# Patient Record
Sex: MALE | Race: WHITE | NOT HISPANIC OR LATINO | Employment: UNEMPLOYED | URBAN - METROPOLITAN AREA
[De-identification: names, ages, dates, MRNs, and addresses within clinical notes are randomized per-mention and may not be internally consistent; named-entity substitution may affect disease eponyms.]

---

## 2019-03-08 ENCOUNTER — OFFICE VISIT (OUTPATIENT)
Dept: URGENT CARE | Facility: URGENT CARE | Age: 34
End: 2019-03-08
Payer: MEDICAID

## 2019-03-08 VITALS — TEMPERATURE: 98.6 F | HEART RATE: 105 BPM | SYSTOLIC BLOOD PRESSURE: 117 MMHG | DIASTOLIC BLOOD PRESSURE: 72 MMHG

## 2019-03-08 DIAGNOSIS — S61.259A DOG BITE OF FINGER, INITIAL ENCOUNTER: Primary | ICD-10-CM
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
## 2019-03-08 PROCEDURE — 99203 OFFICE O/P NEW LOW 30 MIN: CPT

## 2019-03-08 RX ORDER — DOXYCYCLINE 100 MG/1
100 TABLET ORAL 2 TIMES DAILY
Qty: 14 TABLET | Refills: 0 | Status: SHIPPED | OUTPATIENT
Start: 2019-03-08 | End: 2019-03-15

## 2019-03-08 RX ORDER — NAPROXEN 500 MG/1
500 TABLET ORAL 2 TIMES DAILY WITH MEALS
Qty: 30 TABLET | Refills: 1 | Status: SHIPPED | OUTPATIENT
Start: 2019-03-08 | End: 2019-11-04

## 2019-03-08 RX ORDER — CLINDAMYCIN HCL 300 MG
300 CAPSULE ORAL 3 TIMES DAILY
Qty: 21 CAPSULE | Refills: 0 | Status: SHIPPED | OUTPATIENT
Start: 2019-03-08 | End: 2019-03-15

## 2019-03-09 NOTE — PATIENT INSTRUCTIONS
STOP Cephalexin.    Take antibiotics (Doxycycline + Clindamycin) as directed.    Follow-up with hand specialist.    Seek immediate medical attention if you develop fever, increasing swelling/redness, wound discharge, nausea/vomiting, diarrhea, or any other unusual symptoms.      Patient Education     Dog Bite  A dog bite can cause a wound deep enough to break the skin. In such cases, the wound is cleaned and sometimes closed. If the wound is closed, it is usually not completely closed. This is so that fluid can drain if the wound becomes infected. Often, wounds will be left open to heal. In addition to wound care, a tetanus shot may be given, if needed.    Home care    Wash your hands well with soap and warm water before and after caring for the wound. This helps lower the risk of infection.    Care for the wound as directed. If a dressing was applied to the wound, be sure to change it as directed.    If the wound bleeds, place a clean, soft cloth on the wound. Then firmly apply pressure until the bleeding stops. This may take up to 5 minutes. Do not release the pressure and look at the wound during this time.    Most wounds heal within 10 days. But an infection can occur even with proper treatment. So be sure to check the wound daily for signs of infection (see below).    Antibiotics may be prescribed. These help prevent or treat infection. If you re given antibiotics, take them as directed. Also be sure to complete the medicines.  Rabies prevention  Rabies is a virus that can be carried in certain animals. These can include domestic animals such as dogs and cats. Pets fully vaccinated against rabies (2 shots) are at very low risk of infection. But because human rabies is almost always fatal, any biting pet should be confined for 10 days as an extra precaution. In general, if there is a risk for rabies, the following steps may need to be taken:    If someone s pet dog has bitten you, it should be kept in a secure  area for the next 10 days to watch for signs of illness. (If the pet owner won t allow this, contact your local animal control center.) If the dog becomes ill or dies during that time, contact your local animal control center at once so the animal may be tested for rabies. If the dog stays healthy for the next 10 days, there is no danger of rabies in the animal or you.  ? If a stray dog bit you, contact your local animal control center. They can give information on capture, quarantine, and animal rabies testing.  ? If you can t find the animal that bit you in the next 2 days, and if rabies exists in your area, you may need to receive the rabies vaccine series. Call your healthcare provider right away. Or, return to the emergency department promptly.  ? All animal bites should be reported to the local animal control center. If you were not given a form to fill out, you can report this yourself.  Follow-up care  Follow up with your healthcare provider, or as directed.  When to seek medical advice  Call your healthcare provider right away if any of these occur:    Signs of infection:  ? Spreading redness or warmth from the wound  ? Increased pain or swelling  ? Fever of 100.4 F (38 C) or higher, or as directed by your healthcare provider  ? Colored fluid or pus draining from the wound    Signs of rabies infection:  ? Headache  ? Confusion  ? Strange behavior  ? Increased salivating and drooling  ? Seizure    Decreased ability to move any body part near the wound    Bleeding that can't be stopped after 5 minutes of firm pressure  Date Last Reviewed: 3/1/2017    4902-8218 [employer]. [address] [place] [address], [place] [id number]. All rights reserved. This information is not intended as a substitute for professional medical care. Always follow your healthcare professional's instructions.

## 2019-03-12 ENCOUNTER — TELEPHONE (OUTPATIENT)
Dept: ORTHOPEDICS | Facility: CLINIC | Age: 34
End: 2019-03-12

## 2019-03-12 NOTE — TELEPHONE ENCOUNTER
3/12/19 Ortho Con referral from Jonathan Tom for dog bite of rt thumb  3/12 LVM for pt to call back -LMK

## 2019-03-13 ASSESSMENT — ENCOUNTER SYMPTOMS
DIARRHEA: 0
VOMITING: 0
NAUSEA: 0
CHILLS: 0
SENSORY CHANGE: 0
FEVER: 0

## 2019-03-13 NOTE — PROGRESS NOTES
HPI    Patient comes in due to increasing pain at the right thumb after a dog bite on 3/6/2019.  There is also increasing swelling.  No redness or wound discharge.  Denies numbness/tingling.  Was seen at the ER on the same day of the dog bite and was prescribed with cephalexin and tramadol.  Patient is still in the middle of acquiring vaccination records of the dog involved in the incident.      Past Medical History:   Diagnosis Date     Anxiety      Depressive disorder      PTSD (post-traumatic stress disorder)        Review of Systems   Constitutional: Negative for chills, fever and malaise/fatigue.   Gastrointestinal: Negative for diarrhea, nausea and vomiting.   Neurological: Negative for sensory change.       /72   Pulse 105   Temp 98.6  F (37  C) (Tympanic)       Physical Exam   Constitutional: He is oriented to person, place, and time. No distress.   Pulmonary/Chest: Effort normal. No respiratory distress.   Musculoskeletal: Normal range of motion. He exhibits edema and tenderness.   Neurological: He is alert and oriented to person, place, and time. No sensory deficit.   Skin:   Scabbed punctate bite wounds at right thumb; no wound discharge   Vitals reviewed.        ICD-10-CM    1. Dog bite of finger, initial encounter S61.259A doxycycline monohydrate (ADOXA) 100 MG tablet    W54.0XXA clindamycin (CLEOCIN) 300 MG capsule     naproxen (NAPROSYN) 500 MG tablet     ORTHOPEDICS ADULT REFERRAL   **Informed patient that cephalexin is not recommended for prophylactic antibiotic treatment of dog bites; patient is allergic to amoxicillin so we cannot use Augmentin; informed the patient about the recommendation of doxycycline plus clindamycin as alternative prophylactic antibiotic treatment of his dog bite; will also start NSAIDs for pain and swelling; advised patient that surgical referral is recommended with any dog bite involving the face or hands      Patient Instructions   STOP Cephalexin.    Take  antibiotics (Doxycycline + Clindamycin) as directed.    Follow-up with hand specialist.    Seek immediate medical attention if you develop fever, increasing swelling/redness, wound discharge, nausea/vomiting, diarrhea, or any other unusual symptoms.      Patient Education     Dog Bite  A dog bite can cause a wound deep enough to break the skin. In such cases, the wound is cleaned and sometimes closed. If the wound is closed, it is usually not completely closed. This is so that fluid can drain if the wound becomes infected. Often, wounds will be left open to heal. In addition to wound care, a tetanus shot may be given, if needed.    Home care    Wash your hands well with soap and warm water before and after caring for the wound. This helps lower the risk of infection.    Care for the wound as directed. If a dressing was applied to the wound, be sure to change it as directed.    If the wound bleeds, place a clean, soft cloth on the wound. Then firmly apply pressure until the bleeding stops. This may take up to 5 minutes. Do not release the pressure and look at the wound during this time.    Most wounds heal within 10 days. But an infection can occur even with proper treatment. So be sure to check the wound daily for signs of infection (see below).    Antibiotics may be prescribed. These help prevent or treat infection. If you re given antibiotics, take them as directed. Also be sure to complete the medicines.  Rabies prevention  Rabies is a virus that can be carried in certain animals. These can include domestic animals such as dogs and cats. Pets fully vaccinated against rabies (2 shots) are at very low risk of infection. But because human rabies is almost always fatal, any biting pet should be confined for 10 days as an extra precaution. In general, if there is a risk for rabies, the following steps may need to be taken:    If someone s pet dog has bitten you, it should be kept in a secure area for the next 10 days  to watch for signs of illness. (If the pet owner won t allow this, contact your local animal control center.) If the dog becomes ill or dies during that time, contact your local animal control center at once so the animal may be tested for rabies. If the dog stays healthy for the next 10 days, there is no danger of rabies in the animal or you.  ? If a stray dog bit you, contact your local animal control center. They can give information on capture, quarantine, and animal rabies testing.  ? If you can t find the animal that bit you in the next 2 days, and if rabies exists in your area, you may need to receive the rabies vaccine series. Call your healthcare provider right away. Or, return to the emergency department promptly.  ? All animal bites should be reported to the local animal control center. If you were not given a form to fill out, you can report this yourself.  Follow-up care  Follow up with your healthcare provider, or as directed.  When to seek medical advice  Call your healthcare provider right away if any of these occur:    Signs of infection:  ? Spreading redness or warmth from the wound  ? Increased pain or swelling  ? Fever of 100.4 F (38 C) or higher, or as directed by your healthcare provider  ? Colored fluid or pus draining from the wound    Signs of rabies infection:  ? Headache  ? Confusion  ? Strange behavior  ? Increased salivating and drooling  ? Seizure    Decreased ability to move any body part near the wound    Bleeding that can't be stopped after 5 minutes of firm pressure  Date Last Reviewed: 3/1/2017    1813-5747 [employer]. [address] [place] [address], [place] [address]. All rights reserved. This information is not intended as a substitute for professional medical care. Always follow your healthcare professional's instructions.

## 2019-09-18 ENCOUNTER — NURSE TRIAGE (OUTPATIENT)
Dept: NURSING | Facility: CLINIC | Age: 34
End: 2019-09-18

## 2019-09-19 NOTE — TELEPHONE ENCOUNTER
"Girlfriend calling, brandin gave ok to talk with her. At 5pm got home from errands, states Brandin \"kind of passed out\" in the car \"twice\". Easily roused, all day feeling dizzy, and \"scatter brained\" and light headed. Patient talking in background seems to have delayed speech. Does not want to go d/t insurance issues and only works part time, but GF got him to agree to go.     Jasmyn Herrera RN  [facility]      Reason for Disposition    Difficult to awaken or acting confused (e.g., disoriented, slurred speech)    Additional Information    Negative: Still unconscious    Protocols used: [id number]-G-CB      " Routine  care and anticipatory guidance

## 2019-09-21 ENCOUNTER — NURSE TRIAGE (OUTPATIENT)
Dept: NURSING | Facility: CLINIC | Age: 34
End: 2019-09-21

## 2019-09-21 NOTE — TELEPHONE ENCOUNTER
Brandin is having cognitive issues.  Girlfriend told him was apparently unconscious and took a while to awaken.  Brandin is having trouble communicating.  Wednesday September 18th of this week is when Brandin was found slumping down on couch.  Brandin is going to go to ED as he is having troubles communicating and is wanting ED to be aware of this as he is not able to sit long.  Brandin feels he may have had a stroke.    Reason for Disposition    Headache  (and neurologic deficit)    Additional Information    Negative: [1] SEVERE weakness (i.e., unable to walk or barely able to walk, requires support) AND [2] new onset or worsening    Negative: [1] Numbness (i.e., loss of sensation) of the face, arm / hand, or leg / foot on one side of the body AND [2] sudden onset AND [3] present now    Negative: [1] Loss of speech or garbled speech AND [2] sudden onset AND [3] present now    Negative: Difficult to awaken or acting confused (e.g., disoriented, slurred speech)    Negative: Sounds like a life-threatening emergency to the triager    Negative: [1] Weakness (i.e., paralysis, loss of muscle strength) of the face, arm / hand, or leg / foot on one side of the body AND [2] sudden onset AND [3] present now    Protocols used: NEUROLOGIC DEFICIT-A-AH

## 2019-11-04 ENCOUNTER — HOSPITAL ENCOUNTER (INPATIENT)
Facility: CLINIC | Age: 34
LOS: 2 days | Discharge: HOME | End: 2019-11-06
Attending: EMERGENCY MEDICINE | Admitting: INTERNAL MEDICINE
Payer: MEDICAID

## 2019-11-04 DIAGNOSIS — R11.2 INTRACTABLE VOMITING WITH NAUSEA, UNSPECIFIED VOMITING TYPE: ICD-10-CM

## 2019-11-04 DIAGNOSIS — E80.6 CONJUGATED HYPERBILIRUBINEMIA: ICD-10-CM

## 2019-11-04 DIAGNOSIS — K92.0 HEMATEMESIS WITH NAUSEA: ICD-10-CM

## 2019-11-04 DIAGNOSIS — K70.10 ALCOHOLIC HEPATITIS WITHOUT ASCITES (H): ICD-10-CM

## 2019-11-04 DIAGNOSIS — F32.A DEPRESSION, UNSPECIFIED DEPRESSION TYPE: ICD-10-CM

## 2019-11-04 DIAGNOSIS — F10.920 ALCOHOLIC INTOXICATION WITHOUT COMPLICATION (H): ICD-10-CM

## 2019-11-04 DIAGNOSIS — R45.851 SUICIDAL THOUGHTS: ICD-10-CM

## 2019-11-04 LAB
ALBUMIN SERPL-MCNC: 5 G/DL (ref 3.4–5)
ALBUMIN UR-MCNC: 30 MG/DL
ALP SERPL-CCNC: 126 U/L (ref 40–150)
ALT SERPL W P-5'-P-CCNC: 85 U/L (ref 0–70)
ANION GAP SERPL CALCULATED.3IONS-SCNC: 27 MMOL/L (ref 3–14)
APPEARANCE UR: CLEAR
AST SERPL W P-5'-P-CCNC: 124 U/L (ref 0–45)
BASOPHILS # BLD AUTO: 0.1 10E9/L (ref 0–0.2)
BASOPHILS NFR BLD AUTO: 1 %
BILIRUB DIRECT SERPL-MCNC: 1.1 MG/DL (ref 0–0.2)
BILIRUB SERPL-MCNC: 2.5 MG/DL (ref 0.2–1.3)
BILIRUB UR QL STRIP: NEGATIVE
BUN SERPL-MCNC: 12 MG/DL (ref 7–30)
CALCIUM SERPL-MCNC: 9.3 MG/DL (ref 8.5–10.1)
CHLORIDE SERPL-SCNC: 97 MMOL/L (ref 94–109)
CO2 SERPL-SCNC: 12 MMOL/L (ref 20–32)
COLOR UR AUTO: YELLOW
CREAT SERPL-MCNC: 0.86 MG/DL (ref 0.66–1.25)
DIFFERENTIAL METHOD BLD: ABNORMAL
EOSINOPHIL # BLD AUTO: 0 10E9/L (ref 0–0.7)
EOSINOPHIL NFR BLD AUTO: 0 %
ERYTHROCYTE [DISTWIDTH] IN BLOOD BY AUTOMATED COUNT: 14.4 % (ref 10–15)
ETHANOL SERPL-MCNC: 0.31 G/DL
GFR SERPL CREATININE-BSD FRML MDRD: >90 ML/MIN/{1.73_M2}
GLUCOSE SERPL-MCNC: 78 MG/DL (ref 70–99)
GLUCOSE UR STRIP-MCNC: NEGATIVE MG/DL
HCT VFR BLD AUTO: 52.5 % (ref 40–53)
HGB BLD-MCNC: 14.6 G/DL (ref 13.3–17.7)
HGB BLD-MCNC: 19 G/DL (ref 13.3–17.7)
HGB UR QL STRIP: NEGATIVE
HYALINE CASTS #/AREA URNS LPF: 7 /LPF (ref 0–2)
KETONES BLD-SCNC: 5 MMOL/L (ref 0–0.6)
KETONES UR STRIP-MCNC: >150 MG/DL
LACTATE BLD-SCNC: 10.3 MMOL/L (ref 0.7–2)
LEUKOCYTE ESTERASE UR QL STRIP: NEGATIVE
LIPASE SERPL-CCNC: 363 U/L (ref 73–393)
LYMPHOCYTES # BLD AUTO: 1.1 10E9/L (ref 0.8–5.3)
LYMPHOCYTES NFR BLD AUTO: 11 %
MCH RBC QN AUTO: 37.7 PG (ref 26.5–33)
MCHC RBC AUTO-ENTMCNC: 36.2 G/DL (ref 31.5–36.5)
MCV RBC AUTO: 104 FL (ref 78–100)
MONOCYTES # BLD AUTO: 0.3 10E9/L (ref 0–1.3)
MONOCYTES NFR BLD AUTO: 3 %
MUCOUS THREADS #/AREA URNS LPF: PRESENT /LPF
NEUTROPHILS # BLD AUTO: 8.8 10E9/L (ref 1.6–8.3)
NEUTROPHILS NFR BLD AUTO: 85 %
NITRATE UR QL: NEGATIVE
PH UR STRIP: 5 PH (ref 5–7)
PLATELET # BLD AUTO: 287 10E9/L (ref 150–450)
PLATELET # BLD EST: ABNORMAL 10*3/UL
POTASSIUM SERPL-SCNC: 3.9 MMOL/L (ref 3.4–5.3)
PROT SERPL-MCNC: 9.3 G/DL (ref 6.8–8.8)
RBC # BLD AUTO: 5.04 10E12/L (ref 4.4–5.9)
RBC #/AREA URNS AUTO: <1 /HPF (ref 0–2)
RBC MORPH BLD: ABNORMAL
SODIUM SERPL-SCNC: 136 MMOL/L (ref 133–144)
SOURCE: ABNORMAL
SP GR UR STRIP: 1.02 (ref 1–1.03)
SQUAMOUS #/AREA URNS AUTO: <1 /HPF (ref 0–1)
UROBILINOGEN UR STRIP-MCNC: NORMAL MG/DL (ref 0–2)
WBC # BLD AUTO: 10.4 10E9/L (ref 4–11)
WBC #/AREA URNS AUTO: <1 /HPF (ref 0–5)

## 2019-11-04 PROCEDURE — 81001 URINALYSIS AUTO W/SCOPE: CPT | Performed by: EMERGENCY MEDICINE

## 2019-11-04 PROCEDURE — 25000128 H RX IP 250 OP 636: Performed by: INTERNAL MEDICINE

## 2019-11-04 PROCEDURE — C9113 INJ PANTOPRAZOLE SODIUM, VIA: HCPCS | Performed by: INTERNAL MEDICINE

## 2019-11-04 PROCEDURE — 99285 EMERGENCY DEPT VISIT HI MDM: CPT | Mod: 25

## 2019-11-04 PROCEDURE — 83690 ASSAY OF LIPASE: CPT | Performed by: EMERGENCY MEDICINE

## 2019-11-04 PROCEDURE — 96361 HYDRATE IV INFUSION ADD-ON: CPT

## 2019-11-04 PROCEDURE — 25000132 ZZH RX MED GY IP 250 OP 250 PS 637: Performed by: NURSE PRACTITIONER

## 2019-11-04 PROCEDURE — 25800030 ZZH RX IP 258 OP 636: Performed by: INTERNAL MEDICINE

## 2019-11-04 PROCEDURE — 25000128 H RX IP 250 OP 636: Performed by: NURSE PRACTITIONER

## 2019-11-04 PROCEDURE — 83605 ASSAY OF LACTIC ACID: CPT | Performed by: INTERNAL MEDICINE

## 2019-11-04 PROCEDURE — HZ2ZZZZ DETOXIFICATION SERVICES FOR SUBSTANCE ABUSE TREATMENT: ICD-10-PCS | Performed by: EMERGENCY MEDICINE

## 2019-11-04 PROCEDURE — 99223 1ST HOSP IP/OBS HIGH 75: CPT | Mod: AI | Performed by: INTERNAL MEDICINE

## 2019-11-04 PROCEDURE — 25800030 ZZH RX IP 258 OP 636: Performed by: EMERGENCY MEDICINE

## 2019-11-04 PROCEDURE — 25000128 H RX IP 250 OP 636: Performed by: EMERGENCY MEDICINE

## 2019-11-04 PROCEDURE — 85018 HEMOGLOBIN: CPT | Performed by: INTERNAL MEDICINE

## 2019-11-04 PROCEDURE — 25000132 ZZH RX MED GY IP 250 OP 250 PS 637: Performed by: INTERNAL MEDICINE

## 2019-11-04 PROCEDURE — 96375 TX/PRO/DX INJ NEW DRUG ADDON: CPT

## 2019-11-04 PROCEDURE — 12000000 ZZH R&B MED SURG/OB

## 2019-11-04 PROCEDURE — 80076 HEPATIC FUNCTION PANEL: CPT | Performed by: EMERGENCY MEDICINE

## 2019-11-04 PROCEDURE — 25800030 ZZH RX IP 258 OP 636: Performed by: NURSE PRACTITIONER

## 2019-11-04 PROCEDURE — 36415 COLL VENOUS BLD VENIPUNCTURE: CPT | Performed by: INTERNAL MEDICINE

## 2019-11-04 PROCEDURE — 25000125 ZZHC RX 250: Performed by: EMERGENCY MEDICINE

## 2019-11-04 PROCEDURE — 82010 KETONE BODYS QUAN: CPT | Performed by: EMERGENCY MEDICINE

## 2019-11-04 PROCEDURE — 99291 CRITICAL CARE FIRST HOUR: CPT | Performed by: NURSE PRACTITIONER

## 2019-11-04 PROCEDURE — 96365 THER/PROPH/DIAG IV INF INIT: CPT

## 2019-11-04 PROCEDURE — 80320 DRUG SCREEN QUANTALCOHOLS: CPT | Performed by: EMERGENCY MEDICINE

## 2019-11-04 PROCEDURE — 25000132 ZZH RX MED GY IP 250 OP 250 PS 637: Performed by: EMERGENCY MEDICINE

## 2019-11-04 PROCEDURE — 85025 COMPLETE CBC W/AUTO DIFF WBC: CPT | Performed by: EMERGENCY MEDICINE

## 2019-11-04 PROCEDURE — 80048 BASIC METABOLIC PNL TOTAL CA: CPT | Performed by: EMERGENCY MEDICINE

## 2019-11-04 RX ORDER — LORAZEPAM 2 MG/ML
1-2 INJECTION INTRAMUSCULAR EVERY 30 MIN PRN
Status: DISCONTINUED | OUTPATIENT
Start: 2019-11-04 | End: 2019-11-06 | Stop reason: HOSPADM

## 2019-11-04 RX ORDER — PANTOPRAZOLE SODIUM 40 MG/1
40 TABLET, DELAYED RELEASE ORAL ONCE
Status: COMPLETED | OUTPATIENT
Start: 2019-11-04 | End: 2019-11-04

## 2019-11-04 RX ORDER — LIDOCAINE 40 MG/G
CREAM TOPICAL
Status: DISCONTINUED | OUTPATIENT
Start: 2019-11-04 | End: 2019-11-06 | Stop reason: HOSPADM

## 2019-11-04 RX ORDER — MULTIPLE VITAMINS W/ MINERALS TAB 9MG-400MCG
1 TAB ORAL DAILY
Status: DISCONTINUED | OUTPATIENT
Start: 2019-11-05 | End: 2019-11-06 | Stop reason: HOSPADM

## 2019-11-04 RX ORDER — PROCHLORPERAZINE MALEATE 5 MG
10 TABLET ORAL EVERY 6 HOURS PRN
Status: DISCONTINUED | OUTPATIENT
Start: 2019-11-04 | End: 2019-11-06 | Stop reason: HOSPADM

## 2019-11-04 RX ORDER — PROCHLORPERAZINE 25 MG
25 SUPPOSITORY, RECTAL RECTAL EVERY 12 HOURS PRN
Status: DISCONTINUED | OUTPATIENT
Start: 2019-11-04 | End: 2019-11-06 | Stop reason: HOSPADM

## 2019-11-04 RX ORDER — FOLIC ACID 1 MG/1
1 TABLET ORAL DAILY
Status: DISCONTINUED | OUTPATIENT
Start: 2019-11-05 | End: 2019-11-06 | Stop reason: HOSPADM

## 2019-11-04 RX ORDER — POTASSIUM CHLORIDE 29.8 MG/ML
20 INJECTION INTRAVENOUS
Status: DISCONTINUED | OUTPATIENT
Start: 2019-11-04 | End: 2019-11-06 | Stop reason: HOSPADM

## 2019-11-04 RX ORDER — POTASSIUM CHLORIDE 1.5 G/1.58G
20-40 POWDER, FOR SOLUTION ORAL
Status: DISCONTINUED | OUTPATIENT
Start: 2019-11-04 | End: 2019-11-06 | Stop reason: HOSPADM

## 2019-11-04 RX ORDER — ONDANSETRON 2 MG/ML
4 INJECTION INTRAMUSCULAR; INTRAVENOUS EVERY 6 HOURS PRN
Status: DISCONTINUED | OUTPATIENT
Start: 2019-11-04 | End: 2019-11-06 | Stop reason: HOSPADM

## 2019-11-04 RX ORDER — NICOTINE 21 MG/24HR
1 PATCH, TRANSDERMAL 24 HOURS TRANSDERMAL DAILY
Status: DISCONTINUED | OUTPATIENT
Start: 2019-11-04 | End: 2019-11-06 | Stop reason: HOSPADM

## 2019-11-04 RX ORDER — LORAZEPAM 2 MG/ML
1 INJECTION INTRAMUSCULAR ONCE
Status: COMPLETED | OUTPATIENT
Start: 2019-11-04 | End: 2019-11-04

## 2019-11-04 RX ORDER — GABAPENTIN 300 MG/1
300 CAPSULE ORAL 3 TIMES DAILY
Status: DISCONTINUED | OUTPATIENT
Start: 2019-11-04 | End: 2019-11-06 | Stop reason: HOSPADM

## 2019-11-04 RX ORDER — POTASSIUM CHLORIDE 7.45 MG/ML
10 INJECTION INTRAVENOUS
Status: DISCONTINUED | OUTPATIENT
Start: 2019-11-04 | End: 2019-11-06 | Stop reason: HOSPADM

## 2019-11-04 RX ORDER — SODIUM CHLORIDE, SODIUM LACTATE, POTASSIUM CHLORIDE, CALCIUM CHLORIDE 600; 310; 30; 20 MG/100ML; MG/100ML; MG/100ML; MG/100ML
INJECTION, SOLUTION INTRAVENOUS CONTINUOUS
Status: DISCONTINUED | OUTPATIENT
Start: 2019-11-04 | End: 2019-11-06 | Stop reason: HOSPADM

## 2019-11-04 RX ORDER — ONDANSETRON 4 MG/1
4 TABLET, ORALLY DISINTEGRATING ORAL EVERY 6 HOURS PRN
Status: DISCONTINUED | OUTPATIENT
Start: 2019-11-04 | End: 2019-11-06 | Stop reason: HOSPADM

## 2019-11-04 RX ORDER — LANOLIN ALCOHOL/MO/W.PET/CERES
100 CREAM (GRAM) TOPICAL DAILY
Status: DISCONTINUED | OUTPATIENT
Start: 2019-11-05 | End: 2019-11-06 | Stop reason: HOSPADM

## 2019-11-04 RX ORDER — NALOXONE HYDROCHLORIDE 0.4 MG/ML
.1-.4 INJECTION, SOLUTION INTRAMUSCULAR; INTRAVENOUS; SUBCUTANEOUS
Status: DISCONTINUED | OUTPATIENT
Start: 2019-11-04 | End: 2019-11-06 | Stop reason: HOSPADM

## 2019-11-04 RX ORDER — ONDANSETRON 2 MG/ML
4 INJECTION INTRAMUSCULAR; INTRAVENOUS ONCE
Status: COMPLETED | OUTPATIENT
Start: 2019-11-04 | End: 2019-11-04

## 2019-11-04 RX ORDER — POTASSIUM CL/LIDO/0.9 % NACL 10MEQ/0.1L
10 INTRAVENOUS SOLUTION, PIGGYBACK (ML) INTRAVENOUS
Status: DISCONTINUED | OUTPATIENT
Start: 2019-11-04 | End: 2019-11-06 | Stop reason: HOSPADM

## 2019-11-04 RX ORDER — LORAZEPAM 1 MG/1
1-2 TABLET ORAL EVERY 30 MIN PRN
Status: DISCONTINUED | OUTPATIENT
Start: 2019-11-04 | End: 2019-11-06 | Stop reason: HOSPADM

## 2019-11-04 RX ORDER — POTASSIUM CHLORIDE 1500 MG/1
20-40 TABLET, EXTENDED RELEASE ORAL
Status: DISCONTINUED | OUTPATIENT
Start: 2019-11-04 | End: 2019-11-06 | Stop reason: HOSPADM

## 2019-11-04 RX ORDER — CEFTRIAXONE 1 G/1
1 INJECTION, POWDER, FOR SOLUTION INTRAMUSCULAR; INTRAVENOUS EVERY 24 HOURS
Status: DISCONTINUED | OUTPATIENT
Start: 2019-11-04 | End: 2019-11-06

## 2019-11-04 RX ADMIN — ONDANSETRON 4 MG: 2 INJECTION INTRAMUSCULAR; INTRAVENOUS at 20:49

## 2019-11-04 RX ADMIN — PANTOPRAZOLE SODIUM 40 MG: 40 INJECTION, POWDER, FOR SOLUTION INTRAVENOUS at 21:25

## 2019-11-04 RX ADMIN — SODIUM CHLORIDE 1000 ML: 9 INJECTION, SOLUTION INTRAVENOUS at 16:15

## 2019-11-04 RX ADMIN — Medication 1 LOZENGE: at 21:24

## 2019-11-04 RX ADMIN — LORAZEPAM 1 MG: 1 TABLET ORAL at 20:30

## 2019-11-04 RX ADMIN — NICOTINE 1 PATCH: 21 PATCH, EXTENDED RELEASE TRANSDERMAL at 19:02

## 2019-11-04 RX ADMIN — CEFTRIAXONE SODIUM 1 G: 1 INJECTION, POWDER, FOR SOLUTION INTRAMUSCULAR; INTRAVENOUS at 20:26

## 2019-11-04 RX ADMIN — SODIUM CHLORIDE, POTASSIUM CHLORIDE, SODIUM LACTATE AND CALCIUM CHLORIDE: 600; 310; 30; 20 INJECTION, SOLUTION INTRAVENOUS at 23:27

## 2019-11-04 RX ADMIN — GABAPENTIN 300 MG: 300 CAPSULE ORAL at 21:25

## 2019-11-04 RX ADMIN — ONDANSETRON 4 MG: 2 INJECTION INTRAMUSCULAR; INTRAVENOUS at 14:53

## 2019-11-04 RX ADMIN — LORAZEPAM 2 MG: 2 INJECTION, SOLUTION INTRAMUSCULAR; INTRAVENOUS at 23:25

## 2019-11-04 RX ADMIN — LORAZEPAM 1 MG: 2 INJECTION, SOLUTION INTRAMUSCULAR; INTRAVENOUS at 16:09

## 2019-11-04 RX ADMIN — PANTOPRAZOLE SODIUM 40 MG: 40 TABLET, DELAYED RELEASE ORAL at 14:53

## 2019-11-04 RX ADMIN — OCTREOTIDE ACETATE 50 MCG/HR: 200 INJECTION, SOLUTION INTRAVENOUS; SUBCUTANEOUS at 21:14

## 2019-11-04 RX ADMIN — PROCHLORPERAZINE EDISYLATE 10 MG: 5 INJECTION, SOLUTION INTRAMUSCULAR; INTRAVENOUS at 18:38

## 2019-11-04 RX ADMIN — SODIUM CHLORIDE, POTASSIUM CHLORIDE, SODIUM LACTATE AND CALCIUM CHLORIDE 2970 ML: 600; 310; 30; 20 INJECTION, SOLUTION INTRAVENOUS at 20:05

## 2019-11-04 RX ADMIN — LORAZEPAM 2 MG: 2 INJECTION, SOLUTION INTRAMUSCULAR; INTRAVENOUS at 21:24

## 2019-11-04 RX ADMIN — FOLIC ACID: 5 INJECTION, SOLUTION INTRAMUSCULAR; INTRAVENOUS; SUBCUTANEOUS at 17:37

## 2019-11-04 RX ADMIN — SODIUM CHLORIDE 1000 ML: 9 INJECTION, SOLUTION INTRAVENOUS at 14:53

## 2019-11-04 ASSESSMENT — ACTIVITIES OF DAILY LIVING (ADL)
BATHING: 0-->INDEPENDENT
FALL_HISTORY_WITHIN_LAST_SIX_MONTHS: NO
TRANSFERRING: 0-->INDEPENDENT
TOILETING: 0-->INDEPENDENT
DRESS: 0-->INDEPENDENT
ADLS_ACUITY_SCORE: 15
RETIRED_COMMUNICATION: 0-->UNDERSTANDS/COMMUNICATES WITHOUT DIFFICULTY
RETIRED_EATING: 0-->INDEPENDENT
SWALLOWING: 0-->SWALLOWS FOODS/LIQUIDS WITHOUT DIFFICULTY
COGNITION: 0 - NO COGNITION ISSUES REPORTED
AMBULATION: 0-->INDEPENDENT

## 2019-11-04 ASSESSMENT — ENCOUNTER SYMPTOMS
NAUSEA: 1
VOMITING: 1
SHORTNESS OF BREATH: 0
DYSPHORIC MOOD: 1

## 2019-11-04 NOTE — PHARMACY-ADMISSION MEDICATION HISTORY
Admission medication history interview status for the 11/4/2019  admission is complete. See EPIC admission navigator for prior to admission medications     Medication history source reliability:Moderate    Actions taken by pharmacist (provider contacted, etc):None     Additional medication history information not noted on PTA med list :None    Medication reconciliation/reorder completed by provider prior to medication history?     Prior to Admission medications    None

## 2019-11-04 NOTE — ED TRIAGE NOTES
Pt originally brought to room 25 - while receiving report it was evident to me that the pt needs to be in the mental health area if possible- transferred to 18. Report given to MG

## 2019-11-04 NOTE — ED NOTES
"[facility]  ED Nurse Handoff Report    ED Chief complaint: Hematemesis      ED Diagnosis:   Final diagnoses:   Alcoholic intoxication without complication (H)   Alcoholic hepatitis without ascites   Conjugated hyperbilirubinemia   Intractable vomiting with nausea, unspecified vomiting type   Hematemesis with nausea   Depression, unspecified depression type   Suicidal thoughts       Code Status: Full Code    Allergies:   Allergies   Allergen Reactions     Amoxicillin      Bactrim [Sulfamethoxazole W-Trimethoprim]        Activity level - Baseline/Home:  Independent  Activity Level - Current:   Independent    Patient's Preferred language: english     Needed?: No    Isolation: No  Infection: Not Applicable  Bariatric?: No    Vital Signs:   Vitals:    11/04/19 1600 11/04/19 1620 11/04/19 1630 11/04/19 1700   BP: (!) 154/107 (!) 160/101 135/87 (!) 160/101   Pulse: 157  103 108   Resp:       Temp:       TempSrc:       SpO2: 96% 96% 97% 96%       Cardiac Rhythm: ,        Pain level: 0-10 Pain Scale: 5    Is this patient confused?: No   Does this patient have a guardian?  No         If yes, is there guardianship documents in the Epic \"Code/ACP\" activity?  N/A         Guardian Notified?  N/A  [place] - Suicide Severity Rating Scale Completed?  Yes  If yes, what color did the patient score?  Red    Patient Report: Initial Complaint: vomiting  Focused Assessment:  34 year old male, with history of alcohol abuse, alcohol withdrawal and anxiety amongst others, who presents alone for evaluation of noted hematemesis since this morning. Patient does admit that he is a chronic drinker, 1 pint of vodka daily and admits to some alcohol this morning, but endorses that it was less than he normally drinks  but noted that his emesis was dark in hue with some bright red blood, which prompted him to come to ED.Patient also admits to long, complicated history of mental health issues and domestic issues. He states " that he has had ongoing suicidal ideation since the death of his daughter 12 years ago.   I interviewed the patient 2 additional times after the initial intake, the patient denies current suicidal ideation.  He notes that even when he thinks transiently about suicide, he generally does not want to go down that pathway and does not spend any time planning. Pt does not require sitter. Pt will need psych eval after medically stable     Tests Performed: labs  Abnormal Results:   Results for orders placed or performed during the hospital encounter of 11/04/19   CBC with platelets differential     Status: Abnormal   Result Value Ref Range    WBC 10.4 4.0 - 11.0 10e9/L    RBC Count 5.04 4.4 - 5.9 10e12/L    Hemoglobin 19.0 (H) 13.3 - 17.7 g/dL    Hematocrit 52.5 40.0 - 53.0 %     (H) 78 - 100 fl    MCH 37.7 (H) 26.5 - 33.0 pg    MCHC 36.2 31.5 - 36.5 g/dL    RDW 14.4 10.0 - 15.0 %    Platelet Count 287 150 - 450 10e9/L    Diff Method Manual Differential     % Neutrophils 85.0 %    % Lymphocytes 11.0 %    % Monocytes 3.0 %    % Eosinophils 0.0 %    % Basophils 1.0 %    Absolute Neutrophil 8.8 (H) 1.6 - 8.3 10e9/L    Absolute Lymphocytes 1.1 0.8 - 5.3 10e9/L    Absolute Monocytes 0.3 0.0 - 1.3 10e9/L    Absolute Eosinophils 0.0 0.0 - 0.7 10e9/L    Absolute Basophils 0.1 0.0 - 0.2 10e9/L    RBC Morphology Consistent with reported results     Platelet Estimate       Automated count confirmed.  Platelet morphology is normal.   Basic metabolic panel     Status: Abnormal   Result Value Ref Range    Sodium 136 133 - 144 mmol/L    Potassium 3.9 3.4 - 5.3 mmol/L    Chloride 97 94 - 109 mmol/L    Carbon Dioxide 12 (L) 20 - 32 mmol/L    Anion Gap 27 (H) 3 - 14 mmol/L    Glucose 78 70 - 99 mg/dL    Urea Nitrogen 12 7 - 30 mg/dL    Creatinine 0.86 0.66 - 1.25 mg/dL    GFR Estimate >90 >60 mL/min/[1.73_m2]    GFR Estimate If Black >90 >60 mL/min/[1.73_m2]    Calcium 9.3 8.5 - 10.1 mg/dL   Hepatic panel     Status: Abnormal    Result Value Ref Range    Bilirubin Direct 1.1 (H) 0.0 - 0.2 mg/dL    Bilirubin Total 2.5 (H) 0.2 - 1.3 mg/dL    Albumin 5.0 3.4 - 5.0 g/dL    Protein Total 9.3 (H) 6.8 - 8.8 g/dL    Alkaline Phosphatase 126 40 - 150 U/L    ALT 85 (H) 0 - 70 U/L     (H) 0 - 45 U/L   Lipase     Status: None   Result Value Ref Range    Lipase 363 73 - 393 U/L   Alcohol level blood     Status: Abnormal   Result Value Ref Range    Ethanol g/dL 0.31 (HH) <0.01 g/dL   UA with Microscopic     Status: Abnormal   Result Value Ref Range    Color Urine Yellow     Appearance Urine Clear     Glucose Urine Negative NEG^Negative mg/dL    Bilirubin Urine Negative NEG^Negative    Ketones Urine >150 (A) NEG^Negative mg/dL    Specific Gravity Urine 1.021 1.003 - 1.035    Blood Urine Negative NEG^Negative    pH Urine 5.0 5.0 - 7.0 pH    Protein Albumin Urine 30 (A) NEG^Negative mg/dL    Urobilinogen mg/dL Normal 0.0 - 2.0 mg/dL    Nitrite Urine Negative NEG^Negative    Leukocyte Esterase Urine Negative NEG^Negative    Source Midstream Urine     WBC Urine <1 0 - 5 /HPF    RBC Urine <1 0 - 2 /HPF    Squamous Epithelial /HPF Urine <1 0 - 1 /HPF    Mucous Urine Present (A) NEG^Negative /LPF    Hyaline Casts 7 (H) 0 - 2 /LPF   Ketone Beta-Hydroxybutyrate Quantitative     Status: Abnormal   Result Value Ref Range    Ketone Quantitative 5.0 (HH) 0.0 - 0.6 mmol/L       Treatments provided: fluids    Family Comments: no family here    OBS brochure/video discussed/provided to patient/family: No              Name of person given brochure if not patient:               Relationship to patient:     ED Medications:   Medications   prochlorperazine (COMPAZINE) injection 10 mg (10 mg Intravenous Not Given 11/4/19 3568)   0.9% sodium chloride BOLUS (0 mLs Intravenous Stopped 11/4/19 1612)   pantoprazole (PROTONIX) EC tablet 40 mg (40 mg Oral Given 11/4/19 1453)   ondansetron (ZOFRAN) injection 4 mg (4 mg Intravenous Given 11/4/19 1453)   0.9% sodium chloride  BOLUS (1,000 mLs Intravenous New Bag 11/4/19 1615)   dextrose 5% and 0.9% NaCl 1,000 mL with INFUVITE ADULT 10 mL, thiamine 100 mg, folic acid 1 mg, magnesium sulfate 2 g infusion ( Intravenous New Bag 11/4/19 8616)   LORazepam (ATIVAN) injection 1 mg (1 mg Intravenous Given 11/4/19 1602)       Drips infusing?:  Yes    For the majority of the shift this patient was Green.   Interventions performed were .    Severe Sepsis OR Septic Shock Diagnosis Present: No    To be done/followed up on inpatient unit:      ED NURSE PHONE NUMBER: [phone]

## 2019-11-04 NOTE — ED NOTES
DATE:  11/4/2019   TIME OF RECEIPT FROM LAB:  5245  LAB TEST:  Serum Ketone  LAB VALUE:  5.0  RESULTS GIVEN WITH READ-BACK TO (PROVIDER): Tremayne Saul/ Dr. Anguiano  TIME LAB VALUE REPORTED TO PROVIDER:   0040

## 2019-11-04 NOTE — ED NOTES
Bed: ED18  Expected date:   Expected time:   Means of arrival:   Comments:  Room 25, bloody emesis also run risk

## 2019-11-04 NOTE — LETTER
[place] [address] [facility]  [address] [place] [address]  [phone]          November 6, 2019    RE:  Brandin Rodriguez                                                                                                                                                       [address]  [facility] [address]            To whom it may concern:    Brandin Rodriguez was under my professional care from 11/4/19-11/6/19.  He may return to work on 11/8/19 without any restrictions       Sincerely,        Jerod Arzate DO
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
## 2019-11-05 LAB
ALBUMIN SERPL-MCNC: 3.1 G/DL (ref 3.4–5)
ALBUMIN SERPL-MCNC: 3.4 G/DL (ref 3.4–5)
ALP SERPL-CCNC: 65 U/L (ref 40–150)
ALP SERPL-CCNC: 76 U/L (ref 40–150)
ALT SERPL W P-5'-P-CCNC: 43 U/L (ref 0–70)
ALT SERPL W P-5'-P-CCNC: 53 U/L (ref 0–70)
ANION GAP SERPL CALCULATED.3IONS-SCNC: 15 MMOL/L (ref 3–14)
ANION GAP SERPL CALCULATED.3IONS-SCNC: 7 MMOL/L (ref 3–14)
AST SERPL W P-5'-P-CCNC: 54 U/L (ref 0–45)
AST SERPL W P-5'-P-CCNC: 70 U/L (ref 0–45)
BASE DEFICIT BLDV-SCNC: 10.4 MMOL/L
BILIRUB SERPL-MCNC: 2.6 MG/DL (ref 0.2–1.3)
BILIRUB SERPL-MCNC: 3.3 MG/DL (ref 0.2–1.3)
BUN SERPL-MCNC: 6 MG/DL (ref 7–30)
BUN SERPL-MCNC: 7 MG/DL (ref 7–30)
CALCIUM SERPL-MCNC: 7.9 MG/DL (ref 8.5–10.1)
CALCIUM SERPL-MCNC: 8 MG/DL (ref 8.5–10.1)
CHLORIDE SERPL-SCNC: 101 MMOL/L (ref 94–109)
CHLORIDE SERPL-SCNC: 102 MMOL/L (ref 94–109)
CO2 SERPL-SCNC: 16 MMOL/L (ref 20–32)
CO2 SERPL-SCNC: 23 MMOL/L (ref 20–32)
CREAT SERPL-MCNC: 0.7 MG/DL (ref 0.66–1.25)
CREAT SERPL-MCNC: 0.73 MG/DL (ref 0.66–1.25)
ERYTHROCYTE [DISTWIDTH] IN BLOOD BY AUTOMATED COUNT: 14.7 % (ref 10–15)
GFR SERPL CREATININE-BSD FRML MDRD: >90 ML/MIN/{1.73_M2}
GFR SERPL CREATININE-BSD FRML MDRD: >90 ML/MIN/{1.73_M2}
GLUCOSE SERPL-MCNC: 137 MG/DL (ref 70–99)
GLUCOSE SERPL-MCNC: 157 MG/DL (ref 70–99)
HCO3 BLDV-SCNC: 14 MMOL/L (ref 21–28)
HCT VFR BLD AUTO: 38.3 % (ref 40–53)
HGB BLD-MCNC: 13.5 G/DL (ref 13.3–17.7)
LACTATE BLD-SCNC: 0.9 MMOL/L (ref 0.7–2)
LACTATE BLD-SCNC: 5.1 MMOL/L (ref 0.7–2)
MAGNESIUM SERPL-MCNC: 2.2 MG/DL (ref 1.6–2.3)
MCH RBC QN AUTO: 36.4 PG (ref 26.5–33)
MCHC RBC AUTO-ENTMCNC: 35.2 G/DL (ref 31.5–36.5)
MCV RBC AUTO: 103 FL (ref 78–100)
OXYHGB MFR BLDV: 87 %
PCO2 BLDV: 27 MM HG (ref 40–50)
PH BLDV: 7.33 PH (ref 7.32–7.43)
PLATELET # BLD AUTO: 142 10E9/L (ref 150–450)
PO2 BLDV: 57 MM HG (ref 25–47)
POTASSIUM SERPL-SCNC: 4.1 MMOL/L (ref 3.4–5.3)
POTASSIUM SERPL-SCNC: 4.5 MMOL/L (ref 3.4–5.3)
PROT SERPL-MCNC: 5.6 G/DL (ref 6.8–8.8)
PROT SERPL-MCNC: 6.1 G/DL (ref 6.8–8.8)
RBC # BLD AUTO: 3.71 10E12/L (ref 4.4–5.9)
SODIUM SERPL-SCNC: 132 MMOL/L (ref 133–144)
SODIUM SERPL-SCNC: 132 MMOL/L (ref 133–144)
UPPER GI ENDOSCOPY: NORMAL
WBC # BLD AUTO: 7.6 10E9/L (ref 4–11)

## 2019-11-05 PROCEDURE — 25000128 H RX IP 250 OP 636: Performed by: INTERNAL MEDICINE

## 2019-11-05 PROCEDURE — 82805 BLOOD GASES W/O2 SATURATION: CPT | Performed by: NURSE PRACTITIONER

## 2019-11-05 PROCEDURE — 36415 COLL VENOUS BLD VENIPUNCTURE: CPT | Performed by: NURSE PRACTITIONER

## 2019-11-05 PROCEDURE — 43235 EGD DIAGNOSTIC BRUSH WASH: CPT | Performed by: INTERNAL MEDICINE

## 2019-11-05 PROCEDURE — 80053 COMPREHEN METABOLIC PANEL: CPT | Performed by: INTERNAL MEDICINE

## 2019-11-05 PROCEDURE — 80053 COMPREHEN METABOLIC PANEL: CPT | Performed by: NURSE PRACTITIONER

## 2019-11-05 PROCEDURE — 25000132 ZZH RX MED GY IP 250 OP 250 PS 637: Performed by: NURSE PRACTITIONER

## 2019-11-05 PROCEDURE — 99232 SBSQ HOSP IP/OBS MODERATE 35: CPT | Performed by: INTERNAL MEDICINE

## 2019-11-05 PROCEDURE — 85027 COMPLETE CBC AUTOMATED: CPT | Performed by: INTERNAL MEDICINE

## 2019-11-05 PROCEDURE — 83605 ASSAY OF LACTIC ACID: CPT | Performed by: NURSE PRACTITIONER

## 2019-11-05 PROCEDURE — 25000128 H RX IP 250 OP 636: Performed by: NURSE PRACTITIONER

## 2019-11-05 PROCEDURE — 25800030 ZZH RX IP 258 OP 636: Performed by: NURSE PRACTITIONER

## 2019-11-05 PROCEDURE — 25000125 ZZHC RX 250: Performed by: INTERNAL MEDICINE

## 2019-11-05 PROCEDURE — 99207 ZZC NON-BILLABLE SERV PER CHARTING: CPT | Performed by: PSYCHIATRY & NEUROLOGY

## 2019-11-05 PROCEDURE — 25000132 ZZH RX MED GY IP 250 OP 250 PS 637: Performed by: INTERNAL MEDICINE

## 2019-11-05 PROCEDURE — 25000132 ZZH RX MED GY IP 250 OP 250 PS 637: Performed by: PSYCHIATRY & NEUROLOGY

## 2019-11-05 PROCEDURE — 0DJ08ZZ INSPECTION OF UPPER INTESTINAL TRACT, VIA NATURAL OR ARTIFICIAL OPENING ENDOSCOPIC: ICD-10-PCS | Performed by: INTERNAL MEDICINE

## 2019-11-05 PROCEDURE — C9113 INJ PANTOPRAZOLE SODIUM, VIA: HCPCS | Performed by: INTERNAL MEDICINE

## 2019-11-05 PROCEDURE — G0500 MOD SEDAT ENDO SERVICE >5YRS: HCPCS | Performed by: INTERNAL MEDICINE

## 2019-11-05 PROCEDURE — 83735 ASSAY OF MAGNESIUM: CPT | Performed by: NURSE PRACTITIONER

## 2019-11-05 PROCEDURE — 12000000 ZZH R&B MED SURG/OB

## 2019-11-05 PROCEDURE — 36415 COLL VENOUS BLD VENIPUNCTURE: CPT | Performed by: INTERNAL MEDICINE

## 2019-11-05 RX ORDER — NALOXONE HYDROCHLORIDE 0.4 MG/ML
.1-.4 INJECTION, SOLUTION INTRAMUSCULAR; INTRAVENOUS; SUBCUTANEOUS
Status: DISCONTINUED | OUTPATIENT
Start: 2019-11-05 | End: 2019-11-06 | Stop reason: HOSPADM

## 2019-11-05 RX ORDER — LIDOCAINE 40 MG/G
CREAM TOPICAL
Status: DISCONTINUED | OUTPATIENT
Start: 2019-11-05 | End: 2019-11-05 | Stop reason: HOSPADM

## 2019-11-05 RX ORDER — FLUMAZENIL 0.1 MG/ML
0.2 INJECTION, SOLUTION INTRAVENOUS
Status: ACTIVE | OUTPATIENT
Start: 2019-11-05 | End: 2019-11-06

## 2019-11-05 RX ORDER — FENTANYL CITRATE 50 UG/ML
INJECTION, SOLUTION INTRAMUSCULAR; INTRAVENOUS PRN
Status: DISCONTINUED | OUTPATIENT
Start: 2019-11-05 | End: 2019-11-05 | Stop reason: HOSPADM

## 2019-11-05 RX ORDER — MIRTAZAPINE 7.5 MG/1
7.5 TABLET, FILM COATED ORAL AT BEDTIME
Status: DISCONTINUED | OUTPATIENT
Start: 2019-11-05 | End: 2019-11-06

## 2019-11-05 RX ADMIN — SODIUM CHLORIDE, POTASSIUM CHLORIDE, SODIUM LACTATE AND CALCIUM CHLORIDE: 600; 310; 30; 20 INJECTION, SOLUTION INTRAVENOUS at 08:00

## 2019-11-05 RX ADMIN — GABAPENTIN 300 MG: 300 CAPSULE ORAL at 10:06

## 2019-11-05 RX ADMIN — GABAPENTIN 300 MG: 300 CAPSULE ORAL at 16:23

## 2019-11-05 RX ADMIN — PANTOPRAZOLE SODIUM 40 MG: 40 INJECTION, POWDER, FOR SOLUTION INTRAVENOUS at 21:39

## 2019-11-05 RX ADMIN — ONDANSETRON 4 MG: 2 INJECTION INTRAMUSCULAR; INTRAVENOUS at 03:15

## 2019-11-05 RX ADMIN — SODIUM CHLORIDE, POTASSIUM CHLORIDE, SODIUM LACTATE AND CALCIUM CHLORIDE: 600; 310; 30; 20 INJECTION, SOLUTION INTRAVENOUS at 15:42

## 2019-11-05 RX ADMIN — NICOTINE 1 PATCH: 21 PATCH, EXTENDED RELEASE TRANSDERMAL at 10:06

## 2019-11-05 RX ADMIN — LIDOCAINE HYDROCHLORIDE 30 ML: 20 SOLUTION ORAL; TOPICAL at 16:23

## 2019-11-05 RX ADMIN — CEFTRIAXONE SODIUM 1 G: 1 INJECTION, POWDER, FOR SOLUTION INTRAMUSCULAR; INTRAVENOUS at 21:40

## 2019-11-05 RX ADMIN — LIDOCAINE HYDROCHLORIDE 30 ML: 20 SOLUTION ORAL; TOPICAL at 21:39

## 2019-11-05 RX ADMIN — QUETIAPINE 12.5 MG: 25 TABLET, FILM COATED ORAL at 22:48

## 2019-11-05 RX ADMIN — SODIUM CHLORIDE, POTASSIUM CHLORIDE, SODIUM LACTATE AND CALCIUM CHLORIDE: 600; 310; 30; 20 INJECTION, SOLUTION INTRAVENOUS at 23:11

## 2019-11-05 RX ADMIN — GABAPENTIN 300 MG: 300 CAPSULE ORAL at 22:48

## 2019-11-05 RX ADMIN — PANTOPRAZOLE SODIUM 40 MG: 40 INJECTION, POWDER, FOR SOLUTION INTRAVENOUS at 10:06

## 2019-11-05 ASSESSMENT — ACTIVITIES OF DAILY LIVING (ADL)
ADLS_ACUITY_SCORE: 10

## 2019-11-05 NOTE — CONSULTS
[facility]  Gastroenterology Consultation         Brandin Rodriguez  [address]  [place] [id number]  34 year old male    Admission Date/Time: 11/4/2019  Primary Care Provider: Kee Horn  Referring / Attending Physician:  Dr. Jerod Arzate\    We were asked to see the patient in consultation by Dr. Jerod Arzate for evaluation of GI bleed, hematemesis and possible melena.      CC: hematemesis    HPI:  Brandin Rodriguez is a 34 year old male who has a 12 or more year history of alcohol abuse. States drinks 1 pint of alcohol daily, States yesterday vomited numerous time at least 5 and since being at [facility] has vomited another 4 times. States had pink colored vomit and then dark coffee ground emesis. States thinks he has dark colored stool as well. He denies fevers, chills, chest pain, SOB, lightheadedness, diarrhea, constipation, heartburn, or abdominal pain.      He has a significant medical history of anxiety, depression, and PTSD. States he believes is alcohol abuse stems from death of daughter 12 years ago. He also has a significant other that is an alcoholic and has bipolar disorder and he is concerned for his safety.    Labs reveal hyponatremia ( Na 132), Creat 0.73, bilirubin of 2.6, slightly elevated AST at 70, Lactic acid 5.1 and has improved to 0.9, normal lipase, ethanol level on presentation 0.31, WBC 7.6, hemoglobin 13.5, platelets still pending (287 yesterday). Patient is afebrile now, BP around 110-120 systolic.    ROS: A comprehensive ten point review of systems was negative aside from those in mentioned in the HPI.      PAST MED HX:  I have reviewed this patient's medical history and updated it with pertinent information if needed.   Past Medical History:   Diagnosis Date     Anxiety      Depressive disorder      PTSD (post-traumatic stress disorder)        MEDICATIONS:   None       ALLERGIES:   Allergies   Allergen Reactions     Amoxicillin       Bactrim [Sulfamethoxazole W-Trimethoprim]        SOCIAL HISTORY:  Social History     Tobacco Use     Smoking status: Current Every Day Smoker     Packs/day: 1.00   Substance Use Topics     Alcohol use: Yes     Comment: pint a day     Drug use: Yes     Types: Marijuana     Comment: occ       FAMILY HISTORY:  History reviewed. No pertinent family history.    PHYSICAL EXAM:   General  Alert, oriented and   Vital Signs with Ranges  Temp: 98  F (36.7  C) Temp src: Oral BP: 117/82 Pulse: 120 Heart Rate: 107 Resp: 18 SpO2: 95 % O2 Device: None (Room air)    I/O last 3 completed shifts:  In: 3742 [I.V.:3742]  Out: 850 [Emesis/NG output:850]    Constitutional: alert, moderate distress and cooperative   Cardiovascular: negative, PMI normal. No lifts, heaves, or thrills. RRR. No murmurs, clicks gallops or rub  Respiratory: negative, Percussion normal. Good diaphragmatic excursion. Lungs clear  Head: Normocephalic. No masses, lesions, tenderness or abnormalities  Neck: Neck supple. No adenopathy. Thyroid symmetric, normal size,, Carotids without bruits.  Abdomen: Abdomen soft, non-tender. BS normal. No masses, organomegaly          ADDITIONAL COMMENTS:   I reviewed the patient's new clinical lab test results.   Recent Labs   Lab Test 11/05/19 0819 11/04/19  2158 11/04/19  1454   WBC 7.6  --  10.4   HGB 13.5 14.6 19.0*   *  --  104*   *  --  287     Recent Labs   Lab Test 11/05/19 0819 11/05/19  0023 11/04/19  1454   POTASSIUM 4.1 4.5 3.9   CHLORIDE 102 101 97   CO2 23 16* 12*   BUN 7 6* 12   ANIONGAP 7 15* 27*     Recent Labs   Lab Test 11/05/19  0819 11/05/19  0023 11/04/19  1650 11/04/19  1454   ALBUMIN 3.1* 3.4  --  5.0   BILITOTAL 3.3* 2.6*  --  2.5*   ALT 43 53  --  85*   AST 54* 70*  --  124*   PROTEIN  --   --  30*  --    LIPASE  --   --   --  363       I reviewed the patient's new imaging results.        CONSULTATION ASSESSMENT AND PLAN:    Active Problems:  Hematemesis  Alcohol abuse  Brandin Rodriguez is  a pleasant 34 year old male with a history of hematemesis and alcohol abuse,. Hemoglobin dropped 19 to 13.5 likely related to dehydration and rehydration. There is a concern for elina alfonso tears vs PUD and unlikely esophageal varices.   - Recommend EGD today  - Keep NPO until post EGD  - Continue with IV pantoprazole  - Daily hemoglobin  - Alcohol cessation with further workup as an outpatient  - Appreciate chemical dependency and psychiatry consult           [facility].  Office: [phone]  Cell : [phone] (Dr. Elizabeth)  Cell: [phone] (Irene Cox PA-C)

## 2019-11-05 NOTE — PROGRESS NOTES
Admission    Patient arrives to room 612 via cart from ED.  Care plan note: Pt nauseous, vomited 250mL upon arrival. A&Ox4. SBA, slight tremor.    Inpatient nursing criteria listed below were met:    PCD's Documented: Yes  Skin issues/needs documented :Yes  Isolation education started/completed NA  Patient allergies verified with patient: Yes  Verified completion of [place] Risk Assessment Tool:  Yes  Verified completion of Guardianship screening tool: Yes  Fall Prevention: Care plan updated, Education given and documented Yes  Care Plan initiated: Yes  Home medications documented in belongings flowsheet: NA  Patient belongings documented in belongings flowsheet: Yes  Reminder note (belongings/ medications) placed in discharge instructions:Yes  Admission profile/ required documentation complete: No  Bedside Report Letter given and explained to patient Yes

## 2019-11-05 NOTE — PROGRESS NOTES
[facility]    Medicine Progress Note - Hospitalist Service       Date of Admission:  11/4/2019  Assessment & Plan   Brandin Rodriguez is a 34 year old male with a history of alcohol abuse who presented to the ED on 11/4/2019 with hematemesis and the desire to quit drinking    Alcohol abuse  Hepatitis, likely due to alcohol use   Anion gap metabolic acidosis 2/2 starvation ketosis   Patient admits to drinking at least 1 pint of hard alcohol a day.  In the ED his ETOH level is 0.31.  He has no prior history of hallucinations or seizures from withdrawing from alcohol but does admit to a history of seizures as a kid supposedly.  Given his alcohol use and tolerance I suspect this patient is at a high risk for alcohol withdrawal     ALT 85 and  in the ED.  Patient does note he had an ultrasound approximately 1 year ago that showed a fatty liver.  Suspect this is due to alcohol use. Improved on recheck   Patient also has a bicarb of 12 with an anion gap of 27.  His ketones were elevated at 5.0 and suspect this is due to starvation ketosis.   CIWAs elevated over night and required Ativan PRN   - CIWA with Ativan PRN   - IVF with LR   - IV multivitamins followed by PO   - Psychiatry consulted as below     Hematemesis  Possible recent melena  Presented with multiple episodes of hematemesis today at home.  States it started with his initial episode.  Also notes some melena off and on recently.  Hemoglobin is actually elevated at 19.0 and suspect this is hemo-concentrated  - Recheck hemoglobin this evening  - Continue to monitor  - IV Protonix BID   - GI consulted and appreciate their recommendations.  Plan for EGD today     Lactic acidosis  After admission met SIRS criteria.  Lactate was significantly elevated at 10.  Given IVF and resolved.  Was started on Zosyn   - Will continue Zosyn one more day and then stop and monitor     Suicidal ideation   Patient also admits to multiple stressors and states he  has been having issues with suicidal ideation since his daughter  12 years ago.  Has had thought of suicide by  and has had other ideas but denies any specific plan currently though he does have thoughts of harming himself  - Psychiatry consulted and appreciate their recommendations  - Suicide precautions with sitter         Diet: NPO per Anesthesia Guidelines for Procedure/Surgery Except for: Meds    DVT Prophylaxis: Pneumatic Compression Devices  Sosa Catheter: not present  Code Status: Full Code      Disposition Plan   Expected discharge: 3-4 days, once through withdrawal.  Disposition placement is dependent upon psychiatry evaluation   Entered: Jerod Arzate DO 2019, 12:06 PM       The patient's care was discussed with the Bedside Nurse and Patient.    Jerod Arzate DO  Hospitalist Service  [facility]    ______________________________________________________________________    Interval History   Patient seen and examined.  No acute events over night.  No fevers or chills.  No pain at this time.  Nausea improved.      Data reviewed today: I reviewed all medications, new labs and imaging results over the last 24 hours. I personally reviewed no images or EKG's today.    Physical Exam   Vital Signs: Temp: 98  F (36.7  C) Temp src: Oral BP: 117/82 Pulse: 120 Heart Rate: 107 Resp: 18 SpO2: 95 % O2 Device: None (Room air)    Weight: 218 lbs 4.09 oz  General Appearance: Sleeping but arousable.  NAD   Respiratory: Clear to auscultation.  No respiratory distress  Cardiovascular: Tachycardiac.  No obvious murmurs  GI: Bowel sounds present.  Non-tender  Skin: No obvious rashes.  No cyanosis  Other: No edema.  No calf tenderness     Data   Recent Labs   Lab 19  0819 19  0023 19  2158 19  1454   WBC 7.6  --   --  10.4   HGB 13.5  --  14.6 19.0*   *  --   --  104*   *  --   --  287   * 132*  --  136   POTASSIUM 4.1 4.5  --  3.9   CHLORIDE 102  101  --  97   CO2 23 16*  --  12*   BUN 7 6*  --  12   CR 0.70 0.73  --  0.86   ANIONGAP 7 15*  --  27*   KATHY 8.0* 7.9*  --  9.3   * 137*  --  78   ALBUMIN 3.1* 3.4  --  5.0   PROTTOTAL 5.6* 6.1*  --  9.3*   BILITOTAL 3.3* 2.6*  --  2.5*   ALKPHOS 65 76  --  126   ALT 43 53  --  85*   AST 54* 70*  --  124*   LIPASE  --   --   --  363     No results found for this or any previous visit (from the past 24 hour(s)).

## 2019-11-05 NOTE — PROGRESS NOTES
MD Notification    Notified Person: MD    Notified Person Name: Mitch Hall AIMEE    Notification Date/Time: 11/5/19 0035    Notification Interaction: Phone     Purpose of Notification: Pt lactic 5.1    Orders Received: Continue to monitor

## 2019-11-05 NOTE — PLAN OF CARE
DATE & TIME: 11/4/2019 evening shift                    Cognitive Concerns/ Orientation : A&Ox4   BEHAVIOR & AGGRESSION TOOL COLOR: Green  CIWA SCORE: 7, 11, 14        ABNL VS/O2: HTN, other VSS on RA  MOBILITY: SBA to BR  PAIN MANAGMENT: minimizing PO liquids, repositioning, ativan for anxiety  DIET: NPO  BOWEL/BLADDER: continent of B&B  ABNL LAB/BG: Lactic acid 10.3- RRT called. Recheck at 0000.ALT- 85, AST- 124 Ketone 5.0, HgB 19.0, recheck 14.9  DRAIN/DEVICES: 2 PIV Infusing in R arm  TELEMETRY RHYTHM: n/a  SKIN: WDL  TESTS/PROCEDURES: EGD tomorrow  D/C DAY/GOALS/PLACE: pending  OTHER IMPORTANT INFO: Zofran given x1, Compazine given x1. Ativan given for CIWA and nausea x2 (oral pill vomited up). Pt has had hematemesis x2 since arrival to the unit. 250mL and 600mL. Dark red/brown in color. Hx of SI and current SI, no current plan. Sitter at bedside, psych to see tomorrow.

## 2019-11-05 NOTE — PROGRESS NOTES
Current condition (current mood & behavior): calm  Sitter present: yes  Every 15 minute documentation by NA/RN completed for Shift: yes  Room safety Suicide Checklist completed in Epic: yes  Patient's color of severity (suicide scale): YELLOW  Order for psych consult placed (if appropriate): yes  Suicide care plan added: yes      Silvia Urena RN
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
## 2019-11-05 NOTE — CODE/RAPID RESPONSE
[facility]  House [facility] RRT Note  11/4/2019   Time Called: 1925  RRT called for: elevated lactic acid  Code Status: Full Code    Assessment & Plan   I was paged to the bedside to evaluate Mr. Brandin Rodriguez for an acute episode of elevated lactic acid. Patient was admitted this afternoon for alcohol withdrawal and hematemesis. Abdominal pain present, but appears to be chronic in nature. Given elevated LA and abdominal pain, we can prophylactically treat with rocephin and fluids. 3 L LR for lactic acidosis, patient does not have history of heart failure and is young, he should be able to tolerate the volume. Gabapentin to mitigate the withdrawal symptoms, continue with CIWA. Labs at midnight after boluses.     Diagnosis:  -- lactic acidosis  -- acute alcohol withdrawal    Interventions ordered/provided:  -- 3L LR bolus (30 mL/kg)  -- labs at midnight (CMP, mag, lactic acid, VBG)  -- 1 g IV rocephin  -- 300 mg PO gabapentin TID for withdrawal  -- octreotide infusion    At the conclusion of this RRT patient was hemodynamically stable and will remain on station 66. I have a low threshold to transfer the patient to a higher level of care. Please page with additional concerns.     Interval History     Mr. Brandin Rodriguez is a 34 year old male who was admitted on 11/4/2019 for alcohol withdrawal and hematemesis.    His history is significant for:  Past Medical History:   Diagnosis Date     Anxiety      Depressive disorder      PTSD (post-traumatic stress disorder)      History reviewed. No pertinent surgical history.    Allergies   Allergies   Allergen Reactions     Amoxicillin      Bactrim [Sulfamethoxazole W-Trimethoprim]        Physical Exam   Physical Exam  HENT:      Head: Normocephalic and atraumatic.      Nose: Nose normal.      Mouth/Throat:      Mouth: Mucous membranes are dry.   Eyes:      Extraocular Movements: Extraocular movements intact.      Pupils: Pupils are equal, round, and reactive to  light.   Neck:      Musculoskeletal: Normal range of motion.   Cardiovascular:      Rate and Rhythm: Regular rhythm. Tachycardia present.   Pulmonary:      Effort: Pulmonary effort is normal. No respiratory distress.      Breath sounds: Normal breath sounds.   Abdominal:      General: Abdomen is flat. There is no distension.      Tenderness: There is tenderness.   Musculoskeletal: Normal range of motion.      Right lower leg: No edema.      Left lower leg: No edema.   Skin:     General: Skin is warm and dry.      Capillary Refill: Capillary refill takes 2 to 3 seconds.      Coloration: Skin is pale.   Neurological:      General: No focal deficit present.      Mental Status: He is alert and oriented to person, place, and time. Mental status is at baseline.   Psychiatric:         Attention and Perception: Attention normal.         Mood and Affect: Mood is anxious.         Speech: Speech normal.         Behavior: Behavior normal. Behavior is cooperative.         Thought Content: Thought content normal.         Vital Signs with Ranges:  Temp:  [97.9  F (36.6  C)-98.2  F (36.8  C)] 97.9  F (36.6  C)  Pulse:  [103-157] 123  Heart Rate:  [108] 108  Resp:  [22-30] 22  BP: (135-177)/() 157/97  SpO2:  [94 %-98 %] 96 %  No intake/output data recorded.    Data     EKG:  - none    ABG:  -No lab results found in last 7 days.    Troponin:    No lab results found.    IMAGING: (X-ray/CT/MRI)   No results found for this or any previous visit (from the past 24 hour(s)).    CBC with Diff:  Recent Labs   Lab Test 11/04/19  1454   WBC 10.4   HGB 19.0*   *         No results found for: RETICABSCT  No results found for: RETP    Lactic Acid:    No results found for: LACT  Lactate for Sepsis Protocol   Date Value Ref Range Status   11/04/2019 10.3 () 0.7 - 2.0 mmol/L Final     Comment:     Critical Value called to and read back by  Renetta Castillo on [address] at 1924.SL          Comprehensive Metabolic Panel:  Recent  Labs   Lab 11/04/19  1454      POTASSIUM 3.9   CHLORIDE 97   CO2 12*   ANIONGAP 27*   GLC 78   BUN 12   CR 0.86   GFRESTIMATED >90   GFRESTBLACK >90   KATHY 9.3   PROTTOTAL 9.3*   ALBUMIN 5.0   BILITOTAL 2.5*   ALKPHOS 126   *   ALT 85*       INR:    No lab results found.    D-DIMER:  No components found for: DDIMER    BNP:  No results found for: BNP    UA:  Recent Labs   Lab 11/04/19  1650   COLOR Yellow   APPEARANCE Clear   URINEGLC Negative   URINEBILI Negative   URINEKETONE >150*   SG 1.021   UBLD Negative   URINEPH 5.0   PROTEIN 30*   NITRITE Negative   LEUKEST Negative   RBCU <1   WBCU <1       Time Spent on this Encounter   I spent 30 minutes of critical care time on the unit/floor managing the care of Brandin Rodriguez. Upon evaluation, this patient had a high probability of imminent or life-threatening deterioration due to lactic acidosis and malperfusion, which required my direct attention, intervention, and personal management. 100% of my time was spent at the bedside counseling the patient and/or coordinating care regarding services listed in this note.    OBDULIA Jimenez, CNP  Hospitalist - House [facility]  Text Page  [phone])

## 2019-11-05 NOTE — PLAN OF CARE
DATE & TIME: 11/5/2019 3644-4464    Cognitive Concerns/ Orientation : A&O4   BEHAVIOR & AGGRESSION TOOL COLOR: green  CIWA SCORE: 7,6; scoring for N/V, sweats, and anxiety   ABNL VS/O2: VSS on RA  MOBILITY: SBA in room  PAIN MANAGMENT: denies on shift   DIET: NPO   BOWEL/BLADDER: Continent of bowel and bladder- ambulates to bathroom. No BM on shift   ABNL LAB/BG: Lactic 10.3 on evening shift - recheck at midnight was 5.1. Continue to monitor per NP. Ketones 5.0   DRAIN/DEVICES: 2 PIV infusing   TELEMETRY RHYTHM: na  SKIN: CDI  TESTS/PROCEDURES: EGD 11/5  D/C DAY/GOALS/PLACE: pending clinical progress   OTHER IMPORTANT INFO: suicide precautions - sitter in room. Psych to see on 11/5. Pt denies feelings of self harm on shift. Zofran x1 given. Cell phone and  in room currently while sitter present. No emesis on shift.

## 2019-11-05 NOTE — CONSULTS
GI bleed, Hematemesis and melena.  Dehydration, ETOH use. Elevated LFT, Hb 19  Plan for EGD in AM    Jake ARECHIGA

## 2019-11-05 NOTE — PROGRESS NOTES
RECEIVING UNIT ED HANDOFF REVIEW    ED Nurse Handoff Report was reviewed by: Renetta Castillo RN on November 4, 2019 at 6:11 PM

## 2019-11-06 VITALS
BODY MASS INDEX: 27.28 KG/M2 | SYSTOLIC BLOOD PRESSURE: 135 MMHG | TEMPERATURE: 98.5 F | RESPIRATION RATE: 16 BRPM | HEART RATE: 81 BPM | WEIGHT: 218.26 LBS | DIASTOLIC BLOOD PRESSURE: 92 MMHG | OXYGEN SATURATION: 95 %

## 2019-11-06 PROCEDURE — 25000132 ZZH RX MED GY IP 250 OP 250 PS 637: Performed by: INTERNAL MEDICINE

## 2019-11-06 PROCEDURE — C9113 INJ PANTOPRAZOLE SODIUM, VIA: HCPCS | Performed by: INTERNAL MEDICINE

## 2019-11-06 PROCEDURE — 25000128 H RX IP 250 OP 636: Performed by: INTERNAL MEDICINE

## 2019-11-06 PROCEDURE — 25000125 ZZHC RX 250: Performed by: INTERNAL MEDICINE

## 2019-11-06 PROCEDURE — 25800030 ZZH RX IP 258 OP 636: Performed by: NURSE PRACTITIONER

## 2019-11-06 PROCEDURE — 25000132 ZZH RX MED GY IP 250 OP 250 PS 637: Performed by: NURSE PRACTITIONER

## 2019-11-06 PROCEDURE — 99238 HOSP IP/OBS DSCHRG MGMT 30/<: CPT | Performed by: INTERNAL MEDICINE

## 2019-11-06 PROCEDURE — 25000132 ZZH RX MED GY IP 250 OP 250 PS 637: Performed by: PSYCHIATRY & NEUROLOGY

## 2019-11-06 RX ORDER — ONDANSETRON 4 MG/1
4 TABLET, FILM COATED ORAL EVERY 8 HOURS PRN
Qty: 30 TABLET | Refills: 0 | Status: ON HOLD | OUTPATIENT
Start: 2019-11-06 | End: 2020-03-11

## 2019-11-06 RX ORDER — PANTOPRAZOLE SODIUM 40 MG/1
40 TABLET, DELAYED RELEASE ORAL 2 TIMES DAILY
Qty: 60 TABLET | Refills: 0 | Status: ON HOLD | OUTPATIENT
Start: 2019-11-06 | End: 2021-11-18

## 2019-11-06 RX ORDER — FOLIC ACID 1 MG/1
1 TABLET ORAL DAILY
Qty: 30 TABLET | Refills: 0 | Status: SHIPPED | OUTPATIENT
Start: 2019-11-07 | End: 2019-12-03

## 2019-11-06 RX ORDER — DISULFIRAM 250 MG/1
250 TABLET ORAL DAILY
Qty: 30 TABLET | Refills: 0 | Status: ON HOLD | OUTPATIENT
Start: 2019-11-06 | End: 2020-03-11

## 2019-11-06 RX ORDER — QUETIAPINE FUMARATE 25 MG/1
12.5 TABLET, FILM COATED ORAL EVERY 6 HOURS PRN
Qty: 30 TABLET | Refills: 0 | Status: ON HOLD | OUTPATIENT
Start: 2019-11-06 | End: 2020-03-11

## 2019-11-06 RX ORDER — LANOLIN ALCOHOL/MO/W.PET/CERES
100 CREAM (GRAM) TOPICAL DAILY
Qty: 30 TABLET | Refills: 0 | Status: ON HOLD | OUTPATIENT
Start: 2019-11-07 | End: 2020-12-03

## 2019-11-06 RX ORDER — MULTIPLE VITAMINS W/ MINERALS TAB 9MG-400MCG
1 TAB ORAL DAILY
Qty: 30 EACH | Refills: 0 | Status: ON HOLD | OUTPATIENT
Start: 2019-11-07 | End: 2020-12-03

## 2019-11-06 RX ORDER — MIRTAZAPINE 15 MG/1
15 TABLET, FILM COATED ORAL AT BEDTIME
Qty: 30 TABLET | Refills: 0 | Status: ON HOLD | OUTPATIENT
Start: 2019-11-06 | End: 2020-03-11

## 2019-11-06 RX ORDER — MIRTAZAPINE 15 MG/1
15 TABLET, FILM COATED ORAL AT BEDTIME
Status: DISCONTINUED | OUTPATIENT
Start: 2019-11-06 | End: 2019-11-06 | Stop reason: HOSPADM

## 2019-11-06 RX ORDER — PANTOPRAZOLE SODIUM 40 MG/1
40 TABLET, DELAYED RELEASE ORAL
Status: DISCONTINUED | OUTPATIENT
Start: 2019-11-07 | End: 2019-11-06 | Stop reason: HOSPADM

## 2019-11-06 RX ADMIN — NICOTINE 1 PATCH: 21 PATCH, EXTENDED RELEASE TRANSDERMAL at 09:45

## 2019-11-06 RX ADMIN — Medication 100 MG: at 09:39

## 2019-11-06 RX ADMIN — LIDOCAINE HYDROCHLORIDE 30 ML: 20 SOLUTION ORAL; TOPICAL at 08:02

## 2019-11-06 RX ADMIN — MIRTAZAPINE 7.5 MG: 7.5 TABLET, FILM COATED ORAL at 00:24

## 2019-11-06 RX ADMIN — PANTOPRAZOLE SODIUM 40 MG: 40 INJECTION, POWDER, FOR SOLUTION INTRAVENOUS at 09:39

## 2019-11-06 RX ADMIN — FOLIC ACID 1 MG: 1 TABLET ORAL at 09:39

## 2019-11-06 RX ADMIN — SODIUM CHLORIDE, POTASSIUM CHLORIDE, SODIUM LACTATE AND CALCIUM CHLORIDE: 600; 310; 30; 20 INJECTION, SOLUTION INTRAVENOUS at 05:42

## 2019-11-06 RX ADMIN — GABAPENTIN 300 MG: 300 CAPSULE ORAL at 09:39

## 2019-11-06 RX ADMIN — MULTIPLE VITAMINS W/ MINERALS TAB 1 TABLET: TAB at 09:39

## 2019-11-06 ASSESSMENT — ACTIVITIES OF DAILY LIVING (ADL)
ADLS_ACUITY_SCORE: 10

## 2019-11-06 NOTE — DISCHARGE INSTRUCTIONS
Pt belongings (clothes, shoes, wallet) in Locker #2 in blue pod     Please Call DR Zuleta to set up an appointment  [address], [place] [address]  Phone: [phone]    DR Barrow's office will call you for an appointment  [facility]  Office : [phone]

## 2019-11-06 NOTE — CONSULTS
[facility] Psychiatric Consult Follow-up Note      TIME SPENT IN PSYCHIATRY CONSULT: 15 MINUTES.     Interim History     The patient's care was discussed, patient seen and chart notes were reviewed. Pt examined on 11/06/19 by Dr. Yancey for a follow-up psychiatric consultation.  On interview with Dr Yancey, Patient expressed and appeared improved mood in comparison to yesterday. Patient denies any side effects from the new medications, Remeron and Seroquel, which were added yesterday. Dr Yancey will increase Remeron dose to 15 mg to continue to address patient's symptoms of depression including sleep disturbance. Patient continues to wait for CD . He is still in agreement with [facility] Intensive outpatient  Recommendation. In addition to this the medication Antabuse was discussed as a bridging measure from discharge to the start of intensive outpatient treatment to support sobriety. If Patient is unable to see inpatient CD  before discharge, recommend scheduling an outpatient CD assessment immediately after discharging.       Medications     Current Facility-Administered Medications Ordered in Epic   Medication Dose Route Frequency Last Rate Last Dose     benzocaine-menthol (CHLORASEPTIC) 6-10 MG lozenge 1 lozenge  1 lozenge Buccal Q1H PRN   1 lozenge at 11/04/19 2124     cefTRIAXone (ROCEPHIN) 1 g vial to attach to  mL bag for ADULTS or NS 50 mL bag for PEDS  1 g Intravenous Q24H 400 mL/hr at 11/04/19 2026 1 g at 11/05/19 2140     folic acid (FOLVITE) tablet 1 mg  1 mg Oral Daily         gabapentin (NEURONTIN) capsule 300 mg  300 mg Oral TID   300 mg at 11/05/19 2248     lactated ringers infusion   Intravenous Continuous 150 mL/hr at 11/06/19 0542       lidocaine (LMX4) cream   Topical Q1H PRN         lidocaine (XYLOCAINE) 2 % 15 mL, alum & mag hydroxide-simethicone (MYLANTA ES/MAALOX  ES) 15 mL GI Cocktail  30 mL Oral 4x Daily   30 mL at 11/05/19 2139      lidocaine 1 % 0.1-1 mL  0.1-1 mL Other Q1H PRN         LORazepam (ATIVAN) tablet 1-2 mg  1-2 mg Oral Q30 Min PRN   1 mg at 11/04/19 2030    Or     LORazepam (ATIVAN) injection 1-2 mg  1-2 mg Intravenous Q30 Min PRN   2 mg at 11/04/19 2325     May continue current IV fluids if patient has IV fluids infusing.   Does not apply Continuous PRN         melatonin tablet 1 mg  1 mg Oral At Bedtime PRN         mirtazapine (REMERON) tablet TABS 7.5 mg  7.5 mg Oral At Bedtime   7.5 mg at 11/06/19 0024     multivitamin w/minerals (THERA-VIT-M) tablet 1 tablet  1 tablet Oral Daily         naloxone (NARCAN) injection 0.1-0.4 mg  0.1-0.4 mg Intravenous Q2 Min PRN         naloxone (NARCAN) injection 0.1-0.4 mg  0.1-0.4 mg Intravenous Q2 Min PRN         nicotine (NICODERM CQ) 21 MG/24HR 24 hr patch 1 patch  1 patch Transdermal Daily   1 patch at 11/05/19 1006     nicotine Patch in Place   Transdermal Q8H         nicotine patch REMOVAL   Transdermal Daily         ondansetron (ZOFRAN-ODT) ODT tab 4 mg  4 mg Oral Q6H PRN        Or     ondansetron (ZOFRAN) injection 4 mg  4 mg Intravenous Q6H PRN   4 mg at 11/05/19 0315     pantoprazole (PROTONIX) 40 mg IV push injection  40 mg Intravenous BID   40 mg at 11/05/19 2139     potassium chloride (KLOR-CON) Packet 20-40 mEq  20-40 mEq Oral or Feeding Tube Q2H PRN         potassium chloride 10 mEq in 100 mL intermittent infusion with 10 mg lidocaine  10 mEq Intravenous Q1H PRN         potassium chloride 10 mEq in 100 mL sterile water intermittent infusion (premix)  10 mEq Intravenous Q1H PRN         potassium chloride 20 mEq in 50 mL intermittent infusion  20 mEq Intravenous Q1H PRN         potassium chloride ER (K-DUR/KLOR-CON M) CR tablet 20-40 mEq  20-40 mEq Oral Q2H PRN         prochlorperazine (COMPAZINE) injection 10 mg  10 mg Intravenous Q6H PRN   10 mg at 11/04/19 1838    Or     prochlorperazine (COMPAZINE) tablet 10 mg  10 mg Oral Q6H PRN        Or     prochlorperazine (COMPAZINE)  Suppository 25 mg  25 mg Rectal Q12H PRN         QUEtiapine (SEROquel) half-tab 12.5-25 mg  12.5-25 mg Oral Q2H PRN   12.5 mg at 11/05/19 2248     sodium chloride (PF) 0.9% PF flush 3 mL  3 mL Intravenous q1 min prn         sodium chloride (PF) 0.9% PF flush 3 mL  3 mL Intracatheter q1 min prn         sodium chloride (PF) 0.9% PF flush 3 mL  3 mL Intracatheter Q8H   3 mL at 11/05/19 1919     vitamin B1 (THIAMINE) tablet 100 mg  100 mg Oral Daily         No current Epic-ordered outpatient medications on file.         Allergies      Allergies   Allergen Reactions     Amoxicillin      Bactrim [Sulfamethoxazole W-Trimethoprim]         Medical Review of Systems     /73 (BP Location: Left arm)   Pulse 94   Temp 98.6  F (37  C) (Oral)   Resp 16   Wt 99 kg (218 lb 4.1 oz)   SpO2 94%   BMI 27.28 kg/m    Body mass index is 27.28 kg/m .  A 10-point review of systems was performed by Diego Zuleta MD and is negative, no new findings.      Psychiatric Examination     Appearance Lying in bed, dressed in gown. Appears stated age.   Attitude Cooperative   Orientation Oriented to person, place, time   Eye Contact Good   Speech Regular rate, rhythm, volume and tone   Language Normal   Psychomotor Behavior Normal   Mood Relieved   Affect Congruent with mood   Thought Process Goal Oriented, Intact   Associations Intact   Thought Content Negative for current suicidal ideations   Fund of Knowledge normal   Insight intact   Judgement intact   Attention Span & Concentration normal   Recent & Remote Memory normal   Gait Not assessed   Muscle Tone Intact on visual inspection       Labs     Labs reviewed.  Recent Results (from the past 24 hour(s))   Comprehensive metabolic panel    Collection Time: 11/05/19  8:19 AM   Result Value Ref Range    Sodium 132 (L) 133 - 144 mmol/L    Potassium 4.1 3.4 - 5.3 mmol/L    Chloride 102 94 - 109 mmol/L    Carbon Dioxide 23 20 - 32 mmol/L    Anion Gap 7 3 - 14 mmol/L    Glucose 157 (H) 70  - 99 mg/dL    Urea Nitrogen 7 7 - 30 mg/dL    Creatinine 0.70 0.66 - 1.25 mg/dL    GFR Estimate >90 >60 mL/min/[1.73_m2]    GFR Estimate If Black >90 >60 mL/min/[1.73_m2]    Calcium 8.0 (L) 8.5 - 10.1 mg/dL    Bilirubin Total 3.3 (H) 0.2 - 1.3 mg/dL    Albumin 3.1 (L) 3.4 - 5.0 g/dL    Protein Total 5.6 (L) 6.8 - 8.8 g/dL    Alkaline Phosphatase 65 40 - 150 U/L    ALT 43 0 - 70 U/L    AST 54 (H) 0 - 45 U/L   CBC with platelets    Collection Time: 11/05/19  8:19 AM   Result Value Ref Range    WBC 7.6 4.0 - 11.0 10e9/L    RBC Count 3.71 (L) 4.4 - 5.9 10e12/L    Hemoglobin 13.5 13.3 - 17.7 g/dL    Hematocrit 38.3 (L) 40.0 - 53.0 %     (H) 78 - 100 fl    MCH 36.4 (H) 26.5 - 33.0 pg    MCHC 35.2 31.5 - 36.5 g/dL    RDW 14.7 10.0 - 15.0 %    Platelet Count 142 (L) 150 - 450 10e9/L   Lactic acid whole blood    Collection Time: 11/05/19  8:19 AM   Result Value Ref Range    Lactic Acid 0.9 0.7 - 2.0 mmol/L   UPPER GI ENDOSCOPY    Collection Time: 11/05/19  1:57 PM   Result Value Ref Range    Upper GI Endoscopy       [facility] Endoscopy Department  _______________________________________________________________________________  Patient Name: Brandin Rodriguez          Procedure Date: 11/5/2019 1:57 PM  MRN: [id number]                       Account Number: [id number]  YOB: 1985              Admit Type: Inpatient  Age: 34                               Room: 3  Note Status: Finalized                Attending MD: Jake Elizabeth MD  Total Sedation Time:                  Instrument Name: 401 GIF- Gastroscope  _______________________________________________________________________________     Procedure:                Upper GI endoscopy  Indications:              Functional Dyspepsia, Dysphagia, Odynophagia,                             Heartburn  Providers:                Jake Elizabeth MD, Anila García, RN  Referring MD:               Medicines:                Midazolam 4 mg IV,  Fentanyl 100 micrograms IV,                             Cetacaine spray  Complicat ions:            No immediate complications.  _______________________________________________________________________________  Procedure:                Pre-Anesthesia Assessment:                            - Prior to the procedure, a History and Physical                             was performed, and patient medications and                             allergies were reviewed. The patient is competent.                             The risks and benefits of the procedure and the                             sedation options and risks were discussed with the                             patient. All questions were answered and informed                             consent was obtained. Patient identification and                             proposed procedure were verified by the physician                             in the pre-procedure area. Mental Status                             Examination: alert and oriented. Airway                             Examination: normal oropharyngeal airway  and neck                             mobility. Respiratory Examination: clear to                             auscultation. CV Examination: normal. Prophylactic                             Antibiotics: The patient does not require                             prophylactic antibiotics. Prior Anticoagulants: The                             patient has taken no previous anticoagulant or                             antiplatelet agents. ASA Grade Assessment: II - A                             patient with mild systemic disease. After reviewing                             the risks and benefits, the patient was deemed in                             satisfactory condition to undergo the procedure.                             The anesthesia plan was to use moderate sedation /                             analgesia (conscious sedation). Immediately prior                              to administration of medications, the patient was                             re-assessed for adequacy to receive sed atives. The                             heart rate, respiratory rate, oxygen saturations,                             blood pressure, adequacy of pulmonary ventilation,                             and response to care were monitored throughout the                             procedure. The physical status of the patient was                             re-assessed after the procedure.                            After obtaining informed consent, the endoscope was                             passed under direct vision. Throughout the                             procedure, the patient's blood pressure, pulse, and                             oxygen saturations were monitored continuously. The                             Colonoscope was introduced through the mouth, and                             advanced to the third part of duodenum. The upper                             GI endoscopy was accomplished without difficulty.                             The patient tolerated the procedure well.                                                                                    Findings:       LA Grade D (one or more mucosal breaks involving at least 75% of        esophageal circumference) esophagitis with no bleeding was found in the        entire esophagus.       Diffuse moderately congested mucosa was found in the entire examined        stomach.       The cardia and gastric fundus were normal on retroflexion.       The duodenal bulb, first portion of the duodenum and second portion of        the duodenum were normal.                                                                                   Impression:               - LA Grade D reflux esophagitis.                            - Congestive gastropathy.                            - Normal duodenal bulb, first portion of the                              duodenum and second portion of the duodenum.                            - No specimens collected.  Recommendation:           - Anti Relux Precautions                             - Continue present medications.                            - [facility]                                                                                   Procedure Code(s):       --- Professional ---       23991, Esophagogastroduodenoscopy, flexible, transoral; diagnostic,        including collection of specimen(s) by brushing or washing, when        performed (separate procedure)  Diagnosis Code(s):       --- Professional ---       K21.0, Gastro-esophageal reflux disease with esophagitis       K31.89, Other diseases of stomach and duodenum       K30, Functional dyspepsia       R13.10, Dysphagia, unspecified       R12, Heartburn    CPT copyright 2018 [employer] Medical [employer]. All rights reserved.    The codes documented in this report are preliminary and upon  review may   be revised to meet current compliance requirements.    Electronically Signed by Jake Huertas  __________________  Jake Elizabeth MD  11/5/2019 2:35:18 PM  I was physically presen t for the entire viewing portion of the exam.  Jake Elizabeth MD  Number of Addenda: 0    Note Initiated On: 11/5/2019 1:57 PM  MRN:                      [id number]  Procedure Date:           11/5/2019 1:57:42 PM  Total Procedure Duration: 0 hours 3 minutes 57 seconds   Estimated Blood Loss:       Scope In: 2:05:30 PM  Scope Out: 2:09:27 PM          Impression   This is a 34 year old male with a history of major depression and PTSD. Patient originally presented to [facility] ED for hematemesis. Patient seen this morning by Dr. Zuleta for a follow-up psychiatric consultation. Patient feels and appears much improved from yesterday. Patient describes better sleep last night and decreased anxiety. Dr Zuleta will increase the Remeron to 15 mg, encourage  Patient to take the Seroquel 25 mg instead of the 12.5 mg PRN. Chemical Dependency assessment was order, goal is to have it completed before discharge. If not completed inpatient before discharge Dr Yancey recommends scheduling an outpatient CD assessment  Upon discharge. Patient is in agreement with attending [facility] Outpatient treatment in [place]. Patient will be followed by Dr Yancey at [facility] and will have an appointment set up for 1-2 weeks post discharge. Upon discharge patient should be prescribed Antabuse 250 mg daily to reinforce sobriety and bridge from inpatientl to outpatient treatment.     Diagnoses     1. Major depression, recurrent, severe, without psychotic features.  2. Alcohol Use Disorder      Plan     1. Explained Side effects, benefits and complications of medications to the patient.   2. Medication changes:Increase Remeron to 15 mg  3. Encourage Patient to utilize the Seroquel 25 mg instead of the 12.5 mg PRN  4. Prescribe Antabuse 250 mg daily upon discharge  5. Discussed treatment plan with patient and team.  6. Re-consult Psychiatry as needed.  7. Schedule appointment with Dr Yancey at [facility] 1-2 weeks after discharge.  8. Anticipate Patient attending [facility] Treatment in [facility]      TIME SPENT IN PSYCHIATRY CONSULT: 15 MINUTES.    Attestation:   I, Brandin Elyssacelestino, am serving as a PA Student on 11/6/2019 to document services personally performed by Diego Yancey MD based on my observations and the provider's statements to me.      Patient ID:  Name: Brandin Rodriguez    MRN: [id number]  Admission: 11/4/2019   YOB: 1985

## 2019-11-06 NOTE — PLAN OF CARE
DATE & TIME: 11/06/19 8793-8220   Cognitive Concerns/ Orientation : A&O x4   BEHAVIOR & AGGRESSION TOOL COLOR: Green  CIWA SCORE: 2, 3   ABNL VS/O2: VSS on room air   MOBILITY: Independent  PAIN MANAGMENT: Back pain, heat packs given  DIET: Mechanical/dental soft  BOWEL/BLADDER: Continent  ABNL LAB/BG: None  DRAIN/DEVICES:  PIV x2, IVF infusing  TELEMETRY RHYTHM: NA  SKIN: Intact  TESTS/PROCEDURES: None  D/C DAY/GOALS/PLACE: Discharge pending  OTHER IMPORTANT INFO: CD/ Psych following; pt started on scheduled Remeron last night and PRN Seroquel

## 2019-11-06 NOTE — PROGRESS NOTES
[facility]  Gastroenterology Progress Note     Brandin Rodriguez MRN# [id number]   YOB: 1985 Age: 34 year old          Assessment and Plan:   Brandin Rodriguez is a pleasant 34 year old male with a history of hematemesis and alcohol abuse,. Hemoglobin dropped 19 to 13.5. Gi was consulted on 11/5/2019.    Hematemesis  Alcohol abuse  Patient has had stable hemoglobin. NO recurrent episodes of hematemesis. Underwent EGD that revealed esophagitis.   - Tolerating diet can advance to regular diet  -Switch to oral pantoprazole 40 mg daily and to continue as an outpatient  - Alcohol cessation with further workup as an outpatient  - Appreciate chemical dependency and psychiatry consult  - Ok with [facility] ARE[facility]IG[facility] to discharge patient with plans to f/u as an outpatient in 1-2 weeks          Alcoholic intoxication without complication (H)  Alcoholic hepatitis without ascites  Conjugated hyperbilirubinemia  Intractable vomiting with nausea, unspecified vomiting type  Hematemesis with nausea  Depression, unspecified depression type  Suicidal thoughts      Interval History:   no new complaints, doing well, denies chest pain, denies shortness of breath, denies abdominal pain, alert, oriented to person, place and time and doing well; no cp, sob, n/v/d, or abd pain.              Review of Systems:   C: NEGATIVE for fever, chills, change in weight  E/M: NEGATIVE for ear, mouth and throat problems  R: NEGATIVE for significant cough or SOB  CV: NEGATIVE for chest pain, palpitations or peripheral edema             Medications:   I have reviewed this patient's current medications    folic acid  1 mg Oral Daily     gabapentin  300 mg Oral TID     lidocaine viscous 2% 15 mL and maalox/mylanta w/ simethicone 15 mL (GI COCKTAIL)  30 mL Oral 4x Daily     mirtazapine  15 mg Oral At Bedtime     multivitamin w/minerals  1 tablet Oral Daily     nicotine  1 patch Transdermal Daily     nicotine   Transdermal Q8H     nicotine    Transdermal Daily     pantoprazole (PROTONIX) IV  40 mg Intravenous BID     [START ON 11/7/2019] pantoprazole  40 mg Oral QAM AC     sodium chloride (PF)  3 mL Intracatheter Q8H     vitamin B1  100 mg Oral Daily                  Physical Exam:   Vitals were reviewed  Vital Signs with Ranges  Temp:  [98.1  F (36.7  C)-98.8  F (37.1  C)] 98.5  F (36.9  C)  Pulse:  [] 81  Heart Rate:  [] 86  Resp:  [10-35] 16  BP: (128-150)/(73-96) 135/92  SpO2:  [93 %-96 %] 95 %  I/O last 3 completed shifts:  In: 2419 [P.O.:360; I.V.:2059]  Out: -   Constitutional: healthy, alert and no distress   Cardiovascular: negative, PMI normal. No lifts, heaves, or thrills. RRR. No murmurs, clicks gallops or rub  Respiratory: negative, Percussion normal. Good diaphragmatic excursion. Lungs clear  Head: Normocephalic. No masses, lesions, tenderness or abnormalities  Neck: Neck supple. No adenopathy. Thyroid symmetric, normal size,, Carotids without bruits.  Abdomen: Abdomen soft, non-tender. BS normal. No masses, organomegaly           Data:   I reviewed the patient's new clinical lab test results.   Recent Labs   Lab Test 11/05/19 0819 11/04/19  2158 11/04/19  1454   WBC 7.6  --  10.4   HGB 13.5 14.6 19.0*   *  --  104*   *  --  287     Recent Labs   Lab Test 11/05/19 0819 11/05/19  0023 11/04/19  1454   POTASSIUM 4.1 4.5 3.9   CHLORIDE 102 101 97   CO2 23 16* 12*   BUN 7 6* 12   ANIONGAP 7 15* 27*     Recent Labs   Lab Test 11/05/19 0819 11/05/19  0023 11/04/19  1650 11/04/19  1454   ALBUMIN 3.1* 3.4  --  5.0   BILITOTAL 3.3* 2.6*  --  2.5*   ALT 43 53  --  85*   AST 54* 70*  --  124*   PROTEIN  --   --  30*  --    LIPASE  --   --   --  363       I reviewed the patient's new imaging results.    All laboratory data reviewed  All imaging studies reviewed by me.    Irene Cox PA-C,  11/6/2019  [facility]  Office : [phone]  Cell: [phone] (Dr. Elizabeth)  Cell: [phone] (Irene  Kenny MIRELES)
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
## 2019-11-06 NOTE — PLAN OF CARE
DATE & TIME: 11/5/19 5447-1764  Cognitive Concerns/ Orientation : A&Ox4, calm/cooperative. Verbalized sadness of daughters passing.  BEHAVIOR & AGGRESSION TOOL COLOR: Green  CIWA SCORE: 4,   ABNL VS/O2: VSS on RA  MOBILITY: SBA  PAIN MANAGMENT: C/O abd pain. Scheduled GI cocktail given x 2.   DIET: Mechanical Soft Diet  BOWEL/BLADDER: Continent  ABNL LAB/BG: lactic now 0.9  DRAIN/DEVICES: N/A  TELEMETRY RHYTHM: N/A  SKIN: Intact, tattoos  TESTS/PROCEDURES: EGD done today  D/C DAY/GOALS/PLACE: Pending plan  OTHER IMPORTANT INFO Dr. Zuleta saw patient this evening, stated the patient no longer needed a sitter or suicide precautions. Started pt on PRN Seroquel and scheduled Remeron.

## 2019-11-06 NOTE — DISCHARGE SUMMARY
[facility]  Hospitalist Discharge Summary       Date of Admission:  11/4/2019  Date of Discharge:  11/6/2019  2:20 PM  Discharging Provider: Jerod Arzate DO      Discharge Diagnoses   1. Alcohol abuse  2. 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 Anion gap metabolic acidosis 2/2 starvation ketosis  4. Hematemesis  5. Esophagitis w/congestive gastropathy   6. Lactic acidosis due to dehydration   7. Major depressive disorder with suicidal ideation     Follow-ups Needed After Discharge   Follow-up Appointments     Follow-up and recommended labs and tests       Follow up with primary care provider, [facility],   within 2-4 weeks, for hospital follow- up. No follow up labs or test are   needed.  Follow up with Dr. Elizabeth (GI) in 1-2 weeks  Follow up with outpatient chemical dependency   Follow up with psychiatry as needed           Unresulted Labs Ordered in the Past 30 Days of this Admission     No orders found from 10/5/2019 to 11/5/2019.        Discharge Disposition   Discharged to home  Condition at discharge: Stable    Hospital Course   Alcohol abuse  Hepatitis, likely due to alcohol use   Anion gap metabolic acidosis 2/2 starvation ketosis   Patient admits to drinking at least 1 pint of hard alcohol a day.  In the ED his ETOH level is 0.31.  He has no prior history of hallucinations or seizures from withdrawing from alcohol but does admit to a history of seizures as a kid supposedly.  Given his alcohol use and tolerance I suspect this patient is at a high risk for alcohol withdrawal     ALT 85 and  in the ED.  Patient does note he had an ultrasound approximately 1 year ago that showed a fatty liver.  Suspect this is due to alcohol use. Improved on recheck   Patient also has a bicarb of 12 with an anion gap of 27.  His ketones were elevated at 5.0 and suspect this is due to starvation ketosis.   CIWAs elevated over night and required Ativan PRN   - CIWA with Ativan  PRN   - IVF with LR   - IV multivitamins followed by PO      Presented with multiple episodes of hematemesis today at home.  States it started with his initial episode.  Also notes some melena off and on recently.  Hemoglobin is actually elevated at 19.0 and suspect this is hemo-concentrated  - IV Protonix BID started and switched to PO at discharge   - GI consulted and appreciate their recommendations.  EGD done which shoed reflux esophagitis and congestive gastropathy.     Lactic acidosis  After admission met SIRS criteria.  Lactate was significantly elevated at 10.  Given IVF and resolved.  Was started on Zosyn   - Given Zosyn briefly but this was stopped without issue      Suicidal ideation   Patient also admits to multiple stressors and states he has been having issues with suicidal ideation since his daughter  12 years ago.  Has had thought of suicide by  and has had other ideas but denies any specific plan currently though he does have thoughts of harming himself  - Suicide precautions stopped by psychiatry   - Psychiatry consulted and started Remeron and PRN Seroquel.  Okay with outpatient follow up        Consultations This Hospital Stay   GASTROENTEROLOGY IP CONSULT  PSYCHIATRY IP CONSULT  CHEMICAL DEPENDENCY IP CONSULT    Code Status   Full Code    Time Spent on this Encounter   I, Jerod Arzate DO, personally saw the patient today and spent less than or equal to 30 minutes discharging this patient.       Jerod Arzate DO  [facility]  ______________________________________________________________________    Physical Exam   Vital Signs: Temp: 98.5  F (36.9  C) Temp src: Oral BP: (!) 135/92 Pulse: 81 Heart Rate: 86 Resp: 16 SpO2: 95 % O2 Device: None (Room air) Oxygen Delivery: 2 LPM  Weight: 218 lbs 4.09 oz  General Appearance: Resting comfortably.  NAD   Respiratory: Clear to auscultation.  No respiratory distress  Cardiovascular: RRR.  No murmurs  GI: Bowel sounds  present.  Non-tender  Skin: No rashes.  No cyanosis  Other: No edema.  No calf tenderness        Primary Care Physician   Kee Aponte [facility]    Discharge Orders      Reason for your hospital stay    Bloody vomit.  Alcohol use     Follow-up and recommended labs and tests     Follow up with primary care provider, Kee Horn, within 2-4 weeks, for hospital follow- up. No follow up labs or test are needed.  Follow up with Dr. Elizabeth (GI) in 1-2 weeks  Follow up with outpatient chemical dependency   Follow up with psychiatry as needed     Activity    Your activity upon discharge: activity as tolerated     Diet    Follow this diet upon discharge: Orders Placed This Encounter      Mechanical/Dental Soft Diet       Significant Results and Procedures   Most Recent 3 CBC's:  Recent Labs   Lab Test 11/05/19  0819 11/04/19  2158 11/04/19  1454   WBC 7.6  --  10.4   HGB 13.5 14.6 19.0*   *  --  104*   *  --  287     Most Recent 3 BMP's:  Recent Labs   Lab Test 11/05/19 0819 11/05/19  0023 11/04/19  1454   * 132* 136   POTASSIUM 4.1 4.5 3.9   CHLORIDE 102 101 97   CO2 23 16* 12*   BUN 7 6* 12   CR 0.70 0.73 0.86   ANIONGAP 7 15* 27*   KATHY 8.0* 7.9* 9.3   * 137* 78     Most Recent 2 LFT's:  Recent Labs   Lab Test 11/05/19 0819 11/05/19  0023   AST 54* 70*   ALT 43 53   ALKPHOS 65 76   BILITOTAL 3.3* 2.6*   , No results found for this or any previous visit.    Discharge Medications   Current Discharge Medication List      START taking these medications    Details   folic acid (FOLVITE) 1 MG tablet Take 1 tablet (1 mg) by mouth daily  Qty: 30 tablet, Refills: 0    Comments: Future refills by PCP Dr. Kee Horn with phone number [phone].  Associated Diagnoses: Alcoholic intoxication without complication (H)      mirtazapine (REMERON) 15 MG tablet Take 1 tablet (15 mg) by mouth At Bedtime  Qty: 30 tablet, Refills: 0    Comments: Future refills by  PCP Dr. Kee Diamond [facility] with phone number [phone].  Associated Diagnoses: Depression, unspecified depression type      multivitamin w/minerals (THERA-VIT-M) tablet Take 1 tablet by mouth daily  Qty: 30 each, Refills: 0    Comments: Future refills by PCP Dr. Kee Diamond [facility] with phone number [phone].  Associated Diagnoses: Alcoholic intoxication without complication (H)      ondansetron (ZOFRAN) 4 MG tablet Take 1 tablet (4 mg) by mouth every 8 hours as needed for nausea or vomiting  Qty: 30 tablet, Refills: 0    Comments: Future refills by PCP Dr. Kee Diamond [facility] with phone number [phone].  Associated Diagnoses: Alcoholic intoxication without complication (H)      pantoprazole (PROTONIX) 40 MG EC tablet Take 1 tablet (40 mg) by mouth 2 times daily  Qty: 60 tablet, Refills: 0    Comments: Future refills by PCP Dr. Kee Diamond [facility] with phone number [phone].  Associated Diagnoses: Alcoholic intoxication without complication (H)      QUEtiapine (SEROQUEL) 25 MG tablet Take 0.5 tablets (12.5 mg) by mouth every 6 hours as needed (anxienty)  Qty: 30 tablet, Refills: 0    Comments: Future refills by PCP Dr. Kee Diamond [facility] with phone number [phone].  Associated Diagnoses: Depression, unspecified depression type      vitamin B1 (THIAMINE) 100 MG tablet Take 1 tablet (100 mg) by mouth daily  Qty: 30 tablet, Refills: 0    Comments: Future refills by PCP Dr. Kee Diamond [facility] with phone number [phone].  Associated Diagnoses: Alcoholic intoxication without complication (H)           Allergies   Allergies   Allergen Reactions     Amoxicillin      Bactrim [Sulfamethoxazole W-Trimethoprim]

## 2019-11-11 ENCOUNTER — OFFICE VISIT (OUTPATIENT)
Dept: FAMILY MEDICINE | Facility: CLINIC | Age: 34
End: 2019-11-11
Payer: MEDICAID

## 2019-11-11 VITALS
HEART RATE: 101 BPM | HEIGHT: 75 IN | DIASTOLIC BLOOD PRESSURE: 80 MMHG | OXYGEN SATURATION: 94 % | SYSTOLIC BLOOD PRESSURE: 121 MMHG | TEMPERATURE: 97.3 F | WEIGHT: 223 LBS | BODY MASS INDEX: 27.73 KG/M2

## 2019-11-11 DIAGNOSIS — K70.9 LIVER DISEASE, CHRONIC, DUE TO ALCOHOL (H): ICD-10-CM

## 2019-11-11 DIAGNOSIS — F33.2 SEVERE EPISODE OF RECURRENT MAJOR DEPRESSIVE DISORDER, WITHOUT PSYCHOTIC FEATURES (H): ICD-10-CM

## 2019-11-11 DIAGNOSIS — F10.10 ALCOHOL ABUSE, EPISODIC DRINKING BEHAVIOR: Primary | ICD-10-CM

## 2019-11-11 DIAGNOSIS — K75.9 HEPATITIS: ICD-10-CM

## 2019-11-11 DIAGNOSIS — R45.851 SUICIDAL IDEATION: ICD-10-CM

## 2019-11-11 DIAGNOSIS — F41.1 GAD (GENERALIZED ANXIETY DISORDER): ICD-10-CM

## 2019-11-11 LAB
ALBUMIN UR-MCNC: 30 MG/DL
AMORPH CRY #/AREA URNS HPF: ABNORMAL /HPF
APPEARANCE UR: CLEAR
BACTERIA #/AREA URNS HPF: ABNORMAL /HPF
BASOPHILS # BLD AUTO: 0 10E9/L (ref 0–0.2)
BASOPHILS NFR BLD AUTO: 0.6 %
BILIRUB UR QL STRIP: ABNORMAL
CAOX CRY #/AREA URNS HPF: ABNORMAL /HPF
COLOR UR AUTO: ABNORMAL
DIFFERENTIAL METHOD BLD: ABNORMAL
EOSINOPHIL # BLD AUTO: 0.2 10E9/L (ref 0–0.7)
EOSINOPHIL NFR BLD AUTO: 2.8 %
ERYTHROCYTE [DISTWIDTH] IN BLOOD BY AUTOMATED COUNT: 15.7 % (ref 10–15)
GLUCOSE UR STRIP-MCNC: NEGATIVE MG/DL
HCT VFR BLD AUTO: 43.7 % (ref 40–53)
HGB BLD-MCNC: 15.3 G/DL (ref 13.3–17.7)
HGB UR QL STRIP: NEGATIVE
KETONES UR STRIP-MCNC: ABNORMAL MG/DL
LEUKOCYTE ESTERASE UR QL STRIP: NEGATIVE
LYMPHOCYTES # BLD AUTO: 1.7 10E9/L (ref 0.8–5.3)
LYMPHOCYTES NFR BLD AUTO: 24.9 %
MCH RBC QN AUTO: 36.9 PG (ref 26.5–33)
MCHC RBC AUTO-ENTMCNC: 35 G/DL (ref 31.5–36.5)
MCV RBC AUTO: 105 FL (ref 78–100)
MONOCYTES # BLD AUTO: 1.1 10E9/L (ref 0–1.3)
MONOCYTES NFR BLD AUTO: 16.2 %
MUCOUS THREADS #/AREA URNS LPF: PRESENT /LPF
NEUTROPHILS # BLD AUTO: 3.7 10E9/L (ref 1.6–8.3)
NEUTROPHILS NFR BLD AUTO: 55.5 %
NITRATE UR QL: NEGATIVE
PH UR STRIP: 5.5 PH (ref 5–7)
PLATELET # BLD AUTO: 302 10E9/L (ref 150–450)
RBC # BLD AUTO: 4.15 10E12/L (ref 4.4–5.9)
RBC #/AREA URNS AUTO: ABNORMAL /HPF
SOURCE: ABNORMAL
SP GR UR STRIP: 1.02 (ref 1–1.03)
UROBILINOGEN UR STRIP-ACNC: 2 EU/DL (ref 0.2–1)
WBC # BLD AUTO: 6.7 10E9/L (ref 4–11)
WBC #/AREA URNS AUTO: ABNORMAL /HPF

## 2019-11-11 PROCEDURE — 85025 COMPLETE CBC W/AUTO DIFF WBC: CPT | Performed by: INTERNAL MEDICINE

## 2019-11-11 PROCEDURE — 81001 URINALYSIS AUTO W/SCOPE: CPT | Performed by: INTERNAL MEDICINE

## 2019-11-11 PROCEDURE — 80053 COMPREHEN METABOLIC PANEL: CPT | Performed by: INTERNAL MEDICINE

## 2019-11-11 PROCEDURE — 99204 OFFICE O/P NEW MOD 45 MIN: CPT | Performed by: INTERNAL MEDICINE

## 2019-11-11 PROCEDURE — 36415 COLL VENOUS BLD VENIPUNCTURE: CPT | Performed by: INTERNAL MEDICINE

## 2019-11-11 ASSESSMENT — ANXIETY QUESTIONNAIRES
GAD7 TOTAL SCORE: 21
6. BECOMING EASILY ANNOYED OR IRRITABLE: NEARLY EVERY DAY
5. BEING SO RESTLESS THAT IT IS HARD TO SIT STILL: NEARLY EVERY DAY
2. NOT BEING ABLE TO STOP OR CONTROL WORRYING: NEARLY EVERY DAY
1. FEELING NERVOUS, ANXIOUS, OR ON EDGE: NEARLY EVERY DAY
IF YOU CHECKED OFF ANY PROBLEMS ON THIS QUESTIONNAIRE, HOW DIFFICULT HAVE THESE PROBLEMS MADE IT FOR YOU TO DO YOUR WORK, TAKE CARE OF THINGS AT HOME, OR GET ALONG WITH OTHER PEOPLE: VERY DIFFICULT
3. WORRYING TOO MUCH ABOUT DIFFERENT THINGS: NEARLY EVERY DAY
7. FEELING AFRAID AS IF SOMETHING AWFUL MIGHT HAPPEN: NEARLY EVERY DAY

## 2019-11-11 ASSESSMENT — MIFFLIN-ST. JEOR: SCORE: 2024.21

## 2019-11-11 ASSESSMENT — PATIENT HEALTH QUESTIONNAIRE - PHQ9
SUM OF ALL RESPONSES TO PHQ QUESTIONS 1-9: 26
5. POOR APPETITE OR OVEREATING: NEARLY EVERY DAY
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
## 2019-11-11 NOTE — NURSING NOTE
Care Coordination referral done.  Avelina said  Briseyda is off site today at another clinic but will call pt later today or tomorrow.  Patient will call us if does not hear from her by tomorrow afternoon.    Licha CASTANEDA MA

## 2019-11-12 ENCOUNTER — PATIENT OUTREACH (OUTPATIENT)
Dept: CARE COORDINATION | Facility: CLINIC | Age: 34
End: 2019-11-12

## 2019-11-12 ENCOUNTER — TELEPHONE (OUTPATIENT)
Dept: ADDICTION MEDICINE | Facility: CLINIC | Age: 34
End: 2019-11-12

## 2019-11-12 ENCOUNTER — TELEPHONE (OUTPATIENT)
Dept: PSYCHIATRY | Facility: CLINIC | Age: 34
End: 2019-11-12

## 2019-11-12 ENCOUNTER — TELEPHONE (OUTPATIENT)
Dept: FAMILY MEDICINE | Facility: CLINIC | Age: 34
End: 2019-11-12

## 2019-11-12 LAB
ALBUMIN SERPL-MCNC: 4 G/DL (ref 3.4–5)
ALP SERPL-CCNC: 106 U/L (ref 40–150)
ALT SERPL W P-5'-P-CCNC: 676 U/L (ref 0–70)
ANION GAP SERPL CALCULATED.3IONS-SCNC: 9 MMOL/L (ref 3–14)
AST SERPL W P-5'-P-CCNC: 888 U/L (ref 0–45)
BILIRUB SERPL-MCNC: 0.9 MG/DL (ref 0.2–1.3)
BUN SERPL-MCNC: 8 MG/DL (ref 7–30)
CALCIUM SERPL-MCNC: 9.2 MG/DL (ref 8.5–10.1)
CHLORIDE SERPL-SCNC: 105 MMOL/L (ref 94–109)
CO2 SERPL-SCNC: 26 MMOL/L (ref 20–32)
CREAT SERPL-MCNC: 0.73 MG/DL (ref 0.66–1.25)
GFR SERPL CREATININE-BSD FRML MDRD: >90 ML/MIN/{1.73_M2}
GLUCOSE SERPL-MCNC: 100 MG/DL (ref 70–99)
POTASSIUM SERPL-SCNC: 3.4 MMOL/L (ref 3.4–5.3)
PROT SERPL-MCNC: 7.2 G/DL (ref 6.8–8.8)
SODIUM SERPL-SCNC: 140 MMOL/L (ref 133–144)

## 2019-11-12 ASSESSMENT — ANXIETY QUESTIONNAIRES: GAD7 TOTAL SCORE: 21

## 2019-11-12 ASSESSMENT — ACTIVITIES OF DAILY LIVING (ADL): DEPENDENT_IADLS:: INDEPENDENT

## 2019-11-12 NOTE — TELEPHONE ENCOUNTER
Pt is scheduled with Dr. Fisher on 11/21/19 @ 2:45 @ [facility] . Closing encounter as no further follow up is needed.     Dennise Kamara  [facility]

## 2019-11-12 NOTE — TELEPHONE ENCOUNTER
"Spoke to pt in depth.  Pt is struggling with ETOH.   Pt states he can't go to hospital cause of bills he has to figure out how to pay his rent tonight.  Is asking for help with managing life things/help with figuiring out bills/etc.  Advised SW called this am, and will route back for them to recall - pt will have phone on him.    Advised ED/UC per Dr. Ortiz's note, discussed elevated liver levels and importance of being seen.    Pt mentions he was going to go to [place] to see about borrowing money from parents. Parents do not know about recent hospitalization, but would be supportive, but pt states \"don't want to put that on them.\"  Encouraged pt to share with his parents, and that if he's planning to go to [place], to obstain from ETOH, and that they have hospital too.     Pt states he knows, will think about it and will plan to be seen by tomorrow AM.  Pt does DENY any SI    Shima Girard RN     "

## 2019-11-12 NOTE — TELEPHONE ENCOUNTER
Please schedule appointment for patient with   Addiction Medicine Provider   For alcohol use.

## 2019-11-12 NOTE — PROGRESS NOTES
Clinic Care Coordination Contact    Clinic Care Coordination Contact  OUTREACH    Referral Information:  Referral Source: PCP    Primary Diagnosis: Dual -  MH/CD    Chief Complaint   Patient presents with     Clinic Care Coordination - Initial      Universal Utilization: 2 hospital visits in the past 90 days.  Utilization    Last refreshed: 11/12/2019  3:40 PM:  Hospital Admissions 1           Last refreshed: 11/12/2019  3:40 PM:  ED Visits 0           Last refreshed: 11/12/2019  3:40 PM:  No Show Count (past year) 0              Current as of: 11/12/2019  3:40 PM            Clinical Concerns:  CC LUIS spoke with pt regarding his overall wellbeing. PT shared that she is depressed and has financial worries.    He spoke with a triage nurse that advised him to go to the ED due to elevated liver levels. He stated understanding that the numbers don't sound good. He understands his alcohol use and lack of eating is likely making this worse but he thinks he will probably continue drinking at least minimally tonight. LUZ MARINA SMITH spoke with him about the concerns of this decision but he explained that he worries about the danger of withdrawal symptoms. He understands that the ED could assist with withdrawal symptoms as well. LUZ MARINA SMITH inquired about what would need to happen for him to make to the choice to go into the hospital and he said if he were actually feeling sick he would go in.    Psychosocial:  Pt shared that the main factor that is contributing to his depression is the death of his daughter 12 years ago. Her birthday in 11/16 and this is historically a very hard day for him. He explained that he typically goes to [place] to visit her grave and spend time with family on her birthday and on the date of her accident. He missed her accident last winter because he fell on the ice and he will be missing her birthday because he has to work. He plans to visit her grave on 11/18. Pt shared that he has participating in therapy in  the past which was helpful for a while but when he would start to feel better he would stop going and then his mood would slide again.    Pt has set up several appts today to address his mental health and chemical health needs. 11/21 he has an appointment with Psychology and Addiction Medicine. He has an appointment for a Rule 25 in January, he hopes to get into outpatient treatment.    Financial/Insurance:   Pt is now enrolled in MA. He is currently working at [employer] at a . He only gets hours on the weekends and is unable to pay his bills with this. He plans to speak with his parents tonight about money from rent. He feels despair about his job because he doesn't think he can emotionally handle more hours, especially if he gets into a treatment program.    CC SW spoke with pt about potential for [facility] support, cash and SNAP. Pt explained that he struggles with follow through on completing applications. CC SW shared that the [facility] can assist with this. Pt was unable to continue conversation at this time due to needing to start requesting financial support from family. CC SW will outreach to pt again to further discuss this.    Living Situation:  Current living arrangement:: I live in a private home  with girlfriend  Type of residence:: Apartment    Diet/Exercise/Sleep:  Pt stated that he is not eating much due to depression. He shared that he does have food at home but he is unable to get the motivation to eat. He related this to a memory of his daughter being taken off her feeding tube prior to life support ending.    Transportation:  Transportation concerns (GOAL):: No  Transportation means:: Regular car     Resources and Interventions:  Community Resources: OP Mental Health, Drug Abuse  Referrals Placed: Mental Health     Goals:      Goals        Patient Stated      1. Mental Health Management (pt-stated)      Goal Statement: Within the next 2-3 months, I would like to work with Psychiatry, Addiction  Medicine, and CD treatment to support my mental health symptoms and overall wellbeing.     Measure of Success: Brandin will verbally inform CC SW of success    Supportive Steps to Achieve: Care Coordination  will assist provide resources, treatment options and programs, and assisting to find appropriate community providers.    Barriers: Difficulty coping with financial stressors and employment concerns    Strengths: Motivated to address mental health concerns    Date to Achieve By: 2/12/2020    Patient expressed understanding of goal: Brandin verbally stated understanding of goal            Patient/Caregiver understanding: Pt verbally stated understanding     Future Appointments              In 1 week Tricia Cummings LGSW [facility], [facility]    In 1 week Arturo Fisher MD [place] [facility], [facility]    In 3 weeks Mireya Navarrete MD [facility], [facility]      Plan: CC SW will follow up in 2 weeks to further discuss pt's goals. Pt was reminded of CC SW contact information and encouraged to call with questions or concerns that arise before next outreach.    DENNY Gtz, [facility]  Clinic Care Coordinator  [facility]  [phone]  [email or web address]

## 2019-11-12 NOTE — TELEPHONE ENCOUNTER
Please review referral. Please route back to reception pool #[id number].    Thank you,    Dennise Kamara  [facility]

## 2019-11-12 NOTE — LETTER
[facility]  Complex Care Plan  About Me:    Patient Name:  Brandin Rodriguez    YOB: 1985  Age:         34 year old   [place] MRN:    [id number] Telephone Information:  Home Phone [phone]   Mobile [phone]       Address:  [address]  [place] [address] Email address:  No e-mail address on record      Emergency Contact(s)    Name Relationship Lgl Lamonte Work Phone Home Phone Mobile Phone   HARDEEP PEREZ Mother   [phone]            Primary language:  English     needed? No   [employer]:  [phone] op. 1  Other communication barriers: None  Preferred Method of Communication:     Current living arrangement: I live in a private home with spouse(lives with girlfriend)  Mobility Status/ Medical Equipment: Independent    Health Maintenance  Health Maintenance Reviewed: Up to date    My Access Plan  Medical Emergency 911   Primary Clinic Line [facility] - [phone]   24 Hour Appointment Line [phone] or  [phone] ([phone]) (toll-free)   24 Hour Nurse Line [phone] (toll-free)   Preferred Urgent Care [facility], [phone]   Preferred Hospital [facility]  [phone]   Preferred Pharmacy [id number] [place], [place] - [address] AT [address] [place] [address]     Behavioral Health Crisis Line The National Suicide Prevention Lifeline at 1-517.177.2930 or 911             My Care Team Members  Patient Care Team       Relationship Specialty Notifications Start End    Earlene Whelan MD PCP - General Internal Medicine  11/11/19     Phone: [phone] [address] [place] [address]    Briseyda Cross LGSW Lead Care Coordinator Primary Care - CC  11/12/19             My Care Plans  Self Management and Treatment Plan  Goals and (Comments)  Goals        General    1. Mental Health Management (pt-stated)     Notes - Note  created  11/12/2019  4:12 PM by Briseyda Cross LUIS    Goal Statement: Within the next 2-3 months, I would like to work with Psychiatry, Addiction Medicine, and CD treatment to support my mental health symptoms and overall wellbeing.     Measure of Success: Brandin will verbally inform CC SW of success    Supportive Steps to Achieve: Care Coordination  will assist provide resources, treatment options and programs, and assisting to find appropriate community providers.    Barriers: Difficulty coping with financial stressors and employment concerns    Strengths: Motivated to address mental health concerns    Date to Achieve By: 2/12/2020    Patient expressed understanding of goal: Brandin verbally stated understanding of goal                      My Medical and Care Information  Problem List   Patient Active Problem List   Diagnosis     Hematemesis     NEREYDA (generalized anxiety disorder)     Severe episode of recurrent major depressive disorder, without psychotic features (H)     Alcohol abuse, episodic drinking behavior     Hepatitis     Liver disease, chronic, due to alcohol (H)     Suicidal ideation      Current Medications and Allergies:  See printed Medication Report.    Care Coordination Start Date: 11/12/2019   Frequency of Care Coordination: 2 weeks   Form Last Updated: 11/12/2019

## 2019-11-12 NOTE — PROGRESS NOTES
Clinic Care Coordination Contact  [facility]/Voicemail    Referral Source: PCP  Clinical Data: Care Coordinator Outreach    Outreach attempted x 1.  Left message on patient's voicemail with call back information and requested return call.    Plan: Care Coordinator will try to reach patient again in 1-3 business days.    DENNY Gtz, [facility]  Clinic Care Coordinator  [facility] Children[facility]  [phone]  [email or web address]

## 2019-11-12 NOTE — TELEPHONE ENCOUNTER
.  PSYCHIATRY CLINIC PHONE INTAKE     SERVICES REQUESTED / INTERESTED IN          Other:  CDE    Presenting Problem and Brief History                              What would you like to be seen for? (brief description):  Pt is looking to get help with alcohol. Pt has struggled with alcohol abuse for over 10 years. Pt drinks over a pint a day. Pt stated he has PTSD from the death of his daughter when she was 2 years old. The drinking wasn't as bad then as it is now. Sleep isn't good. It's hard to fall asleep unless he drinks. He gets nightmares. He stated his esophagus is really messed up, he's been throwing up blood and has concerns about his liver. Pt gets panic attacks that seem to be increasing in frequency.     Have you received a mental health diagnosis? No   Which one (s): No formal diagnosis (Had ADD when he was growing up)  Is there any history of developmental delay?  No   Are you currently seeing a mental health provider?  Yes            Who / month last seen:  Pt scheduled at [facility] for therapy and med management.   Do you have mental health records elsewhere?  Yes  Will you sign a release so we can obtain them?  Yes    Have you ever been hospitalized for psychiatric reasons? Yes  Describe:  He was taking a medication that was giving him SI. He was held over night. Last week he was in the hospital [facility] and stayed for 3 days due to depression.     Do you have current thoughts of self-harm?  No    Do you currently have thoughts of harming others?  No       Substance Use History     Do you have any history of alcohol / illicit drug use?  Yes  Describe:  See above  Have you ever received treatment for this?  No    Describe:  NA     Social History     Does the patient have a guardian?  No    Name / number: NA  Have you had an ACT team in last 12 months?  No  Describe: NA   Do you have any current or past legal issues?  No  Describe: NA   OK to leave a detailed voicemail?  Yes    Medical/ Surgical  History                                   Patient Active Problem List   Diagnosis     Hematemesis     NEREYDA (generalized anxiety disorder)     Severe episode of recurrent major depressive disorder, without psychotic features (H)     Alcohol abuse, episodic drinking behavior     Hepatitis     Liver disease, chronic, due to alcohol (H)     Suicidal ideation          Medications             Current Outpatient Medications   Medication Sig Dispense Refill     disulfiram (ANTABUSE) 250 MG tablet Take 1 tablet (250 mg) by mouth daily (Patient not taking: Reported on 11/11/2019) 30 tablet 0     folic acid (FOLVITE) 1 MG tablet Take 1 tablet (1 mg) by mouth daily 30 tablet 0     mirtazapine (REMERON) 15 MG tablet Take 1 tablet (15 mg) by mouth At Bedtime 30 tablet 0     multivitamin w/minerals (THERA-VIT-M) tablet Take 1 tablet by mouth daily 30 each 0     ondansetron (ZOFRAN) 4 MG tablet Take 1 tablet (4 mg) by mouth every 8 hours as needed for nausea or vomiting 30 tablet 0     pantoprazole (PROTONIX) 40 MG EC tablet Take 1 tablet (40 mg) by mouth 2 times daily 60 tablet 0     QUEtiapine (SEROQUEL) 25 MG tablet Take 0.5 tablets (12.5 mg) by mouth every 6 hours as needed (anxienty) 30 tablet 0     vitamin B1 (THIAMINE) 100 MG tablet Take 1 tablet (100 mg) by mouth daily 30 tablet 0         DISPOSITION      11/12/19 Intake completed. Pt is scheduled for med management and therapy through [facility]. Adding pt to CDE [facility].  Madeline Herrera,

## 2019-11-12 NOTE — LETTER
[place] CARE COORDINATION    November 12, 2019    Brandin Rodriguez  [address]  [place] [address]      Dear Brandin,    I am a clinic care coordinator who works with Earlene Whelan MD at [facility]. Thank you for speaking with me today. I wanted to provide you with my contact information so that you can call me with questions or concerns about your health care. Below is a description of clinic care coordination and how I can further assist you.     The clinic care coordinator is a registered nurse and/or  who understand the health care system. The goal of clinic care coordination is to help you manage your health and improve access to the [facility] system in the most efficient manner. The registered nurse can assist you in meeting your health care goals by providing education, coordinating services, and strengthening the communication among your providers. The  can assist you with financial, behavioral, psychosocial, chemical dependency, counseling, and/or psychiatric resources.    Please feel free to contact me at [phone], with any questions or concerns. We at [facility] are focused on providing you with the highest-quality healthcare experience possible and that all starts with you.     Sincerely,     CIARAN Gtz

## 2019-11-12 NOTE — TELEPHONE ENCOUNTER
The patient's labs returned showing an AST of 888 and an ALT of 676.  I will have the nurse directed him to either an urgent care or ER if this is significantly higher than the last one.    Shine Ortiz M.D.

## 2019-11-13 ENCOUNTER — HOSPITAL ENCOUNTER (EMERGENCY)
Facility: CLINIC | Age: 34
Discharge: HOME | End: 2019-11-13
Attending: EMERGENCY MEDICINE | Admitting: EMERGENCY MEDICINE
Payer: MEDICAID

## 2019-11-13 VITALS
DIASTOLIC BLOOD PRESSURE: 95 MMHG | HEIGHT: 75 IN | SYSTOLIC BLOOD PRESSURE: 143 MMHG | WEIGHT: 220 LBS | HEART RATE: 96 BPM | BODY MASS INDEX: 27.35 KG/M2 | OXYGEN SATURATION: 98 % | TEMPERATURE: 98 F | RESPIRATION RATE: 16 BRPM

## 2019-11-13 DIAGNOSIS — F10.10 ALCOHOL ABUSE: ICD-10-CM

## 2019-11-13 DIAGNOSIS — K70.10 ALCOHOLIC HEPATITIS, UNSPECIFIED WHETHER ASCITES PRESENT (H): ICD-10-CM

## 2019-11-13 LAB
ALBUMIN SERPL-MCNC: 3.7 G/DL (ref 3.4–5)
ALP SERPL-CCNC: 107 U/L (ref 40–150)
ALT SERPL W P-5'-P-CCNC: 425 U/L (ref 0–70)
ANION GAP SERPL CALCULATED.3IONS-SCNC: 8 MMOL/L (ref 3–14)
AST SERPL W P-5'-P-CCNC: 325 U/L (ref 0–45)
BASOPHILS # BLD AUTO: 0 10E9/L (ref 0–0.2)
BASOPHILS NFR BLD AUTO: 0.6 %
BILIRUB SERPL-MCNC: 0.7 MG/DL (ref 0.2–1.3)
BUN SERPL-MCNC: 12 MG/DL (ref 7–30)
CALCIUM SERPL-MCNC: 8.9 MG/DL (ref 8.5–10.1)
CHLORIDE SERPL-SCNC: 108 MMOL/L (ref 94–109)
CO2 SERPL-SCNC: 24 MMOL/L (ref 20–32)
CREAT SERPL-MCNC: 0.73 MG/DL (ref 0.66–1.25)
DIFFERENTIAL METHOD BLD: ABNORMAL
EOSINOPHIL # BLD AUTO: 0.2 10E9/L (ref 0–0.7)
EOSINOPHIL NFR BLD AUTO: 3 %
ERYTHROCYTE [DISTWIDTH] IN BLOOD BY AUTOMATED COUNT: 15.5 % (ref 10–15)
ETHANOL SERPL-MCNC: 0.07 G/DL
GFR SERPL CREATININE-BSD FRML MDRD: >90 ML/MIN/{1.73_M2}
GLUCOSE SERPL-MCNC: 104 MG/DL (ref 70–99)
HCT VFR BLD AUTO: 42.9 % (ref 40–53)
HGB BLD-MCNC: 15.2 G/DL (ref 13.3–17.7)
IMM GRANULOCYTES # BLD: 0 10E9/L (ref 0–0.4)
IMM GRANULOCYTES NFR BLD: 0.1 %
LIPASE SERPL-CCNC: 332 U/L (ref 73–393)
LYMPHOCYTES # BLD AUTO: 2.6 10E9/L (ref 0.8–5.3)
LYMPHOCYTES NFR BLD AUTO: 37 %
MCH RBC QN AUTO: 37.1 PG (ref 26.5–33)
MCHC RBC AUTO-ENTMCNC: 35.4 G/DL (ref 31.5–36.5)
MCV RBC AUTO: 105 FL (ref 78–100)
MONOCYTES # BLD AUTO: 0.9 10E9/L (ref 0–1.3)
MONOCYTES NFR BLD AUTO: 12.5 %
NEUTROPHILS # BLD AUTO: 3.3 10E9/L (ref 1.6–8.3)
NEUTROPHILS NFR BLD AUTO: 46.8 %
NRBC # BLD AUTO: 0 10*3/UL
NRBC BLD AUTO-RTO: 0 /100
PLATELET # BLD AUTO: 390 10E9/L (ref 150–450)
POTASSIUM SERPL-SCNC: 3.2 MMOL/L (ref 3.4–5.3)
PROT SERPL-MCNC: 7.1 G/DL (ref 6.8–8.8)
RBC # BLD AUTO: 4.1 10E12/L (ref 4.4–5.9)
SODIUM SERPL-SCNC: 140 MMOL/L (ref 133–144)
WBC # BLD AUTO: 7 10E9/L (ref 4–11)

## 2019-11-13 PROCEDURE — 85025 COMPLETE CBC W/AUTO DIFF WBC: CPT | Performed by: EMERGENCY MEDICINE

## 2019-11-13 PROCEDURE — 80320 DRUG SCREEN QUANTALCOHOLS: CPT | Performed by: EMERGENCY MEDICINE

## 2019-11-13 PROCEDURE — 99284 EMERGENCY DEPT VISIT MOD MDM: CPT | Mod: 25

## 2019-11-13 PROCEDURE — 83690 ASSAY OF LIPASE: CPT | Performed by: EMERGENCY MEDICINE

## 2019-11-13 PROCEDURE — 80053 COMPREHEN METABOLIC PANEL: CPT | Performed by: EMERGENCY MEDICINE

## 2019-11-13 PROCEDURE — 96360 HYDRATION IV INFUSION INIT: CPT

## 2019-11-13 PROCEDURE — 25000132 ZZH RX MED GY IP 250 OP 250 PS 637: Performed by: EMERGENCY MEDICINE

## 2019-11-13 PROCEDURE — 25800030 ZZH RX IP 258 OP 636: Performed by: EMERGENCY MEDICINE

## 2019-11-13 RX ORDER — FOLIC ACID 1 MG/1
1 TABLET ORAL ONCE
Status: COMPLETED | OUTPATIENT
Start: 2019-11-13 | End: 2019-11-13

## 2019-11-13 RX ORDER — LANOLIN ALCOHOL/MO/W.PET/CERES
100 CREAM (GRAM) TOPICAL ONCE
Status: COMPLETED | OUTPATIENT
Start: 2019-11-13 | End: 2019-11-13

## 2019-11-13 RX ORDER — MULTIVITAMIN,THERAPEUTIC
1 TABLET ORAL ONCE
Status: COMPLETED | OUTPATIENT
Start: 2019-11-13 | End: 2019-11-13

## 2019-11-13 RX ORDER — POTASSIUM CHLORIDE 1500 MG/1
40 TABLET, EXTENDED RELEASE ORAL ONCE
Status: COMPLETED | OUTPATIENT
Start: 2019-11-13 | End: 2019-11-13

## 2019-11-13 RX ADMIN — FOLIC ACID 1 MG: 1 TABLET ORAL at 20:08

## 2019-11-13 RX ADMIN — SODIUM CHLORIDE 1000 ML: 9 INJECTION, SOLUTION INTRAVENOUS at 20:07

## 2019-11-13 RX ADMIN — POTASSIUM CHLORIDE 40 MEQ: 1500 TABLET, EXTENDED RELEASE ORAL at 20:30

## 2019-11-13 RX ADMIN — Medication 100 MG: at 20:07

## 2019-11-13 RX ADMIN — THERA TABS 1 TABLET: TAB at 20:07

## 2019-11-13 ASSESSMENT — ENCOUNTER SYMPTOMS
FEVER: 0
ABDOMINAL PAIN: 0
VOMITING: 0

## 2019-11-13 ASSESSMENT — MIFFLIN-ST. JEOR: SCORE: 2010.6

## 2019-11-13 NOTE — TELEPHONE ENCOUNTER
Dr. Whelan,    Just wanted to make sure you saw this and his labs from yesterday.  I will let you take it from here.    Thanks    Shine

## 2019-11-13 NOTE — TELEPHONE ENCOUNTER
Thank you Shine for looking at these labs, I had also contacted patient yesterday and he decided go to the ER today; follow-up call today with the patient, he is now at [facility], he will be going to the ER as per recommendations.  He denies any symptoms at all.

## 2019-11-13 NOTE — RESULT ENCOUNTER NOTE
Patient was notified of all his lab results, electrolytes stable and cbc shows macrocytosis, platelet normal as well hb/hematocrit and wbc. He was notified of marked elevation of LFT and recommendation given [see prior result note]

## 2019-11-13 NOTE — RESULT ENCOUNTER NOTE
This provider talked to patient on phone , and advised him again of his marked elevated liver enzymes [compared to last hospitalization], advised patient again be seen in ED for possible admission,/obseravtion, monitoring of his LFT, IVF and also see GI and have RUQ US of liver and coagulopathy mead ,   patient had continued to drink alcohol since discharge from hospital but not to same extent he says, he was advised to completely stop and abstain from alcohol drinking, avoid any hepatotoxic medicine, antiinflammatories, or tylenol and  push fluids,   He denies any abdominal pain, lethargy, vomiting, hematemesis or any GI bleed, or change of mental status, he declined to go to ED tonight but will go tomorrow morning to ED, he states still has financial burdens, he still has to pay his bills from last hospital,  involved with the care,and has contacted patient,   patient appreciative of phone call, and repeated he will go tomorrow to ED.

## 2019-11-14 ENCOUNTER — PATIENT OUTREACH (OUTPATIENT)
Dept: CARE COORDINATION | Facility: CLINIC | Age: 34
End: 2019-11-14

## 2019-11-14 ASSESSMENT — ENCOUNTER SYMPTOMS: SHORTNESS OF BREATH: 0

## 2019-11-14 NOTE — ED PROVIDER NOTES
History   Chief Complaint:  Abnormal Labs    HPI   Brandin Rodriguez is a 34 year old male (current every day smoker) with a history of alcohol abuse and chronic liver disease who presents with abnormal labs. The patient reports that he was hospitalized for three days on 11/04 due to hematemesis and depression. He saw his primary care physician two days ago for follow up after his admission and labs were obtained. Today, the patient was contacted by his primary's office and was recommended to come to the ED due to elevated liver enzymes (see below). The patient does have a history of alcohol abuse and typically drinks 1-2 pints vodka per day. He endorses having three drinks today 3-4 hours ago. The patient is not having any pain and denies fever, vomiting, and recent illness.    Allergies:  Amoxicillin  Bactrim    Medications:    disulfiram (ANTABUSE) 250 MG tablet  mirtazapine (REMERON) 15 MG tablet  ondansetron (ZOFRAN) 4 MG tablet  pantoprazole (PROTONIX) 40 MG EC tablet  QUEtiapine (SEROQUEL) 25 MG tablet  vitamin B1 (THIAMINE) 100 MG tablet    Past Medical History:    Alcohol abuse, episodic drinking behavior  Alcoholic hepatitis  Anxiety  Depression  Depressive disorder  PTSD  Suicidal ideation  Hematemesis  Liver disease, chronic, due to alcohol  Hepatitis  Severe episode of recurrent major depressive disorder, without psychotic features    Past Surgical History:    Mole biopsy  Soft tissue surgery    Family History:    History reviewed. No pertinent family history.     Social History:  Smoking status: Current every day smoker  Alcohol use: Yes, 1-2 pints per day vodka  Drug use: Yes, marijuana   Marital Status:  Single [1]     Review of Systems   Constitutional: Negative for fever.   Respiratory: Negative for shortness of breath.    Cardiovascular: Negative for chest pain.   Gastrointestinal: Negative for abdominal pain and vomiting.   All other systems reviewed and are negative.      Physical Exam   Patient  "Vitals for the past 24 hrs:   BP Temp Temp src Pulse Heart Rate Resp SpO2 Height Weight   11/13/19 2140 (!) 143/95 -- -- 96 -- 16 98 % -- --   11/13/19 2130 (!) 143/95 -- -- 89 -- -- 97 % -- --   11/13/19 2115 (!) 140/94 -- -- 97 -- -- 96 % -- --   11/13/19 2100 (!) 130/91 -- -- 100 -- -- 97 % -- --   11/13/19 2045 (!) 129/92 -- -- 98 -- -- 99 % -- --   11/13/19 2030 (!) 133/90 -- -- 97 -- -- 99 % -- --   11/13/19 2015 (!) 129/92 -- -- 98 -- -- 98 % -- --   11/13/19 2000 136/86 -- -- 99 -- -- 96 % -- --   11/13/19 1950 (!) 132/91 -- -- -- -- -- -- -- --   11/13/19 1913 (!) 141/79 98  F (36.7  C) Oral -- 125 16 95 % 1.892 m (6' 2.5\") 99.8 kg (220 lb)     Physical Exam  General: Alert and cooperative with exam. Patient in mild distress.  Mildly intoxicated.  Head:  Scalp is NC/AT  Eyes:  No scleral icterus, PERRL  ENT:  The external nose and ears are normal. The oropharynx is normal and without erythema; mucus membranes are moist. Uvula midline.  Neck:  Normal range of motion without rigidity.  CV:  Mildly tachycardic, regular rhythm    No pathologic murmur   Resp:  Breath sounds are clear bilaterally    Non-labored, no retractions or accessory muscle use  GI:  Abdomen is soft, no distension, no tenderness. No peritoneal signs.  Overweight  MS:  No lower extremity edema   Skin:  Warm and dry, No rash or lesions noted.  Neuro: Oriented x 3. No gross motor deficits.    Emergency Department Course   Laboratory:  CBC: WNL (WBC 7.0, HGB 15.2, )  CMP: Potassium 3.2 (L), Glucose 104 (H),  (H),  (H) o/w WNL (Creatinine 0.73)  Lipase: 332  Alcohol Ethyl: 0.07 (H)    Interventions:  2007: NS 1L IV Bolus            thiamine 100 mg PO            multivitamin 1 tablet PO  2008: folic acid 1 mg PO  2030: potassium chloride 40 mEq PO    Emergency Department Course:  Past medical records, nursing notes, and vitals reviewed.   1955: I performed an exam of the patient and obtained history, as documented " above.    2045: I rechecked the patient. Explained findings to the patient.    Findings and plan explained to the patient. Patient discharged home with instructions regarding supportive care, medications, and reasons to return. The importance of close follow-up was reviewed.     Impression & Plan    Medical Decision Making:  Patient is a 34-year-old male who presents for abnormal LFTs obtained 2 days ago (significantly elevated AST/ALT).  Patient with history of alcohol abuse and notes that he continues to drink daily.  Patient's medical history and records were reviewed.  On evaluation, patient denied abdominal pain and overall feels well; abdominal exam benign.  Labs were obtained and demonstrate improving LFTs as noted above.  LFT elevation likely secondary to alcohol abuse/alcoholic hepatitis.  Patient was offered but deferred resources for alcohol abuse.  No reported history of complex withdrawal symptoms and patient is clinically sober at time of discharge.  He was provided 1 L IV fluids as well as multivitamin/folate/thiamine.  Additionally, mild hypokalemia was noted on labs; provided oral replacement.  Recommended close follow-up with his PCP.  No indication for emergent abdominal imaging at this time.  Return precautions were discussed.  Patient discharged home.  At the time discharge patient was hemodynamically stable, neurologically intact, afebrile, and well-appearing.    Diagnosis:    ICD-10-CM    1. Alcoholic hepatitis, unspecified whether ascites present K70.10    2. Alcohol abuse F10.10        Disposition:  Discharged to home        Tremayne Krueger  11/13/2019    EMERGENCY DEPARTMENT  I Tremayne Krueger, am serving as a scribe at 7:55 PM on 11/13/2019 to document services personally performed by Darron Wayne DO based on my observations and the provider's statements to me.        Darron Wayne DO  11/14/19 0841

## 2019-11-14 NOTE — PROGRESS NOTES
Clinic Care Coordination Contact  [facility]/Voicemail       Clinical Data: Care Coordinator Outreach    Outreach attempted x 1.  Left message on patient's voicemail with call back information and requested return call.    Plan: Care Coordinator will try to reach patient again in 1-2 business days.    DENNY Gtz, [facility]  Clinic Care Coordinator  [facility] Children[facility]  [phone]  [email or web address]

## 2019-11-21 ENCOUNTER — OFFICE VISIT (OUTPATIENT)
Dept: ADDICTION MEDICINE | Facility: CLINIC | Age: 34
End: 2019-11-21
Payer: MEDICAID

## 2019-11-21 VITALS
OXYGEN SATURATION: 99 % | WEIGHT: 224.5 LBS | HEART RATE: 125 BPM | TEMPERATURE: 98.3 F | BODY MASS INDEX: 27.91 KG/M2 | HEIGHT: 75 IN | RESPIRATION RATE: 18 BRPM

## 2019-11-21 DIAGNOSIS — F10.930 ALCOHOL WITHDRAWAL SYNDROME WITHOUT COMPLICATION (H): Primary | ICD-10-CM

## 2019-11-21 DIAGNOSIS — F10.20 UNCOMPLICATED ALCOHOL DEPENDENCE (H): ICD-10-CM

## 2019-11-21 PROCEDURE — 99203 OFFICE O/P NEW LOW 30 MIN: CPT | Performed by: FAMILY MEDICINE

## 2019-11-21 RX ORDER — PROPRANOLOL HYDROCHLORIDE 20 MG/1
20 TABLET ORAL 2 TIMES DAILY PRN
Qty: 30 TABLET | Refills: 1 | Status: SHIPPED | OUTPATIENT
Start: 2019-11-21 | End: 2021-05-26

## 2019-11-21 RX ORDER — GABAPENTIN 300 MG/1
300 CAPSULE ORAL 3 TIMES DAILY
Qty: 15 CAPSULE | Refills: 0 | Status: SHIPPED | OUTPATIENT
Start: 2019-11-21 | End: 2019-11-25

## 2019-11-21 ASSESSMENT — MIFFLIN-ST. JEOR: SCORE: 2031.02

## 2019-11-21 NOTE — PROGRESS NOTES
Subjective     Brandin Rodriguez is a 34 year old male who presents to clinic today for the following health issues:    HPI   New Patient/Transfer of Care    ADDICTION MEDICINE NOTE:    Initial visit    Epic chart reviewed     Here asking for help with alcohol dependence    Knows he needs to stop drinking and wants to    Has been drinking heavily for 13 years  since 2 yr.old daughter was killed in a MVA 13 yrs. 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  No children    Drinks vodka daily - 1-2 pints  Adverse effects:   - liver disease   - depression, anxiety   - finances   - job loss    Was hospitalized at [facility] earlier this month after hematemesis  Started on Seroquel and Remeron but not interested in taking either    Discussed medications used for alcohol dependence  - Antabuse, naltrexone, Gabapentin    No history of severe withdrawal     Will begin Gabapentin for alcohol withdrawal and post acute withdrawal     Advised make appointment at [facility] for evaluation for out-patient treatment ; he's willing    Discussed AA; told him [facility] [facility]    Advised follow up here 11/25/19    Questions answered    Has used propranolol for anxiety with some success in past  - ordered        Patient Active Problem List   Diagnosis     Hematemesis     NEREYDA (generalized anxiety disorder)     Severe episode of recurrent major depressive disorder, without psychotic features (H)     Alcohol abuse, episodic drinking behavior     Hepatitis     Liver disease, chronic, due to alcohol (H)     Suicidal ideation     Past Surgical History:   Procedure Laterality Date     BIOPSY      mole bxs     ESOPHAGOSCOPY, GASTROSCOPY, DUODENOSCOPY (EGD), COMBINED N/A 11/5/2019    Procedure: ESOPHAGOGASTRODUODENOSCOPY (EGD);  Surgeon: Jake Elizabeth MD;  Location:  GI     SOFT TISSUE SURGERY        "  Social History     Tobacco Use     Smoking status: Current Every Day Smoker     Packs/day: 1.00     Smokeless tobacco: Never Used   Substance Use Topics     Alcohol use: Yes     Comment: pint a day, 11/ 2019 Trying to quit      No family history on file.      Current Outpatient Medications   Medication Sig Dispense Refill     gabapentin (NEURONTIN) 300 MG capsule Take 1 capsule (300 mg) by mouth 3 times daily 15 capsule 0     propranolol (INDERAL) 20 MG tablet Take 1 tablet (20 mg) by mouth 2 times daily as needed (Anxiety) 30 tablet 1     disulfiram (ANTABUSE) 250 MG tablet Take 1 tablet (250 mg) by mouth daily (Patient not taking: Reported on 11/11/2019) 30 tablet 0     folic acid (FOLVITE) 1 MG tablet Take 1 tablet (1 mg) by mouth daily 30 tablet 0     mirtazapine (REMERON) 15 MG tablet Take 1 tablet (15 mg) by mouth At Bedtime 30 tablet 0     multivitamin w/minerals (THERA-VIT-M) tablet Take 1 tablet by mouth daily 30 each 0     ondansetron (ZOFRAN) 4 MG tablet Take 1 tablet (4 mg) by mouth every 8 hours as needed for nausea or vomiting 30 tablet 0     pantoprazole (PROTONIX) 40 MG EC tablet Take 1 tablet (40 mg) by mouth 2 times daily 60 tablet 0     QUEtiapine (SEROQUEL) 25 MG tablet Take 0.5 tablets (12.5 mg) by mouth every 6 hours as needed (anxienty) 30 tablet 0     vitamin B1 (THIAMINE) 100 MG tablet Take 1 tablet (100 mg) by mouth daily 30 tablet 0     Allergies   Allergen Reactions     Amoxicillin      Bactrim [Sulfamethoxazole W-Trimethoprim]          Reviewed and updated as needed this visit by Provider  Tobacco         Review of Systems   ROS COMP: Constitutional, HEENT, cardiovascular, pulmonary, GI, , musculoskeletal, neuro, skin, endocrine and psych systems are negative, except as otherwise noted.      Objective    Pulse 125   Temp 98.3  F (36.8  C) (Temporal)   Resp 18   Ht 1.892 m (6' 2.5\")   Wt 101.8 kg (224 lb 8 oz)   SpO2 99%   BMI 28.44 kg/m    Body mass index is 28.44 " kg/m .  Physical Exam   GENERAL: healthy, alert and no distress  EYES: Eyes grossly normal to inspection, PERRL and conjunctivae and sclerae normal  MS: no gross musculoskeletal defects noted, no edema  SKIN: no suspicious lesions or rashes  NEURO: Normal strength and tone, mentation intact and speech normal  PSYCH: mentation appears normal, affect normal/bright  Mental Status:   General appearance:  Appropriate, well kept   Mood: anxious   Cognition: Appropriate for age   Orientation: Times three   Insight: fair   Memory: Appropriate long term / Short term   Psychosis: Denies   Suicidal / Homicidal Thoughts: Denies       Diagnostic Test Results:  Labs reviewed in Epic        Assessment & Plan     1. Alcohol withdrawal syndrome without complication (H)    - gabapentin (NEURONTIN) 300 MG capsule; Take 1 capsule (300 mg) by mouth 3 times daily  Dispense: 15 capsule; Refill: 0  - propranolol (INDERAL) 20 MG tablet; Take 1 tablet (20 mg) by mouth 2 times daily as needed (Anxiety)  Dispense: 30 tablet; Refill: 1     2. Alcohol dependence     3. Alcoholic liver disease    MEDICATIONS:   Orders Placed This Encounter   Medications     gabapentin (NEURONTIN) 300 MG capsule     Sig: Take 1 capsule (300 mg) by mouth 3 times daily     Dispense:  15 capsule     Refill:  0     propranolol (INDERAL) 20 MG tablet     Sig: Take 1 tablet (20 mg) by mouth 2 times daily as needed (Anxiety)     Dispense:  30 tablet     Refill:  1          - Continue other medications without change  FUTURE APPOINTMENTS:       - Follow-up visit in 4 days      Arturo Fisher MD  [facility]

## 2019-11-25 ENCOUNTER — OFFICE VISIT (OUTPATIENT)
Dept: ADDICTION MEDICINE | Facility: CLINIC | Age: 34
End: 2019-11-25
Payer: MEDICAID

## 2019-11-25 ENCOUNTER — OFFICE VISIT (OUTPATIENT)
Dept: PSYCHOLOGY | Facility: CLINIC | Age: 34
End: 2019-11-25
Attending: INTERNAL MEDICINE
Payer: MEDICAID

## 2019-11-25 ENCOUNTER — TELEPHONE (OUTPATIENT)
Dept: ADDICTION MEDICINE | Facility: CLINIC | Age: 34
End: 2019-11-25

## 2019-11-25 VITALS
WEIGHT: 231 LBS | OXYGEN SATURATION: 95 % | HEART RATE: 99 BPM | DIASTOLIC BLOOD PRESSURE: 68 MMHG | TEMPERATURE: 98.4 F | BODY MASS INDEX: 29.26 KG/M2 | SYSTOLIC BLOOD PRESSURE: 124 MMHG | RESPIRATION RATE: 15 BRPM

## 2019-11-25 DIAGNOSIS — F10.10 ALCOHOL ABUSE, EPISODIC DRINKING BEHAVIOR: ICD-10-CM

## 2019-11-25 DIAGNOSIS — F41.1 GAD (GENERALIZED ANXIETY DISORDER): ICD-10-CM

## 2019-11-25 DIAGNOSIS — F33.2 SEVERE EPISODE OF RECURRENT MAJOR DEPRESSIVE DISORDER, WITHOUT PSYCHOTIC FEATURES (H): Primary | ICD-10-CM

## 2019-11-25 DIAGNOSIS — F10.930 ALCOHOL WITHDRAWAL SYNDROME WITHOUT COMPLICATION (H): ICD-10-CM

## 2019-11-25 PROCEDURE — 99214 OFFICE O/P EST MOD 30 MIN: CPT | Performed by: FAMILY MEDICINE

## 2019-11-25 PROCEDURE — 90834 PSYTX W PT 45 MINUTES: CPT | Performed by: SOCIAL WORKER

## 2019-11-25 RX ORDER — GABAPENTIN 300 MG/1
300 CAPSULE ORAL 3 TIMES DAILY
Qty: 90 CAPSULE | Refills: 0 | Status: ON HOLD | OUTPATIENT
Start: 2019-11-25 | End: 2020-12-03

## 2019-11-25 RX ORDER — HYDROXYZINE PAMOATE 50 MG/1
50 CAPSULE ORAL 3 TIMES DAILY PRN
Qty: 90 CAPSULE | Refills: 0 | Status: SHIPPED | OUTPATIENT
Start: 2019-11-25 | End: 2020-01-27

## 2019-11-25 ASSESSMENT — PATIENT HEALTH QUESTIONNAIRE - PHQ9
5. POOR APPETITE OR OVEREATING: NEARLY EVERY DAY
SUM OF ALL RESPONSES TO PHQ QUESTIONS 1-9: 22

## 2019-11-25 ASSESSMENT — ANXIETY QUESTIONNAIRES
5. BEING SO RESTLESS THAT IT IS HARD TO SIT STILL: MORE THAN HALF THE DAYS
2. NOT BEING ABLE TO STOP OR CONTROL WORRYING: NEARLY EVERY DAY
3. WORRYING TOO MUCH ABOUT DIFFERENT THINGS: NEARLY EVERY DAY
GAD7 TOTAL SCORE: 20
7. FEELING AFRAID AS IF SOMETHING AWFUL MIGHT HAPPEN: NEARLY EVERY DAY
1. FEELING NERVOUS, ANXIOUS, OR ON EDGE: NEARLY EVERY DAY
6. BECOMING EASILY ANNOYED OR IRRITABLE: NEARLY EVERY DAY

## 2019-11-25 NOTE — PROGRESS NOTES
Progress Note - Initial Session    Client Name:  Brandin Rodriguez Date: 11/25/2019         Service Type: Individual  Video Visit: No     Session Start Time: 2:05 pm  Session End Time: 2:55 pm     Session Length: 50 mins    Session #: 1    Attendees: Client attended alone     DATA:  Diagnostic Assessment in progress.  Unable to complete documentation at the conclusion of the first session due to reviewing rights and responsibilities, expectation for therapy and current symptoms. PHQ 9 and NEREYDA 7 completed in session. Intake packet and WHODAS incomplete, advised to return next session. Client endorses symptoms of depression and anxiety along with dependency on alcohol. Client has 3 ED visits in late 2019 relating to alcohol intoxication. He reports concerns about his physical health, finances and depression. He is working with Dr. Fisher for addiction and has an appointment with psychiatry in 12/3/2019.     Interactive Complexity: No  Crisis: No    Intervention:  Motivational Interviewing: Building rapport with client, and reflective listening. Discussed changes of quitting alcohol and treating depression with support from medical team.  Motivational Interviewing    MI Intervention: Expressed Empathy/Understanding, Supported Autonomy, Collaboration, Evocation, Permission to raise concern or advise, Open-ended questions and Reflections: simple and complex     Change Talk Expressed by the Patient: Desire to change Ability to change Reasons to change Need to change Committment to change    Provider Response to Change Talk: E - Evoked more info from patient about behavior change, A - Affirmed patient's thoughts, decisions, or attempts at behavior change, R - Reflected patient's change talk and S - Summarized patient's change talk statements      ASSESSMENT:  Mental Status Assessment:  Appearance:   Appropriate   Eye Contact:   Good   Psychomotor Behavior: Normal   Attitude:   Cooperative  "  Orientation:   All  Speech   Rate / Production: Normal    Volume:  Normal   Mood:    Depressed  Sad   Affect:    Appropriate   Thought Content:  Clear   Thought Form:  Coherent  Logical   Insight:    Good       Safety Issues and Plan for Safety and Risk Management:  Client denies current fears or concerns for personal safety.  Client reports the following current or recent suicidal ideation or behaviors: having fleeting thoughts of not wanting to live. States he is afraid to cut himself and is afraid of heights. He express, \"not wanting to do that to my parents..  Client denies current or recent homicidal ideation or behaviors.  Client denies current or recent self injurious behavior or ideation.  Client denies other safety concerns.  Recommended that patient call 911 or go to the local ED should there be a change in any of these risk factors.  Client reports there are no firearms in the house.      Diagnostic Criteria:  A. Excessive anxiety and worry about a number of events or activities (such as work or school performance).   B. The person finds it difficult to control the worry.   - Restlessness or feeling keyed up or on edge.    - Difficulty concentrating or mind going blank.    - Irritability.    - Muscle tension.    - Sleep disturbance (difficulty falling or staying asleep, or restless unsatisfying sleep).   D. The focus of the anxiety and worry is not confined to features of an Axis I disorder.  E. The anxiety, worry, or physical symptoms cause clinically significant distress or impairment in social, occupational, or other important areas of functioning.   F. The disturbance is not due to the direct physiological effects of a substance (e.g., a drug of abuse, a medication) or a general medical condition (e.g., hyperthyroidism) and does not occur exclusively during a Mood Disorder, a Psychotic Disorder, or a Pervasive Developmental Disorder.  A) Recurrent episode(s) - symptoms have been present during the " same 2-week period and represent a change from previous functioning 5 or more symptoms (required for diagnosis)   - Depressed mood. Note: In children and adolescents, can be irritable mood.     - Diminished interest or pleasure in all, or almost all, activities.    - Decreased sleep.    - Fatigue or loss of energy.    - Feelings of worthlessness or excessive guilt.    - Diminished ability to think or concentrate, or indecisiveness.    - Recurrent thoughts of death (not just fear of dying), recurrent suicidal ideation without a specific plan, or a suicide attempt or a specific plan for committing suicide.   B) The symptoms cause clinically significant distress or impairment in social, occupational, or other important areas of functioning  C) The episode is not attributable to the physiological effects of a substance or to another medical condition  D) The occurence of major depressive episode is not better explained by other thought / psychotic disorders  E) There has never been a manic episode or hypomanic episode    A problematic pattern of alcohol use leading to clinically significant impairment or distress, as manifested by at least two of the following, occurring within a 12-month period:  1. Alcohol is often taken in larger amounts or over a longer period than was intended.  2. There is a persistent desire or unsuccessful efforts to cut down or control alcohol use.  3. A great deal of time is spent in activities necessary to obtain alcohol, use alcohol, or recover from its effects.  4. Craving, or a strong desire or urge to use alcohol.  5. Recurrent alcohol use resulting in a failure to fulfill major role obligations at work, school, or home.  6. Continued alcohol use despite having persistent or recurrent social or interpersonal problems caused or exacerbated by the effects of alcohol.  7. Important social, occupational, or recreational activities are given up or reduced because of alcohol use.  8. Recurrent  alcohol use in situations in which it is physically hazardous.  9. Alcohol use is continued despite knowledge of having a persistent or recurrent physical or psychological problem that is likely to have been caused or exacerbated by alcohol.  10. Tolerance, as defined by either of the followin. A need for markedly increased amounts of alcohol to achieve intoxication or desired effect.  2. A markedly diminished effect with continued use of the same amount of alcohol.      DSM5 Diagnoses: (Sustained by DSM5 Criteria Listed Above)  Diagnoses: 296.33 (F33.2) Major Depressive Disorder, Recurrent Episode, Severe _  300.02 (F41.1) Generalized Anxiety Disorder  Substance-Related & Addictive Disorders Alcohol Use Disorder   303.90 (F10.20) Severe .  Psychosocial & Contextual Factors: Loss of child, history of SI, alcohol dependency, financial stressor, relationship conflict  WHODAS 2.0 (12 item)-not completed prior to start of session.   Collateral Reports Completed:  Not Applicable      PLAN: (Homework, other):  Client stated that he may follow up for ongoing services with [facility].  Second session scheduled for 2019.  Writer provided number to complete CD assessment. Encouraged client to attend psychiatry appointment and look into AA/NA support groups before next session. Client will continue to abstain from alcohol and use Gabapentin as prescribed by doctor to treat withdrawal symptoms.       DENNY Reis LGSW   2019  Note reviewed and clinical supervision by DENNY Hull [facility] 2019

## 2019-11-25 NOTE — TELEPHONE ENCOUNTER
Reason for Call:  Other appointment    Detailed comments: Patient arrived at 3:30 for his 3:15 appt, while the  was busy, and was unable to be checked in until 3:38.    Phone Number Patient can be reached at: Home number on file [phone] (home)    Best Time: Any    Can we leave a detailed message on this number? YES    Call taken on 11/25/2019 at 3:37 PM by Kerry Hanks

## 2019-11-26 ASSESSMENT — ANXIETY QUESTIONNAIRES: GAD7 TOTAL SCORE: 20

## 2019-11-26 NOTE — TELEPHONE ENCOUNTER
Patient came to appointment and patient was seen by provider. Closing encounter.    Day Higgins RN  11/26/19  8:27 AM

## 2019-11-27 NOTE — PROGRESS NOTES
Clinic Care Coordination Contact  RUST/Voicemail       Clinical Data: Care Coordinator Outreach    Outreach attempted x 2.  Left message on patient's voicemail with call back information and requested return call.    Plan: Care Coordinator sent care coordination introduction letter on 11/12/2019 via mail. Care Coordinator will try to reach patient again in 10 business days.    DENNY Gtz, [facility]  Clinic Care Coordinator  [facility]  [phone]  [email or web address]

## 2019-11-28 NOTE — PROGRESS NOTES
Subjective     Brandin Rodriguez is a 34 year old male who presents to clinic today for the following health issues:    HPI   New Patient/Transfer of Care    ADDICTION MEDICINE NOTE:    Previous visit noted below    Sober 2-3 days    Gabapentin helps immensely    Had therapist appointment today; referred for CD evaluation as I did    Wonders about medication for anxiety; advised Xanax is a no    Hydroxyzine OK    Encouraged AA    Re-check 10 days        PREVIOUS VISIT:    Initial visit    Epic chart reviewed     Here asking for help with alcohol dependence    Knows he needs to stop drinking and wants to    Has been drinking heavily for 13 years  since 2 yr.old daughter was killed in a MVA 13 yrs. 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  No children    Drinks vodka daily - 1-2 pints  Adverse effects:   - liver disease   - depression, anxiety   - finances   - job loss    Was hospitalized at [facility] earlier this month after hematemesis  Started on Seroquel and Remeron but not interested in taking either    Discussed medications used for alcohol dependence  - Antabuse, naltrexone, Gabapentin    No history of severe withdrawal     Will begin Gabapentin for alcohol withdrawal and post acute withdrawal     Advised make appointment at [facility] for evaluation for out-patient treatment ; he's willing    Discussed AA; told him about [email or web address] group which meets tonight    Advised follow up here 11/25/19    Questions answered    Has used propranolol for anxiety with some success in past  - ordered        Patient Active Problem List   Diagnosis     Hematemesis     NEREYDA (generalized anxiety disorder)     Severe episode of recurrent major depressive disorder, without psychotic features (H)     Alcohol abuse, episodic drinking behavior     Hepatitis     Liver disease, chronic, due to alcohol (H)     Suicidal ideation      Past Surgical History:   Procedure Laterality Date     BIOPSY      mole bxs     ESOPHAGOSCOPY, GASTROSCOPY, DUODENOSCOPY (EGD), COMBINED N/A 11/5/2019    Procedure: ESOPHAGOGASTRODUODENOSCOPY (EGD);  Surgeon: Jake Elizabeth MD;  Location:  GI     SOFT TISSUE SURGERY         Social History     Tobacco Use     Smoking status: Current Every Day Smoker     Packs/day: 1.00     Smokeless tobacco: Never Used   Substance Use Topics     Alcohol use: Yes     Comment: pint a day, 11/ 2019 Trying to quit      No family history on file.      Current Outpatient Medications   Medication Sig Dispense Refill     gabapentin (NEURONTIN) 300 MG capsule Take 1 capsule (300 mg) by mouth 3 times daily 90 capsule 0     hydrOXYzine (VISTARIL) 50 MG capsule Take 1 capsule (50 mg) by mouth 3 times daily as needed for anxiety 90 capsule 0     disulfiram (ANTABUSE) 250 MG tablet Take 1 tablet (250 mg) by mouth daily (Patient not taking: Reported on 11/11/2019) 30 tablet 0     folic acid (FOLVITE) 1 MG tablet Take 1 tablet (1 mg) by mouth daily 30 tablet 0     mirtazapine (REMERON) 15 MG tablet Take 1 tablet (15 mg) by mouth At Bedtime 30 tablet 0     multivitamin w/minerals (THERA-VIT-M) tablet Take 1 tablet by mouth daily 30 each 0     ondansetron (ZOFRAN) 4 MG tablet Take 1 tablet (4 mg) by mouth every 8 hours as needed for nausea or vomiting 30 tablet 0     pantoprazole (PROTONIX) 40 MG EC tablet Take 1 tablet (40 mg) by mouth 2 times daily 60 tablet 0     propranolol (INDERAL) 20 MG tablet Take 1 tablet (20 mg) by mouth 2 times daily as needed (Anxiety) 30 tablet 1     QUEtiapine (SEROQUEL) 25 MG tablet Take 0.5 tablets (12.5 mg) by mouth every 6 hours as needed (anxienty) 30 tablet 0     vitamin B1 (THIAMINE) 100 MG tablet Take 1 tablet (100 mg) by mouth daily 30 tablet 0     Allergies   Allergen Reactions     Amoxicillin      Bactrim [Sulfamethoxazole W-Trimethoprim]          Reviewed and updated as needed this visit by  Provider         Review of Systems   ROS COMP: Constitutional, HEENT, cardiovascular, pulmonary, GI, , musculoskeletal, neuro, skin, endocrine and psych systems are negative, except as otherwise noted.      Objective    /68   Pulse 99   Temp 98.4  F (36.9  C) (Oral)   Resp 15   Wt 104.8 kg (231 lb)   SpO2 95%   BMI 29.26 kg/m    Body mass index is 29.26 kg/m .  Physical Exam   GENERAL: healthy, alert and no distress  EYES: Eyes grossly normal to inspection, PERRL and conjunctivae and sclerae normal  MS: no gross musculoskeletal defects noted, no edema  SKIN: no suspicious lesions or rashes  NEURO: Normal strength and tone, mentation intact and speech normal  PSYCH: mentation appears normal, affect normal/bright  Mental Status:   General appearance:  Appropriate, well kept   Mood: anxious   Cognition: Appropriate for age   Orientation: Times three   Insight: fair   Memory: Appropriate long term / Short term   Psychosis: Denies   Suicidal / Homicidal Thoughts: Denies       Diagnostic Test Results:  Labs reviewed in Epic        Assessment & Plan     1. Alcohol withdrawal syndrome without complication (H)    - gabapentin (NEURONTIN) 300 MG capsule; Take 1 capsule (300 mg) by mouth 3 times daily  Dispense: 15 capsule; Refill: 0  - propranolol (INDERAL) 20 MG tablet; Take 1 tablet (20 mg) by mouth 2 times daily as needed (Anxiety)  Dispense: 30 tablet; Refill: 1     2. Alcohol dependence     3. Alcoholic liver disease    MEDICATIONS:   Orders Placed This Encounter   Medications     gabapentin (NEURONTIN) 300 MG capsule     Sig: Take 1 capsule (300 mg) by mouth 3 times daily     Dispense:  90 capsule     Refill:  0     hydrOXYzine (VISTARIL) 50 MG capsule     Sig: Take 1 capsule (50 mg) by mouth 3 times daily as needed for anxiety     Dispense:  90 capsule     Refill:  0          - Continue other medications without change  FUTURE APPOINTMENTS:       - Follow-up visit in 4 days      Arturo Fisher,  MD [facility]

## 2019-12-02 ENCOUNTER — PATIENT OUTREACH (OUTPATIENT)
Dept: CARE COORDINATION | Facility: CLINIC | Age: 34
End: 2019-12-02

## 2019-12-02 ASSESSMENT — ACTIVITIES OF DAILY LIVING (ADL): DEPENDENT_IADLS:: INDEPENDENT

## 2019-12-02 NOTE — PROGRESS NOTES
Clinic Care Coordination Contact    Follow Up Progress Note      Assessment: LUZ MARINA SMITH spoke with pt regarding his overall wellbeing. Pt shared that he is doing well and has been sober for 1 week. He explained that the medication is helping with this. He spoke of his Thanksgiving without alcohol and reported it went well.     Pt requested information on dental care with MA and locations that take his insurance. LUZ MARINA SMITH recommended he contact [facility] to discuss locations near him that take [facility].     Pt shared concerns regarding his Psychiatry appointment tomorrow and it's location. LUZ MARINA SMITH explained how Psychiatry works within [facility] and he will likely not have many appointments at this location. LUZ MARINA SMITH recommended he keep his appointment as it could take a while to get a new one. Pt was agreeable to this and may discuss with scheduling other locations after his appointment.    Goals addressed this encounter:   Goals Addressed                 This Visit's Progress       Patient Stated      1. Mental Health Management (pt-stated)   On track     Goal Statement: Within the next 2-3 months, I would like to work with Psychiatry, Addiction Medicine, and CD treatment to support my mental health symptoms and overall wellbeing.     Measure of Success: Brandin will verbally inform LUZ MARINA SMITH of success    Supportive Steps to Achieve: Care Coordination  will assist provide resources, treatment options and programs, and assisting to find appropriate community providers.    Barriers: Difficulty coping with financial stressors and employment concerns    Strengths: Motivated to address mental health concerns    Date to Achieve By: 2/12/2020    Patient expressed understanding of goal: Brandin verbally stated understanding of goal            Outreach Frequency: 2 weeks    Plan: CC [facility] will continue to follow up with pt regarding goal progression. Pt was reminded of [facility] contact information and encouraged to call with  questions or concerns that arise before next outreach.    Care Coordinator will follow up in 1 month    DENNY Gtz, CIARAN  Clinic Care Coordinator  [facility]  [phone]  [email or web address] [email or web address]

## 2019-12-03 ENCOUNTER — OFFICE VISIT (OUTPATIENT)
Dept: BEHAVIORAL HEALTH | Facility: CLINIC | Age: 34
End: 2019-12-03
Attending: INTERNAL MEDICINE
Payer: COMMERCIAL

## 2019-12-03 VITALS
DIASTOLIC BLOOD PRESSURE: 65 MMHG | HEART RATE: 84 BPM | OXYGEN SATURATION: 94 % | TEMPERATURE: 98.5 F | WEIGHT: 230 LBS | BODY MASS INDEX: 29.14 KG/M2 | SYSTOLIC BLOOD PRESSURE: 102 MMHG

## 2019-12-03 DIAGNOSIS — F32.2 CURRENT SEVERE EPISODE OF MAJOR DEPRESSIVE DISORDER WITHOUT PSYCHOTIC FEATURES, UNSPECIFIED WHETHER RECURRENT (H): Primary | ICD-10-CM

## 2019-12-03 DIAGNOSIS — F41.1 GAD (GENERALIZED ANXIETY DISORDER): ICD-10-CM

## 2019-12-03 DIAGNOSIS — F10.20 UNCOMPLICATED ALCOHOL DEPENDENCE (H): ICD-10-CM

## 2019-12-03 DIAGNOSIS — F43.10 PTSD (POST-TRAUMATIC STRESS DISORDER): ICD-10-CM

## 2019-12-03 DIAGNOSIS — F17.200 TOBACCO USE DISORDER: ICD-10-CM

## 2019-12-03 PROBLEM — F43.21 COMPLICATED GRIEF: Status: ACTIVE | Noted: 2017-11-02

## 2019-12-03 PROBLEM — F10.920 ALCOHOLIC INTOXICATION WITHOUT COMPLICATION (H): Status: ACTIVE | Noted: 2019-12-03

## 2019-12-03 PROBLEM — N50.819 TESTICULAR PAIN: Status: ACTIVE | Noted: 2018-02-02

## 2019-12-03 PROBLEM — R79.89 ELEVATED LFTS: Status: ACTIVE | Noted: 2017-09-12

## 2019-12-03 PROCEDURE — 90792 PSYCH DIAG EVAL W/MED SRVCS: CPT | Performed by: PSYCHIATRY & NEUROLOGY

## 2019-12-03 RX ORDER — FLUOXETINE 10 MG/1
TABLET, FILM COATED ORAL
Qty: 60 TABLET | Refills: 0 | Status: ON HOLD | OUTPATIENT
Start: 2019-12-03 | End: 2020-03-11

## 2019-12-03 RX ORDER — FOLIC ACID 1 MG/1
1 TABLET ORAL DAILY
Qty: 30 TABLET | Refills: 2 | Status: ON HOLD | OUTPATIENT
Start: 2019-12-03 | End: 2020-12-03

## 2019-12-03 SDOH — HEALTH STABILITY: MENTAL HEALTH: HOW MANY STANDARD DRINKS CONTAINING ALCOHOL DO YOU HAVE ON A TYPICAL DAY?: 7 TO 9

## 2019-12-03 SDOH — HEALTH STABILITY: MENTAL HEALTH: HOW OFTEN DO YOU HAVE A DRINK CONTAINING ALCOHOL?: 4 OR MORE TIMES A WEEK

## 2019-12-03 SDOH — HEALTH STABILITY: MENTAL HEALTH: HOW OFTEN DO YOU HAVE 6 OR MORE DRINKS ON ONE OCCASION?: DAILY OR ALMOST DAILY

## 2019-12-03 ASSESSMENT — ANXIETY QUESTIONNAIRES
GAD7 TOTAL SCORE: 19
2. NOT BEING ABLE TO STOP OR CONTROL WORRYING: NEARLY EVERY DAY
6. BECOMING EASILY ANNOYED OR IRRITABLE: NEARLY EVERY DAY
IF YOU CHECKED OFF ANY PROBLEMS ON THIS QUESTIONNAIRE, HOW DIFFICULT HAVE THESE PROBLEMS MADE IT FOR YOU TO DO YOUR WORK, TAKE CARE OF THINGS AT HOME, OR GET ALONG WITH OTHER PEOPLE: EXTREMELY DIFFICULT
5. BEING SO RESTLESS THAT IT IS HARD TO SIT STILL: MORE THAN HALF THE DAYS
7. FEELING AFRAID AS IF SOMETHING AWFUL MIGHT HAPPEN: NEARLY EVERY DAY
1. FEELING NERVOUS, ANXIOUS, OR ON EDGE: NEARLY EVERY DAY
3. WORRYING TOO MUCH ABOUT DIFFERENT THINGS: NEARLY EVERY DAY

## 2019-12-03 ASSESSMENT — PAIN SCALES - GENERAL: PAINLEVEL: NO PAIN (0)

## 2019-12-03 ASSESSMENT — PATIENT HEALTH QUESTIONNAIRE - PHQ9
5. POOR APPETITE OR OVEREATING: MORE THAN HALF THE DAYS
SUM OF ALL RESPONSES TO PHQ QUESTIONS 1-9: 22
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
## 2019-12-03 NOTE — PATIENT INSTRUCTIONS
Start fluoxetine 10 mg daily for one week, then 20 mg daily    Continue all other medications per your primary care provider.    Schedule an appointment with me in 4 weeks or sooner as needed.  Call [facility] at [phone] to schedule or schedule at the .     [facility] Resources:      Go to the Emergency Department as needed or call after hours crisis line at [phone].      To schedule individual or family therapy, call [facility] at [phone].     Follow up with primary care provider as planned or sooner for acute medical concerns.    Call the psychiatric nurse line with medication questions or concerns at [phone].    [employer] may be used to communicate with your provider, but this is not intended to be used for emergencies.    Community Resources:      National Suicide Prevention Lifeline: 861.587.7421 (TTY: 432.317.6330). Call anytime for help.  (www.suicidepreventionlifeline.org)    National [place] on Mental Illness (www.nini.org): 102.252.1233 or 867-719-9477.     Mental Health Association (www.mentalhealth.org): 459.625.1136 or 767-775-8845.    [place] Crisis Text Line: Text [place] to 972367    Suicide LifeLine Chat: suicideCapture Educational Consulting Servicesline.org/chat

## 2019-12-04 ENCOUNTER — TELEPHONE (OUTPATIENT)
Dept: BEHAVIORAL HEALTH | Facility: CLINIC | Age: 34
End: 2019-12-04

## 2019-12-04 ASSESSMENT — ANXIETY QUESTIONNAIRES: GAD7 TOTAL SCORE: 19

## 2019-12-04 NOTE — TELEPHONE ENCOUNTER
Prior Authorization Retail Medication Request    Medication/Dose: Fluoxetine 10MG tablets  ICD code (if different than what is on RX):    Previously Tried and Failed:    Rationale:      Insurance Name:  [facility]  Insurance ID:  [id number]       Pharmacy Information (if different than what is on RX)  Name:  [facility]  Phone:  [phone]    Giulia Baker MA

## 2019-12-04 NOTE — TELEPHONE ENCOUNTER
Central Prior Authorization Team   Phone: [phone]    PA Initiation    Medication: Fluoxetine 10MG tablets  Insurance Company: [employer] - Phone [phone] Fax [phone]  Pharmacy Filling the Rx: [id number] [address] AT [address] [place] [address]  Filling Pharmacy Phone: [phone]  Filling Pharmacy Fax: [phone]  Start Date: 12/4/2019

## 2019-12-04 NOTE — TELEPHONE ENCOUNTER
Received call from Gerber at [facility]. He stated that the capsules are on patient's formulary but the tablets are not. Patient has to take 10mg capsules for 1 week and then has to take 20mg capsules thereafter. Please send new Rxs to the pharmacy for 10mg and 20mg capsules.

## 2019-12-12 ENCOUNTER — TELEPHONE (OUTPATIENT)
Dept: PSYCHOLOGY | Facility: CLINIC | Age: 34
End: 2019-12-12
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
## 2019-12-17 ENCOUNTER — OFFICE VISIT (OUTPATIENT)
Dept: PSYCHOLOGY | Facility: CLINIC | Age: 34
End: 2019-12-17
Payer: COMMERCIAL

## 2019-12-17 DIAGNOSIS — F43.10 POSTTRAUMATIC STRESS DISORDER: ICD-10-CM

## 2019-12-17 DIAGNOSIS — F33.2 SEVERE EPISODE OF RECURRENT MAJOR DEPRESSIVE DISORDER, WITHOUT PSYCHOTIC FEATURES (H): Primary | ICD-10-CM

## 2019-12-17 DIAGNOSIS — F41.1 GAD (GENERALIZED ANXIETY DISORDER): ICD-10-CM

## 2019-12-17 DIAGNOSIS — F10.10 ALCOHOL ABUSE, EPISODIC DRINKING BEHAVIOR: ICD-10-CM

## 2019-12-17 PROCEDURE — 90791 PSYCH DIAGNOSTIC EVALUATION: CPT | Performed by: SOCIAL WORKER
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
## 2019-12-17 NOTE — PROGRESS NOTES
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 influence will be addressed by client and therapist    Initial Treatment will focus on: Depressed Mood - low mood, negative self-talk, SI  Anxiety - excessive worry, irritability  Alcohol / Substance Use - Urges/cravings, alcohol replacement  Trauma-understanding impact of trauma, grounding skills.     Resources/Service Plan:       services are not indicated.     Modifications to assist communication are not indicated.     Additional disability accomodations are not indicated     Discussed the general effects of drugs and alcohol on health and well-being. Provider gave patient printed information about the effects of chemical use on his health and well being.    Records were reviewed at time of assessment.    Report to child / adult protection services was NA.    Information in this assessment was obtained from the medical record and provided by patient who is a fair historian.     Patient will have open access to their mental health medical record.    DENNY Reis [facility]     December 17, 2019  Note reviewed and clinical supervision by DENNY Doty [facility] 12/19/2019

## 2019-12-18 ENCOUNTER — TELEPHONE (OUTPATIENT)
Dept: FAMILY MEDICINE | Facility: CLINIC | Age: 34
End: 2019-12-18

## 2019-12-18 DIAGNOSIS — K70.9 LIVER DISEASE, CHRONIC, DUE TO ALCOHOL (H): Primary | ICD-10-CM

## 2019-12-18 NOTE — TELEPHONE ENCOUNTER
Notes recorded by Earlene Whelan MD on 12/18/2019 at 5:09 AM CST  Please notify patient he will need repeat LFT and Also see GI for alcohol liver disease.  Dr Whelan    Called patient:     No answer, left VM to return call to clinic.     Verena TAVERA RN

## 2019-12-18 NOTE — LETTER
December 26, 2019      Brandin Rodriguez  [address]  [place] [address]        Dear Brandin,     Attached are your lab results from 11/11/19.     Dr. Whelan wanted to follow up to ensure you follow up with gastroenterology: Preferred Location: [place] [facility]: [phone]    Thank you,   [facility]

## 2019-12-23 NOTE — TELEPHONE ENCOUNTER
Patient does have lab appt on 12/30/19. ?if he got referral  Left message to call triage back  Nicolette Pennington RN- Triage [employer]

## 2019-12-26 NOTE — TELEPHONE ENCOUNTER
FYI PCP:     Attempt #3: no answer, LVM asking pt to call back     Unable to reach pt - Mailed letter with lab results/recommendation to schedule with GI     Not active on MyChart     Hoa NUNEZ RN

## 2019-12-30 ENCOUNTER — OFFICE VISIT (OUTPATIENT)
Dept: PSYCHOLOGY | Facility: CLINIC | Age: 34
End: 2019-12-30
Payer: COMMERCIAL

## 2019-12-30 DIAGNOSIS — F10.10 ALCOHOL ABUSE, EPISODIC DRINKING BEHAVIOR: ICD-10-CM

## 2019-12-30 DIAGNOSIS — F33.2 SEVERE EPISODE OF RECURRENT MAJOR DEPRESSIVE DISORDER, WITHOUT PSYCHOTIC FEATURES (H): Primary | ICD-10-CM

## 2019-12-30 PROCEDURE — 90834 PSYTX W PT 45 MINUTES: CPT | Performed by: SOCIAL WORKER

## 2019-12-30 ASSESSMENT — COLUMBIA-SUICIDE SEVERITY RATING SCALE - C-SSRS
ATTEMPT PAST THREE MONTHS: NO
TOTAL  NUMBER OF ABORTED OR SELF INTERRUPTED ATTEMPTS PAST LIFETIME: YES
2. HAVE YOU ACTUALLY HAD ANY THOUGHTS OF KILLING YOURSELF?: NO
1. IN THE PAST MONTH, HAVE YOU WISHED YOU WERE DEAD OR WISHED YOU COULD GO TO SLEEP AND NOT WAKE UP?: YES
ATTEMPT LIFETIME: YES
LETHALITY/MEDICAL DAMAGE CODE FIRST POTENTIAL ATTEMPT: BEHAVIOR NOT LIKELY TO RESULT IN INJURY
5. HAVE YOU STARTED TO WORK OUT OR WORKED OUT THE DETAILS OF HOW TO KILL YOURSELF? DO YOU INTEND TO CARRY OUT THIS PLAN?: YES
LETHALITY/MEDICAL DAMAGE CODE FIRST ACTUAL ATTEMPT: NO PHYSICAL DAMAGE OR VERY MINOR PHYSICAL DAMAGE
TOTAL  NUMBER OF INTERRUPTED ATTEMPTS LIFETIME: NO
REASONS FOR IDEATION LIFETIME: COMPLETELY TO END OR STOP THE PAIN (YOU COULDN'T GO ON LIVING WITH THE PAIN OR HOW YOU WERE FEELING)
3. HAVE YOU BEEN THINKING ABOUT HOW YOU MIGHT KILL YOURSELF?: YES
REASONS FOR IDEATION PAST MONTH: COMPLETELY TO END OR STOP THE PAIN (YOU COULDN'T GO ON LIVING WITH THE PAIN OR HOW YOU WERE FEELING)
6. HAVE YOU EVER DONE ANYTHING, STARTED TO DO ANYTHING, OR PREPARED TO DO ANYTHING TO END YOUR LIFE?: NO
4. HAVE YOU HAD THESE THOUGHTS AND HAD SOME INTENTION OF ACTING ON THEM?: YES
6. HAVE YOU EVER DONE ANYTHING, STARTED TO DO ANYTHING, OR PREPARED TO DO ANYTHING TO END YOUR LIFE?: NO
TOTAL  NUMBER OF ABORTED OR SELF INTERRUPTED ATTEMPTS LIFETIME: 1
2. HAVE YOU ACTUALLY HAD ANY THOUGHTS OF KILLING YOURSELF LIFETIME?: YES
4. HAVE YOU HAD THESE THOUGHTS AND HAD SOME INTENTION OF ACTING ON THEM?: NO
5. HAVE YOU STARTED TO WORK OUT OR WORKED OUT THE DETAILS OF HOW TO KILL YOURSELF? DO YOU INTEND TO CARRY OUT THIS PLAN?: NO
TOTAL  NUMBER OF ABORTED OR SELF INTERRUPTED ATTEMPTS PAST 3 MONTHS: NO
TOTAL  NUMBER OF ACTUAL ATTEMPTS LIFETIME: 1
TOTAL  NUMBER OF INTERRUPTED ATTEMPTS PAST 3 MONTHS: NO
1. IN THE PAST MONTH, HAVE YOU WISHED YOU WERE DEAD OR WISHED YOU COULD GO TO SLEEP AND NOT WAKE UP?: YES

## 2019-12-30 ASSESSMENT — ANXIETY QUESTIONNAIRES
GAD7 TOTAL SCORE: 11
2. NOT BEING ABLE TO STOP OR CONTROL WORRYING: MORE THAN HALF THE DAYS
1. FEELING NERVOUS, ANXIOUS, OR ON EDGE: MORE THAN HALF THE DAYS
3. WORRYING TOO MUCH ABOUT DIFFERENT THINGS: MORE THAN HALF THE DAYS
5. BEING SO RESTLESS THAT IT IS HARD TO SIT STILL: SEVERAL DAYS
6. BECOMING EASILY ANNOYED OR IRRITABLE: SEVERAL DAYS
7. FEELING AFRAID AS IF SOMETHING AWFUL MIGHT HAPPEN: SEVERAL DAYS

## 2019-12-30 ASSESSMENT — PATIENT HEALTH QUESTIONNAIRE - PHQ9
5. POOR APPETITE OR OVEREATING: MORE THAN HALF THE DAYS
SUM OF ALL RESPONSES TO PHQ QUESTIONS 1-9: 12

## 2019-12-30 NOTE — PROGRESS NOTES
Progress Note    Patient Name: Brandin Rodriguez  Date: 12/30/2019         Service Type: Individual  Video Visit: No     Session Start Time: 3:15 pm  Session End Time: 4 pm     Session Length: 45 mins    Session #: 3    Attendees: Client attended alone     Treatment Plan Last Reviewed: n/a  PHQ-9 / NEREYDA-7 : 12/11    DATA  Interactive Complexity: No  Crisis: No       Progress Since Last Session (Related to Symptoms / Goals / Homework):   Symptoms: Improving -- decrease in PHQ 9 and NEREYDA 7, still some challenges around alcohol consumption. Reports 0 SI in the last two weeks    Homework: Partially completed      Episode of Care Goals: Minimal progress - PREPARATION (Decided to change - considering how); Intervened by negotiating a change plan and determining options / strategies for behavior change, identifying triggers, exploring social supports, and working towards setting a date to begin behavior change     Current / Ongoing Stressors and Concerns:   Ongoing: Managing MDD, NEREYDA, RENATA and PTSD symptoms, financial stressor and relationship stressor     Current: Discussed past SI and attempts, completed [place] Suicide Severity Scale, discussed discomfort about talking about SI in details due to past hospitalization because SI was disclosed.      Treatment Objective(s) Addressed in This Session:   Decrease urges/cravings to consume alcohol  Decrease frequency and intensity of feeling down, depressed, hopeless  Identify negative self-talk and behaviors: challenge core beliefs, myths, and actions  Decrease thoughts that you'd be better off dead or of suicide / self-harm     Intervention:   CBT: Reviewed SI thoughts in the past versus now, client identified triggers to drinking as memories of losing his daughter, financial and relationship stressor with roommate/girlfriend, discussed client's anxiety around talking about SI in details due to past experience of being escorted by  "security to psychiatric franco after experience SI as a side effect of Trazodone. Client also identified feeling hopeful lately about being able to \"do something about my drinking.   Motivational Interviewing: Emphasizing personal choice and control, validating his discomfort and talking about his SI and past attempt, reviewing risk and protective factors in the past and currently.  Motivational Interviewing    MI Intervention: Expressed Empathy/Understanding, Supported Autonomy, Collaboration, Evocation, Permission to raise concern or advise, Open-ended questions, Reflections: simple and complex and Reframe     Change Talk Expressed by the Patient: Desire to change Ability to change Reasons to change Need to change Committment to change    Provider Response to Change Talk: E - Evoked more info from patient about behavior change, A - Affirmed patient's thoughts, decisions, or attempts at behavior change, R - Reflected patient's change talk and S - Summarized patient's change talk statements          ASSESSMENT: Current Emotional / Mental Status (status of significant symptoms):   Risk status (Self / Other harm or suicidal ideation)   Patient denies current fears or concerns for personal safety.   Patient denies current or recent suicidal ideation or behaviors.   Patientdenies current or recent homicidal ideation or behaviors.   Patient denies current or recent self injurious behavior or ideation.   Patient denies other safety concerns.   Patient Patient reports there has been no change in risk factors since their last session.     PatientPatient reports there has been no change in protective factors since their last session.     Recommended that patient call 911 or go to the local ED should there be a change in any of these risk factors.   [place] Suicide Severity Rating Scale (Lifetime/Recent)  [place] Suicide Severity Rating (Lifetime/Recent) 12/30/2019   1. Wish to be Dead (Lifetime) Yes   Wish to be Dead " "Description (Lifetime) After the death of 1yo daughter, \"hoping to be done,\" \"It would just be easier.\" Stated that \"everything seemed dark in the world, so maybe creating my own end would be better.\"   1. Wish to be Dead (Recent) Yes   Wish to be Dead Description (Recent) \"Wishing that it would just be done,\" Sometimes placing self in \"risky\" situation.    2. Non-Specific Active Suicidal Thoughts (Lifetime) Yes   Non-Specific Active Suicidal Thought Description (Lifetime) Cynaide pills, hanging self--aborted attempt, use of alcohol-\"drinking myself to death.\"   2. Non-Specific Active Suicidal Thoughts (Recent) No   3. Active Suicidal Ideation with any Methods (Not Plan) Without Intent to Act (Lifetime) Yes   Active Suicidal Ideation with any Methods (Not Plan) Description (Lifetime) pills, hanging, alcohol   3. Active Sucidal Ideation with any Methods (Not Plan) Without Intent to Act (Recent) No   4. Active Suicidal Ideation with Some Intent to Act, Without Specific Plan (Lifetime) Yes   Active Suicidal Ideation with Some Intent to Act, Without Specific Plan Description (Lifetime) Memories of the loss of daughter, financial stressor and accumulation of stress in living situation.    4. Active Suicidal Ideation with Some Intent to Act, Without Specific Plan (Recent) No   5. Active Suicidal Ideation with Specific Plan and Intent (Lifetime) Yes   Active Suicidal Ideation with Specific Plan and Intent Description (Lifetime) Yes, using extension cord to hang self, had images when 24 you of seeing self hanging outside of parent's shower.    5. Active Suicidal Ideation with Specific Plan and Intent (Recent) No   Most Severe Ideation Rating (Lifetime) 5   Most Severe Ideation Description (Lifetime) 2 years ago with attempt--accumulation of stressor over time   Frequency (Lifetime) 5   Duration (Lifetime) 4   Controllability (Lifetime) 5   Protective Factors  (Lifetime) 1   Reasons for Ideation (Lifetime) 5   Most Severe " Ideation Rating (Past Month) 2   Most Severe Ideation Description (Past Month) able to see ability to get help   Frequency (Past Month) 5   Duration (Past Month) 4   Controllability (Past Month) 2   Protective Factors (Past Month) 1   Reasons for Ideation (Past Month) 5   Actual Attempt (Lifetime) Yes   Actual Attempt Description (Lifetime) 2 yrs ago, attempted to hang self   Total Number of Actual Attempts (Lifetime) 1   Actual Attempt (Past 3 Months) No   Has subject engaged in non-suicidal self-injurious behavior? (Lifetime) Yes   Has subject engaged in non-suicidal self-injurious behavior? (Past 3 Months) Yes   Interrupted Attempts (Lifetime) No   Interrupted Attempts (Past 3 Months) No   Aborted or Self-Interrupted Attempt (Lifetime) Yes   Aborted or Self Interrupted Attempt Description (Lifetime) Client attempted to hang however thought about parents and decided not to go through with it.   Total Number Aborted or Self Interrupted Attempts (Lifetime) 1   Aborted or Self-Interrupted Attempt (Past 3 Months) No   Preparatory Acts or Behavior (Lifetime) No   Preparatory Acts or Behavior (Past 3 Months) No   Most Recent Attempt Actual Lethality Code NA   Most Lethal Attempt Actual Lethality Code NA   Initial/First Attempt Actual Lethality Code 0   Initial/First Attempt Potential Lethality Code 0          Appearance:   Appropriate    Eye Contact:   Good    Psychomotor Behavior: Agitated  Restless    Attitude:   Cooperative    Orientation:   All   Speech    Rate / Production: Hyperverbal     Volume:  Normal    Mood:    Anxious  Irritable    Affect:    Appropriate    Thought Content:  Perservative    Thought Form:  Coherent  Tangential    Insight:    Fair      Medication Review:   No changes to current psychiatric medication(s)     Medication Compliance:   Yes     Changes in Health Issues:   None reported     Chemical Use Review:   Substance Use: Problem use continues with no change since last session, Stage of  Change: Preparatory  Reviewed information and resources for treatment and ongoing sobriety  Provided encouragement towards sobriety        Tobacco Use: No current tobacco use.      Diagnosis:  1. Severe episode of recurrent major depressive disorder, without psychotic features (H)    2. Alcohol abuse, episodic drinking behavior        Collateral Reports Completed:   Not Applicable    PLAN: (Patient Tasks / Therapist Tasks / Other)  Client: Connect with PCP for referral to neurology to address concerns of cognitive impairment due to alcohol use.   Next Session: Treatment Planning, safety plan, Dx discussion        DENNY Reis [facility]   December 30, 2019  Note reviewed and clinical supervision by DENNY Doty [facility] 1/6/2020

## 2019-12-31 ASSESSMENT — ANXIETY QUESTIONNAIRES: GAD7 TOTAL SCORE: 11

## 2020-01-09 ENCOUNTER — PATIENT OUTREACH (OUTPATIENT)
Dept: CARE COORDINATION | Facility: CLINIC | Age: 35
End: 2020-01-09

## 2020-01-09 ASSESSMENT — ACTIVITIES OF DAILY LIVING (ADL): DEPENDENT_IADLS:: INDEPENDENT

## 2020-01-09 NOTE — PROGRESS NOTES
Clinic Care Coordination Contact  [facility]/Voicemail    Referral Source: PCP  Clinical Data: Care Coordinator Outreach    Outreach attempted x 1.  Left message on patient's voicemail with call back information and requested return call.    Plan: Care Coordinator will try to reach patient again in 3-5 business days.    DENNY Gtz, [facility]  Clinic Care Coordinator  [facility]  [phone]  [email or web address]

## 2020-01-12 ENCOUNTER — HOSPITAL ENCOUNTER (EMERGENCY)
Facility: CLINIC | Age: 35
Discharge: HOME | End: 2020-01-12
Attending: EMERGENCY MEDICINE | Admitting: EMERGENCY MEDICINE
Payer: COMMERCIAL

## 2020-01-12 ENCOUNTER — NURSE TRIAGE (OUTPATIENT)
Dept: NURSING | Facility: CLINIC | Age: 35
End: 2020-01-12

## 2020-01-12 VITALS
TEMPERATURE: 98 F | RESPIRATION RATE: 18 BRPM | HEART RATE: 100 BPM | OXYGEN SATURATION: 97 % | SYSTOLIC BLOOD PRESSURE: 132 MMHG | DIASTOLIC BLOOD PRESSURE: 93 MMHG

## 2020-01-12 DIAGNOSIS — F10.129 ALCOHOL ABUSE WITH INTOXICATION (H): ICD-10-CM

## 2020-01-12 DIAGNOSIS — F10.920 ALCOHOLIC INTOXICATION WITHOUT COMPLICATION (H): ICD-10-CM

## 2020-01-12 DIAGNOSIS — F32.A DEPRESSION, UNSPECIFIED DEPRESSION TYPE: ICD-10-CM

## 2020-01-12 LAB — ALCOHOL BREATH TEST: 0.26 (ref 0–0.01)

## 2020-01-12 PROCEDURE — 25000128 H RX IP 250 OP 636: Performed by: EMERGENCY MEDICINE

## 2020-01-12 PROCEDURE — 99285 EMERGENCY DEPT VISIT HI MDM: CPT | Performed by: EMERGENCY MEDICINE

## 2020-01-12 PROCEDURE — 99284 EMERGENCY DEPT VISIT MOD MDM: CPT | Mod: Z6 | Performed by: EMERGENCY MEDICINE

## 2020-01-12 PROCEDURE — 82075 ASSAY OF BREATH ETHANOL: CPT | Performed by: EMERGENCY MEDICINE

## 2020-01-12 RX ORDER — ONDANSETRON 4 MG/1
4 TABLET, ORALLY DISINTEGRATING ORAL ONCE
Status: COMPLETED | OUTPATIENT
Start: 2020-01-12 | End: 2020-01-12

## 2020-01-12 RX ADMIN — ONDANSETRON 4 MG: 4 TABLET, ORALLY DISINTEGRATING ORAL at 03:51

## 2020-01-12 ASSESSMENT — ENCOUNTER SYMPTOMS
HALLUCINATIONS: 0
NAUSEA: 0
HEADACHES: 0
VOMITING: 0
NECK PAIN: 0
DYSURIA: 0
ABDOMINAL PAIN: 0
FEVER: 0
DIARRHEA: 0
NECK STIFFNESS: 0
WOUND: 1
CHILLS: 0
SORE THROAT: 0

## 2020-01-12 NOTE — ED TRIAGE NOTES
Drinking, wanted someone to talk to, called suicide hotline and mentioned about creating a noose, stated wanted to hang himself in living room, hot called local PD, cuffed, cooperative, on meds, see therapist here, missed recent appointment,

## 2020-01-12 NOTE — ED PROVIDER NOTES
History     Chief Complaint   Patient presents with     Psychiatric Evaluation     drinking tonight, thought about  2 year old daughter and wanted someone to talk to, called suicide hotline who then called 911, denies si     HPI  Brandin Rodriguez is a 34 year old male who has past medical history of alcohol dependence, depression presenting with report of suicidal ideation.  The patient says that he called the suicide hotline stating that he wanted to hang himself.  He is depressed about 2-year-old daughter who has passed away.  Currently he is denying suicidal ideation and says that he would never do it.  He says he called the hotline because he was more depressed than usual.  He has been drinking and occasionally uses marijuana.  Denies hallucinations or voices.  He has no intent at this point to hurt himself and he says he would never do it.  He is been working very hard to his depression, he has been taking his medications and seeing his therapist weekly.    I have reviewed the Medications, Allergies, Past Medical and Surgical History, and Social History in the Epic system.    Review of Systems   Constitutional: Negative for chills and fever.   HENT: Negative for sore throat.    Eyes: Negative for visual disturbance.   Gastrointestinal: Negative for abdominal pain, diarrhea, nausea and vomiting.   Genitourinary: Negative for dysuria.   Musculoskeletal: Negative for neck pain and neck stiffness.   Skin: Positive for wound. Negative for rash.   Neurological: Negative for headaches.   Psychiatric/Behavioral: Positive for suicidal ideas. Negative for hallucinations.   All other systems reviewed and are negative.      Physical Exam   BP: 124/88  Pulse: 95  Temp: 98  F (36.7  C)  Resp: 16  SpO2: 98 %      Physical Exam  Physical Exam   Constitutional: oriented to person, place, and time. appears well-developed and well-nourished.   HENT:   Head: Normocephalic and atraumatic.   Neck: Normal range of motion.    Pulmonary/Chest: Effort normal. No respiratory distress.   Cardiac: No murmurs, rubs, gallops. RRR.  Abdominal: Abdomen soft, nontender, nondistended. No rebound tenderness.  MSK: Bilateral forearms there is area of bruising that appears to be several days old with no lacerations or excoriations.  No tenderness palpation over this area, no swelling, no erythema.  Range of motion at elbows and wrist bilaterally are normal.  No tenderness palpation over these areas.  Neurological: alert and oriented to person, place, and time.   Skin: Skin is warm and dry.   Psychiatric:  normal mood and affect.  behavior is normal. Thought content normal.     ED Course        Procedures      Labs Ordered and Resulted from Time of ED Arrival Up to the Time of Departure from the ED   ALCOHOL BREATH TEST POCT - Abnormal; Notable for the following components:       Result Value    Alcohol Breath Test 0.260 (*)     All other components within normal limits            Assessments & Plan (with Medical Decision Making)   MDM  Patient is presenting with alcohol intoxication.  He is not suicidal, he has no plan is that he is no plans on hurting himself.  Patient does have resources set in place and his medications patient will need to sober, likely disposition home when sober.    Re eval: Patient is clinically sober and well appearing. He is denying any SI or thoughts of self harm. Patient will return if he has any further concerns or worsening depression.    I have reviewed the nursing notes.    I have reviewed the findings, diagnosis, plan and need for follow up with the patient.    New Prescriptions    No medications on file       Final diagnoses:   Alcoholic intoxication without complication (H)   Depression, unspecified depression type       1/12/2020   [facility], EMERGENCY DEPARTMENT     Toni Ray MD  01/12/20 9401

## 2020-01-12 NOTE — DISCHARGE INSTRUCTIONS
Please follow-up with your therapist as planned.    Continue to take your medications as you are prescribed.    If you would like to try to get a better detox, you may call our detox center at [phone].  If there is a bed availability they can reserve this for you    Return to the emergency department if you have thoughts about harming yourself or if you do not feel safe

## 2020-01-12 NOTE — ED NOTES
Bed: ED16A  Expected date:   Expected time:   Means of arrival:   Comments:  Ollie Jones, 33 yo M, Depressed, on hold

## 2020-01-12 NOTE — ED AVS SNAPSHOT
[facility], [place], Emergency Department  [address]  [place] [address]  Phone:  [phone]  Fax:  [phone]                                    Brandin Rodriguez   MRN: [id number]    Department:  [facility], Emergency Department   Date of Visit:  1/12/2020           After Visit Summary Signature Page    I have received my discharge instructions, and my questions have been answered. I have discussed any challenges I see with this plan with the nurse or doctor.    ..........................................................................................................................................  Patient/Patient Representative Signature      ..........................................................................................................................................  Patient Representative Print Name and Relationship to Patient    ..................................................               ................................................  Date                                   Time    ..........................................................................................................................................  Reviewed by Signature/Title    ...................................................              ..............................................  Date                                               Time          22EPIC Rev 08/18

## 2020-01-12 NOTE — ED NOTES
Patient told writer that he wanted to be discharged. Writer explained that he needs to be sober to be discharged. Alcohol breath test done blew 0.140. patient still adamant on leaving, and requested to be discharged with a sober friend. MD informed.
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
## 2020-01-12 NOTE — TELEPHONE ENCOUNTER
Caller states he is suffering from depression and anxiety. Caller states he can't sleep and is effecting his activities of daily living. Patient is requesting to know if any beds are available.  Caller denies any suicidal ideations or homicidal thoughts. Triage guidelines recommend to go to ED. Caller verbalized and understands directives.    Reason for Disposition    [1] Depression AND [2] unable to do any of normal activities (e.g., self care, school, work; in comparison to baseline).    Additional Information    Negative: Patient attempted suicide    Negative: Patient is threatening suicide now    Negative: Violent behavior, or threatening to physically hurt or kill someone    Negative: [1] Patient is very confused (disoriented, slurred speech) AND [2] no other adult (e.g., friend or family member) available    Negative: [1] Difficult to awaken or acting very confused (disoriented, slurred speech) AND [2] new onset    Negative: Sounds like a life-threatening emergency to the triager    Negative: Alcohol use, abuse or dependence, question or problem related to    Negative: Drug abuse or dependence, question or problem related to    Negative: Bipolar disorder (manic depression)    Negative: Depression during the postpartum period (< 1 year since delivery)    Protocols used: DEPRESSION-A-

## 2020-01-14 ENCOUNTER — PATIENT OUTREACH (OUTPATIENT)
Dept: CARE COORDINATION | Facility: CLINIC | Age: 35
End: 2020-01-14

## 2020-01-14 ASSESSMENT — ACTIVITIES OF DAILY LIVING (ADL): DEPENDENT_IADLS:: INDEPENDENT
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
## 2020-01-16 ENCOUNTER — OFFICE VISIT (OUTPATIENT)
Dept: PSYCHOLOGY | Facility: CLINIC | Age: 35
End: 2020-01-16
Payer: COMMERCIAL

## 2020-01-16 DIAGNOSIS — F10.10 ALCOHOL ABUSE, EPISODIC DRINKING BEHAVIOR: ICD-10-CM

## 2020-01-16 DIAGNOSIS — F33.2 SEVERE EPISODE OF RECURRENT MAJOR DEPRESSIVE DISORDER, WITHOUT PSYCHOTIC FEATURES (H): Primary | ICD-10-CM

## 2020-01-16 DIAGNOSIS — F41.1 GAD (GENERALIZED ANXIETY DISORDER): ICD-10-CM

## 2020-01-16 PROCEDURE — 90834 PSYTX W PT 45 MINUTES: CPT | Performed by: SOCIAL WORKER

## 2020-01-16 ASSESSMENT — ANXIETY QUESTIONNAIRES
6. BECOMING EASILY ANNOYED OR IRRITABLE: MORE THAN HALF THE DAYS
5. BEING SO RESTLESS THAT IT IS HARD TO SIT STILL: SEVERAL DAYS
GAD7 TOTAL SCORE: 11
2. NOT BEING ABLE TO STOP OR CONTROL WORRYING: SEVERAL DAYS
7. FEELING AFRAID AS IF SOMETHING AWFUL MIGHT HAPPEN: MORE THAN HALF THE DAYS
3. WORRYING TOO MUCH ABOUT DIFFERENT THINGS: MORE THAN HALF THE DAYS
1. FEELING NERVOUS, ANXIOUS, OR ON EDGE: MORE THAN HALF THE DAYS

## 2020-01-16 ASSESSMENT — PATIENT HEALTH QUESTIONNAIRE - PHQ9
5. POOR APPETITE OR OVEREATING: SEVERAL DAYS
SUM OF ALL RESPONSES TO PHQ QUESTIONS 1-9: 15

## 2020-01-17 ASSESSMENT — ANXIETY QUESTIONNAIRES: GAD7 TOTAL SCORE: 11

## 2020-01-23 ENCOUNTER — OFFICE VISIT (OUTPATIENT)
Dept: PSYCHOLOGY | Facility: CLINIC | Age: 35
End: 2020-01-23
Payer: COMMERCIAL

## 2020-01-23 ENCOUNTER — TELEPHONE (OUTPATIENT)
Dept: FAMILY MEDICINE | Facility: CLINIC | Age: 35
End: 2020-01-23

## 2020-01-23 DIAGNOSIS — F10.10 ALCOHOL ABUSE, EPISODIC DRINKING BEHAVIOR: Primary | ICD-10-CM

## 2020-01-23 DIAGNOSIS — F10.930 ALCOHOL WITHDRAWAL SYNDROME WITHOUT COMPLICATION (H): ICD-10-CM

## 2020-01-23 DIAGNOSIS — F33.2 SEVERE EPISODE OF RECURRENT MAJOR DEPRESSIVE DISORDER, WITHOUT PSYCHOTIC FEATURES (H): ICD-10-CM

## 2020-01-23 DIAGNOSIS — F41.1 GAD (GENERALIZED ANXIETY DISORDER): ICD-10-CM

## 2020-01-23 PROCEDURE — 90834 PSYTX W PT 45 MINUTES: CPT | Performed by: SOCIAL WORKER

## 2020-01-23 RX ORDER — HYDROXYZINE PAMOATE 50 MG/1
50 CAPSULE ORAL 3 TIMES DAILY PRN
Qty: 90 CAPSULE | Refills: 0 | Status: CANCELLED | OUTPATIENT
Start: 2020-01-23

## 2020-01-23 NOTE — TELEPHONE ENCOUNTER
Reason for Call:  Medication or medication refill:    Do you use a [facility]?  Name of the pharmacy and phone number for the current request:       [id number] - [address] AT [address] [place] [address]      Name of the medication requested: hydrOXYzine   (VISTARIL) 50 MG    Other request:  Albuterol inhailer    Can we leave a detailed message on this number? YES    Phone number patient can be reached at: Home number on file [phone] (home)    Best Time: Any    Call taken on 1/23/2020 at 3:44 PM by Zoila Fine

## 2020-01-23 NOTE — TELEPHONE ENCOUNTER
Routing to Dr Whelan.   Please read all the messaged below.     Then----Patient requesting Rx Hydroxyzine---on current med list.   Pended.   Previously Rx'd by a different provider.     Last Written Prescription Date:  11-25-19  Last Fill Quantity: 90,  # refills: 0   Last office visit: 11/11/2019 with  Dr Whelan   Future Office Visit:   Next 5 appointments (look out 90 days)    Jan 30, 2020  3:30 PM CST  Return Visit with Tricia Cummings [id number] [address]  [phone]   Feb 05, 2020  9:30 AM CST  Return Visit with Tricia Cummings [place] [address] [place] [address]  [phone]   Feb 12, 2020  9:30 AM CST  Return Visit with Tricia Cummings [place] [address] [address]  [phone]   Feb 20, 2020  4:00 PM CST  Return Visit with Tricia Cummings [facility] ([place] [id number] [address]  [phone]   Feb 27, 2020  2:30 PM CST  Return Visit with Tricia Cummings [place] [address] [address]  [phone]         Requested Prescriptions   Pending Prescriptions Disp Refills     hydrOXYzine (VISTARIL) 50 MG capsule 90 capsule 0     Sig: Take 1 capsule (50 mg) by mouth 3 times daily as needed for anxiety       There is no refill protocol information for this order

## 2020-01-23 NOTE — PATIENT INSTRUCTIONS
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,\"    Thinking Processes: ruminations (can't stop thinking about my problems): thinking back around current stressor with roommate or finances, thoughts about loss of daughter, thoughts about how life used to be and racing thoughts    Mood: worsening depression, hopelessness, helplessness, intense anger and intense worry    Behaviors: isolating/withdrawing , using alcohol, impulsive, reckless behaviors (acting without thinking): driving under the influence, getting into fights with strangers, unsafe sex practices, not taking care of myself, not taking care of my responsibilities and not sleeping enough, not eating/sleeping    Situations: relationship problems, trauma  and financial stress   Step 2: Coping strategies - Things I can do to take my mind off of my problems without contacting another person (relaxation technique, physical activity):    Distress Tolerance Strategies:  change body temperature (ice pack/cold water) , paced breathing/progressive muscle relaxation, intense exercise: running, jumping jacks for 2-3 minutes  and listening to podcast, playing video games    Physical Activities: go for a walk, gardening, meditation, deep breathing, stretching  and weight lifting    Focus on helpful thoughts:  \"This is temporary\", \"I will get through this\", \"It always passes\", \"Ride the wave\" and remind myself of what is important to me: \"I can't do this to my parents,\" \"Fuck them, I'm going to better than that shit.\"  Step 3: People and " "social settings that provide distraction:   Name: Briana Phone: [phone]   Name: Dick Phone: [phone]    gym  and [place]   Step 4: Remind myself of people and things that are important to me and worth living for:                              My family, my garden    Step 5: When I am in crisis, I can ask these people to help me use my safety plan:   Name: Briana Phone: [phone]   Name: Dick Phone: [phone]  Step 6: Making the environment safe:     remove alcohol, remove drugs, dispose of old medications  and be around others, get rid of gun lighter  Step 7: Professionals or agencies I can contact during a crisis:    Suicide Prevention Lifeline: 9-250-155-EIKC (0049)    Crisis Text Line Service (available 24 hours a day, 7 days a week): Text [place] to 139616  [facility] Crisis Services: [facility] [place]E: [phone]    Call 911 or go to my nearest emergency department.   I helped develop this safety plan and agree to use it when needed.  I have been given a copy of this plan.      Client signature _________________________________________________________________  Today s date:  1/23/2020  Adapted from Safety Plan Template 2008 Janina Pierson and Arturo Roman is reprinted with the express permission of the authors.  No portion of the Safety Plan Template may be reproduced without the express, written permission.  You can contact the authors at [email or web address] or [email or web address].                                             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,\"    Thinking Processes: " "ruminations (can't stop thinking about my problems): thinking back around current stressor with roommate or finances, thoughts about loss of daughter, thoughts about how life used to be and racing thoughts    Mood: worsening depression, hopelessness, helplessness, intense anger and intense worry    Behaviors: isolating/withdrawing , using alcohol, impulsive, reckless behaviors (acting without thinking): driving under the influence, getting into fights with strangers, unsafe sex practices, not taking care of myself, not taking care of my responsibilities and not sleeping enough, not eating/sleeping    Situations: relationship problems, trauma  and financial stress   Step 2: Coping strategies - Things I can do to take my mind off of my problems without contacting another person (relaxation technique, physical activity):    Distress Tolerance Strategies:  change body temperature (ice pack/cold water) , paced breathing/progressive muscle relaxation, intense exercise: running, jumping jacks for 2-3 minutes  and listening to podcast, playing video games    Physical Activities: go for a walk, gardening, meditation, deep breathing, stretching  and weight lifting    Focus on helpful thoughts:  \"This is temporary\", \"I will get through this\", \"It always passes\", \"Ride the wave\" and remind myself of what is important to me: \"I can't do this to my parents,\" \"Fuck them, I'm going to better than that shit.\"  Step 3: People and social settings that provide distraction:   Name: Briana Phone: [phone]   Name: Dick Phone: [phone]    gym  and [place]   Step 4: Remind myself of people and things that are important to me and worth living for:                              My family, my garden    Step 5: When I am in crisis, I can ask these people to help me use my safety plan:   Name: Briana Phone: [phone]   Name: Dick Phone: [phone]  Step 6: Making the environment safe:     remove alcohol, remove drugs, dispose of old " medications  and be around others, get rid of gun lighter  Step 7: Professionals or agencies I can contact during a crisis:    Suicide Prevention Lifeline: 0-535-790-TALK (3989)    Crisis Text Line Service (available 24 hours a day, 7 days a week): Text [place] to 122367  Local Crisis Services: [facility] COPE: [phone]    Call 911 or go to my nearest emergency department.   I helped develop this safety plan and agree to use it when needed.  I have been given a copy of this plan.      Client signature _________________________________________________________________  Today s date:  1/23/2020  Adapted from Safety Plan Template 2008 Janina Pierson and Arturo Roman is reprinted with the express permission of the authors.  No portion of the Safety Plan Template may be reproduced without the express, written permission.  You can contact the authors at [email or web address] or [email or web address].

## 2020-01-23 NOTE — PROGRESS NOTES
Progress Note    Patient Name: Brandin Rodriguez  Date: 1/23/2020         Service Type: Individual  Video Visit: No     Session Start Time: 2:07  PM   Session End Time: 2:55 PM     Session Length: 48 mins    Session #: 5    Attendees: Client attended alone     Treatment Plan Last Reviewed: n/a  PHQ-9 / NEREYDA-7 : n/a    DATA  Interactive Complexity: No  Crisis: No       Progress Since Last Session (Related to Symptoms / Goals / Homework):   Symptoms: No change --client continues to use alcohol, currently experiencing withdrawal symptoms of nausea, loss in appetite, insomnia    Homework: Did not complete-Connect with PCP for referral to neurology to address concerns of cognitive impairment due to alcohol use.      Episode of Care Goals: No improvement - CONTEMPLATION (Considering change and yet undecided); Intervened by assessing the negative and positive thinking (ambivalence) about behavior change     Current / Ongoing Stressors and Concerns:   Ongoing: Managing MDD, NEREYDA, RENATA and PTSD symptoms, financial stressor and relationship stressor     Current: Client recently visited parents in [facility], working with maintaining his sobriety, looking at work opportunities and cleaning his apartment.      Treatment Objective(s) Addressed in This Session:   Decrease urges/cravings to consume alcohol  Decrease frequency and intensity of feeling down, depressed, hopeless  Identify negative self-talk and behaviors: challenge core beliefs, myths, and actions  Decrease thoughts that you'd be better off dead or of suicide / self-harm     Intervention:   CBT: Discussed thoughts, emotions and behaviors associated with SI  DBT: Distress Tolerance skills, self-soothing and relaxation; client thinking about joining a gym such as the [employer] and participating in swimming, he would like to get back into gardening  Motivational Interviewing: Emphasizing personal choice and control, validating his  discomfort and talking about his SI and past attempt and reaching out for support, reviewing risk and protective factors in the past and currently  Motivational Interviewing    MI Intervention: Expressed Empathy/Understanding, Supported Autonomy, Collaboration, Evocation, Permission to raise concern or advise, Open-ended questions, Reflections: simple and complex, Change talk (evoked) and Reframe     Change Talk Expressed by the Patient: Desire to change Ability to change Reasons to change Need to change Committment to change Activation    Provider Response to Change Talk: E - Evoked more info from patient about behavior change, A - Affirmed patient's thoughts, decisions, or attempts at behavior change, R - Reflected patient's change talk and S - Summarized patient's change talk statements          ASSESSMENT: Current Emotional / Mental Status (status of significant symptoms):   Risk status (Self / Other harm or suicidal ideation)   Patient denies current fears or concerns for personal safety.   Patient denies current or recent suicidal ideation or behaviors.   Patientdenies current or recent homicidal ideation or behaviors.   Patient denies current or recent self injurious behavior or ideation.   Patient denies other safety concerns.   Patient reports there has been no change in risk factors since their last session.     Patientreports there has been a chance in protective factors since their last session.  Client has been sober from alcohol for one week   A safety and risk management plan has been developed including: Patient consented to co-developed safety plan.  Safety and risk management plan was completed.  Patient agreed to use safety plan should any safety concerns arise.  A copy was given to the patient.   [place] Suicide Severity Rating Scale (Lifetime/Recent)  [place] Suicide Severity Rating (Lifetime/Recent) 12/30/2019   1. Wish to be Dead (Lifetime) Yes   Wish to be Dead Description (Lifetime) After  "the death of 3yo daughter, \"hoping to be done,\" \"It would just be easier.\" Stated that \"everything seemed dark in the world, so maybe creating my own end would be better.\"   1. Wish to be Dead (Recent) Yes   Wish to be Dead Description (Recent) \"Wishing that it would just be done,\" Sometimes placing self in \"risky\" situation.    2. Non-Specific Active Suicidal Thoughts (Lifetime) Yes   Non-Specific Active Suicidal Thought Description (Lifetime) Cynaide pills, hanging self--aborted attempt, use of alcohol-\"drinking myself to death.\"   2. Non-Specific Active Suicidal Thoughts (Recent) No   3. Active Suicidal Ideation with any Methods (Not Plan) Without Intent to Act (Lifetime) Yes   Active Suicidal Ideation with any Methods (Not Plan) Description (Lifetime) pills, hanging, alcohol   3. Active Sucidal Ideation with any Methods (Not Plan) Without Intent to Act (Recent) No   4. Active Suicidal Ideation with Some Intent to Act, Without Specific Plan (Lifetime) Yes   Active Suicidal Ideation with Some Intent to Act, Without Specific Plan Description (Lifetime) Memories of the loss of daughter, financial stressor and accumulation of stress in living situation.    4. Active Suicidal Ideation with Some Intent to Act, Without Specific Plan (Recent) No   5. Active Suicidal Ideation with Specific Plan and Intent (Lifetime) Yes   Active Suicidal Ideation with Specific Plan and Intent Description (Lifetime) Yes, using extension cord to hang self, had images when 24 you of seeing self hanging outside of parent's shower.    5. Active Suicidal Ideation with Specific Plan and Intent (Recent) No   Most Severe Ideation Rating (Lifetime) 5   Most Severe Ideation Description (Lifetime) 2 years ago with attempt--accumulation of stressor over time   Frequency (Lifetime) 5   Duration (Lifetime) 4   Controllability (Lifetime) 5   Protective Factors  (Lifetime) 1   Reasons for Ideation (Lifetime) 5   Most Severe Ideation Rating (Past Month) 2 "   Most Severe Ideation Description (Past Month) able to see ability to get help   Frequency (Past Month) 5   Duration (Past Month) 4   Controllability (Past Month) 2   Protective Factors (Past Month) 1   Reasons for Ideation (Past Month) 5   Actual Attempt (Lifetime) Yes   Actual Attempt Description (Lifetime) 2 yrs ago, attempted to hang self   Total Number of Actual Attempts (Lifetime) 1   Actual Attempt (Past 3 Months) No   Has subject engaged in non-suicidal self-injurious behavior? (Lifetime) Yes   Has subject engaged in non-suicidal self-injurious behavior? (Past 3 Months) Yes   Interrupted Attempts (Lifetime) No   Interrupted Attempts (Past 3 Months) No   Aborted or Self-Interrupted Attempt (Lifetime) Yes   Aborted or Self Interrupted Attempt Description (Lifetime) Client attempted to hang however thought about parents and decided not to go through with it.   Total Number Aborted or Self Interrupted Attempts (Lifetime) 1   Aborted or Self-Interrupted Attempt (Past 3 Months) No   Preparatory Acts or Behavior (Lifetime) No   Preparatory Acts or Behavior (Past 3 Months) No   Most Recent Attempt Actual Lethality Code NA   Most Lethal Attempt Actual Lethality Code NA   Initial/First Attempt Actual Lethality Code 0   Initial/First Attempt Potential Lethality Code 0          Appearance:   Appropriate    Eye Contact:   Fair    Psychomotor Behavior: Normal    Attitude:   Cooperative    Orientation:   All   Speech    Rate / Production: Normal     Volume:  Normal    Mood:    Anxious    Affect:    Appropriate    Thought Content:  Clear    Thought Form:  Coherent  Goal Directed  Logical    Insight:    Fair      Medication Review:   No changes to current psychiatric medication(s)     Medication Compliance:   Yes     Changes in Health Issues:   None reported     Chemical Use Review:   Substance Use: decrease in alcohol .  Patient reports frequency of use none this week.  Stage of Change: Preparatory        Tobacco Use: No  "current tobacco use.      Diagnosis:  1. Alcohol abuse, episodic drinking behavior    2. Severe episode of recurrent major depressive disorder, without psychotic features (H)    3. NEREYDA (generalized anxiety disorder)        Collateral Reports Completed:   Not Applicable    PLAN: (Patient Tasks / Therapist Tasks / Other)  Client: Think about specific treatment goals focused around Alcohol Use, MDD, NEREYDA and PTSD.         DENNY Reis [facility]   January 23, 2020  Note reviewed and clinical supervision by DENNY Doty [facility] 1/25/2020                                                 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,\"    Thinking Processes: ruminations (can't stop thinking about my problems): thinking back around current stressor with roommate or finances, thoughts about loss of daughter, thoughts about how life used to be and racing thoughts    Mood: worsening depression, hopelessness, helplessness, intense anger and intense worry    Behaviors: isolating/withdrawing , using alcohol, impulsive, reckless behaviors (acting without thinking): driving under the influence, getting into fights with strangers, unsafe sex practices, not taking care of myself, not taking care of my responsibilities and not sleeping enough, not eating/sleeping    Situations: relationship problems, trauma  and financial stress   Step 2: Coping strategies - Things I can do to take my mind off of my problems without contacting another person (relaxation technique, physical activity):    Distress Tolerance Strategies:  change body temperature " "(ice pack/cold water) , paced breathing/progressive muscle relaxation, intense exercise: running, jumping jacks for 2-3 minutes  and listening to podcast, playing video games    Physical Activities: go for a walk, gardening, meditation, deep breathing, stretching  and weight lifting    Focus on helpful thoughts:  \"This is temporary\", \"I will get through this\", \"It always passes\", \"Ride the wave\" and remind myself of what is important to me: \"I can't do this to my parents,\" \"Fuck them, I'm going to better than that shit.\"  Step 3: People and social settings that provide distraction:   Name: Briana Phone: [phone]   Name: Dick Phone: [phone]    gym  and [place]   Step 4: Remind myself of people and things that are important to me and worth living for:                              My family, my garden    Step 5: When I am in crisis, I can ask these people to help me use my safety plan:   Name: Briana Phone: [phone]   Name: Dick Phone: [phone]  Step 6: Making the environment safe:     remove alcohol, remove drugs, dispose of old medications  and be around others, get rid of gun lighter  Step 7: Professionals or agencies I can contact during a crisis:    Suicide Prevention Lifeline: 2-545-904-OIKU (5146)    Crisis Text Line Service (available 24 hours a day, 7 days a week): Text [place] to 976254  Local Crisis Services: [facility] COPE: [phone]    Call 911 or go to my nearest emergency department.   I helped develop this safety plan and agree to use it when needed.  I have been given a copy of this plan.      Client signature _________________________________________________________________  Today s date:  1/23/2020  Adapted from Safety Plan Template 2008 Janina Pierson and Arturo Roman is reprinted with the express permission of the authors.  No portion of the Safety Plan Template may be reproduced without the express, written permission.  You can contact the authors at [email or web address] or " [email or web address].

## 2020-01-23 NOTE — TELEPHONE ENCOUNTER
"Called patient.     Patient states he doesn't need Rx Hydroxyzine refilled.    States it's a \"med that another doctor prescribed to take at bedtime to help with sleep\"  Reviewed patient's current med list---and patient says the med requested is not Rx Mirtazapine.  Patient doesn't know the name of the med.      Advised patient to call pharmacy and have pharmacy send Rx request either to Dr Whelan ---or to the provider who prescribed the med, if patient is still seeing that provider.     Regarding Rx Albuterol---not on patient's current or historical med list.    States he used to use Albuterol \"years ago\".    Patient states he wants the inhaler \"because I'm trying to stop smoking and I think the inhaler will help my breathing.\"    \"I thought that inhaler was innocuous enough that Dr Whelan could just Rx it for me\".    Patient denies Hx of asthma.      Advised patient to schedule appointment with PCP to review meds and also discuss Rx Albuterol for smoking cessation.     Patient stated understanding and agreement with advise/plan.    Will call back to schedule .     Will watch for Rx requests sent directly from patient's pharmacy.    Tania ZAMUDIO RN,BSN      "

## 2020-01-24 NOTE — TELEPHONE ENCOUNTER
Routing to AM intake to contact patient.       Refill for: hydrOXYzine (VISTARIL) 50 MG capsule    Last Appointment: 11/25/19    Next Appointment: none    No Shows/Cancellations since last appointment: no show: 12/9, 12/12    Last Refill in Epic (date and amount/how many days):    Disp Refills Start End ELIE   hydrOXYzine (VISTARIL) 50 MG capsule 90 capsule 0 11/25/2019  --   Sig - Route: Take 1 capsule (50 mg) by mouth 3 times daily as needed for anxiety - Oral     Most Recent UDS results: none on record      Day Higgins RN  01/24/20  9:54 AM

## 2020-01-24 NOTE — TELEPHONE ENCOUNTER
Medication was never ordered or filled by this provider.    Medication was prescribed by Addiction specialist below   Arturo Fisher MD     please forward refills to addiction specialists Arturo Yang MD

## 2020-01-24 NOTE — TELEPHONE ENCOUNTER
Detailed message left for patient advising request for Hydroxyzine needs to go to Dr. Fisher  Noted there is an open refill request that was routed to addiction medicine pool for this med     Hoa NUNEZ RN

## 2020-01-27 RX ORDER — HYDROXYZINE PAMOATE 50 MG/1
50 CAPSULE ORAL 3 TIMES DAILY PRN
Qty: 90 CAPSULE | Refills: 0 | Status: ON HOLD | OUTPATIENT
Start: 2020-01-27 | End: 2020-12-03

## 2020-02-05 ENCOUNTER — OFFICE VISIT (OUTPATIENT)
Dept: PSYCHOLOGY | Facility: CLINIC | Age: 35
End: 2020-02-05
Payer: COMMERCIAL

## 2020-02-05 DIAGNOSIS — F41.1 GAD (GENERALIZED ANXIETY DISORDER): ICD-10-CM

## 2020-02-05 DIAGNOSIS — F43.10 POSTTRAUMATIC STRESS DISORDER: ICD-10-CM

## 2020-02-05 DIAGNOSIS — F33.2 SEVERE EPISODE OF RECURRENT MAJOR DEPRESSIVE DISORDER, WITHOUT PSYCHOTIC FEATURES (H): Primary | ICD-10-CM

## 2020-02-05 DIAGNOSIS — F10.10 ALCOHOL ABUSE, EPISODIC DRINKING BEHAVIOR: ICD-10-CM

## 2020-02-05 PROCEDURE — 90834 PSYTX W PT 45 MINUTES: CPT | Performed by: SOCIAL WORKER

## 2020-02-05 NOTE — PROGRESS NOTES
Progress Note    Patient Name: Brandin Rodriguez  Date: 2/5/2020         Service Type: Individual  Video Visit: No     Session Start Time: 9:35 AM   Session End Time: 10:20 AM     Session Length: 45 mins    Session #: 6    Attendees: Client attended alone     Treatment Plan Last Reviewed: 2/5/2020  PHQ-9 / NEREYDA-7 : n/a    DATA  Interactive Complexity: No  Crisis: No       Progress Since Last Session (Related to Symptoms / Goals / Homework):   Symptoms: No change --client continues to use alcohol, currently experiencing withdrawal symptoms of nausea, loss in appetite, insomnia     Homework: Completed in session-Think about specific treatment goals focused around Alcohol Use, MDD, NEREYDA and PTSD.       Episode of Care Goals: No improvement - CONTEMPLATION (Considering change and yet undecided); Intervened by assessing the negative and positive thinking (ambivalence) about behavior change     Current / Ongoing Stressors and Concerns:   Ongoing: Managing MDD, NEREYDA, RENATA and PTSD symptoms, financial stressor and relationship stressor     Current: Managing cravings/urges for alcohol, discussion around attendance and no show last session.      Treatment Objective(s) Addressed in This Session:   Decrease urges/cravings to consume alcohol  Decrease frequency and intensity of feeling down, depressed, hopeless  Identify negative self-talk and behaviors: challenge core beliefs, myths, and actions  Decrease thoughts that you'd be better off dead or of suicide / self-harm     Intervention:  Motivational Interviewing: Emphasizing personal choice and control, discussion around barriers that get in the way of making it to therapy, affirming client's efforts in making it to today's session and encouraging client to challenge self to be accountable in making it to sessions or communicating with write when he is unable to make it. Discussion around treatment goals and changes he wants to see and  "level of change he is at.   Motivational Interviewing    MI Intervention: Expressed Empathy/Understanding, Supported Autonomy, Collaboration, Evocation, Permission to raise concern or advise, Open-ended questions, Reflections: simple and complex, Change talk (evoked) and Reframe     Change Talk Expressed by the Patient: Desire to change Ability to change Reasons to change Need to change Committment to change Activation    Provider Response to Change Talk: E - Evoked more info from patient about behavior change, A - Affirmed patient's thoughts, decisions, or attempts at behavior change, R - Reflected patient's change talk and S - Summarized patient's change talk statements          ASSESSMENT: Current Emotional / Mental Status (status of significant symptoms):   Risk status (Self / Other harm or suicidal ideation)   Patient denies current fears or concerns for personal safety.   Patient denies current or recent suicidal ideation or behaviors.   Patientdenies current or recent homicidal ideation or behaviors.   Patient denies current or recent self injurious behavior or ideation.   Patient denies other safety concerns.   Patient reports there has been no change in risk factors since their last session.     Patientreports there has been a chance in protective factors since their last session.  Client has been sober from alcohol for one week   A safety and risk management plan has been developed including: Patient consented to co-developed safety plan.  Safety and risk management plan was completed.  Patient agreed to use safety plan should any safety concerns arise.  A copy was given to the patient.   [place] Suicide Severity Rating Scale (Lifetime/Recent)  [place] Suicide Severity Rating (Lifetime/Recent) 12/30/2019   1. Wish to be Dead (Lifetime) Yes   Wish to be Dead Description (Lifetime) After the death of 1yo daughter, \"hoping to be done,\" \"It would just be easier.\" Stated that \"everything seemed dark in the " "world, so maybe creating my own end would be better.\"   1. Wish to be Dead (Recent) Yes   Wish to be Dead Description (Recent) \"Wishing that it would just be done,\" Sometimes placing self in \"risky\" situation.    2. Non-Specific Active Suicidal Thoughts (Lifetime) Yes   Non-Specific Active Suicidal Thought Description (Lifetime) Cynaide pills, hanging self--aborted attempt, use of alcohol-\"drinking myself to death.\"   2. Non-Specific Active Suicidal Thoughts (Recent) No   3. Active Suicidal Ideation with any Methods (Not Plan) Without Intent to Act (Lifetime) Yes   Active Suicidal Ideation with any Methods (Not Plan) Description (Lifetime) pills, hanging, alcohol   3. Active Sucidal Ideation with any Methods (Not Plan) Without Intent to Act (Recent) No   4. Active Suicidal Ideation with Some Intent to Act, Without Specific Plan (Lifetime) Yes   Active Suicidal Ideation with Some Intent to Act, Without Specific Plan Description (Lifetime) Memories of the loss of daughter, financial stressor and accumulation of stress in living situation.    4. Active Suicidal Ideation with Some Intent to Act, Without Specific Plan (Recent) No   5. Active Suicidal Ideation with Specific Plan and Intent (Lifetime) Yes   Active Suicidal Ideation with Specific Plan and Intent Description (Lifetime) Yes, using extension cord to hang self, had images when 24 you of seeing self hanging outside of parent's shower.    5. Active Suicidal Ideation with Specific Plan and Intent (Recent) No   Most Severe Ideation Rating (Lifetime) 5   Most Severe Ideation Description (Lifetime) 2 years ago with attempt--accumulation of stressor over time   Frequency (Lifetime) 5   Duration (Lifetime) 4   Controllability (Lifetime) 5   Protective Factors  (Lifetime) 1   Reasons for Ideation (Lifetime) 5   Most Severe Ideation Rating (Past Month) 2   Most Severe Ideation Description (Past Month) able to see ability to get help   Frequency (Past Month) 5   Duration " (Past Month) 4   Controllability (Past Month) 2   Protective Factors (Past Month) 1   Reasons for Ideation (Past Month) 5   Actual Attempt (Lifetime) Yes   Actual Attempt Description (Lifetime) 2 yrs ago, attempted to hang self   Total Number of Actual Attempts (Lifetime) 1   Actual Attempt (Past 3 Months) No   Has subject engaged in non-suicidal self-injurious behavior? (Lifetime) Yes   Has subject engaged in non-suicidal self-injurious behavior? (Past 3 Months) Yes   Interrupted Attempts (Lifetime) No   Interrupted Attempts (Past 3 Months) No   Aborted or Self-Interrupted Attempt (Lifetime) Yes   Aborted or Self Interrupted Attempt Description (Lifetime) Client attempted to hang however thought about parents and decided not to go through with it.   Total Number Aborted or Self Interrupted Attempts (Lifetime) 1   Aborted or Self-Interrupted Attempt (Past 3 Months) No   Preparatory Acts or Behavior (Lifetime) No   Preparatory Acts or Behavior (Past 3 Months) No   Most Recent Attempt Actual Lethality Code NA   Most Lethal Attempt Actual Lethality Code NA   Initial/First Attempt Actual Lethality Code 0   Initial/First Attempt Potential Lethality Code 0          Appearance:   Appropriate    Eye Contact:   Fair    Psychomotor Behavior: Normal    Attitude:   Cooperative    Orientation:   All   Speech    Rate / Production: Normal     Volume:  Normal    Mood:    Anxious    Affect:    Appropriate    Thought Content:  Clear    Thought Form:  Coherent  Goal Directed  Logical    Insight:    Fair      Medication Review:   No changes to current psychiatric medication(s)     Medication Compliance:   Yes     Changes in Health Issues:   None reported     Chemical Use Review:   Substance Use: decrease in alcohol .  Patient reports frequency of use none this week.  Stage of Change: Preparatory        Tobacco Use: No current tobacco use.      Diagnosis:  1. 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 Alcohol abuse, episodic drinking behavior    3. NEREYDA (generalized anxiety disorder)    4. Posttraumatic stress disorder        Collateral Reports Completed:   Not Applicable    PLAN: (Patient Tasks / Therapist Tasks / Other)  Client: Re-schedule CD assessment.         DENNY Reis [facility]   February 5, 2020  Note reviewed and clinical supervision by DENNY Doty [facility] 2/16/2020                                                 Brandin   Jennifer     SAFETY PLAN:  Step 1: Warning signs / cues (Thoughts, images, mood, situation, behavior) that a crisis may be developing:    Thoughts: \"I can't do this anymore\", \"I just want this to end\", \"Nothing makes it better\" and \"feeling helpless,\" \"I can't fix anything,\" \"I've fucked up my life so much that I can't come back from it.\"    Images: flashbacks and \"how 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,\" managing restaurants, 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,\"    Thinking Processes: ruminations (can't stop thinking about my problems): thinking back around current stressor with roommate or finances, thoughts about loss of daughter, thoughts about how life used to be and racing thoughts    Mood: worsening depression, hopelessness, helplessness, intense anger and intense worry    Behaviors: isolating/withdrawing , using alcohol, impulsive, reckless behaviors (acting without thinking): driving under the influence, getting into fights with strangers, unsafe sex practices, not taking care of myself, not taking care of my responsibilities and not sleeping enough, not eating/sleeping    Situations: relationship problems, trauma  and financial stress   Step 2: Coping strategies - Things I can do to take my mind off of my problems without contacting another person (relaxation technique, physical activity):    Distress Tolerance Strategies:  change body temperature (ice pack/cold water) , paced breathing/progressive muscle relaxation, intense exercise: running, jumping jacks for 2-3 minutes  and " "listening to podcast, playing video games    Physical Activities: go for a walk, gardening, meditation, deep breathing, stretching  and weight lifting    Focus on helpful thoughts:  \"This is temporary\", \"I will get through this\", \"It always passes\", \"Ride the wave\" and remind myself of what is important to me: \"I can't do this to my parents,\" \"Fuck them, I'm going to better than that shit.\"  Step 3: People and social settings that provide distraction:   Name: Briana Phone: [phone]   Name: Dick Phone: [phone]    gym  and [place]   Step 4: Remind myself of people and things that are important to me and worth living for:                              My family, my garden    Step 5: When I am in crisis, I can ask these people to help me use my safety plan:   Name: Briana Phone: [phone]   Name: Dick Phone: [phone]  Step 6: Making the environment safe:     remove alcohol, remove drugs, dispose of old medications  and be around others, get rid of gun lighter  Step 7: Professionals or agencies I can contact during a crisis:    Suicide Prevention Lifeline: 2-705-100-TALK (5257)    Crisis Text Line Service (available 24 hours a day, 7 days a week): Text [place] to 676141  [facility] Crisis Services: [facility]: [phone]    Call 911 or go to my nearest emergency department.   I helped develop this safety plan and agree to use it when needed.  I have been given a copy of this plan.      Client signature _________________________________________________________________  Today s date:  1/23/2020  Adapted from Safety Plan Template 2008 Janina Pierson and Arturo Roman is reprinted with the express permission of the authors.  No portion of the Safety Plan Template may be reproduced without the express, written permission.  You can contact the authors at [email or web address] or [email or web address].                                                   Treatment Plan    Client's Name: Brandin Rodriguez  Date Of " Birth: 1985    Date: 2/5/2020    DSM5 Diagnoses: (Sustained by DSM5 Criteria Listed Above)  Diagnoses:      296.33 (F33.2) Major Depressive Disorder, Recurrent Episode, Severe _  300.02 (F41.1) Generalized Anxiety Disorder  309.81 (F43.10) Posttraumatic Stress Disorder (includes Posttraumatic Stress Disorder for Children 6 Years and Younger)  Without dissociative symptoms  Substance-Related & Addictive Disorders Alcohol Use Disorder   303.90 (F10.20) Severe .  Psychosocial & Contextual Factors: Loss of child, history of SI, alcohol dependency, financial stressor, relationship conflict  WHODAS:   WHODAS 2.0 Total Score 12/3/2019   Total Score 41         Referral / Collaboration:  Was/were discussed and client will pursue.  Referral to MI/CD day treatment made.   Client is to follow thru with CD Assessment.    Anticipated number of session or this episode of care: 12      MeasurableTreatment Goal(s) related to diagnosis / functional impairment(s)  Goal 1: Client will report a decrease in depressive symptoms and reduce PHQ 9 score from 26 below 10.    Objective #A (Client Action)    Client will Decrease frequency and intensity of feeling down, depressed, hopeless  Identify negative self-talk and behaviors: challenge core beliefs, myths, and actions.  Status: New - Date: 2/5/2020     Intervention(s)  Therapist will use components of CBT and DBT to identify unhealthy patterns of thoughts, emotions and behaviors, breaking negative core beliefs, and cognitive restructuring .    Objective #B  Client will identify two areas of life that you would like to have improved functioning.  Status: New - Date: 2/5/2020     Intervention(s)  Therapist will assign homework : Client will write up a plan on finding another job that is more fulfilling to his skills.  Client will also identify 1-2 barriers that get in the way of completing household tasks. Client will learn 1-2 strategies to help in tidying place.     Goal 2: Client  will report a decrease in anxiety symptoms and reduce NEREYDA 7 score from 21 below 10.    I will know I've met my goal when I know I'm not afraid of everything.      Objective #A (Client Action)    Status: New - Date: 2/5/2020     Client will identify 2-3 fears / thoughts that contribute to feeling anxious.    Intervention(s)  Therapist will teach mindfulness and relaxation skills to help in identifying and noticing fear/thoughts  Therapist will use components of DBT and CBT to aid in cognitive restructuring.    Goal 3: Client will be completely sober from alcohol in 6 months    Objective #A (Client Action)    Status: New - Date: 2/5/2020     Client will identify at least 3 example(s) of how drinking has resulted in an experience that interferes with person values or goals.   Client will write a detailed alcohol use history describing treatment attempts and the specific situations surrounding relapse.    Client will identify 2-3 triggers that may lead to relapse   Client will work to Develop a written relapse prevention plan  Intervention(s)  Therapist will discuss alcohol and high-risk behaviors, use CBT and DBT to identify triggers to usage and relapse prevention plan. .    Goal 4: Client will increase understanding of trauma and its impact on client's functioning.    Objective #A (Client Action)    Client will experience a decrease in distress in previously-avoided situation.   Status: New - Date: 2/5/2020     Intervention(s)  Therapist will teach mindfulness, grounding and relaxation skills. Therapist will use CBT and DBT to help client identify and challenge negative beliefs and distortions.    Patient has reviewed and agreed to the above plan.      DENNY Reis [facility]     February 5, 2020  Treatment plan reviewed and clinical supervision by DENNY Doty [facility] 2/16/2020

## 2020-02-07 NOTE — PATIENT INSTRUCTIONS
Treatment Plan    Client's Name: Brandin Rodriguez  YOB: 1985    Date: 2/5/2020    DSM5 Diagnoses: (Sustained by DSM5 Criteria Listed Above)  Diagnoses:      296.33 (F33.2) Major Depressive Disorder, Recurrent Episode, Severe _  300.02 (F41.1) Generalized Anxiety Disorder  309.81 (F43.10) Posttraumatic Stress Disorder (includes Posttraumatic Stress Disorder for Children 6 Years and Younger)  Without dissociative symptoms  Substance-Related & Addictive Disorders Alcohol Use Disorder   303.90 (F10.20) Severe .  Psychosocial & Contextual Factors: Loss of child, history of SI, alcohol dependency, financial stressor, relationship conflict  WHODAS:   WHODAS 2.0 Total Score 12/3/2019   Total Score 41         Referral / Collaboration:  Was/were discussed and client will pursue.  Referral to MI/CD day treatment made.   Client is to follow thru with CD Assessment.    Anticipated number of session or this episode of care: 12      MeasurableTreatment Goal(s) related to diagnosis / functional impairment(s)  Goal 1: Client will report a decrease in depressive symptoms and reduce PHQ 9 score from 26 below 10.    Objective #A (Client Action)    Client will Decrease frequency and intensity of feeling down, depressed, hopeless  Identify negative self-talk and behaviors: challenge core beliefs, myths, and actions.  Status: New - Date: 2/5/2020     Intervention(s)  Therapist will use components of CBT and DBT to identify unhealthy patterns of thoughts, emotions and behaviors, breaking negative core beliefs, and cognitive restructuring .    Objective #B  Client will identify two areas of life that you would like to have improved functioning.  Status: New - Date: 2/5/2020     Intervention(s)  Therapist will assign homework : Client will write up a plan on finding another job that is more fulfilling to his skills.  Client will also identify 1-2 barriers that get in the way of  completing household tasks. Client will learn 1-2 strategies to help in tidying place.     Goal 2: Client will report a decrease in anxiety symptoms and reduce NEREYDA 7 score from 21 below 10.    I will know I've met my goal when I know I'm not afraid of everything.      Objective #A (Client Action)    Status: New - Date: 2/5/2020     Client will identify 2-3 fears / thoughts that contribute to feeling anxious.    Intervention(s)  Therapist will teach mindfulness and relaxation skills to help in identifying and noticing fear/thoughts  Therapist will use components of DBT and CBT to aid in cognitive restructuring.    Goal 3: Client will be completely sober from alcohol in 6 months    Objective #A (Client Action)    Status: New - Date: 2/5/2020     Client will identify at least 3 example(s) of how drinking has resulted in an experience that interferes with person values or goals.   Client will write a detailed alcohol use history describing treatment attempts and the specific situations surrounding relapse.    Client will identify 2-3 triggers that may lead to relapse   Client will work to Develop a written relapse prevention plan  Intervention(s)  Therapist will discuss alcohol and high-risk behaviors, use CBT and DBT to identify triggers to usage and relapse prevention plan. .    Goal 4: Client will increase understanding of trauma and its impact on client's functioning.    Objective #A (Client Action)    Client will experience a decrease in distress in previously-avoided situation.   Status: New - Date: 2/5/2020     Intervention(s)  Therapist will teach mindfulness, grounding and relaxation skills. Therapist will use CBT and DBT to help client identify and challenge negative beliefs and distortions.    Patient has reviewed and agreed to the above plan.                DENNY Reis [facility]     February 5, 2020

## 2020-02-12 ENCOUNTER — OFFICE VISIT (OUTPATIENT)
Dept: PSYCHOLOGY | Facility: CLINIC | Age: 35
End: 2020-02-12
Payer: COMMERCIAL

## 2020-02-12 DIAGNOSIS — F33.2 SEVERE EPISODE OF RECURRENT MAJOR DEPRESSIVE DISORDER, WITHOUT PSYCHOTIC FEATURES (H): ICD-10-CM

## 2020-02-12 DIAGNOSIS — F10.10 ALCOHOL ABUSE, EPISODIC DRINKING BEHAVIOR: Primary | ICD-10-CM

## 2020-02-12 PROCEDURE — 90834 PSYTX W PT 45 MINUTES: CPT | Performed by: SOCIAL WORKER

## 2020-02-12 ASSESSMENT — ANXIETY QUESTIONNAIRES
6. BECOMING EASILY ANNOYED OR IRRITABLE: MORE THAN HALF THE DAYS
1. FEELING NERVOUS, ANXIOUS, OR ON EDGE: SEVERAL DAYS
5. BEING SO RESTLESS THAT IT IS HARD TO SIT STILL: MORE THAN HALF THE DAYS
2. NOT BEING ABLE TO STOP OR CONTROL WORRYING: SEVERAL DAYS
7. FEELING AFRAID AS IF SOMETHING AWFUL MIGHT HAPPEN: MORE THAN HALF THE DAYS
3. WORRYING TOO MUCH ABOUT DIFFERENT THINGS: SEVERAL DAYS
GAD7 TOTAL SCORE: 10

## 2020-02-12 ASSESSMENT — PATIENT HEALTH QUESTIONNAIRE - PHQ9
SUM OF ALL RESPONSES TO PHQ QUESTIONS 1-9: 12
5. POOR APPETITE OR OVEREATING: SEVERAL DAYS

## 2020-02-12 NOTE — PROGRESS NOTES
"                                           Progress Note    Patient Name: Brandin Rodriguez  Date: 2/12/2020         Service Type: Individual  Video Visit: No     Session Start Time: 9:31 AM   Session End Time: 10:20 AM     Session Length: 49 mins    Session #: 7    Attendees: Client attended alone     Treatment Plan Last Reviewed: 2/5/2020  PHQ-9 / NEREYDA-7 : 12/10    DATA  Interactive Complexity: No  Crisis: No       Progress Since Last Session (Related to Symptoms / Goals / Homework):   Symptoms: No change --client continues to use alcohol, currently experiencing withdrawal symptoms      Homework: Did not complete-Re-schedule CD assessment, client says he wonders about what he would get out of it, concern they would tell him something he already knows.        Episode of Care Goals: No improvement - CONTEMPLATION (Considering change and yet undecided); Intervened by assessing the negative and positive thinking (ambivalence) about behavior change     Current / Ongoing Stressors and Concerns:   Ongoing: Managing MDD, NEREYDA, RENATA and PTSD symptoms, financial stressor and relationship stressor     Current: Continual alcohol usage, took 2-3 days to recover from withdrawal symptoms after consuming a pint on Sunday. Client did go to first AA meeting yesterday, was surprised from his experience.       Treatment Objective(s) Addressed in This Session:   Decrease urges/cravings to consume alcohol  Decrease frequency and intensity of feeling down, depressed, hopeless  Identify negative self-talk and behaviors: challenge core beliefs, myths, and actions     Intervention:  CBT: We discussed client's experience with alcohol on Sunday and going to first Alcohol support group. He says while cleaning his apartment, found an unopened bottle of liquor and \"couldn't help myself.\" He connected with a Facebook support group and reconnected with an old friend who told him about a \"Meetings Leana,\" where he can find local support groups. He " "explained feeling nervous about going, and friend offered to go with him. After cancelling with friend Tuesday morning, he noted it felt unfair to pull a friend into his recovery and he needed to do it himself. He went to his first meeting and says, \"It went better than I expected.\" He reflected on \"excuses,\" that he made in the past, and notes these excuses have kept him from working on his usage. He plans to connect with a member tomorrow and possibly explore sponsorship.   Motivational Interviewing: Emphasizing personal choice and control, affirming efforts client made to make it to his first alcohol support groups, identifying pros and cons about attending groups, reflecting on the challenges of addressing his usage and connecting with people he is unfamiliar with.   Motivational Interviewing    MI Intervention: Expressed Empathy/Understanding, Supported Autonomy, Collaboration, Evocation, Permission to raise concern or advise, Open-ended questions, Reflections: simple and complex, Change talk (evoked) and Reframe     Change Talk Expressed by the Patient: Desire to change Ability to change Reasons to change Need to change Committment to change Activation Taking steps    Provider Response to Change Talk: E - Evoked more info from patient about behavior change, A - Affirmed patient's thoughts, decisions, or attempts at behavior change, R - Reflected patient's change talk and S - Summarized patient's change talk statements          ASSESSMENT: Current Emotional / Mental Status (status of significant symptoms):   Risk status (Self / Other harm or suicidal ideation)   Patient denies current fears or concerns for personal safety.   Patient denies current or recent suicidal ideation or behaviors.   Patient denies current or recent homicidal ideation or behaviors.   Patient denies current or recent self injurious behavior or ideation.   Patient denies other safety concerns.   Patient reports there has been a change in " "risk factors since their last session.  Client continues to use alcohol   Patient reports there has been a chance in protective factors since their last session.  Parents are offering for client to move back home   A safety and risk management plan has been developed including: Patient consented to co-developed safety plan.  Safety and risk management plan was completed.  Patient agreed to use safety plan should any safety concerns arise.  A copy was given to the patient.     [place] Suicide Severity Rating Scale (Lifetime/Recent)  [place] Suicide Severity Rating (Lifetime/Recent) 12/30/2019   1. Wish to be Dead (Lifetime) Yes   Wish to be Dead Description (Lifetime) After the death of 3yo daughter, \"hoping to be done,\" \"It would just be easier.\" Stated that \"everything seemed dark in the world, so maybe creating my own end would be better.\"   1. Wish to be Dead (Recent) Yes   Wish to be Dead Description (Recent) \"Wishing that it would just be done,\" Sometimes placing self in \"risky\" situation.    2. Non-Specific Active Suicidal Thoughts (Lifetime) Yes   Non-Specific Active Suicidal Thought Description (Lifetime) Cynaide pills, hanging self--aborted attempt, use of alcohol-\"drinking myself to death.\"   2. Non-Specific Active Suicidal Thoughts (Recent) No   3. Active Suicidal Ideation with any Methods (Not Plan) Without Intent to Act (Lifetime) Yes   Active Suicidal Ideation with any Methods (Not Plan) Description (Lifetime) pills, hanging, alcohol   3. Active Sucidal Ideation with any Methods (Not Plan) Without Intent to Act (Recent) No   4. Active Suicidal Ideation with Some Intent to Act, Without Specific Plan (Lifetime) Yes   Active Suicidal Ideation with Some Intent to Act, Without Specific Plan Description (Lifetime) Memories of the loss of daughter, financial stressor and accumulation of stress in living situation.    4. Active Suicidal Ideation with Some Intent to Act, Without Specific Plan (Recent) No   5. " Active Suicidal Ideation with Specific Plan and Intent (Lifetime) Yes   Active Suicidal Ideation with Specific Plan and Intent Description (Lifetime) Yes, using extension cord to hang self, had images when 24 you of seeing self hanging outside of parent's shower.    5. Active Suicidal Ideation with Specific Plan and Intent (Recent) No   Most Severe Ideation Rating (Lifetime) 5   Most Severe Ideation Description (Lifetime) 2 years ago with attempt--accumulation of stressor over time   Frequency (Lifetime) 5   Duration (Lifetime) 4   Controllability (Lifetime) 5   Protective Factors  (Lifetime) 1   Reasons for Ideation (Lifetime) 5   Most Severe Ideation Rating (Past Month) 2   Most Severe Ideation Description (Past Month) able to see ability to get help   Frequency (Past Month) 5   Duration (Past Month) 4   Controllability (Past Month) 2   Protective Factors (Past Month) 1   Reasons for Ideation (Past Month) 5   Actual Attempt (Lifetime) Yes   Actual Attempt Description (Lifetime) 2 yrs ago, attempted to hang self   Total Number of Actual Attempts (Lifetime) 1   Actual Attempt (Past 3 Months) No   Has subject engaged in non-suicidal self-injurious behavior? (Lifetime) Yes   Has subject engaged in non-suicidal self-injurious behavior? (Past 3 Months) Yes   Interrupted Attempts (Lifetime) No   Interrupted Attempts (Past 3 Months) No   Aborted or Self-Interrupted Attempt (Lifetime) Yes   Aborted or Self Interrupted Attempt Description (Lifetime) Client attempted to hang however thought about parents and decided not to go through with it.   Total Number Aborted or Self Interrupted Attempts (Lifetime) 1   Aborted or Self-Interrupted Attempt (Past 3 Months) No   Preparatory Acts or Behavior (Lifetime) No   Preparatory Acts or Behavior (Past 3 Months) No   Most Recent Attempt Actual Lethality Code NA   Most Lethal Attempt Actual Lethality Code NA   Initial/First Attempt Actual Lethality Code 0   Initial/First Attempt  "Potential Lethality Code 0          Appearance:   Appropriate    Eye Contact:   Good    Psychomotor Behavior: Normal    Attitude:   Cooperative    Orientation:   All   Speech    Rate / Production: Normal     Volume:  Normal    Mood:    Anxious    Affect:    Appropriate    Thought Content:  Clear    Thought Form:  Coherent  Goal Directed  Logical    Insight:    Fair      Medication Review:   No changes to current psychiatric medication(s)     Medication Compliance:   Yes     Changes in Health Issues:   None reported     Chemical Use Review:   Substance Use: Problem use continues with no change since last session, Stage of Change: Preparatory  Provided encouragement towards sobriety  Provided support and affirmation for steps taken towards sobriety         Tobacco Use: No current tobacco use.      Diagnosis:  1. Alcohol abuse, episodic drinking behavior    2. Severe episode of recurrent major depressive disorder, without psychotic features (H)        Collateral Reports Completed:   Not Applicable    PLAN: (Patient Tasks / Therapist Tasks / Other)  Client: Client will reschedule CD assessment by next session.    Client will also attend 2-3 more support groups by next session.     DENNY Reis [facility]   February 5, 2020  Note reviewed and clinical supervision by DENNY Doty [facility] 2/19/2020                                                 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,\"    Thinking Processes: ruminations (can't stop thinking about my problems): thinking back around current stressor with roommate or " "finances, thoughts about loss of daughter, thoughts about how life used to be and racing thoughts    Mood: worsening depression, hopelessness, helplessness, intense anger and intense worry    Behaviors: isolating/withdrawing , using alcohol, impulsive, reckless behaviors (acting without thinking): driving under the influence, getting into fights with strangers, unsafe sex practices, not taking care of myself, not taking care of my responsibilities and not sleeping enough, not eating/sleeping    Situations: relationship problems, trauma  and financial stress   Step 2: Coping strategies - Things I can do to take my mind off of my problems without contacting another person (relaxation technique, physical activity):    Distress Tolerance Strategies:  change body temperature (ice pack/cold water) , paced breathing/progressive muscle relaxation, intense exercise: running, jumping jacks for 2-3 minutes  and listening to podcast, playing video games    Physical Activities: go for a walk, gardening, meditation, deep breathing, stretching  and weight lifting    Focus on helpful thoughts:  \"This is temporary\", \"I will get through this\", \"It always passes\", \"Ride the wave\" and remind myself of what is important to me: \"I can't do this to my parents,\" \"Fuck them, I'm going to better than that shit.\"  Step 3: People and social settings that provide distraction:   Name: Briana Phone: [phone]   Name: Dick Phone: [phone]    gym  and [place]   Step 4: Remind myself of people and things that are important to me and worth living for:                              My family, my garden    Step 5: When I am in crisis, I can ask these people to help me use my safety plan:   Name: Briana Phone: [phone]   Name: Dick Phone: [phone]  Step 6: Making the environment safe:     remove alcohol, remove drugs, dispose of old medications  and be around others, get rid of gun lighter  Step 7: Professionals or agencies I can contact " during a crisis:    Suicide Prevention Lifeline: 0-407-437-TALK (5302)    Crisis Text Line Service (available 24 hours a day, 7 days a week): Text [place] to 098714  [facility] Crisis Services: [facility] [place]: [phone]    Call 911 or go to my nearest emergency department.   I helped develop this safety plan and agree to use it when needed.  I have been given a copy of this plan.      Client signature _________________________________________________________________  Today s date:  1/23/2020  Adapted from Safety Plan Template 2008 Janina Pierson and Arturo Roman is reprinted with the express permission of the authors.  No portion of the Safety Plan Template may be reproduced without the express, written permission.  You can contact the authors at bhs@[facility] or [email or web address].                                                   Treatment Plan    Client's Name: Brandin Rodriguez  YOB: 1985    Date: 2/5/2020    DSM5 Diagnoses: (Sustained by DSM5 Criteria Listed Above)  Diagnoses:      296.33 (F33.2) Major Depressive Disorder, Recurrent Episode, Severe _  300.02 (F41.1) Generalized Anxiety Disorder  309.81 (F43.10) Posttraumatic Stress Disorder (includes Posttraumatic Stress Disorder for Children 6 Years and Younger)  Without dissociative symptoms  Substance-Related & Addictive Disorders Alcohol Use Disorder   303.90 (F10.20) Severe .  Psychosocial & Contextual Factors: Loss of child, history of SI, alcohol dependency, financial stressor, relationship conflict  WHODAS:   WHODAS 2.0 Total Score 12/3/2019   Total Score 41         Referral / Collaboration:  Was/were discussed and client will pursue.  Referral to MI/CD day treatment made.   Client is to follow thru with CD Assessment.    Anticipated number of session or this episode of care: 12      MeasurableTreatment Goal(s) related to diagnosis / functional impairment(s)  Goal 1: Client will report a decrease in depressive symptoms and  reduce PHQ 9 score from 26 below 10.    Objective #A (Client Action)    Client will Decrease frequency and intensity of feeling down, depressed, hopeless  Identify negative self-talk and behaviors: challenge core beliefs, myths, and actions.  Status: New - Date: 2/5/2020     Intervention(s)  Therapist will use components of CBT and DBT to identify unhealthy patterns of thoughts, emotions and behaviors, breaking negative core beliefs, and cognitive restructuring .    Objective #B  Client will identify two areas of life that you would like to have improved functioning.  Status: New - Date: 2/5/2020     Intervention(s)  Therapist will assign homework : Client will write up a plan on finding another job that is more fulfilling to his skills.  Client will also identify 1-2 barriers that get in the way of completing household tasks. Client will learn 1-2 strategies to help in tidying place.     Goal 2: Client will report a decrease in anxiety symptoms and reduce NEREYDA 7 score from 21 below 10.    I will know I've met my goal when I know I'm not afraid of everything.      Objective #A (Client Action)    Status: New - Date: 2/5/2020     Client will identify 2-3 fears / thoughts that contribute to feeling anxious.    Intervention(s)  Therapist will teach mindfulness and relaxation skills to help in identifying and noticing fear/thoughts  Therapist will use components of DBT and CBT to aid in cognitive restructuring.    Goal 3: Client will be completely sober from alcohol in 6 months    Objective #A (Client Action)    Status: New - Date: 2/5/2020     Client will identify at least 3 example(s) of how drinking has resulted in an experience that interferes with person values or goals.   Client will write a detailed alcohol use history describing treatment attempts and the specific situations surrounding relapse.    Client will identify 2-3 triggers that may lead to relapse   Client will work to Develop a written relapse prevention  plan  Intervention(s)  Therapist will discuss alcohol and high-risk behaviors, use CBT and DBT to identify triggers to usage and relapse prevention plan. .    Goal 4: Client will increase understanding of trauma and its impact on client's functioning.    Objective #A (Client Action)    Client will experience a decrease in distress in previously-avoided situation.   Status: New - Date: 2/5/2020     Intervention(s)  Therapist will teach mindfulness, grounding and relaxation skills. Therapist will use CBT and DBT to help client identify and challenge negative beliefs and distortions.    Patient has reviewed and agreed to the above plan.      DENNY Reis [facility]     February 5, 2020  Treatment plan reviewed and clinical supervision by EDNNY Doty [facility] 2/19/2020

## 2020-02-13 ASSESSMENT — ANXIETY QUESTIONNAIRES: GAD7 TOTAL SCORE: 10

## 2020-02-18 ENCOUNTER — PATIENT OUTREACH (OUTPATIENT)
Dept: CARE COORDINATION | Facility: CLINIC | Age: 35
End: 2020-02-18

## 2020-02-18 NOTE — PROGRESS NOTES
Clinic Care Coordination Contact    Follow Up Progress Note      Assessment: CC SW spoke with pt regarding his overall wellbeing. Pt shared that he is doing pretty well and has been feeling like life is in process right now. He shared that his roommate continues to be out of the apartment. This was been very good for him as he is feeling less stress and sleeping better. This has caused some financial issues due to rent but past roommate is paying him back some money owed and his parents have been assisting. Pt plans to pursue getting another job.     Pt plans on making an appointment with PCP for a check up and labs. He is thinking about making this appointment for next week once he gets his work schedule.    Goals addressed this encounter:   Goals Addressed                 This Visit's Progress       Patient Stated      1. Mental Health Management (pt-stated)   50%     Goal Statement: Within the next 2-3 months, I would like to work with Psychiatry, Addiction Medicine, and CD treatment to support my mental health symptoms and overall wellbeing.     Measure of Success: Brandin will verbally inform CC LUIS of success    Supportive Steps to Achieve: Care Coordination  will assist provide resources, treatment options and programs, and assisting to find appropriate community providers.    Barriers: Difficulty coping with financial stressors and employment concerns    Strengths: Motivated to address mental health concerns    Date to Achieve By: 5/12/2020    Patient expressed understanding of goal: Brandin verbally stated understanding of goal            Outreach Frequency: monthly    Plan: CC SW will continue to follow up with pt regarding goal progression. Pt was reminded of CC SW contact information and encouraged to call with questions or concerns that arise before next outreach.    Care Coordinator will follow up in 1 month    DENNY Gtz, LGLUIS  Clinic Care Coordinator  [facility]  [facility]  [phone]  [email or web address]

## 2020-02-18 NOTE — LETTER
[place] CARE COORDINATION    February 18, 2020        Brandin Rodriguez  [address]  [place] [place] [address]      [facility]  Complex Care Plan  About Me:    Patient Name:  Brandin Rodriguez    YOB: 1985  Age:         34 year old   Kee MRN:    [id number] Telephone Information:  Home Phone [phone]   Mobile [phone]       Address:  [address]  [facility] [address] Email address:  No e-mail address on record      Emergency Contact(s)    Name Relationship Luis Aburto Work Phone Home Phone Mobile Phone   HARDEEP PEREZ Mother   [phone]    2. DECLINE PER PT Declined            My Access Plan  Medical Emergency 911   Primary Clinic Line [facility] [phone]   24 Hour Appointment Line [phone] or  [phone] ([phone]) (toll-free)   24 Hour Nurse Line [phone] (toll-free)   Preferred Urgent Care     Preferred Hospital     [id number] [place], [place] - [address] AT [address] [place] [address]     Behavioral Health Crisis Line The National Suicide Prevention Lifeline at 1-332.388.4535 or 911         My Care Team Members  Patient Care Team       Relationship Specialty Notifications Start End    Earlene Whelan MD PCP - General Internal Medicine  11/11/19     Phone: [phone] Fax: [phone]         [address] [place] [address]    Earlene Whelan MD Assigned PCP   10/17/19     Phone: [phone] Fax: [phone] [address] [place] [address]    Briseyda Cross LGSW Lead Care Coordinator Primary Care - CC  11/12/19             My Care Plans  Self Management and Treatment Plan  Goals and (Comments)  Goals        General    1. Mental Health Management (pt-stated)     Notes - Note edited  2/18/2020  1:08 PM by Briseyda Cross LGSW    Goal Statement: Within the next 2-3 months, I would like to work with Psychiatry, Addiction Medicine,  and CD treatment to support my mental health symptoms and overall wellbeing.     Measure of Success: Brandin will verbally inform CC SW of success    Supportive Steps to Achieve: Care Coordination  will assist provide resources, treatment options and programs, and assisting to find appropriate community providers.    Barriers: Difficulty coping with financial stressors and employment concerns    Strengths: Motivated to address mental health concerns    Date to Achieve By: 5/12/2020    Patient expressed understanding of goal: Brandin verbally stated understanding of goal                      My Medical and Care Information  Problem List   Patient Active Problem List   Diagnosis     Hematemesis     NEREYDA (generalized anxiety disorder)     Severe episode of recurrent major depressive disorder, without psychotic features (H)     Hepatitis     Liver disease, chronic, due to alcohol (H)     Suicidal ideation     Alcoholic intoxication without complication (H)     Uncomplicated alcohol dependence (H)     Complicated grief     Dysphagia     Elevated LFTs     Testicular pain     Tobacco use disorder     PTSD (post-traumatic stress disorder)     Current severe episode of major depressive disorder without psychotic features, unspecified whether recurrent (H)          Care Coordination Start Date: 11/12/2019   Frequency of Care Coordination: monthly   Form Last Updated: 02/18/2020

## 2020-02-20 ENCOUNTER — OFFICE VISIT (OUTPATIENT)
Dept: PSYCHOLOGY | Facility: CLINIC | Age: 35
End: 2020-02-20
Payer: COMMERCIAL

## 2020-02-20 DIAGNOSIS — F10.10 ALCOHOL ABUSE, EPISODIC DRINKING BEHAVIOR: Primary | ICD-10-CM

## 2020-02-20 DIAGNOSIS — F41.1 GAD (GENERALIZED ANXIETY DISORDER): ICD-10-CM

## 2020-02-20 DIAGNOSIS — F33.2 SEVERE EPISODE OF RECURRENT MAJOR DEPRESSIVE DISORDER, WITHOUT PSYCHOTIC FEATURES (H): ICD-10-CM

## 2020-02-20 PROCEDURE — 90834 PSYTX W PT 45 MINUTES: CPT | Performed by: SOCIAL WORKER

## 2020-02-20 NOTE — PROGRESS NOTES
Progress Note    Patient Name: Brandin Rodriguez  Date: 2/20/2020         Service Type: Individual  Video Visit: No     Session Start Time: 4:13 PM   Session End Time: 4:55 PM     Session Length: 42 mins    Session #: 8    Attendees: Client attended alone     Treatment Plan Last Reviewed: 2/5/2020  PHQ-9 / NEREYDA-7 : n/a    DATA  Interactive Complexity: No  Crisis: No       Progress Since Last Session (Related to Symptoms / Goals / Homework):   Symptoms: Improving -- client continues to feel motivated to work on his apartment and stay sober from alcohol      Homework: Did not complete-Client will reschedule CD assessment by next session.    Client will also attend 2-3 more support groups by next session. .        Episode of Care Goals: Minimal progress - PREPARATION (Decided to change - considering how); Intervened by negotiating a change plan and determining options / strategies for behavior change, identifying triggers, exploring social supports, and working towards setting a date to begin behavior change     Current / Ongoing Stressors and Concerns:   Ongoing: Managing MDD, NEREYDA, RENATA and PTSD symptoms, financial stressor and relationship stressor     Current: Continue managing alcohol use and thoughts around using, contemplating moving back to [place].     Treatment Objective(s) Addressed in This Session:   Decrease urges/cravings to consume alcohol  Decrease frequency and intensity of feeling down, depressed, hopeless  Identify negative self-talk and behaviors: challenge core beliefs, myths, and actions     Intervention:  CBT: Client noted that after last session, he had thoughts about going to buy some alcohol, however decided to buy food instead. Client says this felt good to invest money into food that he enjoys and would benefit him. He has started thinking about how he would like to move forward with his life, thinking about taking some trade classes at a [email or web address]  college, working on his garden at his parent's house and find enjoyment in daily activities.   Motivational Interviewing: Emphasizing personal choice and control, identifying barriers to continuing goals when if moving back to [place], assessing pros and cons of move and identifying resources to help make goals successful.   Motivational Interviewing    MI Intervention: Expressed Empathy/Understanding, Supported Autonomy, Collaboration, Evocation, Permission to raise concern or advise, Open-ended questions, Reflections: simple and complex, Change talk (evoked) and Reframe     Change Talk Expressed by the Patient: Desire to change Ability to change Reasons to change Need to change Committment to change Activation Taking steps    Provider Response to Change Talk: E - Evoked more info from patient about behavior change, A - Affirmed patient's thoughts, decisions, or attempts at behavior change, R - Reflected patient's change talk and S - Summarized patient's change talk statements          ASSESSMENT: Current Emotional / Mental Status (status of significant symptoms):   Risk status (Self / Other harm or suicidal ideation)   Patient denies current fears or concerns for personal safety.   Patient denies current or recent suicidal ideation or behaviors.   Patient denies current or recent homicidal ideation or behaviors.   Patient denies current or recent self injurious behavior or ideation.   Patient denies other safety concerns.   Patient reports there has been no change in risk factors since their last session.     Patient reports there has been a chance in protective factors since their last session.  Parents are offering for client to move back home   A safety and risk management plan has been developed including: Patient consented to co-developed safety plan.  Safety and risk management plan was completed.  Patient agreed to use safety plan should any safety concerns arise.  A copy was given to the patient.  "    [place] Suicide Severity Rating Scale (Lifetime/Recent)  [place] Suicide Severity Rating (Lifetime/Recent) 12/30/2019   1. Wish to be Dead (Lifetime) Yes   Wish to be Dead Description (Lifetime) After the death of 3yo daughter, \"hoping to be done,\" \"It would just be easier.\" Stated that \"everything seemed dark in the world, so maybe creating my own end would be better.\"   1. Wish to be Dead (Recent) Yes   Wish to be Dead Description (Recent) \"Wishing that it would just be done,\" Sometimes placing self in \"risky\" situation.    2. Non-Specific Active Suicidal Thoughts (Lifetime) Yes   Non-Specific Active Suicidal Thought Description (Lifetime) Cynaide pills, hanging self--aborted attempt, use of alcohol-\"drinking myself to death.\"   2. Non-Specific Active Suicidal Thoughts (Recent) No   3. Active Suicidal Ideation with any Methods (Not Plan) Without Intent to Act (Lifetime) Yes   Active Suicidal Ideation with any Methods (Not Plan) Description (Lifetime) pills, hanging, alcohol   3. Active Sucidal Ideation with any Methods (Not Plan) Without Intent to Act (Recent) No   4. Active Suicidal Ideation with Some Intent to Act, Without Specific Plan (Lifetime) Yes   Active Suicidal Ideation with Some Intent to Act, Without Specific Plan Description (Lifetime) Memories of the loss of daughter, financial stressor and accumulation of stress in living situation.    4. Active Suicidal Ideation with Some Intent to Act, Without Specific Plan (Recent) No   5. Active Suicidal Ideation with Specific Plan and Intent (Lifetime) Yes   Active Suicidal Ideation with Specific Plan and Intent Description (Lifetime) Yes, using extension cord to hang self, had images when 24 you of seeing self hanging outside of parent's shower.    5. Active Suicidal Ideation with Specific Plan and Intent (Recent) No   Most Severe Ideation Rating (Lifetime) 5   Most Severe Ideation Description (Lifetime) 2 years ago with attempt--accumulation of stressor " over time   Frequency (Lifetime) 5   Duration (Lifetime) 4   Controllability (Lifetime) 5   Protective Factors  (Lifetime) 1   Reasons for Ideation (Lifetime) 5   Most Severe Ideation Rating (Past Month) 2   Most Severe Ideation Description (Past Month) able to see ability to get help   Frequency (Past Month) 5   Duration (Past Month) 4   Controllability (Past Month) 2   Protective Factors (Past Month) 1   Reasons for Ideation (Past Month) 5   Actual Attempt (Lifetime) Yes   Actual Attempt Description (Lifetime) 2 yrs ago, attempted to hang self   Total Number of Actual Attempts (Lifetime) 1   Actual Attempt (Past 3 Months) No   Has subject engaged in non-suicidal self-injurious behavior? (Lifetime) Yes   Has subject engaged in non-suicidal self-injurious behavior? (Past 3 Months) Yes   Interrupted Attempts (Lifetime) No   Interrupted Attempts (Past 3 Months) No   Aborted or Self-Interrupted Attempt (Lifetime) Yes   Aborted or Self Interrupted Attempt Description (Lifetime) Client attempted to hang however thought about parents and decided not to go through with it.   Total Number Aborted or Self Interrupted Attempts (Lifetime) 1   Aborted or Self-Interrupted Attempt (Past 3 Months) No   Preparatory Acts or Behavior (Lifetime) No   Preparatory Acts or Behavior (Past 3 Months) No   Most Recent Attempt Actual Lethality Code NA   Most Lethal Attempt Actual Lethality Code NA   Initial/First Attempt Actual Lethality Code 0   Initial/First Attempt Potential Lethality Code 0          Appearance:   Appropriate    Eye Contact:   Good    Psychomotor Behavior: Normal    Attitude:   Cooperative    Orientation:   All   Speech    Rate / Production: Normal     Volume:  Normal    Mood:    Stable    Affect:    Appropriate    Thought Content:  Clear    Thought Form:  Coherent  Logical    Insight:    Fair      Medication Review:   No changes to current psychiatric medication(s)     Medication Compliance:   Yes     Changes in Health  "Issues:   None reported     Chemical Use Review:   Substance Use: decrease in alcohol .  Patient reports frequency of use to 0 since last week.  Stage of Change: Preparatory  Provided encouragement towards sobriety  Provided support and affirmation for steps taken towards sobriety         Tobacco Use: No current tobacco use.      Diagnosis:  1. Alcohol abuse, episodic drinking behavior    2. Severe episode of recurrent major depressive disorder, without psychotic features (H)    3. NEREYDA (generalized anxiety disorder)        Collateral Reports Completed:   Not Applicable    PLAN: (Patient Tasks / Therapist Tasks / Other)  Client: Start looking into [email or web address] in the [facility] and [place].    small project around his apartment.     DENNY Reis [facility]   February 20, 2020  Note reviewed and clinical supervision by DENNY Doty [facility] 2/24/2020                                                       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,\"    Thinking Processes: ruminations (can't stop thinking about my problems): thinking back around current stressor with roommate or finances, thoughts about loss of daughter, thoughts about how life used to be and racing thoughts    Mood: worsening depression, hopelessness, helplessness, intense anger and intense worry    Behaviors: isolating/withdrawing , using alcohol, impulsive, reckless behaviors (acting without thinking): driving under the influence, getting into fights with strangers, unsafe sex practices, not taking care of myself, not taking care of my " "responsibilities and not sleeping enough, not eating/sleeping    Situations: relationship problems, trauma  and financial stress   Step 2: Coping strategies - Things I can do to take my mind off of my problems without contacting another person (relaxation technique, physical activity):    Distress Tolerance Strategies:  change body temperature (ice pack/cold water) , paced breathing/progressive muscle relaxation, intense exercise: running, jumping jacks for 2-3 minutes  and listening to podcast, playing video games    Physical Activities: go for a walk, gardening, meditation, deep breathing, stretching  and weight lifting    Focus on helpful thoughts:  \"This is temporary\", \"I will get through this\", \"It always passes\", \"Ride the wave\" and remind myself of what is important to me: \"I can't do this to my parents,\" \"Fuck them, I'm going to better than that shit.\"  Step 3: People and social settings that provide distraction:   Name: Briana Phone: [phone]   Name: Dick Phone: [phone]    gym  and [place]   Step 4: Remind myself of people and things that are important to me and worth living for:                              My family, my garden    Step 5: When I am in crisis, I can ask these people to help me use my safety plan:   Name: Briana Phone: [phone]   Name: Dick Phone: [phone]  Step 6: Making the environment safe:     remove alcohol, remove drugs, dispose of old medications  and be around others, get rid of gun lighter  Step 7: Professionals or agencies I can contact during a crisis:    Suicide Prevention Lifeline: 8-060-065-LMLT (8142)    Crisis Text Line Service (available 24 hours a day, 7 days a week): Text [place] to 878720  Local Crisis Services: [facility]: [phone]    Call 911 or go to my nearest emergency department.   I helped develop this safety plan and agree to use it when needed.  I have been given a copy of this plan.      Client signature " _________________________________________________________________  Today s date:  1/23/2020  Adapted from Safety Plan Template 2008 Janina Pierson and Arturo Roman is reprinted with the express permission of the authors.  No portion of the Safety Plan Template may be reproduced without the express, written permission.  You can contact the authors at bhs@[email or web address] or ro@mail.[facility].                                                   Treatment Plan    Client's Name: Brandin Rodriguez  YOB: 1985    Date: 2/5/2020    DSM5 Diagnoses: (Sustained by DSM5 Criteria Listed Above)  Diagnoses:      296.33 (F33.2) Major Depressive Disorder, Recurrent Episode, Severe _  300.02 (F41.1) Generalized Anxiety Disorder  309.81 (F43.10) Posttraumatic Stress Disorder (includes Posttraumatic Stress Disorder for Children 6 Years and Younger)  Without dissociative symptoms  Substance-Related & Addictive Disorders Alcohol Use Disorder   303.90 (F10.20) Severe .  Psychosocial & Contextual Factors: Loss of child, history of SI, alcohol dependency, financial stressor, relationship conflict  WHODAS:   WHODAS 2.0 Total Score 12/3/2019   Total Score 41         Referral / Collaboration:  Was/were discussed and client will pursue.  Referral to MI/CD day treatment made.   Client is to follow thru with CD Assessment.    Anticipated number of session or this episode of care: 12      MeasurableTreatment Goal(s) related to diagnosis / functional impairment(s)  Goal 1: Client will report a decrease in depressive symptoms and reduce PHQ 9 score from 26 below 10.    Objective #A (Client Action)    Client will Decrease frequency and intensity of feeling down, depressed, hopeless  Identify negative self-talk and behaviors: challenge core beliefs, myths, and actions.  Status: New - Date: 2/5/2020     Intervention(s)  Therapist will use components of CBT and DBT to identify unhealthy patterns of thoughts, emotions and behaviors,  breaking negative core beliefs, and cognitive restructuring .    Objective #B  Client will identify two areas of life that you would like to have improved functioning.  Status: New - Date: 2/5/2020     Intervention(s)  Therapist will assign homework : Client will write up a plan on finding another job that is more fulfilling to his skills.  Client will also identify 1-2 barriers that get in the way of completing household tasks. Client will learn 1-2 strategies to help in tidying place.     Goal 2: Client will report a decrease in anxiety symptoms and reduce NEREYDA 7 score from 21 below 10.    I will know I've met my goal when I know I'm not afraid of everything.      Objective #A (Client Action)    Status: New - Date: 2/5/2020     Client will identify 2-3 fears / thoughts that contribute to feeling anxious.    Intervention(s)  Therapist will teach mindfulness and relaxation skills to help in identifying and noticing fear/thoughts  Therapist will use components of DBT and CBT to aid in cognitive restructuring.    Goal 3: Client will be completely sober from alcohol in 6 months    Objective #A (Client Action)    Status: New - Date: 2/5/2020     Client will identify at least 3 example(s) of how drinking has resulted in an experience that interferes with person values or goals.   Client will write a detailed alcohol use history describing treatment attempts and the specific situations surrounding relapse.    Client will identify 2-3 triggers that may lead to relapse   Client will work to Develop a written relapse prevention plan  Intervention(s)  Therapist will discuss alcohol and high-risk behaviors, use CBT and DBT to identify triggers to usage and relapse prevention plan. .    Goal 4: Client will increase understanding of trauma and its impact on client's functioning.    Objective #A (Client Action)    Client will experience a decrease in distress in previously-avoided situation.   Status: New - Date: 2/5/2020      Intervention(s)  Therapist will teach mindfulness, grounding and relaxation skills. Therapist will use CBT and DBT to help client identify and challenge negative beliefs and distortions.    Patient has reviewed and agreed to the above plan.      DENNY Reis [facility]     February 5, 2020  Treatment plan reviewed and clinical supervision by DENNY Doty [facility] 2/19/2020

## 2020-03-04 ENCOUNTER — OFFICE VISIT (OUTPATIENT)
Dept: PSYCHOLOGY | Facility: CLINIC | Age: 35
End: 2020-03-04
Payer: COMMERCIAL

## 2020-03-04 DIAGNOSIS — F41.1 GAD (GENERALIZED ANXIETY DISORDER): ICD-10-CM

## 2020-03-04 DIAGNOSIS — F33.2 SEVERE EPISODE OF RECURRENT MAJOR DEPRESSIVE DISORDER, WITHOUT PSYCHOTIC FEATURES (H): ICD-10-CM

## 2020-03-04 DIAGNOSIS — F43.10 POSTTRAUMATIC STRESS DISORDER: Primary | ICD-10-CM

## 2020-03-04 PROCEDURE — 90834 PSYTX W PT 45 MINUTES: CPT | Performed by: SOCIAL WORKER

## 2020-03-04 ASSESSMENT — PATIENT HEALTH QUESTIONNAIRE - PHQ9
5. POOR APPETITE OR OVEREATING: SEVERAL DAYS
SUM OF ALL RESPONSES TO PHQ QUESTIONS 1-9: 11

## 2020-03-04 ASSESSMENT — ANXIETY QUESTIONNAIRES
GAD7 TOTAL SCORE: 8
3. WORRYING TOO MUCH ABOUT DIFFERENT THINGS: SEVERAL DAYS
6. BECOMING EASILY ANNOYED OR IRRITABLE: SEVERAL DAYS
5. BEING SO RESTLESS THAT IT IS HARD TO SIT STILL: SEVERAL DAYS
2. NOT BEING ABLE TO STOP OR CONTROL WORRYING: SEVERAL DAYS
7. FEELING AFRAID AS IF SOMETHING AWFUL MIGHT HAPPEN: SEVERAL DAYS
1. FEELING NERVOUS, ANXIOUS, OR ON EDGE: MORE THAN HALF THE DAYS

## 2020-03-04 NOTE — PROGRESS NOTES
"                                           Progress Note    Patient Name: Brandin Rodriguez  Date: 3/4/2020         Service Type: Individual  Video Visit: No     Session Start Time: 10:31 AM   Session End Time: 11:22 AM     Session Length: 51 mins    Session #: 9    Attendees: Client attended alone     Treatment Plan Last Reviewed: 2/5/2020  PHQ-9 / NEREYDA-7 : 11/8    DATA  Interactive Complexity: No  Crisis: No       Progress Since Last Session (Related to Symptoms / Goals / Homework):   Symptoms: Improving -- some improvement on mood and managing his alcohol use, some increase in anxiety      Homework: Did not complete-Start looking into [employer] in the [facility] and [place].    small project around his apartment. .        Episode of Care Goals: Minimal progress - PREPARATION (Decided to change - considering how); Intervened by negotiating a change plan and determining options / strategies for behavior change, identifying triggers, exploring social supports, and working towards setting a date to begin behavior change     Current / Ongoing Stressors and Concerns:   Ongoing: Managing MDD, NEREYDA, RENATA and PTSD symptoms, financial stressor and relationship stressor     Current: concerns of being a \"sociopath,\" after reading and listening on a podcast on it.      Treatment Objective(s) Addressed in This Session:   Decrease frequency and intensity of feeling down, depressed, hopeless  Identify negative self-talk and behaviors: challenge core beliefs, myths, and actions   Decrease excessive worrying and ruminations  Psychoeducation around PTSD, trauma symptoms vs Antisocial Personality      Intervention:  CBT: Client presented concerns of being a \"sociopath,\" due to behaviors of manipulation, disruptive relationships, aggressive behavior and persistent lying. We talked about scenarios where these symptoms were present. Writer encouraged client to think about reasons for engaging in those behaviors at the " "time and if these behaviors are present most of the time. Client clarified that he is able to experiences and be empathetic yet also knows how to \"fake it.\" Writer provided explanation of scenarios being a response we can see in PTSD as far of distorted perception of situations, dissociations, negative belief about self and others which can contribute to risky behaviors, hypervigilance which may contribute to aggressive behavior in order to protect ourselves.   Motivational Interviewing: Emphasizing personal choice and control, affirming client's efforts in self-reflecting on past behaviors and wanting to make changes as he can see it can be self-destructive and harmful in his relationship.   Motivational Interviewing    MI Intervention: Expressed Empathy/Understanding, Supported Autonomy, Collaboration, Evocation, Permission to raise concern or advise, Open-ended questions, Reflections: simple and complex, Change talk (evoked) and Reframe     Change Talk Expressed by the Patient: Desire to change Ability to change Reasons to change Need to change Committment to change Activation Taking steps    Provider Response to Change Talk: E - Evoked more info from patient about behavior change, A - Affirmed patient's thoughts, decisions, or attempts at behavior change, R - Reflected patient's change talk and S - Summarized patient's change talk statements          ASSESSMENT: Current Emotional / Mental Status (status of significant symptoms):   Risk status (Self / Other harm or suicidal ideation)   Patient denies current fears or concerns for personal safety.   Patient denies current or recent suicidal ideation or behaviors.   Patient denies current or recent homicidal ideation or behaviors.   Patient denies current or recent self injurious behavior or ideation.   Patient denies other safety concerns.   Patient reports there has been no change in risk factors since their last session.     Patient reports there has been no " "change in protective factors since their last session.     A safety and risk management plan has been developed including: Patient consented to co-developed safety plan.  Safety and risk management plan was completed.  Patient agreed to use safety plan should any safety concerns arise.  A copy was given to the patient.     Dodge Suicide Severity Rating Scale (Lifetime/Recent)  Dodge Suicide Severity Rating (Lifetime/Recent) 12/30/2019   1. Wish to be Dead (Lifetime) Yes   Wish to be Dead Description (Lifetime) After the death of 3yo daughter, \"hoping to be done,\" \"It would just be easier.\" Stated that \"everything seemed dark in the world, so maybe creating my own end would be better.\"   1. Wish to be Dead (Recent) Yes   Wish to be Dead Description (Recent) \"Wishing that it would just be done,\" Sometimes placing self in \"risky\" situation.    2. Non-Specific Active Suicidal Thoughts (Lifetime) Yes   Non-Specific Active Suicidal Thought Description (Lifetime) Cynaide pills, hanging self--aborted attempt, use of alcohol-\"drinking myself to death.\"   2. Non-Specific Active Suicidal Thoughts (Recent) No   3. Active Suicidal Ideation with any Methods (Not Plan) Without Intent to Act (Lifetime) Yes   Active Suicidal Ideation with any Methods (Not Plan) Description (Lifetime) pills, hanging, alcohol   3. Active Sucidal Ideation with any Methods (Not Plan) Without Intent to Act (Recent) No   4. Active Suicidal Ideation with Some Intent to Act, Without Specific Plan (Lifetime) Yes   Active Suicidal Ideation with Some Intent to Act, Without Specific Plan Description (Lifetime) Memories of the loss of daughter, financial stressor and accumulation of stress in living situation.    4. Active Suicidal Ideation with Some Intent to Act, Without Specific Plan (Recent) No   5. Active Suicidal Ideation with Specific Plan and Intent (Lifetime) Yes   Active Suicidal Ideation with Specific Plan and Intent Description (Lifetime) Yes, " using extension cord to hang self, had images when 24 you of seeing self hanging outside of parent's shower.    5. Active Suicidal Ideation with Specific Plan and Intent (Recent) No   Most Severe Ideation Rating (Lifetime) 5   Most Severe Ideation Description (Lifetime) 2 years ago with attempt--accumulation of stressor over time   Frequency (Lifetime) 5   Duration (Lifetime) 4   Controllability (Lifetime) 5   Protective Factors  (Lifetime) 1   Reasons for Ideation (Lifetime) 5   Most Severe Ideation Rating (Past Month) 2   Most Severe Ideation Description (Past Month) able to see ability to get help   Frequency (Past Month) 5   Duration (Past Month) 4   Controllability (Past Month) 2   Protective Factors (Past Month) 1   Reasons for Ideation (Past Month) 5   Actual Attempt (Lifetime) Yes   Actual Attempt Description (Lifetime) 2 yrs ago, attempted to hang self   Total Number of Actual Attempts (Lifetime) 1   Actual Attempt (Past 3 Months) No   Has subject engaged in non-suicidal self-injurious behavior? (Lifetime) Yes   Has subject engaged in non-suicidal self-injurious behavior? (Past 3 Months) Yes   Interrupted Attempts (Lifetime) No   Interrupted Attempts (Past 3 Months) No   Aborted or Self-Interrupted Attempt (Lifetime) Yes   Aborted or Self Interrupted Attempt Description (Lifetime) Client attempted to hang however thought about parents and decided not to go through with it.   Total Number Aborted or Self Interrupted Attempts (Lifetime) 1   Aborted or Self-Interrupted Attempt (Past 3 Months) No   Preparatory Acts or Behavior (Lifetime) No   Preparatory Acts or Behavior (Past 3 Months) No   Most Recent Attempt Actual Lethality Code NA   Most Lethal Attempt Actual Lethality Code NA   Initial/First Attempt Actual Lethality Code 0   Initial/First Attempt Potential Lethality Code 0          Appearance:   Appropriate    Eye Contact:   Good    Psychomotor Behavior: Agitated    Attitude:   Cooperative  "   Orientation:   All   Speech    Rate / Production: Hyperverbal     Volume:  Normal    Mood:    Anxious    Affect:    Worrisome    Thought Content:  Perservative    Thought Form:  Coherent  Circumstantial   Insight:    Fair      Medication Review:   No changes to current psychiatric medication(s)     Medication Compliance:   Yes     Changes in Health Issues:   None reported     Chemical Use Review:   Substance Use: decrease in alcohol .  Patient reports frequency of use to 0 since last week.  Stage of Change: Preparatory  Provided encouragement towards sobriety  Provided support and affirmation for steps taken towards sobriety         Tobacco Use: No current tobacco use.      Diagnosis:  1. Posttraumatic stress disorder    2. Severe episode of recurrent major depressive disorder, without psychotic features (H)    3. NEREYDA (generalized anxiety disorder)        Collateral Reports Completed:   Not Applicable    PLAN: (Patient Tasks / Therapist Tasks / Other)  Client: Identify 2-3 behaviors that stand out from \"sociopath,\" that client would like to change.     DENNY Reis [facility]   March 4, 2020  Note reviewed and clinical supervision by DENNY Doty [facility] 3/9/2020                                                   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,\"    Thinking Processes: ruminations (can't stop thinking about my problems): thinking back around current stressor with roommate or finances, thoughts about loss of daughter, thoughts about how life used to be and racing thoughts    Mood: worsening depression, " "hopelessness, helplessness, intense anger and intense worry    Behaviors: isolating/withdrawing , using alcohol, impulsive, reckless behaviors (acting without thinking): driving under the influence, getting into fights with strangers, unsafe sex practices, not taking care of myself, not taking care of my responsibilities and not sleeping enough, not eating/sleeping    Situations: relationship problems, trauma  and financial stress   Step 2: Coping strategies - Things I can do to take my mind off of my problems without contacting another person (relaxation technique, physical activity):    Distress Tolerance Strategies:  change body temperature (ice pack/cold water) , paced breathing/progressive muscle relaxation, intense exercise: running, jumping jacks for 2-3 minutes  and listening to podcast, playing video games    Physical Activities: go for a walk, gardening, meditation, deep breathing, stretching  and weight lifting    Focus on helpful thoughts:  \"This is temporary\", \"I will get through this\", \"It always passes\", \"Ride the wave\" and remind myself of what is important to me: \"I can't do this to my parents,\" \"Fuck them, I'm going to better than that shit.\"  Step 3: People and social settings that provide distraction:   Name: Briana Phone: [phone]   Name: Dick Phone: [phone]    gym  and [place]   Step 4: Remind myself of people and things that are important to me and worth living for:                              My family, my garden    Step 5: When I am in crisis, I can ask these people to help me use my safety plan:   Name: Briana Phone: [phone]   Name: Dick Phone: [phone]  Step 6: Making the environment safe:     remove alcohol, remove drugs, dispose of old medications  and be around others, get rid of gun lighter  Step 7: Professionals or agencies I can contact during a crisis:    Suicide Prevention Lifeline: 5-715-259-FHMD (6885)    Crisis Text Line Service (available 24 hours a day, 7 days a " week): Text [place] to 941634  Local Crisis Services: [facility]: [phone]    Call 911 or go to my nearest emergency department.   I helped develop this safety plan and agree to use it when needed.  I have been given a copy of this plan.      Client signature _________________________________________________________________  Today s date:  1/23/2020  Adapted from Safety Plan Template 2008 Janina Pierson and Arturo Roman is reprinted with the express permission of the authors.  No portion of the Safety Plan Template may be reproduced without the express, written permission.  You can contact the authors at bhs@[facility] or ro@[email or web address].                                                   Treatment Plan    Client's Name: Brandin Rodriguez  YOB: 1985    Date: 2/5/2020    DSM5 Diagnoses: (Sustained by DSM5 Criteria Listed Above)  Diagnoses:      296.33 (F33.2) Major Depressive Disorder, Recurrent Episode, Severe _  300.02 (F41.1) Generalized Anxiety Disorder  309.81 (F43.10) Posttraumatic Stress Disorder (includes Posttraumatic Stress Disorder for Children 6 Years and Younger)  Without dissociative symptoms  Substance-Related & Addictive Disorders Alcohol Use Disorder   303.90 (F10.20) Severe .  Psychosocial & Contextual Factors: Loss of child, history of SI, alcohol dependency, financial stressor, relationship conflict  WHODAS:   WHODAS 2.0 Total Score 12/3/2019   Total Score 41         Referral / Collaboration:  Was/were discussed and client will pursue.  Referral to MI/CD day treatment made.   Client is to follow thru with CD Assessment.    Anticipated number of session or this episode of care: 12      MeasurableTreatment Goal(s) related to diagnosis / functional impairment(s)  Goal 1: Client will report a decrease in depressive symptoms and reduce PHQ 9 score from 26 below 10.    Objective #A (Client Action)    Client will Decrease frequency and intensity of feeling down,  depressed, hopeless  Identify negative self-talk and behaviors: challenge core beliefs, myths, and actions.  Status: New - Date: 2/5/2020     Intervention(s)  Therapist will use components of CBT and DBT to identify unhealthy patterns of thoughts, emotions and behaviors, breaking negative core beliefs, and cognitive restructuring .    Objective #B  Client will identify two areas of life that you would like to have improved functioning.  Status: New - Date: 2/5/2020     Intervention(s)  Therapist will assign homework : Client will write up a plan on finding another job that is more fulfilling to his skills.  Client will also identify 1-2 barriers that get in the way of completing household tasks. Client will learn 1-2 strategies to help in tidying place.     Goal 2: Client will report a decrease in anxiety symptoms and reduce NEREYDA 7 score from 21 below 10.    I will know I've met my goal when I know I'm not afraid of everything.      Objective #A (Client Action)    Status: New - Date: 2/5/2020     Client will identify 2-3 fears / thoughts that contribute to feeling anxious.    Intervention(s)  Therapist will teach mindfulness and relaxation skills to help in identifying and noticing fear/thoughts  Therapist will use components of DBT and CBT to aid in cognitive restructuring.    Goal 3: Client will be completely sober from alcohol in 6 months    Objective #A (Client Action)    Status: New - Date: 2/5/2020     Client will identify at least 3 example(s) of how drinking has resulted in an experience that interferes with person values or goals.   Client will write a detailed alcohol use history describing treatment attempts and the specific situations surrounding relapse.    Client will identify 2-3 triggers that may lead to relapse   Client will work to Develop a written relapse prevention plan  Intervention(s)  Therapist will discuss alcohol and high-risk behaviors, use CBT and DBT to identify triggers to usage and  relapse prevention plan. .    Goal 4: Client will increase understanding of trauma and its impact on client's functioning.    Objective #A (Client Action)    Client will experience a decrease in distress in previously-avoided situation.   Status: New - Date: 2/5/2020     Intervention(s)  Therapist will teach mindfulness, grounding and relaxation skills. Therapist will use CBT and DBT to help client identify and challenge negative beliefs and distortions.    Patient has reviewed and agreed to the above plan.      DENNY Reis [facility]     February 5, 2020  Treatment plan reviewed and clinical supervision by DENNY Doty [facility] 2/19/2020

## 2020-03-05 ASSESSMENT — ANXIETY QUESTIONNAIRES: GAD7 TOTAL SCORE: 8

## 2020-03-11 ENCOUNTER — APPOINTMENT (OUTPATIENT)
Dept: ULTRASOUND IMAGING | Facility: CLINIC | Age: 35
End: 2020-03-11
Attending: EMERGENCY MEDICINE
Payer: COMMERCIAL

## 2020-03-11 ENCOUNTER — HOSPITAL ENCOUNTER (INPATIENT)
Facility: CLINIC | Age: 35
LOS: 3 days | Discharge: HOME | End: 2020-03-14
Attending: EMERGENCY MEDICINE | Admitting: INTERNAL MEDICINE
Payer: COMMERCIAL

## 2020-03-11 DIAGNOSIS — F17.200 TOBACCO USE DISORDER: ICD-10-CM

## 2020-03-11 DIAGNOSIS — R79.89 ABNORMAL LFTS: ICD-10-CM

## 2020-03-11 DIAGNOSIS — F10.10 ALCOHOL ABUSE: Primary | ICD-10-CM

## 2020-03-11 DIAGNOSIS — K21.00 GASTROESOPHAGEAL REFLUX DISEASE WITH ESOPHAGITIS: ICD-10-CM

## 2020-03-11 DIAGNOSIS — E86.0 DEHYDRATION: ICD-10-CM

## 2020-03-11 DIAGNOSIS — F10.930 ALCOHOL WITHDRAWAL SYNDROME WITHOUT COMPLICATION (H): ICD-10-CM

## 2020-03-11 DIAGNOSIS — F41.1 GAD (GENERALIZED ANXIETY DISORDER): ICD-10-CM

## 2020-03-11 DIAGNOSIS — N17.9 AKI (ACUTE KIDNEY INJURY) (H): ICD-10-CM

## 2020-03-11 DIAGNOSIS — E87.6 HYPOKALEMIA: ICD-10-CM

## 2020-03-11 PROBLEM — F32.2 CURRENT SEVERE EPISODE OF MAJOR DEPRESSIVE DISORDER WITHOUT PSYCHOTIC FEATURES, UNSPECIFIED WHETHER RECURRENT (H): Status: ACTIVE | Noted: 2019-12-03

## 2020-03-11 PROBLEM — F10.20 UNCOMPLICATED ALCOHOL DEPENDENCE (H): Status: ACTIVE | Noted: 2019-12-03

## 2020-03-11 PROBLEM — K75.9 HEPATITIS: Status: ACTIVE | Noted: 2019-11-11

## 2020-03-11 PROBLEM — R13.10 DYSPHAGIA: Status: ACTIVE | Noted: 2017-09-19

## 2020-03-11 PROBLEM — K70.9 LIVER DISEASE, CHRONIC, DUE TO ALCOHOL (H): Status: ACTIVE | Noted: 2019-11-11

## 2020-03-11 PROBLEM — F33.2 SEVERE EPISODE OF RECURRENT MAJOR DEPRESSIVE DISORDER, WITHOUT PSYCHOTIC FEATURES (H): Status: ACTIVE | Noted: 2019-11-11

## 2020-03-11 PROBLEM — K92.0 HEMATEMESIS: Status: ACTIVE | Noted: 2019-11-04

## 2020-03-11 PROBLEM — R00.0 SINUS TACHYCARDIA: Status: ACTIVE | Noted: 2020-03-11

## 2020-03-11 PROBLEM — F10.939 ALCOHOL WITHDRAWAL (H): Status: ACTIVE | Noted: 2020-03-11

## 2020-03-11 LAB
ABO + RH BLD: NORMAL
ABO + RH BLD: NORMAL
ALBUMIN SERPL-MCNC: 3.9 G/DL (ref 3.4–5)
ALBUMIN SERPL-MCNC: 4.8 G/DL (ref 3.4–5)
ALP SERPL-CCNC: 108 U/L (ref 40–150)
ALT SERPL W P-5'-P-CCNC: 88 U/L (ref 0–70)
ANION GAP SERPL CALCULATED.3IONS-SCNC: 16 MMOL/L (ref 3–14)
ANION GAP SERPL CALCULATED.3IONS-SCNC: 27 MMOL/L (ref 3–14)
AST SERPL W P-5'-P-CCNC: 132 U/L (ref 0–45)
BASOPHILS # BLD AUTO: 0 10E9/L (ref 0–0.2)
BASOPHILS NFR BLD AUTO: 0.1 %
BILIRUB SERPL-MCNC: 6.9 MG/DL (ref 0.2–1.3)
BLD GP AB SCN SERPL QL: NORMAL
BLOOD BANK CMNT PATIENT-IMP: NORMAL
BUN SERPL-MCNC: 20 MG/DL (ref 7–30)
BUN SERPL-MCNC: 24 MG/DL (ref 7–30)
CALCIUM SERPL-MCNC: 8.4 MG/DL (ref 8.5–10.1)
CALCIUM SERPL-MCNC: 9.8 MG/DL (ref 8.5–10.1)
CHLORIDE SERPL-SCNC: 87 MMOL/L (ref 94–109)
CHLORIDE SERPL-SCNC: 94 MMOL/L (ref 94–109)
CO2 SERPL-SCNC: 15 MMOL/L (ref 20–32)
CO2 SERPL-SCNC: 21 MMOL/L (ref 20–32)
CREAT SERPL-MCNC: 1.59 MG/DL (ref 0.66–1.25)
CREAT SERPL-MCNC: 2.05 MG/DL (ref 0.66–1.25)
DIFFERENTIAL METHOD BLD: ABNORMAL
EOSINOPHIL # BLD AUTO: 0 10E9/L (ref 0–0.7)
EOSINOPHIL NFR BLD AUTO: 0 %
ERYTHROCYTE [DISTWIDTH] IN BLOOD BY AUTOMATED COUNT: 13 % (ref 10–15)
ERYTHROCYTE [DISTWIDTH] IN BLOOD BY AUTOMATED COUNT: 13.5 % (ref 10–15)
ETHANOL SERPL-MCNC: <0.01 G/DL
FOLATE SERPL-MCNC: 14.2 NG/ML
GFR SERPL CREATININE-BSD FRML MDRD: 41 ML/MIN/{1.73_M2}
GFR SERPL CREATININE-BSD FRML MDRD: 55 ML/MIN/{1.73_M2}
GLUCOSE BLDC GLUCOMTR-MCNC: 147 MG/DL (ref 70–99)
GLUCOSE SERPL-MCNC: 143 MG/DL (ref 70–99)
GLUCOSE SERPL-MCNC: 230 MG/DL (ref 70–99)
HBA1C MFR BLD: 4.6 % (ref 0–5.6)
HCT VFR BLD AUTO: 42.1 % (ref 40–53)
HCT VFR BLD AUTO: 48.9 % (ref 40–53)
HGB BLD-MCNC: 15 G/DL (ref 13.3–17.7)
HGB BLD-MCNC: 17.6 G/DL (ref 13.3–17.7)
IMM GRANULOCYTES # BLD: 0.1 10E9/L (ref 0–0.4)
IMM GRANULOCYTES NFR BLD: 0.3 %
INR PPP: 1.19 (ref 0.86–1.14)
LACTATE BLD-SCNC: 1.4 MMOL/L (ref 0.7–2)
LIPASE SERPL-CCNC: 366 U/L (ref 73–393)
LYMPHOCYTES # BLD AUTO: 1 10E9/L (ref 0.8–5.3)
LYMPHOCYTES NFR BLD AUTO: 5.8 %
MAGNESIUM SERPL-MCNC: 1.8 MG/DL (ref 1.6–2.3)
MCH RBC QN AUTO: 37.6 PG (ref 26.5–33)
MCH RBC QN AUTO: 37.9 PG (ref 26.5–33)
MCHC RBC AUTO-ENTMCNC: 35.6 G/DL (ref 31.5–36.5)
MCHC RBC AUTO-ENTMCNC: 36 G/DL (ref 31.5–36.5)
MCV RBC AUTO: 105 FL (ref 78–100)
MCV RBC AUTO: 106 FL (ref 78–100)
MONOCYTES # BLD AUTO: 1.4 10E9/L (ref 0–1.3)
MONOCYTES NFR BLD AUTO: 8 %
NEUTROPHILS # BLD AUTO: 15.5 10E9/L (ref 1.6–8.3)
NEUTROPHILS NFR BLD AUTO: 85.8 %
PHOSPHATE SERPL-MCNC: 1 MG/DL (ref 2.5–4.5)
PLATELET # BLD AUTO: 165 10E9/L (ref 150–450)
PLATELET # BLD AUTO: 221 10E9/L (ref 150–450)
POTASSIUM SERPL-SCNC: 3.8 MMOL/L (ref 3.4–5.3)
POTASSIUM SERPL-SCNC: 3.9 MMOL/L (ref 3.4–5.3)
PROT SERPL-MCNC: 8.6 G/DL (ref 6.8–8.8)
RBC # BLD AUTO: 3.96 10E12/L (ref 4.4–5.9)
RBC # BLD AUTO: 4.68 10E12/L (ref 4.4–5.9)
SODIUM SERPL-SCNC: 129 MMOL/L (ref 133–144)
SODIUM SERPL-SCNC: 131 MMOL/L (ref 133–144)
SPECIMEN EXP DATE BLD: NORMAL
TSH SERPL DL<=0.005 MIU/L-ACNC: 0.86 MU/L (ref 0.4–4)
VIT B12 SERPL-MCNC: 345 PG/ML (ref 193–986)
WBC # BLD AUTO: 12.3 10E9/L (ref 4–11)
WBC # BLD AUTO: 18.1 10E9/L (ref 4–11)

## 2020-03-11 PROCEDURE — 86850 RBC ANTIBODY SCREEN: CPT | Performed by: INTERNAL MEDICINE

## 2020-03-11 PROCEDURE — 96361 HYDRATE IV INFUSION ADD-ON: CPT

## 2020-03-11 PROCEDURE — 83735 ASSAY OF MAGNESIUM: CPT | Performed by: INTERNAL MEDICINE

## 2020-03-11 PROCEDURE — 99285 EMERGENCY DEPT VISIT HI MDM: CPT | Mod: 25

## 2020-03-11 PROCEDURE — 83036 HEMOGLOBIN GLYCOSYLATED A1C: CPT | Performed by: INTERNAL MEDICINE

## 2020-03-11 PROCEDURE — 85027 COMPLETE CBC AUTOMATED: CPT | Performed by: INTERNAL MEDICINE

## 2020-03-11 PROCEDURE — C9113 INJ PANTOPRAZOLE SODIUM, VIA: HCPCS | Performed by: EMERGENCY MEDICINE

## 2020-03-11 PROCEDURE — 25000125 ZZHC RX 250: Performed by: INTERNAL MEDICINE

## 2020-03-11 PROCEDURE — 25800030 ZZH RX IP 258 OP 636: Performed by: EMERGENCY MEDICINE

## 2020-03-11 PROCEDURE — 25000128 H RX IP 250 OP 636: Performed by: EMERGENCY MEDICINE

## 2020-03-11 PROCEDURE — C9113 INJ PANTOPRAZOLE SODIUM, VIA: HCPCS | Performed by: INTERNAL MEDICINE

## 2020-03-11 PROCEDURE — 25000132 ZZH RX MED GY IP 250 OP 250 PS 637: Performed by: INTERNAL MEDICINE

## 2020-03-11 PROCEDURE — 85025 COMPLETE CBC W/AUTO DIFF WBC: CPT | Performed by: EMERGENCY MEDICINE

## 2020-03-11 PROCEDURE — 83690 ASSAY OF LIPASE: CPT | Performed by: EMERGENCY MEDICINE

## 2020-03-11 PROCEDURE — 80320 DRUG SCREEN QUANTALCOHOLS: CPT | Performed by: EMERGENCY MEDICINE

## 2020-03-11 PROCEDURE — 86900 BLOOD TYPING SEROLOGIC ABO: CPT | Performed by: INTERNAL MEDICINE

## 2020-03-11 PROCEDURE — 93005 ELECTROCARDIOGRAM TRACING: CPT

## 2020-03-11 PROCEDURE — 83605 ASSAY OF LACTIC ACID: CPT | Performed by: INTERNAL MEDICINE

## 2020-03-11 PROCEDURE — 86901 BLOOD TYPING SEROLOGIC RH(D): CPT | Performed by: INTERNAL MEDICINE

## 2020-03-11 PROCEDURE — 25800030 ZZH RX IP 258 OP 636: Performed by: INTERNAL MEDICINE

## 2020-03-11 PROCEDURE — 93010 ELECTROCARDIOGRAM REPORT: CPT | Performed by: INTERNAL MEDICINE

## 2020-03-11 PROCEDURE — 80069 RENAL FUNCTION PANEL: CPT | Performed by: INTERNAL MEDICINE

## 2020-03-11 PROCEDURE — HZ2ZZZZ DETOXIFICATION SERVICES FOR SUBSTANCE ABUSE TREATMENT: ICD-10-PCS | Performed by: INTERNAL MEDICINE

## 2020-03-11 PROCEDURE — 99223 1ST HOSP IP/OBS HIGH 75: CPT | Mod: AI | Performed by: INTERNAL MEDICINE

## 2020-03-11 PROCEDURE — 82746 ASSAY OF FOLIC ACID SERUM: CPT | Performed by: INTERNAL MEDICINE

## 2020-03-11 PROCEDURE — 25000128 H RX IP 250 OP 636: Performed by: INTERNAL MEDICINE

## 2020-03-11 PROCEDURE — 36415 COLL VENOUS BLD VENIPUNCTURE: CPT | Performed by: INTERNAL MEDICINE

## 2020-03-11 PROCEDURE — 85610 PROTHROMBIN TIME: CPT | Performed by: INTERNAL MEDICINE

## 2020-03-11 PROCEDURE — 25000132 ZZH RX MED GY IP 250 OP 250 PS 637: Performed by: EMERGENCY MEDICINE

## 2020-03-11 PROCEDURE — 84443 ASSAY THYROID STIM HORMONE: CPT | Performed by: INTERNAL MEDICINE

## 2020-03-11 PROCEDURE — 00000146 ZZHCL STATISTIC GLUCOSE BY METER IP

## 2020-03-11 PROCEDURE — 96374 THER/PROPH/DIAG INJ IV PUSH: CPT

## 2020-03-11 PROCEDURE — 80053 COMPREHEN METABOLIC PANEL: CPT | Performed by: EMERGENCY MEDICINE

## 2020-03-11 PROCEDURE — 76705 ECHO EXAM OF ABDOMEN: CPT

## 2020-03-11 PROCEDURE — 96375 TX/PRO/DX INJ NEW DRUG ADDON: CPT

## 2020-03-11 PROCEDURE — 12000000 ZZH R&B MED SURG/OB

## 2020-03-11 PROCEDURE — 25000125 ZZHC RX 250: Performed by: EMERGENCY MEDICINE

## 2020-03-11 PROCEDURE — 82607 VITAMIN B-12: CPT | Performed by: INTERNAL MEDICINE

## 2020-03-11 RX ORDER — MULTIVITAMIN,THERAPEUTIC
1 TABLET ORAL ONCE
Status: COMPLETED | OUTPATIENT
Start: 2020-03-11 | End: 2020-03-11

## 2020-03-11 RX ORDER — LORAZEPAM 1 MG/1
1-2 TABLET ORAL EVERY 30 MIN PRN
Status: DISCONTINUED | OUTPATIENT
Start: 2020-03-11 | End: 2020-03-14 | Stop reason: HOSPADM

## 2020-03-11 RX ORDER — QUETIAPINE FUMARATE 25 MG/1
25-50 TABLET, FILM COATED ORAL EVERY 6 HOURS PRN
Status: DISCONTINUED | OUTPATIENT
Start: 2020-03-11 | End: 2020-03-14 | Stop reason: HOSPADM

## 2020-03-11 RX ORDER — AMOXICILLIN 250 MG
2 CAPSULE ORAL 2 TIMES DAILY PRN
Status: DISCONTINUED | OUTPATIENT
Start: 2020-03-11 | End: 2020-03-14 | Stop reason: HOSPADM

## 2020-03-11 RX ORDER — POTASSIUM CL/LIDO/0.9 % NACL 10MEQ/0.1L
10 INTRAVENOUS SOLUTION, PIGGYBACK (ML) INTRAVENOUS
Status: DISCONTINUED | OUTPATIENT
Start: 2020-03-11 | End: 2020-03-14 | Stop reason: HOSPADM

## 2020-03-11 RX ORDER — AMOXICILLIN 250 MG
1 CAPSULE ORAL 2 TIMES DAILY PRN
Status: DISCONTINUED | OUTPATIENT
Start: 2020-03-11 | End: 2020-03-14 | Stop reason: HOSPADM

## 2020-03-11 RX ORDER — LANOLIN ALCOHOL/MO/W.PET/CERES
100 CREAM (GRAM) TOPICAL DAILY
Status: DISCONTINUED | OUTPATIENT
Start: 2020-03-12 | End: 2020-03-14 | Stop reason: HOSPADM

## 2020-03-11 RX ORDER — LORAZEPAM 2 MG/ML
1-2 INJECTION INTRAMUSCULAR EVERY 30 MIN PRN
Status: DISCONTINUED | OUTPATIENT
Start: 2020-03-11 | End: 2020-03-14 | Stop reason: HOSPADM

## 2020-03-11 RX ORDER — ONDANSETRON 2 MG/ML
4 INJECTION INTRAMUSCULAR; INTRAVENOUS EVERY 6 HOURS PRN
Status: DISCONTINUED | OUTPATIENT
Start: 2020-03-11 | End: 2020-03-14 | Stop reason: HOSPADM

## 2020-03-11 RX ORDER — POTASSIUM CHLORIDE 1.5 G/1.58G
20-40 POWDER, FOR SOLUTION ORAL
Status: DISCONTINUED | OUTPATIENT
Start: 2020-03-11 | End: 2020-03-14 | Stop reason: HOSPADM

## 2020-03-11 RX ORDER — POTASSIUM CHLORIDE 7.45 MG/ML
10 INJECTION INTRAVENOUS
Status: DISCONTINUED | OUTPATIENT
Start: 2020-03-11 | End: 2020-03-11

## 2020-03-11 RX ORDER — POTASSIUM CHLORIDE 1500 MG/1
20-40 TABLET, EXTENDED RELEASE ORAL
Status: DISCONTINUED | OUTPATIENT
Start: 2020-03-11 | End: 2020-03-11

## 2020-03-11 RX ORDER — DEXTROSE MONOHYDRATE, SODIUM CHLORIDE, AND POTASSIUM CHLORIDE 50; 1.49; 9 G/1000ML; G/1000ML; G/1000ML
INJECTION, SOLUTION INTRAVENOUS CONTINUOUS
Status: DISCONTINUED | OUTPATIENT
Start: 2020-03-11 | End: 2020-03-14 | Stop reason: HOSPADM

## 2020-03-11 RX ORDER — MAGNESIUM SULFATE HEPTAHYDRATE 40 MG/ML
4 INJECTION, SOLUTION INTRAVENOUS EVERY 4 HOURS PRN
Status: DISCONTINUED | OUTPATIENT
Start: 2020-03-11 | End: 2020-03-14 | Stop reason: HOSPADM

## 2020-03-11 RX ORDER — POTASSIUM CHLORIDE 1500 MG/1
20-40 TABLET, EXTENDED RELEASE ORAL
Status: DISCONTINUED | OUTPATIENT
Start: 2020-03-11 | End: 2020-03-14 | Stop reason: HOSPADM

## 2020-03-11 RX ORDER — POTASSIUM CHLORIDE 29.8 MG/ML
20 INJECTION INTRAVENOUS
Status: DISCONTINUED | OUTPATIENT
Start: 2020-03-11 | End: 2020-03-14 | Stop reason: HOSPADM

## 2020-03-11 RX ORDER — ONDANSETRON 4 MG/1
4 TABLET, ORALLY DISINTEGRATING ORAL EVERY 6 HOURS PRN
Status: DISCONTINUED | OUTPATIENT
Start: 2020-03-11 | End: 2020-03-14 | Stop reason: HOSPADM

## 2020-03-11 RX ORDER — POLYETHYLENE GLYCOL 3350 17 G/17G
17 POWDER, FOR SOLUTION ORAL DAILY PRN
Status: DISCONTINUED | OUTPATIENT
Start: 2020-03-11 | End: 2020-03-14 | Stop reason: HOSPADM

## 2020-03-11 RX ORDER — PROCHLORPERAZINE 25 MG
25 SUPPOSITORY, RECTAL RECTAL EVERY 12 HOURS PRN
Status: DISCONTINUED | OUTPATIENT
Start: 2020-03-11 | End: 2020-03-14 | Stop reason: HOSPADM

## 2020-03-11 RX ORDER — LIDOCAINE 40 MG/G
CREAM TOPICAL
Status: DISCONTINUED | OUTPATIENT
Start: 2020-03-11 | End: 2020-03-14 | Stop reason: HOSPADM

## 2020-03-11 RX ORDER — POTASSIUM CHLORIDE 7.45 MG/ML
10 INJECTION INTRAVENOUS
Status: DISCONTINUED | OUTPATIENT
Start: 2020-03-11 | End: 2020-03-14 | Stop reason: HOSPADM

## 2020-03-11 RX ORDER — ONDANSETRON 2 MG/ML
4 INJECTION INTRAMUSCULAR; INTRAVENOUS ONCE
Status: COMPLETED | OUTPATIENT
Start: 2020-03-11 | End: 2020-03-11

## 2020-03-11 RX ORDER — ONDANSETRON 2 MG/ML
4 INJECTION INTRAMUSCULAR; INTRAVENOUS ONCE
Status: DISCONTINUED | OUTPATIENT
Start: 2020-03-11 | End: 2020-03-11

## 2020-03-11 RX ORDER — DISULFIRAM 250 MG/1
250 TABLET ORAL DAILY
Status: ON HOLD | COMMUNITY
End: 2020-03-14

## 2020-03-11 RX ORDER — MULTIPLE VITAMINS W/ MINERALS TAB 9MG-400MCG
1 TAB ORAL DAILY
Status: DISCONTINUED | OUTPATIENT
Start: 2020-03-12 | End: 2020-03-14 | Stop reason: HOSPADM

## 2020-03-11 RX ORDER — NALOXONE HYDROCHLORIDE 0.4 MG/ML
.1-.4 INJECTION, SOLUTION INTRAMUSCULAR; INTRAVENOUS; SUBCUTANEOUS
Status: DISCONTINUED | OUTPATIENT
Start: 2020-03-11 | End: 2020-03-14 | Stop reason: HOSPADM

## 2020-03-11 RX ORDER — POTASSIUM CHLORIDE 1.5 G/1.58G
20-40 POWDER, FOR SOLUTION ORAL
Status: DISCONTINUED | OUTPATIENT
Start: 2020-03-11 | End: 2020-03-11

## 2020-03-11 RX ORDER — LANOLIN ALCOHOL/MO/W.PET/CERES
100 CREAM (GRAM) TOPICAL ONCE
Status: COMPLETED | OUTPATIENT
Start: 2020-03-11 | End: 2020-03-11

## 2020-03-11 RX ORDER — POTASSIUM CHLORIDE 29.8 MG/ML
20 INJECTION INTRAVENOUS
Status: DISCONTINUED | OUTPATIENT
Start: 2020-03-11 | End: 2020-03-11

## 2020-03-11 RX ORDER — LORAZEPAM 2 MG/ML
2 INJECTION INTRAMUSCULAR ONCE
Status: COMPLETED | OUTPATIENT
Start: 2020-03-11 | End: 2020-03-11

## 2020-03-11 RX ORDER — FOLIC ACID 1 MG/1
1 TABLET ORAL ONCE
Status: COMPLETED | OUTPATIENT
Start: 2020-03-11 | End: 2020-03-11

## 2020-03-11 RX ORDER — FOLIC ACID 1 MG/1
1 TABLET ORAL DAILY
Status: DISCONTINUED | OUTPATIENT
Start: 2020-03-12 | End: 2020-03-11

## 2020-03-11 RX ORDER — POTASSIUM CL/LIDO/0.9 % NACL 10MEQ/0.1L
10 INTRAVENOUS SOLUTION, PIGGYBACK (ML) INTRAVENOUS
Status: DISCONTINUED | OUTPATIENT
Start: 2020-03-11 | End: 2020-03-11

## 2020-03-11 RX ORDER — PROCHLORPERAZINE MALEATE 5 MG
10 TABLET ORAL EVERY 6 HOURS PRN
Status: DISCONTINUED | OUTPATIENT
Start: 2020-03-11 | End: 2020-03-14 | Stop reason: HOSPADM

## 2020-03-11 RX ORDER — MULTIPLE VITAMINS W/ MINERALS TAB 9MG-400MCG
1 TAB ORAL DAILY
Status: DISCONTINUED | OUTPATIENT
Start: 2020-03-12 | End: 2020-03-11

## 2020-03-11 RX ORDER — BISACODYL 10 MG
10 SUPPOSITORY, RECTAL RECTAL DAILY PRN
Status: DISCONTINUED | OUTPATIENT
Start: 2020-03-11 | End: 2020-03-14 | Stop reason: HOSPADM

## 2020-03-11 RX ORDER — FOLIC ACID 1 MG/1
1 TABLET ORAL DAILY
Status: DISCONTINUED | OUTPATIENT
Start: 2020-03-12 | End: 2020-03-14 | Stop reason: HOSPADM

## 2020-03-11 RX ADMIN — ONDANSETRON 4 MG: 2 INJECTION INTRAMUSCULAR; INTRAVENOUS at 14:53

## 2020-03-11 RX ADMIN — Medication 1 LOZENGE: at 20:47

## 2020-03-11 RX ADMIN — Medication 1 LOZENGE: at 16:04

## 2020-03-11 RX ADMIN — SODIUM CHLORIDE 1000 ML: 9 INJECTION, SOLUTION INTRAVENOUS at 14:47

## 2020-03-11 RX ADMIN — SODIUM CHLORIDE 1000 ML: 9 INJECTION, SOLUTION INTRAVENOUS at 15:57

## 2020-03-11 RX ADMIN — PANTOPRAZOLE SODIUM 40 MG: 40 INJECTION, POWDER, FOR SOLUTION INTRAVENOUS at 21:46

## 2020-03-11 RX ADMIN — THERA TABS 1 TABLET: TAB at 18:19

## 2020-03-11 RX ADMIN — FOLIC ACID: 5 INJECTION, SOLUTION INTRAMUSCULAR; INTRAVENOUS; SUBCUTANEOUS at 20:39

## 2020-03-11 RX ADMIN — ONDANSETRON 4 MG: 4 TABLET, ORALLY DISINTEGRATING ORAL at 20:46

## 2020-03-11 RX ADMIN — FOLIC ACID 1 MG: 1 TABLET ORAL at 18:19

## 2020-03-11 RX ADMIN — Medication 1 MG: at 20:47

## 2020-03-11 RX ADMIN — FAMOTIDINE 20 MG: 10 INJECTION, SOLUTION INTRAVENOUS at 15:57

## 2020-03-11 RX ADMIN — THIAMINE HCL TAB 100 MG 100 MG: 100 TAB at 18:19

## 2020-03-11 RX ADMIN — LORAZEPAM 2 MG: 2 INJECTION INTRAMUSCULAR; INTRAVENOUS at 15:58

## 2020-03-11 RX ADMIN — PANTOPRAZOLE SODIUM 40 MG: 40 INJECTION, POWDER, FOR SOLUTION INTRAVENOUS at 18:19

## 2020-03-11 ASSESSMENT — ACTIVITIES OF DAILY LIVING (ADL)
COGNITION: 0 - NO COGNITION ISSUES REPORTED
AMBULATION: 0-->INDEPENDENT
NUMBER_OF_TIMES_PATIENT_HAS_FALLEN_WITHIN_LAST_SIX_MONTHS: 1
TOILETING: 0-->INDEPENDENT
RETIRED_COMMUNICATION: 0-->UNDERSTANDS/COMMUNICATES WITHOUT DIFFICULTY
FALL_HISTORY_WITHIN_LAST_SIX_MONTHS: YES
TRANSFERRING: 0-->INDEPENDENT
BATHING: 0-->INDEPENDENT
ADLS_ACUITY_SCORE: 10
RETIRED_EATING: 0-->INDEPENDENT
DRESS: 0-->INDEPENDENT
SWALLOWING: 0-->SWALLOWS FOODS/LIQUIDS WITHOUT DIFFICULTY

## 2020-03-11 ASSESSMENT — ENCOUNTER SYMPTOMS
ABDOMINAL PAIN: 1
WEAKNESS: 1
DYSPHORIC MOOD: 1
BACK PAIN: 1
APPETITE CHANGE: 1
VOMITING: 1
DIARRHEA: 0

## 2020-03-11 ASSESSMENT — MIFFLIN-ST. JEOR: SCORE: 2002.66

## 2020-03-11 NOTE — H&P
[facility]    History and Physical  Hospitalist       Date of Admission:  3/11/2020    Assessment & Plan   Brandin Rodriguez is a 34 year old male history of generalized anxiety disorder, depression, alcohol hepatitis, alcohol dependence, history of dysphasia, history of grade D esophagitis, history of tobacco use, history of PTSD, history of alcohol withdrawal who presents with alcohol withdrawal, vomiting and dehydration.  He is found to be in alcohol withdrawal and acute renal failure secondary to dehydration.  Anion gap metabolic acidosis thought to be starvation ketosis.    Principal Problem:    Alcohol withdrawal (H)    Assessment: No history of seizures.  Admit for alcohol withdrawal last fall [place] self to hospital.  Intermittent periods of sobriety.  Binge drinks heavily.  Alcohol withdrawal symptoms began yesterday.  Transient hallucinations.  Sinus tachycardia.  Nonfocal neuro exam.  Afebrile.  Leukocytosis secondary to dehydration.  Currently feeling better with IV Ativan.  Last drink was 2 days ago.  Drinking in part to treat his depression.  Interested in alcohol treatment.  Followed by addiction counselor at [facility] regularly for the last several months.  No signs of infection.  Calm and cooperative.    Plan: I mean, folic acid, multivitamin, psychiatry consult, social work consult, follow for need of physical therapy and Occupational Therapy, follow electrolytes, routine EKG to check QTc interval, telemetry, alcohol withdrawal protocol, IV fluids, daily electrolytes.  Clear liquids, n.p.o. at midnight for possible upper endoscopy with gastroenterology     possible Hematemesis   Etoh  Hepatitis   Liver disease, chronic, due to alcohol (H)  alcohol dependence (H)    Assessment: No history of varices or cirrhosis bilirubin currently 6.9.  Had been as high as 3.3 last November.  Mild transaminase elevations.  Normal alk phos.  Alcohol-related hepatitis.  No clear  encephalopathy.  No history of varices.  Grade D esophagitis by EGD last fall.  Hemoglobin normal likely concentrated.  History of melena in the past but denies currently.  Normal rectal exam today.  Hemodynamically stable.  Received 2 L normal saline in the emergency room.  Unclear if he is vomiting blood or not.  His history is vague.  Small amount of bright red blood earlier today.  Unclear if he was having coffee-ground emesis at home.  Normal rectal exam with no blood.  Doubt active bleeding.  He likely has gastritis and portal gastropathy and likely has ongoing esophagitis.    Plan: IV fluids, Protonix 40 mg twice daily, n.p.o. at midnight, clear liquids tonight, treat alcohol withdrawal, serial hemoglobins, type and screen    Addendum; follow labs show improvement in AGMA, Na, Cr.  Lactate nl. Low phos. Hb down but hemoconcentrated.  Follow Hb.       Dysphagia    Assessment: History esophagitis.  Grade D esophagitis by last EGD by Dr. Elizabeth December 2019.  No complaints of dysphasia currently.    Plan: Follow, PPI, follow hemoglobin, gastroenterology consult    Tobacco use disorder    Assessment: Currently smoking.  Follow    Elevated blood sugar-may be related to stress.  Follow blood sugars and check hemoglobin A1c.      NEREYDA (generalized anxiety disorder)    PTSD (post-traumatic stress disorder)     Hx severe episode of major depressive disorder without psychotic features, unspecified whether recurrent (H)    Assessment: She has multiple anxiolytics and antidepressants listed on medication list he is not been taking these regularly and is unclear which 1 he was taking regular at one point.  Taking propranolol intermittently.  Took a dose last week.    Plan: Psychiatry consultation, no suicidal ideation at this time.  Reevaluate his anxiolytics and antidepressants.      Sinus tachycardia    Assessment: Secondary to dehydration and alcohol withdrawal.  I doubt that he is actively bleeding at this time.     Plan: Telemetry, follow electrolytes, treat alcohol withdrawal, follow hemoglobin, IV fluids      Dehydration  AGMA  Hyponatremia    Assessment: Creatinine of 2..  Baseline creatinine is 0.7.  Poor p.o. intake for the last 1 to 2 days.  Multiple episodes of vomiting.  They can Erasmo to dehydration.  Anion gap metabolic acidosis likely starvation ketosis.  No ingestions.  No hypotension.  Hyponatremia secondary to nausea and vomiting     Plan: Check postvoid residual, continue IV fluids, follow BMP tonight and daily in the morning.  Follow hemoglobin.  DVT Prophylaxis: Pneumoboots  Code Status: Full Code    Disposition: Expected discharge in greater than 2 days once his alcohol withdrawal is resolved and he has safe disposition    Mick Ashton MD    Primary Care Physician   Earlene Whelna    Chief Complaint   Vomiting, alcohol withdrawal    History is obtained from the patient by EDMD    History of Present Illness   Brandin Rodriguez is a 34 year old male history of generalized anxiety disorder, depression, alcohol hepatitis, alcohol dependence, history of dysphasia, history of grade D esophagitis, history of tobacco use, history of PTSD, history of alcohol withdrawal who presents with alcohol withdrawal, vomiting and dehydration.    [facility] hepatitis secondary to alcohol use anion gap metabolic acidosis from starvation ketosis hematemesis and diagnosis of esophagitis with congestive gastropathy on EGD.  Lactic acidosis secondary to dehydration.  He had major depressive disorder with suicidal ideation.  Seen by psychiatry at that time.  He was initiated on Remeron and PRN Seroquel.    Patient states since his last discharge he is been having intermittent bouts of sobriety lasting about a week.  His last period of sobriety was last week.  He has typically been drinking about a pint of hard liquor daily when he is not sober.  He had binge drinking  over the weekend 1 to 2 L of vodka per day.  Last drink was Monday.  He began having nausea and vomiting yesterday.  He had a few specks of bright red blood at times but then other times may have had some dark coffee-ground-like emesis but details are unclear.  No bloody stools.  He has more remote history of black tarry stools but nothing in the last couple weeks.  He has had some mild right upper quadrant tenderness.  His mood has been worse he is been significantly depressed but not taking any of his anxiolytics or antidepressants on a regular basis.  He is followed by chemical dependency/addiction counselor at [facility] for several months which he finds helpful.  He has apparently an appointment later this week.  He has not been in a chemical dependency treatment program.  He denies suicidal ideation.  He denies suicidal ideation.  He began having tremors and sweats yesterday.  He had some visual hallucinations today seeing smoke.  He denies any hallucinations currently.  He feels as tremors are better with the Ativan given in the emergency room.  He has had poor p.o. intake for the last 2 days.  He has not been taking any of his medications regularly for the last several weeks.  He is not taking the Antabuse.    In the emergency room patient is afebrile.  Initial heart rates in the 150 range.  This is down to 130 with 2 L IV fluids and Ativan.  He is normotensive.  Normal oxygen saturations.  He was awake but having signs of alcohol withdrawal with tremors.  Nonfocal neuro exam.  Laboratory studies notable for normal lipase.  ALT 88, , normal alk phos.  Bilirubin total 6.9.  Anion gap 27 BUN 20 creatinine 2.  Sodium 129 chloride 87 bicarb 15.  White blood cell count 18.  Platelet count 221  hemoglobin 17.6.  Alcohol level negative.  Ultrasound shows fatty liver and normal gallbladder.  No acute findings.  Emergency room he was given 2 L normal saline, IV Pepcid, IV Ativan 2 mg and IV  Zofran.      Past Medical History    I have reviewed this patient's medical history and updated it with pertinent information if needed.   Past Medical History:   Diagnosis Date     Alcoholic hepatitis      Anxiety      Depression      Depressive disorder      PTSD (post-traumatic stress disorder)      Suicidal ideation        Past Surgical History   I have reviewed this patient's surgical history and updated it with pertinent information if needed.  Past Surgical History:   Procedure Laterality Date     BIOPSY      mole bxs     ESOPHAGOSCOPY, GASTROSCOPY, DUODENOSCOPY (EGD), COMBINED N/A 11/5/2019    Procedure: ESOPHAGOGASTRODUODENOSCOPY (EGD);  Surgeon: Jake Elizabeth MD;  Location:  GI     SOFT TISSUE SURGERY         Prior to Admission Medications   Prior to Admission Medications   Prescriptions Last Dose Informant Patient Reported? Taking?   FLUoxetine (PROZAC) 10 MG tablet   No No   Sig: Take 10 mg PO daily for one week, then increase to 20 mg daily   QUEtiapine (SEROQUEL) 25 MG tablet   No No   Sig: Take 0.5 tablets (12.5 mg) by mouth every 6 hours as needed (anxienty)   Patient not taking: Reported on 12/3/2019   disulfiram (ANTABUSE) 250 MG tablet   No No   Sig: Take 1 tablet (250 mg) by mouth daily   Patient not taking: Reported on 11/11/2019   folic acid (FOLVITE) 1 MG tablet   No No   Sig: Take 1 tablet (1 mg) by mouth daily   gabapentin (NEURONTIN) 300 MG capsule   No No   Sig: Take 1 capsule (300 mg) by mouth 3 times daily   hydrOXYzine (VISTARIL) 50 MG capsule   No No   Sig: Take 1 capsule (50 mg) by mouth 3 times daily as needed for anxiety   mirtazapine (REMERON) 15 MG tablet   No No   Sig: Take 1 tablet (15 mg) by mouth At Bedtime   Patient not taking: Reported on 12/3/2019   multivitamin w/minerals (THERA-VIT-M) tablet   No No   Sig: Take 1 tablet by mouth daily   ondansetron (ZOFRAN) 4 MG tablet   No No   Sig: Take 1 tablet (4 mg) by mouth every 8 hours as needed for nausea or vomiting    pantoprazole (PROTONIX) 40 MG EC tablet   No No   Sig: Take 1 tablet (40 mg) by mouth 2 times daily   propranolol (INDERAL) 20 MG tablet   No No   Sig: Take 1 tablet (20 mg) by mouth 2 times daily as needed (Anxiety)   vitamin B1 (THIAMINE) 100 MG tablet   No No   Sig: Take 1 tablet (100 mg) by mouth daily      Facility-Administered Medications: None     Allergies   Allergies   Allergen Reactions     Amoxicillin      Bactrim [Sulfamethoxazole W-Trimethoprim]        Social History   I have reviewed this patient's social history and updated it with pertinent information if needed. Brandin Rodriguez  reports that he has been smoking. He has been smoking about 1.00 pack per day. He has never used smokeless tobacco. He reports current alcohol use. He reports current drug use. Drug: Marijuana.    Family History   I have reviewed this patient's family history and updated it with pertinent information if needed.   Family History   Problem Relation Age of Onset     Diabetes Mother      Hypertension Mother      Heart Disease Maternal Grandmother      Cancer Paternal Grandmother      Anxiety Disorder Brother      Arrhythmia Maternal Half-Sister        Review of Systems   The 10 point Review of Systems is negative other than noted in the HPI or here.     Physical Exam   Temp: 98.5  F (36.9  C) Temp src: Oral BP: 126/83 Pulse: 128 Heart Rate: 124 Resp: 15 SpO2: 96 % O2 Device: None (Room air)    Vital Signs with Ranges  Temp:  [98.5  F (36.9  C)] 98.5  F (36.9  C)  Pulse:  [125-152] 128  Heart Rate:  [124-138] 124  Resp:  [11-28] 15  BP: (109-132)/(79-99) 126/83  SpO2:  [95 %-98 %] 96 %  220 lbs 0 oz    Constitutional: No acute distress, nontoxic-appearing  Eyes: Slightly icteric sclera, pupils equal round react light and accommodating no nystagmus, extraocular eye motions intact  HEENT: Normal oropharynx  Neck-supple, nontender  Respiratory: Clear to auscultation bilaterally, breathing easily  Cardiovascular: Regular rate and  rhythm, no rubs gallops or murmurs.  He is tachycardic  GI: Soft, nondistended, mild tenderness epigastrium.  No right upper quadrant tenderness.  Rectal exam with no blood.  No melena  Lymph/Hematologic: No axillary, cervical, submandibular or supraclavicular lymphadenopathy  Skin: No rash or edema  Musculoskeletal: No focal joint swelling or erythema.  No pain with passive range of motion of major joints upper and lower extremities.  No spinal tenderness  Neurologic: Mildly tremulous, fully oriented, normal speech and mentation.  No diaphoresis currently.  Normal finger-nose-finger, strength 5 out of 5 in extremities x4, cranial nerves II through XII grossly intact, toes downgoing bilaterally, no clonus in upper and lower extremities  Psychiatric: He is depressed but denies suicidal ideation.  Cooperative and calm, denies hallucinations currently    Data   Data reviewed today:  I personally reviewed laboratory studies and imaging performed in the emergency room  Recent Labs   Lab 03/11/20  1449   WBC 18.1*   HGB 17.6   *      *   POTASSIUM 3.9   CHLORIDE 87*   CO2 15*   BUN 20   CR 2.05*   ANIONGAP 27*   KATHY 9.8   *   ALBUMIN 4.8   PROTTOTAL 8.6   BILITOTAL 6.9*   ALKPHOS 108   ALT 88*   *   LIPASE 366       Imaging:  Recent Results (from the past 24 hour(s))   Abdomen US, limited (RUQ only)    Narrative    US ABDOMEN LIMITED 3/11/2020 4:54 PM    CLINICAL HISTORY: Right upper quadrant pain and epigastric pain,  elevated LFTs/bilirubin, rule out evidence of biliary  pathology/obstruction. Right upper quadrant pain and epigastric pain,  elevated LFTs/bilirubin. Rule out evidence of biliary  pathology/obstruction.    TECHNIQUE: Limited abdominal ultrasound.    COMPARISON: None.    FINDINGS:    GALLBLADDER: The gallbladder is normal. No gallstones, wall  thickening, or pericholecystic fluid. Negative sonographic Vega's  sign.    BILE DUCTS: There is no biliary dilatation. Common  duct is not  visualized due to overlying bowel gas.    LIVER: Liver is enlarged, measuring 19.6 cm in length. There is  diffuse increased hepatic echogenicity. No focal lesions.    RIGHT KIDNEY: No hydronephrosis.    PANCREAS: The pancreas is largely obscured by overlying gas.    No ascites.      Impression    IMPRESSION:  1.  Enlarged, fatty infiltrated liver.  2.  Normal-appearing gallbladder.    RIO TRAYLOR MD

## 2020-03-11 NOTE — PROGRESS NOTES
RECEIVING UNIT ED HANDOFF REVIEW    ED Nurse Handoff Report was reviewed by: SIDDHARTH NOBLE RN on March 11, 2020 at 6:24 PM

## 2020-03-11 NOTE — ED NOTES
"[facility]  ED Nurse Handoff Report    ED Chief complaint: Hematemesis (vomit dark purple started vomit monday) and Delirium Tremens (DTS) (drinks 2 pints per day)      ED Diagnosis:   Final diagnoses:   None       Code Status: Full Code    Allergies:   Allergies   Allergen Reactions     Amoxicillin      Bactrim [Sulfamethoxazole W-Trimethoprim]        Patient Story: Pt presents from home after binge drinking. Pt has hx of etoh abuse, was wober and began drinking again last weekend. Pt states last drink was on Monday. Pt presents today with \"purple\" vomit, shaking, feeling nauseous and having some epigastric pain. Pt's labs abnormal showing low NA, elevated LFTs and JINNY with a creat of 2.1. Pt given 2 liters NS, zofran, 2 mg ativan and pepcid. Pt initial HR was 140 sinus tach, ranging now from 120-130 bpm and BP stable. Parents are at bedside.   Focused Assessment:  A.o x4, slightly tremulous but improved after ativan, normally up independent     Treatments and/or interventions provided: Medications, RUQ US   Patient's response to treatments and/or interventions: Tolerating okay     To be done/followed up on inpatient unit:  inpt orders, ciwa     Does this patient have any cognitive concerns?: none    Activity level - Baseline/Home:  Independent  Activity Level - Current:   Stand with Assist    Patient's Preferred language: English   Needed?: No    Isolation: None  Infection: Not Applicable  Bariatric?: No    Vital Signs:   Vitals:    03/11/20 1600 03/11/20 1615 03/11/20 1700 03/11/20 1730   BP: (!) 128/90 127/79 126/83 (!) 126/91   Pulse: 125 131 128 130   Resp: 11 19 15    Temp:       TempSrc:       SpO2: 96% 96% 96%    Weight:       Height:           Cardiac Rhythm:     Was the PSS-3 completed:   Yes  What interventions are required if any?               Family Comments: Parents here now, will likely be leaving soon   OBS brochure/video discussed/provided to patient/family: N/A         "      Name of person given brochure if not patient: n/a              Relationship to patient: n/a    For the majority of the shift this patient's behavior was Green.   Behavioral interventions performed were none  .    ED NURSE PHONE NUMBER: *[id number]

## 2020-03-11 NOTE — ED NOTES
Pt reports shakes and nausea improved after IV ativan but pt still c/o throat discomfort and epigastric pain. Pt states overall feeling slightly better though.

## 2020-03-11 NOTE — ED PROVIDER NOTES
History     Chief Complaint:  Vomiting, Alcohol withdrawal    HPI   Brandin Rodriguez is a 34 year old male who presents with vomiting and alcohol withdrawal. The patient reports having a small relapse this weekend after a couple weeks of sobriety, drinking 2 to 3 pints of vodka daily. He attributes this to worsening depression and asking his ex-girlfriend to move out a few months ago, denying thoughts of self harm or homicidal ideation. He notes work has been difficult as well, as he has been feeling weak and it's simply hard to get there and be on his feet all day. The patient's last drink was 2 days ago, but has not been able to eat for the past 3 to 4 days. The patient reports of vomiting persistently since yesterday, also complaining of back pain, acid reflux, and right upper abdominal pain. He noticed a small blood clot in one of his emesis episodes. The patient has not been taking his medications consistently and denies diarrhea. Of note, the patient has an addiction therapist and his next appointment is tomorrow.     Upper GI endoscopy 11/4/19:  LA Grade D reflux esophagitis. Congestive gastropathy. Normal duodenal bulb, first portion of the duodenum and second portion of the duodenum. No specimens collected.     Allergies:  Amoxicillin  Bactrim [Sulfamethoxazole W-Trimethoprim]   Trazodone     Medications:    disulfiram  fluoxetine   folic acid   gabapentin  hydroxyzine   mirtazapine  Zofran  pantoprazole  propranolol   seroquel     Past Medical History:    Alcoholic hepatitis   Anxiety   Depression   Depressive disorder   PTSD (post-traumatic stress disorder)   Suicidal ideation   Alcohol dependence    Seizure disorder   TBI    Past Surgical History:    Mole biopsy   Soft tissue surgery     Family History:    Diabetes   Hypertension   Anxiety disorder     Social History:  Tobacco use: yes  Alcohol use: yes  PCP: Earlene Whelan     Review of Systems   Constitutional: Positive for appetite change.  "  Gastrointestinal: Positive for abdominal pain and vomiting. Negative for diarrhea.   Musculoskeletal: Positive for back pain.   Neurological: Positive for weakness.   Psychiatric/Behavioral: Positive for dysphoric mood. Negative for self-injury and suicidal ideas.   All other systems reviewed and are negative.      Physical Exam     Patient Vitals for the past 24 hrs:   BP Temp Temp src Pulse Heart Rate Resp SpO2 Height Weight   03/11/20 1815 114/80 -- -- 131 126 -- -- -- --   03/11/20 1800 117/66 -- -- 142 131 -- -- -- --   03/11/20 1730 (!) 126/91 -- -- 130 130 -- -- -- --   03/11/20 1700 126/83 -- -- 128 124 15 96 % -- --   03/11/20 1615 127/79 -- -- 131 130 19 96 % -- --   03/11/20 1600 (!) 128/90 -- -- 125 124 11 96 % -- --   03/11/20 1530 (!) 132/90 -- -- 135 138 28 95 % -- --   03/11/20 1500 (!) 132/99 -- -- 126 124 23 98 % -- --   03/11/20 1303 109/79 98.5  F (36.9  C) Oral 152 -- 18 96 % 1.88 m (6' 2\") 99.8 kg (220 lb)        Physical Exam  General: Alert and cooperative with exam. Patient in moderate distress. Normal mentation.  Head:  Scalp is NC/AT  Eyes:  No scleral icterus, PERRL  ENT:  The external nose and ears are normal. The oropharynx is normal and without erythema; mucus membranes are somewhat dry. Uvula midline, no evidence of deep space infection.  Neck:  Normal range of motion without rigidity.  CV:  Tachycardic, regular rhythm  Resp:  Breath sounds are clear bilaterally    Non-labored, no retractions or accessory muscle use  GI:  Abdomen is soft, no distension, mild epigastric tenderness/right upper quadrant tenderness. No peritoneal signs.   MS:  No lower extremity edema   Skin:  Warm and dry, No rash or lesions noted.  Neuro: Oriented x 3. No gross motor deficits. No tongue fasciculations.  Tremulous       Emergency Department Course     Imaging:  Radiology findings were communicated with the patient who voiced understanding of the findings.    US abdomen, limited RUQ:  1.  Enlarged, " fatty infiltrated liver.   2.  Normal-appearing gallbladder.   RIO TRAYLOR MD  as per radiology.      Laboratory:  Laboratory findings were communicated with the patient who voiced understanding of the findings.    CBC: WBC 18.1 (H), HGB 17.6,    CMP:  (L), Chloride 87 (L), Co2 15 (L), Anion Gap 27 (H), Glucose 230 (H), GFR estimate 41 (L), Bilirubin total 6.9 (H), ALT 88 (H),  (H), Creatinine 2.05 (H), o/w wnl   Lipase: 366  Alcohol ethyl: <0.01      Interventions:  1447 NS 1 L IV  1453 Zofran 4 mg IV  1557 NS 1 L IV   Pepcid 20 mg IV  1558 Ativan 2 mg IV  1604 Benzocaine-menthol 1 lozenge Buccal  1819 Protonix 40 mg Po   Thera-vit 1 tablet PO   Folic acid 1 mg PO   Thiamin 100 mg tablet PO     Emergency Department Course:  Past medical records, nursing notes, and vitals reviewed.    1448 I performed an exam of the patient as documented above.     IV was inserted and blood was drawn for laboratory testing, results above.  The patient was sent for a abdominal ultrasound while in the emergency department, results above.     1736 Patient rechecked and updated.      1726 I consulted with Dr. Ashton of the hospitalist services. He was in agreement to accept the patient for admission.    Findings and plan explained to the Patient who consents to admission. Discussed the patient with Dr. Ashton, who will admit the patient to a SUDS bed for further monitoring, evaluation, and treatment.    Impression & Plan     Medical Decision Making:  Brandin Rodriguez is a 34-year-old male who presents with nausea, vomiting, and upper abdominal pain; recent heavy alcohol use.  Patient's medical history and records were reviewed.  Initial consideration for, but not limited to, alcohol intoxication/withdrawal, dehydration, infectious process, electrolyte abnormality, metabolic disturbance, PUD, gastritis, pancreatitis, biliary pathology, among others.  Labs and imaging were obtained.  On initial evaluation  patient noted to be tachycardic and tremulous.  He did receive IV fluids, Zofran, and Ativan with noted improvement in heart rate and clinical appearance.  Patient demonstrated only mild epigastric and right upper quadrant tenderness.  Labs are notable for elevated white count (18.1; likely secondary to demargination reaction from repeated episodes of vomiting), mild hyponatremia (sodium 129), significantly elevated anion gap (27), mild elevations of LFTs with significant elevated bilirubin (bilirubin 6.9), and JINNY (creatinine 2.05).  Patient's ethanol level was undetectable.  He is showing evidence of significant alcohol withdrawal which is also likely contributing to patient's presentation.  He did note a small blood clot in episode of emesis.  He was provided Pepcid and Protonix.  Right upper quadrant ultrasound shows fatty liver without other significant pathology.  Patient does have desire to quit drinking and given significant lab abnormalities he will be admitted to the hospitalist service for further evaluation and care.  Patient's presentation is consistent with alcohol withdrawal and dehydration with associated acute kidney injury and abnormal LFTs.    Discharge Diagnosis:    ICD-10-CM    1. JINNY (acute kidney injury) (H)  N17.9 CANCELED: Hematocrit   2. Alcohol withdrawal syndrome without complication (H)  F10.230    3. Abnormal LFTs  R94.5    4. Dehydration  E86.0      Disposition:  Admitted to [facility] bed     Scribe Disclosure:  Shannon HICKS, am serving as a scribe at 2:48 PM on 3/11/2020 to document services personally performed by Darron Wayne DO based on my observations and the provider's statements to me.       Darron Wayne DO  03/12/20 0006

## 2020-03-12 ENCOUNTER — APPOINTMENT (OUTPATIENT)
Dept: OCCUPATIONAL THERAPY | Facility: CLINIC | Age: 35
End: 2020-03-12
Attending: INTERNAL MEDICINE
Payer: COMMERCIAL

## 2020-03-12 LAB
ALBUMIN SERPL-MCNC: 3.4 G/DL (ref 3.4–5)
ALP SERPL-CCNC: 68 U/L (ref 40–150)
ALT SERPL W P-5'-P-CCNC: 59 U/L (ref 0–70)
ANION GAP SERPL CALCULATED.3IONS-SCNC: 11 MMOL/L (ref 3–14)
AST SERPL W P-5'-P-CCNC: 84 U/L (ref 0–45)
BILIRUB DIRECT SERPL-MCNC: 1.4 MG/DL (ref 0–0.2)
BILIRUB SERPL-MCNC: 2.2 MG/DL (ref 0.2–1.3)
BUN SERPL-MCNC: 26 MG/DL (ref 7–30)
CALCIUM SERPL-MCNC: 8.3 MG/DL (ref 8.5–10.1)
CHLORIDE SERPL-SCNC: 99 MMOL/L (ref 94–109)
CO2 SERPL-SCNC: 22 MMOL/L (ref 20–32)
CREAT SERPL-MCNC: 1.19 MG/DL (ref 0.66–1.25)
ERYTHROCYTE [DISTWIDTH] IN BLOOD BY AUTOMATED COUNT: 12.8 % (ref 10–15)
GFR SERPL CREATININE-BSD FRML MDRD: 78 ML/MIN/{1.73_M2}
GLUCOSE SERPL-MCNC: 118 MG/DL (ref 70–99)
HCT VFR BLD AUTO: 38.8 % (ref 40–53)
HGB BLD-MCNC: 13.9 G/DL (ref 13.3–17.7)
HGB BLD-MCNC: 14.7 G/DL (ref 13.3–17.7)
MAGNESIUM SERPL-MCNC: 1.8 MG/DL (ref 1.6–2.3)
MCH RBC QN AUTO: 38 PG (ref 26.5–33)
MCHC RBC AUTO-ENTMCNC: 35.8 G/DL (ref 31.5–36.5)
MCV RBC AUTO: 106 FL (ref 78–100)
PHOSPHATE SERPL-MCNC: 1.3 MG/DL (ref 2.5–4.5)
PLATELET # BLD AUTO: 128 10E9/L (ref 150–450)
POTASSIUM SERPL-SCNC: 3.5 MMOL/L (ref 3.4–5.3)
PROT SERPL-MCNC: 6.2 G/DL (ref 6.8–8.8)
RBC # BLD AUTO: 3.66 10E12/L (ref 4.4–5.9)
SODIUM SERPL-SCNC: 132 MMOL/L (ref 133–144)
UPPER GI ENDOSCOPY: NORMAL
WBC # BLD AUTO: 10 10E9/L (ref 4–11)

## 2020-03-12 PROCEDURE — 99253 IP/OBS CNSLTJ NEW/EST LOW 45: CPT | Performed by: PSYCHIATRY & NEUROLOGY

## 2020-03-12 PROCEDURE — 83735 ASSAY OF MAGNESIUM: CPT | Performed by: HOSPITALIST

## 2020-03-12 PROCEDURE — 36415 COLL VENOUS BLD VENIPUNCTURE: CPT | Performed by: INTERNAL MEDICINE

## 2020-03-12 PROCEDURE — 80076 HEPATIC FUNCTION PANEL: CPT | Performed by: INTERNAL MEDICINE

## 2020-03-12 PROCEDURE — 0DJ08ZZ INSPECTION OF UPPER INTESTINAL TRACT, VIA NATURAL OR ARTIFICIAL OPENING ENDOSCOPIC: ICD-10-PCS | Performed by: INTERNAL MEDICINE

## 2020-03-12 PROCEDURE — 25800030 ZZH RX IP 258 OP 636: Performed by: INTERNAL MEDICINE

## 2020-03-12 PROCEDURE — 25000132 ZZH RX MED GY IP 250 OP 250 PS 637: Performed by: INTERNAL MEDICINE

## 2020-03-12 PROCEDURE — 25000132 ZZH RX MED GY IP 250 OP 250 PS 637: Performed by: PSYCHIATRY & NEUROLOGY

## 2020-03-12 PROCEDURE — 40000104 ZZH STATISTIC MODERATE SEDATION < 10 MIN: Performed by: INTERNAL MEDICINE

## 2020-03-12 PROCEDURE — 25000132 ZZH RX MED GY IP 250 OP 250 PS 637: Performed by: HOSPITALIST

## 2020-03-12 PROCEDURE — C9113 INJ PANTOPRAZOLE SODIUM, VIA: HCPCS | Performed by: INTERNAL MEDICINE

## 2020-03-12 PROCEDURE — 25800025 ZZH RX 258: Performed by: INTERNAL MEDICINE

## 2020-03-12 PROCEDURE — 85018 HEMOGLOBIN: CPT | Performed by: INTERNAL MEDICINE

## 2020-03-12 PROCEDURE — 80048 BASIC METABOLIC PNL TOTAL CA: CPT | Performed by: INTERNAL MEDICINE

## 2020-03-12 PROCEDURE — 43235 EGD DIAGNOSTIC BRUSH WASH: CPT | Performed by: INTERNAL MEDICINE

## 2020-03-12 PROCEDURE — 25000128 H RX IP 250 OP 636: Performed by: INTERNAL MEDICINE

## 2020-03-12 PROCEDURE — 25000125 ZZHC RX 250: Performed by: INTERNAL MEDICINE

## 2020-03-12 PROCEDURE — 99232 SBSQ HOSP IP/OBS MODERATE 35: CPT | Performed by: HOSPITALIST

## 2020-03-12 PROCEDURE — 97165 OT EVAL LOW COMPLEX 30 MIN: CPT | Mod: GO

## 2020-03-12 PROCEDURE — 12000000 ZZH R&B MED SURG/OB

## 2020-03-12 PROCEDURE — 84100 ASSAY OF PHOSPHORUS: CPT | Performed by: HOSPITALIST

## 2020-03-12 PROCEDURE — 25000132 ZZH RX MED GY IP 250 OP 250 PS 637: Performed by: EMERGENCY MEDICINE

## 2020-03-12 PROCEDURE — 85027 COMPLETE CBC AUTOMATED: CPT | Performed by: INTERNAL MEDICINE

## 2020-03-12 PROCEDURE — 36415 COLL VENOUS BLD VENIPUNCTURE: CPT | Performed by: HOSPITALIST

## 2020-03-12 RX ORDER — MIRTAZAPINE 7.5 MG/1
7.5 TABLET, FILM COATED ORAL AT BEDTIME
Status: COMPLETED | OUTPATIENT
Start: 2020-03-12 | End: 2020-03-12

## 2020-03-12 RX ORDER — SUCRALFATE ORAL 1 G/10ML
1 SUSPENSION ORAL
Status: DISCONTINUED | OUTPATIENT
Start: 2020-03-12 | End: 2020-03-14 | Stop reason: HOSPADM

## 2020-03-12 RX ORDER — MIRTAZAPINE 15 MG/1
15 TABLET, FILM COATED ORAL AT BEDTIME
Status: DISCONTINUED | OUTPATIENT
Start: 2020-03-13 | End: 2020-03-14 | Stop reason: HOSPADM

## 2020-03-12 RX ORDER — LIDOCAINE 40 MG/G
CREAM TOPICAL
Status: DISCONTINUED | OUTPATIENT
Start: 2020-03-12 | End: 2020-03-12 | Stop reason: HOSPADM

## 2020-03-12 RX ORDER — NICOTINE 21 MG/24HR
1 PATCH, TRANSDERMAL 24 HOURS TRANSDERMAL DAILY
Status: DISCONTINUED | OUTPATIENT
Start: 2020-03-12 | End: 2020-03-14 | Stop reason: HOSPADM

## 2020-03-12 RX ORDER — NALTREXONE HYDROCHLORIDE 50 MG/1
50 TABLET, FILM COATED ORAL DAILY
Status: DISCONTINUED | OUTPATIENT
Start: 2020-03-13 | End: 2020-03-14 | Stop reason: HOSPADM

## 2020-03-12 RX ORDER — NALOXONE HYDROCHLORIDE 0.4 MG/ML
.1-.4 INJECTION, SOLUTION INTRAMUSCULAR; INTRAVENOUS; SUBCUTANEOUS
Status: DISCONTINUED | OUTPATIENT
Start: 2020-03-12 | End: 2020-03-12

## 2020-03-12 RX ORDER — FENTANYL CITRATE 50 UG/ML
INJECTION, SOLUTION INTRAMUSCULAR; INTRAVENOUS PRN
Status: DISCONTINUED | OUTPATIENT
Start: 2020-03-12 | End: 2020-03-12 | Stop reason: HOSPADM

## 2020-03-12 RX ORDER — FLUMAZENIL 0.1 MG/ML
0.2 INJECTION, SOLUTION INTRAVENOUS
Status: ACTIVE | OUTPATIENT
Start: 2020-03-12 | End: 2020-03-12

## 2020-03-12 RX ADMIN — POTASSIUM CHLORIDE, DEXTROSE MONOHYDRATE AND SODIUM CHLORIDE: 150; 5; 900 INJECTION, SOLUTION INTRAVENOUS at 06:08

## 2020-03-12 RX ADMIN — Medication 1 LOZENGE: at 16:04

## 2020-03-12 RX ADMIN — Medication 1 ML: at 09:18

## 2020-03-12 RX ADMIN — ONDANSETRON 4 MG: 2 INJECTION INTRAMUSCULAR; INTRAVENOUS at 09:47

## 2020-03-12 RX ADMIN — SUCRALFATE 1 G: 1 SUSPENSION ORAL at 22:40

## 2020-03-12 RX ADMIN — PANTOPRAZOLE SODIUM 40 MG: 40 INJECTION, POWDER, FOR SOLUTION INTRAVENOUS at 09:18

## 2020-03-12 RX ADMIN — POTASSIUM CHLORIDE, DEXTROSE MONOHYDRATE AND SODIUM CHLORIDE: 150; 5; 900 INJECTION, SOLUTION INTRAVENOUS at 16:03

## 2020-03-12 RX ADMIN — PROCHLORPERAZINE EDISYLATE 10 MG: 5 INJECTION INTRAMUSCULAR; INTRAVENOUS at 13:40

## 2020-03-12 RX ADMIN — THIAMINE HCL TAB 100 MG 100 MG: 100 TAB at 09:18

## 2020-03-12 RX ADMIN — PANTOPRAZOLE SODIUM 40 MG: 40 INJECTION, POWDER, FOR SOLUTION INTRAVENOUS at 22:36

## 2020-03-12 RX ADMIN — MULTIPLE VITAMINS W/ MINERALS TAB 1 TABLET: TAB at 09:18

## 2020-03-12 RX ADMIN — NICOTINE 1 PATCH: 14 PATCH, EXTENDED RELEASE TRANSDERMAL at 22:59

## 2020-03-12 RX ADMIN — POTASSIUM PHOSPHATE, MONOBASIC AND POTASSIUM PHOSPHATE, DIBASIC 20 MMOL: 224; 236 INJECTION, SOLUTION INTRAVENOUS at 18:11

## 2020-03-12 RX ADMIN — FOLIC ACID 1 MG: 1 TABLET ORAL at 09:18

## 2020-03-12 RX ADMIN — POTASSIUM PHOSPHATE, MONOBASIC AND POTASSIUM PHOSPHATE, DIBASIC 25 MMOL: 224; 236 INJECTION, SOLUTION INTRAVENOUS at 04:40

## 2020-03-12 RX ADMIN — MIRTAZAPINE 7.5 MG: 7.5 TABLET, FILM COATED ORAL at 22:40

## 2020-03-12 RX ADMIN — SUCRALFATE 1 G: 1 SUSPENSION ORAL at 16:31

## 2020-03-12 ASSESSMENT — ACTIVITIES OF DAILY LIVING (ADL)
ADLS_ACUITY_SCORE: 12
PREVIOUS_RESPONSIBILITIES: MEAL PREP;HOUSEKEEPING;LAUNDRY;SHOPPING;YARDWORK;MEDICATION MANAGEMENT;FINANCES;DRIVING;WORK
ADLS_ACUITY_SCORE: 12
ADLS_ACUITY_SCORE: 12

## 2020-03-12 ASSESSMENT — MIFFLIN-ST. JEOR: SCORE: 2006.75

## 2020-03-12 NOTE — CONSULTS
Pt seen for initial psychiatric evaluation, please see my dictation for details and recommendations. Patient says he is interested in residential treatment but he has some urgent financial issues he has to take care off. States he is not medication compliant and so he does not feel antidepressants will work for him. Accepts trial of naltrexone.    Meri Ariza MD

## 2020-03-12 NOTE — CONSULTS
"Consult Date:  03/12/2020      PSYCHIATRY CONSULTATION        PRIMARY CARE PHYSICIAN:  Earlene Whelan MD      REASON FOR REFERRAL:  Decompensated depression, alcohol withdrawal, alcohol dependence.      REQUESTING PROVIDER:  Mick Ashton MD      IDENTIFYING DATA:  Иван Rodriguez is a 34-year-old man who reports he is single with no children.  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  He presented to [facility] ED on 03/11/2020 on account of vomiting and alcohol withdrawal.  Psychiatry was consulted to render an opinion on his depression, anxiety and alcohol use.  Information was gathered through direct patient contact in addition to chart review.      CHIEF COMPLAINT:  \"I'm just overwhelmed with life.\"      HISTORY OF PRESENT ILLNESS:  Иван Rodriguez reports a longstanding history of depression, which reportedly began about 14 years ago after he lost his 2-year-old daughter in a horrific car accident.  He has since been disillusioned about life and had become alcohol dependent.  He reports that he has never had formal CD treatment, but was consuming large amounts of alcohol, particularly when he and his ex-girlfriend were still a pair.  He reports that he drinks about 2-3 pints of vodka daily and reports that this has been going on account of asking his girlfriend to move out a few months ago.  He reports that he was consuming up to 3 pints when they were together, as she is also a drinker.  He reports that he has also had significant financial stress and struggles with keeping his head above water.  Per his report, he experiences florid symptoms of depression and finds it hard to deal with day-to-day even.  He reports that his last drink was a couple days prior to his presentation, but he had not been able to eat for about 3 or 4 days.  He was vomiting prior to admission and complained of back pain, acid reflux and right upper abdominal pain.  He " observed a small amount of blood in his emesis.  He denies other illicit drug use.  He tells me that he is currently seeing a therapist, CIARAN Reis at [facility] in [place].  He states he had been talking to her about the possibility of transitioning to a residential treatment facility on account of his worsening alcohol use.  He last saw his therapist on the 4th of this month.  The patient reports that he feels hopeless, but denies any active self-harm thoughts.  He endorsed anhedonia, depressed mood, excessive sleep, not wanting to move or get out of bed.  He denies any specific self-harm thoughts or plans.  He denies history consistent with jere or psychosis.  He does endorse history of posttraumatic stress disorder, but did not elaborate on this.  He denies history consistent with jere or psychosis.  He endorses ruminative worry as well as panic attacks.  With respect to medication compliance, he was advised that when he was seen by Dr. Zuleta about a year ago, he was prescribed mirtazapine and quetiapine.  The patient reports that he has not been able to take medications on account of his fear that the medications will do something negative to him.  He had been previously diagnosed with major depressive disorder, generalized anxiety disorder, substance use disorder and posttraumatic stress disorder.      PAST PSYCHIATRIC HISTORY:  The patient denies previous psychiatric hospitalization, but as indicated above, has had some trials of antidepressant medication.      CHEMICAL USE HISTORY:  The patient started drinking alcohol about the age of 16.  He started drinking heavily after his daughter's death.  He consumes on average a pint of vodka per day, but this escalated to 2-3 pints a day in the past year.  He also smokes marijuana on a daily basis.      PAST MEDICAL HISTORY:  None reported.      PAST SURGICAL HISTORY:  None reported.      MEDICATIONS PRIOR TO  ADMISSION:   1.  Folic acid 1 mg p.o. daily.   2.  Gabapentin 300 mg p.o. t.i.d.   3.  Vistaril 50 mg p.o. t.i.d.   4.  Multivitamin 1 p.o. daily.   5.  Protonix 40 mg p.o. b.i.d.   6.  Inderal 20 mg p.o. b.i.d.   7.  Thiamine 100 mg p.o. daily.   8.  Antabuse 250 mg p.o. daily.      ALLERGIES:  AMOXICILLIN AND BACTRIM.      FAMILY PSYCHIATRIC HISTORY:  The patient reports his brother suffers from a mental health diagnosis.      SOCIAL HISTORY:  The patient was born and raised in [place], [place].  He has an older brother and an older stepsister.  He reportedly has a good relationship with his parents.  He reports graduating high school and completed 2 years of college at [employer].  He works part-time as a  and claims he transiently worked at a [employer].  He is single and reports that he lost his daughter to a car accident 14 years ago.  He currently resides in an apartment in [place] by himself.  He claims he had significant financial stress.  He denies  or criminal history.  He states he has never obtained a DWI or DUI.      REVIEW OF SYSTEMS:  I refer the reader to the 10-point review of systems documented by Mick Ashton MD on 03/11/2020 at 5:32 p.m.      VITAL SIGNS:  Blood pressure 113/72, pulse 97, respirations 20, temperature 99.2.      MENTAL STATUS EXAMINATION:  Иван Rodriguez is a tall, middle-aged man who appears his stated age of 34.  He makes fair eye contact and speaks softly, but clearly and coherently.  His mood is described as depressed and his affect is flat and restricted in range.  His thought process is logical, relevant and goal directed.  He denies active self-harm thoughts, plan, or intent.  He does not endorse any homicidal thoughts, plans, or intent.  His gait and station are not assessed as he is currently bed bound.  His muscle strength is adequate.  His associations are tight and his language is appropriate.  His recall of recent  and remote events is intact.  His impulse control is marginal.  Risk assessment at this time is considered moderate to high.      DIAGNOSTIC IMPRESSION:  Иван Weaver is a 34-year-old man with significant alcohol use history who presents to the hospital on account of alcohol withdrawal and worsening depression.  He admits to increased use of alcohol and a desire to pursue residential Chemical Dependency treatment, but he claims he has significant financial issues that he is trying to resolve before going to treatment.  He states the undersigned should call his therapist for some background information.      DIAGNOSES:   1.  Alcohol use disorder, severe with alcohol withdrawal.   2.  Major depressive disorder, recurrent, severe without psychotic features.   3.  Generalized anxiety disorder.   4.  Posttraumatic stress disorder.   5.  Cannabis use disorder.   6.  Possible hematemesis.   7.  Alcoholic hepatitis.   8.  Alcoholic liver disease.   9.  Dysphagia.      PLAN:   1.  Medical management as you are.   2.  The patient will benefit from chemical use assessment and referral for appropriate level of care.     3.  The patient will also benefit from medication assisted treatment and naltrexone was discussed with the patient and he gave consent for a trial.  Although he will benefit from antidepressant medication, the patient is currently not keen on starting this reportedly because of medication compliance; however, he is also on other medications and so this will be instituted with mirtazapine at 15 mg daily at bedtime.      Thanks for the consult.         HILLARY SUE MD             D: 2020   T: 2020   MT: ANDREW      Name:     NIELS WEAVER   MRN:      [id number]        Account:       [id number]   :      1985           Consult Date:  2020      Document: [id number]       cc: Earlene Whelan MD

## 2020-03-12 NOTE — PROGRESS NOTES
03/12/20 0834   Quick Adds   Type of Visit Initial Occupational Therapy Evaluation   Living Environment   Lives With alone   Living Arrangements apartment   Home Accessibility stairs to enter home   Self-Care   Usual Activity Tolerance excellent   Current Activity Tolerance good   Equipment Currently Used at Home none   Functional Level   Ambulation 0-->independent   Transferring 0-->independent   Toileting 0-->independent   Bathing 0-->independent   Dressing 0-->independent   Eating 0-->independent   Fall history within last six months yes   Number of times patient has fallen within last six months 1   General Information   Onset of Illness/Injury or Date of Surgery - Date 03/11/20   Referring Physician Mick Ashton MD    Patient/Family Goals Statement I want to eat soon, my throat hurts   Additional Occupational Profile Info/Pertinent History of Current Problem Brandin Rodriguez is a 34 year old male history of generalized anxiety disorder, depression, alcohol hepatitis, alcohol dependence, history of dysphasia, history of grade D esophagitis, history of tobacco use, history of PTSD, history of alcohol withdrawal who presents with alcohol withdrawal, vomiting and dehydration.  He is found to be in alcohol withdrawal and acute renal failure secondary to dehydration   Precautions/Limitations fall precautions   Cognitive Status Examination   Orientation orientation to person, place and time   Level of Consciousness alert   Follows Commands (Cognition) WFL   Memory intact   Attention No deficits were identified   Organization/Problem Solving No deficits were identified   Executive Function No deficits were identified   Pain Assessment   Patient Currently in Pain No   Strength   Strength Comments 5/5 BUE strength   Coordination   Upper Extremity Coordination No deficits were identified   Coordination Comments no tremors, intact finger to nose with eyes closed   Transfer Skills   Transfer Comments Pt ambulated  "approximately 200 ft in [place] independently with no gait aid. Stairs not attempted as pt has IV.   Transfer Skill: Bed to Chair/Chair to Bed   Level of [place]: Bed to Chair independent   Transfer Skill: Sit to Stand   Level of [place]: Sit/Stand independent   Transfer Skill: Toilet Transfer   Level of [place]: Toilet independent   Upper Body Dressing   Level of [place]: Dress Upper Body independent   Lower Body Dressing   Level of [place]: Dress Lower Body independent   Grooming   Level of [place]: Grooming independent   Instrumental Activities of Daily Living (IADL)   Previous Responsibilities meal prep;housekeeping;laundry;shopping;yardwork;medication management;finances;driving;work   IADL Comments pt reports he has not been completing cooking and cleaning due to depressive symptoms   Clinical Impression   Criteria for Skilled Therapeutic Interventions Met evaluation only;no problems identified which require skilled intervention   Clinical Decision Making (Complexity) Low complexity   Anticipated Equipment Needs at Discharge   (none)   Anticipated Discharge Disposition Home   Risks and Benefits of Treatment have been explained. Yes   Patient, Family & other staff in agreement with plan of care Yes   [facility] \"6 Clicks\"   2016, Trustees of [facility], under license to [employer].  All rights reserved.   6 Clicks Short Forms Daily Activity Inpatient Short Form   [facility]  \"6 Clicks\" Daily Activity Inpatient Short Form   1. Putting on and taking off regular lower body clothing? 4 - None   2. Bathing (including washing, rinsing, drying)? 4 - None   3. Toileting, which includes using toilet, bedpan or urinal? 4 - None   4. Putting on and taking off regular upper body clothing? 4 - None   5. Taking care of personal grooming such as brushing teeth? 4 - None   6. Eating meals? 4 - None   Daily Activity Raw Score (Score out of 24.Lower scores equate " to lower levels of function) 24   Total Evaluation Time   Total Evaluation Time (Minutes) 25

## 2020-03-12 NOTE — CONSULTS
ETOH withdrawal.  Likely hematemesis.  H/O esophagitis.  NPO  I/V Protonix. Serial Hemoglobin.  Plan for full consult and EGD in AM.    Jake [facility] GI

## 2020-03-12 NOTE — PLAN OF CARE
DATE & TIME: 3/12/20 7070-2096  Cognitive Concerns/ Orientation : A&Ox4  BEHAVIOR & AGGRESSION TOOL COLOR: Green  CIWA SCORE: 4, 2  ABNL VS/O2: VSS, ex tachy   MOBILITY: SBA  PAIN MANAGMENT: Heat packs applied for neck/back pain.   DIET: Mechanical soft  BOWEL/BLADDER: Cont B/B. Voiding adequately.  ABNL LAB/BG: Na 132, AST 84  DRAIN/DEVICES: PIV x2; one infusing D5 NS + 20 mEq K+ @ 100mL/hr  TELEMETRY RHYTHM: ST  SKIN: Intact  TESTS/PROCEDURES: EGD; see results.   D/C DAY/GOALS/PLACE: Pending  OTHER IMPORTANT INFO: Emesis x1 this AM. Zofran and compazine given x1; effective. Psych, CD, GI, PT, OT, and SW consult.

## 2020-03-12 NOTE — PLAN OF CARE
Summary:     DATE & TIME: 3/11/20 NOC  Cognitive Concerns/ Orientation : A&Ox4  BEHAVIOR & AGGRESSION TOOL COLOR: Green  CIWA SCORE: 4  ABNL VS/O2: VSS on RA with LS clear  MOBILITY: SBA  with gb to bathroom  PAIN MANAGMENT: Denies  DIET: NPO at midnight  BOWEL/BLADDER: Cont B/B. Voiding adequately  ABNL LAB/BG: Hgb 14.7, Na 131, Cr 1.59, , ALT 88, , WBC 12.3  DRAIN/DEVICES: PIV SL  TELEMETRY RHYTHM: ST  SKIN: Intact  TESTS/PROCEDURES: US and EKG  D/C DAY/GOALS/PLACE: Pending  OTHER IMPORTANT INFO: Completed Banana bag. Currently replacing Phosphorus. Infusing D5 NS with 20 KCl at125 ml/hr. EGD in AM. Psych, CD, GI, PT, OT, and SW consult

## 2020-03-12 NOTE — PLAN OF CARE
DATE & TIME: 3/12/20 2799-5375  Cognitive Concerns/ Orientation : A&Ox4  BEHAVIOR & AGGRESSION TOOL COLOR: Green  CIWA SCORE: 1  ABNL VS/O2: VSS, ex tachycardic   MOBILITY: SBA, ambulation encouraged  PAIN MANAGMENT: denies pain  DIET: Mechanical soft diet, c/o some discomfort with swallowing, but tolerable  BOWEL/BLADDER: Cont B/B. Voiding adequately.  ABNL LAB/BG: Na 132, AST 84  DRAIN/DEVICES: PIV x2; both infusing  TELEMETRY RHYTHM: ST  SKIN: Intact  TESTS/PROCEDURES: n/a  D/C DAY/GOALS/PLACE: Possible discharge 1-2 days pending clinical improvement  OTHER IMPORTANT INFO: Psych, CD, GI, PT, OT, and SW following. Q4 neuro checks discontinued.

## 2020-03-12 NOTE — PLAN OF CARE
Discharge Planner PT   Patient plan for discharge: did not discuss with pt  Current status: PT orders received, chart reviewed, discussed with OT. Per communication with OT, pt demos ind mobility with excellent balance and coordination, able to perform SLS. No IP PT needs identified, will sign off.  Barriers to return to prior living situation: no mobility concerns  Recommendations for discharge: Home  Rationale for recommendations: Per communication with OT, pt demos ind mobility  & is at or near baseline.       Entered by: Danielle Guerin 03/12/2020 12:46 PM

## 2020-03-12 NOTE — PROGRESS NOTES
[facility]  Hospitalist Progress Note        Pilo Sandhu MD   03/12/2020        Interval History:      -continues to have vomiting and retching, not seem to be in active withdrawal, G.I. evaluation pending         Assessment and Plan:        Brandin Rodriguez is a 34 year old male history of generalized anxiety disorder, depression, alcohol hepatitis, alcohol dependence/withdrawal, h/o grade D esophagitis, tobacco use, PTSD who presented with concerns for alcohol withdrawal, vomiting and dehydration.       # Alcohol abuse with dependents and likely alcohol withdrawal  - clinically improving, leukocytosis, tachycardia better; wbc 18---12---10 ; does not currently seem to be in active withdrawal; continue Ativan PRN per [facility] protocol  - psych/Chem dep eval pending  -was prescribed Antabuse as outpatient but has not started yet  -continue thiamine, folic acid, multivitamin    # Nausea, vomiting, possible Hematemesis, likely sec to Esophagitis and esophageal ulcers (EGD 3/12)  # Hyponatremia  # Acute renal failure  -no further episodes of hematemesis, but continues to have some nausea and vomiting; hemodynamically stable with improving tachycardia from 150s to low 100s; continue IV protonix, serial Hb, transfuse prn for Hb <7; Hb 17---15---14.7  - US abd UR except for fatty liver  - Dr Elizabeth to eval ; EGD 3/12 noted with LA Grade D reflux esophagitis, Esophageal ulcers and Congestive gastropathy with Congested duodenal mucosa  - sucralfate started  - renal function improving; Cr 2.05----1.59---1.19  - hyponatremia improving; Na 129---132   - continue IV hydration until taking adequate PO    #Thrombocytopenia  - likely related to alcohol abuse; platelet 221---165---128  -will monitor counts    DVT Prophylaxis: PCD boots  Code Status: Full Code     Disposition: likely in 1-2 days pending clinical improvement                     Physical Exam:      Blood pressure 125/78, pulse 108, temperature 99.2  " F (37.3  C), temperature source Oral, resp. rate 18, height 1.88 m (6' 2\"), weight 100.2 kg (220 lb 14.4 oz), SpO2 96 %.  Vitals:    03/11/20 1303 03/12/20 0547   Weight: 99.8 kg (220 lb) 100.2 kg (220 lb 14.4 oz)     Vital Signs with Ranges  Temp:  [98.2  F (36.8  C)-99.2  F (37.3  C)] 99.2  F (37.3  C)  Pulse:  [100-152] 108  Heart Rate:  [106-138] 106  Resp:  [11-28] 18  BP: (109-132)/(66-99) 125/78  SpO2:  [94 %-98 %] 96 %  I/O's Last 24 hours  I/O last 3 completed shifts:  In: 2000 [IV Piggyback:2000]  Out: -     Constitutional: Alert, awake and oriented X 3; lying comfortably in bed in no apparent distress   HEENT: Pupils equal and reactive to light and accomodation, EOMI intact; neck supple no raised JVD or rigidity    Oral cavity: Moist mucosa   Cardiovascular: Normal s1 s2, slightly tachycardic, no murmur   Lungs: B/l clear to auscultation, no wheezes or crepitations   Abdomen: Soft, nt, nd, no guarding, rigidity or rebound; BS +   LE : No edema   Musculoskeletal: Power 5/5 in all extremities   Neuro: No focal neurological deficits noted, CN II to XII grossly intact   Psychiatry: normal mood and affect                Medications:          folic acid  1 mg Oral Daily     multivitamin w/minerals  1 tablet Oral Daily     pantoprazole (PROTONIX) IV  40 mg Intravenous Q12H     sodium chloride (PF)  3 mL Intracatheter Q8H     sodium chloride (PF)  3 mL Intracatheter Q8H     vitamin B1  100 mg Oral Daily     PRN Meds: sore throat lozenge, bisacodyl, lidocaine 4%, lidocaine 4%, lidocaine (buffered or not buffered), lidocaine (buffered or not buffered), LORazepam **OR** LORazepam, magnesium sulfate, - MEDICATION INSTRUCTIONS -, - MEDICATION INSTRUCTIONS -, melatonin, naloxone, ondansetron **OR** ondansetron, phenol, polyethylene glycol, potassium chloride, potassium chloride with lidocaine, potassium chloride, potassium chloride, potassium chloride, potassium phosphate (KPHOS) in D5W IV, potassium phosphate (KPHOS) " in D5W IV, potassium phosphate (KPHOS) in D5W IV, prochlorperazine **OR** prochlorperazine **OR** prochlorperazine, QUEtiapine, senna-docusate **OR** senna-docusate, sodium chloride (PF), sodium chloride (PF)         Data:      All new lab and imaging data was reviewed.   Recent Labs   Lab Test 03/12/20 0059 03/11/20 1941 03/11/20 1449 11/13/19 1956   WBC  --  12.3* 18.1* 7.0   HGB 14.7 15.0 17.6 15.2   MCV  --  106* 105* 105*   PLT  --  165 221 390   INR  --  1.19*  --   --       Recent Labs   Lab Test 03/11/20 1941 03/11/20 1449 11/13/19 1956   * 129* 140   POTASSIUM 3.8 3.9 3.2*   CHLORIDE 94 87* 108   CO2 21 15* 24   BUN 24 20 12   CR 1.59* 2.05* 0.73   ANIONGAP 16* 27* 8   KATHY 8.4* 9.8 8.9   * 230* 104*     No lab results found.    Invalid input(s): TROP, TROPONINIES

## 2020-03-12 NOTE — PLAN OF CARE
Pt admitted with alcohol withdrawal, vomiting and dehydration. At baseline pt lives alone and is independent in self-cares and IADLs.    Discharge Planner OT   Patient plan for discharge: Home  Current status: Pt is completing self-cares and functional mobility independently. He demonstrates excellent balance and coordination. No cognitive concerns noted. Pt does verbalize depressive thoughts and become intermittently tearful. Pt has no physical therapy needs.  Barriers to return to prior living situation: none  Recommendations for discharge: Home  Rationale for recommendations: Pt is functioning at or near baseline.       Entered by: Marleni Ray 03/12/2020 9:09 AM

## 2020-03-12 NOTE — PLAN OF CARE
Summary:     DATE & TIME: 3/11/20 PM  Cognitive Concerns/ Orientation : A&Ox4  BEHAVIOR & AGGRESSION TOOL COLOR: Green  CIWA SCORE: 6  ABNL VS/O2: VSS on RA with LS clear  MOBILITY: SBA  with gb  PAIN MANAGMENT: Denies  DIET: Clear liquid diet. NPO at midnight  BOWEL/BLADDER: Cont B/B. Voiding adequately  ABNL LAB/BG: Na 131, Cr 1.59, , ALT 88, , WBC 12.3  DRAIN/DEVICES: PIV SL  TELEMETRY RHYTHM: SR  SKIN: Intact  TESTS/PROCEDURES: US and EKG  D/C DAY/GOALS/PLACE: Pending  OTHER IMPORTANT INFO: Psych, CD, GI, PT, OT, and SW consult

## 2020-03-12 NOTE — PHARMACY-ADMISSION MEDICATION HISTORY
Pharmacy Medication History  Admission medication history interview status for the 3/11/2020  admission is complete. See EPIC admission navigator for prior to admission medications     Medication history sources: Patient  Medication history source reliability: Good  Adherence assessment: Poor    Significant changes made to the medication list:  1) Medications removed:    Fluoxetine (20 mg daily) - stopped taking > 1 month ago    Mirtazapine (15 mg) - stopped taking > 1 month ago    Additional medication history information:   1) Patient has not started disulfiram yet due to concern of adverse effects.   2) Patient uses gabapentin (PRN withdrawal symptoms), hydroxyzine (PRN anxiety), and propranolol (PRN anxiety) occasionally.   3) Patient reported taking folic acid, multivitamin, pantoprazole, and thiamine occasionally during the week when he remembers.    Medication reconciliation completed by provider prior to medication history? No    Time spent in this activity: 20 minutes      Prior to Admission medications    Medication Sig Last Dose Taking? Auth Provider   folic acid (FOLVITE) 1 MG tablet Take 1 tablet (1 mg) by mouth daily Past Week at AM Yes Mireya Navarrete MD   gabapentin (NEURONTIN) 300 MG capsule Take 1 capsule (300 mg) by mouth 3 times daily Past Week at PRN Yes Arturo Fisher MD   hydrOXYzine (VISTARIL) 50 MG capsule Take 1 capsule (50 mg) by mouth 3 times daily as needed for anxiety PRN at PRN Yes Arturo Fisher MD   multivitamin w/minerals (THERA-VIT-M) tablet Take 1 tablet by mouth daily Past Week at AM Yes Jerod Arzate,    pantoprazole (PROTONIX) 40 MG EC tablet Take 1 tablet (40 mg) by mouth 2 times daily Past Week at AM Yes Jerod Arzate DO   propranolol (INDERAL) 20 MG tablet Take 1 tablet (20 mg) by mouth 2 times daily as needed (Anxiety) PRN at PRN Yes Arturo Fisher MD   vitamin B1 (THIAMINE) 100 MG tablet Take 1 tablet (100 mg) by mouth daily  Past Week at AM Yes Jerod Arzate,    disulfiram (ANTABUSE) 250 MG tablet Take 250 mg by mouth daily   Unknown, Entered By History       Ro Suarez, PharmBetsyD.

## 2020-03-12 NOTE — CONSULTS
3/12/20 chem dep consult acknowledged.  Per EHR, patient has [facility] insurance so would need Rule 25 funding for any substance use services.  I emailed the unit  the [facility] application and requested she provide to patient prior to him being seen.

## 2020-03-13 LAB
ANION GAP SERPL CALCULATED.3IONS-SCNC: 8 MMOL/L (ref 3–14)
BUN SERPL-MCNC: 10 MG/DL (ref 7–30)
CALCIUM SERPL-MCNC: 8.7 MG/DL (ref 8.5–10.1)
CHLORIDE SERPL-SCNC: 106 MMOL/L (ref 94–109)
CO2 SERPL-SCNC: 24 MMOL/L (ref 20–32)
CREAT SERPL-MCNC: 0.78 MG/DL (ref 0.66–1.25)
GFR SERPL CREATININE-BSD FRML MDRD: >90 ML/MIN/{1.73_M2}
GLUCOSE BLDC GLUCOMTR-MCNC: 165 MG/DL (ref 70–99)
GLUCOSE SERPL-MCNC: 129 MG/DL (ref 70–99)
HGB BLD-MCNC: 13.2 G/DL (ref 13.3–17.7)
PHOSPHATE SERPL-MCNC: 1.3 MG/DL (ref 2.5–4.5)
PHOSPHATE SERPL-MCNC: 1.8 MG/DL (ref 2.5–4.5)
PHOSPHATE SERPL-MCNC: 1.9 MG/DL (ref 2.5–4.5)
PLATELET # BLD AUTO: 104 10E9/L (ref 150–450)
POTASSIUM SERPL-SCNC: 3 MMOL/L (ref 3.4–5.3)
POTASSIUM SERPL-SCNC: 3.5 MMOL/L (ref 3.4–5.3)
SODIUM SERPL-SCNC: 138 MMOL/L (ref 133–144)

## 2020-03-13 PROCEDURE — 25000132 ZZH RX MED GY IP 250 OP 250 PS 637: Performed by: INTERNAL MEDICINE

## 2020-03-13 PROCEDURE — 85018 HEMOGLOBIN: CPT | Performed by: HOSPITALIST

## 2020-03-13 PROCEDURE — 99232 SBSQ HOSP IP/OBS MODERATE 35: CPT | Performed by: HOSPITALIST

## 2020-03-13 PROCEDURE — 85049 AUTOMATED PLATELET COUNT: CPT | Performed by: HOSPITALIST

## 2020-03-13 PROCEDURE — 25000132 ZZH RX MED GY IP 250 OP 250 PS 637: Performed by: PSYCHIATRY & NEUROLOGY

## 2020-03-13 PROCEDURE — 80048 BASIC METABOLIC PNL TOTAL CA: CPT | Performed by: HOSPITALIST

## 2020-03-13 PROCEDURE — 25000125 ZZHC RX 250: Performed by: INTERNAL MEDICINE

## 2020-03-13 PROCEDURE — 25800025 ZZH RX 258: Performed by: INTERNAL MEDICINE

## 2020-03-13 PROCEDURE — 84132 ASSAY OF SERUM POTASSIUM: CPT | Performed by: INTERNAL MEDICINE

## 2020-03-13 PROCEDURE — 25800030 ZZH RX IP 258 OP 636: Performed by: INTERNAL MEDICINE

## 2020-03-13 PROCEDURE — 00000146 ZZHCL STATISTIC GLUCOSE BY METER IP

## 2020-03-13 PROCEDURE — 12000000 ZZH R&B MED SURG/OB

## 2020-03-13 PROCEDURE — 25000132 ZZH RX MED GY IP 250 OP 250 PS 637: Performed by: PHYSICIAN ASSISTANT

## 2020-03-13 PROCEDURE — 25000132 ZZH RX MED GY IP 250 OP 250 PS 637: Performed by: HOSPITALIST

## 2020-03-13 PROCEDURE — 36415 COLL VENOUS BLD VENIPUNCTURE: CPT | Performed by: HOSPITALIST

## 2020-03-13 PROCEDURE — 84100 ASSAY OF PHOSPHORUS: CPT | Performed by: HOSPITALIST

## 2020-03-13 PROCEDURE — C9113 INJ PANTOPRAZOLE SODIUM, VIA: HCPCS | Performed by: INTERNAL MEDICINE

## 2020-03-13 PROCEDURE — 36415 COLL VENOUS BLD VENIPUNCTURE: CPT | Performed by: INTERNAL MEDICINE

## 2020-03-13 PROCEDURE — 25000128 H RX IP 250 OP 636: Performed by: INTERNAL MEDICINE

## 2020-03-13 RX ORDER — PANTOPRAZOLE SODIUM 40 MG/1
40 TABLET, DELAYED RELEASE ORAL
Status: DISCONTINUED | OUTPATIENT
Start: 2020-03-13 | End: 2020-03-14 | Stop reason: HOSPADM

## 2020-03-13 RX ORDER — CALCIUM CARBONATE 500 MG/1
1000 TABLET, CHEWABLE ORAL EVERY 4 HOURS PRN
Status: DISCONTINUED | OUTPATIENT
Start: 2020-03-13 | End: 2020-03-14 | Stop reason: HOSPADM

## 2020-03-13 RX ADMIN — POTASSIUM PHOSPHATE, MONOBASIC AND POTASSIUM PHOSPHATE, DIBASIC 20 MMOL: 224; 236 INJECTION, SOLUTION INTRAVENOUS at 02:01

## 2020-03-13 RX ADMIN — MIRTAZAPINE 15 MG: 15 TABLET, FILM COATED ORAL at 21:44

## 2020-03-13 RX ADMIN — NICOTINE 1 PATCH: 14 PATCH, EXTENDED RELEASE TRANSDERMAL at 08:25

## 2020-03-13 RX ADMIN — SUCRALFATE 1 G: 1 SUSPENSION ORAL at 16:44

## 2020-03-13 RX ADMIN — Medication 10 MEQ: at 20:17

## 2020-03-13 RX ADMIN — PROCHLORPERAZINE EDISYLATE 10 MG: 5 INJECTION INTRAMUSCULAR; INTRAVENOUS at 12:32

## 2020-03-13 RX ADMIN — Medication 10 MEQ: at 19:05

## 2020-03-13 RX ADMIN — ONDANSETRON 4 MG: 2 INJECTION INTRAMUSCULAR; INTRAVENOUS at 08:23

## 2020-03-13 RX ADMIN — CALCIUM CARBONATE (ANTACID) CHEW TAB 500 MG 1000 MG: 500 CHEW TAB at 05:19

## 2020-03-13 RX ADMIN — Medication 10 MEQ: at 16:45

## 2020-03-13 RX ADMIN — POTASSIUM PHOSPHATE, MONOBASIC AND POTASSIUM PHOSPHATE, DIBASIC 20 MMOL: 224; 236 INJECTION, SOLUTION INTRAVENOUS at 23:07

## 2020-03-13 RX ADMIN — Medication 10 MEQ: at 18:01

## 2020-03-13 RX ADMIN — SUCRALFATE 1 G: 1 SUSPENSION ORAL at 21:44

## 2020-03-13 RX ADMIN — POTASSIUM CHLORIDE, DEXTROSE MONOHYDRATE AND SODIUM CHLORIDE: 150; 5; 900 INJECTION, SOLUTION INTRAVENOUS at 08:29

## 2020-03-13 RX ADMIN — POTASSIUM PHOSPHATE, MONOBASIC AND POTASSIUM PHOSPHATE, DIBASIC 20 MMOL: 224; 236 INJECTION, SOLUTION INTRAVENOUS at 12:23

## 2020-03-13 RX ADMIN — PANTOPRAZOLE SODIUM 40 MG: 40 TABLET, DELAYED RELEASE ORAL at 16:44

## 2020-03-13 RX ADMIN — PANTOPRAZOLE SODIUM 40 MG: 40 INJECTION, POWDER, FOR SOLUTION INTRAVENOUS at 09:26

## 2020-03-13 RX ADMIN — SUCRALFATE 1 G: 1 SUSPENSION ORAL at 12:22

## 2020-03-13 RX ADMIN — ONDANSETRON 4 MG: 4 TABLET, ORALLY DISINTEGRATING ORAL at 21:48

## 2020-03-13 RX ADMIN — SUCRALFATE 1 G: 1 SUSPENSION ORAL at 07:00

## 2020-03-13 RX ADMIN — NALTREXONE HYDROCHLORIDE 50 MG: 50 TABLET, FILM COATED ORAL at 08:24

## 2020-03-13 ASSESSMENT — ACTIVITIES OF DAILY LIVING (ADL)
ADLS_ACUITY_SCORE: 12

## 2020-03-13 ASSESSMENT — MIFFLIN-ST. JEOR: SCORE: 2000.85

## 2020-03-13 NOTE — CONSULTS
[facility]  Gastroenterology Consultation         Brandin Rodriguez  [address]  [place] [address]  34 year old male    Admission Date/Time: 3/11/2020  Primary Care Provider: Earlene Whelan  Referring / Attending Physician: Dr. Ashton    We were asked to see the patient in consultation by Dr. Ashton for evaluation of hematemesis nausea vomiting.      CC: Hematemesis    HPI:  Brandin Rodriguez is a 34 year old male who was admitted with history of binging drinking patient has longstanding history of heavy alcohol consumption patient was evaluated last November patient was found to have significant esophagitis and ulcers.  Patient was admitted this time with multiple episodes of nausea vomiting episode of hematemesis.  Patient is going through alcohol withdrawal patient has a known history of alcoholic hepatitis and elevated liver function tests alcohol dependence on anxiety post traumatic stress disorder.  Patient was actively drinking.  Patient is denying any history of seizures or any other neurological signs or symptoms.  Patient had episodes of hallucinations yesterday.  Currently patient is on alcohol withdrawal treatment protocol.  Patient is denying any other significant systemic complaint rest of review of system is negative.    ROS: A comprehensive ten point review of systems was negative aside from those in mentioned in the HPI.      PAST MED HX:  I have reviewed this patient's medical history and updated it with pertinent information if needed.   Past Medical History:   Diagnosis Date     Alcoholic hepatitis      Anxiety      Depression      Depressive disorder      PTSD (post-traumatic stress disorder)      Suicidal ideation        MEDICATIONS:   Prior to Admission Medications   Prescriptions Last Dose Informant Patient Reported? Taking?   disulfiram (ANTABUSE) 250 MG tablet  Self Yes No   Sig: Take 250 mg by mouth daily   folic acid (FOLVITE) 1 MG tablet Past Week  at AM Self No Yes   Sig: Take 1 tablet (1 mg) by mouth daily   gabapentin (NEURONTIN) 300 MG capsule Past Week at PRN Self No Yes   Sig: Take 1 capsule (300 mg) by mouth 3 times daily   hydrOXYzine (VISTARIL) 50 MG capsule PRN at PRN Self No Yes   Sig: Take 1 capsule (50 mg) by mouth 3 times daily as needed for anxiety   multivitamin w/minerals (THERA-VIT-M) tablet Past Week at AM Self No Yes   Sig: Take 1 tablet by mouth daily   pantoprazole (PROTONIX) 40 MG EC tablet Past Week at AM Self No Yes   Sig: Take 1 tablet (40 mg) by mouth 2 times daily   propranolol (INDERAL) 20 MG tablet PRN at PRN Self No Yes   Sig: Take 1 tablet (20 mg) by mouth 2 times daily as needed (Anxiety)   vitamin B1 (THIAMINE) 100 MG tablet Past Week at AM Self No Yes   Sig: Take 1 tablet (100 mg) by mouth daily      Facility-Administered Medications: None       ALLERGIES:   Allergies   Allergen Reactions     Amoxicillin      Bactrim [Sulfamethoxazole W-Trimethoprim]        SOCIAL HISTORY:  Social History     Tobacco Use     Smoking status: Current Every Day Smoker     Packs/day: 1.00     Smokeless tobacco: Never Used   Substance Use Topics     Alcohol use: Yes     Frequency: 4 or more times a week     Drinks per session: 7 to 9     Binge frequency: Daily or almost daily     Comment: daily 2 pints per day     Drug use: Yes     Types: Marijuana     Comment: Uses marijuana before eating       FAMILY HISTORY:  Family History   Problem Relation Age of Onset     Diabetes Mother      Hypertension Mother      Heart Disease Maternal Grandmother      Cancer Paternal Grandmother      Anxiety Disorder Brother      Arrhythmia Maternal Half-Sister        PHYSICAL EXAM:   General awake alert oriented shaky tachycardic  Vital Signs with Ranges  Temp: 99.1  F (37.3  C) Temp src: Oral BP: 109/68 Pulse: 97 Heart Rate: 109 Resp: 18 SpO2: 94 % O2 Device: None (Room air) Oxygen Delivery: 2 LPM  I/O last 3 completed shifts:  In: 2000 [IV Piggyback:2000]  Out: -      Constitutional: healthy, alert and no distress   Cardiovascular: negative, PMI normal. No lifts, heaves, or thrills. RRR. No murmurs, clicks gallops or rub  Respiratory: negative, Percussion normal. Good diaphragmatic excursion. Lungs clear  Head: Normocephalic. No masses, lesions, tenderness or abnormalities  Neck: Neck supple. No adenopathy. Thyroid symmetric, normal size,, Carotids without bruits.  Abdomen: Abdomen soft, non-tender. BS normal. No masses, organomegaly  NEURO: Gait normal. Reflexes normal and symmetric. Sensation grossly WNL.          ADDITIONAL COMMENTS:   I reviewed the patient's new clinical lab test results.   Recent Labs   Lab Test 03/12/20 0936 03/12/20  0059 03/11/20 1941 03/11/20  1449   WBC 10.0  --  12.3* 18.1*   HGB 13.9 14.7 15.0 17.6   *  --  106* 105*   *  --  165 221   INR  --   --  1.19*  --      Recent Labs   Lab Test 03/12/20 0936 03/11/20 1941 03/11/20  1449   POTASSIUM 3.5 3.8 3.9   CHLORIDE 99 94 87*   CO2 22 21 15*   BUN 26 24 20   ANIONGAP 11 16* 27*     Recent Labs   Lab Test 03/12/20 0936 03/11/20 1941 03/11/20  1449 11/13/19  1956 11/11/19  1124  11/04/19  1650 11/04/19  1454   ALBUMIN 3.4 3.9 4.8 3.7 4.0   < >  --  5.0   BILITOTAL 2.2*  --  6.9* 0.7 0.9   < >  --  2.5*   ALT 59  --  88* 425* 676*   < >  --  85*   AST 84*  --  132* 325* 888*   < >  --  124*   PROTEIN  --   --   --   --  30*  --  30*  --    LIPASE  --   --  366 332  --   --   --  363    < > = values in this interval not displayed.       I reviewed the patient's new imaging results.        CONSULTATION ASSESSMENT AND PLAN:    Principal Problem:    Alcohol withdrawal (H)    Assessment:   Very pleasant 34-year-old gentleman with history of recurrent bouts of nausea vomiting hematemesis history of esophagitis gastroesophageal reflux disease chronic alcohol use with recent admission again with significant alcohol use and now alcohol withdrawal.  Patient findings are quite suggestive of  possible recurrent esophagitis versus peptic ulcer disease other differential would include Ly-Higginbotham tear.  Patient is currently going through alcohol withdrawal treatment patient is being evaluated in psychiatry I do believe patient need close monitoring and treatment for his alcohol use.  Plan was discussed in detail with the patient continue on IV Protonix I will proceed with upper GI endoscopy this morning.  Differential diagnosis plan risk-benefit were discussed in detail with patient.  Thank you very much for letting me participate in his care.                Jake Elizabeth MD, FACP  [facility].  Office: [phone]  Cell : [phone]

## 2020-03-13 NOTE — PROGRESS NOTES
[facility]  Gastroenterology Progress Note     Brandin Rodriguez MRN# [id number]   YOB: 1985 Age: 34 year old          Assessment and Plan:    Update: Patient feeling improved. Has mild nausea with vomiting, reports heartburn. No labs available this a.m. EGD yesterday noted 2 sophgael ulcers with no stigmata of recent bleeding and congested mucosa in entire stomach.    Alcohol withdrawal (H)   Hematemesis  Patient doing well. Has no tremors. Denies new symptoms. NO labs available. Nausea and heartburn present    - try low fiber diet  - switch to oral pantoprazole 40 mg BID  - Daily labs  - Continue with antiemetics  - Keep head of bed elevated to 60 or higher with eating and 2-3 hours after      NEREYDA (generalized anxiety disorder)    Severe episode of recurrent major depressive disorder, without psychotic features (H)    Hepatitis    Liver disease, chronic, due to alcohol (H)    Uncomplicated alcohol dependence (H)    Dysphagia    Tobacco use disorder    PTSD (post-traumatic stress disorder)    Current severe episode of major depressive disorder without psychotic features, unspecified whether recurrent (H)    Sinus tachycardia    Dehydration            JINNY (acute kidney injury) (H)  Alcohol withdrawal syndrome without complication (H)  Abnormal LFTs  Dehydration      Interval History:   no new complaints, denies chest pain, denies shortness of breath, denies abdominal pain, alert, oriented to person, place and time, has had a bowel movement in the last 24 hours and doing well; no cp, sob, n/v/d, or abd pain.              Review of Systems:   C: NEGATIVE for fever, chills, change in weight  E/M: NEGATIVE for ear, mouth and throat problems  R: NEGATIVE for significant cough or SOB  CV: NEGATIVE for chest pain, palpitations or peripheral edema             Medications:   I have reviewed this patient's current medications    folic acid  1 mg Oral Daily     mirtazapine  15 mg Oral At Bedtime      multivitamin w/minerals  1 tablet Oral Daily     naltrexone  50 mg Oral Daily     nicotine  1 patch Transdermal Daily     nicotine   Transdermal Q8H     pantoprazole (PROTONIX) IV  40 mg Intravenous Q12H     sodium chloride (PF)  3 mL Intracatheter Q8H     sucralfate  1 g Oral 4x Daily AC & HS     vitamin B1  100 mg Oral Daily                  Physical Exam:   Vitals were reviewed  Vital Signs with Ranges  Temp:  [98.1  F (36.7  C)-99.1  F (37.3  C)] 98.7  F (37.1  C)  Pulse:  [] 92  Heart Rate:  [] 116  Resp:  [14-26] 20  BP: (109-138)/(68-89) 129/83  SpO2:  [92 %-99 %] 94 %  I/O last 3 completed shifts:  In: 2858 [I.V.:2858]  Out: -   Constitutional: healthy, alert and no distress   Cardiovascular: negative, PMI normal. No lifts, heaves, or thrills. RRR. No murmurs, clicks gallops or rub  Respiratory: negative, Percussion normal. Good diaphragmatic excursion. Lungs clear  Head: Normocephalic. No masses, lesions, tenderness or abnormalities  Neck: Neck supple. No adenopathy. Thyroid symmetric, normal size,, Carotids without bruits.  Abdomen: Abdomen soft, non-tender. BS normal. No masses, organomegaly           Data:   I reviewed the patient's new clinical lab test results.   Recent Labs   Lab Test 03/12/20 0936 03/12/20 0059 03/11/20 1941 03/11/20  1449   WBC 10.0  --  12.3* 18.1*   HGB 13.9 14.7 15.0 17.6   *  --  106* 105*   *  --  165 221   INR  --   --  1.19*  --      Recent Labs   Lab Test 03/12/20 0936 03/11/20 1941 03/11/20  1449   POTASSIUM 3.5 3.8 3.9   CHLORIDE 99 94 87*   CO2 22 21 15*   BUN 26 24 20   ANIONGAP 11 16* 27*     Recent Labs   Lab Test 03/12/20 0936 03/11/20 1941 03/11/20  1449 11/13/19 1956 11/11/19  1124  11/04/19  1650 11/04/19  1454   ALBUMIN 3.4 3.9 4.8 3.7 4.0   < >  --  5.0   BILITOTAL 2.2*  --  6.9* 0.7 0.9   < >  --  2.5*   ALT 59  --  88* 425* 676*   < >  --  85*   AST 84*  --  132* 325* 888*   < >  --  124*   PROTEIN  --   --   --   --  30*   --  30*  --    LIPASE  --   --  366 332  --   --   --  363    < > = values in this interval not displayed.       I reviewed the patient's new imaging results.    All laboratory data reviewed  All imaging studies reviewed by me.    Irene Cox PA-C,  3/13/2020  [facility]  Office : [phone]  Cell: [phone] (Dr. Elizabeth)  Cell: [phone] (Irene Cox PA-C)

## 2020-03-13 NOTE — PLAN OF CARE
DATE & TIME: 3/12/20 9106-8342  Cognitive Concerns/ Orientation : A&Ox4  BEHAVIOR & AGGRESSION TOOL COLOR: Green  CIWA SCORE: 1, 2, 2  ABNL VS/O2: VSS, ex tachycardic at times  MOBILITY: SBA, ambulation encouraged  PAIN MANAGMENT: denies pain  DIET: Mechanical soft diet, c/o some discomfort with swallowing, but tolerable  BOWEL/BLADDER: Cont B/B. Voiding adequately.  ABNL LAB/BG: Na 132, AST 84, phos 1.3, replaced, recheck 1.3, replacing again  DRAIN/DEVICES: IVF infusing  TELEMETRY RHYTHM: SR  SKIN: Intact  TESTS/PROCEDURES: n/a, EGD completed 3/12  D/C DAY/GOALS/PLACE: Possible discharge 1-2 days pending clinical improvement  OTHER IMPORTANT INFO: mild abdominal discomfort and heartburn, given tums and schedule carafate with relief. Psych, CD, GI, PT, OT, and SW following.

## 2020-03-13 NOTE — PLAN OF CARE
DATE & TIME: 3/13/2020 1368-5402  Cognitive Concerns/ Orientation : A&Ox4  BEHAVIOR & AGGRESSION TOOL COLOR: Green  CIWA SCORE: 2/4/1 for nausea mostly, given zofran/compazine; emesis x1 after trying to take pills  ABNL VS/O2: VSS on RA, ex tachycardic at times 110's  MOBILITY: independent, ambulation encouraged  PAIN MANAGMENT: c/o abdominal discomfort, describes it as burning/indigestion feeling; on carafate and protonix PO  DIET: Advanced to low fiber, poor appetite- still with discomfort and occasional nausea  BOWEL/BLADDER: Cont B/B. Voiding adequately.  ABNL LAB/BG: Na 132, phos 1.8- replaced and recheck at 1830. K 3.0, being replaced IV currently. , checks now discontinued.   DRAIN/DEVICES: PIV decreased to 75 ml/hr  TELEMETRY RHYTHM: ST/SR  SKIN: Intact  TESTS/PROCEDURES:  none  D/C DAY/GOALS/PLACE: Possible discharge 1-2 days pending ability to tolerate diet and decreased nausea/pain.  OTHER IMPORTANT INFO: CD and GI following. Nicotine patch R. Deltoid.

## 2020-03-13 NOTE — PROGRESS NOTES
MD Notification    Notified Person: MD    Notified Person Name: Micah    Notification Date/Time: 3/12/20, 2250    Notification Interaction:    Purpose of Notification: Pt requesting order for nicotine patch. Pt reports smoking 1 pack per day    Orders Received:    Comments:

## 2020-03-13 NOTE — PROGRESS NOTES
[facility]  Hospitalist Progress Note        Pilo Sandhu MD   03/13/2020        Interval History:      - completed EGD yesterday; feels better, still with poor PO intake; evaluated by psych         Assessment and Plan:        Brandin Rodriguez is a 34 year old male history of generalized anxiety disorder, depression, alcohol hepatitis, alcohol dependence/withdrawal, h/o grade D esophagitis, tobacco use, PTSD who presented with concerns for alcohol withdrawal, vomiting and dehydration.       # Alcohol abuse with dependents and likely alcohol withdrawal  - clinically improving, leukocytosis, tachycardia improving; wbc 18---12---10 ; does not currently seem to be in active withdrawal; continue Ativan PRN per [facility] protocol  - psych/Chem dep eval---- needs Rule 25 funding, psych started Remeron 15 mg daily at bedtime and Naltrexone 50 mg po daily  - was prescribed Antabuse as outpatient but had not started yet  - continue thiamine, folic acid, multivitamin    # Nausea, vomiting, possible Hematemesis, likely sec to Esophagitis and esophageal ulcers (EGD 3/12)  # Hyponatremia/ Hypokalemia  # Acute renal failure  # Hypophosphatemia  - no further episodes of hematemesis; hemodynamically stable with improving tachycardia ; serial Hb stabilizing, transfuse prn for Hb <7; Hb 17---15---14.7---13.9---13.2  - US abd UR except for fatty liver  - GI following ; EGD 3/12 noted with LA Grade D reflux esophagitis, Esophageal ulcers and Congestive gastropathy with Congested duodenal mucosa  - renal function improved; Cr 2.05----1.59---1.19---0.78  - hyponatremia improved; Na 129---132 ---138  - continue IV hydration until taking adequate PO; will decrease to 75 ml/hour  - Phosphorus low 1.0---1.3--1.8; K 3.0; continue potassium phosphate supple and monitor BMP  - IV Protonix switched to PO BID , continue carafate    #Thrombocytopenia  - likely related to alcohol abuse; platelet 221---165---128---104  - will  "monitor counts    DVT Prophylaxis: PCD boots  Code Status: Full Code     Disposition: likely in am if clinically stable and tolerating adequate PO                     Physical Exam:      Blood pressure 129/83, pulse 92, temperature 98.7  F (37.1  C), temperature source Oral, resp. rate 20, height 1.88 m (6' 2\"), weight 99.6 kg (219 lb 9.6 oz), SpO2 94 %.  Vitals:    03/11/20 1303 03/12/20 0547 03/13/20 0606   Weight: 99.8 kg (220 lb) 100.2 kg (220 lb 14.4 oz) 99.6 kg (219 lb 9.6 oz)     Vital Signs with Ranges  Temp:  [98.1  F (36.7  C)-99.1  F (37.3  C)] 98.7  F (37.1  C)  Pulse:  [] 92  Heart Rate:  [] 116  Resp:  [14-26] 20  BP: (109-138)/(68-89) 129/83  SpO2:  [92 %-99 %] 94 %  I/O's Last 24 hours  I/O last 3 completed shifts:  In: 2858 [I.V.:2858]  Out: -     Constitutional: Alert, awake and oriented X 3; lying comfortably in bed in no apparent distress   HEENT: Pupils equal and reactive to light and accomodation, EOMI intact; neck supple no raised JVD or rigidity    Oral cavity: Moist mucosa   Cardiovascular: Normal s1 s2, slightly tachycardic, no murmur   Lungs: B/l clear to auscultation, no wheezes or crepitations   Abdomen: Soft, nt, nd, no guarding, rigidity or rebound; BS +   LE : No edema   Musculoskeletal: Power 5/5 in all extremities   Neuro: No focal neurological deficits noted, CN II to XII grossly intact   Psychiatry: normal mood and affect                Medications:          folic acid  1 mg Oral Daily     mirtazapine  15 mg Oral At Bedtime     multivitamin w/minerals  1 tablet Oral Daily     naltrexone  50 mg Oral Daily     nicotine  1 patch Transdermal Daily     nicotine   Transdermal Q8H     pantoprazole  40 mg Oral BID AC     sodium chloride (PF)  3 mL Intracatheter Q8H     sucralfate  1 g Oral 4x Daily AC & HS     vitamin B1  100 mg Oral Daily     PRN Meds: sore throat lozenge, bisacodyl, calcium carbonate, lidocaine 4%, lidocaine (buffered or not buffered), LORazepam **OR** " LORazepam, magnesium sulfate, - MEDICATION INSTRUCTIONS -, melatonin, naloxone, ondansetron **OR** ondansetron, phenol, polyethylene glycol, potassium chloride, potassium chloride with lidocaine, potassium chloride, potassium chloride, potassium chloride, potassium phosphate (KPHOS) in D5W IV, potassium phosphate (KPHOS) in D5W IV, potassium phosphate (KPHOS) in D5W IV, prochlorperazine **OR** prochlorperazine **OR** prochlorperazine, QUEtiapine, senna-docusate **OR** senna-docusate, sodium chloride (PF), sodium chloride (PF)         Data:      All new lab and imaging data was reviewed.   Recent Labs   Lab Test 03/13/20  1014 03/12/20  0936 03/12/20  0059 03/11/20 1941 03/11/20  1449   WBC  --  10.0  --  12.3* 18.1*   HGB 13.2* 13.9 14.7 15.0 17.6   MCV  --  106*  --  106* 105*   PLT  --  128*  --  165 221   INR  --   --   --  1.19*  --       Recent Labs   Lab Test 03/12/20  0936 03/11/20 1941 03/11/20  1449   * 131* 129*   POTASSIUM 3.5 3.8 3.9   CHLORIDE 99 94 87*   CO2 22 21 15*   BUN 26 24 20   CR 1.19 1.59* 2.05*   ANIONGAP 11 16* 27*   KATHY 8.3* 8.4* 9.8   * 143* 230*     No lab results found.    Invalid input(s): TROP, TROPONINIES

## 2020-03-14 VITALS
RESPIRATION RATE: 20 BRPM | BODY MASS INDEX: 28.18 KG/M2 | WEIGHT: 219.6 LBS | HEIGHT: 74 IN | TEMPERATURE: 98.6 F | HEART RATE: 92 BPM | DIASTOLIC BLOOD PRESSURE: 76 MMHG | OXYGEN SATURATION: 95 % | SYSTOLIC BLOOD PRESSURE: 116 MMHG

## 2020-03-14 LAB
ANION GAP SERPL CALCULATED.3IONS-SCNC: 8 MMOL/L (ref 3–14)
BUN SERPL-MCNC: 6 MG/DL (ref 7–30)
CALCIUM SERPL-MCNC: 8.8 MG/DL (ref 8.5–10.1)
CHLORIDE SERPL-SCNC: 110 MMOL/L (ref 94–109)
CO2 SERPL-SCNC: 23 MMOL/L (ref 20–32)
CREAT SERPL-MCNC: 0.76 MG/DL (ref 0.66–1.25)
ERYTHROCYTE [DISTWIDTH] IN BLOOD BY AUTOMATED COUNT: 12.8 % (ref 10–15)
GFR SERPL CREATININE-BSD FRML MDRD: >90 ML/MIN/{1.73_M2}
GLUCOSE SERPL-MCNC: 133 MG/DL (ref 70–99)
HCT VFR BLD AUTO: 39.8 % (ref 40–53)
HGB BLD-MCNC: 14 G/DL (ref 13.3–17.7)
MAGNESIUM SERPL-MCNC: 1.5 MG/DL (ref 1.6–2.3)
MCH RBC QN AUTO: 37.7 PG (ref 26.5–33)
MCHC RBC AUTO-ENTMCNC: 35.2 G/DL (ref 31.5–36.5)
MCV RBC AUTO: 107 FL (ref 78–100)
PHOSPHATE SERPL-MCNC: 2.5 MG/DL (ref 2.5–4.5)
PLATELET # BLD AUTO: 129 10E9/L (ref 150–450)
POTASSIUM SERPL-SCNC: 3.3 MMOL/L (ref 3.4–5.3)
RBC # BLD AUTO: 3.71 10E12/L (ref 4.4–5.9)
SODIUM SERPL-SCNC: 141 MMOL/L (ref 133–144)
WBC # BLD AUTO: 5.9 10E9/L (ref 4–11)

## 2020-03-14 PROCEDURE — 25000128 H RX IP 250 OP 636: Performed by: INTERNAL MEDICINE

## 2020-03-14 PROCEDURE — 40000007 ZZH STATISTIC ADULT CD FACE TO FACE-NO CHRG

## 2020-03-14 PROCEDURE — 25000132 ZZH RX MED GY IP 250 OP 250 PS 637: Performed by: INTERNAL MEDICINE

## 2020-03-14 PROCEDURE — 83735 ASSAY OF MAGNESIUM: CPT | Performed by: HOSPITALIST

## 2020-03-14 PROCEDURE — 80048 BASIC METABOLIC PNL TOTAL CA: CPT | Performed by: HOSPITALIST

## 2020-03-14 PROCEDURE — 25000132 ZZH RX MED GY IP 250 OP 250 PS 637: Performed by: PHYSICIAN ASSISTANT

## 2020-03-14 PROCEDURE — 25000132 ZZH RX MED GY IP 250 OP 250 PS 637: Performed by: HOSPITALIST

## 2020-03-14 PROCEDURE — 36415 COLL VENOUS BLD VENIPUNCTURE: CPT | Performed by: HOSPITALIST

## 2020-03-14 PROCEDURE — 99239 HOSP IP/OBS DSCHRG MGMT >30: CPT | Performed by: INTERNAL MEDICINE

## 2020-03-14 PROCEDURE — 84100 ASSAY OF PHOSPHORUS: CPT | Performed by: HOSPITALIST

## 2020-03-14 PROCEDURE — 25000132 ZZH RX MED GY IP 250 OP 250 PS 637: Performed by: PSYCHIATRY & NEUROLOGY

## 2020-03-14 PROCEDURE — 85027 COMPLETE CBC AUTOMATED: CPT | Performed by: HOSPITALIST

## 2020-03-14 PROCEDURE — 25800030 ZZH RX IP 258 OP 636: Performed by: INTERNAL MEDICINE

## 2020-03-14 RX ORDER — POTASSIUM CHLORIDE 1500 MG/1
20 TABLET, EXTENDED RELEASE ORAL DAILY
Qty: 3 TABLET | Refills: 0 | Status: SHIPPED | OUTPATIENT
Start: 2020-03-14 | End: 2020-03-17

## 2020-03-14 RX ORDER — SUCRALFATE ORAL 1 G/10ML
1 SUSPENSION ORAL
Qty: 1200 ML | Refills: 0 | Status: SHIPPED | OUTPATIENT
Start: 2020-03-14 | End: 2020-04-13

## 2020-03-14 RX ORDER — NICOTINE 21 MG/24HR
1 PATCH, TRANSDERMAL 24 HOURS TRANSDERMAL DAILY
Qty: 30 PATCH | Refills: 0 | Status: ON HOLD | OUTPATIENT
Start: 2020-03-15 | End: 2020-12-03

## 2020-03-14 RX ORDER — NALTREXONE HYDROCHLORIDE 50 MG/1
50 TABLET, FILM COATED ORAL DAILY
Qty: 30 TABLET | Refills: 0 | Status: ON HOLD | OUTPATIENT
Start: 2020-03-15 | End: 2020-12-03

## 2020-03-14 RX ORDER — MIRTAZAPINE 15 MG/1
15 TABLET, FILM COATED ORAL AT BEDTIME
Qty: 20 TABLET | Refills: 0 | Status: ON HOLD | OUTPATIENT
Start: 2020-03-14 | End: 2020-12-03

## 2020-03-14 RX ADMIN — NICOTINE 1 PATCH: 14 PATCH, EXTENDED RELEASE TRANSDERMAL at 08:49

## 2020-03-14 RX ADMIN — SUCRALFATE 1 G: 1 SUSPENSION ORAL at 11:06

## 2020-03-14 RX ADMIN — FOLIC ACID 1 MG: 1 TABLET ORAL at 08:50

## 2020-03-14 RX ADMIN — SUCRALFATE 1 G: 1 SUSPENSION ORAL at 06:58

## 2020-03-14 RX ADMIN — PANTOPRAZOLE SODIUM 40 MG: 40 TABLET, DELAYED RELEASE ORAL at 06:58

## 2020-03-14 RX ADMIN — POTASSIUM CHLORIDE 40 MEQ: 1500 TABLET, EXTENDED RELEASE ORAL at 10:08

## 2020-03-14 RX ADMIN — MAGNESIUM SULFATE HEPTAHYDRATE 1 G: 500 INJECTION, SOLUTION INTRAMUSCULAR; INTRAVENOUS at 11:05

## 2020-03-14 RX ADMIN — THIAMINE HCL TAB 100 MG 100 MG: 100 TAB at 08:50

## 2020-03-14 RX ADMIN — NALTREXONE HYDROCHLORIDE 50 MG: 50 TABLET, FILM COATED ORAL at 08:49

## 2020-03-14 RX ADMIN — MULTIPLE VITAMINS W/ MINERALS TAB 1 TABLET: TAB at 08:50

## 2020-03-14 ASSESSMENT — ACTIVITIES OF DAILY LIVING (ADL)
ADLS_ACUITY_SCORE: 12

## 2020-03-14 NOTE — PLAN OF CARE
DATE & TIME: 3/14/20 NOC     Cognitive Concerns/ Orientation : A&Ox4    BEHAVIOR & AGGRESSION TOOL COLOR: Green   CIWA SCORE: 1,2,0    ABNL VS/O2: VSS on RA   MOBILITY: Up independent   PAIN MANAGMENT: denies   DIET: Low fiber   BOWEL/BLADDER: Continent   ABNL LAB/BG:   DRAIN/DEVICES: PIV infusing   TELEMETRY RHYTHM: SR   SKIN: intact   TESTS/PROCEDURES: none   D/C DAY/GOALS/PLACE: possible discharge today   OTHER IMPORTANT INFO: Phos replaced overnight, recheck in am. K+ recheck in am. Social work, Chemical dependency following

## 2020-03-14 NOTE — PROGRESS NOTES
[facility]  Gastroenterology Progress Note     Brandin Rodriguez MRN# [id number]   YOB: 1985 Age: 34 year old          Assessment and Plan:    Update: Patient feeling improved. Has mild nausea- improved, reports heartburn. EGD yesterday noted 2 esophgael ulcers with no stigmata of recent bleeding and congested mucosa in entire stomach.     Alcohol withdrawal (H)   Hematemesis  Patient doing well. Has no tremors. Denies new symptoms.  Nausea and heartburn present- but improved. EGD yesterday noted 2 sophgael ulcers with no stigmata of recent bleeding and congested mucosa in entire stomach. Magnesium 1.5, potassium 3.3.    - Regular diet  - continue with oral pantoprazole 40 mg BID also sucralfate 1g QID  - Ok with GI to discharge patient with plans to f/u as an outpatient in 1-2 weeks  - Keep head of bed elevated to 60 or higher with eating and 2-3 hours after     Alcohol withdrawal (H)    Hematemesis    NEREYDA (generalized anxiety disorder)    Severe episode of recurrent major depressive disorder, without psychotic features (H)    Tobacco use disorder    PTSD (post-traumatic stress disorder)    Current severe episode of major depressive disorder without psychotic features, unspecified whether recurrent (H)    Sinus tachycardia              JINNY (acute kidney injury) (H)  Alcohol withdrawal syndrome without complication (H)  Abnormal LFTs  Dehydration      Interval History:   doing well; no cp, sob, n/v/d, or abd pain.              Review of Systems:   C: NEGATIVE for fever, chills, change in weight  E/M: NEGATIVE for ear, mouth and throat problems  R: NEGATIVE for significant cough or SOB  CV: NEGATIVE for chest pain, palpitations or peripheral edema             Medications:   I have reviewed this patient's current medications    folic acid  1 mg Oral Daily     magnesium sulfate  1 g Intravenous Once     mirtazapine  15 mg Oral At Bedtime     multivitamin w/minerals  1 tablet Oral Daily      naltrexone  50 mg Oral Daily     nicotine  1 patch Transdermal Daily     nicotine   Transdermal Q8H     pantoprazole  40 mg Oral BID AC     sodium chloride (PF)  3 mL Intracatheter Q8H     sucralfate  1 g Oral 4x Daily AC & HS     vitamin B1  100 mg Oral Daily                  Physical Exam:   Vitals were reviewed  Vital Signs with Ranges  Temp:  [98.2  F (36.8  C)-99  F (37.2  C)] 98.6  F (37  C)  Heart Rate:  [] 98  Resp:  [18-20] 20  BP: (111-138)/(76-95) 116/76  SpO2:  [92 %-96 %] 95 %  I/O last 3 completed shifts:  In: 1463 [P.O.:240; I.V.:1223]  Out: -   Constitutional: healthy, alert and no distress   Cardiovascular: negative, PMI normal. No lifts, heaves, or thrills. RRR. No murmurs, clicks gallops or rub  Head: Normocephalic. No masses, lesions, tenderness or abnormalities  Neck: Neck supple. No adenopathy. Thyroid symmetric, normal size,, Carotids without bruits.  Abdomen: Abdomen soft, non-tender. BS normal. No masses, organomegaly           Data:   I reviewed the patient's new clinical lab test results.   Recent Labs   Lab Test 03/14/20 0941 03/13/20  1014 03/12/20 0936 03/11/20 1941   WBC 5.9  --  10.0  --  12.3*   HGB 14.0 13.2* 13.9   < > 15.0   *  --  106*  --  106*   * 104* 128*  --  165   INR  --   --   --   --  1.19*    < > = values in this interval not displayed.     Recent Labs   Lab Test 03/14/20  0941 03/13/20  2313 03/13/20 1014 03/12/20 0936   POTASSIUM 3.3* 3.5 3.0* 3.5   CHLORIDE 110*  --  106 99   CO2 23  --  24 22   BUN 6*  --  10 26   ANIONGAP 8  --  8 11     Recent Labs   Lab Test 03/12/20  0936 03/11/20  1941 03/11/20  1449 11/13/19 1956 11/11/19  1124  11/04/19  1650 11/04/19  1454   ALBUMIN 3.4 3.9 4.8 3.7 4.0   < >  --  5.0   BILITOTAL 2.2*  --  6.9* 0.7 0.9   < >  --  2.5*   ALT 59  --  88* 425* 676*   < >  --  85*   AST 84*  --  132* 325* 888*   < >  --  124*   PROTEIN  --   --   --   --  30*  --  30*  --    LIPASE  --   --  366 332  --   --   --  363     < > = values in this interval not displayed.       I reviewed the patient's new imaging results.    All laboratory data reviewed  All imaging studies reviewed by me.    Irene Cox PA-C,  3/14/2020  [facility]  Office : [phone]  Cell: [phone] (Dr. Elizabeth)  Cell: [phone] (Irene Cox PA-C)

## 2020-03-14 NOTE — DISCHARGE SUMMARY
[facility]    Discharge Summary  Hospitalist    Date of Admission:  3/11/2020  Date of Discharge:  3/14/2020  2:00 PM  Discharging Provider: Ganesh Falcon MD  Date of Service (when I saw the patient): 03/14/20    Discharge Diagnoses   History of alcohol abuse with acute alcohol withdrawal.  Upper GI bleeding secondary esophagitis and esophageal ulcer resolved  Nausea vomiting secondary to alcohol withdrawal esophagitis resolved  Acute renal failure resolved  Hypokalemia/hyponatremia/hypophosphatemia/hypomagnesium replaced  Thrombocytopenia secondary to alcohol abuse    History of Present Illness   Brandin Rodriguez is an 34 year old male who presented with alcohol withdrawal    Hospital Course      This is a 34-year-old male with history of general anxiety disorder, depression, alcohol dependence, tobacco abuse, posttraumatic stress disorder, presented with nausea vomiting and alcohol withdrawal symptoms and subsequently get admitted.    The patient was started on IV Protonix, IV fluids and put on alcohol withdrawal protocol.  We did give him him benzodiazepine according to CIWA.  GI was consulted because of his hematemesis and concern for upper GI bleed.  He had a EGD done which showed esophagitis and to esophageal ulcer.  The patient will continue IV Protonix 40 twice daily and sucralfate was added.    While in the hospital here multiple electrolyte abnormalities including hypokalemia, phosphatemia hypomagnesemia and hyponatremia.  Which were aggressively replaced while in the hospital.  And the dose of potassium and IV magnesium on the day of discharge.    At this time the patient is doing really well.  He has no alcohol withdrawal symptoms at this time.  Tolerating oral diet well.  Without any nausea and vomiting.  At the time of discharge is denies any chest pain shortness of breath headache dizziness lightheadedness.  Psychiatry started him on naltrexone which he is tolerating well.  He is  discharged on naltrexone at this time.    He will be discharged home in stable condition.  He needs to continue taking Protonix 40 mg twice daily, sucralfate as prescribed.  He is advised to abstain from alcohol.  And follow the recommendation of CD and psych.  He is discharged home in stable condition at this time.    Final discharge diagnosis and hospital course    Acute alcohol withdrawal in the patient with alcohol dependence and alcohol abuse.  - clinically improving, leukocytosis, tachycardia improving; wbc 18---12---10 ; does not currently seem to be in active withdrawal; continue Ativan PRN per [facility] protocol  - psych/Chem dep eval--- psych started Remeron 15 mg daily at bedtime and Naltrexone 50 mg po daily  - continue thiamine, folic acid, multivitamin     Nausea, vomiting, possible Hematemesis, likely sec to Esophagitis and esophageal ulcers (EGD 3/12)  Hyponatremia/ Hypokalemia  Acute renal failure   Hypophosphatemia  - no further episodes of hematemesis; hemodynamically stable with improving tachycardia ; hemoglobin is stable at this time.  - US abd unremarkable except for fatty liver.    - GI was consulted while he was in the hospital.; EGD 3/12 noted with LA Grade D reflux esophagitis, Esophageal ulcers and Congestive gastropathy with Congested duodenal mucosa  - renal function improved; Cr 2.05----1.59---1.19---0.78  - hyponatremia improved; Na 129---132 ---138  -Electrolyte replaced.  He is discharged on oral potassium for next few days.    Thrombocytopenia  -This is likely second to alcohol abuse has been stable at this time.       Ganesh Falcon MD, MD    Significant Results and Procedures   EGD  Impression:               - LA Grade D reflux esophagitis.                             - Esophageal ulcers.                             - Congestive gastropathy.                             - Congested duodenal mucosa.                             - No specimens collected    Pending Results   These results  will be followed up by PCP  Unresulted Labs Ordered in the Past 30 Days of this Admission     No orders found from 2/10/2020 to 3/12/2020.          Code Status   Full Code       Primary Care Physician   Earlene Whelan    Physical Exam   Temp: 98.6  F (37  C) Temp src: Oral BP: 116/76   Heart Rate: 98 Resp: 20 SpO2: 95 % O2 Device: None (Room air)    Vitals:    03/11/20 1303 03/12/20 0547 03/13/20 0606   Weight: 99.8 kg (220 lb) 100.2 kg (220 lb 14.4 oz) 99.6 kg (219 lb 9.6 oz)     Vital Signs with Ranges  Temp:  [98.6  F (37  C)-99  F (37.2  C)] 98.6  F (37  C)  Heart Rate:  [86-98] 98  Resp:  [18-20] 20  BP: (116-138)/(76-95) 116/76  SpO2:  [93 %-95 %] 95 %  I/O last 3 completed shifts:  In: 913 [P.O.:240; I.V.:673]  Out: -     Constitutional: awake, alert, cooperative, no apparent distress, and appears stated age  Eyes: Lids and lashes normal, pupils equal, round and reactive to light, extra ocular muscles intact, sclera clear, conjunctiva normal  Respiratory: No increased work of breathing, good air exchange, clear to auscultation bilaterally, no crackles or wheezing  Cardiovascular: Normal apical impulse, regular rate and rhythm, normal S1 and S2, no S3 or S4, and no murmur noted  GI: No scars, normal bowel sounds, soft, non-distended, non-tender, no masses palpated, no hepatosplenomegally  Musculoskeletal: no lower extremity pitting edema present  Neurologic: Awake, alert, oriented to name, place and time.  Cranial nerves II-XII are grossly intact.  Motor is 5 out of 5 bilaterally.      Discharge Disposition   Discharged to home  Condition at discharge: Stable    Consultations This Hospital Stay   PSYCHIATRY IP CONSULT  SOCIAL WORK IP CONSULT  PHYSICAL THERAPY ADULT IP CONSULT  OCCUPATIONAL THERAPY ADULT IP CONSULT  GASTROENTEROLOGY IP CONSULT  CHEMICAL DEPENDENCY IP CONSULT    Time Spent on this Encounter   IGanesh MD, personally saw the patient today and spent greater than 30 minutes discharging  this patient.    Discharge Orders      Reason for your hospital stay    Alcohol withdrawal, nausea, vomiting.     Follow-up and recommended labs and tests     Follow up with primary care provider, Earlene Whelan, within 7 days for hospital follow- up.  The following labs/tests are recommended: Renal panel .     Activity    Your activity upon discharge: activity as tolerated     Full Code     Diet    Follow this diet upon discharge: Orders Placed This Encounter      Low Fiber Diet     Discharge Medications   Discharge Medication List as of 3/14/2020 12:41 PM      START taking these medications    Details   mirtazapine (REMERON) 15 MG tablet Take 1 tablet (15 mg) by mouth At Bedtime, Disp-20 tablet,R-0, E-PrescribeFurther refill from PCP or Psychiatry      naltrexone (DEPADE/REVIA) 50 MG tablet Take 1 tablet (50 mg) by mouth daily, Disp-30 tablet,R-0, E-PrescribeFurther refill from PCP or Psychiatry      nicotine (NICODERM CQ) 14 MG/24HR 24 hr patch Place 1 patch onto the skin daily, Disp-30 patch,R-0, E-PrescribeFurther refill from PCP      potassium chloride ER (KLOR-CON M) 20 MEQ CR tablet Take 1 tablet (20 mEq) by mouth daily for 3 days, Disp-3 tablet,R-0, E-Prescribe      sucralfate (CARAFATE) 1 GM/10ML suspension Take 10 mLs (1 g) by mouth 4 times daily (before meals and nightly), Disp-1200 mL,R-0, E-PrescribeFurther refill from PCP or GI         CONTINUE these medications which have NOT CHANGED    Details   folic acid (FOLVITE) 1 MG tablet Take 1 tablet (1 mg) by mouth daily, Disp-30 tablet,R-2, E-Prescribe      gabapentin (NEURONTIN) 300 MG capsule Take 1 capsule (300 mg) by mouth 3 times daily, Disp-90 capsule,R-0, E-Prescribe      hydrOXYzine (VISTARIL) 50 MG capsule Take 1 capsule (50 mg) by mouth 3 times daily as needed for anxiety, Disp-90 capsule,R-0, E-PrescribePatient needs appointment before any further refills      multivitamin w/minerals (THERA-VIT-M) tablet Take 1 tablet by mouth daily, Disp-30  each,R-0, E-PrescribeFuture refills by PCP Dr. Sweet Dara [facility] with phone number [phone].      pantoprazole (PROTONIX) 40 MG EC tablet Take 1 tablet (40 mg) by mouth 2 times daily, Disp-60 tablet,R-0, E-PrescribeFuture refills by PCP Dr. Sweet Dara [facility] with phone number [phone].      propranolol (INDERAL) 20 MG tablet Take 1 tablet (20 mg) by mouth 2 times daily as needed (Anxiety), Disp-30 tablet,R-1, E-Prescribe      vitamin B1 (THIAMINE) 100 MG tablet Take 1 tablet (100 mg) by mouth daily, Disp-30 tablet,R-0, E-PrescribeFuture refills by PCP Dr. Kee Diamond [facility] with phone number [phone].         STOP taking these medications       disulfiram (ANTABUSE) 250 MG tablet Comments:   Reason for Stopping:             Allergies   Allergies   Allergen Reactions     Amoxicillin      Bactrim [Sulfamethoxazole W-Trimethoprim]      Data   Most Recent 3 CBC's:  Recent Labs   Lab Test 03/14/20  0941 03/13/20  1014 03/12/20  0936  03/11/20  1941   WBC 5.9  --  10.0  --  12.3*   HGB 14.0 13.2* 13.9   < > 15.0   *  --  106*  --  106*   * 104* 128*  --  165    < > = values in this interval not displayed.      Most Recent 3 BMP's:  Recent Labs   Lab Test 03/14/20  0941 03/13/20  2313 03/13/20  1014 03/12/20  0936     --  138 132*   POTASSIUM 3.3* 3.5 3.0* 3.5   CHLORIDE 110*  --  106 99   CO2 23  --  24 22   BUN 6*  --  10 26   CR 0.76  --  0.78 1.19   ANIONGAP 8  --  8 11   KATHY 8.8  --  8.7 8.3*   *  --  129* 118*     Most Recent 2 LFT's:  Recent Labs   Lab Test 03/12/20  0936 03/11/20  1449   AST 84* 132*   ALT 59 88*   ALKPHOS 68 108   BILITOTAL 2.2* 6.9*     Most Recent INR's and Anticoagulation Dosing History:  Anticoagulation Dose History     Recent Dosing and Labs Latest Ref Rng & Units 3/11/2020    INR 0.86 - 1.14 1.19(H)        Most Recent 3 Troponin's:No lab results found.  Most Recent Cholesterol Panel:No lab results found.  Most  Recent 6 Bacteria Isolates From Any Culture (See EPIC Reports for Culture Details):No lab results found.  Most Recent TSH, T4 and A1c Labs:  Recent Labs   Lab Test 03/11/20 1941   TSH 0.86   A1C 4.6     Results for orders placed or performed during the hospital encounter of 03/11/20   Abdomen US, limited (RUQ only)    Narrative    US ABDOMEN LIMITED 3/11/2020 4:54 PM    CLINICAL HISTORY: Right upper quadrant pain and epigastric pain,  elevated LFTs/bilirubin, rule out evidence of biliary  pathology/obstruction. Right upper quadrant pain and epigastric pain,  elevated LFTs/bilirubin. Rule out evidence of biliary  pathology/obstruction.    TECHNIQUE: Limited abdominal ultrasound.    COMPARISON: None.    FINDINGS:    GALLBLADDER: The gallbladder is normal. No gallstones, wall  thickening, or pericholecystic fluid. Negative sonographic Vgea's  sign.    BILE DUCTS: There is no biliary dilatation. Common duct is not  visualized due to overlying bowel gas.    LIVER: Liver is enlarged, measuring 19.6 cm in length. There is  diffuse increased hepatic echogenicity. No focal lesions.    RIGHT KIDNEY: No hydronephrosis.    PANCREAS: The pancreas is largely obscured by overlying gas.    No ascites.      Impression    IMPRESSION:  1.  Enlarged, fatty infiltrated liver.  2.  Normal-appearing gallbladder.    RIO TRAYLOR MD     Most Recent 3 CBC's:  Recent Labs   Lab Test 03/14/20  0941 03/13/20  1014 03/12/20  0936  03/11/20 1941   WBC 5.9  --  10.0  --  12.3*   HGB 14.0 13.2* 13.9   < > 15.0   *  --  106*  --  106*   * 104* 128*  --  165    < > = values in this interval not displayed.     Most Recent 3 BMP's:  Recent Labs   Lab Test 03/14/20  0941 03/13/20  2313 03/13/20  1014 03/12/20  0936     --  138 132*   POTASSIUM 3.3* 3.5 3.0* 3.5   CHLORIDE 110*  --  106 99   CO2 23  --  24 22   BUN 6*  --  10 26   CR 0.76  --  0.78 1.19   ANIONGAP 8  --  8 11   KAHTY 8.8  --  8.7 8.3*   *  --  129* 118*

## 2020-03-14 NOTE — CONSULTS
3/14/2020    CD consult completed.   Writer met with pt, pt putting his shoes on as he was getting ready to discharge. Pt reports he has done evaluations, declined one currently. Pt reports he has an addiction counselor he meets with, who has talked to him about LP, and he is thinking about it. [place]. Pt reports he has resources and understands what he needs to do if he wants to pursue treatment. Writer encouraged pt to call or reach out to writer if he has further questions in the future. Pt verbalized understanding.    LATANYA Reyes

## 2020-03-14 NOTE — DISCHARGE INSTRUCTIONS
Call to make appointment for follow up in 1-2 weeks   [facility]  Office : [phone]    If you are interested in meeting with a substance use counselor you can arrange this as an out patient by calling [phone].    Keep head of bed elevated to 60 or higher with eating and 2-3 hours after

## 2020-03-14 NOTE — PLAN OF CARE
Discharge    Patient discharged to home via cab  Care plan note: Patient was A&O x4. VSS on RA. Up independently. Tolerated regular diet with no nausea or pain. K 3.3, replaced with 40 mEq per MD. Mg 1.5, replaced and MD said okay not to recheck electrolytes. Discharge AVS reviewed. Patient educated on new medications and follow up he will need to make with PCP and [facility] in [facility]-2 weeks, phone number provided to pt. Patient verbalized understanding and had no further questions. All new medications sent with patient, remeron to soon to fill per pharmacy, carafate switched to pills per insurance coverage. IV removed. All patient belongings accounted for and sent home with patient, including things from security.     Listed belongings gathered and returned to patient. Yes  Care Plan and Patient education resolved: Yes  Prescriptions if needed, hard copies sent with patient  Yes  Home and hospital acquired medications returned to patient: Yes  Medication Bin checked and emptied on discharge Yes  Follow up appointment made for patient: No

## 2020-03-16 ENCOUNTER — PATIENT OUTREACH (OUTPATIENT)
Dept: CARE COORDINATION | Facility: CLINIC | Age: 35
End: 2020-03-16

## 2020-03-16 ENCOUNTER — TELEPHONE (OUTPATIENT)
Dept: FAMILY MEDICINE | Facility: CLINIC | Age: 35
End: 2020-03-16

## 2020-03-16 NOTE — PROGRESS NOTES
Clinic Care Coordination Contact  [facility]/Voicemail       Clinical Data: Care Coordinator Outreach    Pt recently admitted to [facility] due to alcohol withdrawal symptoms and Upper GI bleeding secondary esophagitis and esophageal ulcer, which has now resolved.    Outreach attempted x 1.  Left message on patient's voicemail with call back information and requested return call.  Plan: Care Coordinator will try to reach patient again in 1-2 business days.    DENNY Gtz, [facility]  Clinic Care Coordinator  [facility]  [phone]  [email or web address]

## 2020-03-16 NOTE — TELEPHONE ENCOUNTER
Chief Complaint: [place] (Acute Kidney Injury) (H), Alcohol Abuse,  SAT 14-MAR-2020  3 / 2    [phone] (home)

## 2020-03-17 NOTE — PROGRESS NOTES
Clinic Care Coordination Contact  [facility]/Voicemail     Clinical Data: Care Coordinator Outreach    Outreach attempted x 2.  Left message on patient's voicemail with call back information and requested return call.    Plan: Care Coordinator will try to reach patient again in 1 month.    DENNY Gtz, [facility]  Clinic Care Coordinator  [facility]  [phone]  [email or web address]

## 2020-03-17 NOTE — TELEPHONE ENCOUNTER
Hospital/TCU/ED for chronic condition Discharge Protocol     After reviewing patient's chart, CC has reached out to patient.    QUINCY SolerN, RN  [employer]

## 2020-03-18 LAB — INTERPRETATION ECG - MUSE: NORMAL

## 2020-03-19 ENCOUNTER — VIRTUAL VISIT (OUTPATIENT)
Dept: PSYCHOLOGY | Facility: CLINIC | Age: 35
End: 2020-03-19
Payer: COMMERCIAL

## 2020-03-19 DIAGNOSIS — F33.2 SEVERE EPISODE OF RECURRENT MAJOR DEPRESSIVE DISORDER, WITHOUT PSYCHOTIC FEATURES (H): Primary | ICD-10-CM

## 2020-03-19 DIAGNOSIS — F41.1 GAD (GENERALIZED ANXIETY DISORDER): ICD-10-CM

## 2020-03-19 DIAGNOSIS — F10.10 ALCOHOL ABUSE, EPISODIC DRINKING BEHAVIOR: ICD-10-CM

## 2020-03-19 PROCEDURE — 98968 PH1 ASSMT&MGMT NQHP 21-30: CPT | Performed by: SOCIAL WORKER

## 2020-03-19 NOTE — PROGRESS NOTES
"  Telephone Visit Note    Patient Name: Brandin Rodriguez  Date: 3/19/2020         Service Type: Telephone Visit     The patient has been notified of following:     \"This telephone visit will be conducted via a call between you and your provider. We have found that certain health care needs can be provided without the need for an office visit.  This service lets us provide the care you need with a short phone conversation.    If during the course of the call the provider feels a telephone visit is not appropriate, you will not be charged for this service.\"      Session Start Time: 1:03 PM  Session End Time: 1:33 PM     Session Length: 30 mins    Session #: 10    Attendees: Client attended alone     DATA      Progress Since Last Session (Related to Symptoms / Goals / Homework):   Symptoms: Worsening -- increase in depressed mood, excessive worry      Episode of Care Goals: No improvement - CONTEMPLATION (Considering change and yet undecided); Intervened by assessing the negative and positive thinking (ambivalence) about behavior change     Current / Ongoing Stressors and Concerns:   Ongoing: Managing MDD, NEREYDA, RENATA and PTSD symptoms, financial stressor and relationship stressor   Current: Recently was laid off at job and 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      Intervention:   Motivational Interviewing: Reflecting on challenges of losing financial support from father, discussed barriers to changes client would like to see for himself, identifying areas where he has control to make changes and areas he would like to prioritize.   Motivational Interviewing    MI Intervention: Expressed Empathy/Understanding, Supported Autonomy, Collaboration, Evocation, Permission to raise concern or advise, Open-ended questions, Reflections: simple and complex and Rolled with resistance: Emphasized patient autonomy, Simple reflection " and Evoked patient agenda     Change Talk Expressed by the Patient: Desire to change Ability to change Reasons to change    Provider Response to Change Talk: E - Evoked more info from patient about behavior change, A - Affirmed patient's thoughts, decisions, or attempts at behavior change, R - Reflected patient's change talk and S - Summarized patient's change talk statements          ASSESSMENT: Current Emotional / Mental Status (status of significant symptoms):   Risk status (Self / Other harm or suicidal ideation)   Patient denies current fears or concerns for personal safety.   Patient denies current or recent suicidal ideation or behaviors.   Patient denies current or recent homicidal ideation or behaviors.   Patient denies current or recent self injurious behavior or ideation.   Patient denies other safety concerns.   Patient reports there has been no change in risk factors since their last session.     Patient reports there has been no change in protective factors since their last session.     A safety and risk management plan has been developed including: Patient consented to co-developed safety plan.  Safety and risk management plan was completed.  Patient agreed to use safety plan should any safety concerns arise.  A copy was given to the patient.     Attitude:   Cooperative  Guarded    Orientation:   All   Speech    Rate / Production: Normal     Volume:  Normal    Mood:    Anxious  Irritable    Thought Content:  Perservative    Thought Form:  Coherent    Insight:    Fair      Medication Compliance:   Yes     Changes in Health Issues:   Yes: Pain, Associated Psychological Distress     Chemical Use Review:   Substance Use: decrease in alcohol .  Patient reports frequency of use 0 for one week.  Stage of Change: Contemplation        Tobacco Use: No change in amount of tobacco use since last session.  Pre-contemplation    Diagnosis:  1. 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 NEREYDA (generalized anxiety disorder)    3. Alcohol abuse, episodic drinking behavior          PLAN: (Patient Tasks / Therapist Tasks / Other)  Client:  Client will prioritize applying for unemployment income and continue to be compliant with taking his medication as prescribed.       I have reviewed the note as documented above.  This accurately captures the substance of my conversation with the patient.    DENNY Reis [facility]    March 19, 2020  Note reviewed and clinical supervision by DENNY Doty [facility] 4/1/2020                                                  Brandin Rodriguez              SAFETY PLAN:  Step 1: Warning signs / cues (Thoughts, images, mood, situation, behavior) that a crisis may be developing:  ? Thoughts: \"I can't do this anymore\", \"I just want this to end\", \"Nothing makes it better\" and \"feeling helpless,\" \"I can't fix anything,\" \"I've fucked up my life so much that I can't come back from it.\"  ? Images: flashbacks and \"how 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,\" managing restaurants, 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,\"  ? Thinking Processes: ruminations (can't stop thinking about my problems): thinking back around current stressor with roommate or finances, thoughts about loss of daughter, thoughts about how life used to be and racing thoughts  ? Mood: worsening depression, hopelessness, helplessness, intense anger and intense worry  ? Behaviors: isolating/withdrawing , using alcohol, impulsive, reckless behaviors (acting without thinking): driving under the influence, getting into fights with strangers, unsafe sex practices, not taking care of myself, not taking care of my responsibilities and not sleeping enough, not eating/sleeping  ? Situations: relationship problems, trauma  and financial stress   Step 2: Coping strategies - Things I can do to take my mind off of my problems without contacting another person (relaxation technique, physical activity):  ? Distress Tolerance Strategies:  change " "body temperature (ice pack/cold water) , paced breathing/progressive muscle relaxation, intense exercise: running, jumping jacks for 2-3 minutes  and listening to podcast, playing video games  ? Physical Activities: go for a walk, gardening, meditation, deep breathing, stretching  and weight lifting  ? Focus on helpful thoughts:  \"This is temporary\", \"I will get through this\", \"It always passes\", \"Ride the wave\" and remind myself of what is important to me: \"I can't do this to my parents,\" \"Fuck them, I'm going to better than that shit.\"  Step 3: People and social settings that provide distraction:              Name: Briana      Phone: [phone]              Name: Dick     Phone: [phone]  ? gym  and [place]             Step 4: Remind myself of people and things that are important to me and worth living for:                              My family, my garden     Step 5: When I am in crisis, I can ask these people to help me use my safety plan:              Name: Briana      Phone: [phone]              Name: Dick     Phone: [phone]  Step 6: Making the environment safe:   ? remove alcohol, remove drugs, dispose of old medications  and be around others, get rid of gun lighter  Step 7: Professionals or agencies I can contact during a crisis:  ? Suicide Prevention Lifeline: 4-530-870-BVMB (6792)  ? Crisis Text Line Service (available 24 hours a day, 7 days a week): Text [place] to 766936    Local Crisis Services: [facility] [place]: [phone]     Call 911 or go to my nearest emergency department.       I helped develop this safety plan and agree to use it when needed.  I have been given a copy of this plan.       Client signature _________________________________________________________________  Today s date:  1/23/2020  Adapted from Safety Plan Template 2008 Janina Pierson and Arturo Roman is reprinted with the express permission of the authors.  No portion of the Safety Plan Template may be reproduced " without the express, written permission.  You can contact the authors at [email or web address] or [email or web address].                                                     Treatment Plan     Client's Name: Brandin Rodriguez                    YOB: 1985     Date: 2/5/2020     DSM5 Diagnoses: (Sustained by DSM5 Criteria Listed Above)  Diagnoses:      296.33 (F33.2) Major Depressive Disorder, Recurrent Episode, Severe _  300.02 (F41.1) Generalized Anxiety Disorder  309.81 (F43.10) Posttraumatic Stress Disorder (includes Posttraumatic Stress Disorder for Children 6 Years and Younger)  Without dissociative symptoms  Substance-Related & Addictive Disorders Alcohol Use Disorder   303.90 (F10.20) Severe .  Psychosocial & Contextual Factors: Loss of child, history of SI, alcohol dependency, financial stressor, relationship conflict  WHODAS:   WHODAS 2.0 Total Score 12/3/2019   Total Score 41            Referral / Collaboration:  Was/were discussed and client will pursue.  Referral to MI/CD day treatment made.   Client is to follow thru with CD Assessment.     Anticipated number of session or this episode of care: 12        MeasurableTreatment Goal(s) related to diagnosis / functional impairment(s)  Goal 1: Client will report a decrease in depressive symptoms and reduce PHQ 9 score from 26 below 10.     Objective #A (Client Action)                Client will Decrease frequency and intensity of feeling down, depressed, hopeless  Identify negative self-talk and behaviors: challenge core beliefs, myths, and actions.  Status: New - Date: 2/5/2020      Intervention(s)  Therapist will use components of CBT and DBT to identify unhealthy patterns of thoughts, emotions and behaviors, breaking negative core beliefs, and cognitive restructuring .     Objective #B  Client will identify two areas of life that you would like to have improved functioning.  Status: New - Date: 2/5/2020      Intervention(s)  Therapist will  assign homework : Client will write up a plan on finding another job that is more fulfilling to his skills.  Client will also identify 1-2 barriers that get in the way of completing household tasks. Client will learn 1-2 strategies to help in tidying place.      Goal 2: Client will report a decrease in anxiety symptoms and reduce NEREYDA 7 score from 21 below 10.    I will know I've met my goal when I know I'm not afraid of everything.       Objective #A (Client Action)                Status: New - Date: 2/5/2020      Client will identify 2-3 fears / thoughts that contribute to feeling anxious.     Intervention(s)  Therapist will teach mindfulness and relaxation skills to help in identifying and noticing fear/thoughts  Therapist will use components of DBT and CBT to aid in cognitive restructuring.     Goal 3: Client will be completely sober from alcohol in 6 months     Objective #A (Client Action)                Status: New - Date: 2/5/2020      Client will identify at least 3 example(s) of how drinking has resulted in an experience that interferes with person values or goals.              Client will write a detailed alcohol use history describing treatment attempts and the specific situations surrounding relapse.               Client will identify 2-3 triggers that may lead to relapse              Client will work to Develop a written relapse prevention plan  Intervention(s)  Therapist will discuss alcohol and high-risk behaviors, use CBT and DBT to identify triggers to usage and relapse prevention plan. .     Goal 4: Client will increase understanding of trauma and its impact on client's functioning.     Objective #A (Client Action)                Client will experience a decrease in distress in previously-avoided situation.   Status: New - Date: 2/5/2020      Intervention(s)  Therapist will teach mindfulness, grounding and relaxation skills. Therapist will use CBT and DBT to help client identify and challenge  negative beliefs and distortions.     Patient has reviewed and agreed to the above plan.        DENNY Reis [facility]                                                        February 5, 2020  Treatment plan reviewed and clinical supervision by DENNY Doty [facility] 2/19/2020

## 2020-04-01 ENCOUNTER — VIRTUAL VISIT (OUTPATIENT)
Dept: PSYCHOLOGY | Facility: CLINIC | Age: 35
End: 2020-04-01
Payer: COMMERCIAL

## 2020-04-01 DIAGNOSIS — F10.10 ALCOHOL ABUSE, EPISODIC DRINKING BEHAVIOR: ICD-10-CM

## 2020-04-01 DIAGNOSIS — F33.2 SEVERE EPISODE OF RECURRENT MAJOR DEPRESSIVE DISORDER, WITHOUT PSYCHOTIC FEATURES (H): Primary | ICD-10-CM

## 2020-04-01 PROCEDURE — 90832 PSYTX W PT 30 MINUTES: CPT | Mod: 95 | Performed by: SOCIAL WORKER

## 2020-04-01 NOTE — PROGRESS NOTES
"                                           Progress Note    Patient Name: Brandin Rodriguez  Date: 4/1/2020         Service Type: Individual      Session Start Time: 12 PM  Session End Time: 12:30 PM     Session Length: 30 mins    Session #: 11    Attendees: Client attended alone    \"We have found that certain health care needs can be provided without the need for a face to face visit.  This service lets us provide the care you need with a phone conversation.       I will have full access to your [facility] medical record during this entire phone call.   I will be taking notes for your medical record.      Since this is like an office visit, we will bill your insurance company for this service.       There are potential benefits and risks of telephone visits (e.g. limits to patient confidentiality) that differ from in-person visits.?  Confidentiality still applies for telephone services, and nobody will record the visit.  It is important to be in a quiet, private space that is free of distractions (including cell phone or other devices) during the visit.??      If during the course of the call I believe a telephone visit is not appropriate, you will not be charged for this service\"     Treatment Plan Last Reviewed: 2/5/2020  PHQ-9 / NEREYDA-7 : n/a    DATA  Interactive Complexity: No  Crisis: No       Progress Since Last Session (Related to Symptoms / Goals / Homework):   Symptoms: Improving -- continues to stay sober from alcohol, mood continues to be low however improved since last session    Homework: Partially completed-Client has been compliant with medication       Episode of Care Goals: Minimal progress - ACTION (Actively working towards change); Intervened by reinforcing change plan / affirming steps taken     Current / Ongoing Stressors and Concerns:   Ongoing: Managing MDD, NEREYDA, RENATA and PTSD symptoms, financial stressor and relationship stressor     Current: Managing alcohol use/urges, living arrangement " stressors.      Treatment Objective(s) Addressed in This Session:    Client will experience a decrease in distress in previously-avoided situation.      Intervention:   Motivational Interviewing: Affirming client efforts to manage his alcohol use, reflecting on the challenges of maintaining mental and physical health, emphasizing personal choice and control.   Motivational Interviewing    MI Intervention: Expressed Empathy/Understanding, Supported Autonomy, Collaboration, Evocation, Permission to raise concern or advise, Open-ended questions and Reflections: simple and complex     Change Talk Expressed by the Patient: Desire to change Ability to change Reasons to change Need to change Committment to change    Provider Response to Change Talk: E - Evoked more info from patient about behavior change, A - Affirmed patient's thoughts, decisions, or attempts at behavior change, R - Reflected patient's change talk and S - Summarized patient's change talk statements          ASSESSMENT: Current Emotional / Mental Status (status of significant symptoms):   Risk status (Self / Other harm or suicidal ideation)   Patient denies current fears or concerns for personal safety.   Patient denies current or recent suicidal ideation or behaviors.   Patient denies current or recent homicidal ideation or behaviors.   Patient denies current or recent self injurious behavior or ideation.   Patient denies other safety concerns.   Patient reports there has been no change in risk factors since their last session.     Patient reports there has been no change in protective factors since their last session.     Recommended that patient call 911 or go to the local ED should there be a change in any of these risk factors.     Appearance:   n/a, Phone Session    Eye Contact:   n/a, Phone Session    Psychomotor Behavior: n/a, Phone Session    Attitude:   Cooperative    Orientation:   All   Speech    Rate / Production: Normal     Volume:  Normal  "   Mood:    Stable   Affect:    n/a, phone session    Thought Content:  Clear    Thought Form:  Coherent  Logical    Insight:    Fair      Medication Review:   No changes to current psychiatric medication(s)     Medication Compliance:   Yes     Changes in Health Issues:   None reported     Chemical Use Review:   Substance Use: Chemical use reviewed, no active concerns identified      Tobacco Use: No current tobacco use.      Diagnosis:  1. Severe episode of recurrent major depressive disorder, without psychotic features (H)    2. Alcohol abuse, episodic drinking behavior        Collateral Reports Completed:   Not Applicable    PLAN: (Patient Tasks / Therapist Tasks / Other)  Client:  Client will look into completing CD assessment.         Tricia Cummings [facility]     April 1, 2020  Note reviewed and clinical supervision by Kamini Crawford [facility] 4/7/2020                                                                                                       Brandin Rodriguez              SAFETY PLAN:  Step 1: Warning signs / cues (Thoughts, images, mood, situation, behavior) that a crisis may be developing:  ? Thoughts: \"I can't do this anymore\", \"I just want this to end\", \"Nothing makes it better\" and \"feeling helpless,\" \"I can't fix anything,\" \"I've fucked up my life so much that I can't come back from it.\"  ? Images: flashbacks and \"how 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,\" managing restaurants, 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,\"  ? Thinking Processes: ruminations (can't stop thinking about my problems): thinking back around current stressor with roommate or finances, thoughts about loss of daughter, thoughts about how life used to be and racing thoughts  ? Mood: worsening depression, hopelessness, helplessness, intense anger and intense worry  ? Behaviors: isolating/withdrawing , using alcohol, impulsive, reckless behaviors (acting without thinking): driving under the influence, getting into fights with strangers, unsafe sex practices, not " "taking care of myself, not taking care of my responsibilities and not sleeping enough, not eating/sleeping  ? Situations: relationship problems, trauma  and financial stress   Step 2: Coping strategies - Things I can do to take my mind off of my problems without contacting another person (relaxation technique, physical activity):  ? Distress Tolerance Strategies:  change body temperature (ice pack/cold water) , paced breathing/progressive muscle relaxation, intense exercise: running, jumping jacks for 2-3 minutes  and listening to podcast, playing video games  ? Physical Activities: go for a walk, gardening, meditation, deep breathing, stretching  and weight lifting  ? Focus on helpful thoughts:  \"This is temporary\", \"I will get through this\", \"It always passes\", \"Ride the wave\" and remind myself of what is important to me: \"I can't do this to my parents,\" \"Fuck them, I'm going to better than that shit.\"  Step 3: People and social settings that provide distraction:              Name: Briana      Phone: [phone]              Name: Dick     Phone: [phone]  ? gym  and [place]             Step 4: Remind myself of people and things that are important to me and worth living for:                              My family, my garden     Step 5: When I am in crisis, I can ask these people to help me use my safety plan:              Name: Briana      Phone: [phone]              Name: Dick     Phone: [phone]  Step 6: Making the environment safe:   ? remove alcohol, remove drugs, dispose of old medications  and be around others, get rid of gun lighter  Step 7: Professionals or agencies I can contact during a crisis:  ? Suicide Prevention Lifeline: 2-888-815-TALK (9243)  ? Crisis Text Line Service (available 24 hours a day, 7 days a week): Text [place] to 669451    Local Crisis Services: [facility]: [phone]     Call 911 or go to my nearest emergency department.       I helped develop this safety plan and " agree to use it when needed.  I have been given a copy of this plan.       Client signature _________________________________________________________________  Today s date:  1/23/2020  Adapted from Safety Plan Template 2008 Janina Pierson and Arturo Roman is reprinted with the express permission of the authors.  No portion of the Safety Plan Template may be reproduced without the express, written permission.  You can contact the authors at bhs@[email or web address] or [email or web address].                                                     Treatment Plan     Client's Name: Brandin Rodriguez                    YOB: 1985     Date: 2/5/2020     DSM5 Diagnoses: (Sustained by DSM5 Criteria Listed Above)  Diagnoses:      296.33 (F33.2) Major Depressive Disorder, Recurrent Episode, Severe _  300.02 (F41.1) Generalized Anxiety Disorder  309.81 (F43.10) Posttraumatic Stress Disorder (includes Posttraumatic Stress Disorder for Children 6 Years and Younger)  Without dissociative symptoms  Substance-Related & Addictive Disorders Alcohol Use Disorder   303.90 (F10.20) Severe .  Psychosocial & Contextual Factors: Loss of child, history of SI, alcohol dependency, financial stressor, relationship conflict  WHODAS:   WHODAS 2.0 Total Score 12/3/2019   Total Score 41            Referral / Collaboration:  Was/were discussed and client will pursue.  Referral to MI/CD day treatment made.   Client is to follow thru with CD Assessment.     Anticipated number of session or this episode of care: 12        MeasurableTreatment Goal(s) related to diagnosis / functional impairment(s)  Goal 1: Client will report a decrease in depressive symptoms and reduce PHQ 9 score from 26 below 10.     Objective #A (Client Action)                Client will Decrease frequency and intensity of feeling down, depressed, hopeless  Identify negative self-talk and behaviors: challenge core beliefs, myths, and actions.  Status: New - Date: 2/5/2020       Intervention(s)  Therapist will use components of CBT and DBT to identify unhealthy patterns of thoughts, emotions and behaviors, breaking negative core beliefs, and cognitive restructuring .     Objective #B  Client will identify two areas of life that you would like to have improved functioning.  Status: New - Date: 2/5/2020      Intervention(s)  Therapist will assign homework : Client will write up a plan on finding another job that is more fulfilling to his skills.  Client will also identify 1-2 barriers that get in the way of completing household tasks. Client will learn 1-2 strategies to help in tidying place.      Goal 2: Client will report a decrease in anxiety symptoms and reduce NEREYDA 7 score from 21 below 10.    I will know I've met my goal when I know I'm not afraid of everything.       Objective #A (Client Action)                Status: New - Date: 2/5/2020      Client will identify 2-3 fears / thoughts that contribute to feeling anxious.     Intervention(s)  Therapist will teach mindfulness and relaxation skills to help in identifying and noticing fear/thoughts  Therapist will use components of DBT and CBT to aid in cognitive restructuring.     Goal 3: Client will be completely sober from alcohol in 6 months     Objective #A (Client Action)                Status: New - Date: 2/5/2020      Client will identify at least 3 example(s) of how drinking has resulted in an experience that interferes with person values or goals.              Client will write a detailed alcohol use history describing treatment attempts and the specific situations surrounding relapse.               Client will identify 2-3 triggers that may lead to relapse              Client will work to Develop a written relapse prevention plan  Intervention(s)  Therapist will discuss alcohol and high-risk behaviors, use CBT and DBT to identify triggers to usage and relapse prevention plan. .     Goal 4: Client will increase understanding  of trauma and its impact on client's functioning.     Objective #A (Client Action)                Client will experience a decrease in distress in previously-avoided situation.   Status: New - Date: 2/5/2020      Intervention(s)  Therapist will teach mindfulness, grounding and relaxation skills. Therapist will use CBT and DBT to help client identify and challenge negative beliefs and distortions.     Patient has reviewed and agreed to the above plan.        DENNY Reis [facility]                                                        February 5, 2020  Treatment plan reviewed and clinical supervision by DENNY Doty [facility] 2/19/2020

## 2020-04-13 ENCOUNTER — VIRTUAL VISIT (OUTPATIENT)
Dept: PSYCHOLOGY | Facility: CLINIC | Age: 35
End: 2020-04-13
Payer: COMMERCIAL

## 2020-04-13 DIAGNOSIS — F10.10 ALCOHOL ABUSE, EPISODIC DRINKING BEHAVIOR: ICD-10-CM

## 2020-04-13 DIAGNOSIS — F41.1 GAD (GENERALIZED ANXIETY DISORDER): ICD-10-CM

## 2020-04-13 DIAGNOSIS — F33.2 SEVERE EPISODE OF RECURRENT MAJOR DEPRESSIVE DISORDER, WITHOUT PSYCHOTIC FEATURES (H): Primary | ICD-10-CM

## 2020-04-13 PROCEDURE — 90834 PSYTX W PT 45 MINUTES: CPT | Performed by: SOCIAL WORKER

## 2020-04-13 NOTE — PROGRESS NOTES
"                                           Progress Note    Patient Name: Brandin Rodriguez  Date: 4/13/2020         Service Type: Individual      Session Start Time: 10:02 AM  Session End Time: 10:45 AM     Session Length: 43 mins    Session #: 12    Attendees: Client attended alone    \"We have found that certain health care needs can be provided without the need for a face to face visit.  This service lets us provide the care you need with a phone conversation.       I will have full access to your [facility] medical record during this entire phone call.   I will be taking notes for your medical record.      Since this is like an office visit, we will bill your insurance company for this service.       There are potential benefits and risks of telephone visits (e.g. limits to patient confidentiality) that differ from in-person visits.?  Confidentiality still applies for telephone services, and nobody will record the visit.  It is important to be in a quiet, private space that is free of distractions (including cell phone or other devices) during the visit.??      If during the course of the call I believe a telephone visit is not appropriate, you will not be charged for this service\"     Treatment Plan Last Reviewed: 2/5/2020  PHQ-9 / NEREYDA-7 : n/a    DATA  Interactive Complexity: No  Crisis: No       Progress Since Last Session (Related to Symptoms / Goals / Homework):   Symptoms: Improving -- continues to stay sober from alcohol, mood continues to be low however improved since last session    Homework: Partially completed-Client has been compliant with medication       Episode of Care Goals: Minimal progress - ACTION (Actively working towards change); Intervened by reinforcing change plan / affirming steps taken     Current / Ongoing Stressors and Concerns:   Ongoing: Managing MDD, NEREYDA, RENATA and PTSD symptoms, financial stressor and relationship stressor     Current: Feeling more depressed, staying in bed, feeling " "\"isolated,\" discussion around day treatment for chemical and mental health.      Treatment Objective(s) Addressed in This Session:    Client will experience a decrease in distress in previously-avoided situation   Client will Decrease frequency and intensity of feeling down, depressed, hopeless    Client will identify 2-3 fears / thoughts that contribute to feeling anxious     Intervention:  CBT: We talked about symptoms of depression patient is experiencing now. Patient stated that he feels, \"isolated,\" not being able to work on his garden and see his parents. His motivation and energy is low, and has not been able to really sleep due to stress. We discussed eliminating, \"and but,\" from his thoughts and replacing it with just \"and\" to decrease mental barriers to action.  DBT: Engaging in opposite action and being mindful of emotions that get in the way of the task needing to be done.    Motivational Interviewing: Affirming client efforts to manage his alcohol use, reflecting on the challenges of maintaining mental and physical health, emphasizing personal choice and control.   Motivational Interviewing    MI Intervention: Expressed Empathy/Understanding, Supported Autonomy, Collaboration, Evocation, Permission to raise concern or advise, Open-ended questions and Reflections: simple and complex     Change Talk Expressed by the Patient: Desire to change Ability to change Reasons to change Need to change Committment to change    Provider Response to Change Talk: E - Evoked more info from patient about behavior change, A - Affirmed patient's thoughts, decisions, or attempts at behavior change, R - Reflected patient's change talk and S - Summarized patient's change talk statements          ASSESSMENT: Current Emotional / Mental Status (status of significant symptoms):   Risk status (Self / Other harm or suicidal ideation)   Patient denies current fears or concerns for personal safety.   Patient denies current or recent " "suicidal ideation or behaviors.   Patient denies current or recent homicidal ideation or behaviors.   Patient denies current or recent self injurious behavior or ideation.   Patient denies other safety concerns.   Patient reports there has been no change in risk factors since their last session.     Patient reports there has been no change in protective factors since their last session.     Recommended that patient call 911 or go to the local ED should there be a change in any of these risk factors.     Appearance:   n/a, Phone Session    Eye Contact:   n/a, Phone Session    Psychomotor Behavior: n/a, Phone Session    Attitude:   Cooperative    Orientation:   All   Speech    Rate / Production: Normal     Volume:  Normal    Mood:    Stable   Affect:    n/a, phone session    Thought Content:  Clear    Thought Form:  Coherent  Logical    Insight:    Fair      Medication Review:   No changes to current psychiatric medication(s)     Medication Compliance:   Yes     Changes in Health Issues:   None reported     Chemical Use Review:   Substance Use: Chemical use reviewed, no active concerns identified      Tobacco Use: No current tobacco use.      Diagnosis:  1. Severe episode of recurrent major depressive disorder, without psychotic features (H)    2. Alcohol abuse, episodic drinking behavior    3. NEREYDA (generalized anxiety disorder)        Collateral Reports Completed:   Not Applicable    PLAN: (Patient Tasks / Therapist Tasks / Other)  Patient: Patient will look into completing CD assessment. He will practice engaging in opposite action and removing \"and but\" from thoughts as much as possible.     Provider: Referral being made to MI/CD day treatment due to severe depression and anxiety and alcohol dependency.   Patient is isolated and could benefit for higher level of care at this time.         DENNY Reis [facility]     April 13, 2020  Note reviewed and clinical supervision by DENNY Doty [facility] 4/18/2020         " "Brandin Rodriguez              SAFETY PLAN:  Step 1: Warning signs / cues (Thoughts, images, mood, situation, behavior) that a crisis may be developing:  ? Thoughts: \"I can't do this anymore\", \"I just want this to end\", \"Nothing makes it better\" and \"feeling helpless,\" \"I can't fix anything,\" \"I've fucked up my life so much that I can't come back from it.\"  ? Images: flashbacks and \"how 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,\" managing restaurants, 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,\"  ? Thinking Processes: ruminations (can't stop thinking about my problems): thinking back around current stressor with roommate or finances, thoughts about loss of daughter, thoughts about how life used to be and racing thoughts  ? Mood: worsening depression, hopelessness, helplessness, intense anger and intense worry  ? Behaviors: isolating/withdrawing , using alcohol, impulsive, reckless behaviors (acting without thinking): driving under the influence, getting into fights with strangers, unsafe sex practices, not taking care of myself, not taking care of my responsibilities and not sleeping enough, not eating/sleeping  ? Situations: relationship problems, trauma  and financial stress   Step 2: Coping strategies - Things I can do to take my mind off of my problems without contacting another person (relaxation technique, physical activity):  ? Distress Tolerance Strategies:  change body temperature (ice pack/cold water) , paced breathing/progressive muscle relaxation, intense exercise: running, jumping jacks for 2-3 minutes  and listening to podcast, playing video games  ? Physical Activities: go for a walk, gardening, meditation, deep breathing, stretching  and weight lifting  ? Focus on helpful thoughts:  \"This is temporary\", \"I will get through this\", \"It always passes\", \"Ride the wave\" and remind myself of what is important to me: \"I can't do this to my parents,\" \"Fuck " "them, I'm going to better than that shit.\"  Step 3: People and social settings that provide distraction:              Name: Briana      Phone: [phone]              Name: Dick     Phone: [phone]  ? gym  and [place]             Step 4: Remind myself of people and things that are important to me and worth living for:                              My family, my garden     Step 5: When I am in crisis, I can ask these people to help me use my safety plan:              Name: Briana      Phone: [phone]              Name: Dick     Phone: [phone]  Step 6: Making the environment safe:   ? remove alcohol, remove drugs, dispose of old medications  and be around others, get rid of gun lighter  Step 7: Professionals or agencies I can contact during a crisis:  ? Suicide Prevention Lifeline: 6-794-893-HBCR (8638)  ? Crisis Text Line Service (available 24 hours a day, 7 days a week): Text [place] to 751091    Local Crisis Services: [facility] [place]: [phone]     Call 911 or go to my nearest emergency department.       I helped develop this safety plan and agree to use it when needed.  I have been given a copy of this plan.       Client signature _________________________________________________________________  Today s date:  1/23/2020  Adapted from Safety Plan Template 2008 Janina Pierson and Arturo Roman is reprinted with the express permission of the authors.  No portion of the Safety Plan Template may be reproduced without the express, written permission.  You can contact the authors at bhs@[place].[email or web address] or [email or web address].                                                     Treatment Plan     Client's Name: Brandin Rodriguez                    YOB: 1985     Date: 2/5/2020     DSM5 Diagnoses: (Sustained by DSM5 Criteria Listed Above)  Diagnoses:      296.33 (F33.2) Major Depressive Disorder, Recurrent Episode, Severe _  300.02 (F41.1) Generalized Anxiety Disorder  309.81 (F43.10) " Posttraumatic Stress Disorder (includes Posttraumatic Stress Disorder for Children 6 Years and Younger)  Without dissociative symptoms  Substance-Related & Addictive Disorders Alcohol Use Disorder   303.90 (F10.20) Severe .  Psychosocial & Contextual Factors: Loss of child, history of SI, alcohol dependency, financial stressor, relationship conflict  WHODAS:   WHODAS 2.0 Total Score 12/3/2019   Total Score 41            Referral / Collaboration:  Was/were discussed and client will pursue.  Referral to MI/CD day treatment made.   Client is to follow thru with CD Assessment.     Anticipated number of session or this episode of care: 12        MeasurableTreatment Goal(s) related to diagnosis / functional impairment(s)  Goal 1: Client will report a decrease in depressive symptoms and reduce PHQ 9 score from 26 below 10.     Objective #A (Client Action)                Client will Decrease frequency and intensity of feeling down, depressed, hopeless  Identify negative self-talk and behaviors: challenge core beliefs, myths, and actions.  Status: New - Date: 2/5/2020      Intervention(s)  Therapist will use components of CBT and DBT to identify unhealthy patterns of thoughts, emotions and behaviors, breaking negative core beliefs, and cognitive restructuring .     Objective #B  Client will identify two areas of life that you would like to have improved functioning.  Status: New - Date: 2/5/2020      Intervention(s)  Therapist will assign homework : Client will write up a plan on finding another job that is more fulfilling to his skills.  Client will also identify 1-2 barriers that get in the way of completing household tasks. Client will learn 1-2 strategies to help in tidying place.      Goal 2: Client will report a decrease in anxiety symptoms and reduce NEREYDA 7 score from 21 below 10.    I will know I've met my goal when I know I'm not afraid of everything.       Objective #A (Client Action)                Status: New -  Date: 2/5/2020      Client will identify 2-3 fears / thoughts that contribute to feeling anxious.     Intervention(s)  Therapist will teach mindfulness and relaxation skills to help in identifying and noticing fear/thoughts  Therapist will use components of DBT and CBT to aid in cognitive restructuring.     Goal 3: Client will be completely sober from alcohol in 6 months     Objective #A (Client Action)                Status: New - Date: 2/5/2020      Client will identify at least 3 example(s) of how drinking has resulted in an experience that interferes with person values or goals.              Client will write a detailed alcohol use history describing treatment attempts and the specific situations surrounding relapse.               Client will identify 2-3 triggers that may lead to relapse              Client will work to Develop a written relapse prevention plan  Intervention(s)  Therapist will discuss alcohol and high-risk behaviors, use CBT and DBT to identify triggers to usage and relapse prevention plan. .     Goal 4: Client will increase understanding of trauma and its impact on client's functioning.     Objective #A (Client Action)                Client will experience a decrease in distress in previously-avoided situation.   Status: New - Date: 2/5/2020      Intervention(s)  Therapist will teach mindfulness, grounding and relaxation skills. Therapist will use CBT and DBT to help client identify and challenge negative beliefs and distortions.     Patient has reviewed and agreed to the above plan.        DENNY Reis [facility]                                                        February 5, 2020  Treatment plan reviewed and clinical supervision by DENNY Doty [facility] 2/19/2020

## 2020-04-14 ENCOUNTER — PATIENT OUTREACH (OUTPATIENT)
Dept: CARE COORDINATION | Facility: CLINIC | Age: 35
End: 2020-04-14

## 2020-04-14 NOTE — LETTER
[place] CARE COORDINATION    April 14, 2020    Brandin Rodriguez  [address]  [place] [address]      Dear Brandin,    I have been unsuccessful in reaching you since our last contact. At this time the Care Coordination team will make no further attempts to reach you, however this does not change your ability to continue receiving care from your providers at your primary care clinic. If you need additional support from a care coordinator in the future please contact me at [phone].    All of us at [facility] are invested in your health and are here to assist you in meeting your goals.     Sincerely,    DENNY Gtz, [facility]  Clinic Care Coordinator  [facility]  [phone]  [email or web address]

## 2020-04-14 NOTE — PROGRESS NOTES
Clinic Care Coordination Contact  [facility]       Clinical Data: Care Coordinator Outreach    Outreach attempted x 3.  Left message on patient's voicemail with call back information and requested return call.    Plan: Care Coordinator will send disenrollment letter with care coordinator contact information via mail. Care Coordinator will do no further outreaches at this time.    DENNY Gtz, [facility]  Clinic Care Coordinator  [facility]  [phone]  [email or web address]

## 2020-07-17 ENCOUNTER — TELEPHONE (OUTPATIENT)
Dept: FAMILY MEDICINE | Facility: CLINIC | Age: 35
End: 2020-07-17

## 2020-07-17 NOTE — TELEPHONE ENCOUNTER
"Spoke with patient.     Symptoms:  Mild respiratory difficulty, mild intermittent cough, chills.    Unknown if fever  Patient is a smoker.     Onset:  \"a few days\".      Patient reports his roommate is currently being tested for COVID-19.  Patient has been exposed.     Patient requesting RX Albuterol inhaler.     Not on patient's med list.     Advised patient sign in to [email or web address] and be evaluated for symptoms.     Patient stated understanding and agreement with advise/plan.      Tania ZAMUDIO RN,BSN      "

## 2020-07-17 NOTE — TELEPHONE ENCOUNTER
Reason for call:  Patient reporting a symptom    Symptom or request: Pt called and is stating that he is having difficulties breathing. He is also a smoker.     Duration (how long have symptoms been present):   unkown    Have you been treated for this before? No    Additional comments: Pt's roommate is currently being tested for covid-19.     Phone Number patient can be reached at:  Cell number on file:    Telephone Information:   Mobile [phone]       Best Time:  Any    Can we leave a detailed message on this number:  Not Applicable    Call taken on 7/17/2020 at 4:30 PM by Maria Allen

## 2020-09-03 ENCOUNTER — HOSPITAL ENCOUNTER (EMERGENCY)
Facility: CLINIC | Age: 35
Discharge: HOME | End: 2020-09-04
Attending: EMERGENCY MEDICINE | Admitting: EMERGENCY MEDICINE
Payer: COMMERCIAL

## 2020-09-03 DIAGNOSIS — F10.20 UNCOMPLICATED ALCOHOL DEPENDENCE (H): ICD-10-CM

## 2020-09-03 DIAGNOSIS — F10.220 ALCOHOL DEPENDENCE WITH UNCOMPLICATED INTOXICATION (H): ICD-10-CM

## 2020-09-03 LAB
ALBUMIN SERPL-MCNC: 3.1 G/DL (ref 3.4–5)
ALCOHOL BREATH TEST: 0.1 (ref 0–0.01)
ALP SERPL-CCNC: 89 U/L (ref 40–150)
ALT SERPL W P-5'-P-CCNC: 80 U/L (ref 0–70)
AMPHETAMINES UR QL SCN: NEGATIVE
ANION GAP SERPL CALCULATED.3IONS-SCNC: 11 MMOL/L (ref 3–14)
AST SERPL W P-5'-P-CCNC: 67 U/L (ref 0–45)
BARBITURATES UR QL: NEGATIVE
BASOPHILS # BLD AUTO: 0 10E9/L (ref 0–0.2)
BASOPHILS NFR BLD AUTO: 0.4 %
BENZODIAZ UR QL: NEGATIVE
BILIRUB SERPL-MCNC: 0.9 MG/DL (ref 0.2–1.3)
BUN SERPL-MCNC: 7 MG/DL (ref 7–30)
CALCIUM SERPL-MCNC: 8.6 MG/DL (ref 8.5–10.1)
CANNABINOIDS UR QL SCN: POSITIVE
CHLORIDE SERPL-SCNC: 107 MMOL/L (ref 94–109)
CO2 SERPL-SCNC: 23 MMOL/L (ref 20–32)
COCAINE UR QL: NEGATIVE
CREAT SERPL-MCNC: 0.67 MG/DL (ref 0.66–1.25)
DIFFERENTIAL METHOD BLD: ABNORMAL
EOSINOPHIL # BLD AUTO: 0.2 10E9/L (ref 0–0.7)
EOSINOPHIL NFR BLD AUTO: 1.9 %
ERYTHROCYTE [DISTWIDTH] IN BLOOD BY AUTOMATED COUNT: 13.8 % (ref 10–15)
ETHANOL UR QL SCN: POSITIVE
GFR SERPL CREATININE-BSD FRML MDRD: >90 ML/MIN/{1.73_M2}
GLUCOSE SERPL-MCNC: 104 MG/DL (ref 70–99)
HCT VFR BLD AUTO: 41.4 % (ref 40–53)
HGB BLD-MCNC: 15 G/DL (ref 13.3–17.7)
IMM GRANULOCYTES # BLD: 0 10E9/L (ref 0–0.4)
IMM GRANULOCYTES NFR BLD: 0.1 %
LYMPHOCYTES # BLD AUTO: 2.3 10E9/L (ref 0.8–5.3)
LYMPHOCYTES NFR BLD AUTO: 29 %
MCH RBC QN AUTO: 41.2 PG (ref 26.5–33)
MCHC RBC AUTO-ENTMCNC: 36.2 G/DL (ref 31.5–36.5)
MCV RBC AUTO: 114 FL (ref 78–100)
MONOCYTES # BLD AUTO: 0.8 10E9/L (ref 0–1.3)
MONOCYTES NFR BLD AUTO: 9.9 %
NEUTROPHILS # BLD AUTO: 4.6 10E9/L (ref 1.6–8.3)
NEUTROPHILS NFR BLD AUTO: 58.7 %
NRBC # BLD AUTO: 0 10*3/UL
NRBC BLD AUTO-RTO: 0 /100
OPIATES UR QL SCN: NEGATIVE
PLATELET # BLD AUTO: 322 10E9/L (ref 150–450)
POTASSIUM SERPL-SCNC: 3.2 MMOL/L (ref 3.4–5.3)
PROT SERPL-MCNC: 6.3 G/DL (ref 6.8–8.8)
RBC # BLD AUTO: 3.64 10E12/L (ref 4.4–5.9)
SARS-COV-2 RNA SPEC QL NAA+PROBE: NORMAL
SODIUM SERPL-SCNC: 141 MMOL/L (ref 133–144)
SPECIMEN SOURCE: NORMAL
WBC # BLD AUTO: 7.8 10E9/L (ref 4–11)

## 2020-09-03 PROCEDURE — 99284 EMERGENCY DEPT VISIT MOD MDM: CPT | Mod: Z6 | Performed by: EMERGENCY MEDICINE

## 2020-09-03 PROCEDURE — 82075 ASSAY OF BREATH ETHANOL: CPT

## 2020-09-03 PROCEDURE — 25800030 ZZH RX IP 258 OP 636: Performed by: EMERGENCY MEDICINE

## 2020-09-03 PROCEDURE — 80320 DRUG SCREEN QUANTALCOHOLS: CPT | Performed by: FAMILY MEDICINE

## 2020-09-03 PROCEDURE — 96360 HYDRATION IV INFUSION INIT: CPT

## 2020-09-03 PROCEDURE — 85025 COMPLETE CBC W/AUTO DIFF WBC: CPT | Performed by: EMERGENCY MEDICINE

## 2020-09-03 PROCEDURE — 80053 COMPREHEN METABOLIC PANEL: CPT | Performed by: EMERGENCY MEDICINE

## 2020-09-03 PROCEDURE — C9803 HOPD COVID-19 SPEC COLLECT: HCPCS

## 2020-09-03 PROCEDURE — 80307 DRUG TEST PRSMV CHEM ANLYZR: CPT | Performed by: FAMILY MEDICINE

## 2020-09-03 PROCEDURE — U0003 INFECTIOUS AGENT DETECTION BY NUCLEIC ACID (DNA OR RNA); SEVERE ACUTE RESPIRATORY SYNDROME CORONAVIRUS 2 (SARS-COV-2) (CORONAVIRUS DISEASE [COVID-19]), AMPLIFIED PROBE TECHNIQUE, MAKING USE OF HIGH THROUGHPUT TECHNOLOGIES AS DESCRIBED BY CMS-2020-01-R: HCPCS | Performed by: EMERGENCY MEDICINE

## 2020-09-03 PROCEDURE — 96361 HYDRATE IV INFUSION ADD-ON: CPT

## 2020-09-03 PROCEDURE — 99284 EMERGENCY DEPT VISIT MOD MDM: CPT | Mod: 25

## 2020-09-03 RX ORDER — MULTIPLE VITAMINS W/ MINERALS TAB 9MG-400MCG
1 TAB ORAL DAILY
Status: DISCONTINUED | OUTPATIENT
Start: 2020-09-04 | End: 2020-09-04 | Stop reason: HOSPADM

## 2020-09-03 RX ORDER — FOLIC ACID 1 MG/1
1 TABLET ORAL DAILY
Status: DISCONTINUED | OUTPATIENT
Start: 2020-09-04 | End: 2020-09-04 | Stop reason: HOSPADM

## 2020-09-03 RX ORDER — DIAZEPAM 5 MG
5-20 TABLET ORAL EVERY 30 MIN PRN
Status: DISCONTINUED | OUTPATIENT
Start: 2020-09-03 | End: 2020-09-04 | Stop reason: HOSPADM

## 2020-09-03 RX ORDER — LANOLIN ALCOHOL/MO/W.PET/CERES
100 CREAM (GRAM) TOPICAL DAILY
Status: DISCONTINUED | OUTPATIENT
Start: 2020-09-04 | End: 2020-09-04 | Stop reason: HOSPADM

## 2020-09-03 RX ADMIN — SODIUM CHLORIDE 1000 ML: 9 INJECTION, SOLUTION INTRAVENOUS at 21:48

## 2020-09-03 ASSESSMENT — ENCOUNTER SYMPTOMS
NAUSEA: 0
ABDOMINAL PAIN: 0
NECK STIFFNESS: 0
CONFUSION: 0
DIFFICULTY URINATING: 0
SHORTNESS OF BREATH: 0
VOMITING: 0
APPETITE CHANGE: 1
ADENOPATHY: 0
HEADACHES: 0
BRUISES/BLEEDS EASILY: 0
COLOR CHANGE: 0
CHILLS: 0
EYE REDNESS: 0
FEVER: 0
ARTHRALGIAS: 0
POLYDIPSIA: 0

## 2020-09-03 NOTE — ED AVS SNAPSHOT
[facility], [place], Emergency Department  [address]  [place] [address]  Phone:  [phone]  Fax:  [phone]                                    Brandin Rodriguez   MRN: [id number]    Department:  [facility], Emergency Department   Date of Visit:  9/3/2020           After Visit Summary Signature Page    I have received my discharge instructions, and my questions have been answered. I have discussed any challenges I see with this plan with the nurse or doctor.    ..........................................................................................................................................  Patient/Patient Representative Signature      ..........................................................................................................................................  Patient Representative Print Name and Relationship to Patient    ..................................................               ................................................  Date                                   Time    ..........................................................................................................................................  Reviewed by Signature/Title    ...................................................              ..............................................  Date                                               Time          22EPIC Rev 08/18

## 2020-09-04 VITALS
OXYGEN SATURATION: 97 % | WEIGHT: 216 LBS | BODY MASS INDEX: 27.73 KG/M2 | DIASTOLIC BLOOD PRESSURE: 72 MMHG | HEART RATE: 89 BPM | SYSTOLIC BLOOD PRESSURE: 114 MMHG | RESPIRATION RATE: 16 BRPM | TEMPERATURE: 48.7 F

## 2020-09-04 LAB
LABORATORY COMMENT REPORT: NORMAL
SARS-COV-2 RNA SPEC QL NAA+PROBE: NEGATIVE
SPECIMEN SOURCE: NORMAL

## 2020-09-04 NOTE — ED PROVIDER NOTES
"ED Provider Note  [facility]      History     Chief Complaint   Patient presents with     Alcohol Intoxication     blew .095, seeking detox but does not have a bed reserved, last drink was 5 PM today, had 4oz Vodka today, has been tapering on his drink x 3-4 days, has been vomiting, feeling dehydrated,. Has been drinking 2-3 yrs drinks daily up to 2 pints. Pt unsure if he's had seizures in the past, \" but I was told I have lost consciousness\" denies any SI,HI. Uses THC to help with nausea/eating     HPI  Brandin Rodriguez is a 34 year old male who presents with willingness to check into detox for management of alcohol dependence.  Patient drinks approximately 2 pints of vodka on a daily basis.  No history of seizures or delirium tremens.  He occasionally uses marijuana to enhance his appetite.  He denies any other street drug use or prescription medication abuse.  Last drink was prior to arriving to the emergency department.  No fevers.  No other concerns or complaints.    Past Medical History  Past Medical History:   Diagnosis Date     Alcoholic hepatitis      Anxiety      Depression      Depressive disorder      PTSD (post-traumatic stress disorder)      Suicidal ideation      Past Surgical History:   Procedure Laterality Date     BIOPSY      mole bxs     ESOPHAGOSCOPY, GASTROSCOPY, DUODENOSCOPY (EGD), COMBINED N/A 11/5/2019    Procedure: ESOPHAGOGASTRODUODENOSCOPY (EGD);  Surgeon: Jake Elizabeth MD;  Location:  GI     ESOPHAGOSCOPY, GASTROSCOPY, DUODENOSCOPY (EGD), COMBINED N/A 3/12/2020    Procedure: ESOPHAGOGASTRODUODENOSCOPY (EGD);  Surgeon: Jake Elizabeth MD;  Location:  GI     SOFT TISSUE SURGERY       propranolol (INDERAL) 20 MG tablet  folic acid (FOLVITE) 1 MG tablet  gabapentin (NEURONTIN) 300 MG capsule  hydrOXYzine (VISTARIL) 50 MG capsule  mirtazapine (REMERON) 15 MG tablet  multivitamin w/minerals (THERA-VIT-M) tablet  naltrexone (DEPADE/REVIA) 50 MG " tablet  nicotine (NICODERM CQ) 14 MG/24HR 24 hr patch  pantoprazole (PROTONIX) 40 MG EC tablet  vitamin B1 (THIAMINE) 100 MG tablet      Allergies   Allergen Reactions     Amoxicillin      Bactrim [Sulfamethoxazole W-Trimethoprim]      Family History  Family History   Problem Relation Age of Onset     Diabetes Mother      Hypertension Mother      Heart Disease Maternal Grandmother      Cancer Paternal Grandmother      Anxiety Disorder Brother      Arrhythmia Maternal Half-Sister      Social History   Social History     Tobacco Use     Smoking status: Current Every Day Smoker     Packs/day: 1.00     Smokeless tobacco: Never Used   Substance Use Topics     Alcohol use: Yes     Frequency: 4 or more times a week     Drinks per session: 7 to 9     Binge frequency: Daily or almost daily     Comment: daily 2 pints per day     Drug use: Yes     Types: Marijuana     Comment: Uses marijuana before eating      Past medical history, past surgical history, medications, allergies, family history, and social history were reviewed with the patient. No additional pertinent items.       Review of Systems   Constitutional: Positive for appetite change. Negative for chills and fever.   HENT: Negative for congestion.    Eyes: Negative for redness.   Respiratory: Negative for shortness of breath.    Cardiovascular: Negative for chest pain.   Gastrointestinal: Negative for abdominal pain, nausea and vomiting.   Endocrine: Negative for polydipsia and polyuria.   Genitourinary: Negative for difficulty urinating.   Musculoskeletal: Negative for arthralgias and neck stiffness.   Skin: Negative for color change.   Neurological: Negative for headaches.   Hematological: Negative for adenopathy. Does not bruise/bleed easily.   Psychiatric/Behavioral: Negative for confusion and suicidal ideas.   All other systems reviewed and are negative.    A complete review of systems was performed with pertinent positives and negatives noted in the HPI, and  all other systems negative.    Physical Exam   BP: 100/70  Pulse: 145  Temp: 98.1  F (36.7  C)  Resp: 16  Weight: 98 kg (216 lb)  SpO2: 97 %  Physical Exam  Vitals signs and nursing note reviewed.   Constitutional:       General: He is not in acute distress.     Appearance: Normal appearance. He is not diaphoretic.   HENT:      Head: Normocephalic and atraumatic.   Eyes:      General: No scleral icterus.     Extraocular Movements: Extraocular movements intact.      Conjunctiva/sclera: Conjunctivae normal.      Pupils: Pupils are equal, round, and reactive to light.   Neck:      Musculoskeletal: Normal range of motion and neck supple.   Cardiovascular:      Rate and Rhythm: Tachycardia present.      Heart sounds: Normal heart sounds.   Pulmonary:      Effort: Pulmonary effort is normal. No respiratory distress.      Breath sounds: Normal breath sounds.   Abdominal:      Palpations: Abdomen is soft.      Tenderness: There is no abdominal tenderness.   Musculoskeletal: Normal range of motion.         General: No tenderness.   Skin:     General: Skin is warm.      Findings: No rash.   Neurological:      General: No focal deficit present.      Mental Status: He is alert and oriented to person, place, and time.      Coordination: Coordination normal.   Psychiatric:         Mood and Affect: Mood normal.         Behavior: Behavior normal.      Comments: No suicidal or homicidal ideations.         ED Course      Procedures                         Results for orders placed or performed during the hospital encounter of 09/03/20   Drug abuse screen 6 urine (tox)     Status: Abnormal   Result Value Ref Range    Amphetamine Qual Urine Negative NEG^Negative    Barbiturates Qual Urine Negative NEG^Negative    Benzodiazepine Qual Urine Negative NEG^Negative    Cannabinoids Qual Urine Positive (A) NEG^Negative    Cocaine Qual Urine Negative NEG^Negative    Ethanol Qual Urine Positive (A) NEG^Negative    Opiates Qualitative Urine  Negative NEG^Negative   Comprehensive metabolic panel     Status: Abnormal   Result Value Ref Range    Sodium 141 133 - 144 mmol/L    Potassium 3.2 (L) 3.4 - 5.3 mmol/L    Chloride 107 94 - 109 mmol/L    Carbon Dioxide 23 20 - 32 mmol/L    Anion Gap 11 3 - 14 mmol/L    Glucose 104 (H) 70 - 99 mg/dL    Urea Nitrogen 7 7 - 30 mg/dL    Creatinine 0.67 0.66 - 1.25 mg/dL    GFR Estimate >90 >60 mL/min/[1.73_m2]    GFR Estimate If Black >90 >60 mL/min/[1.73_m2]    Calcium 8.6 8.5 - 10.1 mg/dL    Bilirubin Total 0.9 0.2 - 1.3 mg/dL    Albumin 3.1 (L) 3.4 - 5.0 g/dL    Protein Total 6.3 (L) 6.8 - 8.8 g/dL    Alkaline Phosphatase 89 40 - 150 U/L    ALT 80 (H) 0 - 70 U/L    AST 67 (H) 0 - 45 U/L   CBC with platelets differential     Status: Abnormal   Result Value Ref Range    WBC 7.8 4.0 - 11.0 10e9/L    RBC Count 3.64 (L) 4.4 - 5.9 10e12/L    Hemoglobin 15.0 13.3 - 17.7 g/dL    Hematocrit 41.4 40.0 - 53.0 %     (H) 78 - 100 fl    MCH 41.2 (H) 26.5 - 33.0 pg    MCHC 36.2 31.5 - 36.5 g/dL    RDW 13.8 10.0 - 15.0 %    Platelet Count 322 150 - 450 10e9/L    Diff Method Automated Method     % Neutrophils 58.7 %    % Lymphocytes 29.0 %    % Monocytes 9.9 %    % Eosinophils 1.9 %    % Basophils 0.4 %    % Immature Granulocytes 0.1 %    Nucleated RBCs 0 0 /100    Absolute Neutrophil 4.6 1.6 - 8.3 10e9/L    Absolute Lymphocytes 2.3 0.8 - 5.3 10e9/L    Absolute Monocytes 0.8 0.0 - 1.3 10e9/L    Absolute Eosinophils 0.2 0.0 - 0.7 10e9/L    Absolute Basophils 0.0 0.0 - 0.2 10e9/L    Abs Immature Granulocytes 0.0 0 - 0.4 10e9/L    Absolute Nucleated RBC 0.0    Asymptomatic COVID-19 Virus (Coronavirus) by PCR     Status: None    Specimen: Nasopharyngeal   Result Value Ref Range    COVID-19 Virus PCR to [facility] - Source Nasopharyngeal     COVID-19 Virus PCR to [facility] - Result       Test received-See reflex to IDDL test SARS CoV2 (COVID-19) Virus RT-PCR   Alcohol breath test POCT     Status: Abnormal   Result Value Ref Range     Alcohol Breath Test 0.095 (A) 0.00 - 0.01     Medications   diazepam (VALIUM) tablet 5-20 mg (has no administration in time range)   thiamine (B-1) tablet 100 mg (has no administration in time range)   folic acid (FOLVITE) tablet 1 mg (has no administration in time range)   multivitamin w/minerals (THERA-VIT-M) tablet 1 tablet (has no administration in time range)   0.9% sodium chloride BOLUS (1,000 mLs Intravenous New Bag 9/3/20 9632)        Assessments & Plan (with Medical Decision Making)   34-year-old man presenting to the emergency department with willingness to check into detox for management of alcohol dependence.  Patient will be hydrated with a bolus of normal saline, intravenously.  Laboratory studies were obtained.  He will be admitted to detox.    Patient's laboratory studies returned without any evidence of leukocytosis, WBC is normal at 7800. There is no evidence of anemia, hemoglobin is normal at 15.0.  Electrolytes show no evidence of dehydration, creatinine is normal at 0.67.  LFTs are slightly elevated.      I have reviewed the nursing notes. I have reviewed the findings, diagnosis, plan and need for follow up with the patient.    New Prescriptions    No medications on file       Final diagnoses:   Uncomplicated alcohol dependence (H)       --  Jae Nielsen MD  [facility], [place], EMERGENCY DEPARTMENT  9/3/2020     Jae Nielsen MD  09/03/20 3232

## 2020-09-04 NOTE — ED NOTES
12:03 AM patient was signed out to me with the patient voluntarily going to alcohol detox.  I am informed now that he is choosing to be discharged.  He is sober.  He will be advised that if he changes his mind he should call mental health intake to check on bed availability.     Tigre Rogers MD  09/04/20 0003

## 2020-09-04 NOTE — DISCHARGE INSTRUCTIONS
We recommend that you get into detox and then into a chemical dependency treatment program.  If you change your mind call mental health intake to check on bed availability.

## 2020-09-04 NOTE — ED NOTES
"ED to Behavioral Floor Handoff    SITUATION  Brandin Rodriguez is a 34 year old male who speaks English and lives in a home alone The patient arrived in the ED by public transportation from home with a complaint of Alcohol Intoxication (blew .095, seeking detox but does not have a bed reserved, last drink was 5 PM today, had 4oz Vodka today, has been tapering on his drink x 3-4 days, has been vomiting, feeling dehydrated,. Has been drinking 2-3 yrs drinks daily up to 2 pints. Pt unsure if he's had seizures in the past, \" but I was told I have lost consciousness\" denies any SI,HI. Uses THC to help with nausea/eating)  .The patient's current symptoms started/worsened 2 week(s) ago and during this time the symptoms have increased.   In the ED, pt was diagnosed with   Final diagnoses:   Uncomplicated alcohol dependence (H)        Initial vitals were: BP: 100/70  Pulse: 145  Temp: 98.1  F (36.7  C)  Resp: 16  Weight: 98 kg (216 lb)  SpO2: 97 %   --------  Is the patient diabetic? No   If yes, last blood glucose? --     If yes, was this treated in the ED? --  --------  Is the patient inebriated (ETOH) Yes or Impaired on other substances? No  MSSA done? Yes  Last MSSA score: 6    Were withdrawal symptoms treated? N/A  Does the patient have a seizure history? No. If yes, date of most recent seizure--  --------  Is the patient patient experiencing suicidal ideation? denies current or recent suicidal ideation     Homicidal ideation? denies current or recent homicidal ideation or behaviors.    Self-injurious behavior/urges? denies current or recent self injurious behavior or ideation.  ------  Was pt aggressive in the ED No  Was a code called No  Is the pt now cooperative? Yes  -------  Meds given in ED:   Medications   0.9% sodium chloride BOLUS (1,000 mLs Intravenous New Bag 9/3/20 5700)   diazepam (VALIUM) tablet 5-20 mg (has no administration in time range)   thiamine (B-1) tablet 100 mg (has no administration in time range) "   folic acid (FOLVITE) tablet 1 mg (has no administration in time range)   multivitamin w/minerals (THERA-VIT-M) tablet 1 tablet (has no administration in time range)      Family present during ED course? No  Family currently present? No    BACKGROUND  Does the patient have a cognitive impairment or developmental disability? No  Allergies:   Allergies   Allergen Reactions     Amoxicillin      Bactrim [Sulfamethoxazole W-Trimethoprim]    .   Social demographics are   Social History     Socioeconomic History     Marital status: Single     Spouse name: Not on file     Number of children: Not on file     Years of education: Not on file     Highest education level: Not on file   Occupational History     Not on file   Social Needs     Financial resource strain: Not on file     Food insecurity     Worry: Not on file     Inability: Not on file     Transportation needs     Medical: Not on file     Non-medical: Not on file   Tobacco Use     Smoking status: Current Every Day Smoker     Packs/day: 1.00     Smokeless tobacco: Never Used   Substance and Sexual Activity     Alcohol use: Yes     Frequency: 4 or more times a week     Drinks per session: 7 to 9     Binge frequency: Daily or almost daily     Comment: daily 2 pints per day     Drug use: Yes     Types: Marijuana     Comment: Uses marijuana before eating     Sexual activity: Yes     Partners: Female   Lifestyle     Physical activity     Days per week: Not on file     Minutes per session: Not on file     Stress: Not on file   Relationships     Social connections     Talks on phone: Not on file     Gets together: Not on file     Attends Mosque service: Not on file     Active member of club or organization: Not on file     Attends meetings of clubs or organizations: Not on file     Relationship status: Not on file     Intimate partner violence     Fear of current or ex partner: Not on file     Emotionally abused: Not on file     Physically abused: Not on file     Forced  sexual activity: Not on file   Other Topics Concern     Parent/sibling w/ CABG, MI or angioplasty before 65F 55M? Not Asked   Social History Narrative     Not on file        ASSESSMENT  Labs results   Labs Ordered and Resulted from Time of ED Arrival Up to the Time of Departure from the ED   ALCOHOL BREATH TEST POCT - Abnormal; Notable for the following components:       Result Value    Alcohol Breath Test 0.095 (*)     All other components within normal limits   DRUG ABUSE SCREEN 6 CHEM DEP URINE ([employer])   COMPREHENSIVE METABOLIC PANEL   CBC WITH PLATELETS DIFFERENTIAL   COVID-19 VIRUS (CORONAVIRUS) BY PCR   PERIPHERAL IV CATHETER   MSSA SCORE AND VS   NOTIFY      Imaging Studies: No results found for this or any previous visit (from the past 24 hour(s)).   Most recent vital signs /70   Pulse 105   Temp 98.1  F (36.7  C) (Oral)   Resp 16   Wt 98 kg (216 lb)   SpO2 97%   BMI 27.73 kg/m     Abnormal labs/tests/findings requiring intervention:---   Pain control: pt had none  Nausea control: pt had none    RECOMMENDATION  Are any infection precautions needed (MRSA, VRE, etc.)? No If yes, what infection? --  ---  Does the patient have mobility issues? independently. If yes, what device does the pt use? ---  ---  Is patient on 72 hour hold or commitment? No If on 72 hour hold, have hold and rights been given to patient? N/A  Are admitting orders written if after 10 p.m. ?N/A  Tasks needing to be completed:---     Cyndy Payton RN   [phone] [facility]

## 2020-09-09 ENCOUNTER — FCC EXTENDED DOCUMENTATION (OUTPATIENT)
Dept: PSYCHOLOGY | Facility: CLINIC | Age: 35
End: 2020-09-09

## 2020-09-09 DIAGNOSIS — F43.10 POSTTRAUMATIC STRESS DISORDER: ICD-10-CM

## 2020-09-09 DIAGNOSIS — F33.2 SEVERE EPISODE OF RECURRENT MAJOR DEPRESSIVE DISORDER, WITHOUT PSYCHOTIC FEATURES (H): ICD-10-CM

## 2020-09-09 DIAGNOSIS — F10.10 ALCOHOL ABUSE, EPISODIC DRINKING BEHAVIOR: Primary | ICD-10-CM

## 2020-09-09 DIAGNOSIS — F41.1 GAD (GENERALIZED ANXIETY DISORDER): ICD-10-CM

## 2020-10-12 RX ORDER — HYDROXYZINE PAMOATE 50 MG/1
CAPSULE ORAL
Qty: 90 CAPSULE | Refills: 0 | OUTPATIENT
Start: 2020-10-12

## 2020-10-12 NOTE — TELEPHONE ENCOUNTER
Medication was refilled on 1/27/20.  Closing encounter.    Day Higgins RN    Nurse Liaison  [facility]    Addiction Medicine Services

## 2020-11-06 ENCOUNTER — TRANSFERRED RECORDS (OUTPATIENT)
Dept: HEALTH INFORMATION MANAGEMENT | Facility: CLINIC | Age: 35
End: 2020-11-06

## 2020-11-10 ENCOUNTER — MEDICAL CORRESPONDENCE (OUTPATIENT)
Dept: HEALTH INFORMATION MANAGEMENT | Facility: CLINIC | Age: 35
End: 2020-11-10

## 2020-11-11 ENCOUNTER — TRANSFERRED RECORDS (OUTPATIENT)
Dept: HEALTH INFORMATION MANAGEMENT | Facility: CLINIC | Age: 35
End: 2020-11-11

## 2020-11-13 ENCOUNTER — PATIENT OUTREACH (OUTPATIENT)
Dept: NURSING | Facility: CLINIC | Age: 35
End: 2020-11-13
Payer: COMMERCIAL

## 2020-11-13 DIAGNOSIS — F10.20 UNCOMPLICATED ALCOHOL DEPENDENCE (H): Primary | ICD-10-CM

## 2020-11-13 SDOH — ECONOMIC STABILITY: TRANSPORTATION INSECURITY
IN THE PAST 12 MONTHS, HAS LACK OF TRANSPORTATION KEPT YOU FROM MEETINGS, WORK, OR FROM GETTING THINGS NEEDED FOR DAILY LIVING?: NO

## 2020-11-13 SDOH — ECONOMIC STABILITY: FOOD INSECURITY: WITHIN THE PAST 12 MONTHS, YOU WORRIED THAT YOUR FOOD WOULD RUN OUT BEFORE YOU GOT MONEY TO BUY MORE.: NEVER TRUE

## 2020-11-13 SDOH — ECONOMIC STABILITY: FOOD INSECURITY: WITHIN THE PAST 12 MONTHS, THE FOOD YOU BOUGHT JUST DIDN'T LAST AND YOU DIDN'T HAVE MONEY TO GET MORE.: NEVER TRUE

## 2020-11-13 SDOH — ECONOMIC STABILITY: TRANSPORTATION INSECURITY
IN THE PAST 12 MONTHS, HAS THE LACK OF TRANSPORTATION KEPT YOU FROM MEDICAL APPOINTMENTS OR FROM GETTING MEDICATIONS?: NO

## 2020-11-13 SDOH — SOCIAL STABILITY: SOCIAL NETWORK: HOW OFTEN DO YOU GET TOGETHER WITH FRIENDS OR RELATIVES?: NEVER

## 2020-11-13 SDOH — ECONOMIC STABILITY: INCOME INSECURITY: HOW HARD IS IT FOR YOU TO PAY FOR THE VERY BASICS LIKE FOOD, HOUSING, MEDICAL CARE, AND HEATING?: NOT HARD AT ALL

## 2020-11-13 SDOH — HEALTH STABILITY: MENTAL HEALTH
STRESS IS WHEN SOMEONE FEELS TENSE, NERVOUS, ANXIOUS, OR CAN'T SLEEP AT NIGHT BECAUSE THEIR MIND IS TROUBLED. HOW STRESSED ARE YOU?: TO SOME EXTENT

## 2020-11-13 ASSESSMENT — ACTIVITIES OF DAILY LIVING (ADL): DEPENDENT_IADLS:: INDEPENDENT

## 2020-11-13 NOTE — PROGRESS NOTES
"Clinic Care Coordination Contact    Clinic Care Coordination Contact  OUTREACH    Referral Information:  Referral Source: Self-patient/Caregiver    Primary Diagnosis: Dual -  MH/CD    Chief Complaint   Patient presents with     Clinic Care Coordination - Initial        Universal Utilization: Completed CD treatment today  Clinic Utilization  Difficulty keeping appointments:: No  Compliance Concerns: No  No-Show Concerns: No  No PCP office visit in Past Year: No  Utilization    Last refreshed: 11/12/2020 11:07 AM: Hospital Admissions 1           Last refreshed: 11/12/2020 11:07 AM: ED Visits 4           Last refreshed: 11/12/2020 11:07 AM: No Show Count (past year) 10              Current as of: 11/12/2020 11:07 AM            Clinical Concerns:  Pt contact [facility] because he completed treatment today and believes he contracted COVID while there. He is very frustrated with the facility because they were hiding COVID cases and refusing tests for pts when they experienced symtpoms. He believes to be able to report no COVID cases. [facility] provided Ombudsman number to make a report.    Pt requested information on COVID testing, [facility] SMITH directed pt to [email or web address] and explained process.    Discussed COVID precautions. PT reported runny nose and sore throat. His voice was horse throughout call. 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 He struggled with linear conversation and repeated himself multiple times. Ruminating on his frustration with treatment facility.    Discussed coping skills for alcohol use. Pt stated he doesn't want to drink but he knows isolation is not good for him. He had planned to go to his parents but can no longer do that. [facility] SM[facility] proved AA website for programs to call throughout the day.     Current Medical Concerns:  COVID    Current Behavioral Concerns: alcohol use    Education Provided to patient: [facility] sarah   Pain  Pain (GOAL):: No  Health Maintenance Reviewed: Up to date  Clinical Pathway: " None    Medication Management:  Not discussed at this time     Functional Status:  Dependent ADLs:: Independent  Dependent IADLs:: Independent  Bed or wheelchair confined:: No  Mobility Status: Independent  Fallen 2 or more times in the past year?: No  Any fall with injury in the past year?: No    Living Situation:  Current living arrangement:: I live alone  Type of residence:: Apartment    Lifestyle & Psychosocial Needs:  Lifestyle     Physical activity     Days per week: Not on file     Minutes per session: Not on file     Stress: To some extent     Social Needs     Financial resource strain: Not hard at all     Food insecurity     Worry: Never true     Inability: Never true     Transportation needs     Medical: No     Non-medical: No     Diet:: Regular  Inadequate nutrition (GOAL):: No  Tube Feeding: No  Inadequate activity/exercise (GOAL):: No  Significant changes in sleep pattern (GOAL): No  Transportation means:: Regular car     Buddhism or spiritual beliefs that impact treatment:: No  Mental health DX:: Yes  Mental health DX how managed:: None  Mental health management concern (GOAL):: No  Chemical Dependency Status: Current Concern  Chemical Dependency Management: Previous treatment, AA  Informal Support system:: Family, Parent   Socioeconomic History     Marital status: Single     Spouse name: Not on file     Number of children: Not on file     Years of education: Not on file     Highest education level: Not on file   Relationships     Social connections     Talks on phone: Not on file     Gets together: Never     Attends Yarsanism service: Not on file     Active member of club or organization: Not on file     Attends meetings of clubs or organizations: Not on file     Relationship status: Not on file     Intimate partner violence     Fear of current or ex partner: Not on file     Emotionally abused: Not on file     Physically abused: Not on file     Forced sexual activity: Not on file     Tobacco Use      Smoking status: Current Every Day Smoker     Packs/day: 1.00     Smokeless tobacco: Never Used   Substance and Sexual Activity     Alcohol use: Yes     Frequency: 4 or more times a week     Drinks per session: 7 to 9     Binge frequency: Daily or almost daily     Comment: daily 2 pints per day     Drug use: Yes     Types: Marijuana     Comment: Uses marijuana before eating     Sexual activity: Yes     Partners: Female        Resources and Interventions:  Current Resources:    Community Resources: Chemical Dependency Services  Supplies used at home:: None  Equipment Currently Used at Home: none  Employment Status: unemployed)   )    Advance Care Plan/Directive  Advanced Care Plans/Directives on file:: No    Referrals Placed: None     Patient/Caregiver understanding: Pt reports understanding and denies any additional questions or concerns at this times. SW CC engaged in AIDET communication during encounter.    Plan: CC SW will outreach to pt next week to discuss overall wellbeing and determine next steps for ongoing sobriety and wellbeing.    DENNY Gtz, [facility]  Clinic Care Coordinator  [facility]  [phone]  [email or web address]

## 2020-11-21 ENCOUNTER — NURSE TRIAGE (OUTPATIENT)
Dept: NURSING | Facility: CLINIC | Age: 35
End: 2020-11-21

## 2020-11-21 NOTE — TELEPHONE ENCOUNTER
Has been dealing with a COVID19 infection for a while he believes.     Trying to get his prior PCP to get him scheduled for a COVID test, but they have not done it yet.     Patient is scheduled for a COVID Saliva test tomorrow    Exposure at a treatment facility  10/12/2020-11/12/2020  -when he got out he was too sick to even complete sentences   -has been incapacitated with his ability to communicate since leaving the facility    Sob   Headache  Dizziness  Fatigue  No taste   No smell    Triaged to a disposition of Go to ED. Patient is reluctant to get off of phone, but eventually decides that he will go to ED.     COVID 19 Nurse Triage Plan/Patient Instructions    Please be aware that novel coronavirus (COVID-19) may be circulating in the community. If you develop symptoms such as fever, cough, or SOB or if you have concerns about the presence of another infection including coronavirus (COVID-19), please contact your health care provider or visit [email or web address].     Disposition/Instructions    ED Visit recommended. Follow protocol based instructions.     Bring Your Own Device:  Please also bring your smart device(s) (smart phones, tablets, laptops) and their charging cables for your personal use and to communicate with your care team during your visit.    Thank you for taking steps to prevent the spread of this virus.  o Limit your contact with others.  o Wear a simple mask to cover your cough.  o Wash your hands well and often.    Resources    [facility]: About COVID-19: [email or web address]    CDC: What to Do If You're Sick: www.cdc.gov/coronavirus/2019-ncov/about/steps-when-sick.html    CDC: Ending Home Isolation: www.cdc.gov/coronavirus/2019-ncov/hcp/disposition-in-home-patients.html     CDC: Caring for Someone: www.cdc.gov/coronavirus/2019-ncov/if-you-are-sick/care-for-someone.html     [facility]: Interim Guidance for Hospital Discharge to Home:  www.health.[facility].[place]/diseases/coronavirus/hcp/hospdischarge.pdf    [facility] clinical trials (COVID-19 research studies): [email or web address]     Below are the COVID-19 hotlines at the [facility] ([facility]). Interpreters are available.   o For health questions: Call 502-353-9720 or 1-345.189.9170 (7 a.m. to 7 p.m.)  o For questions about schools and childcare: Call 381-832-7268 or 1-594.289.2361 (7 a.m. to 7 p.m.)

## 2020-11-21 NOTE — TELEPHONE ENCOUNTER
Has been dealing with a COVID19 infection for a while he believes.     Trying to get his prior PCP to get him scheduled for a COVID test, but they have not done it yet.     Patient is scheduled for a COVID Saliva test tomorrow    Exposure at a treatment facility  10/12/2020-11/12/2020  -when he got out he was too sick to even complete sentences   -has been incapacitated with his ability to communicate since leaving the facility    Sob   Headache  Dizziness  Fatigue  No taste   No smell    Triaged to a disposition of Go to ED. Patient is reluctant to get off of phone, but eventually decides that he will go to ED.     COVID 19 Nurse Triage Plan/Patient Instructions    Please be aware that novel coronavirus (COVID-19) may be circulating in the community. If you develop symptoms such as fever, cough, or SOB or if you have concerns about the presence of another infection including coronavirus (COVID-19), please contact your health care provider or visit [email or web address].     Disposition/Instructions    ED Visit recommended. Follow protocol based instructions.     Bring Your Own Device:  Please also bring your smart device(s) (smart phones, tablets, laptops) and their charging cables for your personal use and to communicate with your care team during your visit.    Thank you for taking steps to prevent the spread of this virus.  o Limit your contact with others.  o Wear a simple mask to cover your cough.  o Wash your hands well and often.    Resources    [facility]: About COVID-19: [email or web address]    CDC: What to Do If You're Sick: www.cdc.gov/coronavirus/2019-ncov/about/steps-when-sick.html    CDC: Ending Home Isolation: www.cdc.gov/coronavirus/2019-ncov/hcp/disposition-in-home-patients.html     CDC: Caring for Someone: www.cdc.gov/coronavirus/2019-ncov/if-you-are-sick/care-for-someone.html     [facility]: Interim Guidance for Hospital Discharge to Home:  [email or web address]/[email or web address]/hcp/hospdischarge.pdf    [facility] clinical trials (COVID-19 research studies): [email or web address]     Below are the COVID-19 hotlines at the [facility] ([facility]). Interpreters are available.   o For health questions: Call 911-797-9770 or 1-950.405.3876 (7 a.m. to 7 p.m.)  o For questions about schools and childcare: Call 899-625-1884 or 1-836.179.6917 (7 a.m. to 7 p.m.)     Reason for Disposition    [1] COVID-19 infection suspected by caller or triager AND [2] mild symptoms (cough, fever, or others) AND [3] no complications or SOB    Headache  (and neurologic deficit)    Additional Information    Negative: SEVERE difficulty breathing (e.g., struggling for each breath, speaks in single words)    Negative: Difficult to awaken or acting confused (e.g., disoriented, slurred speech)    Negative: Bluish (or gray) lips or face now    Negative: Shock suspected (e.g., cold/pale/clammy skin, too weak to stand, low BP, rapid pulse)    Negative: Sounds like a life-threatening emergency to the triager    Negative: [1] COVID-19 exposure AND [2] no symptoms    Negative: COVID-19 and Breastfeeding, questions about    Negative: [1] Adult with possible COVID-19 symptoms AND [2] triager concerned about severity of symptoms or other causes    Negative: SEVERE or constant chest pain or pressure (Exception: mild central chest pain, present only when coughing)    Negative: MODERATE difficulty breathing (e.g., speaks in phrases, SOB even at rest, pulse 100-120)    Negative: Patient sounds very sick or weak to the triager    Negative: MILD difficulty breathing (e.g., minimal/no SOB at rest, SOB with walking, pulse <100)    Negative: Chest pain or pressure    Negative: Fever > 103 F (39.4 C)    Negative: [1] Fever > 101 F (38.3 C) AND [2] age > 60    Negative: [1] Fever > 100.0 F (37.8 C) AND [2] bedridden (e.g., nursing home patient, CVA,  chronic illness, recovering from surgery)    Negative: HIGH RISK patient (e.g., age > 64 years, diabetes, heart or lung disease, weak immune system) (Exception: Has already been evaluated by healthcare provider and has no new or worsening symptoms)    Negative: Fever present > 3 days (72 hours)    Negative: [1] Fever returns after gone for over 24 hours AND [2] symptoms worse or not improved    Negative: [1] Continuous (nonstop) coughing interferes with work or school AND [2] no improvement using cough treatment per protocol    Negative: [1] SEVERE weakness (i.e., unable to walk or barely able to walk, requires support) AND [2] new onset or worsening    Negative: [1] Weakness (i.e., paralysis, loss of muscle strength) of the face, arm / hand, or leg / foot on one side of the body AND [2] sudden onset AND [3] present now    Negative: [1] Numbness (i.e., loss of sensation) of the face, arm / hand, or leg / foot on one side of the body AND [2] sudden onset AND [3] present now    Negative: [1] Loss of speech or garbled speech AND [2] sudden onset AND [3] present now    Negative: Difficult to awaken or acting confused (e.g., disoriented, slurred speech)    Negative: Sounds like a life-threatening emergency to the triager    Negative: Confusion, disorientation, or hallucinations is main symptom    Negative: Neck pain is main symptom (and having weakness, numbness, or tingling in arm / hand because of neck pain)    Negative: Back pain is main symptom (and having weakness, numbness, or tingling in leg because of back pain)    Negative: Hand pain is main symptom (and having mild weakness, numbness, or tingling in hand related to hand pain)    Negative: Dizziness is main symptom    Negative: Vision loss or change is main symptom    Negative: Followed a head injury within last 3 days    Negative: Followed a neck injury within last 3 days    Negative: [1] Tingling in both hands and/or feet AND [2] breathing faster than normal  AND [3] feels similar to prior panic attack or hyperventilation episode    Negative: Weakness in both sides of the body or weakness all over    Protocols used: CORONAVIRUS (COVID-19) DIAGNOSED OR [id number] 8.4.20, NEUROLOGIC DEFICIT-A-AH

## 2020-12-02 ENCOUNTER — APPOINTMENT (OUTPATIENT)
Dept: GENERAL RADIOLOGY | Facility: CLINIC | Age: 35
End: 2020-12-02
Attending: EMERGENCY MEDICINE
Payer: COMMERCIAL

## 2020-12-02 ENCOUNTER — HOSPITAL ENCOUNTER (INPATIENT)
Facility: CLINIC | Age: 35
LOS: 1 days | Discharge: HOME | End: 2020-12-03
Attending: EMERGENCY MEDICINE | Admitting: PSYCHIATRY & NEUROLOGY
Payer: COMMERCIAL

## 2020-12-02 ENCOUNTER — TELEPHONE (OUTPATIENT)
Dept: BEHAVIORAL HEALTH | Facility: CLINIC | Age: 35
End: 2020-12-02

## 2020-12-02 DIAGNOSIS — F33.9 RECURRENT MAJOR DEPRESSIVE DISORDER, REMISSION STATUS UNSPECIFIED (H): ICD-10-CM

## 2020-12-02 DIAGNOSIS — G62.9 PERIPHERAL NERVE DISORDER: ICD-10-CM

## 2020-12-02 DIAGNOSIS — F10.939 ALCOHOL WITHDRAWAL SYNDROME WITH COMPLICATION (H): ICD-10-CM

## 2020-12-02 DIAGNOSIS — F41.1 GAD (GENERALIZED ANXIETY DISORDER): Primary | ICD-10-CM

## 2020-12-02 DIAGNOSIS — G62.9 NEUROPATHY: ICD-10-CM

## 2020-12-02 DIAGNOSIS — R11.2 NAUSEA AND VOMITING, INTRACTABILITY OF VOMITING NOT SPECIFIED, UNSPECIFIED VOMITING TYPE: ICD-10-CM

## 2020-12-02 DIAGNOSIS — F43.10 POSTTRAUMATIC STRESS DISORDER: ICD-10-CM

## 2020-12-02 DIAGNOSIS — Z20.828 EXPOSURE TO SARS-ASSOCIATED CORONAVIRUS: ICD-10-CM

## 2020-12-02 LAB
ALBUMIN SERPL-MCNC: 4.3 G/DL (ref 3.4–5)
ALCOHOL BREATH TEST: 0.06 (ref 0–0.01)
ALP SERPL-CCNC: 126 U/L (ref 40–150)
ALT SERPL W P-5'-P-CCNC: 175 U/L (ref 0–70)
AMPHETAMINES UR QL SCN: NEGATIVE
ANION GAP SERPL CALCULATED.3IONS-SCNC: 16 MMOL/L (ref 3–14)
AST SERPL W P-5'-P-CCNC: 171 U/L (ref 0–45)
BARBITURATES UR QL: NEGATIVE
BASOPHILS # BLD AUTO: 0 10E9/L (ref 0–0.2)
BASOPHILS NFR BLD AUTO: 0.6 %
BENZODIAZ UR QL: NEGATIVE
BILIRUB SERPL-MCNC: 2.2 MG/DL (ref 0.2–1.3)
BUN SERPL-MCNC: 6 MG/DL (ref 7–30)
CALCIUM SERPL-MCNC: 9.7 MG/DL (ref 8.5–10.1)
CANNABINOIDS UR QL SCN: POSITIVE
CHLORIDE SERPL-SCNC: 98 MMOL/L (ref 94–109)
CO2 SERPL-SCNC: 23 MMOL/L (ref 20–32)
COCAINE UR QL: NEGATIVE
CREAT SERPL-MCNC: 0.73 MG/DL (ref 0.66–1.25)
DIFFERENTIAL METHOD BLD: ABNORMAL
EOSINOPHIL # BLD AUTO: 0 10E9/L (ref 0–0.7)
EOSINOPHIL NFR BLD AUTO: 0.3 %
ERYTHROCYTE [DISTWIDTH] IN BLOOD BY AUTOMATED COUNT: 13.4 % (ref 10–15)
ETHANOL UR QL SCN: POSITIVE
GFR SERPL CREATININE-BSD FRML MDRD: >90 ML/MIN/{1.73_M2}
GLUCOSE SERPL-MCNC: 106 MG/DL (ref 70–99)
HCT VFR BLD AUTO: 49.9 % (ref 40–53)
HGB BLD-MCNC: 17.7 G/DL (ref 13.3–17.7)
IMM GRANULOCYTES # BLD: 0 10E9/L (ref 0–0.4)
IMM GRANULOCYTES NFR BLD: 0.5 %
LIPASE SERPL-CCNC: 144 U/L (ref 73–393)
LYMPHOCYTES # BLD AUTO: 1.2 10E9/L (ref 0.8–5.3)
LYMPHOCYTES NFR BLD AUTO: 18.7 %
MCH RBC QN AUTO: 35.8 PG (ref 26.5–33)
MCHC RBC AUTO-ENTMCNC: 35.5 G/DL (ref 31.5–36.5)
MCV RBC AUTO: 101 FL (ref 78–100)
MONOCYTES # BLD AUTO: 0.5 10E9/L (ref 0–1.3)
MONOCYTES NFR BLD AUTO: 8.6 %
NEUTROPHILS # BLD AUTO: 4.4 10E9/L (ref 1.6–8.3)
NEUTROPHILS NFR BLD AUTO: 71.3 %
NRBC # BLD AUTO: 0 10*3/UL
NRBC BLD AUTO-RTO: 0 /100
NT-PROBNP SERPL-MCNC: 38 PG/ML (ref 0–450)
OPIATES UR QL SCN: NEGATIVE
PLATELET # BLD AUTO: 290 10E9/L (ref 150–450)
POTASSIUM SERPL-SCNC: 3.5 MMOL/L (ref 3.4–5.3)
PROT SERPL-MCNC: 7.9 G/DL (ref 6.8–8.8)
RBC # BLD AUTO: 4.94 10E12/L (ref 4.4–5.9)
SARS-COV-2 RNA SPEC QL NAA+PROBE: NORMAL
SODIUM SERPL-SCNC: 137 MMOL/L (ref 133–144)
SPECIMEN SOURCE: NORMAL
TROPONIN I SERPL-MCNC: <0.015 UG/L (ref 0–0.04)
WBC # BLD AUTO: 6.2 10E9/L (ref 4–11)

## 2020-12-02 PROCEDURE — 71045 X-RAY EXAM CHEST 1 VIEW: CPT

## 2020-12-02 PROCEDURE — 250N000013 HC RX MED GY IP 250 OP 250 PS 637: Performed by: EMERGENCY MEDICINE

## 2020-12-02 PROCEDURE — 96374 THER/PROPH/DIAG INJ IV PUSH: CPT | Performed by: EMERGENCY MEDICINE

## 2020-12-02 PROCEDURE — U0003 INFECTIOUS AGENT DETECTION BY NUCLEIC ACID (DNA OR RNA); SEVERE ACUTE RESPIRATORY SYNDROME CORONAVIRUS 2 (SARS-COV-2) (CORONAVIRUS DISEASE [COVID-19]), AMPLIFIED PROBE TECHNIQUE, MAKING USE OF HIGH THROUGHPUT TECHNOLOGIES AS DESCRIBED BY CMS-2020-01-R: HCPCS | Performed by: EMERGENCY MEDICINE

## 2020-12-02 PROCEDURE — 99285 EMERGENCY DEPT VISIT HI MDM: CPT | Mod: 25 | Performed by: EMERGENCY MEDICINE

## 2020-12-02 PROCEDURE — 80320 DRUG SCREEN QUANTALCOHOLS: CPT | Performed by: EMERGENCY MEDICINE

## 2020-12-02 PROCEDURE — 83880 ASSAY OF NATRIURETIC PEPTIDE: CPT | Performed by: EMERGENCY MEDICINE

## 2020-12-02 PROCEDURE — 80053 COMPREHEN METABOLIC PANEL: CPT | Performed by: EMERGENCY MEDICINE

## 2020-12-02 PROCEDURE — C9803 HOPD COVID-19 SPEC COLLECT: HCPCS | Performed by: EMERGENCY MEDICINE

## 2020-12-02 PROCEDURE — 93010 ELECTROCARDIOGRAM REPORT: CPT | Performed by: EMERGENCY MEDICINE

## 2020-12-02 PROCEDURE — 258N000003 HC RX IP 258 OP 636: Performed by: EMERGENCY MEDICINE

## 2020-12-02 PROCEDURE — 84484 ASSAY OF TROPONIN QUANT: CPT | Performed by: EMERGENCY MEDICINE

## 2020-12-02 PROCEDURE — 80307 DRUG TEST PRSMV CHEM ANLYZR: CPT | Performed by: EMERGENCY MEDICINE

## 2020-12-02 PROCEDURE — 96375 TX/PRO/DX INJ NEW DRUG ADDON: CPT | Performed by: EMERGENCY MEDICINE

## 2020-12-02 PROCEDURE — 90791 PSYCH DIAGNOSTIC EVALUATION: CPT

## 2020-12-02 PROCEDURE — 82075 ASSAY OF BREATH ETHANOL: CPT | Performed by: EMERGENCY MEDICINE

## 2020-12-02 PROCEDURE — 83690 ASSAY OF LIPASE: CPT | Performed by: EMERGENCY MEDICINE

## 2020-12-02 PROCEDURE — 85025 COMPLETE CBC W/AUTO DIFF WBC: CPT | Performed by: EMERGENCY MEDICINE

## 2020-12-02 PROCEDURE — 250N000011 HC RX IP 250 OP 636: Performed by: EMERGENCY MEDICINE

## 2020-12-02 PROCEDURE — 93005 ELECTROCARDIOGRAM TRACING: CPT | Performed by: EMERGENCY MEDICINE

## 2020-12-02 RX ORDER — KETOROLAC TROMETHAMINE 30 MG/ML
30 INJECTION, SOLUTION INTRAMUSCULAR; INTRAVENOUS ONCE
Status: COMPLETED | OUTPATIENT
Start: 2020-12-02 | End: 2020-12-02

## 2020-12-02 RX ORDER — MULTIPLE VITAMINS W/ MINERALS TAB 9MG-400MCG
1 TAB ORAL DAILY
Status: DISCONTINUED | OUTPATIENT
Start: 2020-12-02 | End: 2020-12-03 | Stop reason: HOSPADM

## 2020-12-02 RX ORDER — LANOLIN ALCOHOL/MO/W.PET/CERES
100 CREAM (GRAM) TOPICAL DAILY
Status: DISCONTINUED | OUTPATIENT
Start: 2020-12-02 | End: 2020-12-03 | Stop reason: HOSPADM

## 2020-12-02 RX ORDER — DIAZEPAM 5 MG
5-20 TABLET ORAL EVERY 30 MIN PRN
Status: DISCONTINUED | OUTPATIENT
Start: 2020-12-02 | End: 2020-12-03

## 2020-12-02 RX ORDER — ACETAMINOPHEN 325 MG/1
975 TABLET ORAL ONCE
Status: COMPLETED | OUTPATIENT
Start: 2020-12-02 | End: 2020-12-02

## 2020-12-02 RX ORDER — GABAPENTIN 300 MG/1
300 CAPSULE ORAL ONCE
Status: COMPLETED | OUTPATIENT
Start: 2020-12-02 | End: 2020-12-02

## 2020-12-02 RX ORDER — FOLIC ACID 1 MG/1
1 TABLET ORAL DAILY
Status: DISCONTINUED | OUTPATIENT
Start: 2020-12-02 | End: 2020-12-03 | Stop reason: HOSPADM

## 2020-12-02 RX ORDER — SODIUM CHLORIDE 9 MG/ML
INJECTION, SOLUTION INTRAVENOUS CONTINUOUS
Status: DISCONTINUED | OUTPATIENT
Start: 2020-12-02 | End: 2020-12-03

## 2020-12-02 RX ORDER — ACETAMINOPHEN 500 MG
1000 TABLET ORAL ONCE
Status: COMPLETED | OUTPATIENT
Start: 2020-12-02 | End: 2020-12-02

## 2020-12-02 RX ORDER — ONDANSETRON 2 MG/ML
4 INJECTION INTRAMUSCULAR; INTRAVENOUS EVERY 30 MIN PRN
Status: DISCONTINUED | OUTPATIENT
Start: 2020-12-02 | End: 2020-12-03

## 2020-12-02 RX ADMIN — DIAZEPAM 10 MG: 5 TABLET ORAL at 18:06

## 2020-12-02 RX ADMIN — GABAPENTIN 300 MG: 300 CAPSULE ORAL at 15:25

## 2020-12-02 RX ADMIN — MULTIPLE VITAMINS W/ MINERALS TAB 1 TABLET: TAB at 18:06

## 2020-12-02 RX ADMIN — THIAMINE HCL TAB 100 MG 100 MG: 100 TAB at 18:05

## 2020-12-02 RX ADMIN — ACETAMINOPHEN 1000 MG: 500 TABLET, FILM COATED ORAL at 22:59

## 2020-12-02 RX ADMIN — KETOROLAC TROMETHAMINE 30 MG: 30 INJECTION, SOLUTION INTRAMUSCULAR at 17:08

## 2020-12-02 RX ADMIN — ONDANSETRON 4 MG: 2 INJECTION INTRAMUSCULAR; INTRAVENOUS at 15:55

## 2020-12-02 RX ADMIN — SODIUM CHLORIDE 500 ML: 9 INJECTION, SOLUTION INTRAVENOUS at 15:24

## 2020-12-02 RX ADMIN — SODIUM CHLORIDE: 9 INJECTION, SOLUTION INTRAVENOUS at 15:54

## 2020-12-02 RX ADMIN — GABAPENTIN 300 MG: 300 CAPSULE ORAL at 16:29

## 2020-12-02 RX ADMIN — FOLIC ACID 1 MG: 1 TABLET ORAL at 18:05

## 2020-12-02 RX ADMIN — ACETAMINOPHEN 975 MG: 325 TABLET, FILM COATED ORAL at 17:07

## 2020-12-02 ASSESSMENT — ENCOUNTER SYMPTOMS
DYSPHORIC MOOD: 1
ABDOMINAL PAIN: 0
FEVER: 0
SHORTNESS OF BREATH: 0

## 2020-12-02 NOTE — ED NOTES
Bed: ED16B  Expected date:   Expected time:   Means of arrival:   Comments:  [facility] 35[facility] N/V PTSD

## 2020-12-02 NOTE — ED TRIAGE NOTES
"Brought in by EMS because states cannot take care of self at home. Worried about mental and physical health. Reports severe pain in bilateral legs from hips to feet, feet worse. Has been present for \"a long time\" but worse last 2 weeks since was in rehab and developed bilateral edema in them. Has been drinking alcohol to help with pain and sleep. Drinks 2 pints per day. Last drink last evening.   "

## 2020-12-02 NOTE — ED PROVIDER NOTES
ED Provider Note  [facility]      History     Chief Complaint   Patient presents with     PTSD     Alcohol Problem     Last drink, was last night      Foot Pain     Patient presents with numbness and shooting pain through toes to bilateral feet for the past 6 months with pain worsening over the last 2 months     The history is provided by the patient.     Brandin Rodriguez is a 35 year old male with a medical history significant for alcoholic hepatitis, MDD, NEREYDA and PTSD who presents to the Emergency Department for mental health evaluation as well as evaluation of bilateral leg pain.  Patient reports that approximately 2 months ago he developed bilateral leg pain and swelling.  Patient was supposed to have an echocardiogram done for evaluation of his bilateral leg swelling.  Patient states that on 11/12/2020 he developed symptoms including a cough, sneezing, runny nose and arthralgias.  Patient was concerned that he had COVID-19, so he did not go to his echo appointment.  The patient was never tested for COVID-19.  Patient reports that the symptoms have since resolved, but he continues to have bilateral leg pain.  Patient is wanting to have his bilateral leg pain evaluated today.  He describes it as a tingling pain in his legs.  He denies any chest pain or shortness of breath.    Patient reports that he has been drinking alcohol as he states that this is the only thing that helps with his leg pain and allows him to sleep.  Patient reports that he was in a rehab facility and was discharged on 11/12/2020.  Patient states that multiple people at the facility tested positive for COVID-19, which is why he believes that he had COVID-19 that week.  Patient states that he began drinking again soon after leaving the rehab facility.  He reports that he has been drinking approximately 2 pints of vodka a day.  He states that he has had withdrawal symptoms in the past.    Patient denies any suicidal or  homicidal ideation.  Patient states that he is wanting help with his mental health as well.  He stated that he has been feeling more depressed recently.    Past Medical History  Past Medical History:   Diagnosis Date     Alcoholic hepatitis      Anxiety      Depression      Depressive disorder      PTSD (post-traumatic stress disorder)      Suicidal ideation      Past Surgical History:   Procedure Laterality Date     BIOPSY      mole bxs     ESOPHAGOSCOPY, GASTROSCOPY, DUODENOSCOPY (EGD), COMBINED N/A 11/5/2019    Procedure: ESOPHAGOGASTRODUODENOSCOPY (EGD);  Surgeon: Jake Elizabeth MD;  Location:  GI     ESOPHAGOSCOPY, GASTROSCOPY, DUODENOSCOPY (EGD), COMBINED N/A 3/12/2020    Procedure: ESOPHAGOGASTRODUODENOSCOPY (EGD);  Surgeon: Jake Elizabeth MD;  Location:  GI     SOFT TISSUE SURGERY            folic acid (FOLVITE) 1 MG tablet       gabapentin (NEURONTIN) 300 MG capsule       multivitamin w/minerals (THERA-VIT-M) tablet       pantoprazole (PROTONIX) 40 MG EC tablet       propranolol (INDERAL) 20 MG tablet       vitamin B1 (THIAMINE) 100 MG tablet       hydrOXYzine (VISTARIL) 50 MG capsule       mirtazapine (REMERON) 15 MG tablet       naltrexone (DEPADE/REVIA) 50 MG tablet       nicotine (NICODERM CQ) 14 MG/24HR 24 hr patch      Allergies   Allergen Reactions     Amoxicillin      Bactrim [Sulfamethoxazole W-Trimethoprim]      Family History  Family History   Problem Relation Age of Onset     Diabetes Mother      Hypertension Mother      Heart Disease Maternal Grandmother      Cancer Paternal Grandmother      Anxiety Disorder Brother      Arrhythmia Maternal Half-Sister      Social History   Social History     Tobacco Use     Smoking status: Current Every Day Smoker     Packs/day: 1.00     Smokeless tobacco: Never Used   Substance Use Topics     Alcohol use: Yes     Frequency: 4 or more times a week     Drinks per session: 7 to 9     Binge frequency: Daily or almost daily     Comment: daily 2  pints of Vodka per day     Drug use: Yes     Types: Marijuana     Comment: Uses marijuana before eating      Past medical history, past surgical history, medications, allergies, family history, and social history were reviewed with the patient. No additional pertinent items.       Review of Systems   Constitutional: Negative for fever.   Respiratory: Negative for shortness of breath.    Cardiovascular: Positive for leg swelling (bilateral; resolved). Negative for chest pain.   Gastrointestinal: Negative for abdominal pain.   Musculoskeletal:        Positive for bilateral leg pain   Neurological:        Positive for paresthesias in bilateral legs   Psychiatric/Behavioral: Positive for dysphoric mood. Negative for self-injury and suicidal ideas.   All other systems reviewed and are negative.      Physical Exam     ED Triage Vitals [12/02/20 1454]   Enc Vitals Group      /86      Pulse 83      Resp 16      Temp 98.6  F (37  C)      Temp src Oral      SpO2 99 %     Physical Exam  Constitutional:       General: He is not in acute distress.     Appearance: He is not diaphoretic.   HENT:      Head: Normocephalic.      Mouth/Throat:      Pharynx: No oropharyngeal exudate.   Eyes:      Extraocular Movements: Extraocular movements intact.   Neck:      Musculoskeletal: Neck supple.   Cardiovascular:      Heart sounds: Normal heart sounds.   Pulmonary:      Effort: No respiratory distress.      Breath sounds: Normal breath sounds.   Abdominal:      General: There is no distension.      Palpations: Abdomen is soft.      Tenderness: There is no abdominal tenderness.   Musculoskeletal:         General: No deformity.      Comments: Bilateral lower extremities feet up through calves tender to the touch.  No obvious rash, cellulitis, ecchymosis, or other visual abnormality.  Motor appears normal   Skin:     General: Skin is dry.   Neurological:      Mental Status: He is alert.      Comments: alert   Psychiatric:          Behavior: Behavior normal.         ED Course      Procedures     2:37 PM  The patient was seen and examined by Zackery Willoughby DO in Room ED04.                EKG Interpretation:      Interpreted by Zackery Willoughby DO  Time reviewed: 1500  Symptoms at time of EKG: Nausea and vomiting  Rhythm: normal sinus   Rate: normal  Axis: normal  Ectopy: none  Conduction: normal  ST Segments/ T Waves: No ST-T wave changes  Q Waves: none  Comparison to prior: Previously sinus tachycardic now normal sinus rhythm normal rate    Clinical Impression: normal EKG  Results for orders placed or performed during the hospital encounter of 12/02/20   XR Chest Port 1 View     Status: None    Narrative    XR CHEST PORT 1 VW  12/2/2020 4:31 PM       INDICATION: Dyspnea  COMPARISON: None       Impression    IMPRESSION: Negative chest.    IVONE STALEY MD   Drug abuse screen 6 urine (chem dep)     Status: Abnormal   Result Value Ref Range    Amphetamine Qual Urine Negative NEG^Negative    Barbiturates Qual Urine Negative NEG^Negative    Benzodiazepine Qual Urine Negative NEG^Negative    Cannabinoids Qual Urine Positive (A) NEG^Negative    Cocaine Qual Urine Negative NEG^Negative    Ethanol Qual Urine Positive (A) NEG^Negative    Opiates Qualitative Urine Negative NEG^Negative   CBC with platelets differential     Status: Abnormal   Result Value Ref Range    WBC 6.2 4.0 - 11.0 10e9/L    RBC Count 4.94 4.4 - 5.9 10e12/L    Hemoglobin 17.7 13.3 - 17.7 g/dL    Hematocrit 49.9 40.0 - 53.0 %     (H) 78 - 100 fl    MCH 35.8 (H) 26.5 - 33.0 pg    MCHC 35.5 31.5 - 36.5 g/dL    RDW 13.4 10.0 - 15.0 %    Platelet Count 290 150 - 450 10e9/L    Diff Method Automated Method     % Neutrophils 71.3 %    % Lymphocytes 18.7 %    % Monocytes 8.6 %    % Eosinophils 0.3 %    % Basophils 0.6 %    % Immature Granulocytes 0.5 %    Nucleated RBCs 0 0 /100    Absolute Neutrophil 4.4 1.6 - 8.3 10e9/L    Absolute Lymphocytes 1.2 0.8 - 5.3 10e9/L     Absolute Monocytes 0.5 0.0 - 1.3 10e9/L    Absolute Eosinophils 0.0 0.0 - 0.7 10e9/L    Absolute Basophils 0.0 0.0 - 0.2 10e9/L    Abs Immature Granulocytes 0.0 0 - 0.4 10e9/L    Absolute Nucleated RBC 0.0    Comprehensive metabolic panel     Status: Abnormal   Result Value Ref Range    Sodium 137 133 - 144 mmol/L    Potassium 3.5 3.4 - 5.3 mmol/L    Chloride 98 94 - 109 mmol/L    Carbon Dioxide 23 20 - 32 mmol/L    Anion Gap 16 (H) 3 - 14 mmol/L    Glucose 106 (H) 70 - 99 mg/dL    Urea Nitrogen 6 (L) 7 - 30 mg/dL    Creatinine 0.73 0.66 - 1.25 mg/dL    GFR Estimate >90 >60 mL/min/[1.73_m2]    GFR Estimate If Black >90 >60 mL/min/[1.73_m2]    Calcium 9.7 8.5 - 10.1 mg/dL    Bilirubin Total 2.2 (H) 0.2 - 1.3 mg/dL    Albumin 4.3 3.4 - 5.0 g/dL    Protein Total 7.9 6.8 - 8.8 g/dL    Alkaline Phosphatase 126 40 - 150 U/L     (H) 0 - 70 U/L     (H) 0 - 45 U/L   Lipase     Status: None   Result Value Ref Range    Lipase 144 73 - 393 U/L   Troponin I     Status: None   Result Value Ref Range    Troponin I ES <0.015 0.000 - 0.045 ug/L   Asymptomatic COVID-19 Virus (Coronavirus) by PCR     Status: None    Specimen: Nasopharyngeal   Result Value Ref Range    COVID-19 Virus PCR to [facility] - Source Nasopharyngeal     COVID-19 Virus PCR to [facility] - Result       Test received-See reflex to IDDL test SARS CoV2 (COVID-19) Virus RT-PCR   Nt probnp inpatient (BNP)     Status: None   Result Value Ref Range    N-Terminal Pro BNP Inpatient 38 0 - 450 pg/mL   EKG 12-lead, tracing only     Status: None (Preliminary result)   Result Value Ref Range    Interpretation ECG Click View Image link to view waveform and result    Alcohol breath test POCT     Status: Abnormal   Result Value Ref Range    Alcohol Breath Test 0.062 (A) 0.00 - 0.01            Results for orders placed or performed during the hospital encounter of 12/02/20   XR Chest Port 1 View     Status: None    Narrative    XR CHEST PORT 1 VW  12/2/2020 4:31 PM        INDICATION: Dyspnea  COMPARISON: None       Impression    IMPRESSION: Negative chest.    IVONE STALEY MD   Drug abuse screen 6 urine (chem dep)     Status: Abnormal   Result Value Ref Range    Amphetamine Qual Urine Negative NEG^Negative    Barbiturates Qual Urine Negative NEG^Negative    Benzodiazepine Qual Urine Negative NEG^Negative    Cannabinoids Qual Urine Positive (A) NEG^Negative    Cocaine Qual Urine Negative NEG^Negative    Ethanol Qual Urine Positive (A) NEG^Negative    Opiates Qualitative Urine Negative NEG^Negative   CBC with platelets differential     Status: Abnormal   Result Value Ref Range    WBC 6.2 4.0 - 11.0 10e9/L    RBC Count 4.94 4.4 - 5.9 10e12/L    Hemoglobin 17.7 13.3 - 17.7 g/dL    Hematocrit 49.9 40.0 - 53.0 %     (H) 78 - 100 fl    MCH 35.8 (H) 26.5 - 33.0 pg    MCHC 35.5 31.5 - 36.5 g/dL    RDW 13.4 10.0 - 15.0 %    Platelet Count 290 150 - 450 10e9/L    Diff Method Automated Method     % Neutrophils 71.3 %    % Lymphocytes 18.7 %    % Monocytes 8.6 %    % Eosinophils 0.3 %    % Basophils 0.6 %    % Immature Granulocytes 0.5 %    Nucleated RBCs 0 0 /100    Absolute Neutrophil 4.4 1.6 - 8.3 10e9/L    Absolute Lymphocytes 1.2 0.8 - 5.3 10e9/L    Absolute Monocytes 0.5 0.0 - 1.3 10e9/L    Absolute Eosinophils 0.0 0.0 - 0.7 10e9/L    Absolute Basophils 0.0 0.0 - 0.2 10e9/L    Abs Immature Granulocytes 0.0 0 - 0.4 10e9/L    Absolute Nucleated RBC 0.0    Comprehensive metabolic panel     Status: Abnormal   Result Value Ref Range    Sodium 137 133 - 144 mmol/L    Potassium 3.5 3.4 - 5.3 mmol/L    Chloride 98 94 - 109 mmol/L    Carbon Dioxide 23 20 - 32 mmol/L    Anion Gap 16 (H) 3 - 14 mmol/L    Glucose 106 (H) 70 - 99 mg/dL    Urea Nitrogen 6 (L) 7 - 30 mg/dL    Creatinine 0.73 0.66 - 1.25 mg/dL    GFR Estimate >90 >60 mL/min/[1.73_m2]    GFR Estimate If Black >90 >60 mL/min/[1.73_m2]    Calcium 9.7 8.5 - 10.1 mg/dL    Bilirubin Total 2.2 (H) 0.2 - 1.3 mg/dL    Albumin 4.3 3.4 -  5.0 g/dL    Protein Total 7.9 6.8 - 8.8 g/dL    Alkaline Phosphatase 126 40 - 150 U/L     (H) 0 - 70 U/L     (H) 0 - 45 U/L   Lipase     Status: None   Result Value Ref Range    Lipase 144 73 - 393 U/L   Troponin I     Status: None   Result Value Ref Range    Troponin I ES <0.015 0.000 - 0.045 ug/L   Asymptomatic COVID-19 Virus (Coronavirus) by PCR     Status: None    Specimen: Nasopharyngeal   Result Value Ref Range    COVID-19 Virus PCR to [facility] - Source Nasopharyngeal     COVID-19 Virus PCR to [facility] - Result       Test received-See reflex to IDDL test SARS CoV2 (COVID-19) Virus RT-PCR   Nt probnp inpatient (BNP)     Status: None   Result Value Ref Range    N-Terminal Pro BNP Inpatient 38 0 - 450 pg/mL   EKG 12-lead, tracing only     Status: None (Preliminary result)   Result Value Ref Range    Interpretation ECG Click View Image link to view waveform and result    Alcohol breath test POCT     Status: Abnormal   Result Value Ref Range    Alcohol Breath Test 0.062 (A) 0.00 - 0.01     Medications   0.9% sodium chloride BOLUS (0 mLs Intravenous Stopped 12/2/20 1554)     Followed by   sodium chloride 0.9% infusion ( Intravenous New Bag 12/2/20 1554)   ondansetron (ZOFRAN) injection 4 mg (4 mg Intravenous Given 12/2/20 1555)   diazepam (VALIUM) tablet 5-20 mg (has no administration in time range)   thiamine (B-1) tablet 100 mg (has no administration in time range)   folic acid (FOLVITE) tablet 1 mg (has no administration in time range)   multivitamin w/minerals (THERA-VIT-M) tablet 1 tablet (has no administration in time range)   gabapentin (NEURONTIN) capsule 300 mg (300 mg Oral Given 12/2/20 1525)   gabapentin (NEURONTIN) capsule 300 mg (300 mg Oral Given 12/2/20 1629)   ketorolac (TORADOL) injection 30 mg (30 mg Intravenous Given 12/2/20 1708)   acetaminophen (TYLENOL) tablet 975 mg (975 mg Oral Given 12/2/20 1707)        Assessments & Plan (with Medical Decision Making)   35-year-old male presents  to us with a chief complaint of depression, alcohol abuse, foot pain, nausea and vomiting.  Differential includes but not limited to alcoholic gastritis, alcohol withdrawal, pancreatitis, acute coronary syndrome, neuropathy. His foot pain is bilateral and describes as a tingling.  There is no external evidence of cellulitis or other injury.  I suspect this is secondary to a neuropathy which may be related to his alcohol abuse.  Nausea and vomiting is improved with Zofran and fluids.  He had a mild elevation in his LFTs lipase was normal.  EKG and troponin were normal as well.  Patient was given 2 doses of gabapentin which did not seem to help much with his feet.  He was also given Tylenol and Toradol which did somewhat improve his symptoms.  He is quite anxious as well and has some degree of tremors at this time.  I suspect he is in alcohol withdrawal as well.  He was placed on the Valium protocol.  Patient is now pending a mental health assessment.  Given his alcohol relapse and mental health issues he may need a dual diagnosis admission.  Patient care was signed out to oncoming physician pending mental health evaluation.     I have reviewed the nursing notes. I have reviewed the findings, diagnosis, plan and need for follow up with the patient.    New Prescriptions    No medications on file       Final diagnoses:   Alcohol withdrawal syndrome with complication (H)   Neuropathy   Nausea and vomiting, intractability of vomiting not specified, unspecified vomiting type       --  I, Marcelo Yoder, am serving as a trained medical scribe to document services personally performed by Zackery Willoughby DO, based on the provider's statements to me.     I, Zackery Willoughby DO, was physically present and have reviewed and verified the accuracy of this note documented by Marcelo Yoder.    Zackery Willoughby DO  [facility] EMERGENCY DEPARTMENT  12/2/2020     Zackery Willoughby DO  12/02/20 [id number]

## 2020-12-03 VITALS
RESPIRATION RATE: 16 BRPM | SYSTOLIC BLOOD PRESSURE: 128 MMHG | HEART RATE: 119 BPM | DIASTOLIC BLOOD PRESSURE: 85 MMHG | TEMPERATURE: 98.4 F | OXYGEN SATURATION: 98 %

## 2020-12-03 PROBLEM — G62.9 NEUROPATHY: Status: ACTIVE | Noted: 2020-12-03

## 2020-12-03 LAB
INTERPRETATION ECG - MUSE: NORMAL
LABORATORY COMMENT REPORT: NORMAL
SARS-COV-2 RNA SPEC QL NAA+PROBE: NEGATIVE
SPECIMEN SOURCE: NORMAL

## 2020-12-03 PROCEDURE — 250N000013 HC RX MED GY IP 250 OP 250 PS 637: Performed by: NURSE PRACTITIONER

## 2020-12-03 PROCEDURE — 250N000013 HC RX MED GY IP 250 OP 250 PS 637: Performed by: EMERGENCY MEDICINE

## 2020-12-03 PROCEDURE — G0177 OPPS/PHP; TRAIN & EDUC SERV: HCPCS

## 2020-12-03 PROCEDURE — 124N000002 HC R&B MH UMMC

## 2020-12-03 PROCEDURE — 99236 HOSP IP/OBS SAME DATE HI 85: CPT | Performed by: NURSE PRACTITIONER

## 2020-12-03 RX ORDER — LANOLIN ALCOHOL/MO/W.PET/CERES
3 CREAM (GRAM) TOPICAL
Status: DISCONTINUED | OUTPATIENT
Start: 2020-12-03 | End: 2020-12-03 | Stop reason: HOSPADM

## 2020-12-03 RX ORDER — OLANZAPINE 10 MG/2ML
10 INJECTION, POWDER, FOR SOLUTION INTRAMUSCULAR 3 TIMES DAILY PRN
Status: DISCONTINUED | OUTPATIENT
Start: 2020-12-03 | End: 2020-12-03 | Stop reason: HOSPADM

## 2020-12-03 RX ORDER — IBUPROFEN 600 MG/1
600 TABLET, FILM COATED ORAL EVERY 6 HOURS PRN
Start: 2020-12-03 | End: 2021-11-01

## 2020-12-03 RX ORDER — GABAPENTIN 400 MG/1
400 CAPSULE ORAL 3 TIMES DAILY
Qty: 90 CAPSULE | Refills: 1 | Status: SHIPPED | OUTPATIENT
Start: 2020-12-03 | End: 2021-06-02

## 2020-12-03 RX ORDER — PANTOPRAZOLE SODIUM 40 MG/1
40 TABLET, DELAYED RELEASE ORAL
Status: DISCONTINUED | OUTPATIENT
Start: 2020-12-03 | End: 2020-12-03 | Stop reason: HOSPADM

## 2020-12-03 RX ORDER — HYDROXYZINE HYDROCHLORIDE 25 MG/1
25 TABLET, FILM COATED ORAL EVERY 4 HOURS PRN
Status: DISCONTINUED | OUTPATIENT
Start: 2020-12-03 | End: 2020-12-03

## 2020-12-03 RX ORDER — IBUPROFEN 600 MG/1
600 TABLET, FILM COATED ORAL EVERY 6 HOURS PRN
Status: DISCONTINUED | OUTPATIENT
Start: 2020-12-03 | End: 2020-12-03 | Stop reason: HOSPADM

## 2020-12-03 RX ORDER — GABAPENTIN 400 MG/1
400 CAPSULE ORAL 3 TIMES DAILY
Status: DISCONTINUED | OUTPATIENT
Start: 2020-12-03 | End: 2020-12-03 | Stop reason: HOSPADM

## 2020-12-03 RX ORDER — DIAZEPAM 5 MG
5-20 TABLET ORAL EVERY 30 MIN PRN
Status: DISCONTINUED | OUTPATIENT
Start: 2020-12-03 | End: 2020-12-03 | Stop reason: HOSPADM

## 2020-12-03 RX ORDER — HYDROXYZINE HYDROCHLORIDE 50 MG/1
50 TABLET, FILM COATED ORAL EVERY 4 HOURS PRN
Qty: 90 TABLET | Refills: 1 | Status: SHIPPED | OUTPATIENT
Start: 2020-12-03 | End: 2021-05-27

## 2020-12-03 RX ORDER — OLANZAPINE 10 MG/1
10 TABLET ORAL 3 TIMES DAILY PRN
Status: DISCONTINUED | OUTPATIENT
Start: 2020-12-03 | End: 2020-12-03 | Stop reason: HOSPADM

## 2020-12-03 RX ORDER — PROPRANOLOL HYDROCHLORIDE 10 MG/1
20 TABLET ORAL 2 TIMES DAILY PRN
Status: DISCONTINUED | OUTPATIENT
Start: 2020-12-03 | End: 2020-12-03 | Stop reason: HOSPADM

## 2020-12-03 RX ORDER — HYDROXYZINE HYDROCHLORIDE 50 MG/1
50 TABLET, FILM COATED ORAL EVERY 4 HOURS PRN
Status: DISCONTINUED | OUTPATIENT
Start: 2020-12-03 | End: 2020-12-03 | Stop reason: HOSPADM

## 2020-12-03 RX ORDER — MAGNESIUM HYDROXIDE/ALUMINUM HYDROXICE/SIMETHICONE 120; 1200; 1200 MG/30ML; MG/30ML; MG/30ML
30 SUSPENSION ORAL EVERY 4 HOURS PRN
Status: DISCONTINUED | OUTPATIENT
Start: 2020-12-03 | End: 2020-12-03 | Stop reason: HOSPADM

## 2020-12-03 RX ORDER — OLANZAPINE 5 MG/1
5 TABLET, ORALLY DISINTEGRATING ORAL ONCE
Status: COMPLETED | OUTPATIENT
Start: 2020-12-03 | End: 2020-12-03

## 2020-12-03 RX ADMIN — GABAPENTIN 400 MG: 400 CAPSULE ORAL at 13:13

## 2020-12-03 RX ADMIN — MULTIPLE VITAMINS W/ MINERALS TAB 1 TABLET: TAB at 09:01

## 2020-12-03 RX ADMIN — THIAMINE HCL TAB 100 MG 100 MG: 100 TAB at 09:01

## 2020-12-03 RX ADMIN — OLANZAPINE 5 MG: 5 TABLET, ORALLY DISINTEGRATING ORAL at 00:48

## 2020-12-03 RX ADMIN — FOLIC ACID 1 MG: 1 TABLET ORAL at 09:01

## 2020-12-03 RX ADMIN — DIAZEPAM 10 MG: 5 TABLET ORAL at 09:00

## 2020-12-03 RX ADMIN — IBUPROFEN 600 MG: 600 TABLET, FILM COATED ORAL at 17:54

## 2020-12-03 RX ADMIN — DIAZEPAM 10 MG: 5 TABLET ORAL at 13:13

## 2020-12-03 RX ADMIN — IBUPROFEN 600 MG: 600 TABLET, FILM COATED ORAL at 09:01

## 2020-12-03 RX ADMIN — PANTOPRAZOLE SODIUM 40 MG: 40 TABLET, DELAYED RELEASE ORAL at 16:34

## 2020-12-03 RX ADMIN — GABAPENTIN 400 MG: 400 CAPSULE ORAL at 10:21

## 2020-12-03 RX ADMIN — PANTOPRAZOLE SODIUM 40 MG: 40 TABLET, DELAYED RELEASE ORAL at 10:21

## 2020-12-03 RX ADMIN — PROPRANOLOL HYDROCHLORIDE 20 MG: 10 TABLET ORAL at 13:13

## 2020-12-03 ASSESSMENT — ACTIVITIES OF DAILY LIVING (ADL)
HYGIENE/GROOMING: INDEPENDENT
LAUNDRY: WITH SUPERVISION
DRESS: INDEPENDENT
ORAL_HYGIENE: INDEPENDENT

## 2020-12-03 NOTE — TELEPHONE ENCOUNTER
835pm - Shari, on call provider, paged  854pm - Shari accepts     R: 32/Ethan/Shari     Pt placed in queue   859pm - unit charge unavail, intake awaiting call back   [place] completed   929pm - unit charge notified, 10pm for report   932pm -ED charge notified via text page

## 2020-12-03 NOTE — PROGRESS NOTES
12/03/20 0452   Patient Belongings   Did you bring any home meds/supplements to the hospital?  No   Patient Belongings locker   Patient Belongings Put in Hospital Secure Location (Security or Locker, etc.) cell phone/electronics;clothing;keys;shoes;wallet   Belongings Search Yes   Clothing Search Yes   Second Staff Jed Hernandez, pajamas pants, sweat pants,socks, samsung cell phone,, shoes, keys, cigarette, lighter, 2 hoodies    Wallet: [place] license, $10, miscellaneous store rewards cards, library card, and insurance card    Social security card, 2 us bank cards and debra card sent down to security in envelope #[id number]    A               Admission:  I am responsible for any personal items that are not sent to the safe or pharmacy.  [facility] is not responsible for loss, theft or damage of any property in my possession.    Signature:  _________________________________ Date: _______  Time: _____                                              Staff Signature:  ____________________________ Date: ________  Time: _____      2nd Staff person, if patient is unable/unwilling to sign:    Signature: ________________________________ Date: ________  Time: _____     Discharge:  [facility] has returned all of my personal belongings:    Signature: _________________________________ Date: ________  Time: _____                                          Staff Signature:  ____________________________ Date: ________  Time: _____

## 2020-12-03 NOTE — ED NOTES
Patient was signed out to me by Dr. Willoughby pending mental health evaluation.  See his note for full history and physical exam as well as prior emergency department course.  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  He does have 2+ DP and PT pulses on my exam.  Behavioral health  did meet with the patient and he had reported suicidal ideation and stating that he wanted to be dead but would not follow through with anything as it would be painful for his parents.  Apparently had also thought of cutting himself but does not have access to do this.  They did feel however that he did not have a support system to make him safe in the outpatient setting at this time and felt that he would be appropriate for inpatient psychiatric admission with chemical dependency as well.  He is on MSSA protocol here.  Patient is also very concerned about his foot pain and we did discuss that the internal medicine physicians could be further consulted here while he is in the hospital but at this point there is no identified emergent cause for this and it has been ongoing now for multiple weeks to months.  Had received oral thiamine, folic acid here.  He was also given multivitamin.  Patient was admitted to the psychiatric unit here for dual chemical dependency.  He is voluntary at this time with this plan.  I did offer him some Zyprexa as he wanted something to help him sleep was still very much ruminating on his feet.  He was in agreement with this.  He was transferred to the floor in stable condition.    MD Lake Jamil Ashley A, MD  12/03/20 0108

## 2020-12-03 NOTE — PLAN OF CARE
BEHAVIORAL TEAM DISCUSSION    Participants: Lorrie Mccarthy CNP; Carmen Cabrales and Jaylyn Cross, FAITH's; Renetta Aguilar RN  Progress: Pt is a 35 year old male admitted to station 32 on 12/2/2020 due to depressive symptoms, passive suicidal ideation and alcohol use disorder and is now requesting to discharge.  Anticipated length of stay: discharging today, less than 24 hours  Continued Stay Criteria/Rationale: pt has been assessed by psychiatry and is stable for discharge.  Medical/Physical: pt has complaints of foot pain that he would like to pursue treatment for.  Precautions:   Behavioral Orders   Procedures     Code 1 - Restrict to Unit     Routine Programming     As clinically indicated     Status 15     Every 15 minutes.     Withdrawal precautions     Plan: discharge today to outpatient providers.  Rationale for change in precautions or plan: pt is stable for discharge

## 2020-12-03 NOTE — PROGRESS NOTES
Work Completed: reviewed chart, remotely attended team discussion.  Completed AVS, scheduling follow up appointment with established therapist (per pt's request) and found new PCP at a [facility] (also per pt request) to address medical needs.      Discharge plan or goal: pt is requesting discharge today, admission under 24 hours.                Barriers to discharge: none
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
## 2020-12-03 NOTE — PLAN OF CARE
"Pt received in bed sleeping at start of shift. Out for meals, participated in groups, interacted productively with peers. Endorsed intermittent passive SI at baseline. No current wish to be dead. States he would never do that to his parents after having experienced his own child's death when she was 2 years old. Chronic depression, anxiety, and alcohol use. Denies access to guns. Denies SIB/HI/AVH. MSSA scores of 10 and 9, mainly for tremors, restlessness, and high pulse. 10mg valium given prn x 2. Given prn ibuprofen at 0901 for very intense pain \"from the waist down\" with minimal effect. Given prn propanolol at 1313 for high anxiety. Writer educated pt on how to manage his pain with current resources and prescribed/over the counter medications. Educated pt on signs to watch for of increasing depression and criteria for coming to the ED. Encouraged pt to come to ED to be admitted to 3A if he decides to detox from alcohol in order to do it safely and avoid complications. Pt requested resources on how to obtain a  in the outpatient setting to get help with alternative housing, paying bills, and finding a grief support group specifically for people who have lost children (states he gets bitter when people grieve over 90 year old family who have lived a full life). CTC included contact information for [facility], [facility], and [facility] on the [address]. Discharge medications arrived on the unit at 1400. Shortly after, pt endorsed dizziness. Sitting /87, pulse 77. Standing /90, pulse 87. O2 97%. Pt encouraged to drink water; states he has been. Writer reviewed AVS with pt around 1500. Pt did not agree with the \"reason for admission\" as it was written. Did not agree that suicidal ideation was a reason for his admission. Pt states that he only came up to the unit because they told him someone would see him about his chronic \"waist down\" pain, but was then told that he would not be getting " "any help with this. Pt agreed to the reasons for admission to include \"increased depression, alcohol use disorder and leg pain\". Pt given discharge medications and went over belongings list with psyc associate. Pt requested to stay until after dinner, which was approved.  Staff will call a cab for patient once after dinner. Oncoming shift RN informed of situation and will continue to monitor.     Problem: Adult Inpatient Plan of Care  Goal: Plan of Care Review  Outcome: Adequate for Discharge  Goal: Patient-Specific Goal (Individualized)  Outcome: Adequate for Discharge  Goal: Absence of Hospital-Acquired Illness or Injury  Outcome: Adequate for Discharge  Goal: Optimal Comfort and Wellbeing  Outcome: Adequate for Discharge  Goal: Readiness for Transition of Care  Outcome: Adequate for Discharge     Problem: Behavioral Health Plan of Care  Goal: Plan of Care Review  Outcome: Adequate for Discharge  Goal: Patient-Specific Goal (Individualization)  Outcome: Adequate for Discharge  Goal: Adheres to Safety Considerations for Self and Others  Outcome: Adequate for Discharge  Goal: Absence of New-Onset Illness or Injury  Outcome: Adequate for Discharge  Goal: Optimized Coping Skills in Response to Life Stressors  Outcome: Adequate for Discharge  Goal: Develops/Participates in Therapeutic [place] to Support Successful Transition  Outcome: Adequate for Discharge     Problem: Suicidal Behavior  Goal: Suicidal Behavior is Absent or Managed  Outcome: Adequate for Discharge     Problem: General Plan of Care (Inpatient Behavioral)  Goal: Individualization/Patient Specific Goal (IP Behavioral)  Description: The patient and/or their representative will achieve their patient-specific goals related to the plan of care.    The patient-specific goals include:  Outcome: Adequate for Discharge  Goal: Patient Schedule  Description: Patient's Schedule:  Outcome: Adequate for Discharge  Goal: Release of Information  Outcome: Adequate for " Discharge  Goal: Team Discussion  Description: Team Plan:  Outcome: Adequate for Discharge  Goal: Discharge Planning  Outcome: Adequate for Discharge  Goal: Physician Review  Outcome: Adequate for Discharge  Goal: Plan of Care Review (Adult,OB,Behavioral)  Description: The patient and/or their representative will communicate an understanding of their plan of care.  Outcome: Adequate for Discharge     Problem: General Rehab Plan of Care  Goal: Occupational Therapy Goals  Description: The patient and/or their representative will achieve their patient-specific goals related to the plan of care.  The patient-specific goals include:  Outcome: Adequate for Discharge  Goal: Therapeutic Recreation/Music Therapy Goal  Description: The patient and/or their representative will achieve their patient-specific goals related to the plan of care.  The patient-specific goals include:  Outcome: Adequate for Discharge

## 2020-12-03 NOTE — PLAN OF CARE
Problem: Behavioral Health Plan of Care  Goal: Absence of New-Onset Illness or Injury  Outcome: No Change     Problem: Adult Inpatient Plan of Care  Goal: Plan of Care Review  Outcome: No Change     S: Pt is a 35 yr old M admitted vol to st: 32 from [facility] for Pain in bilateral legs. Per ED, pt endorsed suicidal ideation but states he could not commit suicide due to not wanting to hurt his parents. Pt denies any current suicidal ideation/thoughts to hurt himself. Stressors include foot pain and living conditions at home.    B: Pt has a past HX of PTSD,MDD,NEREYDA, Alcohol problem and foot pain. Per ED, pt reports he has been drinking approximately 2 pints of vodka a day.   Pt was in [facility] rehab facility and was discharged on 11/12/2020. Pt went back to drinking right after discharge. Pt reports that multiple people tested covid positive at the rehab and believes he had covid. No covid test was done at that time per patient. Pt hasn't been taking his medications. Last time he took meds was 11/12/2020. Pt is a smoker.    A: On unit, patient presents as sad, labile. Pt states feeling better after getting Zyprexa in ED. Pt told staff he wanted to go home and didn't want to sign in voluntary. Informed pt to talk to provider tomorrow. Pt eventually signed in form. MSSA SCORE 6. Pt slept 4 hrs.     R: Pt is on status 15, code 1. Covid test pending.

## 2020-12-03 NOTE — PLAN OF CARE
"Attended 1 OT group. Pt was given and completed a written self assessment. Identified RFA as: \"stress, depression, leg pain, EKG 3 weeks but, have struggled with depression, stress and PTSD x years\". Further noted experiencing: sadness, anxiety, anger, helplessness, negative thoughts, memory problems, slowed thinking, obsessive thoughts, withdrawal from others, increased chemical use, restlessness, problem starting and/or completing projects and financial concerns. Was able to identify 3 positive coping skills. Goals selected include: make a safety plan and work on problem solving skills. Only support identified as \"my family\". OT purpose was explained with a value of having involvement in tx plan, and provided options to meet self identified goals. Will assess further in the areas of organization, problem solving, and concentration. Anxious mood and affect. Discussed anticipated discharge and his rational to discharge today. Noted he had leg pain and his plans to go to his out pt MD to get meds. Social with peers. Futuristic and goal directed with plans. Encouraged to follow up with out patient services and seek out supports listed on AVS.    "

## 2020-12-03 NOTE — TELEPHONE ENCOUNTER
S: Pt is a 35 yr old m in [facility] ED for SI w/ thoughts to take a pill and alcohol intoxication report by Danielle    B: Pt reports primary stressor is neuropathy in his feet.   reports pt can ambulate independently.  Hx NEREYDA, Dep, and PTSD.  Pt reports decreased eating and sleeping.  No reported homicidal ideation.  Pt reports drinking 2 pints to 1 lt of alcohol daily.  No reported hx of seizures.  Pt was COVID positive on 11/12.  No current op providers.  Pt reports he is not taking his meds consistently.      A; vol     R: Pt waiting in ED for appropriate placement     Patient cleared and ready for behavioral bed placement: Yes

## 2020-12-03 NOTE — DISCHARGE INSTRUCTIONS
Behavioral Discharge Planning and Instructions      Summary:  You were admitted on 12/2/2020  due to depression, alcohol use disorder and leg pain.  You were treated by Lorrie Mccarthy NP and discharged on 12/3/2020 from [facility]      Principal Diagnosis:   Major depressive disorder, severe, recurrent, without psychosis  PTSD  Generalized anxiety disorder  Alcohol use disorder, severe  Bilateral neuropathic leg pain      Health Care Follow-up Appointments:     Therapy - Tricia Cummings [facility]   Return appointment scheduled on Monday 1/11/2021 @ 4pm by phone.  (this was her first available appointment but you are also now on the cancellation list)  [phone]    You requested assistance in finding a new primary care physician.   You have been scheduled with Dr. Foster on Thursday 12/10/2020 at 2:15 pm    [facility]   [address]     [place], [place] [address] [phone]    Please consider contacting  to obtain a sponsor and to attend local meetings.      For pain issues, you can contact [facility]:  [address] [place] [address] [address] [facility], [place] [address]  Phone: [phone]    For case management and information about community support groups, you can contact the [facility] at [phone]  BONG would also be helpful for support groups in the community and other resources:  BONG [place] (National [place] on Mental Illness) improves the lives of children and adults with mental illnesses and their families by providing free classes on mental illnesses and support groups for adults with mental illnesses, parents and family members. For more information:  Phone: 126.304.9359  Toll free: 9-669-FUNG-HELPS  Website: www.namihelps.orghttp://www.namihelps.org/  Attend all scheduled appointments with your outpatient providers. Call at least 24 hours in advance if you need to reschedule an appointment to ensure continued access to your outpatient providers.    Major Treatments, Procedures and Findings:  You were provided with: a psychiatric assessment, medication evaluation and/or management and milieu management    Symptoms to Report: mood getting worse, thoughts of suicide or substance abuse    Early warning signs can include: increased depression or anxiety sleep disturbances increased thoughts or behaviors of suicide or self-harm     Safety and Wellness:  Take all medicines as directed.  Make no changes unless your doctor suggests them.        Follow treatment recommendations.  Refrain from alcohol and non-prescribed drugs.  If there is a concern for safety, call 911.    Resources:   Crisis Intervention: 895.722.7864 or 412-869-4640 (TTY: 356.516.9589).  Call anytime for help.  National [place] on Mental Illness (www.[place].nini.org): 854.810.8600 or 941-885-5689.  Alcoholics Anonymous (www.alcoholics-anonymous.org): Check your phone book for your local chapter.  Suicide Awareness Voices of Education (SAVE) (www.save.org): 926-097-SAVE (8794)  Self- Management and Recovery Training., SMART-- Toll free: 513.435.2696  www.[employer]  [facility] Crisis (COPE) Response - Adult [phone]      The treatment team has appreciated the opportunity to work with you.     Иван,  please take care and make your recovery a daily recovery.   If you have any questions or concerns our unit number is [phone]

## 2020-12-04 ENCOUNTER — TELEPHONE (OUTPATIENT)
Dept: FAMILY MEDICINE | Facility: CLINIC | Age: 35
End: 2020-12-04

## 2020-12-04 NOTE — TELEPHONE ENCOUNTER
Chief Complaint: Alcohol Withdrawal Syndrome With Complication (H), Zacarias (Generalized Anxiety Disorder),  THU 03-DEC-2020   2 / 0    [phone] ([place])

## 2020-12-04 NOTE — TELEPHONE ENCOUNTER
"Next 5 appointments (look out 90 days)    Dec 10, 2020  2:15 PM  Office Visit with Adamaris Steinberg MD  [facility] ([facility]) [address] [facility] [id number] [phone]        ED / Discharge Outreach Protocol    Patient Contact    Attempt # 1    Was call answered?  Yes.  \"May I please speak with <patient name>\"  Is patient available?   Yes    ED/Discharge Protocol    \"Hi, my name is Hoa Ramirez RN, a registered nurse, and I am calling on behalf of Dr. Whelan's office at [place].  I am calling to follow up and see how things are going for you after your recent visit.\"    \"I see that you were in the (ER/UC/IP) on 12/2.    How are you doing now that you are home?\" still very achy/hurting, supposed to follow up with pain specialist/has number to call. A little better than he was in ER. Has access to own medications. Sleeping is not easily     Is patient experiencing symptoms that may require a hospital visit?  No     Discharge Instructions    \"Let's review your discharge instructions.  What is/are the follow-up recommendations?  Pt. Response: follow up with pain clinic and our clinic     \"Were you instructed to make a follow-up appointment?\"  Pt. Response: Yes.  Has appointment been made?   Yes      \"When you see the provider, I would recommend that you bring your discharge instructions with you.    Medications    \"How many new medications are you on since your hospitalization/ED visit?\"    0-1  \"How many of your current medicines changed (dose, timing, name, etc.) while you were in the hospital/ED visit?\"   0-1  \"Do you have questions about your medications?\"   No  \"Were you newly diagnosed with heart failure, COPD, diabetes or did you have a heart attack?\"   No  For patients on insulin: \"Did you start on insulin in the hospital or did you have your insulin dose changed?\"   No  Post Discharge Medication Reconciliation Status: discharge medications reconciled, " Patient:      JOSE ROCHA

Med Rec#:     [id number]            :          1938          

Date:         2018            Age:          79y                 

Account#:     [id number]          Height:       144.8 cm / 57.0 in

Accession#:   [id number]           Weight:       51.3 kg / 113.1 lbs

Sex:          F                     BSA:          1.4

Room#:        406                   

Admit Date#:  2018          

Type:         Inpatient

 

Referring:    Clair Vargas MD

Reading:      Jayme Antonio DO

Sonographer:  Sindy Cervantes RN RDCS

______________________________________________________________________

 

Transthoracic Echocardiogram

 

Indication:

Atrial fibrillation

BP:           136/74

HR:           69

Rhythm:       NSR

 

Findings     

History:

Advanced Alzheimer's dementia, former smoker 

 

Technical Comments:

The study quality is fair.  The study is technically limited due to the

patient's smoking history.  The study was technically limited due to the

patient's inability to lay in the left lateral decubitus position.

Completed at 1700. 

 

Left Ventricle:

The left ventricular chamber size is decreased. There is no left

ventricular hypertrophy.   The left ventricle appears hyperdynamic. The

estimated ejection fraction is greater than 65%.  Normal left

ventricular diastolic filling is observed. The patient was unable to

perform a Valsalva maneuver. 

 

Left Atrium:

The left atrial chamber size is normal. 

 

Right Ventricle:

The right ventricular chamber size and systolic function are within

normal limits. 

 

Right Atrium:

The right atrial cavity size is normal. 

 

Aortic Valve:

The aortic valve structure is not well visualized. The aortic valve

leaflets are mildly thickened. There is no evidence of aortic

regurgitation. There is no evidence of aortic stenosis. 

 

Mitral Valve:

The mitral valve leaflets are mildly thickened. There is mild mitral

regurgitation.  There is no evidence of mitral stenosis. 

 

Tricuspid Valve:

The tricuspid valve leaflets are normal.  There is trace tricuspid

regurgitation.  Unable to estimate the right ventricular systolic

pressure.   There is no tricuspid stenosis. 

 

Pulmonic Valve:

The pulmonic valve structure is not well visualized. There is no

evidence of pulmonic regurgitation. There is no pulmonic stenosis.  

 

Pericardium:

There is no significant pericardial effusion. 

 

Aorta:

There is no dilatation of the ascending aorta. There is no dilatation of

the aortic arch. There is no dilation of the aortic root. 

 

Pulmonary Artery:

The main pulmonary artery is not well visualized. 

 

Venous:

The inferior vena cava appears normal in size. There is less than 50%

respiratory change in the inferior vena cava dimension. 

 

Conclusions

The left ventricular chamber size is decreased.

There is no left ventricular hypertrophy.  

The left ventricle appears hyperdynamic.

The estimated ejection fraction is greater than 70%. 

The left atrial chamber size is normal.

The right ventricular chamber size and systolic function are within

normal limits.

No significant valvular abnormalities noted

 

No prior studies available for comparison at time of interpretation.

 

Measurements     

Name                    Value         Normal Range            

RVDdMajor (2D)          2.3 cm        (2.2 - 4.4)             

RAd ISD 4CH             4.2 cm        (3.4 - 4.9)             

RA (A4C)W               3.2 cm        (2.9 - 4.6)             

IVSd (2D)               0.9 cm        (0.6 - 1)               

LVPWd (2D)              0.9 cm        (0.6 - 1)               

LVIDd (2D)              3.3 cm        (3.6 - 5.4)             

LVIDs (2D)              2.2 cm        -                        

LV FS (2D)              33 %          (25 - 45)               

Aortic Annulus          2 cm          (1.4 - 2.6)             

Ao root diameter (2D)   2.5 cm        (2.1 - 3.5)             

Ascending Ao            2.7 cm        (2.1 - 3.4)             

Aortic arch             2.2 cm        (1.8 - 3.4)             

LA dimension (AP) 2D    3 cm          (2.3 - 3.8)             

LAd ISD 4CH             4.3 cm        (2.9 - 5.3)             

LA ISD 4CH W            3.4 cm        (2.5 - 4.5)             

 

Name                    Value         Normal Range            

LA ESV SP 4CH (A/L)     26 ml         -                        

LA ESV SP 2CH (A/L)     21 ml         -                        

LA ESV BP (A/L)         25 ml         -                        

LA ESV BP (A/L) index   17.8 ml/m2    -                        

LA ESV SP 4CH (MOD)     25 ml         -                        

LA ESV SP 2CH (MOD)     20 ml         -                        

 

Name                    Value         Normal Range            

MV E-wave Vmax          0.82 m/sec    -                        

MV deceleration time    267 msec      -                        

MV A-wave Vmax          0.8 m/sec     -                        

MV E:A ratio            1 ratio       -                        

LV septal e' Vmax       0.09 m/sec    -                        

LV lateral e' Vmax      0.09 m/sec    -                        

LV E:e' septal ratio    9.1 ratio     -                        

LV E:e' lateral ratio   9.1 ratio     -                        

 

Name                    Value         Normal Range            

AV Vmax                 1.5 m/sec     -                        

AV VTI                  32.4 cm       -                        

AV peak gradient        8.6 mmHg      -                        

AV mean gradient        4.7 mmHg      -                        

LVOT Vmax               1.1 m/sec     -                        

LVOT VTI                25.2 cm       -                        

LVOT peak gradient      5 mmHg        -                        

LVOT mean gradient      3 mmHg        -                        

JOSUÉ Vmax                0.58 m/sec    -                        

 

Name                    Value         Normal Range            

IVC diameter            1.8 cm        -                        

 

Name                    Value         Normal Range            

PV Vmax                 0.51 m/sec    -                        

 

Electronically signed by: Jayme Antonio DO on 2018 18:40:36 "continue medications without change.    Was MTM referral placed (*Make sure to put transitions as reason for referral)?   No    Call Summary    \"Do you have any questions or concerns about your condition or care plan at the moment?\"    No    Patient was in ER 4x in the past year (assess appropriateness of ER visits.)      \"If you have questions or things don't continue to improve, we encourage you contact us through the main clinic number,  ([phone]).  Even if the clinic is not open, triage nurses are available 24/7 to help you.     We would like you to know that our clinic has extended hours (provide information).  We also have urgent care (provide details on closest location and hours/contact info)\"    \"Thank you for your time and take care!\"      Hoa NUNEZ RN    "

## 2020-12-04 NOTE — PROGRESS NOTES
Brandin Jennifer is discharging at this time via cab to his home with all belonging including medications.  Patient currently denies SI/SIB/HI and hallucinations.  All questions asked and answered.

## 2020-12-07 ENCOUNTER — PATIENT OUTREACH (OUTPATIENT)
Dept: NURSING | Facility: CLINIC | Age: 35
End: 2020-12-07
Payer: COMMERCIAL

## 2020-12-07 SDOH — SOCIAL STABILITY: SOCIAL NETWORK: ARE YOU MARRIED, WIDOWED, DIVORCED, SEPARATED, NEVER MARRIED, OR LIVING WITH A PARTNER?: DIVORCED

## 2020-12-07 ASSESSMENT — ACTIVITIES OF DAILY LIVING (ADL): DEPENDENT_IADLS:: INDEPENDENT

## 2020-12-07 NOTE — PROGRESS NOTES
Clinic Care Coordination Contact    Clinic Care Coordination Contact  OUTREACH    Referral Information:  Referral Source: Self-patient/Caregiver    Primary Diagnosis: Dual -  MH/CD    Chief Complaint   Patient presents with     Clinic Care Coordination - Initial     Clinic Care Coordination - Post Hospital        Universal Utilization: hospital 12/2-12/3  Clinic Utilization  Difficulty keeping appointments:: No  Compliance Concerns: No  No-Show Concerns: No  No PCP office visit in Past Year: No  Utilization    Last refreshed: 12/4/2020 10:15 PM: Hospital Admissions 2           Last refreshed: 12/4/2020 10:15 PM: ED Visits 4           Last refreshed: 12/4/2020 10:15 PM: No Show Count (past year) 10              Current as of: 12/4/2020 10:15 PM            Clinical Concerns:  CC LUIS spoke with pt regarding his recent hospital stay. Pt shared that he is frustrated because he went in for physical concerns but he was admitted to the mental health unit. Pt explained that his leg pain is very significant and he is now using a cane to get around.     Pt would like to know the results of his lab taken in the hospital. CC LUIS advised he discuss this with Dr. Powell at tomorrow's appointment. Pt made an appointment with a Cardiologist because he believes that something concerning was found while he was at the hospital.     Pt reported that he is still interested in working on his alcohol use. He is interested in treatment at [facility] but his leg pain is of high importance to him at this time. He is going back to speak with his therapist, he has an appointment 1/11/2021.    Pt reported that his COVID symptoms lasted about 2 weeks and others from his treatment facility reported COVID symptoms. Although he was never tested he feels sure it was COVID. He would like to go stay with his parents as this is good for his sobriety but he would like to be sure he doesn't bring COVID to them. He is aware that he recently tested negative  for COVID. He may speak with PCP tomorrow about a serology test.    Current Medical Concerns:  Leg pain    Current Behavioral Concerns: alcohol use    Education Provided to patient: none   Pain  Pain (GOAL):: No  Health Maintenance Reviewed: Due/Overdue Appointment 12/8/2020  Clinical Pathway: None    Medication Management:  Post-discharge medication reconciliation status: Discharge medications reviewed and reconciled.  Changed medications per note/orders (see AVS).     Functional Status:  Dependent ADLs:: Independent  Dependent IADLs:: Independent  Bed or wheelchair confined:: No  Mobility Status: Independent  Fallen 2 or more times in the past year?: No  Any fall with injury in the past year?: No    Living Situation:  Current living arrangement:: I live alone  Type of residence:: Apartment    Lifestyle & Psychosocial Needs:  Lifestyle     Physical activity     Days per week: Not on file     Minutes per session: Not on file     Stress: To some extent     Social Needs     Financial resource strain: Not hard at all     Food insecurity     Worry: Never true     Inability: Never true     Transportation needs     Medical: No     Non-medical: No     Diet:: Regular  Inadequate nutrition (GOAL):: No  Tube Feeding: No  Inadequate activity/exercise (GOAL):: No  Significant changes in sleep pattern (GOAL): No  Transportation means:: Regular car     Moravian or spiritual beliefs that impact treatment:: No  Mental health DX:: Yes  Mental health DX how managed:: None  Mental health management concern (GOAL):: No  Chemical Dependency Status: Current Concern  Chemical Dependency Management: Previous treatment  Informal Support system:: Family, Parent   Socioeconomic History     Marital status: Single     Spouse name: Not on file     Number of children: Not on file     Years of education: Not on file     Highest education level: Not on file   Relationships     Social connections     Talks on phone: Not on file     Gets together:  Never     Attends Mu-ism service: Not on file     Active member of club or organization: Not on file     Attends meetings of clubs or organizations: Not on file     Relationship status:      Intimate partner violence     Fear of current or ex partner: Not on file     Emotionally abused: Not on file     Physically abused: Not on file     Forced sexual activity: Not on file     Tobacco Use     Smoking status: Current Every Day Smoker     Packs/day: 1.00     Smokeless tobacco: Never Used   Substance and Sexual Activity     Alcohol use: Yes     Frequency: 4 or more times a week     Drinks per session: 7 to 9     Binge frequency: Daily or almost daily     Comment: daily 2 pints of Vodka per day     Drug use: Yes     Types: Marijuana     Comment: Uses marijuana before eating     Sexual activity: Yes     Partners: Female      Resources and Interventions:  Current Resources:    Community Resources: Chemical Dependency Services  Supplies used at home:: None  Equipment Currently Used at Home: cane, quad  Employment Status: unemployed)   )    Advance Care Plan/Directive  Advanced Care Plans/Directives on file:: No    Referrals Placed: None     Goals        Patient Stated      1. Mental Health Management (pt-stated)      Goal Statement: Within the next 6 months, I would like to improve my overall health by decreasing my substance use and following up with medical appointments.   Date Goal set: 12/7/2020  Barriers: None  Strengths: Motivated to address health and substance use concerns  Date to Achieve By: 6/7/2021  Patient expressed understanding of goal: Иван verbally stated understanding of goal   Action steps to achieve this goal:  1. I will attend scheduled medical appointments  2. I will follow treatment recommendations  3. I will outreach to Care Coordination  for further questions or concerns           Patient/Caregiver understanding: Pt reports understanding and denies any additional questions or  concerns at this times. SW CC engaged in AIDET communication during encounter.    Outreach Frequency: monthly  Future Appointments              Tomorrow Gerber Powell MD [facility] STEPHANIE [facility]    In 1 week [id number]    In 1 month Tricia Cummings LGSW [facility]        Plan: CC SW will outreach to pt in 1 month to discuss his overall wellbeing.    DENNY Gtz, [facility]  Clinic Care Coordinator  [facility]  [phone]  [email or web address]

## 2020-12-07 NOTE — LETTER
[place] CARE COORDINATION  [address] [place] [address]    December 7, 2020    Brandin Rodriguez  [address]  [facility] [id number]      Dear Brandin,    I am a clinic care coordinator who works with Earlene Whelan MD at [facility]. I wanted to thank you for spending the time to talk with me.  Below is a description of clinic care coordination and how I can further assist you.      The clinic care coordination team is made up of a registered nurse,  and community health worker who understand the health care system. The goal of clinic care coordination is to help you manage your health and improve access to the health care system in the most efficient manner. The team can assist you in meeting your health care goals by providing education, coordinating services, strengthening the communication among your providers and supporting you with any resource needs.    Please feel free to contact me at [phone] with any questions or concerns. We are focused on providing you with the highest-quality healthcare experience possible and that all starts with you.     Sincerely,     DENNY Gtz, LGSW      Enclosed: I have enclosed a copy of the Complex Care Plan. This has helpful information and goals that we have talked about. Please keep this in an easy to access place to use as needed.

## 2020-12-07 NOTE — LETTER
[facility]  Complex Care Plan  About Me:    Patient Name:  Brandin Rodriguez    YOB: 1985  Age:         35 year old   [place] MRN:    [id number] Telephone Information:  Home Phone [phone]   Mobile [phone]       Address:  [address]  [place] [address] Email address:  [email or web address]      Emergency Contact(s)    Name Relationship Lgl Daved Work Phone Home Phone Mobile Phone   HARDEEP PEREZ Mother   [phone]            Health Maintenance  Health Maintenance Reviewed: Due/Overdue 12/8/2020    My Access Plan  Medical Emergency 911   Primary Clinic Line [facility] - [phone]   24 Hour Appointment Line [phone] or  [phone]) (toll-free)   24 Hour Nurse Line [phone] (toll-free)   Preferred Urgent Care [facility], [phone]   Preferred Hospital [facility]  [phone]   [id number] - [place], [place] - [address] AT [address] [place] [address]     Behavioral Health Crisis Line The National Suicide Prevention Lifeline at 1-126.269.6723 or 911         My Care Team Members  Patient Care Team       Relationship Specialty Notifications Start End    Earlene Whelan MD PCP - General Internal Medicine  11/11/19     Phone: [phone] Fax: [phone] [address] [place] [address]    Earlene Whelan MD Assigned PCP   10/17/19     Phone: [phone] Fax: [phone] [address] [place] [address]    Mireya Navarrete MD Assigned Behavioral Health Provider   10/23/20     Phone: [phone] Fax: [phone]         [address] [place] [address]    Briseyda Cross LGSW Lead Care Coordinator Primary Care - CC  12/7/20     Zeynep Crouch MA Community Health Worker   12/7/20             My Care Plans  Self Management and Treatment Plan  Goals and (Comments)  Goals         General    1. Mental Health Management (pt-stated)              My Medical and Care Information  Problem List   Patient Active Problem List   Diagnosis     Hematemesis     NEREYDA (generalized anxiety disorder)     Severe episode of recurrent major depressive disorder, without psychotic features (H)     Hepatitis     Liver disease, chronic, due to alcohol (H)     Suicidal ideation     Alcoholic intoxication without complication (H)     Uncomplicated alcohol dependence (H)     Complicated grief     Dysphagia     Elevated LFTs     Testicular pain     Tobacco use disorder     PTSD (post-traumatic stress disorder)     Current severe episode of major depressive disorder without psychotic features, unspecified whether recurrent (H)     Sinus tachycardia     Alcohol withdrawal (H)     Dehydration     Neuropathy     Nausea and vomiting, intractability of vomiting not specified, unspecified vomiting type          Care Coordination Start Date: 12/7/2020   Frequency of Care Coordination: monthly   Form Last Updated: 12/07/2020

## 2020-12-09 ENCOUNTER — OFFICE VISIT (OUTPATIENT)
Dept: FAMILY MEDICINE | Facility: CLINIC | Age: 35
End: 2020-12-09
Payer: COMMERCIAL

## 2020-12-09 ENCOUNTER — DOCUMENTATION ONLY (OUTPATIENT)
Dept: FAMILY MEDICINE | Facility: CLINIC | Age: 35
End: 2020-12-09

## 2020-12-09 VITALS
WEIGHT: 227 LBS | BODY MASS INDEX: 29.13 KG/M2 | DIASTOLIC BLOOD PRESSURE: 58 MMHG | HEART RATE: 123 BPM | SYSTOLIC BLOOD PRESSURE: 106 MMHG | OXYGEN SATURATION: 97 % | HEIGHT: 74 IN | TEMPERATURE: 97.1 F

## 2020-12-09 DIAGNOSIS — Z20.822 EXPOSURE TO COVID-19 VIRUS: Primary | ICD-10-CM

## 2020-12-09 DIAGNOSIS — Z11.4 SCREENING FOR HIV (HUMAN IMMUNODEFICIENCY VIRUS): ICD-10-CM

## 2020-12-09 DIAGNOSIS — Z00.00 ENCOUNTER FOR PREVENTIVE HEALTH EXAMINATION: ICD-10-CM

## 2020-12-09 DIAGNOSIS — Z11.59 NEED FOR HEPATITIS C SCREENING TEST: ICD-10-CM

## 2020-12-09 DIAGNOSIS — Z20.822 EXPOSURE TO COVID-19 VIRUS: ICD-10-CM

## 2020-12-09 DIAGNOSIS — R20.8 DYSESTHESIA: Primary | ICD-10-CM

## 2020-12-09 DIAGNOSIS — Z13.220 SCREENING FOR HYPERLIPIDEMIA: ICD-10-CM

## 2020-12-09 LAB
CHOLEST SERPL-MCNC: 200 MG/DL
CRP SERPL HS-MCNC: 4.9 MG/L
ERYTHROCYTE [SEDIMENTATION RATE] IN BLOOD BY WESTERGREN METHOD: 9 MM/H (ref 0–15)
HBA1C MFR BLD: 4.9 % (ref 0–5.6)
HCV AB SERPL QL IA: NONREACTIVE
HDLC SERPL-MCNC: 35 MG/DL
HIV 1+2 AB+HIV1 P24 AG SERPL QL IA: NONREACTIVE
LDLC SERPL CALC-MCNC: 140 MG/DL
NONHDLC SERPL-MCNC: 165 MG/DL
TRIGL SERPL-MCNC: 127 MG/DL

## 2020-12-09 PROCEDURE — 36415 COLL VENOUS BLD VENIPUNCTURE: CPT | Performed by: INTERNAL MEDICINE

## 2020-12-09 PROCEDURE — 83036 HEMOGLOBIN GLYCOSYLATED A1C: CPT | Performed by: INTERNAL MEDICINE

## 2020-12-09 PROCEDURE — 83921 ORGANIC ACID SINGLE QUANT: CPT | Performed by: INTERNAL MEDICINE

## 2020-12-09 PROCEDURE — 99495 TRANSJ CARE MGMT MOD F2F 14D: CPT | Performed by: INTERNAL MEDICINE

## 2020-12-09 PROCEDURE — 85652 RBC SED RATE AUTOMATED: CPT | Performed by: INTERNAL MEDICINE

## 2020-12-09 PROCEDURE — 80061 LIPID PANEL: CPT | Performed by: INTERNAL MEDICINE

## 2020-12-09 PROCEDURE — 86157 COLD AGGLUTININ TITER: CPT | Mod: 90 | Performed by: INTERNAL MEDICINE

## 2020-12-09 PROCEDURE — 86141 C-REACTIVE PROTEIN HS: CPT | Performed by: INTERNAL MEDICINE

## 2020-12-09 PROCEDURE — 86038 ANTINUCLEAR ANTIBODIES: CPT | Performed by: INTERNAL MEDICINE

## 2020-12-09 PROCEDURE — 87389 HIV-1 AG W/HIV-1&-2 AB AG IA: CPT | Performed by: INTERNAL MEDICINE

## 2020-12-09 PROCEDURE — 86769 SARS-COV-2 COVID-19 ANTIBODY: CPT | Performed by: INTERNAL MEDICINE

## 2020-12-09 PROCEDURE — 99000 SPECIMEN HANDLING OFFICE-LAB: CPT | Performed by: INTERNAL MEDICINE

## 2020-12-09 PROCEDURE — 86803 HEPATITIS C AB TEST: CPT | Performed by: INTERNAL MEDICINE

## 2020-12-09 ASSESSMENT — MIFFLIN-ST. JEOR: SCORE: 2029.42

## 2020-12-09 ASSESSMENT — PATIENT HEALTH QUESTIONNAIRE - PHQ9: SUM OF ALL RESPONSES TO PHQ QUESTIONS 1-9: 15

## 2020-12-09 NOTE — PROGRESS NOTES
Subjective     Brandin Rodriguez is a 35 year old male who presents to clinic today for the following health issues:    HPI this is a 55-year-old male recently hospitalized at [facility].  He voluntarily entered the emergency department due to depressive symptoms, passive suicidal ideation and alcohol use disorder.  The patient had previously volitionally entered alcohol treatment in November 2020.    The patient stated that he came to the hospital as well for treatment of lower extremity pain.  This lower extremity pain typically gets worse later in the day.  He states it gets to the point that his feet are very painful to walk on.  He utilizes a cane to help him alleviate some of his symptoms.    He has had blood work obtained with no obvious etiology for his lower extremity pain.  He does have slight amount of edema periodically.  Does have a history of heavy alcohol utilization as well as a history of alcoholic hepatitis.  The patient states that he would like to be evaluated for his pain outside the realm of his previous issues with alcohol.          Hospital Follow-up Visit:    Hospital/Nursing Home/IP Rehab Facility: [facility]  Date of Admission: 12/02/2020  Date of Discharge: 12/03/2020  Reason(s) for Admission:     Major depressive disorder, severe, recurrent, without psychosis  PTSD  Generalized anxiety disorder  Alcohol use disorder, severe  Bilateral neuropathic leg pain      Was your hospitalization related to COVID-19? No   Problems taking medications regularly:  None  Medication changes since discharge:   Problems adhering to non-medication therapy:  None    Summary of hospitalization:  [facility] discharge summary reviewed  Diagnostic Tests/Treatments reviewed.  Follow up needed: Psychiatry and chemical dependency  Other Healthcare Providers Involved in Patient s Care:         None  Update since discharge: improved.       Post Discharge Medication Reconciliation: medication  reconcilation previously completed during another office visit.  Plan of care communicated with patient              New Patient/Transfer of Care    Review of Systems   Constitutional, HEENT, cardiovascular, pulmonary, GI, , musculoskeletal, neuro, skin, endocrine and psych systems are negative, except as otherwise noted.      Objective    There were no vitals taken for this visit.  There is no height or weight on file to calculate BMI.  Physical Exam   GENERAL: healthy, alert and no distress  EYES: Eyes grossly normal to inspection, PERRL and conjunctivae and sclerae normal  HENT: ear canals and TM's normal, nose and mouth without ulcers or lesions  NECK: no adenopathy, no asymmetry, masses, or scars and thyroid normal to palpation  RESP: lungs clear to auscultation - no rales, rhonchi or wheezes  CV: regular rate and rhythm, normal S1 S2, no S3 or S4, no murmur, click or rub, no peripheral edema and peripheral pulses strong  ABDOMEN: soft, nontender, no hepatosplenomegaly, no masses and bowel sounds normal  MS: no gross musculoskeletal defects noted, no edema  SKIN: no suspicious lesions or rashes  NEURO: Normal strength and tone, mentation intact and speech normal  PSYCH: mentation appears normal, affect normal/bright    Results for orders placed or performed in visit on 12/09/20   HIV Antigen Antibody Combo     Status: None   Result Value Ref Range    HIV Antigen Antibody Combo Nonreactive NR^Nonreactive       Hepatitis C Screen Reflex to HCV RNA Quant and Genotype     Status: None   Result Value Ref Range    Hepatitis C Antibody Nonreactive NR^Nonreactive   Lipid panel reflex to direct LDL Fasting     Status: Abnormal   Result Value Ref Range    Cholesterol 200 (H) <200 mg/dL    Triglycerides 127 <150 mg/dL    HDL Cholesterol 35 (L) >39 mg/dL    LDL Cholesterol Calculated 140 (H) <100 mg/dL    Non HDL Cholesterol 165 (H) <130 mg/dL   Hemoglobin A1c     Status: None   Result Value Ref Range    Hemoglobin A1C  4.9 0 - 5.6 %   ESR: Erythrocyte sedimentation rate     Status: None   Result Value Ref Range    Sed Rate 9 0 - 15 mm/h   CRP cardiac risk     Status: None   Result Value Ref Range    CRP Cardiac Risk 4.9 mg/L           Assessment & Plan     Brandin was seen today for hospital f/u.    Diagnoses and all orders for this visit:    Dysesthesia  -     HIV Antigen Antibody Combo  -     Methylmalonic Acid  -     Hemoglobin A1c  -     ESR: Erythrocyte sedimentation rate  -     CRP cardiac risk  -     Anti Nuclear Maile IgG by IFA with Reflex  -     NEUROLOGY ADULT REFERRAL  -     Cold agglutinin titer    Screening for HIV (human immunodeficiency virus)    Need for hepatitis C screening test  -     Hepatitis C Screen Reflex to HCV RNA Quant and Genotype    Screening for hyperlipidemia    Encounter for preventive health examination  -     Lipid panel reflex to direct LDL Fasting    Patient with lower extremity discomfort of uncertain etiology.  The work-up will include evaluation for secondary sensory neuropathy.  Usual tests will be obtained in addition to: Gluten titers and antinuclear antibodies.  His hemoglobin A1c will be reassessed additionally.  It is possible that the patient's sensory neuropathy relates to his previous history of alcoholism.  Other possibilities obviously will be considered.  Send him to neurology for evaluation and consideration for EMG evaluation.    Hepatitis C screening also will be obtained today.  I will evaluate the patient's lipid panel additionally.    There are considerable factors in this patient's history that include generalized anxiety as well as major depression.  I would recommend a trial of duloxetine.  Patient is somewhat hesitant to utilize this.  He does not want alcoholism or mental illness to somehow be mixed with what he perceives as a significant medical issue.  We discussed how chronic alcoholism can lead to significant medical issues.  The patient was agreeable to consider  "duloxetine treatment.     Tobacco Cessation:   reports that he has been smoking. He has been smoking about 1.00 pack per day. He has never used smokeless tobacco.        BMI:   Estimated body mass index is 29.15 kg/m  as calculated from the following:    Height as of this encounter: 1.88 m (6' 2\").    Weight as of this encounter: 103 kg (227 lb).                Return in about 3 months (around 3/9/2021).    Gerber Powell MD  [employer]    "

## 2020-12-09 NOTE — PATIENT INSTRUCTIONS
Brandin;  I am going to refer you to neurology to diagnose your form of neuropathy.  I am going to obtain some labs today to look into more uncommon causes.    I suspect that you may benefit with the addition of Cymbalta.  Cymbalta works for anxiety, depression, and perception of pain.  I will send in the prescription and you can make the decision whether or not you would like to try this.    Labs will be obtained today as mentioned above.    Continue to work on her sobriety as this is critical from many fronts.    Best regards  Gerber

## 2020-12-10 LAB — ANA SER QL IF: NEGATIVE

## 2020-12-11 ENCOUNTER — APPOINTMENT (OUTPATIENT)
Dept: LAB | Facility: CLINIC | Age: 35
End: 2020-12-11
Attending: INTERNAL MEDICINE
Payer: COMMERCIAL

## 2020-12-12 LAB
CA TITR SERPL AGGL: NORMAL TITER
COVID-19 SPIKE RBD ABY TITER: NORMAL
COVID-19 SPIKE RBD ABY: NEGATIVE

## 2020-12-16 ENCOUNTER — ANCILLARY PROCEDURE (OUTPATIENT)
Dept: CARDIOLOGY | Facility: CLINIC | Age: 35
End: 2020-12-16
Attending: PHYSICIAN ASSISTANT
Payer: COMMERCIAL

## 2020-12-16 DIAGNOSIS — R60.0 BILATERAL LOWER EXTREMITY EDEMA: ICD-10-CM

## 2020-12-16 PROCEDURE — 93306 TTE W/DOPPLER COMPLETE: CPT | Performed by: STUDENT IN AN ORGANIZED HEALTH CARE EDUCATION/TRAINING PROGRAM

## 2020-12-23 LAB — METHYLMALONATE SERPL-SCNC: 0.37 UMOL/L (ref 0–0.4)

## 2020-12-24 DIAGNOSIS — F10.930 ALCOHOL WITHDRAWAL SYNDROME WITHOUT COMPLICATION (H): ICD-10-CM

## 2020-12-24 RX ORDER — PROPRANOLOL HYDROCHLORIDE 20 MG/1
20 TABLET ORAL 2 TIMES DAILY PRN
Qty: 30 TABLET | Refills: 1 | OUTPATIENT
Start: 2020-12-24

## 2021-01-04 ENCOUNTER — TELEPHONE (OUTPATIENT)
Dept: CARDIOLOGY | Facility: CLINIC | Age: 36
End: 2021-01-04

## 2021-01-04 NOTE — TELEPHONE ENCOUNTER
Patient was transferred to Triage from somewhere else. He had Cardiac Echo done 12/16 at [facility], ordered by his previous primary, Toni Parra PA-C at [facility] in [place]. Now he has a new primary, Dr. Gerber Powell. I offered to fax the results of the Echo to his office and patient said he would really appreciate that. I told him to call there to find out the results. (Patient is not our patient and I did this as a courtesy since he apparently has been trying to get these results for a while.)

## 2021-01-11 ENCOUNTER — VIRTUAL VISIT (OUTPATIENT)
Dept: PSYCHOLOGY | Facility: CLINIC | Age: 36
End: 2021-01-11
Payer: COMMERCIAL

## 2021-01-11 DIAGNOSIS — F10.10 ALCOHOL ABUSE, EPISODIC DRINKING BEHAVIOR: Primary | ICD-10-CM

## 2021-01-11 DIAGNOSIS — F33.2 SEVERE EPISODE OF RECURRENT MAJOR DEPRESSIVE DISORDER, WITHOUT PSYCHOTIC FEATURES (H): ICD-10-CM

## 2021-01-11 PROCEDURE — 90834 PSYTX W PT 45 MINUTES: CPT | Mod: 95 | Performed by: SOCIAL WORKER

## 2021-01-11 ASSESSMENT — ANXIETY QUESTIONNAIRES
6. BECOMING EASILY ANNOYED OR IRRITABLE: MORE THAN HALF THE DAYS
2. NOT BEING ABLE TO STOP OR CONTROL WORRYING: NEARLY EVERY DAY
GAD7 TOTAL SCORE: 17
7. FEELING AFRAID AS IF SOMETHING AWFUL MIGHT HAPPEN: NEARLY EVERY DAY
3. WORRYING TOO MUCH ABOUT DIFFERENT THINGS: NEARLY EVERY DAY
5. BEING SO RESTLESS THAT IT IS HARD TO SIT STILL: MORE THAN HALF THE DAYS
1. FEELING NERVOUS, ANXIOUS, OR ON EDGE: MORE THAN HALF THE DAYS

## 2021-01-11 ASSESSMENT — PATIENT HEALTH QUESTIONNAIRE - PHQ9
SUM OF ALL RESPONSES TO PHQ QUESTIONS 1-9: 22
5. POOR APPETITE OR OVEREATING: MORE THAN HALF THE DAYS

## 2021-01-11 NOTE — Clinical Note
Rashawn Powell,    Иван is re-establishing care with me. I have not seen him since Spring 2020. Previously, Иван was struggling with Severe depression and alcohol dependency. I have brought up a higher level of care, which he declined then. During today's discussion, he expressed feeling ready to commit to a partial hospitalization program. He reports limited support with ongoing problems with depression and alcohol use. He also endorses passive SI with intense feelings of hopelessness. My availability to see him won't be until late February. As partial requires a physician referral, please refer his chart and place referral for Partial Hospitalization Program for Иван.     Let me know if there are questions.    DENNY Reis LGSW

## 2021-01-11 NOTE — PROGRESS NOTES
"                 Progress Note - Initial Visit    Client Name:  Brandin Rodriguez Date: 2021         Service Type: Individual     Visit Start Time: 4:17 PM  Visit End Time: 4:58 PM    Visit #: 1    Attendees: Client attended alone    Service Modality:  Phone Visit:      Provider verified identity through the following two step process.  Patient provided:  Patient  and Patient address    The patient has been notified of the following:      \"We have found that certain health care needs can be provided without the need for a face to face visit.  This service lets us provide the care you need with a phone conversation.       I will have full access to your [place] medical record during this entire phone call.   I will be taking notes for your medical record.      Since this is like an office visit, we will bill your insurance company for this service.       There are potential benefits and risks of telephone visits (e.g. limits to patient confidentiality) that differ from in-person visits.?  Confidentiality still applies for telephone services, and nobody will record the visit.  It is important to be in a quiet, private space that is free of distractions (including cell phone or other devices) during the visit.??      If during the course of the call I believe a telephone visit is not appropriate, you will not be charged for this service\"     Consent has been obtained for this service by care team member: Yes        DATA:   Interactive Complexity: No   Crisis: No     Presenting Concerns/  Current Stressors:   Depression, feeling \"that I can't do anything,\" continue alcohol use. I don't see a  Point for me anymore, I am not going to do anything stupid.       ASSESSMENT:  Mental Status Assessment:  Appearance:   Unable to assess   Eye Contact:   Unable to assess   Psychomotor Behavior: Unable to assess   Attitude:   Cooperative  Guarded   Orientation:   All  Speech   Rate / Production: Mumbled   Volume:  Soft " "  Mood:    Anxious  Depressed   Affect:    Unable to assess   Thought Content:  Perservative   Thought Form:  Coherent   Insight:    Fair       Safety Issues and Plan for Safety and Risk Management:     [place] Suicide Severity Rating Scale (Short Version)  [place] Suicide Severity Rating (Short Version) 11/4/2019 11/13/2019 1/12/2020 3/11/2020 9/3/2020 12/2/2020 1/11/2021   Over the past 2 weeks have you felt down, depressed, or hopeless? - yes yes no no no yes   Comments - - - - - - -   Over the past 2 weeks have you had thoughts of killing yourself? - no yes no no no no   Have you ever attempted to kill yourself? - no no no no no yes   When did this last happen? - - - - - - more than 6 months ago   Q1 Wished to be Dead (Past Month) - - yes - no - -   Q2 Suicidal Thoughts (Past Month) - - no - no - -   Q3 Suicidal Thought Method - - - - no - -   Q4 Suicidal Intent without Specific Plan - - - - no - -   Q5 Suicide Intent with Specific Plan - - - - no - -   Q6 Suicide Behavior (Lifetime) - - - - no - -   Interventions DEC consulted;Monitored via video - - - - - -     Patient denies current fears or concerns for personal safety.  Patient reports the following current or recent suicidal ideation or behaviors: Passive thoughts, \"I know I won't do anything.\".  Patient denies current or recent homicidal ideation or behaviors.  Patient denies current or recent self injurious behavior or ideation.  Patient denies other safety concerns.  A safety and risk management plan has been developed including: Patient consented to co-developed safety plan.  Safety and risk management plan was completed.  Patient agreed to use safety plan should any safety concerns arise.  A copy was given to the patient.  Patient reports there are no firearms in the house.     Diagnostic Criteria:    Depression:  A) Recurrent episode(s) - symptoms have been present during the same 2-week period and represent a change from previous functioning 5 or " more symptoms (required for diagnosis)   - Depressed mood. Note: In children and adolescents, can be irritable mood.     - Diminished interest or pleasure in all, or almost all, activities.    - Decreased sleep.    - Psychomotor activity retardation.    - Fatigue or loss of energy.    - Feelings of worthlessness or inappropriate and excessive guilt.    - Diminished ability to think or concentrate, or indecisiveness.    - Recurrent thoughts of death (not just fear of dying), recurrent suicidal ideation without a specific plan, or a suicide attempt or a specific plan for committing suicide.   B) The symptoms cause clinically significant distress or impairment in social, occupational, or other important areas of functioning  C) The episode is not attributable to the physiological effects of a substance or to another medical condition  D) The occurence of major depressive episode is not better explained by other thought / psychotic disorders  E) There has never been a manic episode or hypomanic episode    Alcohol Use:  A problematic pattern of alcohol use leading to clinically significant impairment or distress, as manifested by at least two of the following, occurring within a 12-month period:  1. Alcohol is often taken in larger amounts or over a longer period than was intended.  2. There is a persistent desire or unsuccessful efforts to cut down or control alcohol use.  3. A great deal of time is spent in activities necessary to obtain alcohol, use alcohol, or recover from its effects.  4. Craving, or a strong desire or urge to use alcohol.  5. Recurrent alcohol use resulting in a failure to fulfill major role obligations at work, school, or home.  6. Continued alcohol use despite having persistent or recurrent social or interpersonal problems caused or exacerbated by the effects of alcohol.  7. Important social, occupational, or recreational activities are given up or reduced because of alcohol use.  8. Recurrent  alcohol use in situations in which it is physically hazardous.  9. Alcohol use is continued despite knowledge of having a persistent or recurrent physical or psychological problem that is likely to have been caused or exacerbated by alcohol.  10. Tolerance, as defined by either of the followin. A need for markedly increased amounts of alcohol to achieve intoxication or desired effect.  2. A markedly diminished effect with continued use of the same amount of alcohol.  Withdrawal -Cessation of (or reduction in) alcohol use that has been heavy and prolonged.  Two (or more) of the following, developing within several hours to a few days after the cessation of (or reduction in) alcohol use described in Criterion A:  1. Autonomic hyperactivity (e.g., sweating or pulse rate greater than 100 bpm).  2. Increased hand tremor.  3. Insomnia.  4. Nausea or vomiting.  5. Psychomotor agitation.  6. Anxiety.      DSM5 Diagnoses: (Sustained by DSM5 Criteria Listed Above)  Diagnoses: 296.33 (F33.2) Major Depressive Disorder, Recurrent Episode, Severe _  Substance-Related & Addictive Disorders Alcohol Use Disorder   303.90 (F10.20) Severe .  Psychosocial & Contextual Factors: Loss of child, history of SI, alcohol dependency, financial stressor, relationship conflict  WHODAS 2.0 (12 item):   WHODAS 2.0 Total Score 12/3/2019   Total Score 41     Intervention:   Completed through review of safety issues and safety interventions and Educated on treatment planning and started identifying goals and interventions for treatment plan  Collateral Reports Completed:  Routed note to PCP      PLAN: (Homework, other):  1. Provider will continue Diagnostic Assessment.  Patient was given the following to do until next session: Reach out to PCP for sooner appt to review ECHO and Partial referral.     2. Provider recommended the following referrals: Partial Hospitalization, reached out to PCP.      3.  Safety plan created.  Provider recommended  "that patient use distractions and phone parents when feeling distress. Patient notes feeling uncomfortable using hotlines due to unpleasant encounters in the past.       DENNY Reis [facility]     January 11, 2021  Service Performed and Documented by SW-   Note reviewed and clinical supervision by DENNY [facility]a [facility] 2/3/2021          SAFETY PLAN:  Step 1: Warning signs / cues (Thoughts, images, mood, situation, behavior) that a crisis may be developing:  ? Thoughts: \"I can't do this anymore\", \"I just want this to end\", \"Nothing makes it better\" and \"feeling helpless,\" \"I can't fix anything,\" \"I've fucked up my life so much that I can't come back from it.\"  ? Images: flashbacks and \"how 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,\" managing restaurants, 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,\"  ? Thinking Processes: ruminations (can't stop thinking about my problems): thinking back around current stressor with roommate or finances, thoughts about loss of daughter, thoughts about how life used to be and racing thoughts  ? Mood: worsening depression, hopelessness, helplessness, intense anger and intense worry  ? Behaviors: isolating/withdrawing , using alcohol, impulsive, reckless behaviors (acting without thinking): driving under the influence, getting into fights with strangers, unsafe sex practices, not taking care of myself, not taking care of my responsibilities and not sleeping enough, not eating/sleeping  ? Situations: relationship problems, trauma  and financial stress   Step 2: Coping strategies - Things I can do to take my mind off of my problems without contacting another person (relaxation technique, physical activity):  ? Distress Tolerance Strategies:  change body temperature (ice pack/cold water) , paced breathing/progressive muscle relaxation, intense exercise: running, jumping jacks for 2-3 minutes  and listening to podcast, playing video games  ? Physical Activities: go for a walk, gardening, meditation, deep breathing, stretching  and " "weight lifting  ? Focus on helpful thoughts:  \"This is temporary\", \"I will get through this\", \"It always passes\", \"Ride the wave\" and remind myself of what is important to me: \"I can't do this to my parents,\" \"Fuck them, I'm going to better than that shit.\"  Step 3: People and social settings that provide distraction:              Name: Briana      Phone: [phone]              Name: Dick     Phone: [phone]  ? gym  and [place]             Step 4: Remind myself of people and things that are important to me and worth living for:                              My family, my garden     Step 5: When I am in crisis, I can ask these people to help me use my safety plan:              Name: Briana      Phone: [phone]              Name: Dick     Phone: [phone]  Step 6: Making the environment safe:   ? remove alcohol, remove drugs, dispose of old medications  and be around others, get rid of gun lighter  Step 7: Professionals or agencies I can contact during a crisis:  ? Suicide Prevention Lifeline: 8-737-488-TALK (7414)  ? Crisis Text Line Service (available 24 hours a day, 7 days a week): Text [place] to 087619    Local Crisis Services: [facility]: [phone]     Call 911 or go to my nearest emergency department.       I helped develop this safety plan and agree to use it when needed.  I have been given a copy of this plan.       Client signature _________________________________________________________________  Today s date:  1/11/2021  Adapted from Safety Plan Template 2008 Janina Pierson and Arturo Roman is reprinted with the express permission of the authors.  No portion of the Safety Plan Template may be reproduced without the express, written permission.  You can contact the authors at [email or web address] or [email or web address].    "

## 2021-01-12 ASSESSMENT — ANXIETY QUESTIONNAIRES: GAD7 TOTAL SCORE: 17

## 2021-01-13 ENCOUNTER — PATIENT OUTREACH (OUTPATIENT)
Dept: CARE COORDINATION | Facility: CLINIC | Age: 36
End: 2021-01-13

## 2021-01-13 NOTE — PROGRESS NOTES
Clinic Care Coordination Contact  [facility]/Voicemail       Clinical Data: Care Coordinator Outreach    Outreach attempted x 1.  Left message on patient's voicemail with call back information and requested return call.    Plan: Care Coordinator will try to reach patient again in 3-5 business days.    Abimbola Cross [facility]  Clinic Care Coordinator  [facility] Cathie Edwards  [facility]  [phone]  [email or web address]

## 2021-01-15 ENCOUNTER — HEALTH MAINTENANCE LETTER (OUTPATIENT)
Age: 36
End: 2021-01-15

## 2021-01-19 ENCOUNTER — NURSE TRIAGE (OUTPATIENT)
Dept: NURSING | Facility: CLINIC | Age: 36
End: 2021-01-19
Payer: COMMERCIAL

## 2021-01-19 ASSESSMENT — ACTIVITIES OF DAILY LIVING (ADL): DEPENDENT_IADLS:: INDEPENDENT

## 2021-01-19 NOTE — TELEPHONE ENCOUNTER
"Briseyda Cross, [facility] Triage 2 hours ago (11:23 AM)     Иван would like someone to call to review his Echocardiogram results from . He is very concerned about ongoing chest pain that he is experiencing.     Abimbola Weems comment      Called patient  Echocardiogram was ordered 2020 by another provider and cardiology notes entered but does not have summary note to patient    Wants to know what echocardiogram said - per his request, mailed copy to his addressed   States it was ordered by another provider at a different clinic in [place] but could not get that provider to review and advise on results, had it done at the [facility]   Admitted into 30 day rehab for alcohol and it was done then   Only doctor they would admit him to was a non [facility] provider in [place]   Doctor there said he say heightened level of a certain enzyme with cardio problems - wanted an echocardiogram. Pt very worried about results. Did advise do not see any urgent/abnormal findings but a provider really should review with him    Upset he did not get the results \"so long ago and just trying to find out if it's okay\"     Concerns: Gets really dizzy with standing   Chest pain - on left side   Not doing well at all   Not much in the last week but before that was having issues debating about calling an ambulence - multiple times advised ER but pt refuses     Radiation? Has chills through body all the time so hard to tell - from neuropathy with alcoholism   Pain worse with exertion? Yes   Hx of heart concerns? Grandma had 7 stents, uncle not blood related  of MI, arhythmia with mother, half sister -arrhythmia and had surgery   Self concerns? Unsure - pt says went 5 years without health insurance, has asthma   Having SOB, yes   Smoker - Yes     Advised pt go to the ER for SOB with chest pain and dizziness - multiple times   Drinks - had death of daughter and is an alcoholic per patient     At one pt said \"would rather " "fucking die of a heart attack and die than deal with the pain of doctors pushing me off\" after pt was advised to go to the ER.     Last time went in throwing up blood - had endoscopy. Found 6 ulcers - 2 in esophagus and 2 in stomach. Only AVS note was withdrawals, nothing about ulcers only addressed the drinking/mental health. Used to not go to the doctor but now at a pt he needs to    said he omits/lies to get out - symptoms are left out. Did discuss provider can only know what he does disclose if he omits information they cannot help with the omitted info     C/o Postural hypotension - discussed hydration and if hydration isn't helping needs to rule out other causes     Doesn't want to go to ER, not being heard, worried about COVID19 - did also discuss if not happy with a particular ER, go to a different hospital     Not currently suicidal - but not afraid of ending his life per atient, not actually going to do anything. \"If my parents were dead I would do it\" strongly advised call 911 if any active suicidal concerns, but pt says he is not actively suicidal     Pt refuses recommendation of going to ER, Asked how can I help you \"I don't know\" - did recommend a sooner follow up than scheduled to discuss concerns at the very least if not willing to go to the ER, he agreed to sooner visit.     pts breathing sounded better on the phone at the end of the conversation       Hoa NUNEZ RN      Reason for Disposition    Severe difficulty breathing (e.g., struggling for each breath, speaks in single words)    Protocols used: CHEST PAIN-A-OH      "

## 2021-01-19 NOTE — PROGRESS NOTES
Clinic Care Coordination Contact    Follow Up Progress Note      Assessment: LUZ MARINA SMITH spoke with pt regarding his overall wellbeing. Pt continues to experience chest pains and inability to walk due to pain.    Pt reported that his depression is worse. He has been isolating. He is not taking medication for depression due to concerns that is was contributing to leg pain. He has been drinking some but the pain in his legs decreases when he drinks. He feels it is bearable when he is drinking. OTC pain medication is not helpful and he worries about the effect it has on his liver. LUZ MARINA SMITH explained alcohol has a negative effect on his liver as well, pt stated understanding.    He is still in therapy but doesn't think it is helping because it is virtual and he doesn't have to be sober during the calls. He understands that he should be committing more fully to therapy for it to help but struggles with this. Discussed his parents and the support they have offered. LUZ MARINA SMITH encouraged him to consider allowing their support because they are a large motivator to him in decreasing his alcohol use and addressing his medical concerns.    Pt requested a call from his care team to review results of Echocardiogram from 12/16.     Goals addressed this encounter:   Goals Addressed                 This Visit's Progress       Patient Stated      1. Mental Health Management (pt-stated)   0%     Goal Statement: Within the next 6 months, I would like to improve my overall health by decreasing my substance use and following up with medical appointments.   Date Goal set: 12/7/2020  Barriers: None  Strengths: Motivated to address health and substance use concerns  Date to Achieve By: 6/7/2021  Patient expressed understanding of goal: Иван verbally stated understanding of goal   Action steps to achieve this goal:  1. I will attend scheduled medical appointments  2. I will follow treatment recommendations  3. I will outreach to Care Coordination Social  Worker for further questions or concerns         Outreach Frequency: monthly    Plan: CC SW will put in a request for Financial Resources Worker to call pt to discuss his medical insurance and any other resources he may qualify for.    Abimbola Cross, [facility]  Clinic Care Coordinator  [facility] Cathie Kearny  [facility]  [phone]  [email or web address]

## 2021-01-20 ENCOUNTER — PATIENT OUTREACH (OUTPATIENT)
Dept: CARE COORDINATION | Facility: CLINIC | Age: 36
End: 2021-01-20

## 2021-01-20 NOTE — PROGRESS NOTES
Clinic Care Coordination Contact  Program: Updated Health Insurance case/ SNAP/CASH  County:  [employer] Case #:  [employer] Worker:   Medardo #:   PMI #: [id number]  Renewal Month:  Date Applied:     FRW Outreach:   1/20/2021: Outreach attempted x 1. Left message on voicemail with call back information and requested return call.  Plan: FRW will call again within one week.     Health Insurance:    Major Programs  This subscriber has eligibility for MA: Medical Assistance.  Elig Type AX: MA Early Expansion  Eligibility Begin Date: 11/01/2019  Eligibility End Date: --/--/----  This subscriber is eligible for the following service types: Medical Care ,  Chiropractic ,  Dental Care ,  Hospital ,  Hospital - Inpatient ,  Hospital - Outpatient ,  Emergency Services ,  Pharmacy ,  Professional (Physician) Visit - Office ,  Vision (Optometry) ,  Mental Health ,  Urgent Care    Prepaid Health Plan  This subscriber receives MA12 - Prepaid Medical Assistance Program (PMAP) delivered through [employer]. The phone number is: [phone].    Referral/Screening:    Is patient requesting help applying for [facility] benefits? Yes    Have you recently applied for any [facility] benefits? No    What was applied for?  SNAP;CASH    How many people in your household? 1    Do you buy/eat food together? Yes    What is the monthly gross income for the household (wages, social security, workers comp, and pension)?  0    [place]? Yes    Is this an insurance renewal? No    Is this a new insurance application request? Yes    Have you recently applied for insurance? No  Several months ago    How many people in your household?  1    Do you file taxes? No    What is the monthly gross income for the household (wages, social security, workers comp, and pension)?  0    Any other information for the FRW? Pt currently  has insurance  through the  [facility] but has  co-pays for  appointments.  He is no longer  working from  the last  time  he applied.  Could someone  review if he is  eligible for  MA.          Financial Resource Worker Follow Up    Goals:       Intervention and Education during outreach: n/a    FRW Plan: FRW will make outreach in 1 week

## 2021-01-27 ENCOUNTER — PATIENT OUTREACH (OUTPATIENT)
Dept: CARE COORDINATION | Facility: CLINIC | Age: 36
End: 2021-01-27

## 2021-01-27 ENCOUNTER — TELEPHONE (OUTPATIENT)
Dept: FAMILY MEDICINE | Facility: CLINIC | Age: 36
End: 2021-01-27

## 2021-01-27 NOTE — PROGRESS NOTES
Clinic Care Coordination Contact  Program: Updated Health Insurance case/ SNAP/CASH  County: [facility] Case #:  [facility] Worker:   Medardo #:   PMI #: [id number]  Renewal Month:  Date Applied:     FRW Outreach:  2021: FRW called patient and left a vm with call back information. Patient called FRW back and we went through the combined application screening. Patient appears to be within the guidelines so we scheduled a phone appointment for 20 at 10:00 am.     Closed Encounter:   2021: Outreach attempted x 1. Left message on voicemail with call back information and requested return call.  Plan: FRW will call again within one week.     SNAP/CASH Application Screenin. Have you had [facility] benefits before? No  2. How many people in the household, do you eat/buy food together? 1  3. What is your monthly income (include all tax members)? Unemployment $153  4. Do you have a bank account? Checking account  5. Do you have any utility bills (electricity, rent, mortgage, phone, insurance, medical bills, etc.)? Rent $1149 Electricity $50 Gas $100   6. Do you have social security cards and/or green cards? Yes, [place] citizen      Health Insurance:    Major Programs  This subscriber has eligibility for MA: Medical Assistance.  Elig Type AX: MA Early Expansion  Eligibility Begin Date: 2019  Eligibility End Date: --/--/----  This subscriber is eligible for the following service types: Medical Care ,  Chiropractic ,  Dental Care ,  Hospital ,  Hospital - Inpatient ,  Hospital - Outpatient ,  Emergency Services ,  Pharmacy ,  Professional (Physician) Visit - Office ,  Vision (Optometry) ,  Mental Health ,  Urgent Care    Prepaid Health Plan  This subscriber receives MA12 - Prepaid Medical Assistance Program (PMAP) delivered through [facility]. The phone number is: [phone].    Referral/Screening:    Is patient requesting help applying for [facility] benefits? Yes    Have you recently applied for any  [facility] benefits? No    What was applied for?  SNAP;CASH    How many people in your household? 1    Do you buy/eat food together? Yes    What is the monthly gross income for the household (wages, social security, workers comp, and pension)?  0    [place]? Yes    Is this an insurance renewal? No    Is this a new insurance application request? Yes    Have you recently applied for insurance? No  Several months ago    How many people in your household?  1    Do you file taxes? No    What is the monthly gross income for the household (wages, social security, workers comp, and pension)?  0    Any other information for the FRW? Pt currently  has insurance  through the  [facility] but has  co-pays for  appointments.  He is no longer  working from  the last time  he applied.  Could someone  review if he is  eligible for  MA.            Financial Resource Worker Follow Up    Goals:   Goals Addressed as of 1/27/2021 at 9:30 AM                 Today      [facility] Benefits (pt-stated)   20%    Added 1/27/21 by Alanis Danielle     Goal Statement: I will apply for county benefits within the next 2 weeks   Date goal set: 1/27/2021  Date to Achieve By: 3/1/2021  Action steps to achieve this goal:  1. I will be available for the phone appointment with my Financial Resource Worker 2/1/21 at 10:00 am.  2. I will connect with my Financial Resource Worker, Alanis ROGEL, at [phone] if needed.                Intervention and Education during outreach: n/a    FRW Plan: FRW will make outreach 2/1/20 at 10:00 am

## 2021-01-27 NOTE — TELEPHONE ENCOUNTER
Reason for Call:  Other appointment    Detailed comments: Patient called to reschedule appointment with Dr. Powell. Rescheduled for  2/17/21 Patient states that  helped him set up the appointment. Patient would like to have visit done as a virtual visit if possible. He wasn't sure if the appointment was just to discuss test results or if he would need to be in clinic for the visit.     Phone Number Patient can be reached at: Home number on file [phone] (home)    Best Time: anytime    Can we leave a detailed message on this number? YES, patient would like a detailed message left if he doesn't answer.    Call taken on 1/27/2021 at 3:09 PM by Марина Barragan

## 2021-02-01 ENCOUNTER — PATIENT OUTREACH (OUTPATIENT)
Dept: CARE COORDINATION | Facility: CLINIC | Age: 36
End: 2021-02-01

## 2021-02-01 ENCOUNTER — APPOINTMENT (OUTPATIENT)
Dept: CARE COORDINATION | Facility: CLINIC | Age: 36
End: 2021-02-01
Payer: COMMERCIAL

## 2021-02-01 NOTE — PROGRESS NOTES
Clinic Care Coordination Contact  Program: SNAP/CASH  County: [facility] Case #:  [facility] Worker:   Medardo #:   PMI #: [id number]  Renewal Month:  Date Applied:     FRW Outreach:  2021: FRW called patient for our scheduled phone appointment. We started completing the application and patient states he started receiving an extra $300 a week a couple of weeks ago. FRW advised patient that will put him about $200 a month over income for SNAP. FRW advised patient we could still try to apply, however, he may get denied due to income and patient decided he would like to wait and apply when/if his income changes. FRW asked patient if he called the [facility] to change plans and he states he hasn't. FRW advised him he can call the [facility] to change PMAP's if he does not like his current plan. FRW will remove patient from panel, put the [facility] benefit goal on hold and resolve the FRW episode. Please refer to FRW for future needs.     Closed Encounters:  2021: FRW called patient and left a vm with call back information. Patient called FRW back and we went through the combined application screening. Patient appears to be within the guidelines so we scheduled a phone appointment for 20 at 10:00 am.    2021: Outreach attempted x 1. Left message on [employer] with call back information and requested return call.  Plan: FRW will call again within one week.     SNAP/CASH Application Screenin. Have you had [facility] benefits before? No  2. How many people in the household, do you eat/buy food together? 1  3. What is your monthly income (include all tax members)? Unemployment $153  4. Do you have a bank account? Checking account  5. Do you have any utility bills (electricity, rent, mortgage, phone, insurance, medical bills, etc.)? Rent $1149 Electricity $50 Gas $100   6. Do you have social security cards and/or green cards? Yes, [place] citizen      Health Insurance:    Major Programs  This subscriber has eligibility for  MA: Medical Assistance.  [place] Type AX: MA Early Expansion  Eligibility Begin Date: 11/01/2019  Eligibility End Date: --/--/----  This subscriber is eligible for the following service types: Medical Care ,  Chiropractic ,  Dental Care ,  Hospital ,  Hospital - Inpatient ,  Hospital - Outpatient ,  Emergency Services ,  Pharmacy ,  Professional (Physician) Visit - Office ,  Vision (Optometry) ,  Mental Health ,  Urgent Care    Prepaid Health Plan  This subscriber receives MA12 - Prepaid Medical Assistance Program (PMAP) delivered through [facility]. The phone number is: [phone].    Referral/Screening:    Is patient requesting help applying for [facility] benefits? Yes    Have you recently applied for any [facility] benefits? No    What was applied for?  SNAP;CASH    How many people in your household? 1    Do you buy/eat food together? Yes    What is the monthly gross income for the household (wages, social security, workers comp, and pension)?  0    [place]? Yes    Is this an insurance renewal? No    Is this a new insurance application request? Yes    Have you recently applied for insurance? No  Several months ago    How many people in your household?  1    Do you file taxes? No    What is the monthly gross income for the household (wages, social security, workers comp, and pension)?  0    Any other information for the FRW? Pt currently  has insurance  through the  [facility] but has  co-pays for  appointments.  He is no longer  working from  the last time  he applied.  Could someone  review if he is  eligible for  MA.          Financial Resource Worker Follow Up    Goals:   Goals Addressed as of 2/1/2021 at 10:20 AM                 Today    1/27/21      [facility] Benefits (pt-stated)   On Hold  20%    Added 1/27/21 by Alanis Danielle     Goal Statement: I will apply for [facility] benefits within the next 2 weeks   Date goal set: 1/27/2021  Date to Achieve By: 3/1/2021  Action steps to  achieve this goal:  1. I will be available for the phone appointment with my Financial Resource Worker 2/1/21 at 10:00 am.  2. I will connect with my Financial Resource Worker, Alanis ROGEL, at [phone] if needed.                Intervention and Education during outreach: n/a    FRW Plan: n/a

## 2021-02-25 ENCOUNTER — PATIENT OUTREACH (OUTPATIENT)
Dept: CARE COORDINATION | Facility: CLINIC | Age: 36
End: 2021-02-25

## 2021-02-25 NOTE — PROGRESS NOTES
Clinic Care Coordination Contact  [facility]/Voicemail       Clinical Data: Care Coordinator Outreach    Outreach attempted x 1.  Left message on patient's voicemail with call back information and requested return call.    Plan: Care Coordinator will try to reach patient again in 3-5 business days.    Abimbola Cross [facility]  Clinic Care Coordinator  [facility] Cathie Pickaway  [facility]  [phone]  [email or web address]

## 2021-03-02 ENCOUNTER — VIRTUAL VISIT (OUTPATIENT)
Dept: PSYCHOLOGY | Facility: CLINIC | Age: 36
End: 2021-03-02
Payer: COMMERCIAL

## 2021-03-02 DIAGNOSIS — F10.10 ALCOHOL ABUSE, EPISODIC DRINKING BEHAVIOR: Primary | ICD-10-CM

## 2021-03-02 DIAGNOSIS — F33.2 SEVERE EPISODE OF RECURRENT MAJOR DEPRESSIVE DISORDER, WITHOUT PSYCHOTIC FEATURES (H): ICD-10-CM

## 2021-03-02 DIAGNOSIS — F43.10 POSTTRAUMATIC STRESS DISORDER: ICD-10-CM

## 2021-03-02 PROCEDURE — 90834 PSYTX W PT 45 MINUTES: CPT | Mod: 95 | Performed by: SOCIAL WORKER

## 2021-03-02 PROCEDURE — 90785 PSYTX COMPLEX INTERACTIVE: CPT | Mod: 95 | Performed by: SOCIAL WORKER

## 2021-03-02 NOTE — PROGRESS NOTES
"                                           Progress Note    Patient Name: Brandin Rodriguez  Date: 3/2/2021         Service Type: Individual      Session Start Time: 4:03 PM  Session End Time: 4:53 PM     Session Length: 50 mins    Session #: 2    Attendees: Client attended alone    Service Modality:  Phone Visit:      Provider verified identity through the following two step process.  Patient provided:  Patient  and Patient address    The patient has been notified of the following:      \"We have found that certain health care needs can be provided without the need for a face to face visit.  This service lets us provide the care you need with a phone conversation.       I will have full access to your [facility] medical record during this entire phone call.   I will be taking notes for your medical record.      Since this is like an office visit, we will bill your insurance company for this service.       There are potential benefits and risks of telephone visits (e.g. limits to patient confidentiality) that differ from in-person visits.?Confidentiality still applies for telephone services, and nobody will record the visit.  It is important to be in a quiet, private space that is free of distractions (including cell phone or other devices) during the visit.??      If during the course of the call I believe a telephone visit is not appropriate, you will not be charged for this service\"     Consent has been obtained for this service by care team member: Yes      Treatment Plan Last Reviewed: n/a  PHQ-9 / NEREYDA-7 : updated 2021    DATA  Interactive Complexity: Yes, visit entailed Interactive Complexity evidenced by:  -The need to manage maladaptive communication (related to, e.g., high anxiety, high reactivity, repeated questions, or disagreement) among participants that complicates delivery of care  Crisis: No       Progress Since Last Session (Related to Symptoms / Goals / Homework):   Symptoms: Improving " : Improvement in mood, anxiety still present, patient continues with alcohol use    Homework: Did not complete      Episode of Care Goals: No improvement - PREPARATION (Decided to change - considering how); Intervened by negotiating a change plan and determining options / strategies for behavior change, identifying triggers, exploring social supports, and working towards setting a date to begin behavior change     Current / Ongoing Stressors and Concerns:   Alcohol use, higher level of care for MI and CD     Treatment Objective(s) Addressed in This Session:   Increase awareness of the impacts on alcohol on patient's fucntioning  use thought-stopping strategy daily to reduce intrusive thoughts  Decrease frequency and intensity of feeling down, depressed, hopeless     Intervention:   DBT: Discussed use of distress tolerance skills when feeling anxious  Motivational Interviewing: Assessing patient's readiness to explore change, rolled with resistance of practicing distress tolerance skills, discussed increasing level of care and patient's commitment to tackling his mental and chemical health  Motivational Interviewing    MI Intervention: Expressed Empathy/Understanding, Supported Autonomy, Collaboration, Evocation, Permission to raise concern or advise, Open-ended questions, Reflections: simple and complex and Rolled with resistance: Simple reflection     Change Talk Expressed by the Patient: Desire to change Ability to change Reasons to change    Provider Response to Change Talk: E - Evoked more info from patient about behavior change, A - Affirmed patient's thoughts, decisions, or attempts at behavior change, R - Reflected patient's change talk and S - Summarized patient's change talk statements          ASSESSMENT: Current Emotional / Mental Status (status of significant symptoms):   Risk status (Self / Other harm or suicidal ideation)   Patient denies current fears or concerns for personal safety.   Patient denies  current or recent suicidal ideation or behaviors.   Patient denies current or recent homicidal ideation or behaviors.   Patient denies current or recent self injurious behavior or ideation.   Patient denies other safety concerns.   Patient reports there has been no change in risk factors since their last session.     Patient reports there has been no change in protective factors since their last session.     A safety and risk management plan has been developed including: Patient consented to co-developed safety plan.  Safety and risk management plan was completed.  Patient agreed to use safety plan should any safety concerns arise.  A copy was given to the patient.     Appearance:   Unable to assess over phone    Eye Contact:   Unable to assess over phone    Psychomotor Behavior: Unable to assess over phone    Attitude:   Cooperative    Orientation:   All   Speech    Rate / Production: Hyperverbal     Volume:  Normal    Mood:    Anxious  Depressed    Affect:    Unable to assess over phone    Thought Content:  Perservative    Thought Form:  Tangential , needing re-directing a few times during session   Insight:    Poor  and External locus     Medication Review:   No current psychiatric medications prescribed     Medication Compliance:   NA     Changes in Health Issues:   None reported     Chemical Use Review:   Substance Use: Problem use continues with no change since last session, Stage of Change: Contemplation        Tobacco Use: No change in amount of tobacco use since last session.  Contemplation    Diagnosis:  1. Alcohol abuse, episodic drinking behavior    2. Severe episode of recurrent major depressive disorder, without psychotic features (H)    3. Posttraumatic stress disorder        Collateral Reports Completed:   Not Applicable    PLAN: (Patient Tasks / Therapist Tasks / Other)  Patient: will discuss with parents steps for housing and follow up with call to [facility] to follow-through with  "PHP.  Patient requested referral for ADHD assessment. Advised call may come from 1-800 number, and he would need to  the call.       DENNY Reis [facility]    March 2, 2021  Service Performed and Documented by [facility]-   Note reviewed and clinical supervision by DENNY Araya [facility] 3/3/2021                 SAFETY PLAN:  Step 1: Warning signs / cues (Thoughts, images, mood, situation, behavior) that a crisis may be developing:  ? Thoughts: \"I can't do this anymore\", \"I just want this to end\", \"Nothing makes it better\" and \"feeling helpless,\" \"I can't fix anything,\" \"I've fucked up my life so much that I can't come back from it.\"  ? Images: flashbacks and \"how 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,\" managing restaurants, 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,\"  ? Thinking Processes: ruminations (can't stop thinking about my problems): thinking back around current stressor with roommate or finances, thoughts about loss of daughter, thoughts about how life used to be and racing thoughts  ? Mood: worsening depression, hopelessness, helplessness, intense anger and intense worry  ? Behaviors: isolating/withdrawing , using alcohol, impulsive, reckless behaviors (acting without thinking): driving under the influence, getting into fights with strangers, unsafe sex practices, not taking care of myself, not taking care of my responsibilities and not sleeping enough, not eating/sleeping  ? Situations: relationship problems, trauma  and financial stress   Step 2: Coping strategies - Things I can do to take my mind off of my problems without contacting another person (relaxation technique, physical activity):  ? Distress Tolerance Strategies:  change body temperature (ice pack/cold water) , paced breathing/progressive muscle relaxation, intense exercise: running, jumping jacks for 2-3 minutes  and listening to podcast, playing video games  ? Physical Activities: go for a walk, gardening, meditation, deep breathing, stretching  and weight lifting  ? Focus on " "helpful thoughts:  \"This is temporary\", \"I will get through this\", \"It always passes\", \"Ride the wave\" and remind myself of what is important to me: \"I can't do this to my parents,\" \"Fuck them, I'm going to better than that shit.\"  Step 3: People and social settings that provide distraction:              Name: Briana      Phone: [phone]              Name: Dick     Phone: [phone]  ? gym  and Wiley             Step 4: Remind myself of people and things that are important to me and worth living for:                              My family, my garden     Step 5: When I am in crisis, I can ask these people to help me use my safety plan:              Name: Briana      Phone: [phone]              Name: Dick     Phone: [phone]  Step 6: Making the environment safe:   ? remove alcohol, remove drugs, dispose of old medications  and be around others, get rid of gun lighter  Step 7: Professionals or agencies I can contact during a crisis:  ? Suicide Prevention Lifeline: 1-380-666-IQRC (0196)  ? Crisis Text Line Service (available 24 hours a day, 7 days a week): Text [place] to 951182    Local Crisis Services: [facility]: [phone]     Call 911 or go to my nearest emergency department.       I helped develop this safety plan and agree to use it when needed.  I have been given a copy of this plan.       Client signature _________________________________________________________________  Today s date:  1/11/2021  Adapted from Safety Plan Template 2008 Janina Pierson and Arturo Roman is reprinted with the express permission of the authors.  No portion of the Safety Plan Template may be reproduced without the express, written permission.  You can contact the authors at [email or web address] or [email or web address].  "

## 2021-03-03 ENCOUNTER — DOCUMENTATION ONLY (OUTPATIENT)
Dept: PSYCHOLOGY | Facility: CLINIC | Age: 36
End: 2021-03-03

## 2021-03-03 DIAGNOSIS — F10.10 ALCOHOL ABUSE, EPISODIC DRINKING BEHAVIOR: Primary | ICD-10-CM

## 2021-03-03 NOTE — PROGRESS NOTES
Case Consultation Record       Client Name: Brandin Rodriguez   Date:  03/03/202     Diagnosis: 296.33 (F33.2) Major Depressive Disorder, Recurrent Episode, Severe _  300.02 (F41.1) Generalized Anxiety Disorder  309.81 (F43.10) Posttraumatic Stress Disorder (includes Posttraumatic Stress Disorder for Children 6 Years and Younger)  Without dissociative symptoms  Substance-Related & Addictive Disorders Alcohol Use Disorder     Therapist: DENNY Reis      Team Members Present:  Jesse Saalzar, Edouard Hunter, Tina Reed, Carlos Brown, Gerri Melendez, Marlin Bui    Purpose:   Treatment Planning    Recommendations:  Explore peer support through partial hospitalization program, revisit conversation of higher level of care, bridge care until higher level of care has started.       DENNY Reis    March 3, 2021

## 2021-03-09 ENCOUNTER — PATIENT OUTREACH (OUTPATIENT)
Dept: NURSING | Facility: CLINIC | Age: 36
End: 2021-03-09
Payer: COMMERCIAL

## 2021-03-09 NOTE — PROGRESS NOTES
"Clinic Care Coordination Contact    Follow Up Progress Note      Assessment: CC LUIS spoke with pt regarding his overall wellbeing. Pt shared that he is not doing well and is sick from alcohol withdrawal. His throat and stomach are hurting and has been vomiting since last night. He is struggling to sleep because of this pain. Pt reported that this is how he typically experiences withdrawal from alcohol. No seizures at this time. He is trying to sip on a beer to help and is also trying to drink water and Gatorade but is no sure he is keeping much down. Pt's Legs feel weak but swelling is down.     Pt shared that his last alcoholic drink was a few days ago. He explained that he wants to quit because he just can't keep going this way. But he is discouraged by the physical pain he feels when he quits.    Pt went to [facility] today for an appointment but it was cancelled. He rescheduled for 3/11.     CC LUIS encouraged pt to visit ED to assist with withdrawal symptoms and hydration. Pt stated that he is reluctant because \"they don't like me there\". LUZ MARINA SMITH explained that providers rarely think in these terms and instead look at pt's need to be safe as main priority. LUZ MARINA SMITH also explained that he has not been in a [facility] hospital in many months which increases the likelihood he won't be remembered. He was encouraged to visit whichever hospital makes him comfortable. Pt stated that at this time he thinks he needs to rest and focus on hydrating but would consider the hospital. Pt was communicating clearly during conversation and demonstrated his ability earlier to get himself to a medical setting.    Goals addressed this encounter:   Goals Addressed                 This Visit's Progress       Patient Stated      1. Mental Health Management (pt-stated)   10%     Goal Statement: Within the next 6 months, I would like to improve my overall health by decreasing my substance use and following up with medical appointments.   Date " Goal set: 12/7/2020  Barriers: None  Strengths: Motivated to address health and substance use concerns  Date to Achieve By: 6/7/2021  Patient expressed understanding of goal: Иван verbally stated understanding of goal   Action steps to achieve this goal:  1. I will attend scheduled medical appointments  2. I will follow treatment recommendations  3. I will outreach to Care Coordination  for further questions or concerns         [facility] Benefits (pt-stated)   On Hold     Goal Statement: I will apply for [facility] benefits within the next 2 weeks   Date goal set: 1/27/2021  Date to Achieve By: 3/1/2021  Action steps to achieve this goal:  1. I will be available for the phone appointment with my Financial Resource Worker 2/1/21 at 10:00 am.  2. I will connect with my Financial Resource Worker, Alanis ROGEL, at [phone] if needed.            Outreach Frequency: monthly    Plan: Pt may call LUZ MARINA SMITH back later today to discuss his well being. LUZ MARINA SMITH will outreach to pt on 3/11 after PCP appointment.    Abimbola Cross [facility]  Clinic Care Coordinator  [facility] Cathie Thomas  [facility]  [phone]  [email or web address]

## 2021-03-11 ENCOUNTER — PATIENT OUTREACH (OUTPATIENT)
Dept: NURSING | Facility: CLINIC | Age: 36
End: 2021-03-11
Payer: COMMERCIAL

## 2021-03-11 NOTE — PROGRESS NOTES
Clinic Care Coordination Contact    Follow Up Progress Note      Assessment: CC LUIS received a call from pt to discuss his overall wellbeing. Pt shared that he is feeling better today than he was 3/9. He is not vomiting anymore. He is taking small sips of water but food is upsetting his stomach. He feels like he has acid reflux issues or potentially his ulcers are flaring up. He is considering taking his past ulcer medication. CC LUIS encouraged him to take pepto bismol or tums to see if that helps and address ulcer medication with PCP next week.     Pt shared that he is frustrated that his PCP keeps getting cancelled. Today's appointment was rescheduled for 3/18.    Pt shared that he has to be out of apartment by 4/30. He thinks he will move to [place] with his parent for a while and would like to enter day treatment there. While there he would like to spend time on the memorial for his daughter because that has always helped him. Pt expressed that ultimately he is in need of more significant treatment for his alcohol use, mental health, and physical health.    Goals addressed this encounter:   Goals Addressed                 This Visit's Progress       Patient Stated      1. Mental Health Management (pt-stated)   10%     Goal Statement: Within the next 6 months, I would like to improve my overall health by decreasing my substance use and following up with medical appointments.   Date Goal set: 12/7/2020  Barriers: None  Strengths: Motivated to address health and substance use concerns  Date to Achieve By: 6/7/2021  Patient expressed understanding of goal: Иван verbally stated understanding of goal   Action steps to achieve this goal:  1. I will attend scheduled medical appointments  2. I will follow treatment recommendations  3. I will outreach to Care Coordination  for further questions or concerns         [employer] (pt-stated)   On Hold     Goal Statement: I will apply for county benefits  within the next 2 weeks   Date goal set: 1/27/2021  Date to Achieve By: 3/1/2021  Action steps to achieve this goal:  1. I will be available for the phone appointment with my Financial Resource Worker 2/1/21 at 10:00 am.  2. I will connect with my Financial Resource Worker, Alanis ROGEL, at [phone] if needed.            Outreach Frequency: monthly    Plan: LUZ MARINA SMITH will outreach to pt in 2-3 weeks with resources for day treatment in [place].    Abimbola Cross [facility]  Clinic Care Coordinator  [facility] Cathie Hutchinson  [facility]  [phone]  [email or web address]

## 2021-03-17 NOTE — PROGRESS NOTES
"    Assessment & Plan     Edema, unspecified type  Likely a relative to the utilization of ibuprofen with gabapentin.  Improving the edema diminishes dysesthesia.  Continues B12 supplementation I would like to evaluate sterile folate additionally today  - Vitamin B12  - Folate  - Comprehensive metabolic panel (BMP + Alb, Alk Phos, ALT, AST, Total. Bili, TP)             Tobacco Cessation:   reports that he has been smoking. He has been smoking about 1.00 pack per day. He has never used smokeless tobacco.      BMI:   Estimated body mass index is 28.12 kg/m  as calculated from the following:    Height as of this encounter: 1.88 m (6' 2\").    Weight as of this encounter: 99.3 kg (219 lb).       See Patient Instructions    No follow-ups on file.    Gerber Powell MD  [employer] MAUREEN Oates is a 35 year old who presents for the following health issues     HPI 35-year-old male presents for follow-up of dysesthesia.  He was found to be borderline regarding his B12 levels.  He has started B12 supplementation and claims he is doing much better.    He has been doing well on his sobriety additionally.  His mood is improved.    Medication Followup    Taking Medication as prescribed: yes    Side Effects:  None       New Patient/Transfer of Care    Review of Systems   Constitutional, HEENT, cardiovascular, pulmonary, gi and gu systems are negative, except as otherwise noted.      Objective    There were no vitals taken for this visit.  There is no height or weight on file to calculate BMI.  Physical Exam   Alert cooperative patient no apparent distress  Lower extremities just trace edema    Office Visit on 12/09/2020   Component Date Value Ref Range Status     HIV Antigen Antibody Combo 12/09/2020 Nonreactive  NR^Nonreactive     Final    HIV-1 p24 Ag & HIV-1/HIV-2 Ab Not Detected     Hepatitis C Antibody 12/09/2020 Nonreactive  NR^Nonreactive Final    Comment: Assay performance characteristics have " not been established for newborns,   infants, and children       Cholesterol 12/09/2020 200* <200 mg/dL Final    Desirable:       <200 mg/dl     Triglycerides 12/09/2020 127  <150 mg/dL Final     HDL Cholesterol 12/09/2020 35* >39 mg/dL Final     LDL Cholesterol Calculated 12/09/2020 140* <100 mg/dL Final    Comment: Above desirable:  100-129 mg/dl  Borderline High:  130-159 mg/dL  High:             160-189 mg/dL  Very high:       >189 mg/dl       Non HDL Cholesterol 12/09/2020 165* <130 mg/dL Final    Comment: Above Desirable:  130-159 mg/dl  Borderline high:  160-189 mg/dl  High:             190-219 mg/dl  Very high:       >219 mg/dl       Methylmalonic Acid 12/09/2020 0.37  0.00 - 0.40 umol/L Final    Comment: This test was developed and its performance characteristics determined by the   [facility].   It has not been cleared or approved by the FDA. The laboratory is regulated   under CLIA as qualified to perform high-complexity testing. This test is used   for clinical purposes. It should not be regarded as investigational or for   research.       Hemoglobin A1C 12/09/2020 4.9  0 - 5.6 % Final    Comment: Normal <5.7% Prediabetes 5.7-6.4%  Diabetes 6.5% or higher - adopted from ADA   consensus guidelines.       Sed Rate 12/09/2020 9  0 - 15 mm/h Final     CRP Cardiac Risk 12/09/2020 4.9  mg/L Final    Comment: Reference Values:  Low Risk:           <1.0 mg/L  Average Risk:       1.0-3.0 mg/L  High Risk:          >3.0 mg/L  Acute Inflammation: >10.0 mg/L       DILMA interpretation 12/09/2020 Negative  NEG^Negative Final    Comment:                                    Reference range:  <1:40  NEGATIVE  1:40 - 1:80  BORDERLINE POSITIVE  >1:80 POSITIVE       Cold Agglutinins 12/09/2020 <1:32  <1:32 Titer Final    Comment: (Note)  INTERPRETIVE INFORMATION: Cold Agglutinins  Titers of 1:32 or higher are considered elevated by this   technique. Elevated  titers are rarely seen except in   primary atypical pneumonia and in certain hemolytic   anemias. If the agglutination is not reversible after   incubation at 37 degrees Celsius, then the reaction is not   due to cold agglutinins.  Primary atypical pneumonia can be caused by Mycoplasma   pneumoniae, influenza A, influenza B, parainfluenza, and   adenoviruses. However, a fourfold rise in the cold   agglutinins usually begins to appear late in the first week   or during the second week of the disease and begins to   decrease between the fourth and sixth week. Low titers of   cold agglutinins have been demonstrated in malaria,   peripheral vascular disease, and common respiratory disease.  Performed By: [employer]  [address] [place] [id number]  : Jennifer Donahue MD       COVID-19 Antibody Screen 12/09/2020 Negative   Final    Comment: No COVID-19 antibodies detected.  Patients within 10 days of symptom onset for   COVID-19 may not produce sufficient levels of detectable antibodies.    Immunocompromised COVID-19 patients may take longer to develop antibodies.       COVID-19 Antibody, IgG Titer 12/09/2020 Not Applicable   Final    Comment: Qualitative screen for total antibodies to COVID-19 (SARS-CoV-2) with   semi-quantitative measurement of IgG COVID-19 antibodies by endpoint titer.    COVID-19 antibodies may be elevated due to a past or current infection.  Negative results do not rule out COVID-19 infection.  Results from antibody   testing should not be used as the sole basis to diagnose or exclude SARS-CoV-2   infection or to inform infection status.  COVID-19 PCR test should be ordered   if current infection is suspected.  False positive results may occur in rare   cases due to cross-reacting antibodies.  This test was developed and its performance characteristics determined by the   [facility] ([facility]),   which is  regulated under CLIA as qualified to perform high-complexity testing.    This test has not been reviewed by the FDA.  Testing performed by [facility], [facility], [address], [place] [address]

## 2021-03-18 ENCOUNTER — OFFICE VISIT (OUTPATIENT)
Dept: FAMILY MEDICINE | Facility: CLINIC | Age: 36
End: 2021-03-18
Payer: COMMERCIAL

## 2021-03-18 VITALS
SYSTOLIC BLOOD PRESSURE: 121 MMHG | HEART RATE: 103 BPM | HEIGHT: 74 IN | WEIGHT: 219 LBS | OXYGEN SATURATION: 95 % | DIASTOLIC BLOOD PRESSURE: 78 MMHG | TEMPERATURE: 98.6 F | BODY MASS INDEX: 28.11 KG/M2

## 2021-03-18 DIAGNOSIS — R60.9 EDEMA, UNSPECIFIED TYPE: Primary | ICD-10-CM

## 2021-03-18 LAB — FOLATE SERPL-MCNC: 8.2 NG/ML

## 2021-03-18 PROCEDURE — 82746 ASSAY OF FOLIC ACID SERUM: CPT | Performed by: INTERNAL MEDICINE

## 2021-03-18 PROCEDURE — 36415 COLL VENOUS BLD VENIPUNCTURE: CPT | Performed by: INTERNAL MEDICINE

## 2021-03-18 PROCEDURE — 80053 COMPREHEN METABOLIC PANEL: CPT | Performed by: INTERNAL MEDICINE

## 2021-03-18 PROCEDURE — 82607 VITAMIN B-12: CPT | Performed by: INTERNAL MEDICINE

## 2021-03-18 PROCEDURE — 99213 OFFICE O/P EST LOW 20 MIN: CPT | Performed by: INTERNAL MEDICINE

## 2021-03-18 ASSESSMENT — MIFFLIN-ST. JEOR: SCORE: 1993.13

## 2021-03-19 LAB
ALBUMIN SERPL-MCNC: 3.3 G/DL (ref 3.4–5)
ALP SERPL-CCNC: 57 U/L (ref 40–150)
ALT SERPL W P-5'-P-CCNC: 103 U/L (ref 0–70)
ANION GAP SERPL CALCULATED.3IONS-SCNC: 6 MMOL/L (ref 3–14)
AST SERPL W P-5'-P-CCNC: 53 U/L (ref 0–45)
BILIRUB SERPL-MCNC: 0.4 MG/DL (ref 0.2–1.3)
BUN SERPL-MCNC: 13 MG/DL (ref 7–30)
CALCIUM SERPL-MCNC: 8.8 MG/DL (ref 8.5–10.1)
CHLORIDE SERPL-SCNC: 109 MMOL/L (ref 94–109)
CO2 SERPL-SCNC: 27 MMOL/L (ref 20–32)
CREAT SERPL-MCNC: 0.8 MG/DL (ref 0.66–1.25)
GFR SERPL CREATININE-BSD FRML MDRD: >90 ML/MIN/{1.73_M2}
GLUCOSE SERPL-MCNC: 113 MG/DL (ref 70–99)
POTASSIUM SERPL-SCNC: 3.2 MMOL/L (ref 3.4–5.3)
PROT SERPL-MCNC: 6.2 G/DL (ref 6.8–8.8)
SODIUM SERPL-SCNC: 142 MMOL/L (ref 133–144)
VIT B12 SERPL-MCNC: 386 PG/ML (ref 193–986)

## 2021-04-01 ENCOUNTER — PATIENT OUTREACH (OUTPATIENT)
Dept: NURSING | Facility: CLINIC | Age: 36
End: 2021-04-01
Payer: COMMERCIAL

## 2021-04-01 ASSESSMENT — ACTIVITIES OF DAILY LIVING (ADL): DEPENDENT_IADLS:: INDEPENDENT

## 2021-04-01 NOTE — PROGRESS NOTES
Clinic Care Coordination Contact    Follow Up Progress Note      Assessment: LUZ MARINA SMITH received a voicemail from pt inquiring about COVID vaccines. LUZ MARINA SMITH returned pt's call. LUZ MARINA SMITH explained the eligibility at [facility] and discussed how to get a vaccine through [facility]. LUZ MARINA SMITH encouraged pt to visit [facility] website to use the Vaccine Finder tool. Pt stated understanding.    Pt shared that he is doing well. He had a very good PCP appointment on 3/18. He explained that he was deficient in B12 and now that he has started on a supplement he is feeling quite a bit better.    Pt shared that he plans to move to [place] with his parents at the end of this month. Pt is currently having a friend from treatment crash on his couch but he is being careful to not allow this person be there when pt is now. His friend is currently going through withdrawal but may be almost through it. LUZ MARINA SMITH encouraged pt to call an ambulance if he is concerned about this persons health and safety. Pt stated agreement.    Goals addressed this encounter:   Goals Addressed                 This Visit's Progress       Patient Stated      1. Mental Health Management (pt-stated)   20%     Goal Statement: Within the next 6 months, I would like to improve my overall health by decreasing my substance use and following up with medical appointments.   Date Goal set: 12/7/2020  Barriers: None  Strengths: Motivated to address health and substance use concerns  Date to Achieve By: 6/7/2021  Patient expressed understanding of goal: Иван verbally stated understanding of goal   Action steps to achieve this goal:  1. I will attend scheduled medical appointments  2. I will follow treatment recommendations  3. I will outreach to Care Coordination  for further questions or concerns         [facility] Benefits (pt-stated)   On Hold     Goal Statement: I will apply for [facility] benefits within the next 2 weeks   Date goal set: 1/27/2021  Date to Achieve By: 3/1/2021  Action steps  to achieve this goal:  1. I will be available for the phone appointment with my Financial Resource Worker 2/1/21 at 10:00 am.  2. I will connect with my Financial Resource Worker, Alanis ROGEL, at [phone] if needed.            Outreach Frequency: monthly    Plan: LUZ MARINA SMITH Will outreach to pt next month to discuss his move and any resources he may need.    Abimbola Cross, [facility]  Clinic Care Coordinator  [facility] Cathie [place]  [facility]  [phone]  [email or web address]

## 2021-04-09 ENCOUNTER — VIRTUAL VISIT (OUTPATIENT)
Dept: PSYCHOLOGY | Facility: CLINIC | Age: 36
End: 2021-04-09
Payer: COMMERCIAL

## 2021-04-09 DIAGNOSIS — F10.10 ALCOHOL ABUSE, EPISODIC DRINKING BEHAVIOR: Primary | ICD-10-CM

## 2021-04-09 DIAGNOSIS — F33.2 SEVERE EPISODE OF RECURRENT MAJOR DEPRESSIVE DISORDER, WITHOUT PSYCHOTIC FEATURES (H): ICD-10-CM

## 2021-04-09 DIAGNOSIS — F41.1 GAD (GENERALIZED ANXIETY DISORDER): ICD-10-CM

## 2021-04-09 DIAGNOSIS — F43.10 POSTTRAUMATIC STRESS DISORDER: ICD-10-CM

## 2021-04-09 PROCEDURE — 90834 PSYTX W PT 45 MINUTES: CPT | Mod: 95 | Performed by: SOCIAL WORKER
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
## 2021-05-06 ENCOUNTER — PATIENT OUTREACH (OUTPATIENT)
Dept: CARE COORDINATION | Facility: CLINIC | Age: 36
End: 2021-05-06

## 2021-05-06 NOTE — PROGRESS NOTES
Clinic Care Coordination Contact  [facility]/Voicemail       Clinical Data: Care Coordinator Outreach    Outreach attempted x 1.  Left message on patient's voicemail with call back information and requested return call.    Plan: Care Coordinator will try to reach patient again in 3-5 business days.    Abimbola Cross [facility]  Clinic Care Coordinator  [facility] Cathie [place]  [facility]  [phone]  [email or web address]

## 2021-05-14 ENCOUNTER — PATIENT OUTREACH (OUTPATIENT)
Dept: NURSING | Facility: CLINIC | Age: 36
End: 2021-05-14
Payer: COMMERCIAL
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
## 2021-05-24 ASSESSMENT — ANXIETY QUESTIONNAIRES
7. FEELING AFRAID AS IF SOMETHING AWFUL MIGHT HAPPEN: MORE THAN HALF THE DAYS
GAD7 TOTAL SCORE: 17
4. TROUBLE RELAXING: MORE THAN HALF THE DAYS
7. FEELING AFRAID AS IF SOMETHING AWFUL MIGHT HAPPEN: MORE THAN HALF THE DAYS
1. FEELING NERVOUS, ANXIOUS, OR ON EDGE: NEARLY EVERY DAY
6. BECOMING EASILY ANNOYED OR IRRITABLE: NEARLY EVERY DAY
GAD7 TOTAL SCORE: 17
3. WORRYING TOO MUCH ABOUT DIFFERENT THINGS: MORE THAN HALF THE DAYS
2. NOT BEING ABLE TO STOP OR CONTROL WORRYING: MORE THAN HALF THE DAYS
5. BEING SO RESTLESS THAT IT IS HARD TO SIT STILL: NEARLY EVERY DAY
GAD7 TOTAL SCORE: 17

## 2021-05-24 ASSESSMENT — PATIENT HEALTH QUESTIONNAIRE - PHQ9
10. IF YOU CHECKED OFF ANY PROBLEMS, HOW DIFFICULT HAVE THESE PROBLEMS MADE IT FOR YOU TO DO YOUR WORK, TAKE CARE OF THINGS AT HOME, OR GET ALONG WITH OTHER PEOPLE: VERY DIFFICULT
SUM OF ALL RESPONSES TO PHQ QUESTIONS 1-9: 20
SUM OF ALL RESPONSES TO PHQ QUESTIONS 1-9: 20

## 2021-05-25 ENCOUNTER — VIRTUAL VISIT (OUTPATIENT)
Dept: PSYCHOLOGY | Facility: CLINIC | Age: 36
End: 2021-05-25
Payer: COMMERCIAL

## 2021-05-25 DIAGNOSIS — Z13.39 ATTENTION DEFICIT HYPERACTIVITY DISORDER (ADHD) EVALUATION: ICD-10-CM

## 2021-05-25 DIAGNOSIS — F10.10 ALCOHOL ABUSE, EPISODIC DRINKING BEHAVIOR: ICD-10-CM

## 2021-05-25 DIAGNOSIS — F33.2 SEVERE EPISODE OF RECURRENT MAJOR DEPRESSIVE DISORDER, WITHOUT PSYCHOTIC FEATURES (H): Primary | ICD-10-CM

## 2021-05-25 DIAGNOSIS — F43.10 POSTTRAUMATIC STRESS DISORDER: ICD-10-CM

## 2021-05-25 PROCEDURE — 90834 PSYTX W PT 45 MINUTES: CPT | Mod: 95 | Performed by: PSYCHOLOGIST

## 2021-05-25 ASSESSMENT — PATIENT HEALTH QUESTIONNAIRE - PHQ9: SUM OF ALL RESPONSES TO PHQ QUESTIONS 1-9: 20

## 2021-05-25 ASSESSMENT — ANXIETY QUESTIONNAIRES: GAD7 TOTAL SCORE: 17

## 2021-05-25 NOTE — PROGRESS NOTES
Progress Note - Initial Visit    Client Name:  Brandin Rodriguez Date: 2021         Service Type: Individual     Visit Start Time: 905  Visit End Time:     Visit #: 1    Attendees: Client    Service Modality:  Video Visit:      Provider verified identity through the following two step process.  Patient provided:  Patient     Telemedicine Visit: The patient's condition can be safely assessed and treated via synchronous audio and visual telemedicine encounter.      Reason for Telemedicine Visit: Services only offered telehealth    Originating Site (Patient Location): Patient's home    Distant Site (Provider Location): Provider Remote Setting    Consent:  The patient/guardian has verbally consented to: the potential risks and benefits of telemedicine (video visit) versus in person care; bill my insurance or make self-payment for services provided; and responsibility for payment of non-covered services.     Patient would like the video invitation sent by:  My Chart    Mode of Communication:  Video Conference via [employer]well    As the provider I attest to compliance with applicable laws and regulations related to telemedicine.       DATA:   Interactive Complexity: No   Crisis: No     Presenting Concerns/  Current Stressors:   ADHD Evaluation    ASSESSMENT:  Mental Status Assessment:  Appearance:   Disheveled   Eye Contact:   Fair   Psychomotor Behavior: Normal   Attitude:   Cooperative   Orientation:   All  Speech   Rate / Production: Normal/ Responsive   Volume:  Normal   Mood:    Anxious  Dysphoric  Affect:    Restricted   Thought Content:  Clear   Thought Form:  Goal Directed  Logical   Insight:    Fair     Answers for HPI/ROS submitted by the patient on 2021   If you checked off any problems, how difficult have these problems made it for you to do your work, take care of things at home, or get along with other people?: Very difficult  PHQ9 TOTAL SCORE: 20  NEREYDA 7 TOTAL SCORE: 17    Safety  Issues and Plan for Safety and Risk Management:     Nicholas Suicide Severity Rating Scale (Short Version)  Nicholas Suicide Severity Rating (Short Version) 11/13/2019 1/12/2020 3/11/2020 9/3/2020 12/2/2020 1/11/2021 5/25/2021   Over the past 2 weeks have you felt down, depressed, or hopeless? yes yes no no no yes yes   Comments - - - - - - -   Over the past 2 weeks have you had thoughts of killing yourself? no yes no no no no yes   Have you ever attempted to kill yourself? no no no no no yes no   When did this last happen? - - - - - more than 6 months ago -   Q1 Wished to be Dead (Past Month) - yes - no - - yes   Q2 Suicidal Thoughts (Past Month) - no - no - - no   Q3 Suicidal Thought Method - - - no - - no   Q4 Suicidal Intent without Specific Plan - - - no - - no   Q5 Suicide Intent with Specific Plan - - - no - - no   Q6 Suicide Behavior (Lifetime) - - - no - - no   Interventions - - - - - - -     Patient denies current fears or concerns for personal safety.  Patient reports the following current or recent suicidal ideation or behaviors: passive SI.  Patient denies current or recent homicidal ideation or behaviors.  Patient denies current or recent self injurious behavior or ideation.  Patient denies other safety concerns.  Recommended that patient call 911 or go to the local ED should there be a change in any of these risk factors.  Patient reports there are no firearms in the house.     Diagnostic Criteria:  PTSD  MDD  Alcohol Use  Rule Out ADHD    WHODAS 2.0 (12 item):   WHODAS 2.0 Total Score 12/3/2019 5/24/2021   Total Score 41 -   Total Score MyChart - 49   Some encounter information is confidential and restricted. Go to Review Flowsheets activity to see all data.     Intervention:   During today's session, this writer outlined the expectations and purpose of the ADHD Evaluation including Notice of Billing. Mr. Rodriguez discussed his reason for referral and symptoms. This writer reviewed Mr. Rodriguez's PHQ-9  and NEREYDA-7 scores as well as risk. This writer used the Adult ADHD Evaluation Form to guide the clinical interview; it was not finished. He was scheduled for a second session and a third may be needed.   Collateral Reports Completed:  Routed note to PCP      PLAN: (Homework, other):  1. Finish DA  2. Find and/or attend AA meeting      Krystyna Roberts, PhD LP  May 25, 2021

## 2021-05-26 DIAGNOSIS — F10.930 ALCOHOL WITHDRAWAL SYNDROME WITHOUT COMPLICATION (H): ICD-10-CM

## 2021-05-26 RX ORDER — PROPRANOLOL HYDROCHLORIDE 20 MG/1
TABLET ORAL
Qty: 30 TABLET | Refills: 1 | Status: ON HOLD | OUTPATIENT
Start: 2021-05-26 | End: 2021-11-18

## 2021-05-26 NOTE — TELEPHONE ENCOUNTER
Pt has not been seen by an Addiction medicine provider since 11/25/19.     Will route refill request to PCP.

## 2021-05-27 DIAGNOSIS — F41.1 GAD (GENERALIZED ANXIETY DISORDER): ICD-10-CM

## 2021-05-27 RX ORDER — HYDROXYZINE HYDROCHLORIDE 50 MG/1
50 TABLET, FILM COATED ORAL EVERY 4 HOURS PRN
Qty: 90 TABLET | Refills: 1 | Status: SHIPPED | OUTPATIENT
Start: 2021-05-27 | End: 2023-06-04

## 2021-05-28 ENCOUNTER — PATIENT OUTREACH (OUTPATIENT)
Dept: CARE COORDINATION | Facility: CLINIC | Age: 36
End: 2021-05-28

## 2021-05-28 NOTE — PROGRESS NOTES
Clinic Care Coordination Contact  [facility]/Voicemail       Clinical Data: Care Coordinator Outreach    Outreach attempted x 1.  Left message on patient's voicemail with call back information and requested return call.    Plan: Care Coordinator will try to reach patient again in 3-5 business days.    Abimbola Cross [facility]  Clinic Care Coordinator  [facility] Cathie Magoffin  [facility]  [phone]  [email or web address]

## 2021-06-01 ENCOUNTER — PATIENT OUTREACH (OUTPATIENT)
Dept: NURSING | Facility: CLINIC | Age: 36
End: 2021-06-01
Payer: COMMERCIAL

## 2021-06-01 NOTE — PROGRESS NOTES
Clinic Care Coordination Contact    Follow Up Progress Note      Assessment: LUZ MARINA SMITH spoke with pt regarding his overall wellbeing. He shared that he is doing pretty well. He has been slowly moving things to [place] as he plans to move there as soon as he is able. He was told not to give his keys to his landlord until his application for rent support went through.     Pt shared that he has been working in his garden and this has been very helpful for him.     Pt shared that he missed his ADHD assessment today. He attempted to call back but he thinks the phone number may have been wrong. LUZ MARINA MSITH provided correct phone number, he will reschedule.    Discuss therapy. Pt made an appointment with [facility] for July. LUZ MARINA SMITH shared resource for [facility] for therapeutic support until his appointment with [facility].    Goals addressed this encounter:   Goals Addressed                 This Visit's Progress       Patient Stated      1. Mental Health Management (pt-stated)   20%     Goal Statement: Within the next 6 months, I would like to improve my overall health by decreasing my substance use and following up with medical appointments.   Date Goal set: 12/7/2020  Barriers: None  Strengths: Motivated to address health and substance use concerns  Date to Achieve By: 6/7/2021  Patient expressed understanding of goal: Иван verbally stated understanding of goal   Action steps to achieve this goal:  1. I will attend scheduled medical appointments  2. I will follow treatment recommendations  3. I will outreach to Care Coordination  for further questions or concerns         Outreach Frequency: monthly    Plan: LUZ MARINA SMITH will outreach to pt in 1 month to discuss his overall wellbeing.    Abimbola Cross [facility]  Clinic Care Coordinator  [facility] Cathie [place]  [facility]  [phone]  [email or web address]

## 2021-06-02 ENCOUNTER — OFFICE VISIT (OUTPATIENT)
Dept: FAMILY MEDICINE | Facility: CLINIC | Age: 36
End: 2021-06-02
Payer: COMMERCIAL

## 2021-06-02 VITALS
SYSTOLIC BLOOD PRESSURE: 120 MMHG | HEART RATE: 102 BPM | OXYGEN SATURATION: 96 % | BODY MASS INDEX: 26.77 KG/M2 | WEIGHT: 208.6 LBS | HEIGHT: 74 IN | DIASTOLIC BLOOD PRESSURE: 76 MMHG | TEMPERATURE: 98.1 F

## 2021-06-02 DIAGNOSIS — M54.50 LEFT-SIDED LOW BACK PAIN WITHOUT SCIATICA, UNSPECIFIED CHRONICITY: Primary | ICD-10-CM

## 2021-06-02 PROCEDURE — 99214 OFFICE O/P EST MOD 30 MIN: CPT | Performed by: NURSE PRACTITIONER

## 2021-06-02 ASSESSMENT — MIFFLIN-ST. JEOR: SCORE: 1945.95

## 2021-06-02 NOTE — PATIENT INSTRUCTIONS
Salonpas pain patch or Icy hot patches   Voltaren gel is an antiinflammatory topical treatment.     Follow up with physical therapy     You will get a call from the pain management center       Take Tylenol, Ibuprofen or Aleve for pain.   Ibuprofen and Aleve are both antiinflammatories so you should never take these together during the same 24 hour period.  If you take a high dose of Ibuprofen, do not take it consistently for more than 5 days   Tylenol only helps with pain and does not help with inflammation. Tylenol is the safest option if you have kidney or heart history.  You have to take at least 600mg of ibuprofen to get the antiinflammatory affect. 200-400mg of Ibuprofen will only help with pain.  It is ok to take Tylenol with Ibuprofen or Aleve  Do not take more than:  Tylenol (acetaminophen)  1000 mg 3 times a day  Ibuprofen (Advil/Motrin) 600 4 times a day or 800 mg 3 times a day WITH FOOD  Aleve 220mg 2 pills twice a day WITH FOOD

## 2021-06-02 NOTE — PROGRESS NOTES
"    Assessment & Plan   Problem List Items Addressed This Visit     None      Visit Diagnoses     Left-sided low back pain without sciatica, unspecified chronicity    -  Primary    Relevant Medications    diclofenac (VOLTAREN) 1 % topical gel    Other Relevant Orders    [facility] REFERRAL    PAIN MANAGEMENT REFERRAL           Persistent L LBP that has worsened the last couple months. He has chronic nodules which could be episacral lipomas, but these nodules are not tender. There is also a large musculoskeletal component to his symptoms as certain movements and sitting or lying too long cause a lot more pain. He gets a feeling like circulation is being cut of in his upper legs if he sits too long, which could be a sign of stenosis though his pain pattern isn't specific to stenosis. Considering this difficult case in the setting of recent alcohol treatment, will request input from pain management as well as send pt to physical therapy. He can try topical tx such as salonpas or icy hot. We discussed proper tylenol and ibuprofen dosing. He will be moving to [facility] in about 1 month and plans to establish there when the move happens       OBDULIA Holguin CNP  [facility] is a 35 year old who presents for the following health issues     HPI     Cyst on back that has been present for years but has recently gotten more painful    Pain has gotten a lot worse the last few months of left low back    Has a shooting pain where the nodules are--posterior iliac crest   Sitting too long makes the pain severe   Really hard getting out of bed   Tried heat without much improvement   NO abd pain, changes of b/b   Pain doesn't radiate into legs   No recent injury   Sitting in the car he feels like something gets \"cut off\" to the legs. Can't drive more than an hour without those symptoms     Is recently sober from alcohol. Had a relapse in April after he found a friend dead inside " "the pts apartment. Is not interested in pills today and wants to stay sober     Review of Systems   Detailed as above         Objective    /76 (BP Location: Left arm, Patient Position: Sitting, Cuff Size: Adult Regular)   Pulse 102   Temp 98.1  F (36.7  C) (Temporal)   Ht 1.88 m (6' 2\")   Wt 94.6 kg (208 lb 9.6 oz)   SpO2 96%   BMI 26.78 kg/m    Body mass index is 26.78 kg/m .  Physical Exam  Constitutional:       Appearance: Normal appearance.      Comments: Appears uncomfortable   Pulmonary:      Effort: Pulmonary effort is normal.   Musculoskeletal:      Comments: 2 small deep nodules overlying left posterior iliac crest that is not tender.   No lumbar tenderness   Skin:     General: Skin is warm and dry.      Findings: No erythema or rash.   Neurological:      Mental Status: He is alert.      Comments: Normal SLR    Psychiatric:         Mood and Affect: Mood normal.                "

## 2021-06-03 ENCOUNTER — TELEPHONE (OUTPATIENT)
Dept: PALLIATIVE MEDICINE | Facility: CLINIC | Age: 36
End: 2021-06-03

## 2021-06-03 NOTE — TELEPHONE ENCOUNTER
Ok to schedule. He wants to avoid pills.    He will be moving to [place] soon.  Consider using RAQUEL Andrea MD  [facility]

## 2021-06-03 NOTE — TELEPHONE ENCOUNTER
Order received for a New Evaluation.    Are there any red flags that may impact the assessment or management of the patient?   Active or recent alcohol, drug or prescription medication misuse - use comments        Routing to review.    Maria Alejandra MEDINA    [facility]

## 2021-06-04 NOTE — TELEPHONE ENCOUNTER

## 2021-06-16 ENCOUNTER — THERAPY VISIT (OUTPATIENT)
Dept: PHYSICAL THERAPY | Facility: CLINIC | Age: 36
End: 2021-06-16
Payer: COMMERCIAL

## 2021-06-16 DIAGNOSIS — G89.29 CHRONIC LEFT-SIDED LOW BACK PAIN WITHOUT SCIATICA: ICD-10-CM

## 2021-06-16 DIAGNOSIS — M54.50 LEFT-SIDED LOW BACK PAIN WITHOUT SCIATICA, UNSPECIFIED CHRONICITY: ICD-10-CM

## 2021-06-16 DIAGNOSIS — M54.50 CHRONIC LEFT-SIDED LOW BACK PAIN WITHOUT SCIATICA: ICD-10-CM

## 2021-06-16 PROCEDURE — 97110 THERAPEUTIC EXERCISES: CPT | Mod: GP | Performed by: PHYSICAL THERAPIST

## 2021-06-16 PROCEDURE — 97161 PT EVAL LOW COMPLEX 20 MIN: CPT | Mod: GP | Performed by: PHYSICAL THERAPIST

## 2021-06-16 PROCEDURE — 97530 THERAPEUTIC ACTIVITIES: CPT | Mod: GP | Performed by: PHYSICAL THERAPIST

## 2021-06-16 NOTE — PROGRESS NOTES
Physical Therapy Initial Evaluation    Physical Therapy Initial Examination/Evaluation  June 16, 2021    Brandin Rodriguez  is a 35 year old  male referred to physical therapy by Ariel Juarez CNP for treatment of LBP.      DOI/onset 4-16-21  Mechanism of injury Patient has had a gradual onset of LBP over the past year w/out incident. The pain has worsened in recent months prompting patient to seek help.  DOS n/a  Prior treatment none.     Chief Complaint:   Increasing LBP.   Pain location: L LS area  Quality: sharp  Constant/Intermittent: intermittent  Time of day: no time pattern  Symptoms have worsened since onset.    Current pain 3/10.  Pain at best 2/10.  Pain at worst 6/10.    Symptoms aggravated by prolonged sitting/driving, transfers in/out of bed.    Symptoms improved with walking.     Social history:  Will be moving to back to [facility] soon.    Occupation: none.      Patient having difficulty with ADLs: driving.    Patient's goals are to reduce LBP.    Patient reports general health as fair.  Related medical history no specific hx of LBP.    Surgical History:  none.    Imaging: none.    Medications:  none.       Return to MD:  As needed.      Clinical Impression: Patient will hopefully respond well to continued PT intervention including manual therapy and therapeutic exercise.    Subjective:    Patient Health History  Brandin Rodriguez being seen for Lower left back pain.       Problem occurred: It's been getting worse for years and I think it's a nerve being pinched   Pain is reported as 3/10 on pain scale.  General health as reported by patient is fair.  Pertinent medical history includes: asthma, depression and smoking.     Medical allergies: other. Other medical allergies details: Amoxicillin and Bactrim.   Surgeries include:  None.        Current occupation is None.   Primary job tasks include:  Driving, lifting/carrying, prolonged standing, pushing/pulling and repetitive tasks.                                     Objective:  Standing Alignment:        Lumbar:  Lordosis decr  Pelvic:  Iliac crest high L          Gait:    Gait Type:  Normal         Flexibility/Screens:   Positive screens:  Lumbar    Lower Extremity:  Decreased left lower extremity flexibility:Hamstrings    Decreased right lower extremity flexibility:  Hamstrings  Spine:  Decreased left spine flexibility:  Quadratus Lumborum               Lumbar/SI Evaluation  ROM:    AROM Lumbar:   Flexion:            90%  Ext:                    90%   Side Bend:        Left:  Normal    Right:  Normal  Rotation:           Left:  Normal    Right:  90%  Side Glide:        Left:     Right:         Strength: lower abdominals 4-/5; lumbar extensors 4-/5  Lumbar Myotomes:  normal                Lumbar Dermtomes:  normal                Neural Tension/Mobility:      Left side:SLR or Slump  negative.     Right side:   Slump or SLR  negative.   Lumbar Palpation:    Tenderness present at Left:    Quadratus Lumborum and Erector Spinae        Spinal Segmental Conclusions: Pain with P/A glide L4-5    Level: Hypo noted at L4, L5 and S1                                                   General     ROS    Assessment/Plan:    Patient is a 35 year old male with lumbar complaints.    Patient has the following significant findings with corresponding treatment plan.                Diagnosis 1:  LBP  Pain -  manual therapy, self management and education  Decreased ROM/flexibility - manual therapy and therapeutic exercise  Decreased strength - therapeutic exercise and therapeutic activities  Impaired muscle performance - neuro re-education  Decreased function - therapeutic activities    Therapy Evaluation Codes:   1) History comprised of:   Personal factors that impact the plan of care:      None.    Comorbidity factors that impact the plan of care are:      None.     Medications impacting care: None.  2) Examination of Body Systems comprised of:   Body structures and functions that impact  the plan of care:      Lumbar spine.   Activity limitations that impact the plan of care are:      Driving and transfers.  3) Clinical presentation characteristics are:   Stable/Uncomplicated.  4) Decision-Making    Low complexity using standardized patient assessment instrument and/or measureable assessment of functional outcome.  Cumulative Therapy Evaluation is: Low complexity.    Previous and current functional limitations:  (See Goal Flow Sheet for this information)    Short term and Long term goals: (See Goal Flow Sheet for this information)     Communication ability:  Patient appears to be able to clearly communicate and understand verbal and written communication and follow directions correctly.  Treatment Explanation - The following has been discussed with the patient:   RX ordered/plan of care  Anticipated outcomes  Possible risks and side effects  This patient would benefit from PT intervention to resume normal activities.   Rehab potential is fair.    Frequency:  1 X week, once daily  Duration:  for 6 weeks  Discharge Plan:  Achieve all LTG.  Independent in home treatment program.  Reach maximal therapeutic benefit.    Please refer to the daily flowsheet for treatment today, total treatment time and time spent performing 1:1 timed codes.

## 2021-06-22 ENCOUNTER — VIRTUAL VISIT (OUTPATIENT)
Dept: PSYCHOLOGY | Facility: CLINIC | Age: 36
End: 2021-06-22
Payer: COMMERCIAL

## 2021-06-22 DIAGNOSIS — F10.21 ALCOHOL USE DISORDER, SEVERE, IN EARLY REMISSION (H): ICD-10-CM

## 2021-06-22 DIAGNOSIS — F43.10 POSTTRAUMATIC STRESS DISORDER: Primary | ICD-10-CM

## 2021-06-22 DIAGNOSIS — F33.2 SEVERE EPISODE OF RECURRENT MAJOR DEPRESSIVE DISORDER, WITHOUT PSYCHOTIC FEATURES (H): ICD-10-CM

## 2021-06-22 DIAGNOSIS — Z13.39 ATTENTION DEFICIT HYPERACTIVITY DISORDER (ADHD) EVALUATION: ICD-10-CM

## 2021-06-22 PROCEDURE — 90837 PSYTX W PT 60 MINUTES: CPT | Mod: 95 | Performed by: PSYCHOLOGIST

## 2021-07-05 ENCOUNTER — PATIENT OUTREACH (OUTPATIENT)
Dept: NURSING | Facility: CLINIC | Age: 36
End: 2021-07-05
Payer: COMMERCIAL

## 2021-07-05 NOTE — PROGRESS NOTES
Clinic Care Coordination Contact    Follow Up Progress Note      Assessment: CC LUIS spoke with pt regarding his overall wellbeing. Pt shared that he is struggling with back pain. He doesn't want pain medication for this, therefore he didn't keep the appointment with pain clinic. Pt would like to work with PT first to see if this helps. He saw PT 1 time and needs to schedule follow up appointments. CC LUIS encouraged pt to do this.     Pt is working with Krystyna Roberts on ADHD assessment. He plans to explore a therapist to assist with his PTSD. He had an appointment for this month with [facility] but he later received a message that the therapist he made an appointment with is leaving [facility] and he needs to schedule with a different therapist. Pt has not yet done this. CC LUIS shared that Krystyna Roberts also sent information on a therapy agency that may be a good fit for pt. CC S Wencouraged pt to take a look at the [employer] message and call [facility] or [facility] to make an appointment.     Pt is hoping to move to [email or web address] at the end of this month but he is struggling to get rid of some of his stuff prior to his move. He is currently in [email or web address] working in his garden and this is very therapeutic for him. He is planning to move to [email or web address] and live with his parents while looking for a job or will go into treatment. He hasn't decided which will be best for him.    Goals addressed this encounter:   Goals Addressed                 This Visit's Progress       Patient Stated      1. Mental Health Management (pt-stated)   20%     Goal Statement: Within the next 6 months, I would like to improve my overall health by decreasing my substance use and following up with medical appointments.   Date Goal set: 12/7/2020  Barriers: None  Strengths: Motivated to address health and substance use concerns  Date to Achieve By: 6/7/2021  Patient expressed understanding of goal: Иван verbally stated understanding of goal   Action  steps to achieve this goal:  1. I will attend scheduled medical appointments  2. I will follow treatment recommendations  3. I will outreach to Care Coordination  for further questions or concerns         Outreach Frequency: monthly    Plan: CC LUIS will outreach to pt in 1 month to discuss overall wellbeing.    Abimbola Cross, [facility]  Clinic Care Coordinator  [facility] Cathie Dixon  [facility]  [phone]  [email or web address]

## 2021-07-09 ENCOUNTER — VIRTUAL VISIT (OUTPATIENT)
Dept: PSYCHOLOGY | Facility: CLINIC | Age: 36
End: 2021-07-09
Payer: COMMERCIAL

## 2021-07-09 DIAGNOSIS — Z13.39 ATTENTION DEFICIT HYPERACTIVITY DISORDER (ADHD) EVALUATION: ICD-10-CM

## 2021-07-09 DIAGNOSIS — F33.2 SEVERE EPISODE OF RECURRENT MAJOR DEPRESSIVE DISORDER, WITHOUT PSYCHOTIC FEATURES (H): ICD-10-CM

## 2021-07-09 DIAGNOSIS — F43.10 POSTTRAUMATIC STRESS DISORDER: Primary | ICD-10-CM

## 2021-07-09 DIAGNOSIS — F10.21 ALCOHOL USE DISORDER, SEVERE, IN EARLY REMISSION (H): ICD-10-CM

## 2021-07-09 PROCEDURE — 90791 PSYCH DIAGNOSTIC EVALUATION: CPT | Mod: 95 | Performed by: PSYCHOLOGIST
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
## 2021-08-02 ENCOUNTER — PATIENT OUTREACH (OUTPATIENT)
Dept: NURSING | Facility: CLINIC | Age: 36
End: 2021-08-02
Payer: COMMERCIAL

## 2021-08-02 ASSESSMENT — ACTIVITIES OF DAILY LIVING (ADL): DEPENDENT_IADLS:: INDEPENDENT

## 2021-08-02 NOTE — PROGRESS NOTES
Clinic Care Coordination Contact    Follow Up Progress Note      Assessment: LUZ MARINA SMITH spoke with pt regarding his overall wellbeing. Pt shared that his COVID-vaccinated parents attended a  out of state and contracted COVID while there. Pt at this time is still feeling ok but he has a COVID test today. He is living in the basement and spending a lot of time outside in the garden, he is hopeful that he hasn't been around them enough to get the virus. Pt is taking precautions to stay healthy.    Pt shared that overall he has been doing well since staying with his parents in [place]. He finds spending time in the garden to be very therapeutic. He has no concerns at this time.    Goals addressed this encounter:   Goals Addressed                    This Visit's Progress       Patient Stated       1. Mental Health Management (pt-stated)   20%      Goal Statement: Within the next 6 months, I would like to improve my overall health by decreasing my substance use and following up with medical appointments.   Date Goal set: 2020  Barriers: None  Strengths: Motivated to address health and substance use concerns  Date to Achieve By: 2021  Patient expressed understanding of goal: Иван verbally stated understanding of goal   Action steps to achieve this goal:  1. I will attend scheduled medical appointments  2. I will follow treatment recommendations  3. I will outreach to Care Coordination  for further questions or concerns         Outreach Frequency: monthly    Plan: LUZ MARINA SMITH will outreach to pt in 1 month to discuss his overall wellbeing.    Abimbola Cross, [facility]  Clinic Care Coordinator  [facility] [place]  [facility]  [phone]  [email or web address]

## 2021-08-02 NOTE — LETTER
[facility] CARE COORDINATION  [address] [address]    August 2, 2021        Brandin Rodriguez  [address] [place] [address]          Dear Dilma Ghotra is an updated Complex Care Plan for your continued enrollment in Care Coordination. Please let us know if you have additional questions, concerns, or goals that we can assist with.    Sincerely,    Abimbola Cross [facility]  Clinic Care Coordinator  [facility]  [phone]            [facility]  Complex Care Plan  About Me:    Patient Name:  Brandin Rodriguez    YOB: 1985  Age:         35 year old   Nara Visa MRN:    [id number] Telephone Information:  Home Phone [phone]   Mobile [phone]       Address:  [address] [place] [address] Email address:  [email or web address]      Emergency Contact(s)    Name Relationship Lgl Lamonte Work Phone Home Phone Mobile Phone   HARDEEP PEREZ Mother   [phone]         Health Maintenance  Health Maintenance Reviewed:      My Access Plan  Medical Emergency 911   Primary Clinic Line [facility] - [phone]   24 Hour Appointment Line [phone] or  [phone] ([phone]) (toll-free)   24 Hour Nurse Line [phone] (toll-free)   Preferred Urgent Care [facility], [phone]   Preferred Hospital [facility]  [phone]   Preferred [facility]acy [id number]  [place] - [address] AT [address] [place] [address]     Behavioral Health Crisis Line The National Suicide Prevention Lifeline at 1-579.568.6070 or 911           My Care Team Members  Patient Care Team       Relationship Specialty Notifications Start End    Gerber Powell MD PCP - General Internal Medicine  12/9/20     Phone: [phone] Fax: [phone] [address] [place] [address]    Briseyda Cross LGSW Lead Care Coordinator Primary Care - CC   12/7/20     Gerber Powell MD Assigned PCP   1/10/21     Phone: [phone] Fax: [phone] [address] [address]            My Care Plans  Self Management and Treatment Plan  Goals and (Comments)  Goals        General     1. Mental Health Management (pt-stated)      Notes - Note edited  12/8/2020  9:52 AM by Briseyda Cross LGSW     Goal Statement: Within the next 6 months, I would like to improve my overall health by decreasing my substance use and following up with medical appointments.   Date Goal set: 12/7/2020  Barriers: None  Strengths: Motivated to address health and substance use concerns  Date to Achieve By: 6/7/2021  Patient expressed understanding of goal: Иван verbally stated understanding of goal   Action steps to achieve this goal:  1. I will attend scheduled medical appointments  2. I will follow treatment recommendations  3. I will outreach to Care Coordination  for further questions or concerns                 My Medical and Care Information  Problem List   Patient Active Problem List   Diagnosis     Hematemesis     NEREYDA (generalized anxiety disorder)     Severe episode of recurrent major depressive disorder, without psychotic features (H)     Hepatitis     Liver disease, chronic, due to alcohol (H)     Suicidal ideation     Alcoholic intoxication without complication (H)     Uncomplicated alcohol dependence (H)     Complicated grief     Dysphagia     Elevated LFTs     Testicular pain     Tobacco use disorder     PTSD (post-traumatic stress disorder)     Current severe episode of major depressive disorder without psychotic features, unspecified whether recurrent (H)     Sinus tachycardia     Alcohol withdrawal (H)     Dehydration     Neuropathy     Nausea and vomiting, intractability of vomiting not specified, unspecified vomiting type     Chronic left-sided low back pain without sciatica        Care Coordination Start Date: 12/7/2020   Frequency of  Care Coordination: monthly   Form Last Updated: 08/02/2021

## 2021-08-17 ENCOUNTER — NURSE TRIAGE (OUTPATIENT)
Dept: NURSING | Facility: CLINIC | Age: 36
End: 2021-08-17

## 2021-08-17 NOTE — TELEPHONE ENCOUNTER
Triage note:  Patient called to report fever and shortness of breath. Patient states he has asthma and has hard time breathing when walking or exerting energy. Patient states he has to take a breathe after a phrase. Patient denies weakness when standing. Patient reports temperature reading of 99.2 degrees. Patient was exposed to covid-19 during his stay with his parents in [place]. Patient was tested twice for covid 19 and lab result was Not detected. Patient cough has       Per protocol patient to go to ED now.Given care advice per protocol and when to call back.Patient verbalized understanding and agrees to plan of care.    Nguyen Jackson RN   08/17/21 2:43 AM  [facility] Nurse Advisor  COVID 19 Nurse Triage Plan/Patient Instructions    Please be aware that novel coronavirus (COVID-19) may be circulating in the community. If you develop symptoms such as fever, cough, or SOB or if you have concerns about the presence of another infection including coronavirus (COVID-19), please contact your health care provider or visit [email or web address].     Disposition/Instructions    ED Visit recommended. Follow protocol based instructions.     Bring Your Own Device:  Please also bring your smart device(s) (smart phones, tablets, laptops) and their charging cables for your personal use and to communicate with your care team during your visit.    Thank you for taking steps to prevent the spread of this virus.  o Limit your contact with others.  o Wear a simple mask to cover your cough.  o Wash your hands well and often.    Resources    [facility]: About COVID-19: [email or web address]    CDC: What to Do If You're Sick: www.cdc.gov/coronavirus/2019-ncov/about/steps-when-sick.html    CDC: Ending Home Isolation: www.cdc.gov/coronavirus/2019-ncov/hcp/disposition-in-home-patients.html     CDC: Caring for Someone: www.cdc.gov/coronavirus/2019-ncov/if-you-are-sick/care-for-someone.html     SHANKAR:  Interim Guidance for Hospital Discharge to Home: www.health.[facility].[place]/diseases/coronavirus/hcp/hospdischarge.pdf    [facility] clinical trials (COVID-19 research studies): clinicalaffairs.[facility]/umn-clinical-trials     Below are the COVID-19 hotlines at the [place] Department of Health ([facility]). Interpreters are available.   o For health questions: Call 264-272-4087 or 1-975.389.1253 (7 a.m. to 7 p.m.)  o For questions about schools and childcare: Call 585-570-0394 or 1-908.780.4611 (7 a.m. to 7 p.m.)                       Reason for Disposition    MODERATE difficulty breathing (e.g., speaks in phrases, SOB even at rest, pulse 100-120)    Additional Information    Negative: SEVERE difficulty breathing (e.g., struggling for each breath, speaks in single words)    Negative: Difficult to awaken or acting confused (e.g., disoriented, slurred speech)    Negative: Bluish (or gray) lips or face now    Negative: Shock suspected (e.g., cold/pale/clammy skin, too weak to stand, low BP, rapid pulse)    Negative: Sounds like a life-threatening emergency to the triager    Negative: [1] COVID-19 exposure AND [2] has not completed COVID-19 vaccine series AND [3] no symptoms    Negative: [1] COVID-19 exposure AND [2] completed COVID-19 vaccine series (fully vaccinated) AND [3] no symptoms    Negative: COVID-19 vaccine reaction suspected (e.g., fever, headache, muscle aches) occurring during days 1-3 after getting vaccine    Negative: COVID-19 vaccine, questions about    Negative: [1] COVID-19 vaccine series completed (fully vaccinated) in past 3 months AND [2] new-onset of COVID-19 symptoms BUT [3] no known exposure    Negative: [1] Had lab test confirmed COVID-19 infection within last 3 monthsAND [2] new-onset of COVID-19 symptoms BUT [3] no known exposure    Negative: [1] Lives with someone known to have influenza (flu test positive) AND [2] flu-like symptoms (e.g., cough, runny nose, sore throat, SOB; with or  without fever)    Negative: [1] Adult with possible COVID-19 symptoms AND [2] triager concerned about severity of symptoms or other causes    Negative: COVID-19 and breastfeeding, questions about    Negative: SEVERE or constant chest pain or pressure (Exception: mild central chest pain, present only when coughing)    Protocols used: CORONAVIRUS (COVID-19) DIAGNOSED OR [id number]-S-AJ 3.25

## 2021-08-18 ENCOUNTER — E-VISIT (OUTPATIENT)
Dept: FAMILY MEDICINE | Facility: CLINIC | Age: 36
End: 2021-08-18
Payer: COMMERCIAL

## 2021-08-18 DIAGNOSIS — Z20.822 SUSPECTED COVID-19 VIRUS INFECTION: Primary | ICD-10-CM

## 2021-08-18 PROCEDURE — 99421 OL DIG E/M SVC 5-10 MIN: CPT | Performed by: NURSE PRACTITIONER

## 2021-08-18 NOTE — PATIENT INSTRUCTIONS
Dear Brandin Rodriguez,    Your symptoms show that you may have coronavirus (COVID-19). This illness can cause fever, cough and trouble breathing. Many people get a mild case and get better on their own. Some people can get very sick.    Will I be tested for COVID-19?  We would like to test you for Covid-19 virus. I have placed orders for this test.     To schedule: go to your [employer] home page and scroll down to the section that says  You have an appointment that needs to be scheduled  and click the large green button that says  Schedule Now  and follow the steps to find the next available openings.    If you are unable to complete these [employer] scheduling steps, please call [phone] to schedule your testing.     Return to work/school/ guidance:  Please let your workplace manager and staffing office know when your quarantine ends     We can t give you an exact date as it depends on the above. You can calculate this on your own or work with your manager/staffing office to calculate this. (For example if you were exposed on 10/4, you would have to quarantine for 14 full days. That would be through 10/18. You could return on 10/19.)      If you receive a positive COVID-19 test result, follow the guidance of the those who are giving you the results. Usually the return to work is 10 (or in some cases 20 days from symptom onset.) If you work at [employer], you must also be cleared by Employee Occupational Health and Safety to return to work.        If you receive a negative COVID-19 test result and did not have a high risk exposure to someone with a known positive COVID-19 test, you can return to work once you're free of fever for 24 hours without fever-reducing medication and your symptoms are improving or resolved.      If you receive a negative COVID-19 test and If you had a high risk exposure to someone who has tested positive for COVID-19 then you can return to work 14 days after your last contact  with the positive individual    Note: If you have ongoing exposure to the covid positive person, this quarantine period may be more than 14 days. (For example, if you are continued to be exposed to your child who tested positive and cannot isolate from them, then the quarantine of 7-14 days can't start until your child is no longer contagious. This is typically 10 days from onset of the child's symptoms. So the total duration may be 17-24 days in this case.)    Sign up for [facility].   We know it's scary to hear that you might have COVID-19. We want to track your symptoms to make sure you're okay over the next 2 weeks. Please look for an email from [facility]--this is a free, online program that we'll use to keep in touch. To sign up, follow the link in the email you will receive. Learn more at [email or web address]    How can I take care of myself?    Get lots of rest. Drink extra fluids (unless a doctor has told you not to)    Take Tylenol (acetaminophen) or ibuprofen for fever or pain. If you have liver or kidney problems, ask your family doctor if it's okay to take Tylenol o ibuprofen    If you have other health problems (like cancer, heart failure, an organ transplant or severe kidney disease): Call your specialty clinic if you don't feel better in the next 2 days.    Know when to call 911. Emergency warning signs include:  o Trouble breathing or shortness of breath  o Pain or pressure in the chest that doesn't go away  o Feeling confused like you haven't felt before, or not being able to wake up  o Bluish-colored lips or face    Where can I get more information?  [facility] - About COVID-19:   [email or web address]    CDC - What to Do If You're Sick:   www.cdc.gov/coronavirus/2019-ncov/about/steps-when-sick.html

## 2021-08-19 ENCOUNTER — LAB (OUTPATIENT)
Dept: FAMILY MEDICINE | Facility: CLINIC | Age: 36
End: 2021-08-19
Attending: NURSE PRACTITIONER
Payer: COMMERCIAL

## 2021-08-19 DIAGNOSIS — Z20.822 SUSPECTED COVID-19 VIRUS INFECTION: ICD-10-CM

## 2021-08-19 PROCEDURE — U0003 INFECTIOUS AGENT DETECTION BY NUCLEIC ACID (DNA OR RNA); SEVERE ACUTE RESPIRATORY SYNDROME CORONAVIRUS 2 (SARS-COV-2) (CORONAVIRUS DISEASE [COVID-19]), AMPLIFIED PROBE TECHNIQUE, MAKING USE OF HIGH THROUGHPUT TECHNOLOGIES AS DESCRIBED BY CMS-2020-01-R: HCPCS

## 2021-08-19 PROCEDURE — U0005 INFEC AGEN DETEC AMPLI PROBE: HCPCS

## 2021-08-20 LAB — SARS-COV-2 RNA RESP QL NAA+PROBE: NEGATIVE

## 2021-09-03 ENCOUNTER — PATIENT OUTREACH (OUTPATIENT)
Dept: CARE COORDINATION | Facility: CLINIC | Age: 36
End: 2021-09-03

## 2021-09-03 NOTE — PROGRESS NOTES
Clinic Care Coordination Contact  [facility]/Voicemail       Clinical Data: Care Coordinator Outreach    Outreach attempted x 1.  Left message on patient's voicemail with call back information and requested return call.    Plan: Care Coordinator will try to reach patient again in 3-5 business days.    Abimbola Cross [facility]  Clinic Care Coordinator  [facility] [place]  [facility]  [phone]  [email or web address]

## 2021-09-10 ENCOUNTER — PATIENT OUTREACH (OUTPATIENT)
Dept: CARE COORDINATION | Facility: CLINIC | Age: 36
End: 2021-09-10

## 2021-09-10 NOTE — PROGRESS NOTES
Clinic Care Coordination Contact  [facility]/Voicemail       Clinical Data: Care Coordinator Outreach    Outreach attempted x 2.  Left message on patient's voicemail with call back information and requested return call.    Plan: Care Coordinator will try to reach patient again in 1 month.    Abimbola Cross [facility]  Clinic Care Coordinator  [facility] [place]  [facility]  [phone]  [email or web address]

## 2021-09-12 ENCOUNTER — HEALTH MAINTENANCE LETTER (OUTPATIENT)
Age: 36
End: 2021-09-12

## 2021-09-14 ENCOUNTER — FCC EXTENDED DOCUMENTATION (OUTPATIENT)
Dept: PSYCHOLOGY | Facility: CLINIC | Age: 36
End: 2021-09-14

## 2021-09-14 DIAGNOSIS — F43.10 POSTTRAUMATIC STRESS DISORDER: ICD-10-CM

## 2021-09-14 DIAGNOSIS — F10.21 ALCOHOL USE DISORDER, SEVERE, IN EARLY REMISSION (H): Primary | ICD-10-CM

## 2021-09-14 DIAGNOSIS — F33.2 SEVERE EPISODE OF RECURRENT MAJOR DEPRESSIVE DISORDER, WITHOUT PSYCHOTIC FEATURES (H): ICD-10-CM

## 2021-09-14 NOTE — PROGRESS NOTES
"                    Discharge Summary  Multiple Sessions    Client Name: Brandin Rodriguez MRN#: [id number] YOB: 1985      Intake / Discharge Date: 1/11/2021-4/9/2021      DSM5 Diagnoses: (Sustained by DSM5 Criteria Listed Above)  DSM5 Diagnoses: (Sustained by DSM5 Criteria Listed Above)  Diagnoses:      296.33 (F33.2) Major Depressive Disorder, Recurrent Episode, Severe _  300.02 (F41.1) Generalized Anxiety Disorder  Substance-Related & Addictive Disorders Alcohol Use Disorder   303.90 (F10.20) Severe   309.81 (F43.10) Posttraumatic Stress Disorder (includes Posttraumatic Stress Disorder  Psychosocial & Contextual Factors: Loss of child, history of SI, alcohol dependency, financial stressor, relationship conflict  WHODAS:   WHODAS 2.0 Total Score 12/3/2019   Total Score 41      Presenting Concern:  Depression, feeling \"that I can't do anything,\" continue alcohol use. I don't see a  Point for me anymore, I am not going to do anything stupid.       Reason for Discharge:  Client did not return      Disposition at Time of Last Encounter:   Comments:   Processed through loss and patient's return to alcohol use. Discussed patient's plans to moved back with parents and enter treatment for alcohol use.      Risk Management:   Client has had a history of suicidal ideation: See [place]  A safety and risk management plan has been developed including: Patient consented to co-developed safety plan.  Safety and risk management plan was completed.  Patient agreed to use safety plan should any safety concerns arise.  A copy was given to the patient.  [place] Suicide Severity Rating Scale (Lifetime/Recent)  [place] Suicide Severity Rating (Lifetime/Recent) 12/30/2019 12/3/2020 12/3/2020   1. Wish to be Dead (Lifetime) Yes Yes -   Wish to be Dead Description (Lifetime) After the death of 1yo daughter, \"hoping to be done,\" \"It would just be easier.\" Stated that \"everything seemed dark in the world, so maybe creating my " "own end would be better.\" 'After my daughter \" -   1. Wish to be Dead (Recent) Yes No No   Wish to be Dead Description (Recent) \"Wishing that it would just be done,\" Sometimes placing self in \"risky\" situation.  - intermittent passive SI at baseline   2. Non-Specific Active Suicidal Thoughts (Lifetime) Yes No -   Non-Specific Active Suicidal Thought Description (Lifetime) Cynaide pills, hanging self--aborted attempt, use of alcohol-\"drinking myself to death.\" - -   2. Non-Specific Active Suicidal Thoughts (Recent) No No No   Non-Specific Active Suicidal Thought Description (Recent) - - denies   3. Active Suicidal Ideation with any Methods (Not Plan) Without Intent to Act (Lifetime) Yes No -   Active Suicidal Ideation with any Methods (Not Plan) Description (Lifetime) pills, hanging, alcohol - -   3. Active Suicidal Ideation with any Methods (Not Plan) Without Intent to Act (Recent) No No No   Active Suicidal Ideation with any Methods (Not Plan) Description (Recent) - - denies   4. Active Suicidal Ideation with Some Intent to Act, Without Specific Plan (Lifetime) Yes No -   Active Suicidal Ideation with Some Intent to Act, Without Specific Plan Description (Lifetime) Memories of the loss of daughter, financial stressor and accumulation of stress in living situation.  - -   4. Active Suicidal Ideation with Some Intent to Act, Without Specific Plan (Recent) No No No   Active Suicidal Ideation with Some Intent to Act, Without Specific Plan Description (Recent) - - denies   5. Active Suicidal Ideation with Specific Plan and Intent (Lifetime) Yes No -   Active Suicidal Ideation with Specific Plan and Intent Description (Lifetime) Yes, using extension cord to hang self, had images when 24 you of seeing self hanging outside of parent's shower.  - -   5. Active Suicidal Ideation with Specific Plan and Intent (Recent) No No No   Active Suicidal Ideation with Specific Plan and Intent Description (Recent) - - denies   Most " Severe Ideation Rating (Lifetime) 5 4 -   Most Severe Ideation Description (Lifetime) 2 years ago with attempt--accumulation of stressor over time 12 years ago -   Frequency (Lifetime) 5 4 -   Duration (Lifetime) 4 3 -   Controllability (Lifetime) 5 4 -   Protective Factors  (Lifetime) 1 1 -   Reasons for Ideation (Lifetime) 5 4 -   Most Severe Ideation Rating (Past Month) 2 NA -   Most Severe Ideation Description (Past Month) able to see ability to get help none -   Frequency (Past Month) 5 NA -   Duration (Past Month) 4 NA -   Controllability (Past Month) 2 NA -   Protective Factors (Past Month) 1 NA -   Reasons for Ideation (Past Month) 5 NA -   Actual Attempt (Lifetime) Yes No -   Actual Attempt Description (Lifetime) 2 yrs ago, attempted to hang self - -   Total Number of Actual Attempts (Lifetime) 1 - -   Actual Attempt (Past 3 Months) No No -   Has subject engaged in non-suicidal self-injurious behavior? (Lifetime) Yes - -   Has subject engaged in non-suicidal self-injurious behavior? (Past 3 Months) Yes - -   Interrupted Attempts (Lifetime) No - -   Interrupted Attempts (Past 3 Months) No - -   Aborted or Self-Interrupted Attempt (Lifetime) Yes - -   Aborted or Self Interrupted Attempt Description (Lifetime) Client attempted to hang however thought about parents and decided not to go through with it. - -   Total Number Aborted or Self Interrupted Attempts (Lifetime) 1 - -   Aborted or Self-Interrupted Attempt (Past 3 Months) No - -   Preparatory Acts or Behavior (Lifetime) No - -   Preparatory Acts or Behavior (Past 3 Months) No - -   Most Recent Attempt Actual Lethality Code NA - -   Most Lethal Attempt Actual Lethality Code NA - -   Initial/First Attempt Actual Lethality Code 0 - -   Initial/First Attempt Potential Lethality Code 0 - -         Referred To:  Patient can return to [facility] for services by calling [phone].         DENNY Reis [facility]   September 14, 2021          SAFETY PLAN:  Step 1:  "Warning signs / cues (Thoughts, images, mood, situation, behavior) that a crisis may be developing:  ? Thoughts: \"I can't do this anymore\", \"I just want this to end\", \"Nothing makes it better\" and \"feeling helpless,\" \"I can't fix anything,\" \"I've fucked up my life so much that I can't come back from it.\"  ? Images: flashbacks and \"how 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,\" managing restaurants, 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,\"  ? Thinking Processes: ruminations (can't stop thinking about my problems): thinking back around current stressor with roommate or finances, thoughts about loss of daughter, thoughts about how life used to be and racing thoughts  ? Mood: worsening depression, hopelessness, helplessness, intense anger and intense worry  ? Behaviors: isolating/withdrawing , using alcohol, impulsive, reckless behaviors (acting without thinking): driving under the influence, getting into fights with strangers, unsafe sex practices, not taking care of myself, not taking care of my responsibilities and not sleeping enough, not eating/sleeping  ? Situations: relationship problems, trauma  and financial stress   Step 2: Coping strategies - Things I can do to take my mind off of my problems without contacting another person (relaxation technique, physical activity):  ? Distress Tolerance Strategies:  change body temperature (ice pack/cold water) , paced breathing/progressive muscle relaxation, intense exercise: running, jumping jacks for 2-3 minutes  and listening to podcast, playing video games  ? Physical Activities: go for a walk, gardening, meditation, deep breathing, stretching  and weight lifting  ? Focus on helpful thoughts:  \"This is temporary\", \"I will get through this\", \"It always passes\", \"Ride the wave\" and remind myself of what is important to me: \"I can't do this to my parents,\" \"Fuck them, I'm going to better than that shit.\"  Step 3: People and social settings that provide distraction:              Name: Briana      Phone: " [phone]              Name: Dick     Phone: [phone]  ? gym  and [place]             Step 4: Remind myself of people and things that are important to me and worth living for:                              My family, my garden     Step 5: When I am in crisis, I can ask these people to help me use my safety plan:              Name: Briana      Phone: [phone]              Name: Dick     Phone: [phone]  Step 6: Making the environment safe:   ? remove alcohol, remove drugs, dispose of old medications  and be around others, get rid of gun lighter  Step 7: Professionals or agencies I can contact during a crisis:  ? Suicide Prevention Lifeline: 7-082-801-TALK (1214)  ? Crisis Text Line Service (available 24 hours a day, 7 days a week): Text [place] to 778422    [facility] Crisis [facility]: [facility]: [phone]     Call 911 or go to my nearest emergency department.       I helped develop this safety plan and agree to use it when needed.  I have been given a copy of this plan.       Client signature _________________________________________________________________  Today s date:  1/11/2021  Adapted from Safety Plan Template 2008 Janina Pierson and Arturo Roman is reprinted with the express permission of the authors.  No portion of the Safety Plan Template may be reproduced without the express, written permission.  You can contact the authors at [email or web address] or [email or web address].

## 2021-10-11 ENCOUNTER — PATIENT OUTREACH (OUTPATIENT)
Dept: NURSING | Facility: CLINIC | Age: 36
End: 2021-10-11
Payer: COMMERCIAL

## 2021-10-11 NOTE — PROGRESS NOTES
Clinic Care Coordination Contact    Follow Up Progress Note      Assessment: CC LUIS spoke with pt regarding his overall wellbeing. Pt shared that he is still in [place]. His goal is still to move to [place] to be closer to his parents. He is having trouble moving because due to his alcohol consumption and mental health he is struggling to care for himself, pack, and drive.    He needs to go to [place] on Wednesday for his birthday celebration.     Pt wants to go somewhere for in-patient treatment for his mental health. He doesn't want to go somewhere that just focuses on addiction because he believes his mental health is the main  to his alcohol use.    Pt shared that his drinking has gotten really bad. Drank last night and everyday. LUZ MARINA SMITH discussed that for treatment it will be important to go to detox first. Pt stated that he understands that he needs to go through the ED. Pt is thinking of doing this on Thursday.    Care Gaps:    Health Maintenance Due   Topic Date Due     PREVENTIVE CARE VISIT  Never done     ADVANCE CARE PLANNING  Never done     DEPRESSION ACTION PLAN  Never done     INFLUENZA VACCINE (1) 09/01/2021     Postponed to focus on currently mental health concerns.     Goals addressed this encounter:   Goals Addressed                    This Visit's Progress       Patient Stated       1. Mental Health Management (pt-stated)   20%      Goal Statement: Within the next 6 months, I would like to improve my overall health by decreasing my substance use and following up with medical appointments.   Date Goal set: 12/7/2020  Barriers: None  Strengths: Motivated to address health and substance use concerns  Date to Achieve By: 6/7/2021 // date extended 6/7/2022  Patient expressed understanding of goal: Иван verbally stated understanding of goal   Action steps to achieve this goal:  1. I will attend scheduled medical appointments  2. I will follow treatment recommendations  3. I will outreach to  Care Coordination  for further questions or concerns           Outreach Frequency: monthly    Plan: CC SW will outreach to pt in Friday to discuss barriers to going to detox if he has not presented at that point.    Abimbola Cross, [facility]  Clinic Care Coordinator  [facility] Women's [facility] Cathie [place]  [employer]  [phone]  [email or web address]

## 2021-10-12 ENCOUNTER — PATIENT OUTREACH (OUTPATIENT)
Dept: NURSING | Facility: CLINIC | Age: 36
End: 2021-10-12
Payer: COMMERCIAL
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
## 2021-10-14 ENCOUNTER — PATIENT OUTREACH (OUTPATIENT)
Dept: NURSING | Facility: CLINIC | Age: 36
End: 2021-10-14
Payer: COMMERCIAL

## 2021-10-14 NOTE — PROGRESS NOTES
Clinic Care Coordination Contact    Follow Up Progress Note      Assessment: LUZ MARINA SMITH received a call from pt sharing that he is struggling with his mental health. He is currently at his parents home in [place] but plans to return to [facility]. He would like to go directly to the ED for support with detox and his mental health needs. Pt continues to struggle with his depression.     Pt requested LUZ MARINA SMITH outreach to ED to give a heads up that he will be presenting this evening.    Care Gaps:    Health Maintenance Due   Topic Date Due     PREVENTIVE CARE VISIT  Never done     ADVANCE CARE PLANNING  Never done     DEPRESSION ACTION PLAN  Never done     INFLUENZA VACCINE (1) 09/01/2021     Postponed to focus on mental health needs     Goals addressed this encounter:   Goals Addressed                    This Visit's Progress       Patient Stated       1. Mental Health Management (pt-stated)   20%      Goal Statement: Within the next 6 months, I would like to improve my overall health by decreasing my substance use and following up with medical appointments.   Date Goal set: 12/7/2020  Barriers: None  Strengths: Motivated to address health and substance use concerns  Date to Achieve By: 6/7/2021 // date extended 6/7/2022  Patient expressed understanding of goal: Иван verbally stated understanding of goal   Action steps to achieve this goal:  1. I will attend scheduled medical appointments  2. I will follow treatment recommendations  3. I will outreach to Care Coordination  for further questions or concerns         Outreach Frequency: monthly    Plan: LUZ MARINA SMITH will outreach to ED to inform that he will be presenting tonight. LUZ MARINA SMITH will outreach to pt next week to discuss overall wellbeing.    Abimbola Cross [facility]  Clinic Care Coordinator  [facility] Women's [facility] Cathie [place]  [facility]  [place]  [phone]  [email or web address]

## 2021-10-15 ENCOUNTER — PATIENT OUTREACH (OUTPATIENT)
Dept: NURSING | Facility: CLINIC | Age: 36
End: 2021-10-15
Payer: COMMERCIAL

## 2021-10-15 ENCOUNTER — HOSPITAL ENCOUNTER (EMERGENCY)
Facility: CLINIC | Age: 36
Discharge: HOME | End: 2021-10-16
Attending: EMERGENCY MEDICINE | Admitting: EMERGENCY MEDICINE
Payer: COMMERCIAL

## 2021-10-15 DIAGNOSIS — F10.920 ALCOHOLIC INTOXICATION WITHOUT COMPLICATION (H): ICD-10-CM

## 2021-10-15 DIAGNOSIS — F10.220 ALCOHOL DEPENDENCE WITH UNCOMPLICATED INTOXICATION (H): ICD-10-CM

## 2021-10-15 LAB — ALCOHOL BREATH TEST: 0.21 (ref 0–0.01)

## 2021-10-15 PROCEDURE — 82075 ASSAY OF BREATH ETHANOL: CPT

## 2021-10-15 PROCEDURE — 99283 EMERGENCY DEPT VISIT LOW MDM: CPT | Performed by: EMERGENCY MEDICINE

## 2021-10-15 PROCEDURE — 99283 EMERGENCY DEPT VISIT LOW MDM: CPT

## 2021-10-15 NOTE — PROGRESS NOTES
Clinic Care Coordination Contact    Follow Up Progress Note      Assessment: LUZ MARINA SMITH received a voicemail from pt at 1:30am last night. Pt stated that he had returned to [place] but was scared to go to ED because he wanted to be taken seriously.     LUZ MARINA SMITH returned pt's call. Pt shared that he still planned to go to ED today. He spoke with ED staff late last night and felt the woman was short with him. He decided he wanted to present in daytime hours. LUZ MARINA SMITH encouraged pt to go now to receive support with detox and mental health needs. Pt was agreeable to this. He stated that he spoke with cope last night and he missed a call from them today and he wanted to call them back to let then know he was ok.    Pt confirmed plan to present to ED today.    Goals addressed this encounter:   Goals Addressed                    This Visit's Progress       Patient Stated       1. Mental Health Management (pt-stated)   30%      Goal Statement: Within the next 6 months, I would like to improve my overall health by decreasing my substance use and following up with medical appointments.   Date Goal set: 12/7/2020  Barriers: None  Strengths: Motivated to address health and substance use concerns  Date to Achieve By: 6/7/2021 // date extended 6/7/2022  Patient expressed understanding of goal: Иван verbally stated understanding of goal   Action steps to achieve this goal:  1. I will attend scheduled medical appointments  2. I will follow treatment recommendations  3. I will outreach to Care Coordination  for further questions or concerns         Outreach Frequency: monthly    Plan: LUZ MARINA SMITH will review chart Monday and outreach as needed per review.    Abimbola Cross [facility]  Clinic Care Coordinator  [facility] Women's [facility] Cathie [place]  [facility]  [phone]  [email or web address]

## 2021-10-16 VITALS
BODY MASS INDEX: 24.43 KG/M2 | DIASTOLIC BLOOD PRESSURE: 94 MMHG | OXYGEN SATURATION: 95 % | HEART RATE: 102 BPM | SYSTOLIC BLOOD PRESSURE: 134 MMHG | RESPIRATION RATE: 16 BRPM | TEMPERATURE: 98 F | WEIGHT: 190.3 LBS

## 2021-10-16 PROCEDURE — 250N000013 HC RX MED GY IP 250 OP 250 PS 637: Performed by: EMERGENCY MEDICINE

## 2021-10-16 RX ORDER — PROPRANOLOL HYDROCHLORIDE 20 MG/1
20 TABLET ORAL ONCE
Status: COMPLETED | OUTPATIENT
Start: 2021-10-16 | End: 2021-10-16

## 2021-10-16 RX ORDER — NICOTINE 21 MG/24HR
1 PATCH, TRANSDERMAL 24 HOURS TRANSDERMAL ONCE
Status: DISCONTINUED | OUTPATIENT
Start: 2021-10-16 | End: 2021-10-16 | Stop reason: HOSPADM

## 2021-10-16 RX ADMIN — PROPRANOLOL HYDROCHLORIDE 20 MG: 20 TABLET ORAL at 02:28

## 2021-10-16 RX ADMIN — NICOTINE 1 PATCH: 21 PATCH, EXTENDED RELEASE TRANSDERMAL at 01:59

## 2021-10-16 NOTE — ED TRIAGE NOTES
Pt having ongoing depression, Pt not eating, taking care of self.  Pt trying to stop drinking alcohol, has been drinking occasionally to franco off withdrawal.

## 2021-10-16 NOTE — ED PROVIDER NOTES
ED Provider Note  [facility]      History     Chief Complaint   Patient presents with     Depression     Ongoing depression, brought in by Crisis worker     The history is provided by the patient and medical records.     Brandin Rodriguez is a 36 year old male with a past medical history significant for anxiety, depression, PTSD, alcohol and suicidal ideation who presents to the ED for depression. Patient reports that he is in the ED because he hit a breaking point today. He wants to be treated for his mental health, but he states that he has been drinking a little less then a pint of alcohol a day. He states that he has suicidal ideation but he will not act on it. He states the only way he will commit suicide is if he meets a [place] spy and they give him cyanide but he will never do it, especially when his parents are alive. Patient is taking propanolol and hydroxyzine. Denies hallucinations now but may have had them earlier when he was waking up. Patient has been hospitalized in the past due to his alcohol abuse with his last admission on 3/11/2020.     Past Medical History  Past Medical History:   Diagnosis Date     Alcoholic hepatitis      Anxiety      Depression      Depressive disorder      PTSD (post-traumatic stress disorder)      Suicidal ideation      Past Surgical History:   Procedure Laterality Date     BIOPSY      mole bxs     ESOPHAGOSCOPY, GASTROSCOPY, DUODENOSCOPY (EGD), COMBINED N/A 11/5/2019    Procedure: ESOPHAGOGASTRODUODENOSCOPY (EGD);  Surgeon: Jake Elizabeth MD;  Location:  GI     ESOPHAGOSCOPY, GASTROSCOPY, DUODENOSCOPY (EGD), COMBINED N/A 3/12/2020    Procedure: ESOPHAGOGASTRODUODENOSCOPY (EGD);  Surgeon: Jake Elizabeth MD;  Location:  GI     SOFT TISSUE SURGERY       hydrOXYzine (ATARAX) 50 MG tablet  ibuprofen (ADVIL/MOTRIN) 600 MG tablet  pantoprazole (PROTONIX) 40 MG EC tablet  propranolol (INDERAL) 20 MG tablet  diclofenac (VOLTAREN) 1 %  topical gel      Allergies   Allergen Reactions     Amoxicillin      Bactrim [Sulfamethoxazole W-Trimethoprim]      Family History  Family History   Problem Relation Age of Onset     Diabetes Mother      Hypertension Mother      Heart Disease Maternal Grandmother      Cancer Paternal Grandmother      Anxiety Disorder Brother      Arrhythmia Maternal Half-Sister      Social History   Social History     Tobacco Use     Smoking status: Current Every Day Smoker     Packs/day: 1.00     Smokeless tobacco: Never Used   Substance Use Topics     Alcohol use: Yes     Comment: < one pint a day 10-15-21     Drug use: Yes     Types: Marijuana     Comment: occasionally      Past medical history, past surgical history, medications, allergies, family history, and social history were reviewed with the patient. No additional pertinent items.       Review of Systems  A complete review of systems was performed with pertinent positives and negatives noted in the HPI, and all other systems negative.  ROS: 14 point ROS neg other than the symptoms noted above in the HPI.  Physical Exam   BP: 135/74  Pulse: 118  Temp: 98.4  F (36.9  C)  Resp: 16  Weight: 86.3 kg (190 lb 4.8 oz)  SpO2: 97 %  Physical Exam  Physical Exam   Constitutional: oriented to person, place, and time. appears well-developed and well-nourished.   HENT:   Head: Normocephalic and atraumatic.   Neck: Normal range of motion.   Pulmonary/Chest: Effort normal. No respiratory distress.   Cardiac: No murmurs, rubs, gallops. RRR.  Abdominal: Abdomen soft, nontender, nondistended. No rebound tenderness.  MSK: Long bones without deformity or evidence of trauma  Neurological: alert and oriented to person, place, and time.   Skin: Skin is warm and dry.   Psychiatric:  normal mood and affect.  behavior is normal. Thought content normal.     ED Course      Procedures         Results for orders placed or performed during the hospital encounter of 10/15/21   Alcohol breath test POCT      Status: Abnormal   Result Value Ref Range    Alcohol Breath Test 0.212 (A) 0.00 - 0.01     Medications - No data to display     Assessments & Plan (with Medical Decision Making)   MDM  The patient is presenting with alcohol intoxication and depression.  Patient changes mind does not have mental health evaluation.  He stated that he would never act on harming himself.  I did discuss with his crisis worker who agrees that the patient would not harm himself and she has no concerns with him being discharged regarding his safety.  Patient states that he will never hurt himself especially when his parents are alive and that he would never act on it.  His only plan is to contact eru[facility] by taking him, he does not know anyone with these contacts.  Patient knows he can return if he needs.  He will be discharged.  He does not want detox.  He was observed until clinical sobriety.  Patient has no other symptoms of psychosis such as hallucinations, voices, homicidal ideation.    I have reviewed the nursing notes. I have reviewed the findings, diagnosis, plan and need for follow up with the patient.    New Prescriptions    No medications on file       Final diagnoses:   Alcoholic intoxication without complication (H)       --  I, Idalia Tapia, am serving as a trained medical scribe to document services personally performed by Toni Ray MD, based on the provider's statements to me.     I, Toni Ray MD, was physically present and have reviewed and verified the accuracy of this note documented by Idalia Tapia.    Toni Ray MD  [facility] EMERGENCY DEPARTMENT  10/15/2021     Toni Ray MD  10/16/21 0305       Toni Ray MD  10/16/21 0305

## 2021-10-16 NOTE — DISCHARGE INSTRUCTIONS
You may always return to the emergency department if you would like to be seen by mental health worker    Return to the emergency department if your symptoms worsen or if you have any further concerns

## 2021-10-19 ENCOUNTER — PATIENT OUTREACH (OUTPATIENT)
Dept: CARE COORDINATION | Facility: CLINIC | Age: 36
End: 2021-10-19

## 2021-10-19 NOTE — PROGRESS NOTES
Clinic Care Coordination Contact  [facility]/Voicemail       Clinical Data: Care Coordinator Outreach    Outreach attempted x 1.  Left message on patient's voicemail with call back information and requested return call.    CC SW left information for [facility] for detox and inpatient support.    Plan: Care Coordinator will try to reach patient again in 1-2 business days.    Abimbola Cross [facility]  Clinic Care Coordinator  [facility] Women's [facility] Cathie [place]  [facility]  [phone]  [email or web address]

## 2021-10-20 ENCOUNTER — PATIENT OUTREACH (OUTPATIENT)
Dept: NURSING | Facility: CLINIC | Age: 36
End: 2021-10-20
Payer: COMMERCIAL

## 2021-10-20 NOTE — PROGRESS NOTES
"Clinic Care Coordination Contact    Follow Up Progress Note      Assessment: LUZ MARINA SMITH spoke with pt regarding his recent ED visit. Pt shared that he did what he always does and left before any treatment plan could be determined. He also stated that he went to the wrong ED. He is thoughtful of trying again and going to [facility] ED for behavioral health support.    LUZ MARINA SMITH further discussed [facility]. LUZ MARINA SMITH explained they will provide medication for withdrawal support, assist with all county assessments, get him into next treatment, and do door to door so he doesn't have to go back home between detox and treatment. It is covered by pt's insurance.     Pt stated desire to be \"forced\" into treatment or inpatient. LUZ MARINA SMITH explained this isn't really an option for him. LUZ MARINA SMITH explained how MH unit in a hospital is only temporary. Discuss [facility] facility as an appropriate residential treatment.     LUZ MARINA SMITH will [facility] information on [facility].    Care Gaps:    Health Maintenance Due   Topic Date Due     PREVENTIVE CARE VISIT  Never done     ADVANCE CARE PLANNING  Never done     DEPRESSION ACTION PLAN  Never done     INFLUENZA VACCINE (1) 09/01/2021     Postponed to focus on mental health needs     Goals addressed this encounter:   Goals Addressed                    This Visit's Progress       Patient Stated       1. Mental Health Management (pt-stated)   30%      Goal Statement: Within the next 6 months, I would like to improve my overall health by decreasing my substance use and following up with medical appointments.   Date Goal set: 12/7/2020  Barriers: None  Strengths: Motivated to address health and substance use concerns  Date to Achieve By: 6/7/2021 // date extended 6/7/2022  Patient expressed understanding of goal: Иван verbally stated understanding of goal   Action steps to achieve this goal:  1. I will attend scheduled medical appointments  2. I will follow treatment recommendations  3. I will outreach to " Care Coordination  for further questions or concerns         Outreach Frequency: monthly    Plan: CC SW will outreach to pt in 1 week to discuss overall wellbeing.    Abimbola Cross, [facility]  Clinic Care Coordinator  [facility] Women's [facility] Cathie [place]  [facility]  [phone]  [email or web address]

## 2021-10-31 ENCOUNTER — TELEPHONE (OUTPATIENT)
Dept: BEHAVIORAL HEALTH | Facility: CLINIC | Age: 36
End: 2021-10-31

## 2021-10-31 ENCOUNTER — HOSPITAL ENCOUNTER (EMERGENCY)
Facility: CLINIC | Age: 36
Discharge: ANOTHER HOSPITAL | End: 2021-11-01
Attending: FAMILY MEDICINE | Admitting: FAMILY MEDICINE
Payer: COMMERCIAL

## 2021-10-31 DIAGNOSIS — R45.851 SUICIDAL IDEATION: ICD-10-CM

## 2021-10-31 DIAGNOSIS — F41.9 ANXIETY: ICD-10-CM

## 2021-10-31 DIAGNOSIS — F32.A DEPRESSION, UNSPECIFIED DEPRESSION TYPE: ICD-10-CM

## 2021-10-31 DIAGNOSIS — Z20.822 COVID-19 RULED OUT BY LABORATORY TESTING: ICD-10-CM

## 2021-10-31 LAB
AMPHETAMINES UR QL SCN: ABNORMAL
BARBITURATES UR QL: ABNORMAL
BENZODIAZ UR QL: ABNORMAL
CANNABINOIDS UR QL SCN: ABNORMAL
COCAINE UR QL: ABNORMAL
OPIATES UR QL SCN: ABNORMAL
SARS-COV-2 RNA RESP QL NAA+PROBE: NEGATIVE

## 2021-10-31 PROCEDURE — 250N000013 HC RX MED GY IP 250 OP 250 PS 637: Performed by: FAMILY MEDICINE

## 2021-10-31 PROCEDURE — 99285 EMERGENCY DEPT VISIT HI MDM: CPT | Mod: 25 | Performed by: FAMILY MEDICINE

## 2021-10-31 PROCEDURE — 99285 EMERGENCY DEPT VISIT HI MDM: CPT | Performed by: FAMILY MEDICINE

## 2021-10-31 PROCEDURE — 80307 DRUG TEST PRSMV CHEM ANLYZR: CPT | Performed by: FAMILY MEDICINE

## 2021-10-31 PROCEDURE — U0003 INFECTIOUS AGENT DETECTION BY NUCLEIC ACID (DNA OR RNA); SEVERE ACUTE RESPIRATORY SYNDROME CORONAVIRUS 2 (SARS-COV-2) (CORONAVIRUS DISEASE [COVID-19]), AMPLIFIED PROBE TECHNIQUE, MAKING USE OF HIGH THROUGHPUT TECHNOLOGIES AS DESCRIBED BY CMS-2020-01-R: HCPCS | Performed by: FAMILY MEDICINE

## 2021-10-31 PROCEDURE — 90791 PSYCH DIAGNOSTIC EVALUATION: CPT

## 2021-10-31 PROCEDURE — C9803 HOPD COVID-19 SPEC COLLECT: HCPCS | Performed by: FAMILY MEDICINE

## 2021-10-31 RX ORDER — QUETIAPINE FUMARATE 50 MG/1
50 TABLET, FILM COATED ORAL ONCE
Status: COMPLETED | OUTPATIENT
Start: 2021-10-31 | End: 2021-10-31

## 2021-10-31 RX ORDER — HYDROXYZINE HYDROCHLORIDE 50 MG/1
50 TABLET, FILM COATED ORAL ONCE
Status: COMPLETED | OUTPATIENT
Start: 2021-10-31 | End: 2021-10-31

## 2021-10-31 RX ORDER — MULTIPLE VITAMINS W/ MINERALS TAB 9MG-400MCG
1 TAB ORAL DAILY
COMMUNITY

## 2021-10-31 RX ORDER — PROPRANOLOL HYDROCHLORIDE 20 MG/1
20 TABLET ORAL ONCE
Status: COMPLETED | OUTPATIENT
Start: 2021-10-31 | End: 2021-10-31

## 2021-10-31 RX ADMIN — HYDROXYZINE HYDROCHLORIDE 50 MG: 50 TABLET, FILM COATED ORAL at 21:23

## 2021-10-31 RX ADMIN — PROPRANOLOL HYDROCHLORIDE 20 MG: 20 TABLET ORAL at 22:36

## 2021-10-31 RX ADMIN — QUETIAPINE FUMARATE 50 MG: 50 TABLET ORAL at 23:52

## 2021-11-01 ENCOUNTER — HOSPITAL ENCOUNTER (INPATIENT)
Facility: CLINIC | Age: 36
LOS: 1 days | Discharge: HOME | End: 2021-11-02
Attending: PSYCHIATRY & NEUROLOGY | Admitting: PSYCHIATRY & NEUROLOGY
Payer: COMMERCIAL

## 2021-11-01 VITALS
DIASTOLIC BLOOD PRESSURE: 91 MMHG | OXYGEN SATURATION: 99 % | HEART RATE: 73 BPM | SYSTOLIC BLOOD PRESSURE: 128 MMHG | BODY MASS INDEX: 24.39 KG/M2 | TEMPERATURE: 98.1 F | WEIGHT: 190 LBS | RESPIRATION RATE: 16 BRPM

## 2021-11-01 DIAGNOSIS — F10.920 ALCOHOLIC INTOXICATION WITHOUT COMPLICATION (H): ICD-10-CM

## 2021-11-01 DIAGNOSIS — F32.2 MAJOR DEPRESSIVE DISORDER, SINGLE EPISODE, SEVERE WITHOUT PSYCHOSIS (H): Primary | ICD-10-CM

## 2021-11-01 PROBLEM — R45.851 SUICIDAL IDEATION: Status: ACTIVE | Noted: 2019-11-11

## 2021-11-01 PROCEDURE — 250N000013 HC RX MED GY IP 250 OP 250 PS 637: Performed by: EMERGENCY MEDICINE

## 2021-11-01 PROCEDURE — 90791 PSYCH DIAGNOSTIC EVALUATION: CPT

## 2021-11-01 PROCEDURE — 250N000013 HC RX MED GY IP 250 OP 250 PS 637: Performed by: PSYCHIATRY & NEUROLOGY

## 2021-11-01 PROCEDURE — 128N000001 HC R&B CD/MH ADULT

## 2021-11-01 RX ORDER — MAGNESIUM 200 MG
1000 TABLET ORAL DAILY
Status: DISCONTINUED | OUTPATIENT
Start: 2021-11-02 | End: 2021-11-02 | Stop reason: CLARIF

## 2021-11-01 RX ORDER — PROPRANOLOL HYDROCHLORIDE 10 MG/1
20 TABLET ORAL 2 TIMES DAILY PRN
Status: DISCONTINUED | OUTPATIENT
Start: 2021-11-01 | End: 2021-11-02 | Stop reason: HOSPADM

## 2021-11-01 RX ORDER — TRAZODONE HYDROCHLORIDE 50 MG/1
50 TABLET, FILM COATED ORAL
Status: DISCONTINUED | OUTPATIENT
Start: 2021-11-01 | End: 2021-11-02 | Stop reason: HOSPADM

## 2021-11-01 RX ORDER — OLANZAPINE 10 MG/2ML
10 INJECTION, POWDER, FOR SOLUTION INTRAMUSCULAR 3 TIMES DAILY PRN
Status: DISCONTINUED | OUTPATIENT
Start: 2021-11-01 | End: 2021-11-02 | Stop reason: HOSPADM

## 2021-11-01 RX ORDER — OLANZAPINE 10 MG/1
10 TABLET ORAL 3 TIMES DAILY PRN
Status: DISCONTINUED | OUTPATIENT
Start: 2021-11-01 | End: 2021-11-02 | Stop reason: HOSPADM

## 2021-11-01 RX ORDER — MAGNESIUM HYDROXIDE/ALUMINUM HYDROXICE/SIMETHICONE 120; 1200; 1200 MG/30ML; MG/30ML; MG/30ML
30 SUSPENSION ORAL EVERY 4 HOURS PRN
Status: DISCONTINUED | OUTPATIENT
Start: 2021-11-01 | End: 2021-11-02 | Stop reason: HOSPADM

## 2021-11-01 RX ORDER — ALBUTEROL SULFATE 90 UG/1
1-2 AEROSOL, METERED RESPIRATORY (INHALATION) EVERY 6 HOURS PRN
Status: DISCONTINUED | OUTPATIENT
Start: 2021-11-01 | End: 2021-11-02 | Stop reason: HOSPADM

## 2021-11-01 RX ORDER — HYDROXYZINE HYDROCHLORIDE 25 MG/1
50 TABLET, FILM COATED ORAL ONCE
Status: COMPLETED | OUTPATIENT
Start: 2021-11-01 | End: 2021-11-01

## 2021-11-01 RX ORDER — ALBUTEROL SULFATE 90 UG/1
1-2 AEROSOL, METERED RESPIRATORY (INHALATION) EVERY 6 HOURS PRN
COMMUNITY
End: 2023-07-03

## 2021-11-01 RX ORDER — NICOTINE 21 MG/24HR
1 PATCH, TRANSDERMAL 24 HOURS TRANSDERMAL ONCE
Status: DISCONTINUED | OUTPATIENT
Start: 2021-11-01 | End: 2021-11-01 | Stop reason: HOSPADM

## 2021-11-01 RX ORDER — MULTIPLE VITAMINS W/ MINERALS TAB 9MG-400MCG
1 TAB ORAL DAILY
Status: DISCONTINUED | OUTPATIENT
Start: 2021-11-02 | End: 2021-11-02 | Stop reason: HOSPADM

## 2021-11-01 RX ORDER — PANTOPRAZOLE SODIUM 20 MG/1
40 TABLET, DELAYED RELEASE ORAL 2 TIMES DAILY PRN
Status: DISCONTINUED | OUTPATIENT
Start: 2021-11-01 | End: 2021-11-01

## 2021-11-01 RX ORDER — ACETAMINOPHEN 325 MG/1
650 TABLET ORAL EVERY 4 HOURS PRN
Status: DISCONTINUED | OUTPATIENT
Start: 2021-11-01 | End: 2021-11-02 | Stop reason: HOSPADM

## 2021-11-01 RX ORDER — POLYETHYLENE GLYCOL 3350 17 G/17G
17 POWDER, FOR SOLUTION ORAL DAILY PRN
Status: DISCONTINUED | OUTPATIENT
Start: 2021-11-01 | End: 2021-11-02 | Stop reason: HOSPADM

## 2021-11-01 RX ORDER — PANTOPRAZOLE SODIUM 20 MG/1
20 TABLET, DELAYED RELEASE ORAL 2 TIMES DAILY PRN
Status: DISCONTINUED | OUTPATIENT
Start: 2021-11-01 | End: 2021-11-02 | Stop reason: HOSPADM

## 2021-11-01 RX ORDER — HYDROXYZINE HYDROCHLORIDE 25 MG/1
25 TABLET, FILM COATED ORAL EVERY 4 HOURS PRN
Status: DISCONTINUED | OUTPATIENT
Start: 2021-11-01 | End: 2021-11-01

## 2021-11-01 RX ORDER — QUETIAPINE FUMARATE 50 MG/1
50 TABLET, FILM COATED ORAL
COMMUNITY
End: 2021-11-07

## 2021-11-01 RX ORDER — PANTOPRAZOLE SODIUM 20 MG/1
40 TABLET, DELAYED RELEASE ORAL 2 TIMES DAILY
Status: DISCONTINUED | OUTPATIENT
Start: 2021-11-01 | End: 2021-11-01

## 2021-11-01 RX ORDER — QUETIAPINE FUMARATE 50 MG/1
50 TABLET, FILM COATED ORAL
Status: DISCONTINUED | OUTPATIENT
Start: 2021-11-01 | End: 2021-11-02 | Stop reason: HOSPADM

## 2021-11-01 RX ORDER — HYDROXYZINE HYDROCHLORIDE 25 MG/1
50 TABLET, FILM COATED ORAL EVERY 4 HOURS PRN
Status: DISCONTINUED | OUTPATIENT
Start: 2021-11-01 | End: 2021-11-02 | Stop reason: HOSPADM

## 2021-11-01 RX ORDER — PROPRANOLOL HYDROCHLORIDE 20 MG/1
20 TABLET ORAL ONCE
Status: COMPLETED | OUTPATIENT
Start: 2021-11-01 | End: 2021-11-01

## 2021-11-01 RX ADMIN — PROPRANOLOL HYDROCHLORIDE 20 MG: 10 TABLET ORAL at 22:06

## 2021-11-01 RX ADMIN — PROPRANOLOL HYDROCHLORIDE 20 MG: 20 TABLET ORAL at 19:04

## 2021-11-01 RX ADMIN — NICOTINE POLACRILEX 4 MG: 4 GUM, CHEWING ORAL at 22:03

## 2021-11-01 RX ADMIN — HYDROXYZINE HYDROCHLORIDE 50 MG: 25 TABLET, FILM COATED ORAL at 19:04

## 2021-11-01 RX ADMIN — QUETIAPINE FUMARATE 50 MG: 50 TABLET ORAL at 22:03

## 2021-11-01 RX ADMIN — NICOTINE 1 PATCH: 21 PATCH, EXTENDED RELEASE TRANSDERMAL at 19:04

## 2021-11-01 RX ADMIN — HYDROXYZINE HYDROCHLORIDE 50 MG: 25 TABLET, FILM COATED ORAL at 22:03

## 2021-11-01 ASSESSMENT — MIFFLIN-ST. JEOR: SCORE: 1917.82

## 2021-11-01 NOTE — SAFE
Brandin Rodriguez  November 1, 2021  SAFE Note     Critical Safety Issues:        Current Suicidal Ideation/Self-Injurious Concerns/Methods: Ingestion medication.  Is guarded with information and shares little.       Current or Historical Inappropriate Sexual Behavior: No       Current or Historical Aggression/Homicidal Ideation: None - N/A     Updated care team: Yes: ED MD and RN aware     For additional details see full LM[facility] assessment.       RUTH Fang      .

## 2021-11-01 NOTE — ED PROVIDER NOTES
ED Provider Note  [facility]      History     Chief Complaint   Patient presents with     Suicidal     The history is provided by the patient.     Brandin Rodriguez is a 36 year old male with a past medical history significant for alcohol abuse and depression who resents to the ED for evaluation of suicidal ideation.  Patient was seen by a [facility] .  Patient reports that he was brought in by a friend and he is here because sometime in the last week he became very close to committing suicide.  His plan was to drink a lot of alcohol and overdose on gabapentin.  Patient states back in April 2020 he had a friend from a treatment program staying with him and that friend committed suicide on his couch by drinking alcohol and overdosing on gabapentin.  Patient states that the mother of that friends child had messaged him this week asking how he is doing and he realized that he needed to come in and get help after this.  Patient states that he wanted to come in when he was sober from alcohol so that we did not think that he was a drunk.  Patient reports being sober from alcohol for 2 days.  Patient is currently suicidal.  He states that he no longer has the gabapentin in his apartment anymore but he did put them somewhere because he might need to use them in the future.  He did not say the exact spot where he put the gabapentin.  Patient is currently very depressed and spends his time drinking alcohol or sleeping.  Patient is not working.    Past Medical History  Past Medical History:   Diagnosis Date     Alcoholic hepatitis      Anxiety      Depression      Depressive disorder      PTSD (post-traumatic stress disorder)      Suicidal ideation      Past Surgical History:   Procedure Laterality Date     BIOPSY      mole bxs     ESOPHAGOSCOPY, GASTROSCOPY, DUODENOSCOPY (EGD), COMBINED N/A 11/5/2019    Procedure: ESOPHAGOGASTRODUODENOSCOPY (EGD);  Surgeon: Jake Elizabeth MD;  Location:   GI     ESOPHAGOSCOPY, GASTROSCOPY, DUODENOSCOPY (EGD), COMBINED N/A 3/12/2020    Procedure: ESOPHAGOGASTRODUODENOSCOPY (EGD);  Surgeon: Jake Elizabeth MD;  Location:  GI     SOFT TISSUE SURGERY       cyanocobalamin 1000 MCG SUBL  hydrOXYzine (ATARAX) 50 MG tablet  multivitamin w/minerals (MULTI-VITAMIN) tablet  pantoprazole (PROTONIX) 40 MG EC tablet  propranolol (INDERAL) 20 MG tablet  diclofenac (VOLTAREN) 1 % topical gel  ibuprofen (ADVIL/MOTRIN) 600 MG tablet      Allergies   Allergen Reactions     Amoxicillin      Bactrim [Sulfamethoxazole W-Trimethoprim]      Family History  Family History   Problem Relation Age of Onset     Diabetes Mother      Hypertension Mother      Heart Disease Maternal Grandmother      Cancer Paternal Grandmother      Anxiety Disorder Brother      Arrhythmia Maternal Half-Sister      Social History   Social History     Tobacco Use     Smoking status: Current Every Day Smoker     Packs/day: 1.00     Smokeless tobacco: Never Used   Substance Use Topics     Alcohol use: Yes     Comment: < one pint a day 10-15-21     Drug use: Yes     Types: Marijuana     Comment: occasionally      Past medical history, past surgical history, medications, allergies, family history, and social history were reviewed with the patient. No additional pertinent items.       Review of Systems  A complete review of systems was performed with pertinent positives and negatives noted in the HPI, and all other systems negative.    Physical Exam   BP: 129/86  Pulse: 89  Temp: 98.1  F (36.7  C)  Resp: 16  Weight: 86.2 kg (190 lb)  SpO2: 98 %  Physical Exam  Constitutional:       General: He is not in acute distress.     Appearance: He is not diaphoretic.   HENT:      Head: Atraumatic.   Eyes:      General: No scleral icterus.     Pupils: Pupils are equal, round, and reactive to light.   Cardiovascular:      Heart sounds: Normal heart sounds.   Pulmonary:      Effort: No respiratory distress.      Breath sounds:  Normal breath sounds.   Abdominal:      General: Bowel sounds are normal.      Palpations: Abdomen is soft.      Tenderness: There is no abdominal tenderness.   Musculoskeletal:         General: No tenderness.   Skin:     General: Skin is warm.      Findings: No rash.   Neurological:      General: No focal deficit present.      Mental Status: He is oriented to person, place, and time.      Motor: No weakness.      Coordination: Coordination normal.   Psychiatric:         Mood and Affect: Mood is anxious and depressed.         Speech: Speech normal.         Behavior: Behavior is cooperative.         Thought Content: Thought content includes suicidal ideation.         ED Course      Procedures    The medical record was reviewed and interpreted.  Current labs reviewed and interpreted.  Patient. Was seen and evaluated by the  please refer to their documentation in the note section of the epic chart dated 10/31/202     Results for orders placed or performed during the hospital encounter of 10/31/21   Drug abuse screen 1 urine (ED)     Status: Abnormal   Result Value Ref Range    Amphetamines Urine Screen Negative Screen Negative    Barbiturates Urine Screen Negative Screen Negative    Benzodiazepines Urine Screen Negative Screen Negative    Cannabinoids Urine Screen Positive (A) Screen Negative    Cocaine Urine Screen Negative Screen Negative    Opiates Urine Screen Negative Screen Negative   Asymptomatic COVID-19 Virus (Coronavirus) by PCR Oropharynx     Status: Normal    Specimen: Oropharynx; Swab   Result Value Ref Range    SARS CoV2 PCR Negative Negative    Narrative    Testing was performed using the [place]  SARS-CoV-2 & Influenza A/B Assay on the winston  Mere  System.  This test should be ordered for the detection of SARS-COV-2 in individuals who meet SARS-CoV-2 clinical and/or epidemiological criteria. Test performance is unknown in asymptomatic patients.  This test is for in vitro diagnostic use under the  FDA EUA for laboratories certified under CLIA to perform moderate and/or high complexity testing. This test has not been FDA cleared or approved.  A negative test does not rule out the presence of PCR inhibitors in the specimen or target RNA in concentration below the limit of detection for the assay. The possibility of a false negative should be considered if the patient's recent exposure or clinical presentation suggests COVID-19.  [facility] are certified under the Clinical Laboratory Improvement Amendments of 1988 (CLIA-88) as qualified to perform moderate and/or high complexity laboratory testing.   Urine Drugs of Abuse Screen     Status: Abnormal    Narrative    The following orders were created for panel order Urine Drugs of Abuse Screen.  Procedure                               Abnormality         Status                     ---------                               -----------         ------                     Drug abuse screen 1 urin...[[id number]]  Abnormal            Final result                 Please view results for these tests on the individual orders.     Medications   hydrOXYzine (ATARAX) tablet 50 mg (50 mg Oral Given 10/31/21 2123)   propranolol (INDERAL) tablet 20 mg (20 mg Oral Given 10/31/21 2236)   QUEtiapine (SEROquel) tablet 50 mg (50 mg Oral Given 10/31/21 2352)        Assessments & Plan (with Medical Decision Making)       I have reviewed the nursing notes. I have reviewed the findings, diagnosis, plan and need for follow up with the patient.    Patient with history of depression anxiety now having increased invasive suicidal ideation at this time patient is a voluntary admission for psychiatric admission for stabilization and treatment. Patient would likely require 72-hour hold if he chose to try and leave but would need reevaluation.    Final diagnoses:   Suicidal ideation   Depression, unspecified depression type   Anxiety       --  I, Idalia Tapia, am serving as a  trained medical scribe to document services personally performed by Be Mueller MD, based on the provider's statements to me.     I, Be Mueller MD, was physically present and have reviewed and verified the accuracy of this note documented by Idalia Tapia.    Be Muelelr MD  [facility] EMERGENCY DEPARTMENT  10/31/2021     Be Mueller MD  11/01/21 0012

## 2021-11-01 NOTE — ED NOTES
[facility] ED Mental Health Handoff Note:       Brief HPI:  This is a 36 year old male signed out to me by Dr. Mueller.  See initial ED Provider note for full details of the presentation. Interval history is pertinent for SI, HOLDABLE.    Home meds reviewed and ordered/administered: Yes    Medically stable for inpatient mental health admission: Yes.    Evaluated by mental health: Yes. The recommendation is for inpatient mental health treatment. Bed search in process    Safety concerns: At the time I received sign out, there were no safety concerns.    Hold Status:  Active Orders   N/A            Exam:   Patient Vitals for the past 24 hrs:   BP Temp Temp src Pulse Resp SpO2 Weight   10/31/21 1921 129/86 98.1  F (36.7  C) Oral 89 16 98 % 86.2 kg (190 lb)           ED Course:    Medications   hydrOXYzine (ATARAX) tablet 50 mg (50 mg Oral Given 10/31/21 2123)   propranolol (INDERAL) tablet 20 mg (20 mg Oral Given 10/31/21 2236)   QUEtiapine (SEROquel) tablet 50 mg (50 mg Oral Given 10/31/21 2352)            There were no significant events during my shift.    Patient was signed out to the oncoming provider      Impression:    ICD-10-CM    1. Suicidal ideation  R45.851    2. Depression, unspecified depression type  F32.A    3. Anxiety  F41.9        Plan:    1. Awaiting inpatient mental health admission/transfer.      RESULTS:   Results for orders placed or performed during the hospital encounter of 10/31/21 (from the past 24 hour(s))   Urine Drugs of Abuse Screen     Status: Abnormal    Collection Time: 10/31/21  8:09 PM    Narrative    The following orders were created for panel order Urine Drugs of Abuse Screen.  Procedure                               Abnormality         Status                     ---------                               -----------         ------                     Drug abuse screen 1 urin...[[id number]]  Abnormal            Final result                 Please view results for these tests  on the individual orders.   Drug abuse screen 1 urine (ED)     Status: Abnormal    Collection Time: 10/31/21  8:09 PM   Result Value Ref Range    Amphetamines Urine Screen Negative Screen Negative    Barbiturates Urine Screen Negative Screen Negative    Benzodiazepines Urine Screen Negative Screen Negative    Cannabinoids Urine Screen Positive (A) Screen Negative    Cocaine Urine Screen Negative Screen Negative    Opiates Urine Screen Negative Screen Negative   Asymptomatic COVID-19 Virus (Coronavirus) by PCR Oropharynx     Status: Normal    Collection Time: 10/31/21  9:33 PM    Specimen: Oropharynx; Swab   Result Value Ref Range    SARS CoV2 PCR Negative Negative    Narrative    Testing was performed using the [place]ston  SARS-CoV-2 & Influenza A/B Assay on the winston  Mere  System.  This test should be ordered for the detection of SARS-COV-2 in individuals who meet SARS-CoV-2 clinical and/or epidemiological criteria. Test performance is unknown in asymptomatic patients.  This test is for in vitro diagnostic use under the FDA EUA for laboratories certified under CLIA to perform moderate and/or high complexity testing. This test has not been FDA cleared or approved.  A negative test does not rule out the presence of PCR inhibitors in the specimen or target RNA in concentration below the limit of detection for the assay. The possibility of a false negative should be considered if the patient's recent exposure or clinical presentation suggests COVID-19.  [facility] are certified under the Clinical Laboratory Improvement Amendments of 1988 (CLIA-88) as qualified to perform moderate and/or high complexity laboratory testing.             MD Cory Chu Matthew Joseph, MD  11/01/21 0045

## 2021-11-01 NOTE — SAFE
Brandin Rodriguez  October 31, 2021  SAFE Note    Critical Safety Issues:       Current Suicidal Ideation/Self-Injurious Concerns/Methods: Ingestion medication.  Is guarded with information and shares little       Current or Historical Inappropriate Sexual Behavior: No      Current or Historical Aggression/Homicidal Ideation: None - N/A    Updated care team: Yes: ED MD and RN aware    For additional details see full LMHP assessment.     Radha Camacho, [facility] Health CareSW

## 2021-11-01 NOTE — ED TRIAGE NOTES
"Patient contracts for safety in the ER  Patient is here with a friend, \" Pattie\"  A week ago the patient reports that he came very close to \"trying\" to end his life.   "
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
## 2021-11-01 NOTE — ED NOTES
Brandin Rodriguez is reviewed for [facility] service. Will follow and meet with patient/family/care team as able or requested.     Danielle Kauffman  [facility], [facility] Extended [facility]   [phone]

## 2021-11-01 NOTE — TELEPHONE ENCOUNTER
EDMUNDO Ellison called with clinical on  [place] ER    B BIB friend, SI for last week, almost had a SA on alcohol and gabapentin. Patient reports increased SI thoughts hopelessness, and can't contract for safety. Patient guarded and won't tell plans, he reports his pills are out of house but somewhere else. Patient also drinking daily, sober for 2 days. No aggression or HI. MH DX of MDD and alcohol abuse. No medical concerns. Ambulatory, eating drinking.     A [facility], prefers closer to [facility].      In [place] ER awaiting placement    Patient cleared and ready for behavioral bed placement: Yes

## 2021-11-02 VITALS
DIASTOLIC BLOOD PRESSURE: 85 MMHG | WEIGHT: 200.7 LBS | SYSTOLIC BLOOD PRESSURE: 120 MMHG | RESPIRATION RATE: 17 BRPM | BODY MASS INDEX: 24.96 KG/M2 | TEMPERATURE: 98 F | HEIGHT: 75 IN | OXYGEN SATURATION: 99 % | HEART RATE: 63 BPM

## 2021-11-02 PROCEDURE — 99222 1ST HOSP IP/OBS MODERATE 55: CPT | Performed by: PSYCHIATRY & NEUROLOGY

## 2021-11-02 PROCEDURE — 250N000013 HC RX MED GY IP 250 OP 250 PS 637: Performed by: PSYCHIATRY & NEUROLOGY

## 2021-11-02 RX ORDER — ESCITALOPRAM OXALATE 10 MG/1
10 TABLET ORAL DAILY
Qty: 30 TABLET | Refills: 1 | Status: ON HOLD | OUTPATIENT
Start: 2021-11-03 | End: 2021-11-18

## 2021-11-02 RX ORDER — ESCITALOPRAM OXALATE 10 MG/1
10 TABLET ORAL DAILY
Status: DISCONTINUED | OUTPATIENT
Start: 2021-11-02 | End: 2021-11-02 | Stop reason: HOSPADM

## 2021-11-02 RX ORDER — MAGNESIUM 200 MG
1000 TABLET ORAL DAILY
Status: DISCONTINUED | OUTPATIENT
Start: 2021-11-02 | End: 2021-11-02 | Stop reason: CLARIF

## 2021-11-02 RX ORDER — LANOLIN ALCOHOL/MO/W.PET/CERES
1000 CREAM (GRAM) TOPICAL DAILY
Status: DISCONTINUED | OUTPATIENT
Start: 2021-11-02 | End: 2021-11-02 | Stop reason: HOSPADM

## 2021-11-02 RX ADMIN — PROPRANOLOL HYDROCHLORIDE 20 MG: 10 TABLET ORAL at 09:54

## 2021-11-02 RX ADMIN — MULTIPLE VITAMINS W/ MINERALS TAB 1 TABLET: TAB at 09:52

## 2021-11-02 RX ADMIN — PROPRANOLOL HYDROCHLORIDE 20 MG: 10 TABLET ORAL at 15:09

## 2021-11-02 RX ADMIN — NICOTINE POLACRILEX 4 MG: 4 GUM, CHEWING ORAL at 02:38

## 2021-11-02 RX ADMIN — Medication 1000 MCG: at 09:53

## 2021-11-02 RX ADMIN — NICOTINE POLACRILEX 4 MG: 4 GUM, CHEWING ORAL at 14:42

## 2021-11-02 RX ADMIN — HYDROXYZINE HYDROCHLORIDE 50 MG: 25 TABLET, FILM COATED ORAL at 15:09

## 2021-11-02 RX ADMIN — ESCITALOPRAM OXALATE 10 MG: 10 TABLET ORAL at 09:53

## 2021-11-02 ASSESSMENT — MIFFLIN-ST. JEOR: SCORE: 1921

## 2021-11-02 NOTE — PLAN OF CARE
Problem: Behavioral Health Plan of Care  Goal: Develops/Participates in Therapeutic [place] to Support Successful Transition  Outcome: Adequate for Discharge

## 2021-11-02 NOTE — PLAN OF CARE
Problem: Suicidal Behavior  Goal: Suicidal Behavior is Absent or Managed  Outcome: No Change     Problem: Depression  Goal: Improved Mood  Outcome: No Change     Problem: Behavioral Health Plan of Care  Goal: Plan of Care Review  Outcome: No Change   Patient stayed in the dining area after admission, worked on Sudoku while having snack and interacting with selective peers. Patient was given prn's per patient request. Patient is calm and pleasant. Patient was tearful while sharing about the death of his 2 year old and his friend. Writer listened,acknowledged and validated patient's feelings. His current stressors are financial problems, apartment issues, ended relationship recently,and death of his 2 yrs old daughter.Patient denies any pain, anxiety,SI/HI and rates depression 4. Patient is med compliant, and offers no other concerns. Staff will continue to monitor.Nhan Shrestha RN

## 2021-11-02 NOTE — PROGRESS NOTES
Pt arrived on unit via EMS at 2100 from [facility] emergency department. Pt psychosocial and Medical history includes MDD,NEREYDA, PTSD, Alcoholism, Bilateral Neuropathic pain, SI, and Hx. Of CD treatment at [facility] 11/2020. Current stressors are death of his 2 year old daughter and a friend from CD treatment (an acquaintance) that committed suicide.     Pt compliant with admission. VSS . Height and weight checked. Pt reports the current reason for their admission is SI. Pt is able to complete ADLsindependently in their current living situation. Pt currently lives with alone in an apartment. Pt was asked about healthcare directive, pt does not have one and declines to receive more information. Alcohol screening completed, pt history of alcohol includes 4-6 beers daily. Allergies reviewed. Pt prefers to speak english and no  is required at this time. Care plan added for anxiety and depression. No spiritual or cultural needs at this time. Pt listed Pattie DIXON As his designated caregiver. Pt expects to be discharged to his friends place and barriers include cleaning and getting out of his aprtment. Pt drug use history complete, includes alcoholism. Pt has no relevant history of falls. Functional assessment completed, WDL. Head to toe and systems assessment completed, WDL. Pt prefers to learn through reading and listening. Pt notified of their rights and welcome folder materials reviewed, information booklet given. Pt reports  loss of appetite and poor nutrition recently, on a regular diet. PTA med list completed, RN reviewed. Skin check completed, WDL. Pt hisotry of tobacco use includes 1 pack/day. Pt has smokeless tobacco history.     Pt observed to have blunt affect. Pt's behavior observed as cooperative and polite. Pt was visible on the unit having snack at the dining while reading. Pt social with staff. Pt's was A/O and thought content observed to be clear and organized. Speech was clear. Pt rates  depression 4/10, denies anxiety, pain. PRN Seroquel and Propanolol given per Pt request for anxiety and sleep aid. Pt denied having hallucinations and all other psych symptoms. Pt contracted for safety. Will continue to monitor and assist as needed.     No behavior observed this shift.  .Nhan Shrestha RN

## 2021-11-02 NOTE — PHARMACY-ADMISSION MEDICATION HISTORY
Admission Medication History Completed by Pharmacy    See [facility] Admission Navigator for allergy information, preferred outpatient pharmacy, prior to admission medications and immunization status.     Medication History Sources:     Surescripts (fill history), CareEverywhere, and patient interview (via iPad)    Changes made to PTA medication list (reason):    Added: per patient  o Albuterol inhaler PRN    Deleted: per patient and fill hx  o Voltaren gel - last filled in June 2021 for a 7-day supply  o Ibuprofen - no longer taking    Changed: per patient  o Cyanocobalamin 1000 mcg (no sig) --> 500 mcg to 1000 mcg daily     Additional Information:    Pantoprazole - patient is interested in re-starting medication, reports it helped with his heartburn and nausea. Patient reports he ran out of refills for the medication.    Prior to Admission medications    Medication Sig Last Dose Taking? Auth Provider   albuterol (PROAIR HFA/PROVENTIL HFA/VENTOLIN HFA) 108 (90 Base) MCG/ACT inhaler Inhale 1-2 puffs into the lungs every 6 hours as needed for shortness of breath / dyspnea or wheezing More than a month at Unknown time Yes Unknown, Entered By History   cyanocobalamin 1000 MCG SUBL Place 500-1,000 mcg under the tongue daily  10/31/2021 at Unknown time Yes Reported, Patient   hydrOXYzine (ATARAX) 50 MG tablet Take 1 tablet (50 mg) by mouth every 4 hours as needed (insomnia, anxiety) 10/31/2021 at Unknown time Yes Gerber Powell MD   multivitamin w/minerals (MULTI-VITAMIN) tablet Take 1 tablet by mouth daily 10/31/2021 at Unknown time Yes Reported, Patient   pantoprazole (PROTONIX) 40 MG EC tablet Take 1 tablet (40 mg) by mouth 2 times daily More than a month at Unknown time Yes Jerod Arzate, DO   propranolol (INDERAL) 20 MG tablet TAKE 1 TABLET(20 MG) BY MOUTH TWICE DAILY AS NEEDED FOR ANXIETY 10/31/2021 at Unknown time Yes Gerber Powell MD       Date completed: 11/01/21    Medication history completed by:      Nicollette McMann, PharmD  [facility]  Emergency Department: Ascom *[id number]
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
## 2021-11-02 NOTE — PLAN OF CARE
Problem: Behavioral Health Plan of Care  Goal: Adheres to Safety Considerations for Self and Others  Outcome: Improving  Intervention: Develop and Maintain Individualized Safety Plan  Recent Flowsheet Documentation  Taken 11/2/2021 0000 by Bindu Jorgensen RN  Safety Measures: safety rounds completed     Problem: Suicidal Behavior  Goal: Suicidal Behavior is Absent or Managed  Outcome: Improving   Pt is on suicidal precaution, no suicide noted during the night. Pt slept 5-6 hours, denied pains / discomforts, resp wnl. Had nicotine gum x1, no behavior issues, will continue to monitor.
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
## 2021-11-02 NOTE — DISCHARGE INSTRUCTIONS
Medical Appointment: Appointment with Dr. Jakbo Mccann on Tuesday at 1:30PM.   Address: [facility]: [address] [place] [address]  Phone number: [phone]  Please bring your discharge paper work from your hospital stay with you to your medical appointment.   Please bring your ID, insurance card, copayment (if any) and list of medications. If you need to cancel orreschedule, please call the clinic.

## 2021-11-02 NOTE — PROGRESS NOTES
Pt casual when talking about symptoms of depression and anxiety # 3. Denies SI/HI. Pt talked with SW and Dr. Aparicio regarding discharge plan and medications and pt verbalizes understanding and acceptance  Pt states his friend will pick him up at 1500.

## 2021-11-02 NOTE — PHARMACY-ADMISSION MEDICATION HISTORY
Admission medication history completed at [facility]. Please see Pharmacy - Admission Medication History note from 11/01/2021.

## 2021-11-03 ENCOUNTER — PATIENT OUTREACH (OUTPATIENT)
Dept: NURSING | Facility: CLINIC | Age: 36
End: 2021-11-03
Payer: COMMERCIAL

## 2021-11-03 NOTE — PROGRESS NOTES
Clinic Care Coordination Contact    Follow Up Progress Note      Assessment: LUZ MARINA SMITH spoke with pt regarding his recent hospitalization.     Pt stated that he realizes that he should have stayed at [facility]. He became afraid when the ED provider brought up a 72 hour hold. He then told the [facility] provider that he was feeling better, though he wasn't.    Pt would like to go back to [facility]. LUZ MARINA SMITH explained that he would need to go through ED. LUZ MARINA SMITH suggested [facility]. Process was explained, pt stated this might be a good path for him.    Pt stated he is going to try to call [facility] to see if there is any way he can get right in because he was just there.    Pt spoke with a therapist through [facility] today. They suggested he get a Diagnostic Assessment completed as this is needed for treatment. LUZ MARINA SMITH explained this is an assessment that can take several days to weeks to complete. LUZ MARINA SMITH explained this is a good idea to have done but it will not immediately lead to treatment or hospitalization.    Pt stated that he just doesn't feel like he can wait days. At this time he is able to endorse that he is safe with no plan to harm himself. He worries if he waits too long to get treatment or to go to the hospital, then he will harm himself.    Pt requested LUZ MARINA SMITH to outreach to him on Monday if he is not in the hospital. If he doesn't answer he would like LUZ MARINA SMITH to make a welfare check. LUZ MARINA SMITH again assessed for safety. Pt denied SI or HI at this time but he worries SI will come back. LUZ MARINA SMITH encouraged him to go to ED.    Care Gaps:    Health Maintenance Due   Topic Date Due     PREVENTIVE CARE VISIT  Never done     ADVANCE CARE PLANNING  Never done     DEPRESSION ACTION PLAN  Never done     INFLUENZA VACCINE (1) 09/01/2021     PHQ-9  11/25/2021     Postponed to focus on mental health     Goals addressed this encounter:   Goals Addressed                    This Visit's Progress       Patient Stated       1. Mental  Health Management (pt-stated)   30%      Goal Statement: Within the next 6 months, I would like to improve my overall health by decreasing my substance use and following up with medical appointments.   Date Goal set: 12/7/2020  Barriers: None  Strengths: Motivated to address health and substance use concerns  Date to Achieve By: 6/7/2021 // date extended 6/7/2022  Patient expressed understanding of goal: Ивна verbally stated understanding of goal   Action steps to achieve this goal:  1. I will attend scheduled medical appointments  2. I will follow treatment recommendations  3. I will outreach to Care Coordination  for further questions or concerns         Outreach Frequency: monthly    Plan: LUZ MARINA SMITH will outreach to pt on Monday. CC LUIS encouraged pt to outreach before then for any support.    Abimbola Cross, [facility]  Clinic Care Coordinator  [facility] Cathie Lycoming  [facility]  [phone]  [email or web address]

## 2021-11-05 ENCOUNTER — PATIENT OUTREACH (OUTPATIENT)
Dept: CARE COORDINATION | Facility: CLINIC | Age: 36
End: 2021-11-05

## 2021-11-05 NOTE — PROGRESS NOTES
Clinic Care Coordination Contact  [facility]/Voicemail       Clinical Data: Care Coordinator Outreach    Per chart review, pt has not gone to ED for Empath Unit evaluation as discussed. CC SW outreach to further discuss this and resources with pt.    Outreach attempted x 1.  Left message on patient's voicemail with call back information and requested return call.    Plan: Care Coordinator will try to reach patient again in 1-2 business days.    Abimbola Cross [facility]  Clinic Care Coordinator  [facility] Cathei Green  [facility]  [phone]  [email or web address]

## 2021-11-06 ENCOUNTER — HOSPITAL ENCOUNTER (OUTPATIENT)
Facility: CLINIC | Age: 36
Setting detail: OBSERVATION
Discharge: HOME | End: 2021-11-08
Attending: EMERGENCY MEDICINE | Admitting: PSYCHIATRY & NEUROLOGY
Payer: COMMERCIAL

## 2021-11-06 DIAGNOSIS — R45.851 SUICIDAL IDEATION: ICD-10-CM

## 2021-11-06 DIAGNOSIS — F43.10 PTSD (POST-TRAUMATIC STRESS DISORDER): ICD-10-CM

## 2021-11-06 DIAGNOSIS — F33.2 SEVERE EPISODE OF RECURRENT MAJOR DEPRESSIVE DISORDER, WITHOUT PSYCHOTIC FEATURES (H): Primary | ICD-10-CM

## 2021-11-06 DIAGNOSIS — F41.1 GAD (GENERALIZED ANXIETY DISORDER): ICD-10-CM

## 2021-11-06 PROBLEM — F32.A DEPRESSION, UNSPECIFIED DEPRESSION TYPE: Status: ACTIVE | Noted: 2021-11-06

## 2021-11-06 LAB
HOLD SPECIMEN: NORMAL
SARS-COV-2 RNA RESP QL NAA+PROBE: NEGATIVE

## 2021-11-06 PROCEDURE — C9803 HOPD COVID-19 SPEC COLLECT: HCPCS

## 2021-11-06 PROCEDURE — 87635 SARS-COV-2 COVID-19 AMP PRB: CPT | Performed by: EMERGENCY MEDICINE

## 2021-11-06 PROCEDURE — 99285 EMERGENCY DEPT VISIT HI MDM: CPT | Mod: 25

## 2021-11-06 PROCEDURE — 90791 PSYCH DIAGNOSTIC EVALUATION: CPT

## 2021-11-06 ASSESSMENT — MIFFLIN-ST. JEOR: SCORE: 1901.94

## 2021-11-07 PROCEDURE — 250N000013 HC RX MED GY IP 250 OP 250 PS 637: Performed by: PSYCHIATRY & NEUROLOGY

## 2021-11-07 PROCEDURE — G0378 HOSPITAL OBSERVATION PER HR: HCPCS

## 2021-11-07 PROCEDURE — 99220 PR INITIAL OBSERVATION CARE,LEVEL III: CPT | Performed by: NURSE PRACTITIONER

## 2021-11-07 PROCEDURE — 250N000013 HC RX MED GY IP 250 OP 250 PS 637: Performed by: NURSE PRACTITIONER

## 2021-11-07 RX ORDER — PRAZOSIN HYDROCHLORIDE 1 MG/1
1-2 CAPSULE ORAL AT BEDTIME
Status: DISCONTINUED | OUTPATIENT
Start: 2021-11-07 | End: 2021-11-07

## 2021-11-07 RX ORDER — BUPROPION HYDROCHLORIDE 150 MG/1
150 TABLET ORAL DAILY
Status: DISCONTINUED | OUTPATIENT
Start: 2021-11-08 | End: 2021-11-08 | Stop reason: HOSPADM

## 2021-11-07 RX ORDER — ACETAMINOPHEN 325 MG/1
325-650 TABLET ORAL EVERY 4 HOURS PRN
Status: DISCONTINUED | OUTPATIENT
Start: 2021-11-07 | End: 2021-11-08 | Stop reason: HOSPADM

## 2021-11-07 RX ORDER — HYDROXYZINE HYDROCHLORIDE 50 MG/1
50 TABLET, FILM COATED ORAL 4 TIMES DAILY PRN
Status: DISCONTINUED | OUTPATIENT
Start: 2021-11-07 | End: 2021-11-08 | Stop reason: HOSPADM

## 2021-11-07 RX ORDER — QUETIAPINE FUMARATE 50 MG/1
50 TABLET, FILM COATED ORAL
Status: DISCONTINUED | OUTPATIENT
Start: 2021-11-07 | End: 2021-11-07

## 2021-11-07 RX ORDER — QUETIAPINE FUMARATE 100 MG/1
100 TABLET, FILM COATED ORAL AT BEDTIME
Status: DISCONTINUED | OUTPATIENT
Start: 2021-11-07 | End: 2021-11-08 | Stop reason: HOSPADM

## 2021-11-07 RX ORDER — HYDROXYZINE HYDROCHLORIDE 25 MG/1
25 TABLET, FILM COATED ORAL 4 TIMES DAILY PRN
Status: DISCONTINUED | OUTPATIENT
Start: 2021-11-07 | End: 2021-11-08 | Stop reason: HOSPADM

## 2021-11-07 RX ORDER — PROPRANOLOL HYDROCHLORIDE 10 MG/1
20 TABLET ORAL 2 TIMES DAILY
Status: DISCONTINUED | OUTPATIENT
Start: 2021-11-07 | End: 2021-11-08 | Stop reason: HOSPADM

## 2021-11-07 RX ORDER — BUPROPION HYDROCHLORIDE 150 MG/1
150 TABLET, EXTENDED RELEASE ORAL ONCE
Status: COMPLETED | OUTPATIENT
Start: 2021-11-07 | End: 2021-11-07

## 2021-11-07 RX ORDER — ESCITALOPRAM OXALATE 10 MG/1
10 TABLET ORAL DAILY
Status: DISCONTINUED | OUTPATIENT
Start: 2021-11-07 | End: 2021-11-08 | Stop reason: HOSPADM

## 2021-11-07 RX ORDER — NICOTINE 21 MG/24HR
1 PATCH, TRANSDERMAL 24 HOURS TRANSDERMAL DAILY
Status: DISCONTINUED | OUTPATIENT
Start: 2021-11-07 | End: 2021-11-08 | Stop reason: HOSPADM

## 2021-11-07 RX ORDER — PANTOPRAZOLE SODIUM 40 MG/1
40 TABLET, DELAYED RELEASE ORAL 2 TIMES DAILY
Status: DISCONTINUED | OUTPATIENT
Start: 2021-11-07 | End: 2021-11-08 | Stop reason: HOSPADM

## 2021-11-07 RX ADMIN — QUETIAPINE FUMARATE 100 MG: 100 TABLET ORAL at 22:53

## 2021-11-07 RX ADMIN — HYDROXYZINE HYDROCHLORIDE 50 MG: 50 TABLET, FILM COATED ORAL at 10:48

## 2021-11-07 RX ADMIN — NICOTINE POLACRILEX 4 MG: 4 GUM, CHEWING BUCCAL at 22:54

## 2021-11-07 RX ADMIN — QUETIAPINE FUMARATE 50 MG: 50 TABLET ORAL at 01:44

## 2021-11-07 RX ADMIN — Medication 1000 MCG: at 18:05

## 2021-11-07 RX ADMIN — NICOTINE 1 PATCH: 21 PATCH, EXTENDED RELEASE TRANSDERMAL at 13:03

## 2021-11-07 RX ADMIN — ESCITALOPRAM OXALATE 10 MG: 10 TABLET ORAL at 11:14

## 2021-11-07 RX ADMIN — PANTOPRAZOLE SODIUM 40 MG: 40 TABLET, DELAYED RELEASE ORAL at 19:55

## 2021-11-07 RX ADMIN — HYDROXYZINE HYDROCHLORIDE 50 MG: 50 TABLET, FILM COATED ORAL at 17:33

## 2021-11-07 RX ADMIN — HYDROXYZINE HYDROCHLORIDE 50 MG: 50 TABLET, FILM COATED ORAL at 01:30

## 2021-11-07 RX ADMIN — PROPRANOLOL HYDROCHLORIDE 20 MG: 10 TABLET ORAL at 19:56

## 2021-11-07 RX ADMIN — NICOTINE POLACRILEX 2 MG: 2 GUM, CHEWING ORAL at 10:48

## 2021-11-07 RX ADMIN — BUPROPION HYDROCHLORIDE 150 MG: 150 TABLET, EXTENDED RELEASE ORAL at 13:18

## 2021-11-07 RX ADMIN — PROPRANOLOL HYDROCHLORIDE 20 MG: 10 TABLET ORAL at 11:14

## 2021-11-07 ASSESSMENT — ACTIVITIES OF DAILY LIVING (ADL)
DRESS: STREET CLOTHES;INDEPENDENT
HYGIENE/GROOMING: INDEPENDENT
ORAL_HYGIENE: INDEPENDENT
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
## 2021-11-07 NOTE — ED TRIAGE NOTES
Patient states feeling depressed & suicidal for awhile.  Having trouble with his PTSD & trouble with functioning in real world.  Thought about overdosing on his gabapentin.  Stopped taking gabapentin when a marni he was helping overdosed on gabapentin &  from it.  This happened on Easter morning.

## 2021-11-07 NOTE — TREATMENT PLAN
[facility] Treatment Plan    Client's Name: Brandin Rodriguez  YOB: 1985    DSM-5 Diagnoses:   F41.1 NEREYDA  F43.10 PTSD    Psychosocial / Contextual Factors: Patient presented to the [facility]PATH unit with the following concerns: depression, SI, decreased ability to complete tasks- paperwork, rent asst application    Anticipated number of sessions or this episode of care: 1-4    MeasurableTreatment Goal(s) related to diagnosis / functional impairment(s)    Goal 1: Patient will reduce SI intensity and establish sense of hope for self and the future.    Objective #A     Patient will discuss things that have or may help to distract them or things that help them to? feel better.  Status: New as of November 7, 2021    Intervention(s)  LMHP will provide psychoeducation on coping skills for automatic thoughts and distress tolerance.    Goal 2: Patient will increase awareness of depression symptoms and their impact on functioning and develop skills to reduce negative impact.      Objective #A     Patient will describe thoughts, feelings, and actions associated with depression.  Status: New as of November 7, 2021    Intervention(s)  LMHP will explore and process with patient how depression has impacted them.    Objective #B  Patient will identify cognitive distortions in relation to depression and explore alternatives.  Status: New as of November 7, 2021    Intervention(s)  LMHP will guide discussion and assist patient in identification of negative beliefs.             Appearance:   Appropriate    Eye Contact:   Good    Psychomotor Behavior: Normal    Attitude:   Cooperative  Pleasant   Orientation:   All   Speech    Rate / Production: Normal     Volume:  Normal    Mood:    Depressed  Sad  Euthymic Apathetic   Affect:    Flat    Thought Content:  Paranoia  Suicidal   Thought Form:  Goal Directed  Logical    Insight:    Fair           PLAN:   Patient will board in emPATH until patient and treatment deem it appropriate to  either discharge or admit to a higher level of care.       Crescencio Macias, SW                                                           ________

## 2021-11-07 NOTE — PROGRESS NOTES
36 year old male with history of trauma, depression and anxiety received from ED due to increased SI, he told a friend and she brought him in. He had plans to overdose of gabapentin. Reports he has chronic SI. current SI. States he can be safe while he is here. Nursing and risk assessments completed. Assessments reviewed with LMHP and physician. Video monitoring in progress, patient informed.  Admission information reviewed with patient. Patient given a tour of [facility] and instructions on using the facility. Questions regarding [facility] addressed. Pt search completed and belongings inventoried.    Patient reports he last drank 2 days, and he was a daily drinker but went to CD tx and does not drink daily anymore. Does not believe withdrawal. Denies every having a suicide attempt.

## 2021-11-07 NOTE — ED PROVIDER NOTES
"  History     Chief Complaint:  Suicidal     HPI   Brandin Rodriguez is a 36 year old male who presents with history of PTSD and depression who comes today with suicidal ideation.  He states that he suffered the loss of a friend who overdosed on gabapentin and after that time he stopped taking gabapentin.  He states that he has had constant thoughts of overdose.  He got rid of the medication and is now in [place].  He does not have access to it here.  A friend of his brought him in.  He states \"I always think about it but I just cannot get myself to carry through with it.\"  He smokes marijuana occasionally.  No other drugs or alcohol.  No history of seizures, diabetes or hypertension.  He states he has been diagnosed with PTSD in the past.  He really wishes to be transferred to [facility].  He denies any overdose or other attempt to suicide recently.    ROS:  Review of Systems     Allergies:  Amoxicillin  Bactrim [Sulfamethoxazole W-Trimethoprim]     Medications:    albuterol (PROAIR HFA/PROVENTIL HFA/VENTOLIN HFA) 108 (90 Base) MCG/ACT inhaler  cyanocobalamin 1000 MCG SUBL  escitalopram (LEXAPRO) 10 MG tablet  hydrOXYzine (ATARAX) 50 MG tablet  multivitamin w/minerals (MULTI-VITAMIN) tablet  nicotine polacrilex (NICORETTE) 4 MG gum  pantoprazole (PROTONIX) 40 MG EC tablet  propranolol (INDERAL) 20 MG tablet  QUEtiapine (SEROQUEL) 50 MG tablet      Past Medical History:    Past Medical History:   Diagnosis Date     Alcoholic hepatitis      Anxiety      Depression      Depressive disorder      PTSD (post-traumatic stress disorder)      Suicidal ideation      Patient Active Problem List   Diagnosis     Hematemesis     NEREYDA (generalized anxiety disorder)     Severe episode of recurrent major depressive disorder, without psychotic features (H)     Hepatitis     Liver disease, chronic, due to alcohol (H)     Suicidal ideation     Alcoholic intoxication without complication (H)     Uncomplicated alcohol dependence (H) " "    Complicated grief     Dysphagia     Elevated LFTs     Testicular pain     Tobacco use disorder     PTSD (post-traumatic stress disorder)     Major depressive disorder, single episode, severe without psychosis (H)     Sinus tachycardia     Alcohol withdrawal (H)     Dehydration     Neuropathy     Nausea and vomiting, intractability of vomiting not specified, unspecified vomiting type     Chronic left-sided low back pain without sciatica        Past Surgical History:    Past Surgical History:   Procedure Laterality Date     BIOPSY      mole bxs     ESOPHAGOSCOPY, GASTROSCOPY, DUODENOSCOPY (EGD), COMBINED N/A 11/5/2019    Procedure: ESOPHAGOGASTRODUODENOSCOPY (EGD);  Surgeon: Jake Elizabeth MD;  Location:  GI     ESOPHAGOSCOPY, GASTROSCOPY, DUODENOSCOPY (EGD), COMBINED N/A 3/12/2020    Procedure: ESOPHAGOGASTRODUODENOSCOPY (EGD);  Surgeon: Jake Elizabeth MD;  Location:  GI     SOFT TISSUE SURGERY          Family History:    family history includes Anxiety Disorder in his brother; Arrhythmia in his maternal half-sister; Cancer in his paternal grandmother; Diabetes in his mother; Heart Disease in his maternal grandmother; Hypertension in his mother.    Social History:   reports that he has been smoking. He has been smoking about 1.00 pack per day. He has never used smokeless tobacco. He reports current alcohol use. He reports current drug use. Drug: Marijuana.  PCP: Gerber Powell     Physical Exam     Patient Vitals for the past 24 hrs:   BP Temp Temp src Pulse Resp SpO2 Height Weight   11/06/21 2127 (!) 141/95 97  F (36.1  C) Temporal 84 14 99 % 1.88 m (6' 2\") 90.7 kg (200 lb)        Physical Exam  General: Well-nourished, no acute distress  Eyes: PERRL, conjunctivae pink no scleral icterus or conjunctival injection  ENT:  Moist mucus membranes  Respiratory:  No respiratory distress  CV: Normal rate   Skin: Warm, dry.  No rashes or petechiae  Musculoskeletal: No peripheral edema or calf " tenderness  Neuro: Alert and oriented to person/place/time  Psychiatric: Normal affect      Emergency Department Course     Laboratory:  Labs Ordered and Resulted from Time of ED Arrival to Time of ED Departure   COVID-19 VIRUS (CORONAVIRUS) BY PCR - Normal       Result Value    SARS CoV2 PCR Negative          Emergency Department Course:  Reviewed:  I reviewed nursing notes, vitals and past medical history    Assessments:  I obtained history and examined the patient as noted above.   I rechecked the patient and explained findings.     Disposition:  The patient was transferred to [facility].     Impression & Plan      Covid-19  Brandin Rodriguez was evaluated during a global COVID-19 pandemic, which necessitated consideration that the patient might be at risk for infection with the SARS-CoV-2 virus that causes COVID-19.   Applicable protocols for evaluation were followed during the patient's care.   COVID-19 was considered as part of the patient's evaluation. The plan for testing is:  a test was obtained during this visit.    Medical Decision Making:  Brandin Rodriguez is a 36 year old male who presents for evaluation of depression and suicidal ideation..  There are no concerns for or signs at this point of suicide attempt or ingestion, no concerning findings on the remainder of physical exam. The patient is medically cleared and deemed a good candidate for [facility] for further psychiatric evaluation and care. They were in agreement and are transferred to the Em[facility] unit in stable condition.    Diagnosis:    ICD-10-CM    1. Depression, unspecified depression type  F32.A    2. Suicidal ideation  R45.851           11/6/2021   Keara Jackson MD Cho, Amy C, MD  11/06/21 [id number]
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
## 2021-11-07 NOTE — ED NOTES
Pt this morning was resting did not really want to engage with staff and had his hoodie over his eyes. Pt was asked how he was doing and pt stated as good as I can be being in the EmPATH unit sleeping in a chair. Pt states he slept well last night and was able to rest most of the morning. Pt is was feeling anxious and was requesting some propanolol this morning. Nursing acknowledged that his was a PTA med however it had not been ordered and that nursing would need to get this ordered for him. Nursing encouraged pt to try and get up and move around as pt's BP and HR were a bit low this morning for giving him propanolol. Pt stated I know its a beta blocker and I know its not going to bother me. Nursing encouraged pt to try some Atarax for his anxiety as this is available. Pt is agreeable with this.. Pt was asked if the he was still feeling suicidal. Pt said well now I am that you mention it. Pt states that he chronically has SI and it really never goes away. Plan for pt to have medications adjusted and start on some Wellbutrin. Pt is willing to stay on OBS tonight and will reassess his situation tomorrow. Nursing to continue to monitor.

## 2021-11-08 VITALS
OXYGEN SATURATION: 99 % | RESPIRATION RATE: 16 BRPM | TEMPERATURE: 98 F | DIASTOLIC BLOOD PRESSURE: 63 MMHG | HEART RATE: 66 BPM | BODY MASS INDEX: 25.67 KG/M2 | HEIGHT: 74 IN | SYSTOLIC BLOOD PRESSURE: 100 MMHG | WEIGHT: 200 LBS

## 2021-11-08 PROCEDURE — 250N000013 HC RX MED GY IP 250 OP 250 PS 637: Performed by: NURSE PRACTITIONER

## 2021-11-08 PROCEDURE — G0378 HOSPITAL OBSERVATION PER HR: HCPCS

## 2021-11-08 PROCEDURE — 250N000013 HC RX MED GY IP 250 OP 250 PS 637: Performed by: PSYCHIATRY & NEUROLOGY

## 2021-11-08 PROCEDURE — 99217 PR OBSERVATION CARE DISCHARGE: CPT | Mod: GC | Performed by: PSYCHIATRY & NEUROLOGY

## 2021-11-08 RX ORDER — MIRTAZAPINE 7.5 MG/1
7.5 TABLET, FILM COATED ORAL AT BEDTIME
Qty: 30 TABLET | Refills: 0 | Status: ON HOLD | OUTPATIENT
Start: 2021-11-08 | End: 2021-11-18

## 2021-11-08 RX ORDER — QUETIAPINE FUMARATE 50 MG/1
50 TABLET, FILM COATED ORAL
Qty: 20 TABLET | Refills: 0 | Status: SHIPPED | OUTPATIENT
Start: 2021-11-08 | End: 2023-06-04

## 2021-11-08 RX ORDER — BUPROPION HYDROCHLORIDE 150 MG/1
150 TABLET ORAL EVERY MORNING
Qty: 30 TABLET | Refills: 0 | Status: ON HOLD | OUTPATIENT
Start: 2021-11-08 | End: 2021-11-18

## 2021-11-08 RX ADMIN — PANTOPRAZOLE SODIUM 40 MG: 40 TABLET, DELAYED RELEASE ORAL at 08:30

## 2021-11-08 RX ADMIN — HYDROXYZINE HYDROCHLORIDE 50 MG: 50 TABLET, FILM COATED ORAL at 01:05

## 2021-11-08 RX ADMIN — HYDROXYZINE HYDROCHLORIDE 50 MG: 50 TABLET, FILM COATED ORAL at 05:05

## 2021-11-08 RX ADMIN — PROPRANOLOL HYDROCHLORIDE 20 MG: 10 TABLET ORAL at 08:30

## 2021-11-08 RX ADMIN — Medication 1000 MCG: at 08:33

## 2021-11-08 RX ADMIN — ACETAMINOPHEN 650 MG: 325 TABLET, FILM COATED ORAL at 01:05

## 2021-11-08 RX ADMIN — ESCITALOPRAM OXALATE 10 MG: 10 TABLET ORAL at 08:30

## 2021-11-08 RX ADMIN — HYDROXYZINE HYDROCHLORIDE 50 MG: 50 TABLET, FILM COATED ORAL at 14:42

## 2021-11-08 RX ADMIN — NICOTINE 1 PATCH: 21 PATCH, EXTENDED RELEASE TRANSDERMAL at 08:31

## 2021-11-08 RX ADMIN — BUPROPION HYDROCHLORIDE 150 MG: 150 TABLET, FILM COATED, EXTENDED RELEASE ORAL at 08:30

## 2021-11-08 RX ADMIN — NICOTINE POLACRILEX 4 MG: 4 GUM, CHEWING BUCCAL at 05:05

## 2021-11-08 ASSESSMENT — ACTIVITIES OF DAILY LIVING (ADL)
HYGIENE/GROOMING: HANDWASHING
ORAL_HYGIENE: INDEPENDENT

## 2021-11-08 NOTE — DISCHARGE INSTRUCTIONS
If I am feeling unsafe or I am in a crisis, I will:   Contact my established care providers   Call the National Suicide Prevention Lifeline: 956.835.3632   Go to the nearest emergency room   Call 252     Warning signs that I or other people might notice when a crisis is developing for me: isolating/withdrawing more from family, worsening depression, increase in intensity of suicidal thoughts, thinking that you might act on these thoughts    Things I am able to do on my own to cope or help me feel better: take medications as prescribed, go for a walk, read, journal, watch TV    Things that I am able to do with others to cope or help me better: reach out to parents for support, call [facility] for support, reach out to friend Pattie     Things I can use or do for distraction: watching TV, reading, journaling, practice TIPP (see below)     Changes I can make to support my mental health and wellness: call [facility] to ask about getting set up with mental health case management, complete application for county assistance. Start IOP/PHP for intensive therapeutic support while in your home environment. Consider calling [facility] to ask about peer support groups they have.     People in my life that I can ask for help: parents, Pattie, your sister, [facility] professionals     Your Critical access hospital has a mental health crisis team you can call 24/7:   [facility] Crisis Line Number: [phone]     Other things that are important when I m in crisis: Remember that you can always return to the ED for more support if your mental health gets worse.      Additional resources and information:     [facility]  [email or web address]  Phone: [phone]    [facility]  [email or web address]  [phone]    TIPP?stands for?Temperature,?Intense exercise,?Paced breathing, and?Paired muscle relaxation.     TEMPERATURE     When we re upset, our  bodies often feel hot. To counter this, splash your face with cold water, hold an ice cube, or let the car s AC blow on your face. Changing your body temperature will help you cool down--both physically and emotionally.     INTENSE EXERCISE     Do intense exercise to match your intense emotion. You re not a marathon runner? That s okay, you don t need to be. Sprint down to the end of the street, jump in the pool for a few laps, or do jumping jacks until you ve tired yourself out. Increasing oxygen flow helps decrease stress levels. Plus, it s hard to stay dangerously upset when you re exhausted.     PACED BREATHING     Even something as simple as controlling your breath can have a profound impact on reducing emotional pain. There are many different types of breathing exercises. If you have a favorite, breathe it out. If you don t, try a technique called  box breathing . Each breath interval will be four seconds long. Take in air four seconds, hold it in four seconds, breathe out four, and hold four. And then start again. Continue to focus on this breathing pattern until you feel more calm. Steady breathing reduces your body s fight or flight response.     PAIRED MUSCLE RELAXATION     The science of paired muscle relaxation is fascinating. When you tighten a voluntary muscle, relax it, and allow it to rest, the muscle will become more relaxed than it was before it was tightened. Relaxed muscles require less oxygen,?so your breathing and heart rate will slow down. Try this technique by focusing on a group of muscles, such as the muscles in your arms. Tighten the muscles as much as you can for five seconds. Then let go of the tension. Let the muscles relax, and you ll begin to relax, as well.     Crisis Lines  Crisis Text Line  Text 134797  You will be connected with a trained live crisis counselor to provide support.    National Hope Line  1.800.SUICIDE [1877522]    National Suicide Prevention Lifeline  Free and  "confidential support  1.800.273.TALK [6384]  http://suicidepreventionlifeline.org    Community Resources  Fast Tracker  Linking people to mental health and substance use disorder resources  Plixi.org     [place] Mental Health Warm Line  Peer to peer support  Monday thru Saturday, 12 pm to 10 pm  292.958.5642 or 8.370.410.5802  Text \"Support\" to 67514    National [place] on Mental Illness (BONG)  031.363.7809 or 1.888.BONG.HELPS    Walk-in Counseling Center  Free mental health counseling  [facility]. [facility]  [phone]    "

## 2021-11-08 NOTE — ED NOTES
Patient agreeable to discharge plan. Discharge instructions reviewed with patient including follow-up care plan. Medications: Remeron 7.5 mg at bedtime, Seroquel 50 mg PRN and Wellbutrin prescribed and sent home with patient. Reviewed safety plan and outpatient resources. Denies SI and HI. All belongings that were brought into the hospital have been returned to patient. Escorted off the unit at 1715 accompanied by [facility] staff. Discharged to parent's house via friend.

## 2021-11-08 NOTE — ED NOTES
Pt. reports that he has restless legs during the night-attributes this to increased dose of Seroquel. Remanis depressed with SI. Verbalizing desire to transition to Inpatient treatment at [facility].

## 2021-11-08 NOTE — ED NOTES
Awaken for vitals and medication. Depressed and somewhat irritable. Encouraged to order breakfast but has continued to sleep into the morning. Covering head with his sweat shirt.
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
## 2021-11-08 NOTE — ED PROVIDER NOTES
"EmPATH Unit - Psychiatric Observation Discharge Summary  [facility]  Discharge Date: 11/8/2021    Brandin Rodriguez MRN: [id number]   Age: 36 year old YOB: 1985     Brief HPI & Initial ED Course     Chief Complaint   Patient presents with     Suicidal     HPI  Brandin Rodriguez is a 36 year old male with history notable for major depressive disorder, PTSD, and anxiety who presents to the emergency room reporting depressed mood and suicidal ideation.  He was transferred to the empath unit where he entered observation status after meeting with AIMEE Murray who initiated Wellbutrin for treatment of his depressive disorder.  Seroquel was also increased to aid with insomnia.  He is being reassessed today.  The patient continues to endorse moderate to severe depressive symptoms.  He explains that suicidal thoughts are chronic with no active plan or intent.  He does not believe he will act on his suicidal thoughts as he does not wish to cause emotional pain for his parents.  He identified meaningful social supports.  He is hopeful regarding his medications.  He did not sleep well last night and recalls that the higher dose of Seroquel caused restless legs.  He has tried Remeron in the past and tolerated it without side effects.  His plan today is to stay with his parents after discharge.  He does not desire psychiatric hospitalization and anticipates that the loss of autonomy will worsen his depressive symptoms.  He is open to IOP.  He is requesting a referral for a therapist skilled and trauma therapies.        Physical Examination   BP: 100/63  Pulse: 66  Temp: 98  F (36.7  C)  Resp: 16  Height: 188 cm (6' 2\")  Weight: 90.7 kg (200 lb)  SpO2: 99 %  Standing Orthostatic BP: 110/76  Standing Orthostatic Pulse: 83 bpm    Physical Exam  General: Appears stated age.   Neuro: Alert and fully oriented. Extremities appear to demonstrate normal strength on visual inspection.   Integumentary/Skin: " no rash visualized, normal color    Psychiatric Examination   Appearance: awake, alert  Attitude:  cooperative  Eye Contact:  fair  Mood:  anxious and depressed  Affect:  intensity is blunted  Speech:  clear, coherent  Psychomotor Behavior:  no evidence of tardive dyskinesia, dystonia, or tics  Thought Process:  logical and linear  Associations:  no loose associations  Thought Content:  no evidence of psychotic thought and passive suicidal ideation present  Insight:  fair  Judgement:  intact  Oriented to:  time, person, and place  Attention Span and Concentration:  intact  Recent and Remote Memory:  fair  Language: able to name/identify objects without impairment  Fund of Knowledge: intact with awareness of current and past events    Results        Labs Ordered and Resulted from Time of ED Arrival to Time of ED Departure   COVID-19 VIRUS (CORONAVIRUS) BY PCR - Normal       Result Value    SARS CoV2 PCR Negative         Observation Course   The patient was found to have a psychiatric condition that would benefit from an observation stay in the emergency department for further psychiatric stabilization and/or coordination of a safe disposition. The plan upon observation admission included serial assessments of psychiatric condition, potential administration of medications if indicated, further disposition pending the patient's psychiatric course during the monitoring period.     Serial assessments of the patient's psychiatric condition were performed. Nursing notes were reviewed. During the observation period, the patient did not require medications for agitation, and did not require restraints/seclusion for patient and/or provider safety.     After a period of working with the treatment team on the EmPATH unit, the patient's mental state improved to allow a safe transition to outpatient care. After counseling on the diagnosis, work-up, and treatment plan, the patient was discharged. Close follow-up with a  psychiatrist and/or therapist was recommended and community psychiatric resources were provided. Patient is to return to the ED if any urgent or potentially life-threatening concerns.     Discharge Diagnoses:   Final diagnoses:   Suicidal ideation   PTSD (post-traumatic stress disorder)   Severe episode of recurrent major depressive disorder, without psychotic features (H)   NEREYDA (generalized anxiety disorder)       Treatment Plan:  -Continue Wellbutrin and Lexapro for treatment of his mood and anxiety disorders.  -Add Remeron 7.5 mg at bedtime for insomnia while continuing Seroquel however at the lower dose of 50 mg to minimize the likelihood of restless legs.  -Referral for individual psychotherapy  -Referral for intensive outpatient programming  -Discharge home today.      At the time of discharge, the patient's acute suicide risk was determined to be low due to the following factors: Reduction in the intensity of mood/anxiety symptoms that preceded the admission, denial of suicidal thoughts, denies feeling helpless or helpless, not currently under the influence of alcohol or illicit substances, denies experiencing command hallucinations, no immediate access to firearms. The patient's acute risk could be higher if noncompliant with their treatment plan, medications, follow-up appointments or using illicit substances or alcohol. Protective factors include: social supports, stable housing    --  Bolivar Long MD  [facility] EMERGENCY DEPT  EmPATH Unit  11/8/2021      Bolivar Long MD  11/08/21 9510

## 2021-11-08 NOTE — ED NOTES
"Patient remains anxious, depressed, hopeless, and slightly irritable at times. Continues to endorse fleeting thoughts of SI, but no intent. Patient stated, \"I just see flashes of a hanging noose and then at other times I see my friend that I found dead on the floor\". Patient does report he feels safe here, but states that he would be comfortable with inpatient tomorrow if that were to be the next step. Today, patient's Seroquel was increased and he was also started on Wellbutrin.Compliant with all medications. Requested PRN hydroxyzine 50 mg around 1730 for anxiety. Current plan is to stay on observation status overnight and to be re-evaluated by MD in the morning. Patient is aware and agreeable to plan. Will continue to monitor.   "

## 2021-11-09 ENCOUNTER — TELEPHONE (OUTPATIENT)
Dept: BEHAVIORAL HEALTH | Facility: CLINIC | Age: 36
End: 2021-11-09

## 2021-11-09 ENCOUNTER — PATIENT OUTREACH (OUTPATIENT)
Dept: CARE COORDINATION | Facility: CLINIC | Age: 36
End: 2021-11-09
Payer: COMMERCIAL

## 2021-11-09 ENCOUNTER — PATIENT OUTREACH (OUTPATIENT)
Dept: NURSING | Facility: CLINIC | Age: 36
End: 2021-11-09
Payer: COMMERCIAL

## 2021-11-09 ASSESSMENT — ACTIVITIES OF DAILY LIVING (ADL): DEPENDENT_IADLS:: INDEPENDENT

## 2021-11-09 NOTE — TELEPHONE ENCOUNTER
----- Message from CIARAN Mendieta sent at 11/9/2021  4:46 PM CST -----  Regarding: TC referral  Transition Clinic Referral     Type of Referral:    ___x__Therapy    _____Therapy & Medication (Therapy will be scheduled first)    Referring Provider Name: CIARAN Mendieta    Referring Provider Contact Phone Number: [phone]    Reason for Transition Clinic Referral: has outpatient appointments scheduled for therapy on 12/7/2021 at 6pm and IOP intake at [facility] on 11/19/2021 at 1:00pm.  He would like therapeutic support in the meantime to address depression, anxiety, chronic passive SI.  Patient is also suffering from grief and loss.     Next Level of Care Patient Will Be Transitioned To: see above    Start Date for Next Level of Care (Required): 11/19/2021 intake for IOP and therapy on 12/72021     Type of Clinical Assessment Completed (Crisis, DA, RENATA, etc.): crisis assessment at [facility]    Date Clinical Assessment was Completed: 11/6/2021    Diagnosis: Generalized Anxiety, Depression, and PTSD    What Would Be Helpful from the Transition Clinic: therapeutic support for anxiety, depression, and PTSD in addition to grief and loss.      Needs: NO    Does Patient Have Access to Technology: yes    Patient E-mail Address: [email or web address]    Current Patient Phone Number: [phone]    Clinician Gender Preference (if applicable): NO    CIARAN Mendieta

## 2021-11-09 NOTE — PROGRESS NOTES
Clinic Care Coordination Contact    Follow Up Progress Note      Assessment: CC LUIS spoke with pt regarding his recent ED visit. Pt shared details about his experience at the Empath Unit. Pt was very happy about the care he received.    Treatment plan: 11/19 Behavioral Health assessment and 12/7 establish with a therapist. He will be connected with a transitional therapist to bridge the gaps until 12/7. Pt will also be connected with a Mental Health , he doesn't know who that will be through. Pt also received a call from the EmPath Unit and he will be calling them back today.    Pt shared he is not feeling suicidal anymore. He is feeling hopeful right now because he was able to apply for more assistance programs. Also, having a plan to talk with a Mental Health specific . He has new medications that he will be taking and he is feeling ok about these.    Pt would like to complete the ADHD assessment. He explained there was paperwork that he and his parents needed to complete but he never did. LUZ MARINA SMITH encouraged him to contact the psychologist via [employer] and ask for guidance of how he can complete this assessment or if he needs to restart it.    Care Gaps:    Health Maintenance Due   Topic Date Due     PREVENTIVE CARE VISIT  Never done     ADVANCE CARE PLANNING  Never done     DEPRESSION ACTION PLAN  Never done     INFLUENZA VACCINE (1) 09/01/2021     PHQ-9  11/25/2021     Postponed to focus on mental health     Goals addressed this encounter:   Goals Addressed                    This Visit's Progress       Patient Stated       1. Mental Health Management (pt-stated)   30%      Goal Statement: Within the next 6 months, I would like to improve my overall health by decreasing my substance use and following up with medical appointments.   Date Goal set: 12/7/2020  Barriers: None  Strengths: Motivated to address health and substance use concerns  Date to Achieve By: 6/7/2021 // date extended  6/7/2022  Patient expressed understanding of goal: Иван verbally stated understanding of goal   Action steps to achieve this goal:  1. I will attend scheduled medical appointments  2. I will follow treatment recommendations  3. I will outreach to Care Coordination  for further questions or concerns            Outreach Frequency: monthly    Plan: CC SW will outreach to pt next week to discuss overall wellbeing.    Abimbola Cross [facility]  Clinic Care Coordinator  [facility] Cathie Nolan  [facility]  [phone]  [email or web address]

## 2021-11-09 NOTE — PROGRESS NOTES
Clinic Care Coordination Contact  [facility]/Voicemail       Clinical Data: Care Coordinator Outreach  Outreach attempted x 1.  Left message on patient's voicemail with call back information and requested return call.  Plan: Care Coordinator will try to reach patient again in 1-2 business days.    Abimbola Cross [facility]  Clinic Care Coordinator  [facility] Women[facility] Cathie Pickett  [facility]  [phone]  [email or web address]

## 2021-11-09 NOTE — TELEPHONE ENCOUNTER
Received a call from patient he was seen at [facility] on 11/8/2021and was told that he would be referred for transitional care until his follow up appointment date scheduled for 12/7/2021 approaches. No referral received from [facility]. Scheduled patient for 11/11/21 at 1:30pm.

## 2021-11-09 NOTE — TELEPHONE ENCOUNTER
Referral received for pt; pt had contacted TC prior to referral transmission, and is currently scheduled for 1330 11.11.2021 for initial appointment.    Perry Elias [facility]  11.09.2021    ----- Message from CIARAN Mendieta sent at 11/9/2021  4:46 PM CST -----  Regarding: TC referral  Transition Clinic Referral     Type of Referral:    ___x__Therapy    _____Therapy & Medication (Therapy will be scheduled first)    Referring Provider Name: CIARAN Mendieta    Referring Provider Contact Phone Number: [phone]    Reason for Transition Clinic Referral: has outpatient appointments scheduled for therapy on 12/7/2021 at 6pm and IOP intake at [facility] on 11/19/2021 at 1:00pm.  He would like therapeutic support in the meantime to address depression, anxiety, chronic passive SI.  Patient is also suffering from grief and loss.     Next Level of Care Patient Will Be Transitioned To: see above    Start Date for Next Level of Care (Required): 11/19/2021 intake for IOP and therapy on 12/72021     Type of Clinical Assessment Completed (Crisis, DA, RENATA, etc.): crisis assessment at [facility]    Date Clinical Assessment was Completed: 11/6/2021    Diagnosis: Generalized Anxiety, Depression, and PTSD    What Would Be Helpful from the Transition Clinic: therapeutic support for anxiety, depression, and PTSD in addition to grief and loss.      Needs: NO    Does Patient Have Access to Technology: yes    Patient E-mail Address: [email or web address]    Current Patient Phone Number: [phone]    Clinician Gender Preference (if applicable): NO    CIARAN Mendieta

## 2021-11-11 ENCOUNTER — TELEPHONE (OUTPATIENT)
Dept: BEHAVIORAL HEALTH | Facility: CLINIC | Age: 36
End: 2021-11-11
Payer: COMMERCIAL

## 2021-11-11 ENCOUNTER — VIRTUAL VISIT (OUTPATIENT)
Dept: BEHAVIORAL HEALTH | Facility: CLINIC | Age: 36
End: 2021-11-11
Payer: COMMERCIAL

## 2021-11-11 ENCOUNTER — HOSPITAL ENCOUNTER (OUTPATIENT)
Facility: CLINIC | Age: 36
Setting detail: OBSERVATION
Discharge: HOME | End: 2021-11-12
Attending: EMERGENCY MEDICINE | Admitting: EMERGENCY MEDICINE
Payer: COMMERCIAL

## 2021-11-11 ENCOUNTER — TELEPHONE (OUTPATIENT)
Dept: EMERGENCY MEDICINE | Facility: CLINIC | Age: 36
End: 2021-11-11
Payer: COMMERCIAL

## 2021-11-11 DIAGNOSIS — R45.851 SUICIDAL IDEATION: ICD-10-CM

## 2021-11-11 DIAGNOSIS — F43.10 PTSD (POST-TRAUMATIC STRESS DISORDER): ICD-10-CM

## 2021-11-11 DIAGNOSIS — F43.10 PTSD (POST-TRAUMATIC STRESS DISORDER): Primary | ICD-10-CM

## 2021-11-11 DIAGNOSIS — F32.9 MAJOR DEPRESSIVE DISORDER WITH CURRENT ACTIVE EPISODE, UNSPECIFIED DEPRESSION EPISODE SEVERITY, UNSPECIFIED WHETHER RECURRENT: ICD-10-CM

## 2021-11-11 DIAGNOSIS — F33.2 SEVERE EPISODE OF RECURRENT MAJOR DEPRESSIVE DISORDER, WITHOUT PSYCHOTIC FEATURES (H): ICD-10-CM

## 2021-11-11 DIAGNOSIS — F41.1 GAD (GENERALIZED ANXIETY DISORDER): ICD-10-CM

## 2021-11-11 LAB — SARS-COV-2 RNA RESP QL NAA+PROBE: NEGATIVE

## 2021-11-11 PROCEDURE — 90791 PSYCH DIAGNOSTIC EVALUATION: CPT

## 2021-11-11 PROCEDURE — C9803 HOPD COVID-19 SPEC COLLECT: HCPCS

## 2021-11-11 PROCEDURE — 99285 EMERGENCY DEPT VISIT HI MDM: CPT | Mod: 25

## 2021-11-11 PROCEDURE — 87635 SARS-COV-2 COVID-19 AMP PRB: CPT | Performed by: EMERGENCY MEDICINE

## 2021-11-11 ASSESSMENT — ANXIETY QUESTIONNAIRES
7. FEELING AFRAID AS IF SOMETHING AWFUL MIGHT HAPPEN: NEARLY EVERY DAY
6. BECOMING EASILY ANNOYED OR IRRITABLE: MORE THAN HALF THE DAYS
8. IF YOU CHECKED OFF ANY PROBLEMS, HOW DIFFICULT HAVE THESE MADE IT FOR YOU TO DO YOUR WORK, TAKE CARE OF THINGS AT HOME, OR GET ALONG WITH OTHER PEOPLE?: EXTREMELY DIFFICULT
1. FEELING NERVOUS, ANXIOUS, OR ON EDGE: NEARLY EVERY DAY
5. BEING SO RESTLESS THAT IT IS HARD TO SIT STILL: NEARLY EVERY DAY
GAD7 TOTAL SCORE: 20
GAD7 TOTAL SCORE: 20
7. FEELING AFRAID AS IF SOMETHING AWFUL MIGHT HAPPEN: NEARLY EVERY DAY
4. TROUBLE RELAXING: NEARLY EVERY DAY
3. WORRYING TOO MUCH ABOUT DIFFERENT THINGS: NEARLY EVERY DAY
GAD7 TOTAL SCORE: 20
2. NOT BEING ABLE TO STOP OR CONTROL WORRYING: NEARLY EVERY DAY

## 2021-11-11 ASSESSMENT — ENCOUNTER SYMPTOMS
ARTHRALGIAS: 1
NERVOUS/ANXIOUS: 1

## 2021-11-11 ASSESSMENT — PATIENT HEALTH QUESTIONNAIRE - PHQ9
10. IF YOU CHECKED OFF ANY PROBLEMS, HOW DIFFICULT HAVE THESE PROBLEMS MADE IT FOR YOU TO DO YOUR WORK, TAKE CARE OF THINGS AT HOME, OR GET ALONG WITH OTHER PEOPLE: EXTREMELY DIFFICULT
SUM OF ALL RESPONSES TO PHQ QUESTIONS 1-9: 24
SUM OF ALL RESPONSES TO PHQ QUESTIONS 1-9: 24

## 2021-11-11 ASSESSMENT — MIFFLIN-ST. JEOR: SCORE: 1856.58

## 2021-11-11 NOTE — TELEPHONE ENCOUNTER
"Received a call from Yaya Elias, therapist at [facility].  He met with pt today and pt continues to have suicidal ideation.  Pt today Yaya he should be \"put on a hold\" because otherwise he would be \"dishonest enough\" to get discharged.  Pt was having thoughts of making a noose with which to hang himself and looking for items around his house with which to make the noose.      Pt is having a friend bring him to the ED.  Yaya will be calling pt back to ensure accountability is patient presenting to the ED.  Yaya can be reached until 7:30 pm tonight for further collateral if needed.    Daiana Myers, SHALONDA    "

## 2021-11-11 NOTE — TELEPHONE ENCOUNTER
This writer contacted pt by phone for follow-up as noted in 11.11.2021 session documentation. Pt reported that his friend would be arriving within 10 minutes, and that he would be presenting to [facility] shortly following her arrival. Pt reported panic-like sx, and that he intended to take a dose of his PRN medication to assist with this. Briefly reviewed self-soothing strategies, and reframed pt's reported fears of anticipated lack of control due to hold as his taking independent action and empowering self to recognize and advocate for his own needs.    Perry Elias, [facility]  11.11.2021  [id number]

## 2021-11-11 NOTE — PROGRESS NOTES
Progress Note    Patient Name: Brandin Rodriguez  Date: 2021         Service Type: Individual      Session Start Time: 1325  Session End Time: 1355     Session Length: 30m    Session #: 001    Attendees: Client attended alone    Service Modality:  Video Visit:      Provider verified identity through the following two step process.  Patient provided:  Patient     Telemedicine Visit: The patient's condition can be safely assessed and treated via synchronous audio and visual telemedicine encounter.      Reason for Telemedicine Visit: Services only offered telehealth    Originating Site (Patient Location): Patient's home    Distant Site (Provider Location): [facility]: [facility]    Consent:  The patient/guardian has verbally consented to: the potential risks and benefits of telemedicine (video visit) versus in person care; bill my insurance or make self-payment for services provided; and responsibility for payment of non-covered services.     Patient would like the video invitation sent by:  My Chart    Mode of Communication:  Video Conference via [employer] - pt had camera muted by request    As the provider I attest to compliance with applicable laws and regulations related to telemedicine.     PHQ-9 / NEREYDA-7 : Answers for HPI/ROS submitted by the patient on 2021  If you checked off any problems, how difficult have these problems made it for you to do your work, take care of things at home, or get along with other people?: Extremely difficult  PHQ9 TOTAL SCORE: 24  NEREYDA 7 TOTAL SCORE: 20        DATA  Interactive Complexity: No  Crisis: No       Progress Since Last Session (Related to Symptoms / Goals / Homework):   Symptoms: n/a - initial session    Homework: n/a - initial session      Episode of Care Goals: No improvement - CONTEMPLATION (Considering change and yet undecided); Intervened by assessing the negative and positive thinking  "(ambivalence) about behavior change     Current / Ongoing Stressors and Concerns:   Pt presented to initial session audibly distressed; began dialogues by clarifying the point at which emergency services would be dispatched by this writer, and requesting that a \"mutual promise\" be made that he would self-present to a higher level of care if needed prior to involuntarily having police be sent for a safety & wellness check. Clarified reporting requirements and reoriented dialogue to pt's current degree of sx manifestation. He reports that SI have remained consistent with the frequency and severity prior to his most recent admission to [facility] earlier this week, and that although no overt actions have been taken, he does continue to mentally explore plans with the means available to him in the current environment. Continued to process current pt safety; he presented and committed to a plan to contact a close friend to transport him to [facility] upon ending session with this writer, and that this writer would contact [facility] to inform his planned arrival and provide updated collateral information. Pt also reported that he \"needs to be put on a hold so that (he) can't talk (his) way out of it again\" and that he felt as though he should not have been discharged when he was. Pt requested callback from this writer in 1 hour from termination of session to provide additional level of external accountability.     Treatment Objective(s) Addressed in This Session:   Safety screening and coordination with appropriate level of care     Intervention:   Motivational Interviewing: Processed pt's current presentation and stated / evident needs; asisted in decisionmaking process within current stage of change to preserve pt safety        ASSESSMENT: Current Emotional / Mental Status (status of significant symptoms):   Risk status (Self / Other harm or suicidal ideation)   Patient reports the following current fears or concerns for personal " safety: Chronic SI at levels consistent previous to recent [facility] admission.   Patient reports the following current or recent suicidal ideation or behaviors: Chronic SI; exploration of plans with limited means within environment.   Patient denies current or recent homicidal ideation or behaviors.   Patient denies current or recent self injurious behavior or ideation.   Patient denies other safety concerns.   Patient reports there has been no change in risk factors since their last session.     Patient reports there has been no change in protective factors since their last session.     Recommended that patient call 911 or go to the local ED should there be a change in any of these risk factors.     Appearance:   Unable to assess - pt muted camera    Eye Contact:   Unable to assess - pt muted camera    Psychomotor Behavior: Unable to assess - pt muted camera    Attitude:   Cooperative    Orientation:   All   Speech    Rate / Production: Normal     Volume:  Soft    Mood:    Anxious  Depressed  Irritable    Affect:    Blunted    Thought Content:  Clear  Suicidal   Thought Form:  Coherent  Logical    Insight:    Poor      Medication Review:   No changes to current psychiatric medication(s)     Medication Compliance:   Yes     Changes in Health Issues:   None reported     Chemical Use Review:   Substance Use: Chemical use reviewed, no active concerns identified      Tobacco Use: No change to previous tobacco use pattern    Diagnosis:  1. F43.10 - PTSD (post-traumatic stress disorder)    2. F33.2 - Severe episode of recurrent major depressive disorder, without psychotic features (H)    3. F41.1 - NEREYDA (generalized anxiety disorder)        Collateral Reports Completed:   Communicated with: [facility] staff as noted above    PLAN: (Patient Tasks / Therapist Tasks / Other)  Pt to contact close friend by phone to arrange transport to [facility]; further assist in coordination of care as appropriate / available.      Perry  [facility]  11.11.2021

## 2021-11-12 ENCOUNTER — HOSPITAL ENCOUNTER (INPATIENT)
Facility: CLINIC | Age: 36
LOS: 6 days | Discharge: HOME | End: 2021-11-18
Attending: PSYCHIATRY & NEUROLOGY | Admitting: PSYCHIATRY & NEUROLOGY
Payer: COMMERCIAL

## 2021-11-12 ENCOUNTER — TELEPHONE (OUTPATIENT)
Dept: BEHAVIORAL HEALTH | Facility: CLINIC | Age: 36
End: 2021-11-12
Payer: COMMERCIAL

## 2021-11-12 VITALS
WEIGHT: 195.56 LBS | SYSTOLIC BLOOD PRESSURE: 132 MMHG | TEMPERATURE: 97.7 F | OXYGEN SATURATION: 98 % | RESPIRATION RATE: 20 BRPM | DIASTOLIC BLOOD PRESSURE: 81 MMHG | BODY MASS INDEX: 25.1 KG/M2 | HEIGHT: 74 IN | HEART RATE: 85 BPM

## 2021-11-12 DIAGNOSIS — F32.A DEPRESSION, UNSPECIFIED DEPRESSION TYPE: Primary | ICD-10-CM

## 2021-11-12 DIAGNOSIS — F90.2 ADHD (ATTENTION DEFICIT HYPERACTIVITY DISORDER), COMBINED TYPE: ICD-10-CM

## 2021-11-12 DIAGNOSIS — F43.10 PTSD (POST-TRAUMATIC STRESS DISORDER): ICD-10-CM

## 2021-11-12 DIAGNOSIS — F51.04 PSYCHOPHYSIOLOGICAL INSOMNIA: ICD-10-CM

## 2021-11-12 DIAGNOSIS — F17.200 TOBACCO USE DISORDER: ICD-10-CM

## 2021-11-12 DIAGNOSIS — F10.920 ALCOHOLIC INTOXICATION WITHOUT COMPLICATION (H): ICD-10-CM

## 2021-11-12 PROBLEM — F32.9 MAJOR DEPRESSIVE DISORDER WITH CURRENT ACTIVE EPISODE, UNSPECIFIED DEPRESSION EPISODE SEVERITY, UNSPECIFIED WHETHER RECURRENT: Status: ACTIVE | Noted: 2021-11-12

## 2021-11-12 LAB
AMPHETAMINES UR QL SCN: ABNORMAL
BARBITURATES UR QL: ABNORMAL
BENZODIAZ UR QL: ABNORMAL
CANNABINOIDS UR QL SCN: ABNORMAL
COCAINE UR QL: ABNORMAL
OPIATES UR QL SCN: ABNORMAL
PCP UR QL SCN: ABNORMAL

## 2021-11-12 PROCEDURE — 99236 HOSP IP/OBS SAME DATE HI 85: CPT | Performed by: PSYCHIATRY & NEUROLOGY

## 2021-11-12 PROCEDURE — 250N000013 HC RX MED GY IP 250 OP 250 PS 637: Performed by: PSYCHIATRY & NEUROLOGY

## 2021-11-12 PROCEDURE — 124N000002 HC R&B MH UMMC

## 2021-11-12 PROCEDURE — G0378 HOSPITAL OBSERVATION PER HR: HCPCS

## 2021-11-12 PROCEDURE — 90853 GROUP PSYCHOTHERAPY: CPT

## 2021-11-12 PROCEDURE — 80307 DRUG TEST PRSMV CHEM ANLYZR: CPT | Performed by: PSYCHIATRY & NEUROLOGY

## 2021-11-12 RX ORDER — QUETIAPINE FUMARATE 50 MG/1
50 TABLET, FILM COATED ORAL
Status: DISCONTINUED | OUTPATIENT
Start: 2021-11-12 | End: 2021-11-12

## 2021-11-12 RX ORDER — HYDROXYZINE HYDROCHLORIDE 25 MG/1
25 TABLET, FILM COATED ORAL EVERY 4 HOURS PRN
Status: DISCONTINUED | OUTPATIENT
Start: 2021-11-12 | End: 2021-11-12

## 2021-11-12 RX ORDER — MIRTAZAPINE 7.5 MG/1
7.5 TABLET, FILM COATED ORAL AT BEDTIME
Status: DISCONTINUED | OUTPATIENT
Start: 2021-11-12 | End: 2021-11-12 | Stop reason: HOSPADM

## 2021-11-12 RX ORDER — QUETIAPINE FUMARATE 50 MG/1
50 TABLET, FILM COATED ORAL
Status: DISCONTINUED | OUTPATIENT
Start: 2021-11-12 | End: 2021-11-18 | Stop reason: HOSPADM

## 2021-11-12 RX ORDER — ESCITALOPRAM OXALATE 10 MG/1
10 TABLET ORAL DAILY
Status: DISCONTINUED | OUTPATIENT
Start: 2021-11-12 | End: 2021-11-12 | Stop reason: HOSPADM

## 2021-11-12 RX ORDER — PROPRANOLOL HYDROCHLORIDE 10 MG/1
20 TABLET ORAL 2 TIMES DAILY PRN
Status: DISCONTINUED | OUTPATIENT
Start: 2021-11-12 | End: 2021-11-12 | Stop reason: HOSPADM

## 2021-11-12 RX ORDER — BUPROPION HYDROCHLORIDE 150 MG/1
150 TABLET ORAL EVERY MORNING
Status: DISCONTINUED | OUTPATIENT
Start: 2021-11-13 | End: 2021-11-13

## 2021-11-12 RX ORDER — POLYETHYLENE GLYCOL 3350 17 G
2 POWDER IN PACKET (EA) ORAL
Status: DISCONTINUED | OUTPATIENT
Start: 2021-11-12 | End: 2021-11-12

## 2021-11-12 RX ORDER — HYDROXYZINE HYDROCHLORIDE 50 MG/1
50 TABLET, FILM COATED ORAL EVERY 4 HOURS PRN
Status: DISCONTINUED | OUTPATIENT
Start: 2021-11-12 | End: 2021-11-18 | Stop reason: HOSPADM

## 2021-11-12 RX ORDER — LANOLIN ALCOHOL/MO/W.PET/CERES
3 CREAM (GRAM) TOPICAL
Status: DISCONTINUED | OUTPATIENT
Start: 2021-11-12 | End: 2021-11-12 | Stop reason: HOSPADM

## 2021-11-12 RX ORDER — ATENOLOL 50 MG/1
50 TABLET ORAL DAILY PRN
Status: DISCONTINUED | OUTPATIENT
Start: 2021-11-12 | End: 2021-11-13

## 2021-11-12 RX ORDER — MIRTAZAPINE 7.5 MG/1
7.5 TABLET, FILM COATED ORAL AT BEDTIME
Status: DISCONTINUED | OUTPATIENT
Start: 2021-11-12 | End: 2021-11-13

## 2021-11-12 RX ORDER — ALBUTEROL SULFATE 90 UG/1
1-2 AEROSOL, METERED RESPIRATORY (INHALATION) EVERY 6 HOURS PRN
Status: DISCONTINUED | OUTPATIENT
Start: 2021-11-12 | End: 2021-11-18 | Stop reason: HOSPADM

## 2021-11-12 RX ORDER — PANTOPRAZOLE SODIUM 40 MG/1
40 TABLET, DELAYED RELEASE ORAL 2 TIMES DAILY
Status: DISCONTINUED | OUTPATIENT
Start: 2021-11-12 | End: 2021-11-18 | Stop reason: HOSPADM

## 2021-11-12 RX ORDER — BUPROPION HYDROCHLORIDE 150 MG/1
150 TABLET ORAL EVERY MORNING
Status: DISCONTINUED | OUTPATIENT
Start: 2021-11-12 | End: 2021-11-12 | Stop reason: HOSPADM

## 2021-11-12 RX ORDER — OLANZAPINE 10 MG/2ML
10 INJECTION, POWDER, FOR SOLUTION INTRAMUSCULAR 3 TIMES DAILY PRN
Status: DISCONTINUED | OUTPATIENT
Start: 2021-11-12 | End: 2021-11-18 | Stop reason: HOSPADM

## 2021-11-12 RX ORDER — HYDROXYZINE HYDROCHLORIDE 50 MG/1
50 TABLET, FILM COATED ORAL EVERY 4 HOURS PRN
Status: DISCONTINUED | OUTPATIENT
Start: 2021-11-12 | End: 2021-11-12 | Stop reason: HOSPADM

## 2021-11-12 RX ORDER — MULTIPLE VITAMINS W/ MINERALS TAB 9MG-400MCG
1 TAB ORAL DAILY
Status: DISCONTINUED | OUTPATIENT
Start: 2021-11-12 | End: 2021-11-18 | Stop reason: HOSPADM

## 2021-11-12 RX ORDER — AMOXICILLIN 250 MG
1 CAPSULE ORAL 2 TIMES DAILY PRN
Status: DISCONTINUED | OUTPATIENT
Start: 2021-11-12 | End: 2021-11-18 | Stop reason: HOSPADM

## 2021-11-12 RX ORDER — MAGNESIUM HYDROXIDE/ALUMINUM HYDROXICE/SIMETHICONE 120; 1200; 1200 MG/30ML; MG/30ML; MG/30ML
30 SUSPENSION ORAL EVERY 4 HOURS PRN
Status: DISCONTINUED | OUTPATIENT
Start: 2021-11-12 | End: 2021-11-18 | Stop reason: HOSPADM

## 2021-11-12 RX ORDER — OLANZAPINE 10 MG/1
10 TABLET ORAL 3 TIMES DAILY PRN
Status: DISCONTINUED | OUTPATIENT
Start: 2021-11-12 | End: 2021-11-18 | Stop reason: HOSPADM

## 2021-11-12 RX ORDER — ACETAMINOPHEN 325 MG/1
650 TABLET ORAL EVERY 6 HOURS PRN
Status: DISCONTINUED | OUTPATIENT
Start: 2021-11-12 | End: 2021-11-12 | Stop reason: HOSPADM

## 2021-11-12 RX ORDER — DIAZEPAM 5 MG
5-20 TABLET ORAL EVERY 30 MIN PRN
Status: DISCONTINUED | OUTPATIENT
Start: 2021-11-12 | End: 2021-11-13

## 2021-11-12 RX ORDER — HYDROXYZINE HYDROCHLORIDE 50 MG/1
50 TABLET, FILM COATED ORAL EVERY 4 HOURS PRN
Status: DISCONTINUED | OUTPATIENT
Start: 2021-11-12 | End: 2021-11-12

## 2021-11-12 RX ORDER — FOLIC ACID 1 MG/1
1 TABLET ORAL DAILY
Status: DISCONTINUED | OUTPATIENT
Start: 2021-11-12 | End: 2021-11-18 | Stop reason: HOSPADM

## 2021-11-12 RX ORDER — PROPRANOLOL HYDROCHLORIDE 20 MG/1
20 TABLET ORAL 2 TIMES DAILY PRN
Status: DISCONTINUED | OUTPATIENT
Start: 2021-11-12 | End: 2021-11-13

## 2021-11-12 RX ORDER — TRAZODONE HYDROCHLORIDE 50 MG/1
50 TABLET, FILM COATED ORAL
Status: DISCONTINUED | OUTPATIENT
Start: 2021-11-12 | End: 2021-11-13

## 2021-11-12 RX ORDER — NICOTINE 21 MG/24HR
1 PATCH, TRANSDERMAL 24 HOURS TRANSDERMAL DAILY
Status: DISCONTINUED | OUTPATIENT
Start: 2021-11-12 | End: 2021-11-12 | Stop reason: HOSPADM

## 2021-11-12 RX ORDER — MAGNESIUM 200 MG
1000 TABLET ORAL DAILY
Status: DISCONTINUED | OUTPATIENT
Start: 2021-11-12 | End: 2021-11-15

## 2021-11-12 RX ORDER — NICOTINE 21 MG/24HR
1 PATCH, TRANSDERMAL 24 HOURS TRANSDERMAL DAILY
Status: DISCONTINUED | OUTPATIENT
Start: 2021-11-12 | End: 2021-11-18 | Stop reason: HOSPADM

## 2021-11-12 RX ORDER — ACETAMINOPHEN 325 MG/1
650 TABLET ORAL EVERY 4 HOURS PRN
Status: DISCONTINUED | OUTPATIENT
Start: 2021-11-12 | End: 2021-11-18 | Stop reason: HOSPADM

## 2021-11-12 RX ORDER — MULTIPLE VITAMINS W/ MINERALS TAB 9MG-400MCG
1 TAB ORAL DAILY
Status: DISCONTINUED | OUTPATIENT
Start: 2021-11-12 | End: 2021-11-12

## 2021-11-12 RX ORDER — QUETIAPINE FUMARATE 50 MG/1
50 TABLET, FILM COATED ORAL
Status: DISCONTINUED | OUTPATIENT
Start: 2021-11-12 | End: 2021-11-12 | Stop reason: HOSPADM

## 2021-11-12 RX ADMIN — ACETAMINOPHEN 650 MG: 325 TABLET, FILM COATED ORAL at 17:37

## 2021-11-12 RX ADMIN — ACETAMINOPHEN 650 MG: 325 TABLET, FILM COATED ORAL at 12:55

## 2021-11-12 RX ADMIN — NICOTINE POLACRILEX 2 MG: 2 GUM, CHEWING ORAL at 03:12

## 2021-11-12 RX ADMIN — HYDROXYZINE HYDROCHLORIDE 50 MG: 50 TABLET, FILM COATED ORAL at 17:36

## 2021-11-12 RX ADMIN — NICOTINE POLACRILEX 4 MG: 4 GUM, CHEWING ORAL at 21:18

## 2021-11-12 RX ADMIN — MIRTAZAPINE 7.5 MG: 7.5 TABLET, FILM COATED ORAL at 22:04

## 2021-11-12 RX ADMIN — THIAMINE HCL TAB 100 MG 100 MG: 100 TAB at 17:36

## 2021-11-12 RX ADMIN — Medication 1000 MCG: at 17:37

## 2021-11-12 RX ADMIN — MULTIPLE VITAMINS W/ MINERALS TAB 1 TABLET: TAB at 17:37

## 2021-11-12 RX ADMIN — FOLIC ACID 1 MG: 1 TABLET ORAL at 17:36

## 2021-11-12 RX ADMIN — PROPRANOLOL HYDROCHLORIDE 20 MG: 10 TABLET ORAL at 12:55

## 2021-11-12 RX ADMIN — PROPRANOLOL HYDROCHLORIDE 20 MG: 20 TABLET ORAL at 19:36

## 2021-11-12 RX ADMIN — ESCITALOPRAM OXALATE 10 MG: 10 TABLET ORAL at 12:22

## 2021-11-12 RX ADMIN — MELATONIN TAB 3 MG 3 MG: 3 TAB at 03:05

## 2021-11-12 RX ADMIN — NICOTINE 1 PATCH: 21 PATCH, EXTENDED RELEASE TRANSDERMAL at 17:39

## 2021-11-12 RX ADMIN — NICOTINE POLACRILEX 4 MG: 4 GUM, CHEWING BUCCAL at 12:27

## 2021-11-12 RX ADMIN — QUETIAPINE FUMARATE 50 MG: 50 TABLET ORAL at 03:05

## 2021-11-12 RX ADMIN — NICOTINE 1 PATCH: 21 PATCH, EXTENDED RELEASE TRANSDERMAL at 12:23

## 2021-11-12 RX ADMIN — HYDROXYZINE HYDROCHLORIDE 50 MG: 50 TABLET, FILM COATED ORAL at 12:22

## 2021-11-12 RX ADMIN — HYDROXYZINE HYDROCHLORIDE 50 MG: 50 TABLET, FILM COATED ORAL at 23:03

## 2021-11-12 RX ADMIN — BUPROPION HYDROCHLORIDE 150 MG: 150 TABLET, FILM COATED, EXTENDED RELEASE ORAL at 12:22

## 2021-11-12 RX ADMIN — MIRTAZAPINE 7.5 MG: 7.5 TABLET, FILM COATED ORAL at 03:05

## 2021-11-12 RX ADMIN — PANTOPRAZOLE SODIUM 40 MG: 40 TABLET, DELAYED RELEASE ORAL at 19:36

## 2021-11-12 ASSESSMENT — MIFFLIN-ST. JEOR
SCORE: 1886.07
SCORE: 1881.82

## 2021-11-12 ASSESSMENT — PATIENT HEALTH QUESTIONNAIRE - PHQ9: SUM OF ALL RESPONSES TO PHQ QUESTIONS 1-9: 24

## 2021-11-12 ASSESSMENT — ANXIETY QUESTIONNAIRES: GAD7 TOTAL SCORE: 20

## 2021-11-12 ASSESSMENT — ACTIVITIES OF DAILY LIVING (ADL)
HYGIENE/GROOMING: INDEPENDENT
LAUNDRY: WITH SUPERVISION
DRESS: STREET CLOTHES
DRESS: INDEPENDENT
ORAL_HYGIENE: INDEPENDENT
ORAL_HYGIENE: INDEPENDENT

## 2021-11-12 NOTE — TELEPHONE ENCOUNTER
S: [facility] A[facility] called @ 12:06pm with 36y/M with SI and multiple plans.    B:  Pt present after a recent discharge from [facility] where he states he lied about no SI so he could be discharged.    Pt presented a day ago to justo, and again today with worsening depression and multiple plans.  Pt plan is to hang himself, and as a back up, pt states he has been hoarding gabapentin for use as suicide attempt.    Pt denies SIB.  Pt denies Psychosis, HI, AH and VH.  Pt had his 1st Therapy appt yesterday with [facility].     Pt states he has not been taking his meds the last few days.    A: 72 hr   (Pt requested to be put on a hold so he gets help)    R:  Patient cleared and ready for behavioral bed placement: Yes     Provider paged @ 12:10pm to present for 10/Anastasia Marks called back @ 12:20pm and accpted pr for 10  Pt placed in unit que and charge called with disposition @ 12:23pm  [facility] updated with placement @ 12:27pm

## 2021-11-12 NOTE — ED NOTES
"The following information was received from Lakisha Quigley whose relationship to the patient is friend. Information was obtained over the phone. Their phone number is [phone] and they last had contact with patient yesterday.    What happened today: Lakisha brought the pt to the ED last evening due to suicidal ideation.    What is different about patient's functioning: Lakisha state the pt asked her to bring him to the hospital yesterday. She states he can't stop thinking about suicide and is \"ruminating all the time\".  She states they talk multiple hours a day and he repeats himself all the time and doesn't recall he tell her the same things, like he's in a brain fog.  She states he is afraid of everything, he has not been able to work for a long time, he does not sleep or eat.  He is \"paralyzed at his own house\".  He lives close to [facility] and is afraid of everything.  He has a history of a lot of trauma and has been thinking about his daughter's death a lot lately and likely has not dealt with it.  She also does not think he is taking his medications as prescribed    Concern about alcohol/drug use: Yes Lakisha does not think the pt is using alcohol right now, he told her he doesn't have any money to buy it.  Likely is using some marijuana which is baseline for him.    What do you think the patient needs: Lakisha states the pt cannot commit to a safety plan.  She believes the pt's Gabapentin is still locked in a storage unit in [place], however, pt reported to [facility] last evening that he didn't get rid of it.    Has patient made comments about wanting to kill themselves/others:  Yes Pt has talked about overdosing and hanging himself and has not been able to commit to his safety plan.    If d/c is recommended, can they take part in safety/aftercare planning: Yes Lakisha has been involved in the pt's care recently and picked him up the last time he was at [facility], however, she does have significant safety " concerns.     SHALONDA Oneal

## 2021-11-12 NOTE — ED TRIAGE NOTES
[facility]  ED Arrival Note    Arrives through triage. A &O X4. ABC's intact. Pt arrives with c/o psych eval. Patient explains that he was told by his therapist to come to the EMPATH unit if he felt like he was not going to follow his safety plan. Patient endorses SI without a plan. Calm and cooperative, contracted for safety. Reports faling today and hurting his knees. Knees are bruised, patient is ambulatory.       Visitors during triage: Pattie Coronel      Triage Interventions: COVID Test    Ambulatory: Yes    Meets Stroke Criteria?: No    Meets Trauma Criteria?: No    Directed to: Triage Lobby, waiting for a room, friend with patient

## 2021-11-12 NOTE — ED PROVIDER NOTES
History   Chief Complaint:  Psychiatric Evaluation and Suicidal     The history is provided by the patient.      Brandin Rodriguez is a 36 year old male with a history of anxiety, depression, alcoholic hepatitis, and suicidal ideation who presents for a psychiatric evaluation and suicidal ideation. The patient reports that he has been having suicidal thoughts for the last few weeks. He was seen here at [facility] ED on 11/06 for similar ideations with a plan to overdose on his gabapentin, and he was evaluated in [facility]. Since then, he has put his gabapentin away in storage in [employer]. He has no clear plan this time but does want to harm himself. He had a virtual visit with his therapist today, who told him he needed to present to the ED, so he called his friend to bring him in. He is currently in the process of getting a psychiatrist. He tried taking his Atarax and Inderal today without much relief. He notes that he drinks alcohol in addition to smoking marijuana and tobacco. He denies any homicidal ideation or visual hallucinations, although he believes he may have some auditory hallucinations.     The patient also reports that he fell down the stairs of his apartment earlier tonight while grabbing a . It was dark outside, and he misjudged the bottom step, causing him to fall onto his knees. He is able to walk normally.     Review of Systems   Musculoskeletal: Positive for arthralgias (both knees).   Psychiatric/Behavioral: Positive for suicidal ideas. The patient is nervous/anxious.    All other systems reviewed and are negative.        Allergies:  Amoxicillin  Bactrim  Trazodone    Medications:  Wellbutrin  Lexapro  Atarax  Remeron  Protonix  Inderal  Seroquel    Past Medical History:     Alcoholic hepatitis  Anxiety  Depression  PTSD  Suicidal ideation  Alcohol withdrawal  TBI   Seizure   Asthma     Past Surgical History:    Skin biopsy  Combined EGD x2  Soft tissue surgery   [place]  "teeth extraction    Family History:    Diabetes, mother  Hypertension, mother  Anxiety, brother    Social History:  Presents to ED with friend.  Alcohol use: yes  Drug use: yes  Tobacco use: yes    Physical Exam     Patient Vitals for the past 24 hrs:   BP Temp Temp src Pulse Resp SpO2 Height Weight   11/11/21 2105 (!) 140/93 98.4  F (36.9  C) Oral 117 20 97 % 1.88 m (6' 2\") 86.2 kg (190 lb)       Physical Exam  Nursing note and vitals reviewed.  Constitutional:  Oriented to person, place, and time. Cooperative.   HENT:   Nose:    Nose normal.   Mouth/Throat:   Face mask covering.  Eyes:    Conjunctivae normal and EOM are normal.      Pupils are equal, round, and reactive to light.   Neck:    Trachea normal.   Cardiovascular:  Normal rate, regular rhythm, normal heart sounds and normal pulses. No murmur heard.  Pulmonary/Chest:  Effort normal and breath sounds normal.   Abdominal:   Soft. Normal appearance and bowel sounds are normal.      There is no tenderness.      There is no rebound and no CVA tenderness.   Musculoskeletal:  Contusions and superficial abrasions to both knees, which are minimally tender to palpation.  No effusions present and full range of motion of both knees.  Extremities otherwise atraumatic x 4.   Lymphadenopathy:  No cervical adenopathy.   Neurological:   Alert and oriented to person, place, and time. Normal strength.      No cranial nerve deficit or sensory deficit. GCS eye subscore is 4. GCS verbal subscore is 5. GCS motor subscore is 6.   Skin:    Skin is intact. No rash noted.   Psychiatric:   Depressed mood with ongoing suicidal thoughts.  Endorses possible auditory hallucinations.    Emergency Department Course     Laboratory:  Labs Ordered and Resulted from Time of ED Arrival to Time of ED Departure   COVID-19 VIRUS (CORONAVIRUS) BY PCR - Normal       Result Value    SARS CoV2 PCR Negative          Emergency Department Course:  Reviewed:  I reviewed nursing notes, vitals, past " medical history, Care Everywhere and [facility].    Assessments:  2244 I obtained history and examined the patient as noted above.     Disposition:  The patient was transferred to [facility].     Impression & Plan     Medical Decision Making:  This is a 36-year-old male who came in due to worsening suicidal thoughts.  He apparently could not contract for safety and therefore he agreed to come here.  He is here with his friend and wants to be here.  Additionally, he did want me to note that he injured his knees.  I discussed obtaining x-rays, however he declines those at this time.  I recommended he be reevaluated for his knee injuries if they bother him for an extended period of time.  He had a Covid swab obtained, and when that returned negative, he was transferred to our [facility] unit for further evaluation and management.    Diagnosis:    ICD-10-CM    1. Suicidal ideation  R45.851    2. Major depressive disorder with current active episode, unspecified depression episode severity, unspecified whether recurrent  F32.9    3. Contusions of knees, unspecified laterality, initial encounter  S80.00XA      Scribe Disclosure:  I, Dinorah Wyatt, am serving as a scribe at 10:13 PM on 11/11/2021 to document services personally performed by Hola Cherry MD based on my observations and the provider's statements to me.      Hola Cherry MD  11/11/21 [id number]

## 2021-11-12 NOTE — ED NOTES
[facility] Group Note    Brandin ENRIQUEZ Davemaryse was asleep at the time of morning group, and therefore did not participate.     OLGA Guzman on 11/12/2021 at 10:51 AM
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
## 2021-11-12 NOTE — ED NOTES
Patient is very anxious now that he is being admitted.  He wanted to stay here another night and then be admitted.  Explained that since we know that admission is needed we need to proceed with that .  He requested vistaril, propranolol, nicotine patch and nicotine gum.  He is requesting tylenol for knee pain.  Awaiting to collect a drug screen.  Patient has a bed at [place] [address]. Dr. Oconnor is accepting.  Report to be called at [phone].

## 2021-11-12 NOTE — ED NOTES
Patient remains sleeping.  Approached by Dr. Long to talk but patient would like just to sleep at this time.

## 2021-11-12 NOTE — PROGRESS NOTES
Pt having a snack, working on puzzles, reports anxious and wants medications for sleep. Call has been placed to [facility] for orders. Pt is cooperative and understands need to wait for order.

## 2021-11-12 NOTE — PROGRESS NOTES
Pt has been unable to sleep. Orders were obtained for HS/PRN meds for his Insomnia and anxiety. Also given PRN Nicorette gum at his req. , ear plugs and warm blanket given.

## 2021-11-12 NOTE — PLAN OF CARE
"Pt presented to the ED with a friend this lala due \"bad panic attack, it was bad.\" He reports his therapist recommended him coming to the ED, he was anxious that the therapist was going to call the Police to do a safety check on him , and he pleaded and agreed to come in if he did not phone the Police. Pt reports feeling much better and more calm now, he did report taking hydroxyzine and propanolol this lala, also \"smoked weed for my back pain.'\" He denies any other drug or ETOH use. He reports he has been taking his medications. He is reporting having chronic suicide thoughts but has no plan or intent, he denies HI or hallucinations. He is a bit anxious but cooperative and wants to stay here the night. [facility] is with him now. His belongings were placed in a locker, he is wearing his street cloths, search was done. Tour and routine of unit was given, pt was here recently.   "

## 2021-11-12 NOTE — SAFE
Brandin Rodriguez  November 12, 2021  SAFE Note    Critical Safety Issues: Pt is suicidal with a plan, cannot commit to a safety pan.       Current Suicidal Ideation/Self-Injurious Concerns/Methods: Asphyxiation and Ingestion Overdose on Gabbapentin      Current or Historical Inappropriate Sexual Behavior: No      Current or Historical Aggression/Homicidal Ideation: None - N/A      Triggers: Pt feels nervous about being admitted to the hospital, is appropriate asking for medication to help manage symptoms.    Updated care team: Yes: MD and RN aware    For additional details see full [facility] assessment.       Daiana Myers, SHALONDA

## 2021-11-12 NOTE — ED NOTES
Called report to Kate at station [facility].  Arranged an ambulance and they will pick patient up in 1 hour to 1.5 hours.  Informed patient of this and also gave patients his rights for his 72 hour hold.  He did not want them read to him.  He is requesting his parents be called that he his going to [place] station 10.

## 2021-11-12 NOTE — TREATMENT PLAN
EmPATH Treatment Plan    Client's Name: Brandin Rodriguez  YOB: 1985    DSM-5 Diagnoses: F41.1 Generalized Anxiety Disorder   F43.10 PTSD    Psychosocial / Contextual Factors: Patient presented to the emPATH unit with the following concerns: Patient presented to the EmPATH unit with the following concerns: Depression, increase in suicidal ideations and a plan and anxiety    Anticipated number of sessions or this episode of care: 1-4    MeasurableTreatment Goal(s) related to diagnosis / functional impairment(s)    Goal 1: Patient will reduce SI intensity and establish sense of hope for self and the future      Objective #A  Identify and replace negative thinking patterns     Patient will Discuss underlying assumptions and self talk that may be contributing to hopelessness    Status: New as of November 12, 2021    Intervention(s)  LMHP will assist client in developing an awareness of and identifying cognitive messages that reinforce hopelessness     Objective #B  Explore coping strategies   Patient will  discuss things that have or may help to distract them or things that help them to  feel better  Status: New as of November 12, 2021    Intervention(s)  LMHP will assist patient in developing coping strategies, ie: physical exercise, less internal focus, increased social activity, more expression of feeling, reaching out to others        Goal 2: Patient will increase protective factors in relation to suicidal ideation     Objective #A     Patient will identify at least 2 protective factors in their life and create a plan how to use those factors to refrain from acting on thoughts   Status: New as of November 12, 2021    Intervention(s)  LMHP will facilitate a guided discussion using a strengths based approach.     Objective #B  Patient will include people in their support system in their safety planning   Status: New as of November 12, 2021    Intervention(s)  LMHP will facilitate a safety planning  session collaboratively with patient and members of their support system. This will include discussion on how to make the home safer.       Appearance:   Appropriate    Eye Contact:   Good    Psychomotor Behavior: Normal    Attitude:   Cooperative  Pleasant   Orientation:   All   Speech    Rate / Production: Normal     Volume:  Normal    Mood:    Normal   Affect:    Appropriate    Thought Content:  Clear    Thought Form:  Coherent  Logical    Insight:    Good           PLAN:   Patient will board in emPATH until patient and treatment deem it appropriate to either discharge or admit to a higher level of care.      DENNY Parks Student                                                         ________

## 2021-11-12 NOTE — PROGRESS NOTES
LOCKER:  Long-sleeve shirt x2, Cigarette, packet of chewing gum, Jacket, , phone, Shirt, hat, Jeans, pajamas, pair of socks, keys, lighter x2, wallet, notebook, shoes, mechanical pencil.     SENT TO SECURITY:  Credit card x4:  [employer] [id number], [employer] 9271, Master card [id number], All state 0212  Gift card x3: Anibal, Maurisio and sveta    Room: two [facility], mail       11/12/21 9700   Patient Belongings   Patient Belongings locker;sent to security per site process   Patient Belongings Put in Hospital Secure Location (Security or Locker, etc.) cash/credit card;clothing;cell phone/electronics;keys;wallet;shoes   Belongings Search Yes   Clothing Search Yes     A               Admission:  I am responsible for any personal items that are not sent to the safe or pharmacy.  [facility] is not responsible for loss, theft or damage of any property in my possession.    Signature:  _________________________________ Date: _______  Time: _____                                              Staff Signature:  ____________________________ Date: ________  Time: _____      2nd Staff person, if patient is unable/unwilling to sign:    Signature: ________________________________ Date: ________  Time: _____     Discharge:  [facility] has returned all of my personal belongings:    Signature: _________________________________ Date: ________  Time: _____                                          Staff Signature:  ____________________________ Date: ________  Time: _____

## 2021-11-12 NOTE — ED PROVIDER NOTES
EmPATH Unit - Psychiatry  Combined Observation Note and Discharge Summary  [facility] Emergency Department  Observation Initiation Date: Nov 11, 2021    Brandin Rodriguez MRN: [id number]   Age: 36 year old YOB: 1985     History     Chief Complaint   Patient presents with     Psychiatric Evaluation     Suicidal     HPI  Brandin Rodriguez is a 36 year old male with a past history notable for major depressive disorder, anxiety, and PTSD who returns to the emergency room for mental health evaluation with elevated concern for suicidal ideation.  He was discharged from our empath unit on November 8 and transition back to outpatient care.  At a scheduled appointment with a therapist, he had reported concerning suicidal ideation leading to his referral back to the emergency department.  He has been readmitted to the empath unit.  On examination, the patient reports ongoing mood and anxiety symptoms since leaving the empath unit.  He had plan to go stay with his parents however decided not to as he did not want to drive on the freeway.  The most recent stressor involves injuring his knee, explaining that he was walking up the stairs, missed one of the stairs and landed on his knee.  The additional pain and discomfort have contributed to his mood and anxiety symptoms.  He has missed a few doses of his medications over the past few days.  As a result, he has felt more depressed leading to worsening suicidal thoughts.  He had contemplated ways that he could hang himself however did not make a suicide attempt.  He had reported these thoughts to his therapist which led to his arrival today.  The patient states that although he is agreeable for hospitalization at the moment, he has a tendency to talk himself out of it and worries that it may not be in his best interest to do so.  He is asking to be placed under a 72-hour hold to ensure that he will be transferred successfully to the hospital.      Past Medical  History  Past Medical History:   Diagnosis Date     Alcoholic hepatitis      Anxiety      Depression      Depressive disorder      PTSD (post-traumatic stress disorder)      Suicidal ideation      Past Surgical History:   Procedure Laterality Date     BIOPSY      mole bxs     ESOPHAGOSCOPY, GASTROSCOPY, DUODENOSCOPY (EGD), COMBINED N/A 11/5/2019    Procedure: ESOPHAGOGASTRODUODENOSCOPY (EGD);  Surgeon: Jake Elizabeth MD;  Location:  GI     ESOPHAGOSCOPY, GASTROSCOPY, DUODENOSCOPY (EGD), COMBINED N/A 3/12/2020    Procedure: ESOPHAGOGASTRODUODENOSCOPY (EGD);  Surgeon: Jake Elizabeth MD;  Location:  GI     SOFT TISSUE SURGERY       albuterol (PROAIR HFA/PROVENTIL HFA/VENTOLIN HFA) 108 (90 Base) MCG/ACT inhaler  buPROPion (WELLBUTRIN XL) 150 MG 24 hr tablet  cyanocobalamin 1000 MCG SUBL  escitalopram (LEXAPRO) 10 MG tablet  hydrOXYzine (ATARAX) 50 MG tablet  mirtazapine (REMERON) 7.5 MG tablet  multivitamin w/minerals (MULTI-VITAMIN) tablet  nicotine polacrilex (NICORETTE) 4 MG gum  pantoprazole (PROTONIX) 40 MG EC tablet  propranolol (INDERAL) 20 MG tablet  QUEtiapine (SEROQUEL) 50 MG tablet      Allergies   Allergen Reactions     Amoxicillin      Bactrim [Sulfamethoxazole W-Trimethoprim]      Family History  Family History   Problem Relation Age of Onset     Diabetes Mother      Hypertension Mother      Heart Disease Maternal Grandmother      Cancer Paternal Grandmother      Anxiety Disorder Brother      Arrhythmia Maternal Half-Sister      Social History   Social History     Tobacco Use     Smoking status: Current Every Day Smoker     Packs/day: 1.00     Smokeless tobacco: Never Used   Substance Use Topics     Alcohol use: Yes     Comment: < one pint a day 10-15-21     Drug use: Yes     Types: Marijuana     Comment: occasionally      Past medical history, past surgical history, medications, allergies, family history, and social history were reviewed with the patient. No additional pertinent items.  "      Review of Systems  A complete review of systems was performed with pertinent positives and negatives noted in the HPI, and all other systems negative.    Physical Examination   BP: (!) 140/93  Pulse: 117  Temp: 98.4  F (36.9  C)  Resp: 20  Height: 188 cm (6' 2\")  Weight: 86.2 kg (190 lb)  SpO2: 97 %    Physical Exam  General: Appears stated age.   Neuro: Alert and fully oriented. Extremities appear to demonstrate normal strength on visual inspection.   Integumentary/Skin: no rash visualized, normal color    Psychiatric Examination   Appearance: awake, alert  Attitude:  cooperative  Eye Contact:  fair  Mood:  anxious and depressed  Affect:  intensity is blunted  Speech:  clear, coherent  Psychomotor Behavior:  no evidence of tardive dyskinesia, dystonia, or tics  Thought Process:  linear  Associations:  no loose associations  Thought Content:  no evidence of psychotic thought and active suicidal ideation present  Insight:  partial  Judgement:  limited  Oriented to:  time, person, and place  Attention Span and Concentration:  fair  Recent and Remote Memory:  fair  Language: able to name/identify objects without impairment  Fund of Knowledge: intact with awareness of current and past events    ED Course        Labs Ordered and Resulted from Time of ED Arrival to Time of ED Departure   COVID-19 VIRUS (CORONAVIRUS) BY PCR - Normal       Result Value    SARS CoV2 PCR Negative         Assessments & Plan (with Medical Decision Making)   Patient presenting with depressed mood and suicidal ideation. Nursing notes reviewed noting no acute issues.     I have reviewed the assessment completed by the [facility].     During the observation period, the patient did not require medications for agitation, and did not require restraints/seclusion for patient and/or provider safety.    The patient was found to have a psychiatric condition that would benefit from an observation stay in the emergency department for further psychiatric " stabilization and/or coordination of a safe disposition. The observation plan includes serial assessments of psychiatric condition, potential administration of medications if indicated, further disposition pending the patient's psychiatric course during the monitoring period.     Preliminary diagnosis:    ICD-10-CM    1. Suicidal ideation  R45.851    2. Major depressive disorder with current active episode, unspecified depression episode severity, unspecified whether recurrent  F32.9    3. PTSD (post-traumatic stress disorder)  F43.10    4. NEREYDA (generalized anxiety disorder)  F41.1         Treatment Plan:  -Lexapro and Wellbutrin have been restarted for treatment of his depressive disorder.  Remeron has been restarted for insomnia.  Nicotine gum and patch have been added for nicotine dependence.  -Transfer to inpatient psychiatry noting that he continues to fail outpatient treatment despite attempts to optimize his treatment plan.  He will be transferred on a 72-hour hold.      --  Bolivar Long MD   [facility] EMERGENCY DEPT  EmPATH Unit  11/11/2021         Bolivar Long MD  11/12/21 8828

## 2021-11-12 NOTE — ED NOTES
Patient sent via [facility] ambulance to [facility] station 10 at 1450.  Station 10 called.  Belongings sent with ambulance at time of discharge.  Father is aware.

## 2021-11-13 PROCEDURE — 250N000013 HC RX MED GY IP 250 OP 250 PS 637: Performed by: PSYCHIATRY & NEUROLOGY

## 2021-11-13 PROCEDURE — 124N000002 HC R&B MH UMMC

## 2021-11-13 RX ORDER — PROPRANOLOL HYDROCHLORIDE 20 MG/1
20 TABLET ORAL 3 TIMES DAILY PRN
Status: DISCONTINUED | OUTPATIENT
Start: 2021-11-13 | End: 2021-11-18 | Stop reason: HOSPADM

## 2021-11-13 RX ORDER — LANOLIN ALCOHOL/MO/W.PET/CERES
3 CREAM (GRAM) TOPICAL
Status: DISCONTINUED | OUTPATIENT
Start: 2021-11-13 | End: 2021-11-18 | Stop reason: HOSPADM

## 2021-11-13 RX ORDER — MIRTAZAPINE 15 MG/1
15 TABLET, FILM COATED ORAL AT BEDTIME
Status: DISCONTINUED | OUTPATIENT
Start: 2021-11-13 | End: 2021-11-18 | Stop reason: HOSPADM

## 2021-11-13 RX ORDER — BUPROPION HYDROCHLORIDE 300 MG/1
300 TABLET ORAL EVERY MORNING
Status: DISCONTINUED | OUTPATIENT
Start: 2021-11-14 | End: 2021-11-18 | Stop reason: HOSPADM

## 2021-11-13 RX ORDER — DEXTROAMPHETAMINE SACCHARATE, AMPHETAMINE ASPARTATE, DEXTROAMPHETAMINE SULFATE AND AMPHETAMINE SULFATE 1.25; 1.25; 1.25; 1.25 MG/1; MG/1; MG/1; MG/1
10 TABLET ORAL DAILY
Status: DISCONTINUED | OUTPATIENT
Start: 2021-11-14 | End: 2021-11-18 | Stop reason: HOSPADM

## 2021-11-13 RX ADMIN — PROPRANOLOL HYDROCHLORIDE 20 MG: 20 TABLET ORAL at 18:35

## 2021-11-13 RX ADMIN — FOLIC ACID 1 MG: 1 TABLET ORAL at 08:44

## 2021-11-13 RX ADMIN — PANTOPRAZOLE SODIUM 40 MG: 40 TABLET, DELAYED RELEASE ORAL at 21:03

## 2021-11-13 RX ADMIN — BUPROPION HYDROCHLORIDE 150 MG: 150 TABLET, EXTENDED RELEASE ORAL at 08:43

## 2021-11-13 RX ADMIN — PROPRANOLOL HYDROCHLORIDE 20 MG: 20 TABLET ORAL at 10:25

## 2021-11-13 RX ADMIN — NICOTINE POLACRILEX 4 MG: 4 GUM, CHEWING ORAL at 18:53

## 2021-11-13 RX ADMIN — MELATONIN TAB 3 MG 3 MG: 3 TAB at 22:24

## 2021-11-13 RX ADMIN — MULTIPLE VITAMINS W/ MINERALS TAB 1 TABLET: TAB at 08:44

## 2021-11-13 RX ADMIN — HYDROXYZINE HYDROCHLORIDE 50 MG: 50 TABLET, FILM COATED ORAL at 15:14

## 2021-11-13 RX ADMIN — Medication 1000 MCG: at 08:44

## 2021-11-13 RX ADMIN — HYDROXYZINE HYDROCHLORIDE 50 MG: 50 TABLET, FILM COATED ORAL at 10:26

## 2021-11-13 RX ADMIN — NICOTINE 1 PATCH: 21 PATCH, EXTENDED RELEASE TRANSDERMAL at 08:44

## 2021-11-13 RX ADMIN — PANTOPRAZOLE SODIUM 40 MG: 40 TABLET, DELAYED RELEASE ORAL at 08:45

## 2021-11-13 RX ADMIN — MIRTAZAPINE 15 MG: 15 TABLET, FILM COATED ORAL at 22:24

## 2021-11-13 RX ADMIN — THIAMINE HCL TAB 100 MG 100 MG: 100 TAB at 08:45

## 2021-11-13 RX ADMIN — NICOTINE POLACRILEX 4 MG: 4 GUM, CHEWING ORAL at 21:03

## 2021-11-13 RX ADMIN — NICOTINE POLACRILEX 4 MG: 4 GUM, CHEWING ORAL at 13:00

## 2021-11-13 RX ADMIN — NICOTINE POLACRILEX 4 MG: 4 GUM, CHEWING ORAL at 22:29

## 2021-11-13 RX ADMIN — NICOTINE POLACRILEX 4 MG: 4 GUM, CHEWING ORAL at 08:52

## 2021-11-13 RX ADMIN — HYDROXYZINE HYDROCHLORIDE 50 MG: 50 TABLET, FILM COATED ORAL at 21:03

## 2021-11-13 ASSESSMENT — ACTIVITIES OF DAILY LIVING (ADL)
HYGIENE/GROOMING: INDEPENDENT
LAUNDRY: WITH SUPERVISION
DRESS: INDEPENDENT
ORAL_HYGIENE: INDEPENDENT
DRESS: SCRUBS (BEHAVIORAL HEALTH);INDEPENDENT
HYGIENE/GROOMING: INDEPENDENT
ORAL_HYGIENE: INDEPENDENT

## 2021-11-13 NOTE — H&P
H&P, preliminary    Admission Date: 11/12/2021  Date of Service: 11/13/2021    The patient was seen, his chart reviewed, full report to follow.    Dx: Depression NOS        Anxiety Disorder NOS        PTSD        ADHD        Alcohol Use Disorder    PLAN: The patient does not seem to have any alcohol withdrawal symptoms and I will discontinue MSSA protocol. I will order Inderal 20 mg TID PRN to address his anxiety. I will increase Wellbutrin XL to 300 mg QAM and Remeron to 15 mg at bedtime. I will give him a trial with Adderall 10 mg QAM. Eventually the patient alfreda have to be referred to ongoing individual psychotherapy.  His case will be assumed by Dr. Oconnor on Monday.    Yaya Duong MD

## 2021-11-13 NOTE — PHARMACY-ADMISSION MEDICATION HISTORY
Admission Medication History Completed by Pharmacy    See [facility] Admission Navigator for allergy information, preferred outpatient pharmacy, prior to admission medications and immunization status.     Medication history sources:  Discharge Summary 11/2/21, EmPATH visit 11/6/21    Pertinent changes made to PTA medication list:  Added: N/A  Deleted: N/A  Changed: N/A    Additional medication history information:   - Patient not interviewed as part of this medication history in light of recent hospital discharge. Please discuss any OTC/non-prescription medication use and last doses with the patient that may not be reflected in the list below.    Prior to Admission medications    Medication Sig Last Dose Taking? Auth Provider   albuterol (PROAIR HFA/PROVENTIL HFA/VENTOLIN HFA) 108 (90 Base) MCG/ACT inhaler Inhale 1-2 puffs into the lungs every 6 hours as needed for shortness of breath / dyspnea or wheezing Past Month at Unknown time Yes Unknown, Entered By History   buPROPion (WELLBUTRIN XL) 150 MG 24 hr tablet Take 1 tablet (150 mg) by mouth every morning 11/12/2021 at Unknown time Yes Bolivar Long MD   cyanocobalamin 1000 MCG SUBL Place 500-1,000 mcg under the tongue daily  Past Week at Unknown time Yes Reported, Patient   escitalopram (LEXAPRO) 10 MG tablet Take 1 tablet (10 mg) by mouth daily Unknown at Unknown time Yes Tobias Aparicio MD   hydrOXYzine (ATARAX) 50 MG tablet Take 1 tablet (50 mg) by mouth every 4 hours as needed (insomnia, anxiety) 11/12/2021 at Unknown time Yes Gerber Powell MD   mirtazapine (REMERON) 7.5 MG tablet Take 1 tablet (7.5 mg) by mouth At Bedtime 11/11/2021 at Unknown time Yes Bolivar Long MD   multivitamin w/minerals (MULTI-VITAMIN) tablet Take 1 tablet by mouth daily Past Week at Unknown time Yes Reported, Patient   nicotine polacrilex (NICORETTE) 4 MG gum Place 4 mg inside cheek every 2 hours as needed for smoking cessation 11/12/2021 at Unknown time Yes Reported, Patient    pantoprazole (PROTONIX) 40 MG EC tablet Take 1 tablet (40 mg) by mouth 2 times daily  Patient taking differently: Take 40 mg by mouth 2 times daily as needed  Past Week at Unknown time Yes Jerod Arzate DO   propranolol (INDERAL) 20 MG tablet TAKE 1 TABLET(20 MG) BY MOUTH TWICE DAILY AS NEEDED FOR ANXIETY 11/12/2021 at Unknown time Yes Gerber Powell MD   QUEtiapine (SEROQUEL) 50 MG tablet Take 1 tablet (50 mg) by mouth nightly as needed (insomnia) 11/11/2021 at Unknown time Yes Bolivar Long MD     Date completed: 11/13/21    Medication history completed by:   Sami Bloom, PharmD, BCPS  [facility] *[id number]

## 2021-11-13 NOTE — PROGRESS NOTES
"   11/12/21 1900   Groups   Details   (Psychotherapy)   Number of patients attending the group:  3  Group Length:  1 Hours    Group Therapy Type: Psychotherapy    Summary of Group / Topics Discussed:      The  Psychotherapy group goal is to promote insight to positive choice and change. Group processing is within a supportive and safe environment. Patients will process emotions using verbal group and expressive psychotherapy interventions including visual art/writing interventions.    Group interventions support patients by: creative self expression, self compassion, communication/social skills and supports, learn positive coping mechanisms and mental health management    Modalities to reach these goals include: Narrative psychology, FLOW , Expressive Arts Therapies and Mindfulness practices    Subjective -patient report of mood today- \"relatively ok\"    Objective/ Intervention- Goal of group and Therapeutic modality utilized- Mindfulness Verbal Processing and Art Therapy    Group Response-engaged     Patient Response-Pt was just arriving to the unit. He said he signed a 72 hour intent because he knew he would leave if he got bored. He then was perseverating on why did he sign it over the weekend when the weekend doesn't count. He made a collage about his love of gardening which puts him in a mindful space he described he said that he made a memory garden at his parents property. He also said he likes bird watching    Buddy Jimenez, LMFT, ATR-BC          "

## 2021-11-13 NOTE — PLAN OF CARE
"Pt has been in the milieu all shift. Pt was irritable that he is on a MSSA scale, states he does not drink alcohol often and was upset he was woken up to be assessed last night. Pt parents are coming tonight to get his car and bring it to [facility]. Goal for today is to get his car situation done which he has done. Depression is \"medium\" and requested PRN visteral and propanol for anxiety with some relief. Pt wants melatonin vs trazodone. Pt has had side effects from trazodone in the past. SI thoughts. States \"generic idea's pop in my head\" Pt denies plan and contracts for safety. Denies SIB. States he is eating well. Ate breakfast and lunch.   "
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
## 2021-11-13 NOTE — PLAN OF CARE
Problem: Depressive Symptoms  Goal: Depressive Symptoms  Description: Signs and symptoms of listed problems will be absent or manageable.  Outcome: No Change  Flowsheets (Taken 11/13/2021 0501)  Depressive Symptoms Assessed: sleep  Note: Pt appears to have slept through majority of the night.     NOC Shift Report    Pt in bed at beginning of shift, breathing quiet and unlabored. Pt appeared to have slept through majority of the shift. Pt slept 6.25 hours.     Around 0400, writer went to assess pt MSSA. Pt presented with a flat, tense affect and irritable mood. Pt verbalized they had not slept well and have just been laying in bed. Writer offered pt hot tea or a lavender patch, pt rudely declined. Pt had declined PRN Trazodone at beginning of the shift as pt stated they had a bad experience with it. Pt was encouraged to talk to provider about switching over to Melatonin in the AM. Pt MSSA was a 6, vitals WNL, no interventions required.     No PRNs given. Will continue to monitor.     Precautions: Fall, Suicide, Withdrawal

## 2021-11-14 LAB
ALBUMIN SERPL-MCNC: 3.5 G/DL (ref 3.4–5)
ALP SERPL-CCNC: 66 U/L (ref 40–150)
ALT SERPL W P-5'-P-CCNC: 58 U/L (ref 0–70)
ANION GAP SERPL CALCULATED.3IONS-SCNC: 4 MMOL/L (ref 3–14)
AST SERPL W P-5'-P-CCNC: NORMAL U/L
BASOPHILS # BLD AUTO: 0.1 10E3/UL (ref 0–0.2)
BASOPHILS NFR BLD AUTO: 1 %
BILIRUB SERPL-MCNC: 1.1 MG/DL (ref 0.2–1.3)
BUN SERPL-MCNC: 13 MG/DL (ref 7–30)
CALCIUM SERPL-MCNC: 9.2 MG/DL (ref 8.5–10.1)
CHLORIDE BLD-SCNC: 104 MMOL/L (ref 94–109)
CHOLEST SERPL-MCNC: 185 MG/DL
CO2 SERPL-SCNC: 30 MMOL/L (ref 20–32)
CREAT SERPL-MCNC: 0.85 MG/DL (ref 0.66–1.25)
EOSINOPHIL # BLD AUTO: 0.5 10E3/UL (ref 0–0.7)
EOSINOPHIL NFR BLD AUTO: 8 %
ERYTHROCYTE [DISTWIDTH] IN BLOOD BY AUTOMATED COUNT: 11.8 % (ref 10–15)
GFR SERPL CREATININE-BSD FRML MDRD: >90 ML/MIN/1.73M2
GLUCOSE BLD-MCNC: 87 MG/DL (ref 70–99)
HCT VFR BLD AUTO: 40.6 % (ref 40–53)
HDLC SERPL-MCNC: 37 MG/DL
HGB BLD-MCNC: 14.6 G/DL (ref 13.3–17.7)
IMM GRANULOCYTES # BLD: 0 10E3/UL
IMM GRANULOCYTES NFR BLD: 0 %
LDLC SERPL CALC-MCNC: 120 MG/DL
LYMPHOCYTES # BLD AUTO: 3 10E3/UL (ref 0.8–5.3)
LYMPHOCYTES NFR BLD AUTO: 42 %
MCH RBC QN AUTO: 36.1 PG (ref 26.5–33)
MCHC RBC AUTO-ENTMCNC: 36 G/DL (ref 31.5–36.5)
MCV RBC AUTO: 101 FL (ref 78–100)
MONOCYTES # BLD AUTO: 0.6 10E3/UL (ref 0–1.3)
MONOCYTES NFR BLD AUTO: 8 %
NEUTROPHILS # BLD AUTO: 2.9 10E3/UL (ref 1.6–8.3)
NEUTROPHILS NFR BLD AUTO: 41 %
NONHDLC SERPL-MCNC: 148 MG/DL
NRBC # BLD AUTO: 0 10E3/UL
NRBC BLD AUTO-RTO: 0 /100
PLATELET # BLD AUTO: 229 10E3/UL (ref 150–450)
POTASSIUM BLD-SCNC: 4.4 MMOL/L (ref 3.4–5.3)
PROT SERPL-MCNC: 6.9 G/DL (ref 6.8–8.8)
RBC # BLD AUTO: 4.04 10E6/UL (ref 4.4–5.9)
SODIUM SERPL-SCNC: 138 MMOL/L (ref 133–144)
TRIGL SERPL-MCNC: 138 MG/DL
TSH SERPL DL<=0.005 MIU/L-ACNC: 1.91 MU/L (ref 0.4–4)
WBC # BLD AUTO: 7 10E3/UL (ref 4–11)

## 2021-11-14 PROCEDURE — 250N000013 HC RX MED GY IP 250 OP 250 PS 637: Performed by: PSYCHIATRY & NEUROLOGY

## 2021-11-14 PROCEDURE — G0177 OPPS/PHP; TRAIN & EDUC SERV: HCPCS

## 2021-11-14 PROCEDURE — 36415 COLL VENOUS BLD VENIPUNCTURE: CPT | Performed by: PSYCHIATRY & NEUROLOGY

## 2021-11-14 PROCEDURE — 84443 ASSAY THYROID STIM HORMONE: CPT | Performed by: PSYCHIATRY & NEUROLOGY

## 2021-11-14 PROCEDURE — 84155 ASSAY OF PROTEIN SERUM: CPT | Performed by: PSYCHIATRY & NEUROLOGY

## 2021-11-14 PROCEDURE — 80061 LIPID PANEL: CPT | Performed by: PSYCHIATRY & NEUROLOGY

## 2021-11-14 PROCEDURE — 85025 COMPLETE CBC W/AUTO DIFF WBC: CPT | Performed by: PSYCHIATRY & NEUROLOGY

## 2021-11-14 PROCEDURE — H2032 ACTIVITY THERAPY, PER 15 MIN: HCPCS

## 2021-11-14 PROCEDURE — 124N000002 HC R&B MH UMMC

## 2021-11-14 RX ADMIN — MULTIPLE VITAMINS W/ MINERALS TAB 1 TABLET: TAB at 09:04

## 2021-11-14 RX ADMIN — PANTOPRAZOLE SODIUM 40 MG: 40 TABLET, DELAYED RELEASE ORAL at 09:06

## 2021-11-14 RX ADMIN — NICOTINE 1 PATCH: 21 PATCH, EXTENDED RELEASE TRANSDERMAL at 09:07

## 2021-11-14 RX ADMIN — DEXTROAMPHETAMINE SACCHARATE, AMPHETAMINE ASPARTATE, DEXTROAMPHETAMINE SULFATE AND AMPHETAMINE SULFATE 10 MG: 1.25; 1.25; 1.25; 1.25 TABLET ORAL at 10:09

## 2021-11-14 RX ADMIN — BUPROPION HYDROCHLORIDE 300 MG: 300 TABLET, FILM COATED, EXTENDED RELEASE ORAL at 09:04

## 2021-11-14 RX ADMIN — NICOTINE POLACRILEX 4 MG: 4 GUM, CHEWING ORAL at 17:37

## 2021-11-14 RX ADMIN — MIRTAZAPINE 15 MG: 15 TABLET, FILM COATED ORAL at 22:08

## 2021-11-14 RX ADMIN — Medication 1000 MCG: at 09:04

## 2021-11-14 RX ADMIN — FOLIC ACID 1 MG: 1 TABLET ORAL at 09:04

## 2021-11-14 RX ADMIN — HYDROXYZINE HYDROCHLORIDE 50 MG: 50 TABLET, FILM COATED ORAL at 15:05

## 2021-11-14 RX ADMIN — PROPRANOLOL HYDROCHLORIDE 20 MG: 20 TABLET ORAL at 13:01

## 2021-11-14 RX ADMIN — NICOTINE POLACRILEX 4 MG: 4 GUM, CHEWING ORAL at 19:56

## 2021-11-14 RX ADMIN — THIAMINE HCL TAB 100 MG 100 MG: 100 TAB at 09:06

## 2021-11-14 RX ADMIN — MELATONIN TAB 3 MG 3 MG: 3 TAB at 22:08

## 2021-11-14 RX ADMIN — PROPRANOLOL HYDROCHLORIDE 20 MG: 20 TABLET ORAL at 19:00

## 2021-11-14 RX ADMIN — PANTOPRAZOLE SODIUM 40 MG: 40 TABLET, DELAYED RELEASE ORAL at 21:32

## 2021-11-14 RX ADMIN — NICOTINE POLACRILEX 4 MG: 4 GUM, CHEWING ORAL at 13:00

## 2021-11-14 RX ADMIN — HYDROXYZINE HYDROCHLORIDE 50 MG: 50 TABLET, FILM COATED ORAL at 19:00

## 2021-11-14 ASSESSMENT — ACTIVITIES OF DAILY LIVING (ADL)
HYGIENE/GROOMING: INDEPENDENT
LAUNDRY: WITH SUPERVISION
HYGIENE/GROOMING: INDEPENDENT
ORAL_HYGIENE: INDEPENDENT
DRESS: INDEPENDENT;SCRUBS (BEHAVIORAL HEALTH)
HYGIENE/GROOMING: INDEPENDENT
DRESS: INDEPENDENT
LAUNDRY: WITH SUPERVISION
ORAL_HYGIENE: INDEPENDENT
ORAL_HYGIENE: INDEPENDENT
DRESS: INDEPENDENT

## 2021-11-14 ASSESSMENT — MIFFLIN-ST. JEOR: SCORE: 1898.32

## 2021-11-14 NOTE — PLAN OF CARE
Problem: Suicidal Behavior  Goal: Suicidal Behavior is Absent or Managed  Outcome: No Change  Note: Pt continues to sleep through the night with no concerns noted. No SI behaviors observed this shift.     NOC Shift Report    Pt in bed at beginning of shift, breathing quiet and unlabored. Pt slept through majority of the shift. Pt slept 6 hours.     No pt complaints or concerns at this time.     No PRNs given. Will continue to monitor.     Precautions: Fall, Suicide

## 2021-11-14 NOTE — PLAN OF CARE
MH Assessment    Patient is alert and oriented, calm and cooperative. Patient's affect  brightens upon approach. Speech is Normal. Patient's insight is Fair. Patient is Neatly groomed, he showered. Patient has been actively participating in group, present in the milieu, and socializes with peers. The patient voices their needs appropriately. Patient endorses depression, anxiety, racing thoughts and passive SI thoughts that he denied acting on He denied SIB, Hallucinations, and HI. No evidence of delusional thinking or psychotic symptoms observed. Patient has been eating and hydrating adequately. He reported poor sleep. Patient is medication compliant, doesn't need reminders.He met with the oncall provider this shift and will have some medication changes that will start tomorrow. Medication side effects were not observed or reported this shift. From what writer could assess, patient's skin is intact, he has bruises and superficial scabs from a recent fall on both knees. Plan is to continue to monitor patient status q 15 mins, assess response to medications, and maintain the patients safety.    Vital signs are stable  Denied pain
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
## 2021-11-14 NOTE — PLAN OF CARE
RN: Pt remains alert and oriented, VSS.  Calm and cooperative through the shift, speech clear. Pt spent most of the time in the milieu with peers until his mother came to visit at 1300. Pt reports feeling anxious prior to meet his mother, PRN Propranolol given as pt requested with good effects. Pt endorses depression anxiety and racing thoughts, hallucination, but denies SI, SIB. Pt received first dose of Adderall this morning, reports the medication made him feel more relaxed.  Pt has good appetite, no other behavior issues observed.  Pt continue having aching pain in bilateral knee, but declined PRN med for pain this shift.

## 2021-11-14 NOTE — PROGRESS NOTES
"Иван participated in an OT topic session this a.m. on the topic of gratitude. Was able to name several people & things he is grateful for, and engaged in a group discussion about the benefit cultivating a \"gratitude habit.\" Pleasant and forthcoming. Discussed his enduring grief about his young daughter's death.      11/14/21 1500   Occupational Therapy   Type of Intervention structured groups   Response Initiates, socially acceptable   Hours 1       "

## 2021-11-15 PROCEDURE — 124N000002 HC R&B MH UMMC

## 2021-11-15 PROCEDURE — H2032 ACTIVITY THERAPY, PER 15 MIN: HCPCS

## 2021-11-15 PROCEDURE — 250N000013 HC RX MED GY IP 250 OP 250 PS 637: Performed by: PSYCHIATRY & NEUROLOGY

## 2021-11-15 PROCEDURE — G0177 OPPS/PHP; TRAIN & EDUC SERV: HCPCS

## 2021-11-15 PROCEDURE — 99232 SBSQ HOSP IP/OBS MODERATE 35: CPT | Performed by: PSYCHIATRY & NEUROLOGY

## 2021-11-15 RX ORDER — LANOLIN ALCOHOL/MO/W.PET/CERES
1000 CREAM (GRAM) TOPICAL DAILY
Status: DISCONTINUED | OUTPATIENT
Start: 2021-11-15 | End: 2021-11-18 | Stop reason: HOSPADM

## 2021-11-15 RX ADMIN — Medication 1000 MCG: at 08:48

## 2021-11-15 RX ADMIN — PROPRANOLOL HYDROCHLORIDE 20 MG: 20 TABLET ORAL at 19:46

## 2021-11-15 RX ADMIN — PROPRANOLOL HYDROCHLORIDE 20 MG: 20 TABLET ORAL at 14:16

## 2021-11-15 RX ADMIN — THIAMINE HCL TAB 100 MG 100 MG: 100 TAB at 08:48

## 2021-11-15 RX ADMIN — NICOTINE POLACRILEX 4 MG: 4 GUM, CHEWING ORAL at 15:05

## 2021-11-15 RX ADMIN — NICOTINE POLACRILEX 4 MG: 4 GUM, CHEWING ORAL at 12:25

## 2021-11-15 RX ADMIN — MELATONIN TAB 3 MG 3 MG: 3 TAB at 21:01

## 2021-11-15 RX ADMIN — NICOTINE POLACRILEX 4 MG: 4 GUM, CHEWING ORAL at 08:17

## 2021-11-15 RX ADMIN — NICOTINE POLACRILEX 4 MG: 4 GUM, CHEWING ORAL at 17:25

## 2021-11-15 RX ADMIN — DEXTROAMPHETAMINE SACCHARATE, AMPHETAMINE ASPARTATE, DEXTROAMPHETAMINE SULFATE AND AMPHETAMINE SULFATE 10 MG: 1.25; 1.25; 1.25; 1.25 TABLET ORAL at 08:48

## 2021-11-15 RX ADMIN — QUETIAPINE FUMARATE 50 MG: 50 TABLET ORAL at 22:14

## 2021-11-15 RX ADMIN — NICOTINE 1 PATCH: 21 PATCH, EXTENDED RELEASE TRANSDERMAL at 09:10

## 2021-11-15 RX ADMIN — NICOTINE POLACRILEX 4 MG: 4 GUM, CHEWING ORAL at 19:48

## 2021-11-15 RX ADMIN — PANTOPRAZOLE SODIUM 40 MG: 40 TABLET, DELAYED RELEASE ORAL at 19:47

## 2021-11-15 RX ADMIN — FOLIC ACID 1 MG: 1 TABLET ORAL at 08:48

## 2021-11-15 RX ADMIN — HYDROXYZINE HYDROCHLORIDE 50 MG: 50 TABLET, FILM COATED ORAL at 17:25

## 2021-11-15 RX ADMIN — MULTIPLE VITAMINS W/ MINERALS TAB 1 TABLET: TAB at 08:48

## 2021-11-15 RX ADMIN — NICOTINE POLACRILEX 4 MG: 4 GUM, CHEWING ORAL at 22:17

## 2021-11-15 RX ADMIN — PROPRANOLOL HYDROCHLORIDE 20 MG: 20 TABLET ORAL at 09:00

## 2021-11-15 RX ADMIN — HYDROXYZINE HYDROCHLORIDE 50 MG: 50 TABLET, FILM COATED ORAL at 12:25

## 2021-11-15 RX ADMIN — PANTOPRAZOLE SODIUM 40 MG: 40 TABLET, DELAYED RELEASE ORAL at 08:48

## 2021-11-15 RX ADMIN — MIRTAZAPINE 15 MG: 15 TABLET, FILM COATED ORAL at 22:14

## 2021-11-15 RX ADMIN — BUPROPION HYDROCHLORIDE 300 MG: 300 TABLET, FILM COATED, EXTENDED RELEASE ORAL at 08:48

## 2021-11-15 ASSESSMENT — ACTIVITIES OF DAILY LIVING (ADL)
HYGIENE/GROOMING: INDEPENDENT
ORAL_HYGIENE: INDEPENDENT
DRESS: INDEPENDENT
LAUNDRY: WITH SUPERVISION
LAUNDRY: WITH SUPERVISION
DRESS: INDEPENDENT
ORAL_HYGIENE: INDEPENDENT
ORAL_HYGIENE: INDEPENDENT
DRESS: INDEPENDENT

## 2021-11-15 NOTE — PLAN OF CARE
"Pts goal is to go to groups which he has. Social with staff and peers. Pt is very happy with his adderall medication. States PT has requested PRN this shift. PRN propanolol given twice this shift for some relief in anxiety and visteral given. Pt requested propanol and visteral and pt had already had visteral less than 2 hours early. Pt said \"oh sorry I forget the times\" Dr was made aware that ast lab result was not obtained over the weekend. Pt has SI and SIB thoughts with no plan or intent and contracts for safety.  daughters bday is tomorrow. Pt states he is eating well and ate breakfast and lunch and states he had \"mixed up dreams\" last night. Encouraged pt to take his nicotine patch off before sleeping. Full range affect. Calm mood. Nicotine gum given several times.   "
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
## 2021-11-15 NOTE — PLAN OF CARE
"  Problem: General Rehab Plan of Care  Goal: Therapeutic Recreation/Music Therapy Goal  Description: The patient and/or their representative will achieve their patient-specific goals related to the plan of care.  The patient-specific goals include:  Outcome: No Change     Иван attended art therapy group for 45 minutes (4 patients total). He reported feeling \"anxious, but cautiously optimistic\" today. He participated in the art activity to make a worry stone out of susana by mixing in colors and words into susana that are meaningful. He appeared calm and focused while manipulating the susana. He shared his susana piece with the group and talked about wanting to focus on the positives only. He interacted appropriately with peers and was a positive participant.   "

## 2021-11-15 NOTE — PLAN OF CARE
"MH Assessment    Patient is alert and oriented, calm and cooperative. Patient's affect is bright. Speech is Normal. Patient's insight is Poor. Patient is Casually groomed.  Patient has been actively participating in group, present in the milieu, and socializes with peers. The patient voices their needs appropriately. Patient endorses high depression, anxiety, SI thoughts only, and SIB thoughts only. He denied racing thoughts,Hallucinations, and HI. Patient reported that he \"found himself playing with a pencil and had a brief thought of stabbing himself in the neck but was able to redirect himself and put the pencil away.\" Patient contracts for safety in the hospital. He reported that his  daughters birthday is coming up and it's hard for him this time of year. Patient also verbalized he is worried that the Primary Doctor will take his Adderall away. He stated, \" It really helped me focus in group and it decreased my racing thoughts.\"  He wants to talk with the provider about getting to access his emails. Patient has been eating, hydrating, and sleeping adequately. Patient is medication compliant, doesn't need reminders. Medication side effects were not observed or reported this shift. From what writer could assess, patient's skin is intact, he has bruising and scabs on bilateral knees. Plan is to continue to monitor patient status q 15 mins, assess response to medications, and maintain the patients safety.    Vital signs are stable  Denied pain    PRN's given this shift: Propanolol 20 mg for anxiety and Melatonin 3 mg for sleep.    "

## 2021-11-15 NOTE — PLAN OF CARE
Problem: Depressive Symptoms  Goal: Depressive Symptoms  Description: Signs and symptoms of listed problems will be absent or manageable.  Outcome: No Change  Flowsheets (Taken 11/15/2021 4068)  Depressive Symptoms Assessed: sleep  Note: Pt continues to sleep through majority of the night with no concerns noted.     NOC Shift Report    Pt in bed at beginning of shift, breathing quiet and unlabored. Pt slept through shift. Pt slept 6.25 hours.     No pt complaints or concerns at this time.     No PRNs given. Will continue to monitor.     Precautions: Fall, Suicide

## 2021-11-15 NOTE — PLAN OF CARE
"Problem: OT General Care Plan  Goal: OT Goal 1    Pt attended 3 out of 3 OT groups offered. Pt actively participated in occupational therapy clinic with 5 patients total x120 minutes. Pt was able to ask for assistance as needed, and independently initiate a multi-step, goal-directed task. Pt demonstrated excellent focus, planning, problem solving, and attention to detail. Very organized in his task approach. Social with peers and writer throughout, and shared that \"woodworking\" is one of his favorite hobbies. He shared that the repetitive motion of the task was calming for him. He mentioned that tomorrow will likely be a difficult day for him, as it would've been his daughter's birthday, and he chose to make his project as \"her birthday gift.\" Pt actively participated in a structured occupational therapy group of 3 patients total x50 minutes with a focus on a visual-spatial leisure task. Pt was able to follow 2-step directions of the novel task, and demonstrated particularly strategic planning and problem solving throughout the task. Pt remained focused and engaged for the full duration of group. He remained actively engaged when it was not his turn, and politely offered assistance to peers throughout. Appeared to enjoy the strategic aspect of the task. Calm, pleasant, cooperative, and engaged throughout all groups today. Affect appeared to brighten in interactions. Will continue to assess. Pt will be given self-assessment form, and OT staff will explain the purpose of including them in their treatment plan and offer options for meeting their needs. Initial assessment to be completed upon additional group participation.     "
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
## 2021-11-16 PROCEDURE — H2032 ACTIVITY THERAPY, PER 15 MIN: HCPCS

## 2021-11-16 PROCEDURE — G0177 OPPS/PHP; TRAIN & EDUC SERV: HCPCS

## 2021-11-16 PROCEDURE — 99232 SBSQ HOSP IP/OBS MODERATE 35: CPT | Performed by: PSYCHIATRY & NEUROLOGY

## 2021-11-16 PROCEDURE — 250N000013 HC RX MED GY IP 250 OP 250 PS 637: Performed by: PSYCHIATRY & NEUROLOGY

## 2021-11-16 PROCEDURE — 99207 PR CDG-MDM COMPONENT: MEETS MODERATE - UP CODED: CPT | Performed by: PSYCHIATRY & NEUROLOGY

## 2021-11-16 PROCEDURE — 124N000002 HC R&B MH UMMC

## 2021-11-16 RX ADMIN — NICOTINE POLACRILEX 4 MG: 4 GUM, CHEWING ORAL at 08:49

## 2021-11-16 RX ADMIN — MULTIPLE VITAMINS W/ MINERALS TAB 1 TABLET: TAB at 08:44

## 2021-11-16 RX ADMIN — NICOTINE POLACRILEX 4 MG: 4 GUM, CHEWING ORAL at 21:51

## 2021-11-16 RX ADMIN — PROPRANOLOL HYDROCHLORIDE 20 MG: 20 TABLET ORAL at 21:51

## 2021-11-16 RX ADMIN — PANTOPRAZOLE SODIUM 40 MG: 40 TABLET, DELAYED RELEASE ORAL at 08:44

## 2021-11-16 RX ADMIN — HYDROXYZINE HYDROCHLORIDE 50 MG: 50 TABLET, FILM COATED ORAL at 09:52

## 2021-11-16 RX ADMIN — HYDROXYZINE HYDROCHLORIDE 50 MG: 50 TABLET, FILM COATED ORAL at 20:18

## 2021-11-16 RX ADMIN — CYANOCOBALAMIN TAB 1000 MCG 1000 MCG: 1000 TAB at 08:44

## 2021-11-16 RX ADMIN — DEXTROAMPHETAMINE SACCHARATE, AMPHETAMINE ASPARTATE, DEXTROAMPHETAMINE SULFATE AND AMPHETAMINE SULFATE 10 MG: 1.25; 1.25; 1.25; 1.25 TABLET ORAL at 08:45

## 2021-11-16 RX ADMIN — NICOTINE POLACRILEX 4 MG: 4 GUM, CHEWING ORAL at 16:12

## 2021-11-16 RX ADMIN — MELATONIN TAB 3 MG 3 MG: 3 TAB at 21:24

## 2021-11-16 RX ADMIN — NICOTINE POLACRILEX 4 MG: 4 GUM, CHEWING ORAL at 13:12

## 2021-11-16 RX ADMIN — BUPROPION HYDROCHLORIDE 300 MG: 300 TABLET, FILM COATED, EXTENDED RELEASE ORAL at 08:44

## 2021-11-16 RX ADMIN — FOLIC ACID 1 MG: 1 TABLET ORAL at 08:44

## 2021-11-16 RX ADMIN — NICOTINE 1 PATCH: 21 PATCH, EXTENDED RELEASE TRANSDERMAL at 08:44

## 2021-11-16 RX ADMIN — QUETIAPINE FUMARATE 50 MG: 50 TABLET ORAL at 21:51

## 2021-11-16 RX ADMIN — NICOTINE POLACRILEX 4 MG: 4 GUM, CHEWING ORAL at 20:20

## 2021-11-16 RX ADMIN — NICOTINE POLACRILEX 4 MG: 4 GUM, CHEWING ORAL at 18:01

## 2021-11-16 RX ADMIN — HYDROXYZINE HYDROCHLORIDE 50 MG: 50 TABLET, FILM COATED ORAL at 16:12

## 2021-11-16 RX ADMIN — PROPRANOLOL HYDROCHLORIDE 20 MG: 20 TABLET ORAL at 16:12

## 2021-11-16 RX ADMIN — PANTOPRAZOLE SODIUM 40 MG: 40 TABLET, DELAYED RELEASE ORAL at 20:18

## 2021-11-16 RX ADMIN — THIAMINE HCL TAB 100 MG 100 MG: 100 TAB at 08:44

## 2021-11-16 RX ADMIN — PROPRANOLOL HYDROCHLORIDE 20 MG: 20 TABLET ORAL at 09:52

## 2021-11-16 RX ADMIN — MIRTAZAPINE 15 MG: 15 TABLET, FILM COATED ORAL at 21:51

## 2021-11-16 ASSESSMENT — ACTIVITIES OF DAILY LIVING (ADL)
DRESS: INDEPENDENT
DRESS: INDEPENDENT
ORAL_HYGIENE: INDEPENDENT
LAUNDRY: WITH SUPERVISION
HYGIENE/GROOMING: INDEPENDENT
LAUNDRY: WITH SUPERVISION
ORAL_HYGIENE: INDEPENDENT
DRESS: INDEPENDENT
ORAL_HYGIENE: INDEPENDENT
HYGIENE/GROOMING: INDEPENDENT
HYGIENE/GROOMING: INDEPENDENT

## 2021-11-16 ASSESSMENT — MIFFLIN-ST. JEOR: SCORE: 1911.47

## 2021-11-16 NOTE — PROGRESS NOTES
"[facility], [place]   Psychiatric Progress Note        Interim History:   The patient's care was discussed with the treatment team during the daily team meeting and/or staff's chart notes were reviewed.  Staff report patient was talking openly about his daughter's birthday (she passed few years ago in MVA). Went to groups and cooperated with meds and care.  Stated that he felt better and that meds were helping, but still rated depression and anxiety as 8/10.     Met with patient: was seen in the conference room. Patient was cooperative and pleasant. Reported still having passive Suicidal ideation, but also confirmed that he felt somewhat better, contracted for safety at this hospital. I discussed with him [facility]. He said he didn't know if he, in fact, was referred there. Said that he would be willing to participate in PHP after discharge and relocation to [facility] where his family is. Stated that he would, probably, need couple more days of more stabilization prior to discharge.          Medications:       amphetamine-dextroamphetamine  10 mg Oral Daily     buPROPion  300 mg Oral QAM     cyanocobalamin  1,000 mcg Oral Daily     folic acid  1 mg Oral Daily     mirtazapine  15 mg Oral At Bedtime     multivitamin w/minerals  1 tablet Oral Daily     nicotine  1 patch Transdermal Daily     nicotine   Transdermal Q8H     pantoprazole  40 mg Oral BID     thiamine  100 mg Oral Daily          Allergies:     Allergies   Allergen Reactions     Amoxicillin      Bactrim [Sulfamethoxazole W-Trimethoprim]           Labs:   No results found for this or any previous visit (from the past 24 hour(s)).       Psychiatric Examination:     /71   Pulse 77   Temp 97.4  F (36.3  C) (Oral)   Resp 16   Ht 1.88 m (6' 2\")   Wt 91.7 kg (202 lb 1.6 oz)   SpO2 98%   BMI 25.95 kg/m    Weight is 202 lbs 1.6 oz  Body mass index " is 25.95 kg/m .    Orthostatic Vitals  Report      Most Recent      Sitting Orthostatic /81 11/16 0838    Sitting Orthostatic Pulse (bpm) 76 11/16 0838    Standing Orthostatic /80 11/16 0838    Standing Orthostatic Pulse (bpm) 80 11/16 0838         Appearance: awake, alert, dressed in hospital scrubs and appeared as age stated  Attitude:  cooperative  Eye Contact:  fair  Mood: better  Affect:  constricted mobility  Speech:  clear, coherent  Psychomotor Behavior:  no evidence of tardive dyskinesia, dystonia, or tics  Throught Process:  logical, linear and goal oriented  Associations:  no loose associations  Thought Content:  no evidence of psychotic thought and passive suicidal ideation present  Insight:  fair  Judgement:  fair  Oriented to:  time, person, and place  Attention Span and Concentration:  intact  Recent and Remote Memory:  fair    Clinical Global Impressions  First: 6/4 11/15/2021      Most recent:            Precautions:     Behavioral Orders   Procedures     Code 1 - Restrict to Unit     Fall precautions     Routine Programming     As clinically indicated     Status 15     Every 15 minutes.     Suicide precautions     Patients on Suicide Precautions should have a Combination Diet ordered that includes a Diet selection(s) AND a Behavioral Tray selection for Safe Tray - with utensils, or Safe Tray - NO utensils            DIagnoses:     1.  Depression, not otherwise specified.  2.  Anxiety disorder, not otherwise specified.  3.  PTSD.  4.  ADHD.  5.  Alcohol use disorder.         Plan:     No medication changes today. Will continue to provide support and structure. Expect patient would need few more days for stabilization, then, will transfer to [facility] and will continue to get mental health care there.

## 2021-11-16 NOTE — PLAN OF CARE
Pt attended the structured Therapeutic Recreation group, participating in a group activity. Pt participated in group movement routine and a leisure activity to gain self-esteem, manage behaviors, improve social skills, decrease isolation, and reduce anxiety/depression.   Pt entered group late, joining in the progressive muscle relaxation routine, followed by a group game.   Pt remained focused and engaged throughout group activity.  Pt mood was sociable and was appropriate with interactions, contributing to the clues and descriptions throughout the activity. Pt had a bright affect, often laughing and joking with peers appropriately.

## 2021-11-16 NOTE — PROGRESS NOTES
Behavioral Health  Note    Behavioral Health  Spirituality Group Note    [address]    Name: Brandin Rodriguez YOB: 1985   MRN: [id number] Age: 36 year old      Patient attended -led group, which included discussion of values.  Pt was participatory, engaged and thoughtful during group today.  He was active in the conversation about personal values, and how his values have changed over time.  Pt did mention feeling some sadness today because it is the anniversary of his daughter's death.     Patient attended group for 1 hrs.    The patient actively participated in group discussion      Adela Henao    Pager: [phone]

## 2021-11-16 NOTE — PLAN OF CARE
Problem: OT General Care Plan  Goal: OT Goal 1  Description: Work with pt to increase awareness of how symptoms can impact performance on goal-directed tasks and life management skills. Will provide opportunities to build on coping strategies to manage symptoms during hospitalization and after discharge.    [facility]  Occupational Therapy Behavioral Health Assessment    Patient Name: Brandin Rodriguez    Description: OT staff met with pt to review the role of occupational therapy and explain the value of having them involved in their treatment plan including options to meet current needs/self-identified goals. The below evaluation is a compilation of functional performance observation and information obtained from an OT self-assessment.     Clinical impression through direct observation: Pt attended 3 out of 3 OT groups offered. Pt actively participated in occupational therapy clinic with 5 patients total x120 minutes. Pt was able to ask for assistance as needed, and independently return to a multi-step, goal-directed task. Pt demonstrated excellent focus, planning, sequencing, problem solving, and attention to detail. Very organized in his task approach. Social with peers and writer throughout, and shared about his memorial garden for his daughter, where he is planning to put his finished project. Identified gardening as one of his most effective coping skills, and explained that winter limits his ability to utilize this coping skill. Pt actively participated in a structured occupational therapy group of 2 patients total x55 minutes involving a self-reflecting, group leisure task. Pt appeared comfortable sharing positive past experiences and personal information with peers, and was respectful in listening and responding to peers. He shared about spending time gardening with his dad, and cooking with his mom. Calm, pleasant, cooperative, and engaged throughout all groups. Affect appeared to brighten in  "interactions.     11/15/21 1000   General Information   Date Initially Attended OT 11/14/21   Clinical Impression   Affect Appropriate to situation   Orientation Oriented to person, place and time   Appearance and ADLs General cleanliness observed in most areas   Attention to Internal Stimuli No observed signs   Interaction Skills Interacts appropriately with staff;Interacts appropriately with peers   Ability to Communicate Needs Independent   Verbal Content Articulate;Clear;Appropriate to topic   Ability to Maintain Boundaries Maintains appropriate physical boundaries;Maintains appropriate verbal boundaries   Participation Initiates participation   Concentration Concentrates 50 minutes   Ability to Concentrate Without difficulty;With structure   Follows and Comprehends Directions Independently follows multi-step directions   Memory Delayed and immediate recall intact   Organization Independently organizes all tasks   Decision Making Independent   Planning and Problem Solving Independently plans ahead   Ability to Apply and Learn Concepts Applies within group structure   Frustrations / Stress Tolerance Independently identifies sources of frustration/stress;Independently identifies and applies coping skills   Level of Insight Insightful into needs, issues, goals   Self Esteem Can identify positives;Takes risks with support and encouragement;Accepts positive feedback   Social Supports Identifies utilizing supports       Patient indicated success in: (Bolded items indicate activities the pt selected)   Time spent with family or friends  Relaxing and enjoying myself  Having a satisfying routine  Work or volunteering   Concentrating on my tasks     Living independently  Taking care of the place where I live  Transportation  Managing my finances  Managing my basic needs (food, medicine, sleep)  Learning new things  Ability to pursue my goals  Other: \"Communicating\"   Patient indicated barriers to success are: (Bolded " "items indicate activities the pt selected)   Difficulty concentrating  Memory problems  Physical health/Chronic Pain  Lacks support (emotional/physical/spiritual)  Motivation/mood  Finances  Using drugs or alcohol  Sleeping too much or not enough  Missing work/appointments  Bothered by lights or sounds in the environment  Bothered by touch, texture, or movement  Lack of satisfying daily routine   Living environment  Transportation  Other:          Patient indicated these supports: (Bolded items indicate activities the pt selected)   Safe place to live  Family, friends or caregivers to help out when needed  A best friend or significant other  Pets  Belief in myself and abilities  Routines and rituals that support my wellness  Access to email, social media, phone calls  Leisure supplies to support my interests and hobbies  Professionals: , Therapist, etc.  Other:      Patient identified these emotional, physical or mental health concerns: \"Anxiety, memory, and motivation/stamina to get stuff done\"    Patient jennifer with these concerns: \"Family, friends\"    Patient enjoys spending time with: \"Family and my friend Lakisha\"    Identified enjoyment in activities: \"Gardening and woodworking\"    Stressors or changes in the past year: \"Depression worsened, living situation got uncontrollable and bad\"    Patient identified values: \"Helping people and doing good in the world\"    Patient's goal for the future is \"Continue taking my medication as prescribed and everyday\"    Goals selected by patient to work on with OT: (Bolded items indicate activities the pt selected)   Have hope for the future  Feel better  Learn ways to stay well and avoid coming to the hospital  Share my thoughts and feelings  Feel more confident  Improve my sleep  Improve my concentration  Relax and enjoy myself  Improve my relationships with others  Handle my frustrations  Develop a satisfying daily routine  Manage my physical pain  Explore use " of sensory coping strategies  Other:     Assessment: Patient would benefit from continue engagement in OT groups that support healthy recovery, specifically exploration of positive coping skills for symptom management/relapse prevention.    Plan: Initiate care plan goals and interventions.    Patient participated in goal(s) selection and understands the plan of care: Yes    IP OT Goal: Work with pt to increase awareness of how symptoms can impact performance on goal-directed tasks and life management skills. Will provide opportunities to build on coping strategies to manage symptoms during hospitalization and after discharge.    Plan for Next Treatment: Provide graded occupation-based activities for increased success and ongoing assessment.     Anita Hurst, OT on 11/16/2021 at 2:43 PM

## 2021-11-16 NOTE — PLAN OF CARE
Assessment/Intervention/Current Symtoms and Care Coordination  -Chart review  -Pre round meeting with team  -Rounded with team, addressed patient needs/concerns  -Post round meeting with team     Current Symptoms include the following: suicidal ideation, anxiety.    Writer provided patient contact information for a clinic accepting new therapy patients that is in patients network. Patient stated he interest in an outpatient day program. Writer explained about PHP, patient agreed this would be helpful.      Discharge Plan or Goal  Patient will discharge home with appropriate outpatient plan and appointments in place, potentially IOP and out patient therapist with trauma specialty.     Barriers to Discharge   Patient requires evaluation and stabilization of psychiatric symptoms.     Referral Status  No referrals made today.     Legal Status  72 hour hold

## 2021-11-16 NOTE — PLAN OF CARE
"Problem: Suicidal Behavior  Goal: Suicidal Behavior is Absent or Managed  Outcome: No Change  Flowsheets (Taken 11/15/2021 1934)  Mutually Determined Action Steps (Suicidal Behavior Absent/Managed):    shares suicidal thoughts    other (see comments)  Individualized Action Step (Suicidal Behavior Absent/Managed): Contracts for safety    Patient is alert and oriented x3, calm and cooperative. Patient was present in the milieu and appeared social and interactive with peers. Patient attended and  actively participated in group this evening. Patient has a full range affect, normal speech and some insight into his situation. Patient appears neat and well-groomed. Patient voices his needs appropriately. Patient endorses depression 5/10 anxiety 7/10, irritability and agitation 3/10. Patient as well endorsed having passive suicidal thoughts with \"no intent to act\". Patient denied having  SIB/HI,hallucinations, and thoughts of paranoia. Patient was upset and reported having high anxiety during unit crisis. Patient later reported being less anxious. Patient is eating, hydrating, and sleeping adequately.     Patient is medication compliant, doesn't need reminders. Medication side effects were not observed or reported this shift.     Vital signs: /84 (BP Location: Left arm, Patient Position: Sitting)   Pulse 75   Temp 97.2  F (36.2  C) (Temporal)   Resp 16   Ht 1.88 m (6' 2\")   Wt 90.4 kg (199 lb 3.2 oz)   SpO2 98%   BMI 25.58 kg/m      Patient denied pain.    PRN's given this shift: hydroxyzine, propanolol, melatonin and Seroquel.     Medications refused:None    Plan is to continue to monitor patient status q 15 mins, assess response to medications, and maintain the patients safety.    "

## 2021-11-16 NOTE — PLAN OF CARE
Problem: Depressive Symptoms  Goal: Depressive Symptoms  Description: Signs and symptoms of listed problems will be absent or manageable.  Outcome: No Change  Flowsheets (Taken 11/16/2021 0608)  Depressive Symptoms Assessed: sleep  Note: Pt slept through majority of the night with no concerns noted.     NOC Shift Report    Pt in bed at beginning of shift, breathing quiet and unlabored. Pt slept through majority of the shift. Pt slept 6.25 hours.     No pt complaints or concerns at this time.     No PRNs given. Will continue to monitor.     Precautions: Fall, Suicide

## 2021-11-16 NOTE — PLAN OF CARE
"  Patient is alert and oriented x 4. Denies any pain or discomfort. Denies any medical concerns. Reports both depression and anxiety to be at 8/10 because \" it is my daughters birthday, who passed away a few years ago in a car accident\"  He reports that he anticipates to have a \"rough day\" Prn propranolol and hydroxyzine administered with good effect.Reports  medications are working well. States no side effects from  medications. Denies si/ sib/ hallucinations. Reports that he slept well last nite. Patient is progressing towards goals. Will continue to encourage participation in groups and developing healthy coping skills.Will continue  to work towards discharge goals.   "

## 2021-11-17 PROCEDURE — G0177 OPPS/PHP; TRAIN & EDUC SERV: HCPCS

## 2021-11-17 PROCEDURE — 99231 SBSQ HOSP IP/OBS SF/LOW 25: CPT | Performed by: PSYCHIATRY & NEUROLOGY

## 2021-11-17 PROCEDURE — 124N000002 HC R&B MH UMMC

## 2021-11-17 PROCEDURE — 250N000013 HC RX MED GY IP 250 OP 250 PS 637: Performed by: PSYCHIATRY & NEUROLOGY

## 2021-11-17 PROCEDURE — 90853 GROUP PSYCHOTHERAPY: CPT

## 2021-11-17 RX ADMIN — NICOTINE POLACRILEX 4 MG: 4 GUM, CHEWING ORAL at 22:05

## 2021-11-17 RX ADMIN — NICOTINE POLACRILEX 4 MG: 4 GUM, CHEWING ORAL at 19:32

## 2021-11-17 RX ADMIN — THIAMINE HCL TAB 100 MG 100 MG: 100 TAB at 13:04

## 2021-11-17 RX ADMIN — MULTIPLE VITAMINS W/ MINERALS TAB 1 TABLET: TAB at 13:04

## 2021-11-17 RX ADMIN — NICOTINE 1 PATCH: 21 PATCH, EXTENDED RELEASE TRANSDERMAL at 13:07

## 2021-11-17 RX ADMIN — PANTOPRAZOLE SODIUM 40 MG: 40 TABLET, DELAYED RELEASE ORAL at 19:32

## 2021-11-17 RX ADMIN — PROPRANOLOL HYDROCHLORIDE 20 MG: 20 TABLET ORAL at 10:01

## 2021-11-17 RX ADMIN — PANTOPRAZOLE SODIUM 40 MG: 40 TABLET, DELAYED RELEASE ORAL at 09:21

## 2021-11-17 RX ADMIN — MIRTAZAPINE 15 MG: 15 TABLET, FILM COATED ORAL at 22:05

## 2021-11-17 RX ADMIN — QUETIAPINE FUMARATE 50 MG: 50 TABLET ORAL at 22:05

## 2021-11-17 RX ADMIN — NICOTINE POLACRILEX 4 MG: 4 GUM, CHEWING ORAL at 13:07

## 2021-11-17 RX ADMIN — NICOTINE POLACRILEX 4 MG: 4 GUM, CHEWING ORAL at 09:22

## 2021-11-17 RX ADMIN — NICOTINE POLACRILEX 4 MG: 4 GUM, CHEWING ORAL at 16:02

## 2021-11-17 RX ADMIN — CYANOCOBALAMIN TAB 1000 MCG 1000 MCG: 1000 TAB at 13:04

## 2021-11-17 RX ADMIN — DEXTROAMPHETAMINE SACCHARATE, AMPHETAMINE ASPARTATE, DEXTROAMPHETAMINE SULFATE AND AMPHETAMINE SULFATE 10 MG: 1.25; 1.25; 1.25; 1.25 TABLET ORAL at 09:21

## 2021-11-17 RX ADMIN — BUPROPION HYDROCHLORIDE 300 MG: 300 TABLET, FILM COATED, EXTENDED RELEASE ORAL at 09:21

## 2021-11-17 RX ADMIN — FOLIC ACID 1 MG: 1 TABLET ORAL at 13:04

## 2021-11-17 RX ADMIN — MELATONIN TAB 3 MG 3 MG: 3 TAB at 21:05

## 2021-11-17 ASSESSMENT — ACTIVITIES OF DAILY LIVING (ADL)
DRESS: INDEPENDENT
HYGIENE/GROOMING: INDEPENDENT
LAUNDRY: WITH SUPERVISION
ORAL_HYGIENE: INDEPENDENT

## 2021-11-17 NOTE — CONSULTS
Consult acknowledged. We have no available appts for the rest of the week unless there is a cancellation.  Pt is already scheduled for an OP appt.         Consult closed.       DENNY Lombardi, [facility]  [facility]  Phone: [phone]

## 2021-11-17 NOTE — PLAN OF CARE
"  Patient is alert and oriented x3, calm and cooperative. Patient was present in the milieu and appeared social and interactive with peers. Patient attended and  actively participated in group this evening. Patient has a full range affect, normal speech and he has insight into his hospitalization. Patient appears disheveled and neglected grooming. Patient voices his needs appropriately. Patient endorses sadness, depression 6/10 anxiety 9/10, racing thoughts irritability and agitation 2/10. Patient added that he feels \"jumpy, ... unable to concentrate ... because this morning I puked right after taking my medications so probably the adderall didn't settle in my body\".Patient denied having  SI/SIB/HI,hallucinations, and thoughts of paranoia. Patient approach staff and demanded to have access to his phone \"to send an e-mail\" after looking into the chart, the patient was informed that he can have some computer time with staff supervision and he got upset and said \"I will deal with the day staff tomorrow\" and went to his room. Patient is eating, hydrating, and sleeping adequately.     Patient is medication compliant, doesn't need reminders. Medication side effects were not observed or reported this shift.     Vital signs: /71   Pulse 77   Temp 97.4  F (36.3  C) (Oral)   Resp 16   Ht 1.88 m (6' 2\")   Wt 91.7 kg (202 lb 1.6 oz)   SpO2 98%   BMI 25.95 kg/m      Patient denied pain.    PRN's given this shift: None    Medications refused:None    Plan is to continue to monitor patient status q 15 mins, assess response to medications, and maintain the patients safety.    "

## 2021-11-17 NOTE — PROGRESS NOTES
"[facility], [place]   Psychiatric Progress Note        Interim History:   The patient's care was discussed with the treatment team during the daily team meeting and/or staff's chart notes were reviewed.  Staff report patient appeared to be upset that he was not allowed to use his phone to send emails. Reported sleeping 5 hours only, said that sleep was disrupted by nightmare.    Met with patient: was seen in the conference room. He was somewhat upset about not being allowed to use his phone. I explained to him that when I put an order that he could use computer time I meant any computer including his phone and reason that he asked for phone time close to 9 pm might be a reason why he was not allowed to use it as it was late. He accepted it. We discussed his plans for discharge. Brandin reported still having Suicidal ideation off and on, though, overall, said that he was doing better and getting more stable. He agreed to participate in PHP and I put a request for PHP intake. Brandin also said that he would like to be discharged tomorrow.          Medications:       amphetamine-dextroamphetamine  10 mg Oral Daily     buPROPion  300 mg Oral QAM     cyanocobalamin  1,000 mcg Oral Daily     folic acid  1 mg Oral Daily     mirtazapine  15 mg Oral At Bedtime     multivitamin w/minerals  1 tablet Oral Daily     nicotine  1 patch Transdermal Daily     nicotine   Transdermal Q8H     pantoprazole  40 mg Oral BID     thiamine  100 mg Oral Daily          Allergies:     Allergies   Allergen Reactions     Amoxicillin      Bactrim [Sulfamethoxazole W-Trimethoprim]           Labs:   No results found for this or any previous visit (from the past 24 hour(s)).       Psychiatric Examination:     /83 (BP Location: Left arm)   Pulse 102   Temp 97.6  F (36.4  C) (Oral)   Resp 16   Ht 1.88 m (6' 2\")   Wt 91.7 kg (202 lb 1.6 oz)   SpO2 98%   BMI 25.95 kg/m    Weight is 202 lbs 1.6 oz  Body mass index " is 25.95 kg/m .    Orthostatic Vitals  Report      Most Recent      Sitting Orthostatic /83 11/17 0930    Sitting Orthostatic Pulse (bpm) 102 11/17 0930    Standing Orthostatic /86 11/17 0930    Standing Orthostatic Pulse (bpm) 105 11/17 0930         Appearance: awake, alert, dressed in hospital scrubs and appeared as age stated  Attitude: overall, cooperative  Eye Contact:  fair  Mood: better  Affect:  constricted mobility  Speech:  clear, coherent  Psychomotor Behavior:  no evidence of tardive dyskinesia, dystonia, or tics  Throught Process:  logical, linear and goal oriented  Associations:  no loose associations  Thought Content:  no evidence of psychotic thought and passive suicidal ideation present  Insight:  fair  Judgement:  fair  Oriented to:  time, person, and place  Attention Span and Concentration:  intact  Recent and Remote Memory:  fair    Clinical Global Impressions  First: 6/4 11/15/2021      Most recent:            Precautions:     Behavioral Orders   Procedures     Code 1 - Restrict to Unit     Fall precautions     Routine Programming     As clinically indicated     Status 15     Every 15 minutes.     Suicide precautions     Patients on Suicide Precautions should have a Combination Diet ordered that includes a Diet selection(s) AND a Behavioral Tray selection for Safe Tray - with utensils, or Safe Tray - NO utensils            DIagnoses:     1.  Depression, not otherwise specified.  2.  Anxiety disorder, not otherwise specified.  3.  PTSD.  4.  ADHD.  5.  Alcohol use disorder.         Plan:     No medication changes today. Will continue to provide support and structure. Expect patient to live tomorrow, then, will transfer to [facility] and will continue to get mental health care there.

## 2021-11-17 NOTE — PLAN OF CARE
"  Problem: General Rehab Plan of Care  Goal: Therapeutic Recreation/Music Therapy Goal  Description: The patient and/or their representative will achieve their patient-specific goals related to the plan of care.  The patient-specific goals include:  Outcome: Improving     Иван attended art therapy group (6 patients total for 45 minutes). He reported feeling \"listless and stressed\" today. He talked about how he is forcing himself to spend time outside his room so he doesn't isolate today. He engaged in group discussion about self-compassion. He participated in the art activity to make an image or symbol that represents compassion. He appeared calm and focused while drawing. He made an image of a tree and shared that it reminds him of a photograph of him and his daughter. He interacted appropriately with peers and was a positive participant in group.   "

## 2021-11-17 NOTE — PLAN OF CARE
Patient is alert and oriented x4. He is able to communicate needs. Pt's mood is calm, affect is full range. Patient is social with peers. He participate in groups on the unit. Patient endorses anxiety at a 7 out of 10. He denies SI/SIB/AVH. He is medication compliant. He is likely to discharge home tomorrow. His VSS. Staff will continue to monitor.

## 2021-11-17 NOTE — PROGRESS NOTES
"   11/17/21 1600   Groups   Details Process   Number of patients attending the group:  4  Group Length:  1 hour    Group Therapy     Summary of Group / Topics Discussed:  The  Psychotherapy group goal is to promote insight to positive choice and change. Group processing is within a supportive and safe environment. Patients will process emotions using verbal group and expressive psychotherapy interventions.    Assessment CTC led process group on the topic of working with emotions. Participants did a check in, and an activity using two coloring sheets related to experiencing emotion in our bodies and the wheel of emotion. Writer provided a safe place for dialogue about the experience of difficult emotions. Activities were directed at the goal of increasing awareness of physiological experience of emotions as well as expanding vocabulary and ability to articulate primary and secondary emotions.       Patient Response- Иван was an active participant in group today. He completed both worksheet activities and contributed to the group discussion. He did need re-direction at one point when he made a reference to a \"one-ie\" referring to smoking pot. He took re-direction well and did not bring up again.                     "

## 2021-11-17 NOTE — PLAN OF CARE
OT Groups Attended: Clinic x2    Mood/Affect/General Presentation: Calm/pleasant, engaged, congruent affect.     Patient Response: Pt actively participated in occupational therapy clinic with 4-5 patients for 90 minutes. Pt was able to ask for assistance as needed, and independently return to a familiar, goal-directed task without difficulty. Pt demonstrated good focus (50 minutes without interruption), planning, and problem solving during task completion. Pt appeared comfortable interacting with peers and writer, and socialized pleasantly during his time in clinic. He shared the meaning behind his projects with writer, and plans to use one to honor his daughter that passed many years ago and to gift one to a support system in his life.     Plan: Pt will be encouraged to maintain group attendance for continued ongoing assessment and support in completion of occupational therapy treatment goals.

## 2021-11-17 NOTE — PLAN OF CARE
"Problem: Depressive Symptoms  Goal: Depressive Symptoms  Description: Signs and symptoms of listed problems will be absent or manageable.  Outcome: No Change  Flowsheets (Taken 11/17/2021 5924)  Depressive Symptoms Assessed: sleep  Note: Pt slept through majority of the night with no concerns noted.     NOC Shift Report    Pt in bed at beginning of shift, breathing quiet and unlabored. Pt slept through majority of the shift. Pt slept 5 hours.     At the start of the shift, pt came out of room and stated, \"I want to let you guys know I am going home today.\" Pt then immediately walked back into room and shut door. No additional pt complaints or concerns at this time.     No PRNs given. Will continue to monitor.     Precautions: Fall, Suicide    "

## 2021-11-17 NOTE — PLAN OF CARE
Assessment/Intervention/Current Symtoms and Care Coordination  -Chart review  -Pre round meeting with team     Current Symptoms include the following: anxiety, depression, passive SI.     Patient said he is planning to discharge tomorrow. Said he was glad to be referred for out patient PHP and anticipating call on the unit for in-take. Patient signed LORETTA for writer to schedule appointment for psychiatry with [facility]. Patient said he was a little nervous that out patient psychiatry would not continue to prescribe Wellbutrin and adderall. Said he feels much better now on these meds then he has in the past on other medications. Patients 72 hour hold has ended and patient reports he has signed in voluntarily.     Discharge Plan or Goal  Patient will discharge home with appropriate outpatient plan and appointments in place, potentially IOP and out patient therapist with trauma specialty. Plan will likely discharge tomorrow Thursday 11/18.     Barriers to Discharge   Patient requires evaluation and stabilization of psychiatric symptoms.     Referral Status  Patient has out patient psychiatric scheduled with [facility] on 12/4..     Legal Status  Voluntary

## 2021-11-18 VITALS
DIASTOLIC BLOOD PRESSURE: 78 MMHG | HEART RATE: 103 BPM | OXYGEN SATURATION: 97 % | TEMPERATURE: 98.3 F | SYSTOLIC BLOOD PRESSURE: 122 MMHG | RESPIRATION RATE: 16 BRPM | BODY MASS INDEX: 25.93 KG/M2 | HEIGHT: 74 IN | WEIGHT: 202 LBS

## 2021-11-18 PROCEDURE — G0177 OPPS/PHP; TRAIN & EDUC SERV: HCPCS

## 2021-11-18 PROCEDURE — 250N000013 HC RX MED GY IP 250 OP 250 PS 637: Performed by: PSYCHIATRY & NEUROLOGY

## 2021-11-18 PROCEDURE — H2032 ACTIVITY THERAPY, PER 15 MIN: HCPCS

## 2021-11-18 PROCEDURE — 99239 HOSP IP/OBS DSCHRG MGMT >30: CPT | Performed by: PSYCHIATRY & NEUROLOGY

## 2021-11-18 RX ORDER — MIRTAZAPINE 15 MG/1
15 TABLET, FILM COATED ORAL AT BEDTIME
Qty: 30 TABLET | Refills: 1 | Status: SHIPPED | OUTPATIENT
Start: 2021-11-18 | End: 2023-06-04

## 2021-11-18 RX ORDER — BUPROPION HYDROCHLORIDE 300 MG/1
300 TABLET ORAL EVERY MORNING
Qty: 30 TABLET | Refills: 1 | Status: SHIPPED | OUTPATIENT
Start: 2021-11-19 | End: 2021-12-15

## 2021-11-18 RX ORDER — PROPRANOLOL HYDROCHLORIDE 20 MG/1
20 TABLET ORAL 3 TIMES DAILY PRN
Qty: 60 TABLET | Refills: 1 | Status: SHIPPED | OUTPATIENT
Start: 2021-11-18 | End: 2021-12-15

## 2021-11-18 RX ORDER — DEXTROAMPHETAMINE SACCHARATE, AMPHETAMINE ASPARTATE, DEXTROAMPHETAMINE SULFATE AND AMPHETAMINE SULFATE 2.5; 2.5; 2.5; 2.5 MG/1; MG/1; MG/1; MG/1
10 TABLET ORAL DAILY
Qty: 30 TABLET | Refills: 0 | Status: SHIPPED | OUTPATIENT
Start: 2021-11-19 | End: 2021-12-15

## 2021-11-18 RX ORDER — LANOLIN ALCOHOL/MO/W.PET/CERES
3 CREAM (GRAM) TOPICAL
Qty: 30 TABLET | Refills: 1 | Status: SHIPPED | OUTPATIENT
Start: 2021-11-18 | End: 2023-07-03

## 2021-11-18 RX ORDER — PANTOPRAZOLE SODIUM 40 MG/1
40 TABLET, DELAYED RELEASE ORAL 2 TIMES DAILY
Qty: 60 TABLET | Refills: 1 | Status: SHIPPED | OUTPATIENT
Start: 2021-11-18 | End: 2023-07-03

## 2021-11-18 RX ADMIN — NICOTINE POLACRILEX 4 MG: 4 GUM, CHEWING ORAL at 07:43

## 2021-11-18 RX ADMIN — NICOTINE POLACRILEX 4 MG: 4 GUM, CHEWING ORAL at 12:37

## 2021-11-18 RX ADMIN — NICOTINE 1 PATCH: 21 PATCH, EXTENDED RELEASE TRANSDERMAL at 08:53

## 2021-11-18 RX ADMIN — PROPRANOLOL HYDROCHLORIDE 20 MG: 20 TABLET ORAL at 10:01

## 2021-11-18 RX ADMIN — HYDROXYZINE HYDROCHLORIDE 50 MG: 50 TABLET, FILM COATED ORAL at 15:07

## 2021-11-18 RX ADMIN — CYANOCOBALAMIN TAB 1000 MCG 1000 MCG: 1000 TAB at 13:10

## 2021-11-18 RX ADMIN — NICOTINE POLACRILEX 4 MG: 4 GUM, CHEWING ORAL at 08:55

## 2021-11-18 RX ADMIN — NICOTINE POLACRILEX 4 MG: 4 GUM, CHEWING ORAL at 16:23

## 2021-11-18 RX ADMIN — PROPRANOLOL HYDROCHLORIDE 20 MG: 20 TABLET ORAL at 16:22

## 2021-11-18 RX ADMIN — PANTOPRAZOLE SODIUM 40 MG: 40 TABLET, DELAYED RELEASE ORAL at 08:53

## 2021-11-18 RX ADMIN — THIAMINE HCL TAB 100 MG 100 MG: 100 TAB at 13:10

## 2021-11-18 RX ADMIN — BUPROPION HYDROCHLORIDE 300 MG: 300 TABLET, FILM COATED, EXTENDED RELEASE ORAL at 08:53

## 2021-11-18 RX ADMIN — DEXTROAMPHETAMINE SACCHARATE, AMPHETAMINE ASPARTATE, DEXTROAMPHETAMINE SULFATE AND AMPHETAMINE SULFATE 10 MG: 1.25; 1.25; 1.25; 1.25 TABLET ORAL at 08:53

## 2021-11-18 RX ADMIN — MULTIPLE VITAMINS W/ MINERALS TAB 1 TABLET: TAB at 13:10

## 2021-11-18 RX ADMIN — NICOTINE POLACRILEX 4 MG: 4 GUM, CHEWING ORAL at 15:07

## 2021-11-18 RX ADMIN — FOLIC ACID 1 MG: 1 TABLET ORAL at 13:10

## 2021-11-18 ASSESSMENT — ACTIVITIES OF DAILY LIVING (ADL)
HYGIENE/GROOMING: INDEPENDENT
DRESS: INDEPENDENT
ORAL_HYGIENE: INDEPENDENT
ORAL_HYGIENE: INDEPENDENT
LAUNDRY: WITH SUPERVISION
HYGIENE/GROOMING: INDEPENDENT
DRESS: INDEPENDENT
LAUNDRY: WITH SUPERVISION

## 2021-11-18 ASSESSMENT — MIFFLIN-ST. JEOR: SCORE: 1911.02

## 2021-11-18 NOTE — PLAN OF CARE
RN: Patient is visible in the milieu and is social with peers for most of the shift. Very calm, rates anxiety at 2/10, Pt is very happy about being discharged tomorrow. Denies SI, SIB, Hallucination.  Pt is alert and oriented, VSS. has good appetite. Compliant with medication, no reports or observed medication side effects noticed.

## 2021-11-18 NOTE — PROGRESS NOTES
" 11/17/21 1800   Groups   Details   (Psychotherapy)   Number of patients attending the group:  5  Group Length:  1 Hours    Group Therapy Type:     Summary of Group / Topics Discussed:      The  Psychotherapy group goal is to promote insight to positive choice and change. Group processing is within a supportive and safe environment. Patients will process emotions using verbal group and expressive psychotherapy interventions including visual art/writing interventions.    Group interventions support patients by: creative self expression, self compassion and mental health management    Modalities to reach these goals include: Narrative psychology and Expressive Arts Therapies    Subjective -patient report of mood today- \"good day, nervous about getting out and taking medications properly      Objective/ Intervention- Goal of group and Therapeutic modality utilized- Art Therapy and Process discussion about self compassion and safe space    Group Response- engaged    Patient Response-Pt was engaged.He speaks about his garden which his primary coping and grounding skill. He made a thoughtful image of the path to the garden.    Buddy Jimenez, SHALONDA, ATR-BC            "

## 2021-11-18 NOTE — DISCHARGE INSTRUCTIONS
Behavioral Discharge Planning and Instructions    Summary: You were admitted on 11/12/2021  due to Depression and Suicidal Ideations.  You were treated by Dr. Oconnor and discharged on 11/18/2021 from [facility] to Home    Main Diagnosis: Depression, not otherwise specified    Health Care Follow-up:   Appointment Date/Time: Thursday December 2nd 7:55 am (75 minutes long) Virtual appointment, link will be sent to you e-mail the evening before.  AND Tuesday January 4th 7:30 am (25 minutes)  Psychiatrist/Primary Care Giver: [facility]: Carlos A GARCÍA  Address:  [address], [address]  [place] [address]  Phone Number: [phone]  Fax [phone] huc PLEASE SEND DISCHARGE SUMMARY      Attend all scheduled appointments with your outpatient providers. Call at least 24 hours in advance if you need to reschedule an appointment to ensure continued access to your outpatient providers.     Major Treatments, Procedures and Findings:  You were provided with: a psychiatric assessment, assessed for medical stability, medication evaluation and/or management, group therapy and milieu management    Symptoms to Report: losing more sleep, mood getting worse or thoughts of suicide    Early warning signs can include: increased depression or anxiety sleep disturbances increased thoughts or behaviors of suicide or self-harm     Safety and Wellness:  Take all medicines as directed.  Make no changes unless your doctor suggests them.      Follow treatment recommendations.  Refrain from alcohol and non-prescribed drugs.  If there is a concern for safety, call 911.    Resources:   Crisis Intervention: 332.308.4660 or 349-100-2932 (TTY: 712.197.6954).  Call anytime for help.  National [place] on Mental Illness (www.[place].nini.org): 125.466.9912 or 302-490-2135.  Alcoholics Anonymous (www.alcoholics-anonymous.org): Check your phone book for your local chapter.  Suicide Awareness Voices of  "Education (SAVE) (www.save.org): 173-945-EYVK (7283)  National Suicide Prevention Line (www.mentalhealthmn.org): 744-141-AXZN (7851)  Mental Health Consumer/Survivor Network of [place] (www.mhcsn.net): 247.613.3152 or 193-352-5449  Mental Health Association of [place] (www.mentalhealth.org): 338.174.3062 or 855-076-5613  Self- Management and Recovery Training., SMART-- Toll free: 587.855.7778  www.[facility]  [facility] Crisis (COPE) Response - Adult 282 660-8514  Text 4 Life: txt \"LIFE\" to 77934 for immediate support and crisis intervention  Crisis text line: Text \"[place]\" to 289134. Free, confidential, 24/7.  Crisis Intervention: 636.305.8567 or 288-954-3832. Call anytime for help.     General Medication Instructions:   See your medication sheet(s) for instructions.   Take all medicines as directed.  Make no changes unless your doctor suggests them.   Go to all your doctor visits.  Be sure to have all your required lab tests. This way, your medicines can be refilled on time.  Do not use any drugs not prescribed by your doctor.  Avoid alcohol.    Advance Directives:   Scanned document on file with [facility]? No scanned doc  Is document scanned? Pt states no documents  Honoring Choices Your Rights Handout: Informed and given  Was more information offered? Materials given    The Treatment team has appreciated the opportunity to work with you. If you have any questions or concerns about your recent admission, you can contact the unit which can receive your call 24 hours a day, 7 days a week. They will be able to get in touch with a Provider if needed. The unit number is [phone] .  "

## 2021-11-18 NOTE — PLAN OF CARE
Problem: Depressive Symptoms  Goal: Depressive Symptoms  Description: Signs and symptoms of listed problems will be absent or manageable.  Outcome: No Change  Flowsheets (Taken 11/18/2021 0500)  Depressive Symptoms Assessed: sleep  Note: Pt continues to sleep through the night with no concerns noted.     NOC Shift Report    Pt in bed at beginning of shift, breathing quiet and unlabored. Pt slept through shift. Pt slept 7 hours.     No pt complaints or concerns at this time.     No PRNs given. Will continue to monitor.     Precautions: Fall, Suicide

## 2021-11-18 NOTE — PLAN OF CARE
Patient discharged at 1900 to home via friend. Patient was given belongings, medications, and a copy of discharge instructions. Verbally discussed discharge plan,follow up care and education; patient verbalized understanding. Patient denies SI,SIB, and HI; contracts for safety. He denied access to guns. Patient was given the unit number for questions or concerns he may have after discharge.

## 2021-11-18 NOTE — PLAN OF CARE
Patient is alert and oriented x4. He is able to communicate needs. Patient is actively participating in groups. Patient's mood is calm with a bright affect. Patient is social with peers and staff. He is medication compliant. He denies SI/SIB/AVH. He endorses some anxiety, received prn propanolol with good relief. Patient is discharging home this evening at 1900. He feels safe to return home. Denies any other concerns.

## 2021-11-18 NOTE — PLAN OF CARE
Assessment/Intervention/Current Symtoms and Care Coordination  -Chart review  -Pre round meeting with team     Current Symptoms include the following: anxiety, depression, passive SI.    Patient will discharge today. Patient has appointments for psychiatry and an intake for [facility] scheduled. Writer spoke with patient, explained patients appointments to him. Patient said he was glad to be going home tonight, stated he feels better then he has in over six months. Patient states that a friend will pick him up this evening after discharge.     Discharge Plan or Goal  Patient will discharge home with appropriate outpatient plan and appointments in place.    Barriers to Discharge   No barriers at this time.     Referral Status  Patient has out patient psychiatric scheduled with [facility] on 12/4.     Legal Status  Voluntary

## 2021-11-19 ENCOUNTER — TELEPHONE (OUTPATIENT)
Dept: BEHAVIORAL HEALTH | Facility: CLINIC | Age: 36
End: 2021-11-19
Payer: COMMERCIAL

## 2021-11-19 ENCOUNTER — HOSPITAL ENCOUNTER (OUTPATIENT)
Dept: BEHAVIORAL HEALTH | Facility: CLINIC | Age: 36
Discharge: HOME | End: 2021-11-19
Attending: FAMILY MEDICINE | Admitting: FAMILY MEDICINE
Payer: COMMERCIAL

## 2021-11-19 ENCOUNTER — BEH TREATMENT PLAN (OUTPATIENT)
Dept: BEHAVIORAL HEALTH | Facility: CLINIC | Age: 36
End: 2021-11-19
Attending: PSYCHIATRY & NEUROLOGY
Payer: COMMERCIAL

## 2021-11-19 DIAGNOSIS — F33.9 MAJOR DEPRESSIVE DISORDER, RECURRENT EPISODE WITH MIXED FEATURES (H): ICD-10-CM

## 2021-11-19 DIAGNOSIS — F32.A DEPRESSION, UNSPECIFIED DEPRESSION TYPE: Primary | ICD-10-CM

## 2021-11-19 PROCEDURE — 90791 PSYCH DIAGNOSTIC EVALUATION: CPT | Mod: GT | Performed by: COUNSELOR

## 2021-11-19 ASSESSMENT — PATIENT HEALTH QUESTIONNAIRE - PHQ9
10. IF YOU CHECKED OFF ANY PROBLEMS, HOW DIFFICULT HAVE THESE PROBLEMS MADE IT FOR YOU TO DO YOUR WORK, TAKE CARE OF THINGS AT HOME, OR GET ALONG WITH OTHER PEOPLE: VERY DIFFICULT
SUM OF ALL RESPONSES TO PHQ QUESTIONS 1-9: 9
SUM OF ALL RESPONSES TO PHQ QUESTIONS 1-9: 9

## 2021-11-19 ASSESSMENT — ANXIETY QUESTIONNAIRES
IF YOU CHECKED OFF ANY PROBLEMS ON THIS QUESTIONNAIRE, HOW DIFFICULT HAVE THESE PROBLEMS MADE IT FOR YOU TO DO YOUR WORK, TAKE CARE OF THINGS AT HOME, OR GET ALONG WITH OTHER PEOPLE: VERY DIFFICULT
5. BEING SO RESTLESS THAT IT IS HARD TO SIT STILL: SEVERAL DAYS
7. FEELING AFRAID AS IF SOMETHING AWFUL MIGHT HAPPEN: SEVERAL DAYS
6. BECOMING EASILY ANNOYED OR IRRITABLE: SEVERAL DAYS
1. FEELING NERVOUS, ANXIOUS, OR ON EDGE: SEVERAL DAYS
2. NOT BEING ABLE TO STOP OR CONTROL WORRYING: SEVERAL DAYS
3. WORRYING TOO MUCH ABOUT DIFFERENT THINGS: SEVERAL DAYS
4. TROUBLE RELAXING: SEVERAL DAYS
GAD7 TOTAL SCORE: 7

## 2021-11-19 NOTE — TELEPHONE ENCOUNTER
Patient has a virtual  eval today, 11/19/2021 at 1300. Spoke with patient and checked them in for this appointment. Emergency contact and e-mail verified. Appointment will be done via [email or web address] video.
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
## 2021-11-20 PROBLEM — G89.29 CHRONIC LEFT-SIDED LOW BACK PAIN WITHOUT SCIATICA: Status: RESOLVED | Noted: 2021-06-16 | Resolved: 2021-11-20

## 2021-11-20 PROBLEM — M54.50 CHRONIC LEFT-SIDED LOW BACK PAIN WITHOUT SCIATICA: Status: RESOLVED | Noted: 2021-06-16 | Resolved: 2021-11-20

## 2021-11-20 ASSESSMENT — ANXIETY QUESTIONNAIRES: GAD7 TOTAL SCORE: 7

## 2021-11-20 ASSESSMENT — PATIENT HEALTH QUESTIONNAIRE - PHQ9: SUM OF ALL RESPONSES TO PHQ QUESTIONS 1-9: 9

## 2021-11-20 NOTE — PROGRESS NOTES
Patient did not return after initial evaluation.  Please see initial evaluation from 6/16/21 for discharge status.
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
## 2021-11-22 ENCOUNTER — PATIENT OUTREACH (OUTPATIENT)
Dept: NURSING | Facility: CLINIC | Age: 36
End: 2021-11-22
Payer: COMMERCIAL

## 2021-11-22 ASSESSMENT — ACTIVITIES OF DAILY LIVING (ADL): DEPENDENT_IADLS:: INDEPENDENT

## 2021-11-22 NOTE — PROGRESS NOTES
Clinic Care Coordination Contact    Clinic Care Coordination Contact  OUTREACH with Post Discharge Assessment    Referral Information:  Referral Source: Self-patient/Caregiver         Chief Complaint   Patient presents with     Clinic Care Coordination - Post Hospital        Universal Utilization: Pt was recently hospitalized for mental health stabilization.     Utilization    Hospital Admissions  3             ED Visits  5             No Show Count (past year)  5                Current as of: 11/22/2021  1:56 PM            Clinical Concerns:  LUZ MARINA SMITH spoke with pt regarding his recent hospitalization. Pt shared that it was a very helpful experience.    Pt is now in [place], staying with his parents. He plans to continue to come to [place] to get his apartment in order but will sleep at a friends home to avoid being alone.     Pt is now on medication for ADHD and this is helping significantly. He is currently not having cravings for alcohol, no longer has racing thoughts, is able to concentrate better. Due to this reduction of symptoms, he is also feeling significantly less depressed and no longer has suicidal thoughts.    He has completed his intake for IOP. He will begin IOP on 11/24 virtually. Pt has his medication, 30 days of worth. He has an appointment with Psychiatrist before he runs out.     Pt stated that he is feeling very hopeful overall for his mental health and substance use. He plans to continue his sobriety and focus on his mental health through IOP. Pt stated understanding that using alcohol with his medications could be harmful.    Current Medical Concerns:  none    Current Behavioral Concerns: depression, alcohol use    Education Provided to patient: none      Health Maintenance Reviewed:    Clinical Pathway: None    Admission:    Admission Date: 11/12/21   Reason for Admission: Suicidal thoughts  Discharge:   Discharge Date: 11/18/21  Discharge Diagnosis: Depression, PTSD,  ADHD    Enrollment  Primary Care Care Coordination Status: Enrolled  Clinical Pathway Name: None  Outreach Frequency: monthly    Discharge Assessment  How are you doing now that you are home?: Doing very well. He has his medications and is very happy with the changes made. He is feeling significantly less symptoms of depressed mood, racing thoughts, SI, and trouble concentrating.  How are your symptoms? (Red Flag symptoms escalate to triage hotline per guidelines): Improved  Do you feel your condition is stable enough to be safe at home until your provider visit?: Yes  Does the patient have their discharge instructions? : Yes  Does the patient have questions regarding their discharge instructions? : No  Were you started on any new medications or were there changes to any of your previous medications? : Yes  Does the patient have all of their medications?: Yes  Do you have questions regarding any of your medications? : No  Do you have all of your needed medical supplies or equipment (DME)?  (i.e. oxygen tank, CPAP, cane, etc.): Yes  Discharge follow-up appointment scheduled within 14 calendar days? : Yes  Discharge Follow Up Appointment Date: 01/04/22  Discharge Follow Up Appointment Scheduled with?: Specialty Care Provider  Is patient agreeable to assistance with scheduling? : No         Post-op (Clinicians Only)  Did the patient have surgery or a procedure: No  Fever: No  Chills: No  Eating & Drinking: eating and drinking without complaints/concerns    Medication Management:  Medication review status: Medications reviewed.  Changes noted per patient report.    Functional Status:  Dependent ADLs:: Independent  Dependent IADLs:: Independent  Bed or wheelchair confined:: No  Mobility Status: Independent  Fallen 2 or more times in the past year?: No  Any fall with injury in the past year?: No    Living Situation:  Current living arrangement:: I live alone  Type of residence:: Apartment    Lifestyle & Psychosocial  Needs:    Social Determinants of Health     Tobacco Use: High Risk     Smoking Tobacco Use: Current Every Day Smoker     Smokeless Tobacco Use: Never Used   Alcohol Use: Heavy Drinker     Frequency of Alcohol Consumption: 4 or more times a week     Average Number of Drinks: 7 to 9     Frequency of Binge Drinking: Daily or almost daily   Financial Resource Strain: Low Risk      Difficulty of Paying Living Expenses: Not hard at all   Food Insecurity: No Food Insecurity     Worried About Running Out of Food in the Last Year: Never true     Ran Out of Food in the Last Year: Never true   Transportation Needs: No Transportation Needs     Lack of Transportation (Medical): No     Lack of Transportation (Non-Medical): No   Physical Activity: Not on file   Stress: Not on file   Social Connections: Unknown     Frequency of Communication with Friends and Family: Not on file     Frequency of Social Gatherings with Friends and Family: Not on file     Attends Congregation Services: Not on file     Active Member of Clubs or Organizations: Not on file     Attends Club or Organization Meetings: Not on file     Marital Status:    Intimate Partner Violence: Not on file   Depression: Not at risk     PHQ-2 Score: 2   Housing Stability: Not on file     Congregation or spiritual beliefs that impact treatment:: No  Mental health DX:: Yes  Mental health DX how managed:: None  Mental health management concern (GOAL):: No  Chemical Dependency Status: Current Concern  Informal Support system:: Family,Parent      Resources and Interventions:  Current Resources:    Community Resources: Chemical Dependency Services  Supplies used at home:: None  Equipment Currently Used at Home: cane, quad  Employment Status: unemployed     Advance Care Plan/Directive  Advanced Care Plans/Directives on file:: No    Referrals Placed: None     Goals:   Goals        General     1. Mental Health Management (pt-stated)      Notes - Note edited  10/11/2021 11:13 AM by  Briseyda Cross, [facility]     Goal Statement: Within the next 6 months, I would like to improve my overall health by decreasing my substance use and following up with medical appointments.   Date Goal set: 12/7/2020  Barriers: None  Strengths: Motivated to address health and substance use concerns  Date to Achieve By: 6/7/2021 // date extended 6/7/2022  Patient expressed understanding of goal: Иван verbally stated understanding of goal   Action steps to achieve this goal:  1. I will attend scheduled medical appointments  2. I will follow treatment recommendations  3. I will outreach to Care Coordination  for further questions or concerns           Patient/Caregiver understanding: Pt reports understanding and denies any additional questions or concerns at this times. SW CC engaged in AIDET communication during encounter.    Outreach Frequency: monthly  Future Appointments              In 2 days ADULT MH DAY 1B [facility]    In 1 week ADULT MH DAY 1B [facility]    In 1 week ADULT MH DAY 1B [facility]    In 1 week ADULT MH DAY 1B [facility]    In 2 weeks ADULT MH DAY 1B [facility]    In 2 weeks ADULT MH DAY 1B [facility]    In 2 weeks ADULT MH DAY 1B [facility]    In 3 weeks ADULT MH DAY 1B [facility]    In 3 weeks ADULT MH DAY 1B [facility]    In 3 weeks ADULT MH DAY 1B [facility]    In 4 weeks ADULT MH DAY 1B [facility]    In 1 month  ADULT MH DAY 1B [facility]    In 1 month [facility]ULT MH DAY [facility]    In 1 month ADULT MH DAY 1B [facility]    In 1 month ADULT MH DAY 1B [facility]    In 1 month ADULT MH DAY 1B [facility]    In 1 month ADULT MH DAY 1B [facility]    In 1 month ADULT MH DAY 1B [facility]    In 1 month ADULT MH DAY 1B [facility]    In 1 month ADULT MH DAY 1B [facility]    In 1 month ADULT MH DAY 1B [facility]    In 1 month ADULT MH DAY 1B [facility]    In 1 month ADULT MH DAY 1B [facility]    In 1 month ADULT MH DAY 1B [facility]    In 1 month ADULT MH DAY 1B [facility]    In 2 months ADULT MH DAY 1B [facility]    In 2 months ADULT MH DAY 1B [facility]    In 2 months ADULT MH DAY 1B [facility]    In 2 months ADULT MH DAY 1B [facility]    In 2 months ADULT MH DAY 1B [facility]    In 2 months ADULT MH DAY 1B [facility]    In 2 months ADULT MH DAY 1B [facility]  [facility]    In 2 months ADULT MH DAY 1B [facility]    In 2 months ADULT MH DAY 1B [facility]    In 2 months ADULT  DAY 1B [facility]        Plan: CC LUIS will outreach to pt in 1 month to discuss his overall wellbeing.    Abimbola Cross, [facility]  Clinic Care Coordinator  [facility] Dara  [facility] Cathie Muhlenberg  [facility]  [phone]  [email or web address]

## 2021-11-23 NOTE — PROGRESS NOTES
"Admission Date: 11/23/2021    Confirm patient pronouns: he/him    Why are you seeking treatment/What do you want to focus on during treatment? \"I need to learn to live in society again.  I finally have my meds figured out now I need to figure out my mental health\"    Identify any current concerns with potential impact to admission:     medication/medical concerns: meds going well, no medical conerns     immediate safety concerns:none since hospitalized, wont sleep at apt as a trigger    Does patient have safety plan? Yes  Note: Please copy safety plan copied into BEH Encounter     Other (insurance/childcare/transportation/housing/planned absences/etc): none    Patient's insurance is: [employer] AND [employer]    Does patient need appointment with provider? no    Review patient's program schedule and inform them of any variation due to late days or holidays.                                                                                      Completed by: yumi zaidi  "

## 2021-11-23 NOTE — PROGRESS NOTES
"Outpatient Mental Health Services - Adult    MY COPING PLAN FOR SAFETY    PATIENT'S NAME: Brandin Rodriguez  MRN:   [id number]  SAFETY PLAN:  Step 1: Warning signs / cues (Thoughts, images, mood, situation, behavior) that a crisis may be developing:    Thoughts: \"People would be better off without me\", \"I can't do this anymore\", \"I just want this to end\" and \"Nothing makes it better\"    Images: obsessive thoughts of death or dying: thoughts of overdose and hanging and flashbacks    Thinking Processes: ruminations (can't stop thinking about my problems): ruminating on daughter's death, intrusive thoughts (bothersome, unwanted thoughts that come out of nowhere):   and highly critical and negative thoughts:      Mood: worsening depression, hopelessness and intense worry    Behaviors: isolating/withdrawing , using drugs, using alcohol and impulsive, reckless behaviors (acting without thinking):      Situations: loss: death of daughter and trauma    Step 2: Coping strategies - Things I can do to take my mind off of my problems without contacting another person (relaxation technique, physical activity):    Distress Tolerance Strategies:  relaxation activities:  , sensory based activities/self-soothe with five senses:   and watch a funny movie:      Physical Activities: go for a walk    Focus on helpful thoughts:  \"This is temporary\" and \"I will get through this\"  Step 3: People and social settings that provide distraction:   Name: Friend Phone:    Name: Sister Phone:    just staying out of own apartment, being at a friends house   Step 4: Remind myself of people and things that are important to me and worth living for:  By reaching out to friends and family  Step 5: When I am in crisis, I can ask these people to help me use my safety plan:   Name: Parents and Sister Phone:    Name: Friend he is staying with Phone:   Step 6: Making the environment safe:     remove alcohol, remove drugs, remove things I could use to hurt " myself:   and be around others  Step 7: Professionals or agencies I can contact during a crisis:    Suicide Prevention Lifeline: 9-379-690-TALK (8595)    Crisis Text Line Service (available 24 hours a day, 7 days a week): Text [place] to 887212    Call  **CRISIS (986168) from a cell phone to talk to a team of professionals who can help you.  Crisis Services By [facility]: Phone Number:   [place]     [phone]   [place]    [phone]   [place]    [phone]   [place]    [phone]   [place]    [phone]   [place] [phone]   [place]     [phone]       Call 911 or go to my nearest emergency department.     I helped develop this safety plan and agree to use it when needed.  I have been given a copy of this plan.      Client signature ________________unable to sign due to covid_________________  Today s date:  11/23/2021  Adapted from Safety Plan Template 2008 Janina Pierson and Arturo Roman is reprinted with the express permission of the authors.  No portion of the Safety Plan Template may be reproduced without the express, written permission.  You can contact the authors at [email or web address] or [email or web address].

## 2021-11-24 ENCOUNTER — HOSPITAL ENCOUNTER (OUTPATIENT)
Dept: BEHAVIORAL HEALTH | Facility: CLINIC | Age: 36
End: 2021-11-24
Attending: PSYCHIATRY & NEUROLOGY
Payer: COMMERCIAL

## 2021-11-24 PROBLEM — F33.9 MAJOR DEPRESSIVE DISORDER, RECURRENT EPISODE WITH MIXED FEATURES (H): Status: ACTIVE | Noted: 2021-11-24

## 2021-11-24 PROCEDURE — 90853 GROUP PSYCHOTHERAPY: CPT | Mod: GT | Performed by: COUNSELOR

## 2021-11-24 PROCEDURE — G0177 OPPS/PHP; TRAIN & EDUC SERV: HCPCS | Mod: GT

## 2021-11-24 NOTE — GROUP NOTE
Psychoeducation Group Note    PATIENT'S NAME: Brandin Rodriguez  MRN:   [id number]  :   1985  ACCT. NUMBER: [id number]  DATE OF SERVICE: 21  START TIME:  3:00 PM  END TIME:  3:50 PM  FACILITATOR: Sruthi Novak RN  TOPIC: DAKN RN Group: Health Maintenance  [facility] Adult Mental Health Day Treatment  TRACK: 1B    NUMBER OF PARTICIPANTS: 6    Summary of Group / Topics Discussed:  Health Maintenance: Weekend planning: Patients were given time to complete a weekend plan of what they will do to promote wellness and sobriety over the weekend when they do not have the structure of group. Patients were encouraged to review progress on their treatment goals and were challenged to identify ways to work toward meeting them. Patients identified and discussed foreseeable barriers to success over the weekend and then developed a plan to overcome them. Patients reviewed their distress coping skills and social support network and discussed this with the group.       Patient Session Goals / Objectives:    ?    Identified activities to engage in that promote balance in wellness  ?    Distinguished possible barriers to success over the weekend and created a plan to overcome them  ?    Listed distress coping skills and identified social support network to utilize if in crisis during the weekend                                      Service Modality:  Video Visit     Telemedicine Visit: The patient's condition can be safely assessed and treated via synchronous audio and visual telemedicine encounter.      Reason for Telemedicine Visit: Services only offered telehealth    Originating Site (Patient Location): Patient's home    Distant Site (Provider Location): Provider Remote Setting- Home Office    Consent:  The patient/guardian has verbally consented to: the potential risks and benefits of telemedicine (video visit) versus in person care; bill my insurance or make self-payment for services provided; and  responsibility for payment of non-covered services.     Patient would like the video invitation sent by:  My Chart    Mode of Communication:  Video Conference via Medical Zoom    As the provider I attest to compliance with applicable laws and regulations related to telemedicine.           Patient Participation / Response:  Fully participated with the group by sharing personal reflections / insights and openly received / provided feedback with other participants.    Identified / Expressed personal readiness to practice skills    Treatment Plan:  Patient has a current master individualized treatment plan.  See Epic treatment plan for more information.    Sruthi Novak RN

## 2021-11-24 NOTE — GROUP NOTE
Process Group Note    PATIENT'S NAME: Brandin Rodriguez  MRN:   [id number]  :   1985  ACCT. NUMBER: [id number]  DATE OF SERVICE: 21  START TIME:  2:00 PM  END TIME:  2:50 PM  FACILITATOR: Lyndsey Pratt [facility]  TOPIC:  Process Group    Diagnoses:  296.30 (F33.9) Major Depressive Disorder, Recurrent Episode, Unspecified _ and With mixed features  300.02 (F41.1) Generalized Anxiety Disorder  309.81 (F43.10) Posttraumatic Stress Disorder (includes Posttraumatic Stress Disorder for Children 6 Years and Younger)  Without dissociative symptoms.  4. Other Diagnoses that is relevant to services:   Substance-Related & Addictive Disorders 291.9 (F10.99) Unspecified Alcohol Related Disorder.  5. Provisional Diagnosis:  NA.      [facility] Day Treatment  TRACK: 1B    NUMBER OF PARTICIPANTS: 8                                      Service Modality:  Video Visit     Telemedicine Visit: The patient's condition can be safely assessed and treated via synchronous audio and visual telemedicine encounter.      Reason for Telemedicine Visit: Services only offered telehealth    Originating Site (Patient Location): Patient's home    Distant Site (Provider Location): Provider Remote Setting- Home Office    Consent:  The patient/guardian has verbally consented to: the potential risks and benefits of telemedicine (video visit) versus in person care; bill my insurance or make self-payment for services provided; and responsibility for payment of non-covered services.     Patient would like the video invitation sent by:  My Chart    Mode of Communication:  Video Conference via Medical Zoom    As the provider I attest to compliance with applicable laws and regulations related to telemedicine.                Data:    Session content: At the start of this group, patients were invited to check in by identifying themselves, describing their current emotional status, and identifying issues to address in this  "group.   Area(s) of treatment focus addressed in this session included Symptom Management and Personal Safety.    Иван reported feeling \"anxious\" to start group, which was his goal for the day. Patient declined additional process time but contributed to group discussion and provided feedback and support to peers.      Therapeutic Interventions/Treatment Strategies:  Psychotherapist offered support, feedback and validation and reinforced use of skills.    Assessment:    Patient response:   Patient responded to session by accepting feedback, giving feedback and listening    Possible barriers to participation / learning include: and no barriers identified    Health Issues:   None reported       Substance Use Review:   Substance Use: No active concerns identified.    Mental Status/Behavioral Observations  Appearance:   Appropriate   Eye Contact:   Good   Psychomotor Behavior: Normal   Attitude:   Cooperative   Orientation:   All  Speech   Rate / Production: Normal    Volume:  Normal   Mood:    Depressed   Affect:    Appropriate   Thought Content:   Clear  Thought Form:  Coherent  Logical     Insight:    Good     Plan:     Safety Plan: No current safety concerns identified.  Recommended that patient call 911 or go to the local ED should there be a change in any of these risk factors.     Barriers to treatment: None identified    Patient Contracts (see media tab):  None    Substance Use: Not addressed in session     Continue or Discharge: Patient will continue in Adult Day Treatment (ADT)  as planned. Patient is likely to benefit from learning and using skills as they work toward the goals identified in their treatment plan.      Lyndsey Pratt, [facility]  November 24, 2021    "

## 2021-11-24 NOTE — GROUP NOTE
Psychoeducation Group Note    PATIENT'S NAME: Brandin Rodriguez  MRN:   [id number]  :   1985  [id number]  DATE OF SERVICE: 21  START TIME:  1:00 PM  END TIME:  1:50 PM  FACILITATOR: Pablito Duron OTR/L  TOPIC: MH Life Skills Group: Resiliency Development                                    Service Modality:  Video Visit     Telemedicine Visit: The patient's condition can be safely assessed and treated via synchronous audio and visual telemedicine encounter.      Reason for Telemedicine Visit: Services only offered telehealth    Originating Site (Patient Location): Patient's home    Distant Site (Provider Location): Provider Remote Setting- Home Office    Consent:  The patient/guardian has verbally consented to: the potential risks and benefits of telemedicine (video visit) versus in person care; bill my insurance or make self-payment for services provided; and responsibility for payment of non-covered services.     Mode of Communication:  Video Conference via Medical Zoom    As the provider I attest to compliance with applicable laws and regulations related to telemedicine.       [facility] Adult Mental Health Day Treatment  TRACK: 1B    NUMBER OF PARTICIPANTS: 6    Summary of Group / Topics Discussed:  Resiliency Development:  Coping Skills(Weekly Mental Health Check In): Patients were taught how to identify stressors, signs of stress, coping skills, and prevention strategies for overall stress management.  Patients were given the opportunity to identify both ongoing and acute mental health symptoms and how to effectively manage these symptoms by developing an effective aftercare plan.  Patients increased awareness of community based resources.    Patient Session Goals / Objectives:    Identified how using coping skills can be used for symptom and stress management       Improved awareness of individualed symptoms and stressors and how to effectively cope     Established a  relapse prevention plan to practice these skills in their own environments    Practiced and reflected on how to generalize taught skills to their everyday life        Patient Participation / Response:  Fully participated with the group by sharing personal reflections / insights and openly received / provided feedback with other participants.    Demonstrated understanding of content through handout/video/group discussion , Verbalized understanding of content and Patient would benefit from additional opportunities to practice the content to be able to generalize it to their everyday life with increased intentionality, consistency, and efficacy in support of their psychiatric recovery    Treatment Plan:  Patient has a current master individualized treatment plan.  See Epic treatment plan for more information.    Pablito Duron, OTR/L
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
## 2021-11-28 NOTE — DISCHARGE SUMMARY
Adult Mental Health Intensive Outpatient Discharge Summary/Instructions      Patient: Brandin Rodriguez MRN: [id number]   : 1985 Age: 36 year old     Admission Date: 21  Discharge Date: 3/2/22  Diagnosis: 296.30 (F33.9) Major Depressive Disorder, Recurrent Episode, Unspecified _ and With mixed features  300.02 (F41.1) Generalized Anxiety Disorder  309.81 (F43.10) Posttraumatic Stress Disorder (includes Posttraumatic Stress Disorder for Children 6 Years and Younger)  Without dissociative symptoms.  Substance-Related & Addictive Disorders 291.9 (F10.99) Unspecified Alcohol Related Disorder.    Focus of Treatment / Progress    Personal Safety:      * Follow your safety plan     * Call crisis lines as needed:    [facility] [phone] [facility] [phone]  [facility] [phone] Crisis Connection [phone]  [facility] [phone] [facility] [phone]  [facility] [phone] National Suicide Prevention [phone]  [facility] [phone] Suicide Prevention [phone]  [facility] [phone]    Managing symptoms of:  Depression, anxiety, PTSD    Community support/health:  [place] [address] ([phone]) [email or web address], [place] Crisis text line( Text [place] to 355328),  [facility]  [place], [place] ([phone])  [facility] [place], [place] ( [phone])  [facility] [address] [place] [address], [phone]    Managing Symptoms and Preventing Relapse    * Go to all of your appointments    * Take all medications as directed.      * Carry a current list if medication with you    * Do not use illicit (street) drugs.  Avoid alcohol    * Report these symptoms to your care team. These are early signs of relapse:   Thoughts of suicide   Losing more sleep   Increased confusion   Mood getting worse   Feeling more aggressive   Other:  Substance use    *Use these skills  "daily:  Talk to someone you trust at least one time weekly, set boundaries and say \"no\", be assertive, act opposite of negative feelings, accept challenges with a positive attitude, exercise at least three times per week for 30 minutes,  get enough sleep, eat healthy foods, get into a good routine    Copy of summary sent to: In Epic My Chart    Follow up with psychiatrist / main caregiver: Juan M Solorio    Next visit: TBD    Follow up with your therapist: unknown name   Next visit: 3/30/22    Go to group therapy and / or support groups at: You will receive a call about a start date for the aftercare group.  [facility] and Depression Bipolar Support [place](DBSA) support groups, AA    See your medical doctor about:  For an annual physical exam or any general wellness or illness as needed.    Other:  Your treatment team appreciates having the opportunity to work with you and wishes you the best.    Client Signature:__unavailable to sign due to COVID-19_____________________  Date / Time:___________  Staff Signature:__Lyndsey Pratt [facility] on 3/3/2022 at 12:14 PM  ______________________   Date / Time:___________      "

## 2021-11-29 ENCOUNTER — HOSPITAL ENCOUNTER (OUTPATIENT)
Dept: BEHAVIORAL HEALTH | Facility: CLINIC | Age: 36
End: 2021-11-29
Attending: PSYCHIATRY & NEUROLOGY
Payer: COMMERCIAL

## 2021-11-29 PROCEDURE — G0177 OPPS/PHP; TRAIN & EDUC SERV: HCPCS | Mod: GT

## 2021-11-29 PROCEDURE — 90853 GROUP PSYCHOTHERAPY: CPT | Mod: GT | Performed by: COUNSELOR

## 2021-11-29 NOTE — GROUP NOTE
Psychoeducation Group Note    PATIENT'S NAME: Brandin Rodriguez  MRN:   [id number]  :   1985  [id number]  DATE OF SERVICE: 21  START TIME:  3:00 PM  END TIME:  3:50 PM  FACILITATOR: Samantha Gonzales RN  TOPIC: DANK RN Group: Brain Health                                    Service Modality:  Video Visit     Telemedicine Visit: The patient's condition can be safely assessed and treated via synchronous audio and visual telemedicine encounter.      Reason for Telemedicine Visit:  covid19    Originating Site (Patient Location): Patient's home    Distant Site (Provider Location): Provider Remote Setting- Home Office    Consent:  The patient/guardian has verbally consented to: the potential risks and benefits of telemedicine (video visit) versus in person care; bill my insurance or make self-payment for services provided; and responsibility for payment of non-covered services.     Patient would like the video invitation sent by:  My Chart    Mode of Communication:  Video Conference via Medical Zoom    As the provider I attest to compliance with applicable laws and regulations related to telemedicine.          [facility] Mental Health Day Treatment  TRACK: 1B    NUMBER OF PARTICIPANTS: 6    Summary of Group / Topics Discussed:  Brain Health:  Pathophysiology of Mood Disorders: Patients were educated on mood disorder etiology and neuroscience, risk factors, symptoms, and pharmacologic, psychotherapeutic, and complementary treatment options. Patients were guided on a discussion of mental, behavioral, and physical symptoms and shared their symptoms with the group.     Patient Session Goals / Objectives:  ? Described what mood disorders are and identified risk factors   ? Explained how chemical imbalances in the brain can cause symptoms and how medications work to reverse this imbalance   ? Identified and described pharmacologic, psychotherapeutic, and complementary treatment  options      Patient Participation / Response:  Fully participated with the group by sharing personal reflections / insights and openly received / provided feedback with other participants.    Demonstrated understanding of topics discussed through group discussion and participation and Identified / Expressed personal readiness to practice skills    Treatment Plan:  Patient has a current master individualized treatment plan.  See Epic treatment plan for more information.    Samantha Gonzales RN

## 2021-11-29 NOTE — GROUP NOTE
Psychoeducation Group Note    PATIENT'S NAME: Brandin Rodriguez  MRN:   [id number]  :   1985  [id number]  DATE OF SERVICE: 21  START TIME:  2:00 PM  END TIME:  2:50 PM  FACILITATOR: Pablito Duron OTR/L  TOPIC: MH Life Skills Group: Resiliency Development                                    Service Modality:  Video Visit     Telemedicine Visit: The patient's condition can be safely assessed and treated via synchronous audio and visual telemedicine encounter.      Reason for Telemedicine Visit: Services only offered telehealth    Originating Site (Patient Location): Patient's home    Distant Site (Provider Location): Provider Remote Setting- Home Office    Consent:  The patient/guardian has verbally consented to: the potential risks and benefits of telemedicine (video visit) versus in person care; bill my insurance or make self-payment for services provided; and responsibility for payment of non-covered services.     Mode of Communication:  Video Conference via Medical Zoom    As the provider I attest to compliance with applicable laws and regulations related to telemedicine.       [facility] Adult Mental Health Day Treatment  TRACK: 1B    NUMBER OF PARTICIPANTS: 6    Summary of Group / Topics Discussed:  Resiliency Development:  Coping Skills(Strategies to Improve Self-Confidence): Patients were taught how to identify stressors, signs of stress, coping skills, and prevention strategies for overall stress management.  Patients were given the opportunity to identify both ongoing and acute mental health symptoms and how to effectively manage these symptoms by developing an effective aftercare plan.  Patients increased awareness of community based resources.    Patient Session Goals / Objectives:    Identified how using coping skills can be used for symptom and stress management       Improved awareness of individualed symptoms and stressors and how to effectively cope      Established a relapse prevention plan to practice these skills in their own environments    Practiced and reflected on how to generalize taught skills to their everyday life        Patient Participation / Response:  Fully participated with the group by sharing personal reflections / insights and openly received / provided feedback with other participants.    Demonstrated understanding of content through handouts/group discussion , Verbalized understanding of content and Patient would benefit from additional opportunities to practice the content to be able to generalize it to their everyday life with increased intentionality, consistency, and efficacy in support of their psychiatric recovery    Treatment Plan:  Patient has a current master individualized treatment plan.  See Epic treatment plan for more information.    Pablito Duron, OTR/L

## 2021-11-29 NOTE — GROUP NOTE
Process Group Note    PATIENT'S NAME: Brandin Rodriguez  MRN:   [id number]  :   1985  [facility]. NUMBER: [id number]  DATE OF SERVICE: 21  START TIME:  1:00 PM  END TIME:  1:50 PM  FACILITATOR: Lyndsey Pratt [facility]  TOPIC:  Process Group    Diagnoses:  296.30 (F33.9) Major Depressive Disorder, Recurrent Episode, Unspecified _ and With mixed features  300.02 (F41.1) Generalized Anxiety Disorder  309.81 (F43.10) Posttraumatic Stress Disorder (includes Posttraumatic Stress Disorder for Children 6 Years and Younger)  Without dissociative symptoms.  4. Other Diagnoses that is relevant to services:   Substance-Related & Addictive Disorders 291.9 (F10.99) Unspecified Alcohol Related Disorder.  5. Provisional Diagnosis:  NA.      [facility] Day Treatment  TRACK: 1B    NUMBER OF PARTICIPANTS: 6                                      Service Modality:  Video Visit     Telemedicine Visit: The patient's condition can be safely assessed and treated via synchronous audio and visual telemedicine encounter.      Reason for Telemedicine Visit: Services only offered telehealth    Originating Site (Patient Location): Patient's home    Distant Site (Provider Location): Provider Remote Setting- Home Office    Consent:  The patient/guardian has verbally consented to: the potential risks and benefits of telemedicine (video visit) versus in person care; bill my insurance or make self-payment for services provided; and responsibility for payment of non-covered services.     Patient would like the video invitation sent by:  My Chart    Mode of Communication:  Video Conference via Medical Zoom    As the provider I attest to compliance with applicable laws and regulations related to telemedicine.                Data:    Session content: At the start of this group, patients were invited to check in by identifying themselves, describing their current emotional status, and identifying issues to address in this  "group.   Area(s) of treatment focus addressed in this session included Symptom Management and Personal Safety.    Иван reported feeling \"okay\" today.  His goal is to help his dad with some yardwork. Patient declined additional process time but contributed to group discussion and provided feedback and support to peers.      Therapeutic Interventions/Treatment Strategies:  Psychotherapist offered support, feedback and validation and reinforced use of skills.    Assessment:    Patient response:   Patient responded to session by accepting feedback, giving feedback and listening    Possible barriers to participation / learning include: and no barriers identified    Health Issues:   None reported       Substance Use Review:   Substance Use: No active concerns identified.    Mental Status/Behavioral Observations  Appearance:   Appropriate   Eye Contact:   Good   Psychomotor Behavior: Normal   Attitude:   Cooperative   Orientation:   All  Speech   Rate / Production: Normal    Volume:  Normal   Mood:    Normal  Affect:    Appropriate   Thought Content:   Safety reports  presence of suicidal ideation passive suicidal ideation   Thought Form:  Coherent  Logical     Insight:    Good     Plan:     Safety Plan: Committed to safety and agreed to follow previously developed safety coping plan.      Barriers to treatment: None identified    Patient Contracts (see media tab):  None    Substance Use: Not addressed in session     Continue or Discharge: Patient will continue in Adult Day Treatment (ADT)  as planned. Patient is likely to benefit from learning and using skills as they work toward the goals identified in their treatment plan.      Lyndsey Pratt, [facility]  November 29, 2021    "

## 2021-12-01 ENCOUNTER — HOSPITAL ENCOUNTER (OUTPATIENT)
Dept: BEHAVIORAL HEALTH | Facility: CLINIC | Age: 36
End: 2021-12-01
Attending: PSYCHIATRY & NEUROLOGY
Payer: COMMERCIAL

## 2021-12-01 PROCEDURE — 90853 GROUP PSYCHOTHERAPY: CPT | Mod: GT | Performed by: COUNSELOR

## 2021-12-01 PROCEDURE — G0177 OPPS/PHP; TRAIN & EDUC SERV: HCPCS | Mod: GT

## 2021-12-01 NOTE — ADDENDUM NOTE
Encounter addended by: Pablito Duron, OTR/L on: 12/1/2021 5:17 PM   Actions taken: Clinical Note Signed

## 2021-12-01 NOTE — PROGRESS NOTES
Acknowledgement of Current Treatment Plan       I have reviewed my treatment plan with my therapist on 12/1/21.   I agree with the plan as it is written in the electronic health record. (1B)    Name:      Signature:  Brandin Rodriguez Unable to sign due to COVID and Virtual     Brandin Hansen MD  Psychiatrist/Medical Director Brandin Hansen MD on 12/6/2021 at 9:31 AM   RUTH Coley, [facility] [place][note 2]  Psychotherapist RUTH Gibson on 12/2/2021 at 3:20 PM     Franklin Oliverio OTR/L Franklin Duron OTR/L

## 2021-12-01 NOTE — GROUP NOTE
Process Group Note    PATIENT'S NAME: Brandin Rodriguez  MRN:   [id number]  :   1985  ACCT. NUMBER: [id number]  DATE OF SERVICE: 21  START TIME:  1:00 PM  END TIME:  1:50 PM  FACILITATOR: Lyndsey Pratt [facility]  TOPIC:  Process Group    Diagnoses:  296.30 (F33.9) Major Depressive Disorder, Recurrent Episode, Unspecified _ and With mixed features  300.02 (F41.1) Generalized Anxiety Disorder  309.81 (F43.10) Posttraumatic Stress Disorder (includes Posttraumatic Stress Disorder for Children 6 Years and Younger)  Without dissociative symptoms.  4. Other Diagnoses that is relevant to services:   Substance-Related & Addictive Disorders 291.9 (F10.99) Unspecified Alcohol Related Disorder.  5. Provisional Diagnosis:  NA.      [facility] Day Treatment  TRACK: 1B    NUMBER OF PARTICIPANTS: 4                                      Service Modality:  Video Visit     Telemedicine Visit: The patient's condition can be safely assessed and treated via synchronous audio and visual telemedicine encounter.      Reason for Telemedicine Visit: Services only offered telehealth    Originating Site (Patient Location): Patient's home    Distant Site (Provider Location): Provider Remote Setting- Home Office    Consent:  The patient/guardian has verbally consented to: the potential risks and benefits of telemedicine (video visit) versus in person care; bill my insurance or make self-payment for services provided; and responsibility for payment of non-covered services.     Patient would like the video invitation sent by:  My Chart    Mode of Communication:  Video Conference via Medical Zoom    As the provider I attest to compliance with applicable laws and regulations related to telemedicine.                Data:    Session content: At the start of this group, patients were invited to check in by identifying themselves, describing their current emotional status, and identifying issues to address in this  "group.   Area(s) of treatment focus addressed in this session included Symptom Management and Personal Safety.    Иван reportred feeling \"not great\" because she stayed at his apartment last night and woke up not feeling well.  His goal is to get up and be productive and get to [place].  Patient declined additional process time but contributed to group discussion and provided feedback and support to peers.      Therapeutic Interventions/Treatment Strategies:  Psychotherapist offered support, feedback and validation and reinforced use of skills.    Assessment:    Patient response:   Patient responded to session by accepting feedback, giving feedback and listening    Possible barriers to participation / learning include: and no barriers identified    Health Issues:   None reported       Substance Use Review:   Substance Use: cannabis .     Mental Status/Behavioral Observations  Appearance:   Appropriate   Eye Contact:   Good   Psychomotor Behavior: Normal   Attitude:   Cooperative   Orientation:   All  Speech   Rate / Production: Normal    Volume:  Normal   Mood:    Depressed   Affect:    Appropriate   Thought Content:   Clear  Thought Form:  Coherent  Logical     Insight:    Good     Plan:     Safety Plan: No current safety concerns identified.  Recommended that patient call 911 or go to the local ED should there be a change in any of these risk factors.     Barriers to treatment: None identified    Patient Contracts (see media tab):  None    Substance Use: Not addressed in session     Continue or Discharge: Patient will continue in Adult Day Treatment (ADT)  as planned. Patient is likely to benefit from learning and using skills as they work toward the goals identified in their treatment plan.      Lyndsey Pratt, [facility]  December 1, 2021    "

## 2021-12-01 NOTE — PROGRESS NOTES
Acknowledgement of Current Treatment Plan       I have reviewed my treatment plan with my therapist on 12/1/21.   I agree with the plan as it is written in the electronic health record. (5B)    Name:      Signature:  Brandin Rodriguez Unable to sign due to COVID and Virtual     Brandin Hansen MD  Psychiatrist/Medical Director    Lilia Souza, [facility]  Psychotherapist    Franklin Duron OTR/L Franklin Duron OTR/L

## 2021-12-01 NOTE — GROUP NOTE
Psychoeducation Group Note    PATIENT'S NAME: Brandin Rodriguez  MRN:   [id number]  :   1985  [id number]  DATE OF SERVICE: 21  START TIME:  2:00 PM  END TIME:  2:50 PM  FACILITATOR: Pablito Duron OTR/L  TOPIC: MH Life Skills Group: Resiliency Development                                    Service Modality:  Video Visit     Telemedicine Visit: The patient's condition can be safely assessed and treated via synchronous audio and visual telemedicine encounter.      Reason for Telemedicine Visit: Services only offered telehealth    Originating Site (Patient Location): Patient's home    Distant Site (Provider Location): Provider Remote Setting- Home Office    Consent:  The patient/guardian has verbally consented to: the potential risks and benefits of telemedicine (video visit) versus in person care; bill my insurance or make self-payment for services provided; and responsibility for payment of non-covered services.     Mode of Communication:  Video Conference via Medical Zoom    As the provider I attest to compliance with applicable laws and regulations related to telemedicine.       [facility] Adult Mental Health Day Treatment  TRACK: 1B    NUMBER OF PARTICIPANTS: 4    Summary of Group / Topics Discussed:  Resiliency Development:  Coping Skills(Decision Making): Patients were taught how to identify stressors, signs of stress, coping skills, and prevention strategies for overall stress management.  Patients were given the opportunity to identify both ongoing and acute mental health symptoms and how to effectively manage these symptoms by developing an effective aftercare plan.  Patients increased awareness of community based resources.    Patient Session Goals / Objectives:    Identified how using coping skills can be used for symptom and stress management       Improved awareness of individualed symptoms and stressors and how to effectively cope     Established a relapse prevention  plan to practice these skills in their own environments    Practiced and reflected on how to generalize taught skills to their everyday life        Patient Participation / Response:  Fully participated with the group by sharing personal reflections / insights and openly received / provided feedback with other participants.    Demonstrated understanding of content through handouts/discussion , Verbalized understanding of content and Patient would benefit from additional opportunities to practice the content to be able to generalize it to their everyday life with increased intentionality, consistency, and efficacy in support of their psychiatric recovery    Treatment Plan:  Patient has a current master individualized treatment plan.  See Epic treatment plan for more information.    Pablito Duron, OTR/L

## 2021-12-01 NOTE — ADDENDUM NOTE
Encounter addended by: Pablito Duron, OTR/L on: 12/1/2021 5:15 PM   Actions taken: Delete clinical note

## 2021-12-01 NOTE — GROUP NOTE
Psychoeducation Group Note    PATIENT'S NAME: Brandin Rodriguez  MRN:   [id number]  :   1985  [id number]  DATE OF SERVICE: 21  START TIME:  3:00 PM  END TIME:  3:50 PM  FACILITATOR: Samantha Gonzales RN  TOPIC: DANK RN Group: Brain Health                                    Service Modality:  Video Visit     Telemedicine Visit: The patient's condition can be safely assessed and treated via synchronous audio and visual telemedicine encounter.      Reason for Telemedicine Visit:  covid19    Originating Site (Patient Location): Patient's home    Distant Site (Provider Location): Provider Remote Setting- Home Office    Consent:  The patient/guardian has verbally consented to: the potential risks and benefits of telemedicine (video visit) versus in person care; bill my insurance or make self-payment for services provided; and responsibility for payment of non-covered services.     Patient would like the video invitation sent by:  My Chart    Mode of Communication:  Video Conference via Medical Zoom    As the provider I attest to compliance with applicable laws and regulations related to telemedicine.          [facility] Mental [facility] Day Treatment  TRACK: 1B    NUMBER OF PARTICIPANTS: 4    Summary of Group / Topics Discussed:  Brain Health:  Pathophysiology of stress and anxiety: Patients were educated on the difference between stress, chronic stress, and anxiety. The anatomy and pathophysiology of the body/brain were reviewed to illustrate the immediate effects of stress/anxiety in the body and the long term effects and increased risks for chronic disease that come from unmanaged stress/anxiety. Self-coping strategies to manage symptoms of stress were reviewed and pharmacologic, psychotherapeutic, and complementary treatment options were discussed.    Patient Session Goals / Objectives:  ? Described the differences between stress and anxiety and how the body responds to it  ? Listed  the long term effects and increased risks for chronic disease that can arise from unmanaged stress/anxiety  ? Identified and described pharmacologic, psychotherapeutic, and complementary treatment options      Patient Participation / Response:  Fully participated with the group by sharing personal reflections / insights and openly received / provided feedback with other participants.    Demonstrated understanding of topics discussed through group discussion and participation and Identified / Expressed personal readiness to practice skills    Treatment Plan:  Patient has a current master individualized treatment plan.  See Epic treatment plan for more information.    Samantha Gonzales RN

## 2021-12-02 ENCOUNTER — PATIENT OUTREACH (OUTPATIENT)
Dept: NURSING | Facility: CLINIC | Age: 36
End: 2021-12-02
Payer: COMMERCIAL

## 2021-12-02 ENCOUNTER — TELEPHONE (OUTPATIENT)
Dept: BEHAVIORAL HEALTH | Facility: CLINIC | Age: 36
End: 2021-12-02

## 2021-12-02 DIAGNOSIS — F90.9 ADHD (ATTENTION DEFICIT HYPERACTIVITY DISORDER): ICD-10-CM

## 2021-12-02 DIAGNOSIS — F41.1 GAD (GENERALIZED ANXIETY DISORDER): ICD-10-CM

## 2021-12-02 DIAGNOSIS — F43.10 PTSD (POST-TRAUMATIC STRESS DISORDER): Primary | ICD-10-CM

## 2021-12-02 DIAGNOSIS — F32.2 MAJOR DEPRESSIVE DISORDER, SINGLE EPISODE, SEVERE WITHOUT PSYCHOSIS (H): ICD-10-CM

## 2021-12-02 NOTE — PROGRESS NOTES
Clinic Care Coordination Contact    Follow Up Progress Note      Assessment: LUZ MARINA SMITH received a call from pt sharing that he is worried that he will not be able to continue medication for ADHD. Pt explained that since he started this in the hospital, he has been doing very well. He is able to focus better and has not had any cravings for alcohol. He has been sober since his hospital stay.     Pt saw a psychiatrist at [facility] but he refused to continue prescription due to history of alcohol use. LUZ MARINA SMITH suggested pt try [facility] as they may have an appointment within 2 weeks. LUZ MARINA SMITH will place referral for [facility] but pt understands this appointment may be several month out.     Pt will make an appointment with PCP about this as well.    Care Gaps:    Health Maintenance Due   Topic Date Due     PREVENTIVE CARE VISIT  Never done     ADVANCE CARE PLANNING  Never done     DEPRESSION ACTION PLAN  Never done     INFLUENZA VACCINE (1) 09/01/2021     Postponed to focus on mental health needs     Goals addressed this encounter:   Goals Addressed                    This Visit's Progress       Patient Stated       1. Mental Health Management (pt-stated)   50%      Goal Statement: Within the next 6 months, I would like to improve my overall health by decreasing my substance use and following up with medical appointments.   Date Goal set: 12/7/2020  Barriers: None  Strengths: Motivated to address health and substance use concerns  Date to Achieve By: 6/7/2021 // date extended 6/7/2022  Patient expressed understanding of goal: Иван verbally stated understanding of goal   Action steps to achieve this goal:  1. I will attend scheduled medical appointments  2. I will follow treatment recommendations  3. I will outreach to Care Coordination  for further questions or concerns            Outreach Frequency: monthly    Plan: LUZ MARINA SMITH will outreach to pt in 1 month to discuss his overall  wellbeing.    Abimbola Cross, [facility]  Clinic Care Coordinator  [facility] Cathie [place]  [facility]  [phone]  [email or web address]

## 2021-12-02 NOTE — ADDENDUM NOTE
Encounter addended by: Lyndsey Pratt [facility] on: 12/2/2021 3:20 PM   Actions taken: Flowsheet accepted, Clinical Note Signed

## 2021-12-02 NOTE — PROGRESS NOTES
RN Review of Medical History / Physical Health Screen  Outpatient Mental Health Programs - [facility] Adult Mental Health Day Treatment    PATIENT'S NAME: Brandin Rodriguez  MRN:   [id number]  :   1985  ACCT. NUMBER: [id number]  CURRENT AGE:  36 year old    DATE OF DIAGNOSTIC ASSESSMENT: 21  DATE OF ADMISSION: 21     Please see Diagnostic Assessment for additional Medical History.     General Health:   Have you had any exposure to any communicable disease in the past 2-3 weeks? no     Are you aware of safe sex practices? yes   Do you have a history of seizures?     If so, do you have a seizure plan? Known triggers?     Notify patient that we will call 911 (if virtual) or a code (if in-person), if we were to witness seizure during group. No; but seizures on past medical Hx and TBI but not accurate; working with medical records, etc.    no  no    n/a     Nutrition:    Are you on a special diet? If yes, please explain:  no   Do you have any concerns regarding your nutritional status? If yes, please explain:  yes issues with food - since daughter was on feeding tube, feels nauseated. Been eating pretty well since hospital. Depression impacts appetite   Have you had any appetite changes in the last 3 months?  Yes, see above     Have you had any weight loss or weight gain in the last 3 months?  Yes, how much? Lost 30 pounds over summer, gained back     Do you have a history of an eating disorder? no nothing documented, but    Do you have a history of being in an eating disorder program? no     Patient height and weight recorded by RN in epic flowsheet: no No; Unable to measure  Because of temporary in-person programmatic suspension due to COVID-19 pandemic, all pt weights and heights will be collected through patient self-report an recorded in physical health screening progress note upon admission to the program.                            Height/Weight Review:  Patient reported height:   "6'2.5\"      Patient reports weight:  Date last checked: 210 pounds; fluctuates   Any referrals/needs identified?                BMI Review:  Was the patient informed of BMI? no      Findings See above         Fall Risk:   Have you had any falls in the past 3 months? yes tripped on stairs, misjudged last step     Do you currently use any assistive devices for mobility?   no initially bought a cane for neuropathy but started B12 and issue resolved      Additional Comments/Assessment: Pt denies seizures (current/historical), dizziness, mobility concerns. No fall risk assessed; No safety concerns r/t falls.  Some nausea lately, could be r/t stopping protonix    Per completion of the Medical History / Physical Health Screen, is there a recommendation to see / follow up with a primary care physician/clinic or dentist?    No.      Samantha Gonzales RN  12/2/2021        "

## 2021-12-02 NOTE — TELEPHONE ENCOUNTER
Writer called to conduct RN screen.     Pt's Dr declined to refill adderall (bc pt drank following death of child; doesn't want to drink now).    Worried about what will happen in 2 weeks when supply runs  Wellbutrin + Adderall together has been the only combo that's helped.    First appt with psych provider. Clinic policy.  Carlos A Bronson at [facility].     Adderall has eliminated ETOH cravings, worried cravings come back.     Got appt with another clinic [facility].   Trying to set up appt with PCP.     Denies safety concerns.    Samantha Gonzales, RN on 12/2/2021 at 12:16 PM

## 2021-12-06 ENCOUNTER — HOSPITAL ENCOUNTER (OUTPATIENT)
Dept: BEHAVIORAL HEALTH | Facility: CLINIC | Age: 36
End: 2021-12-06
Attending: PSYCHIATRY & NEUROLOGY
Payer: COMMERCIAL

## 2021-12-06 PROCEDURE — 90853 GROUP PSYCHOTHERAPY: CPT | Mod: GT | Performed by: COUNSELOR

## 2021-12-06 PROCEDURE — G0177 OPPS/PHP; TRAIN & EDUC SERV: HCPCS | Mod: GT

## 2021-12-06 NOTE — GROUP NOTE
Process Group Note    PATIENT'S NAME: Barndin Rodriguez  MRN:   [id number]  :   1985  ACCT. NUMBER: [id number]  DATE OF SERVICE: 21  START TIME:  1:00 PM  END TIME:  1:50 PM  FACILITATOR: Lyndsey Pratt [facility]  TOPIC:  Process Group    Diagnoses:  296.30 (F33.9) Major Depressive Disorder, Recurrent Episode, Unspecified _ and With mixed features  300.02 (F41.1) Generalized Anxiety Disorder  309.81 (F43.10) Posttraumatic Stress Disorder (includes Posttraumatic Stress Disorder for Children 6 Years and Younger)  Without dissociative symptoms.  4. Other Diagnoses that is relevant to services:   Substance-Related & Addictive Disorders 291.9 (F10.99) Unspecified Alcohol Related Disorder.  5. Provisional Diagnosis:  NA.      [facility] Mental [facility] Day Treatment  TRACK: 1B    NUMBER OF PARTICIPANTS: 3                                      Service Modality:  Video Visit     Telemedicine Visit: The patient's condition can be safely assessed and treated via synchronous audio and visual telemedicine encounter.      Reason for Telemedicine Visit: Services only offered telehealth    Originating Site (Patient Location): Patient's home    Distant Site (Provider Location): Provider Remote Setting- Home Office    Consent:  The patient/guardian has verbally consented to: the potential risks and benefits of telemedicine (video visit) versus in person care; bill my insurance or make self-payment for services provided; and responsibility for payment of non-covered services.     Patient would like the video invitation sent by:  My Chart    Mode of Communication:  Video Conference via Medical Zoom    As the provider I attest to compliance with applicable laws and regulations related to telemedicine.                Data:    Session content: At the start of this group, patients were invited to check in by identifying themselves, describing their current emotional status, and identifying issues to address in this  "group.   Area(s) of treatment focus addressed in this session included Symptom Management and Personal Safety.    Иван reported feeling \"better than last week.\" His goal today is to run some errands today to get out of the house.  Patient declined additional process time but contributed to group discussion and provided feedback and support to peers.      Therapeutic Interventions/Treatment Strategies:  Psychotherapist offered support, feedback and validation and reinforced use of skills.    Assessment:    Patient response:   Patient responded to session by accepting feedback, giving feedback and listening    Possible barriers to participation / learning include: and no barriers identified    Health Issues:   None reported       Substance Use Review:   Substance Use: No active concerns identified.    Mental Status/Behavioral Observations  Appearance:   Appropriate   Eye Contact:   Good   Psychomotor Behavior: Normal   Attitude:   Cooperative   Orientation:   All  Speech   Rate / Production: Normal    Volume:  Normal   Mood:    Depressed   Affect:    Appropriate   Thought Content:   Clear  Thought Form:  Coherent  Logical     Insight:    Good     Plan:     Safety Plan: No current safety concerns identified.  Recommended that patient call 911 or go to the local ED should there be a change in any of these risk factors.     Barriers to treatment: None identified    Patient Contracts (see media tab):  None    Substance Use: Provided encouragement towards sobriety    Provided support and affirmation for steps taken towards sobriety      Continue or Discharge: Patient will continue in Adult Day Treatment (ADT)  as planned. Patient is likely to benefit from learning and using skills as they work toward the goals identified in their treatment plan.      Lyndsey Pratt, [facility]  December 6, 2021    "

## 2021-12-06 NOTE — GROUP NOTE
Psychoeducation Group Note    PATIENT'S NAME: Brandin Rodriguez  MRN:   [id number]  :   1985  ACCT. NUMBER: [id number]  DATE OF SERVICE: 21  START TIME:  3:00 PM  END TIME:  3:50 PM  FACILITATOR: Samantha Gonzales RN  TOPIC: MH RN Group: Mental Health Maintenance                                    Service Modality:  Video Visit     Telemedicine Visit: The patient's condition can be safely assessed and treated via synchronous audio and visual telemedicine encounter.      Reason for Telemedicine Visit:  covid19    Originating Site (Patient Location): Patient's home    Distant Site (Provider Location): Provider Remote Setting- Home Office    Consent:  The patient/guardian has verbally consented to: the potential risks and benefits of telemedicine (video visit) versus in person care; bill my insurance or make self-payment for services provided; and responsibility for payment of non-covered services.     Patient would like the video invitation sent by:  My Chart    Mode of Communication:  Video Conference via Medical Zoom    As the provider I attest to compliance with applicable laws and regulations related to telemedicine.          [facility] Adult Mental Health Day Treatment  TRACK: 1B    NUMBER OF PARTICIPANTS: 3    Summary of Group / Topics Discussed:  Mental Health Maintenance:  Self-Compassion: Patients discussion self compassion and watched Krystyna Travis's video on self-compassion.    Patient Session Goals / Objectives:  ? Patients will have a deeper understanding of self-compassion  ? Patients will think of 1+ ways to develop self compassion.          Patient Participation / Response:  Fully participated with the group by sharing personal reflections / insights and openly received / provided feedback with other participants.    Demonstrated understanding of topics discussed through group discussion and participation and Identified / Expressed personal readiness to practice skills    Treatment  Plan:  Patient has a current master individualized treatment plan.  See Epic treatment plan for more information.    Samantha Gonzales RN

## 2021-12-06 NOTE — GROUP NOTE
Psychoeducation Group Note    PATIENT'S NAME: Brandin Rodriguez  MRN:   [id number]  :   1985  [id number]  DATE OF SERVICE: 21  START TIME:  2:00 PM  END TIME:  2:50 PM  FACILITATOR: Pablito Duron OTR/L  TOPIC: MH Life Skills Group: Resiliency Development                                    Service Modality:  Video Visit     Telemedicine Visit: The patient's condition can be safely assessed and treated via synchronous audio and visual telemedicine encounter.      Reason for Telemedicine Visit: Services only offered telehealth    Originating Site (Patient Location): Patient's home    Distant Site (Provider Location): Provider Remote Setting- Home Office    Consent:  The patient/guardian has verbally consented to: the potential risks and benefits of telemedicine (video visit) versus in person care; bill my insurance or make self-payment for services provided; and responsibility for payment of non-covered services.     Mode of Communication:  Video Conference via Medical Zoom    As the provider I attest to compliance with applicable laws and regulations related to telemedicine.       [facility] Day Treatment  TRACK: 1B    NUMBER OF PARTICIPANTS: 5    Summary of Group / Topics Discussed:  Resiliency Development:  Coping Skills(6 dimensions of a healthy lifestyle to manage stress): Patients were taught how to identify stressors, signs of stress, coping skills, and prevention strategies for overall stress management.  Patients were given the opportunity to identify both ongoing and acute mental health symptoms and how to effectively manage these symptoms by developing an effective aftercare plan.  Patients increased awareness of community based resources.    Patient Session Goals / Objectives:    Identified how using coping skills can be used for symptom and stress management       Improved awareness of individualed symptoms and stressors and how to effectively cope      Established a relapse prevention plan to practice these skills in their own environments    Practiced and reflected on how to generalize taught skills to their everyday life        Patient Participation / Response:  Fully participated with the group by sharing personal reflections / insights and openly received / provided feedback with other participants.    Demonstrated understanding of content through handout/gorup discussion , Verbalized understanding of content and Patient would benefit from additional opportunities to practice the content to be able to generalize it to their everyday life with increased intentionality, consistency, and efficacy in support of their psychiatric recovery    Treatment Plan:  Patient has a current master individualized treatment plan.  See Epic treatment plan for more information.    Pablito Duron, OTR/L

## 2021-12-08 ENCOUNTER — VIRTUAL VISIT (OUTPATIENT)
Dept: FAMILY MEDICINE | Facility: CLINIC | Age: 36
End: 2021-12-08
Payer: COMMERCIAL

## 2021-12-08 ENCOUNTER — HOSPITAL ENCOUNTER (OUTPATIENT)
Dept: BEHAVIORAL HEALTH | Facility: CLINIC | Age: 36
End: 2021-12-08
Attending: PSYCHIATRY & NEUROLOGY
Payer: COMMERCIAL

## 2021-12-08 DIAGNOSIS — F90.9 ATTENTION DEFICIT HYPERACTIVITY DISORDER (ADHD), UNSPECIFIED ADHD TYPE: ICD-10-CM

## 2021-12-08 DIAGNOSIS — F10.20 UNCOMPLICATED ALCOHOL DEPENDENCE (H): Primary | ICD-10-CM

## 2021-12-08 DIAGNOSIS — F32.9 MAJOR DEPRESSIVE DISORDER WITH CURRENT ACTIVE EPISODE, UNSPECIFIED DEPRESSION EPISODE SEVERITY, UNSPECIFIED WHETHER RECURRENT: ICD-10-CM

## 2021-12-08 PROCEDURE — 90853 GROUP PSYCHOTHERAPY: CPT | Mod: GT | Performed by: COUNSELOR

## 2021-12-08 PROCEDURE — 99213 OFFICE O/P EST LOW 20 MIN: CPT | Mod: 95 | Performed by: INTERNAL MEDICINE

## 2021-12-08 PROCEDURE — G0177 OPPS/PHP; TRAIN & EDUC SERV: HCPCS | Mod: GT

## 2021-12-08 NOTE — GROUP NOTE
Process Group Note    PATIENT'S NAME: Brandin Rodriguez  MRN:   [id number]  :   1985  ACCT. NUMBER: [id number]  DATE OF SERVICE: 21  START TIME:  1:00 PM  END TIME:  1:50 PM  FACILITATOR: Lyndsey Pratt [facility]  TOPIC:  Process Group    Diagnoses:  296.30 (F33.9) Major Depressive Disorder, Recurrent Episode, Unspecified _ and With mixed features  300.02 (F41.1) Generalized Anxiety Disorder  309.81 (F43.10) Posttraumatic Stress Disorder (includes Posttraumatic Stress Disorder for Children 6 Years and Younger)  Without dissociative symptoms.  4. Other Diagnoses that is relevant to services:   Substance-Related & Addictive Disorders 291.9 (F10.99) Unspecified Alcohol Related Disorder.  5. Provisional Diagnosis:  NA.      [facility] Day [facility]  TRACK: 1B    NUMBER OF PARTICIPANTS: 6                                      Service Modality:  Video Visit     Telemedicine Visit: The patient's condition can be safely assessed and treated via synchronous audio and visual telemedicine encounter.      Reason for Telemedicine Visit: Services only offered telehealth    Originating Site (Patient Location): Patient's home    Distant Site (Provider Location): Provider Remote Setting- Home Office    Consent:  The patient/guardian has verbally consented to: the potential risks and benefits of telemedicine (video visit) versus in person care; bill my insurance or make self-payment for services provided; and responsibility for payment of non-covered services.     Patient would like the video invitation sent by:  My Chart    Mode of Communication:  Video Conference via Medical Zoom    As the provider I attest to compliance with applicable laws and regulations related to telemedicine.                Data:    Session content: At the start of this group, patients were invited to check in by identifying themselves, describing their current emotional status, and identifying issues to address in this  "group.   Area(s) of treatment focus addressed in this session included Symptom Management, Personal Safety and Abstinence/Relapse Prevention.    Иван reported feeling \"pretty good\" today.  His goal for the day was to get some prescriptions renewed, which he already did.  Patient declined additional process time but contributed to group discussion and provided feedback and support to peers.      Therapeutic Interventions/Treatment Strategies:  Psychotherapist offered support, feedback and validation and reinforced use of skills.    Assessment:    Patient response:   Patient responded to session by accepting feedback, giving feedback and listening    Possible barriers to participation / learning include: and no barriers identified    Health Issues:   None reported       Substance Use Review:   Substance Use: cannabis .     Mental Status/Behavioral Observations  Appearance:   Appropriate   Eye Contact:   Good   Psychomotor Behavior: Normal   Attitude:   Cooperative   Orientation:   All  Speech   Rate / Production: Normal    Volume:  Normal   Mood:    Depressed   Affect:    Appropriate   Thought Content:   Clear  Thought Form:  Coherent  Logical     Insight:    Good     Plan:     Safety Plan: No current safety concerns identified.  Recommended that patient call 911 or go to the local ED should there be a change in any of these risk factors.     Barriers to treatment: None identified    Patient Contracts (see media tab):  None    Substance Use: Not addressed in session     Continue or Discharge: Patient will continue in Adult Day Treatment (ADT)  as planned. Patient is likely to benefit from learning and using skills as they work toward the goals identified in their treatment plan.      Lyndsey Pratt, [facility]  December 8, 2021    "

## 2021-12-08 NOTE — GROUP NOTE
Psychoeducation Group Note    PATIENT'S NAME: Brandin Rodriguez  MRN:   [id number]  :   1985  [id number]  DATE OF SERVICE: 21  START TIME:  2:00 PM  END TIME:  2:50 PM  FACILITATOR: Pablito Duron OTR/L  TOPIC: MH Life Skills Group: Resiliency Development                                    Service Modality:  Video Visit     Telemedicine Visit: The patient's condition can be safely assessed and treated via synchronous audio and visual telemedicine encounter.      Reason for Telemedicine Visit: Services only offered telehealth    Originating Site (Patient Location): Patient's home    Distant Site (Provider Location): Provider Remote Setting- Home Office    Consent:  The patient/guardian has verbally consented to: the potential risks and benefits of telemedicine (video visit) versus in person care; bill my insurance or make self-payment for services provided; and responsibility for payment of non-covered services.     Mode of Communication:  Video Conference via Medical Zoom    As the provider I attest to compliance with applicable laws and regulations related to telemedicine.       [facility] Day Treatment  TRACK: 1B    NUMBER OF PARTICIPANTS: 7    Summary of Group / Topics Discussed:  Resiliency Development:  Coping Skills(Values and Motivation): Patients were taught how to identify stressors, signs of stress, coping skills, and prevention strategies for overall stress management.  Patients were given the opportunity to identify both ongoing and acute mental health symptoms and how to effectively manage these symptoms by developing an effective aftercare plan.  Patients increased awareness of community based resources.    Patient Session Goals / Objectives:    Identified how using coping skills can be used for symptom and stress management       Improved awareness of individualed symptoms and stressors and how to effectively cope     Established a relapse  prevention plan to practice these skills in their own environments    Practiced and reflected on how to generalize taught skills to their everyday life        Patient Participation / Response:  Fully participated with the group by sharing personal reflections / insights and openly received / provided feedback with other participants.    Demonstrated understanding of content through handouts/discussion , Verbalized understanding of content and Patient would benefit from additional opportunities to practice the content to be able to generalize it to their everyday life with increased intentionality, consistency, and efficacy in support of their psychiatric recovery    Treatment Plan:  Patient has a current master individualized treatment plan.  See Epic treatment plan for more information.    Pablito Duron, OTR/L

## 2021-12-08 NOTE — PROGRESS NOTES
"Иван is a 36 year old who is being evaluated via a billable video visit.      How would you like to obtain your AVS? MyChart  If the video visit is dropped, the invitation should be resent by: Text to cell phone: MY CHART  Will anyone else be joining your video visit? No    Video Start Time: 8:05 AM    Assessment & Plan     Uncomplicated alcohol dependence (H)  Patient doing well with his sobriety.  Currently living with his girlfriend.    Major depressive disorder with current active episode, unspecified depression episode severity, unspecified whether recurrent  Doing much better with a combination of Wellbutrin and Adderall.    Attention deficit hyperactivity disorder (ADHD), unspecified ADHD type  Currently on Wellbutrin and Adderall doing much better.  Feels more energetic.         Tobacco Cessation:   reports that he has been smoking cigarettes. He has been smoking about 1.00 pack per day. He has never used smokeless tobacco.      BMI:   Estimated body mass index is 25.94 kg/m  as calculated from the following:    Height as of 11/12/21: 1.88 m (6' 2\").    Weight as of 11/18/21: 91.6 kg (202 lb).       See Patient Instructions    No follow-ups on file.    Gerber Powell MD  [facility]    Luisa Oates is a 36 year old who presents for the following health issues     HPI 36-year-old male  History of alcohol dependence as well as generalized anxiety, depression, and ADHD.  He was recently been hospitalized.    Wellbutrin was added to his regime.  He feels much better.  Additionally he was diagnosed with ADHD and she is Adderall.  Feels better on this combination of meds.    He has a follow-up appointment with a psychologist on the 17th of this month.  You will need medication refills prior to this time.          Review of Systems   Constitutional, HEENT, cardiovascular, pulmonary, gi and gu systems are negative, except as otherwise noted.      Objective           Vitals:  No vitals were " obtained today due to virtual visit.    Physical Exam   GENERAL: Healthy, alert and no distress  EYES: Eyes grossly normal to inspection.  No discharge or erythema, or obvious scleral/conjunctival abnormalities.  RESP: No audible wheeze, cough, or visible cyanosis.  No visible retractions or increased work of breathing.    SKIN: Visible skin clear. No significant rash, abnormal pigmentation or lesions.  NEURO: Cranial nerves grossly intact.  Mentation and speech appropriate for age.  PSYCH: Mentation appears normal, affect normal/bright, judgement and insight intact, normal speech and appearance well-groomed.    Admission on 11/12/2021, Discharged on 11/18/2021   Component Date Value Ref Range Status     Sodium 11/14/2021 138  133 - 144 mmol/L Final     Potassium 11/14/2021 4.4  3.4 - 5.3 mmol/L Final    Specimen slightly hemolyzed, potassium may be falsely elevated.     Chloride 11/14/2021 104  94 - 109 mmol/L Final     Carbon Dioxide (CO2) 11/14/2021 30  20 - 32 mmol/L Final     Anion Gap 11/14/2021 4  3 - 14 mmol/L Final     Urea Nitrogen 11/14/2021 13  7 - 30 mg/dL Final     Creatinine 11/14/2021 0.85  0.66 - 1.25 mg/dL Final     Calcium 11/14/2021 9.2  8.5 - 10.1 mg/dL Final     Glucose 11/14/2021 87  70 - 99 mg/dL Final     Alkaline Phosphatase 11/14/2021 66  40 - 150 U/L Final     AST 11/14/2021    Final    Specimen is hemolyzed which can falsely elevate AST. Analysis of a non-hemolyzed specimen may result in a lower value.  Notified FREDA Donnelly at 0851 on 11.14.21     ALT 11/14/2021 58  0 - 70 U/L Final     Protein Total 11/14/2021 6.9  6.8 - 8.8 g/dL Final     Albumin 11/14/2021 3.5  3.4 - 5.0 g/dL Final     Bilirubin Total 11/14/2021 1.1  0.2 - 1.3 mg/dL Final     GFR Estimate 11/14/2021 >90  >60 mL/min/1.73m2 Final    As of July 11, 2021, eGFR is calculated by the CKD-EPI creatinine equation, without race adjustment. eGFR can be influenced by muscle mass, exercise, and diet. The reported eGFR is  an estimation only and is only applicable if the renal function is stable.     Cholesterol 11/14/2021 185  <200 mg/dL Final     Triglycerides 11/14/2021 138  <150 mg/dL Final     Direct Measure HDL 11/14/2021 37* >=40 mg/dL Final     LDL Cholesterol Calculated 11/14/2021 120* <=100 mg/dL Final     Non HDL Cholesterol 11/14/2021 148* <130 mg/dL Final     TSH 11/14/2021 1.91  0.40 - 4.00 mU/L Final     WBC Count 11/14/2021 7.0  4.0 - 11.0 10e3/uL Final     RBC Count 11/14/2021 4.04* 4.40 - 5.90 10e6/uL Final     Hemoglobin 11/14/2021 14.6  13.3 - 17.7 g/dL Final     Hematocrit 11/14/2021 40.6  40.0 - 53.0 % Final     MCV 11/14/2021 101* 78 - 100 fL Final     MCH 11/14/2021 36.1* 26.5 - 33.0 pg Final     MCHC 11/14/2021 36.0  31.5 - 36.5 g/dL Final     RDW 11/14/2021 11.8  10.0 - 15.0 % Final     Platelet Count 11/14/2021 229  150 - 450 10e3/uL Final     % Neutrophils 11/14/2021 41  % Final     % Lymphocytes 11/14/2021 42  % Final     % Monocytes 11/14/2021 8  % Final     % Eosinophils 11/14/2021 8  % Final     % Basophils 11/14/2021 1  % Final     % Immature Granulocytes 11/14/2021 0  % Final     NRBCs per 100 WBC 11/14/2021 0  <1 /100 Final     Absolute Neutrophils 11/14/2021 2.9  1.6 - 8.3 10e3/uL Final     Absolute Lymphocytes 11/14/2021 3.0  0.8 - 5.3 10e3/uL Final     Absolute Monocytes 11/14/2021 0.6  0.0 - 1.3 10e3/uL Final     Absolute Eosinophils 11/14/2021 0.5  0.0 - 0.7 10e3/uL Final     Absolute Basophils 11/14/2021 0.1  0.0 - 0.2 10e3/uL Final     Absolute Immature Granulocytes 11/14/2021 0.0  <=0.0 10e3/uL Final     Absolute NRBCs 11/14/2021 0.0  10e3/uL Final               Video-Visit Details    Type of service:  Video Visit    Video End Time:8:15 AM    Originating Location (pt. Location): Home    Distant Location (provider location):  [facility]     Platform used for Video Visit: Nehemias

## 2021-12-08 NOTE — GROUP NOTE
Psychoeducation Group Note    PATIENT'S NAME: Brandin Rodriguez  MRN:   [id number]  :   1985  [id number]  DATE OF SERVICE: 21  START TIME:  3:00 PM  END TIME:  3:50 PM  FACILITATOR: Samantha Gonzales RN  TOPIC: MH RN Group: Mind/Body Practice & Complementary                                    Service Modality:  Video Visit     Telemedicine Visit: The patient's condition can be safely assessed and treated via synchronous audio and visual telemedicine encounter.      Reason for Telemedicine Visit:  covid19    Originating Site (Patient Location): Patient's home    Distant Site (Provider Location): Provider Remote Setting- Home Office    Consent:  The patient/guardian has verbally consented to: the potential risks and benefits of telemedicine (video visit) versus in person care; bill my insurance or make self-payment for services provided; and responsibility for payment of non-covered services.     Patient would like the video invitation sent by:  My Chart    Mode of Communication:  Video Conference via Medical Zoom    As the provider I attest to compliance with applicable laws and regulations related to telemedicine.          [facility] Mental [facility] Day Treatment  TRACK: 1B    NUMBER OF PARTICIPANTS: 6    Summary of Group / Topics Discussed:  Mind/Body Practice & Complementary Therapies:  Progressive Muscle Relaxation: In addition to affecting our mood and behavior, psychological stress can cause a myriad of physical symptoms in our body. Patients were educated on these effects and guided to increased self-awareness of how stress affects their body. The purpose, benefits, history, and techniques of progressive muscle relaxation were discussed. In an instructor guided experiential, patients were guided to practice PMR to help reduce physical symptoms of psychological stress and achieve a more balanced feeling of well-being.    Patient Session Goals / Objectives:  ? Identified  physiological symptoms of stress on the body  ? Listed & Explained the purpose and benefits to practicing PMR   ? Practiced progressive muscle relaxation experiential      Patient Participation / Response:  Fully participated with the group by sharing personal reflections / insights and openly received / provided feedback with other participants.    Demonstrated understanding of topics discussed through group discussion and participation and Identified / Expressed personal readiness to practice skills    Treatment Plan:  Patient has a current master individualized treatment plan.  See Epic treatment plan for more information.    Samantha Gonzales RN

## 2021-12-09 ENCOUNTER — HOSPITAL ENCOUNTER (OUTPATIENT)
Dept: BEHAVIORAL HEALTH | Facility: CLINIC | Age: 36
End: 2021-12-09
Attending: PSYCHIATRY & NEUROLOGY
Payer: COMMERCIAL

## 2021-12-09 PROCEDURE — 90853 GROUP PSYCHOTHERAPY: CPT | Mod: GT | Performed by: COUNSELOR

## 2021-12-09 PROCEDURE — G0177 OPPS/PHP; TRAIN & EDUC SERV: HCPCS | Mod: GT

## 2021-12-09 NOTE — GROUP NOTE
Process Group Note    PATIENT'S NAME: Brandin Rodriguez  MRN:   [id number]  :   1985  ACCT. NUMBER: [id number]  DATE OF SERVICE: 21  START TIME:  1:00 PM  END TIME:  1:50 PM  FACILITATOR: Lyndsey Pratt [facility]  TOPIC:  Process Group    Diagnoses:  296.30 (F33.9) Major Depressive Disorder, Recurrent Episode, Unspecified _ and With mixed features  300.02 (F41.1) Generalized Anxiety Disorder  309.81 (F43.10) Posttraumatic Stress Disorder (includes Posttraumatic Stress Disorder for Children 6 Years and Younger)  Without dissociative symptoms.  4. Other Diagnoses that is relevant to services:   Substance-Related & Addictive Disorders 291.9 (F10.99) Unspecified Alcohol Related Disorder.  5. Provisional Diagnosis:  NA.      [facility] Day Treatment  TRACK: 1B    NUMBER OF PARTICIPANTS: 4                                      Service Modality:  Video Visit     Telemedicine Visit: The patient's condition can be safely assessed and treated via synchronous audio and visual telemedicine encounter.      Reason for Telemedicine Visit: Services only offered telehealth    Originating Site (Patient Location): Patient's home    Distant Site (Provider Location): Provider Remote Setting- Home Office    Consent:  The patient/guardian has verbally consented to: the potential risks and benefits of telemedicine (video visit) versus in person care; bill my insurance or make self-payment for services provided; and responsibility for payment of non-covered services.     Patient would like the video invitation sent by:  My Chart    Mode of Communication:  Video Conference via Medical Zoom    As the provider I attest to compliance with applicable laws and regulations related to telemedicine.                Data:    Session content: At the start of this group, patients were invited to check in by identifying themselves, describing their current emotional status, and identifying issues to address in this  "group.   Area(s) of treatment focus addressed in this session included Symptom Management, Personal Safety and Abstinence/Relapse Prevention.    Иван reported feeling \"tired\" today.  His goal is to rearrange some furniture to make room for the Marjorie tree.  Patient declined additional process time but contributed to group discussion and provided feedback and support to peers.      Therapeutic Interventions/Treatment Strategies:  Psychotherapist offered support, feedback and validation.    Assessment:    Patient response:   Patient responded to session by accepting feedback, giving feedback and listening    Possible barriers to participation / learning include: and no barriers identified    Health Issues:   None reported       Substance Use Review:   Substance Use: No active concerns identified.    Mental Status/Behavioral Observations  Appearance:   Appropriate   Eye Contact:   Good   Psychomotor Behavior: Normal   Attitude:   Cooperative   Orientation:   All  Speech   Rate / Production: Normal    Volume:  Normal   Mood:    Normal  Affect:    Appropriate   Thought Content:   Clear  Thought Form:  Coherent  Logical     Insight:    Good     Plan:     Safety Plan: No current safety concerns identified.  Recommended that patient call 911 or go to the local ED should there be a change in any of these risk factors.     Barriers to treatment: None identified    Patient Contracts (see media tab):  None    Substance Use: Provided encouragement towards sobriety    Provided support and affirmation for steps taken towards sobriety      Continue or Discharge: Patient will continue in Adult Day Treatment (ADT)  as planned. Patient is likely to benefit from learning and using skills as they work toward the goals identified in their treatment plan.      Lyndsey Pratt, [facility]  December 9, 2021    "

## 2021-12-09 NOTE — GROUP NOTE
Psychotherapy Group Note    PATIENT'S NAME: Brandin Rdoriguez  MRN:   [id number]  :   1985  [id number]  DATE OF SERVICE: 21  START TIME:  1:00 PM  END TIME:  1:50 PM  FACILITATOR: Lyndsey Pratt LPCC  TOPIC: MH EBP Group: Cognitive Restructuring  [facility] Adult Mental Health Day Treatment  TRACK: 1B    NUMBER OF PARTICIPANTS: 3                                      Service Modality:  Video Visit     Telemedicine Visit: The patient's condition can be safely assessed and treated via synchronous audio and visual telemedicine encounter.      Reason for Telemedicine Visit: Services only offered telehealth    Originating Site (Patient Location): Patient's home    Distant Site (Provider Location): Provider Remote Setting- Home Office    Consent:  The patient/guardian has verbally consented to: the potential risks and benefits of telemedicine (video visit) versus in person care; bill my insurance or make self-payment for services provided; and responsibility for payment of non-covered services.     Patient would like the video invitation sent by:  My Chart    Mode of Communication:  Video Conference via Medical Zoom    As the provider I attest to compliance with applicable laws and regulations related to telemedicine.          Summary of Group / Topics Discussed:  Cognitive Restructuring: Core Beliefs: Patients received an overview of what a core belief is, and how they develop. Patients then began to identify their negative core beliefs. Patients worked to modify core beliefs with the goal of improved self-image and functioning.     Patient Session Goals / Objectives:    Familiarize self with the concept of core beliefs and how they develop.      Explore personal core beliefs (positive and negative)    Develop / advance recognition of the connection between negative thoughts and negative core beliefs.    Formulate new neutral/positive core beliefs               Patient Participation /  Response:  Fully participated with the group by sharing personal reflections / insights and openly received / provided feedback with other participants.    Demonstrated understanding of topics discussed through group discussion and participation, Expressed understanding of the relationship between behaviors, thoughts, and feelings and Demonstrated knowledge of personal thought patterns and how they impact their mood and behavior.    Treatment Plan:  Patient has a current master individualized treatment plan.  See Epic treatment plan for more information.    Lyndsey Pratt, [facility]

## 2021-12-09 NOTE — GROUP NOTE
Psychoeducation Group Note    PATIENT'S NAME: Brandin Rodriguez  MRN:   [id number]  [facility]   1985  [id number]  DATE OF SERVICE: 21  START TIME:  2:00 PM  END TIME:  2:50 PM  FACILITATOR: Pablito Duron OTR/L  TOPIC: MH Life Skills Group: Resiliency Development                                    Service Modality:  Video Visit     Telemedicine Visit: The patient's condition can be safely assessed and treated via synchronous audio and visual telemedicine encounter.      Reason for Telemedicine Visit: Services only offered telehealth    Originating Site (Patient Location): Patient's home    Distant Site (Provider Location): Provider Remote Setting- Home Office    Consent:  The patient/guardian has verbally consented to: the potential risks and benefits of telemedicine (video visit) versus in person care; bill my insurance or make self-payment for services provided; and responsibility for payment of non-covered services.     Mode of Communication:  Video Conference via Medical Zoom    As the provider I attest to compliance with applicable laws and regulations related to telemedicine.       [facility] Adult Mental Health Day Treatment  TRACK: 1B    NUMBER OF PARTICIPANTS: 4    Summary of Group / Topics Discussed:  Resiliency Development:  Coping Skills(Depression- Why is it different than normal sadness): Patients were taught how to identify stressors, signs of stress, coping skills, and prevention strategies for overall stress management.  Patients were given the opportunity to identify both ongoing and acute mental health symptoms and how to effectively manage these symptoms by developing an effective aftercare plan.  Patients increased awareness of community based resources.    Patient Session Goals / Objectives:    Identified how using coping skills can be used for symptom and stress management       Improved awareness of individualed symptoms and stressors and how to effectively cope      Established a relapse prevention plan to practice these skills in their own environments    Practiced and reflected on how to generalize taught skills to their everyday life        Patient Participation / Response:  Fully participated with the group by sharing personal reflections / insights and openly received / provided feedback with other participants.    Demonstrated understanding of content through video/group discussion , Verbalized understanding of content and Patient would benefit from additional opportunities to practice the content to be able to generalize it to their everyday life with increased intentionality, consistency, and efficacy in support of their psychiatric recovery    Treatment Plan:  Patient has a current master individualized treatment plan.  See Epic treatment plan for more information.    Pablito Duron, OTR/L

## 2021-12-13 ENCOUNTER — HOSPITAL ENCOUNTER (OUTPATIENT)
Dept: BEHAVIORAL HEALTH | Facility: CLINIC | Age: 36
End: 2021-12-13
Attending: PSYCHIATRY & NEUROLOGY
Payer: COMMERCIAL

## 2021-12-13 PROCEDURE — G0177 OPPS/PHP; TRAIN & EDUC SERV: HCPCS | Mod: GT

## 2021-12-13 PROCEDURE — 90853 GROUP PSYCHOTHERAPY: CPT | Mod: GT | Performed by: COUNSELOR

## 2021-12-13 NOTE — GROUP NOTE
Process Group Note    PATIENT'S NAME: Brandin Rodriguez  MRN:   [id number]  :   1985  [facility]. NUMBER: [id number]  DATE OF SERVICE: 21  START TIME:  1:00 PM  END TIME:  1:50 PM  FACILITATOR: Lyndsey Pratt [facility]  TOPIC:  Process Group    Diagnoses:  296.30 (F33.9) Major Depressive Disorder, Recurrent Episode, Unspecified _ and With mixed features  300.02 (F41.1) Generalized Anxiety Disorder  309.81 (F43.10) Posttraumatic Stress Disorder (includes Posttraumatic Stress Disorder for Children 6 Years and Younger)  Without dissociative symptoms.  4. Other Diagnoses that is relevant to services:   Substance-Related & Addictive Disorders 291.9 (F10.99) Unspecified Alcohol Related Disorder.  5. Provisional Diagnosis:  NA.      [facility] Mental [facility] Day Treatment  TRACK: 1B    NUMBER OF PARTICIPANTS: 5                                      Service Modality:  Video Visit     Telemedicine Visit: The patient's condition can be safely assessed and treated via synchronous audio and visual telemedicine encounter.      Reason for Telemedicine Visit: Services only offered telehealth    Originating Site (Patient Location): Patient's home    Distant Site (Provider Location): Provider Remote Setting- Home Office    Consent:  The patient/guardian has verbally consented to: the potential risks and benefits of telemedicine (video visit) versus in person care; bill my insurance or make self-payment for services provided; and responsibility for payment of non-covered services.     Patient would like the video invitation sent by:  My Chart    Mode of Communication:  Video Conference via Medical Zoom    As the provider I attest to compliance with applicable laws and regulations related to telemedicine.                Data:    Session content: At the start of this group, patients were invited to check in by identifying themselves, describing their current emotional status, and identifying issues to address in this  group.   Area(s) of treatment focus addressed in this session included Symptom Management and Personal Safety.    Иван reported feeling okay today.  His goal is to attend a focus group this afternoon. Patient declined additional process time but contributed to group discussion and provided feedback and support to peers.      Therapeutic Interventions/Treatment Strategies:  Psychotherapist offered support, feedback and validation and reinforced use of skills.    Assessment:    Patient response:   Patient responded to session by accepting feedback, giving feedback and listening    Possible barriers to participation / learning include: and no barriers identified    Health Issues:   None reported       Substance Use Review:   Substance Use: No active concerns identified.    Mental Status/Behavioral Observations  Appearance:   Appropriate   Eye Contact:   Good   Psychomotor Behavior: Normal   Attitude:   Cooperative   Orientation:   All  Speech   Rate / Production: Normal    Volume:  Normal   Mood:    Depressed   Affect:    Appropriate   Thought Content:   Clear  Thought Form:  Coherent  Logical     Insight:    Good     Plan:     Safety Plan: No current safety concerns identified.  Recommended that patient call 911 or go to the local ED should there be a change in any of these risk factors.     Barriers to treatment: None identified    Patient Contracts (see media tab):  None    Substance Use: Provided encouragement towards sobriety    Provided support and affirmation for steps taken towards sobriety      Continue or Discharge: Patient will continue in Adult Day Treatment (ADT)  as planned. Patient is likely to benefit from learning and using skills as they work toward the goals identified in their treatment plan.      Lyndsey Pratt, [facility]  December 13, 2021

## 2021-12-13 NOTE — GROUP NOTE
Psychoeducation Group Note    PATIENT'S NAME: Brandin Rodriguez  MRN:   [id number]  :   1985  [id number]  DATE OF SERVICE: 21  START TIME:  2:00 PM  END TIME:  2:50 PM  FACILITATOR: Pablito Duron OTR/L  TOPIC: MH Life Skills Group: Resiliency Development                                    Service Modality:  Video Visit     Telemedicine Visit: The patient's condition can be safely assessed and treated via synchronous audio and visual telemedicine encounter.      Reason for Telemedicine Visit: Services only offered telehealth    Originating Site (Patient Location): Patient's home    Distant Site (Provider Location): Provider Remote Setting- Home Office    Consent:  The patient/guardian has verbally consented to: the potential risks and benefits of telemedicine (video visit) versus in person care; bill my insurance or make self-payment for services provided; and responsibility for payment of non-covered services.     Mode of Communication:  Video Conference via Medical Zoom    As the provider I attest to compliance with applicable laws and regulations related to telemedicine.       [facility] Adult Mental Health Day Treatment  TRACK: 1B    NUMBER OF PARTICIPANTS: 6    Summary of Group / Topics Discussed:  Resiliency Development:  Coping Skills( Stage of coping with stress): Patients were taught how to identify stressors, signs of stress, coping skills, and prevention strategies for overall stress management.  Patients were given the opportunity to identify both ongoing and acute mental health symptoms and how to effectively manage these symptoms by developing an effective aftercare plan.  Patients increased awareness of community based resources.    Patient Session Goals / Objectives:    Identified how using coping skills can be used for symptom and stress management       Improved awareness of individualed symptoms and stressors and how to effectively cope     Established a  relapse prevention plan to practice these skills in their own environments    Practiced and reflected on how to generalize taught skills to their everyday life        Patient Participation / Response:  Fully participated with the group by sharing personal reflections / insights and openly received / provided feedback with other participants.    Demonstrated understanding of content through video,handouts.discussion , Verbalized understanding of content and Patient would benefit from additional opportunities to practice the content to be able to generalize it to their everyday life with increased intentionality, consistency, and efficacy in support of their psychiatric recovery    Treatment Plan:  Patient has a current master individualized treatment plan.  See Epic treatment plan for more information.    Pablito Duron, OTR/L

## 2021-12-15 ENCOUNTER — HOSPITAL ENCOUNTER (OUTPATIENT)
Dept: BEHAVIORAL HEALTH | Facility: CLINIC | Age: 36
End: 2021-12-15
Attending: PSYCHIATRY & NEUROLOGY
Payer: COMMERCIAL

## 2021-12-15 DIAGNOSIS — F32.A DEPRESSION, UNSPECIFIED DEPRESSION TYPE: ICD-10-CM

## 2021-12-15 DIAGNOSIS — F43.10 PTSD (POST-TRAUMATIC STRESS DISORDER): ICD-10-CM

## 2021-12-15 DIAGNOSIS — F90.2 ADHD (ATTENTION DEFICIT HYPERACTIVITY DISORDER), COMBINED TYPE: ICD-10-CM

## 2021-12-15 PROCEDURE — G0177 OPPS/PHP; TRAIN & EDUC SERV: HCPCS | Mod: GT

## 2021-12-15 PROCEDURE — 90853 GROUP PSYCHOTHERAPY: CPT | Mod: GT | Performed by: COUNSELOR

## 2021-12-15 RX ORDER — PROPRANOLOL HYDROCHLORIDE 20 MG/1
20 TABLET ORAL 3 TIMES DAILY PRN
Qty: 60 TABLET | Refills: 0 | Status: SHIPPED | OUTPATIENT
Start: 2021-12-15 | End: 2021-12-15

## 2021-12-15 RX ORDER — BUPROPION HYDROCHLORIDE 300 MG/1
300 TABLET ORAL EVERY MORNING
Qty: 30 TABLET | Refills: 0 | Status: SHIPPED | OUTPATIENT
Start: 2021-12-15 | End: 2021-12-15

## 2021-12-15 RX ORDER — PROPRANOLOL HYDROCHLORIDE 20 MG/1
20 TABLET ORAL 3 TIMES DAILY PRN
Qty: 60 TABLET | Refills: 0 | Status: SHIPPED | OUTPATIENT
Start: 2021-12-15 | End: 2022-01-04

## 2021-12-15 RX ORDER — BUPROPION HYDROCHLORIDE 300 MG/1
300 TABLET ORAL EVERY MORNING
Qty: 30 TABLET | Refills: 0 | Status: SHIPPED | OUTPATIENT
Start: 2021-12-15 | End: 2022-01-14

## 2021-12-15 NOTE — GROUP NOTE
Process Group Note    PATIENT'S NAME: Brandin Rodriguez  MRN:   [id number]  :   1985  [facility]. NUMBER: [id number]  DATE OF SERVICE: 12/15/21  START TIME:  1:00 PM  END TIME:  1:50 PM  FACILITATOR: Lyndsey Pratt [facility]  TOPIC:  Process Group    Diagnoses:  296.30 (F33.9) Major Depressive Disorder, Recurrent Episode, Unspecified _ and With mixed features  300.02 (F41.1) Generalized Anxiety Disorder  309.81 (F43.10) Posttraumatic Stress Disorder (includes Posttraumatic Stress Disorder for Children 6 Years and Younger)  Without dissociative symptoms.  4. Other Diagnoses that is relevant to services:   Substance-Related & Addictive Disorders 291.9 (F10.99) Unspecified Alcohol Related Disorder.  5. Provisional Diagnosis:  NA.      [facility] Day Treatment  TRACK: 1B    NUMBER OF PARTICIPANTS: 5                                      Service Modality:  Video Visit     Telemedicine Visit: The patient's condition can be safely assessed and treated via synchronous audio and visual telemedicine encounter.      Reason for Telemedicine Visit: Services only offered telehealth    Originating Site (Patient Location): Patient's home    Distant Site (Provider Location): Provider Remote Setting- Home Office    Consent:  The patient/guardian has verbally consented to: the potential risks and benefits of telemedicine (video visit) versus in person care; bill my insurance or make self-payment for services provided; and responsibility for payment of non-covered services.     Patient would like the video invitation sent by:  My Chart    Mode of Communication:  Video Conference via Medical Zoom    As the provider I attest to compliance with applicable laws and regulations related to telemedicine.                Data:    Session content: At the start of this group, patients were invited to check in by identifying themselves, describing their current emotional status, and identifying issues to address in this  "group.   Area(s) of treatment focus addressed in this session included Symptom Management, Personal Safety and Abstinence/Relapse Prevention.    Иван reported feeling \"off\" today.  His goal for the day is to call his PCP and go to [facility] to get his medications. Patient declined additional process time but contributed to group discussion and provided feedback and support to peers.      Therapeutic Interventions/Treatment Strategies:  Psychotherapist offered support, feedback and validation and reinforced use of skills.    Assessment:    Patient response:   Patient responded to session by accepting feedback, giving feedback and listening    Possible barriers to participation / learning include: and no barriers identified    Health Issues:   None reported       Substance Use Review:   Substance Use: No active concerns identified.    Mental Status/Behavioral Observations  Appearance:   Appropriate   Eye Contact:   Good   Psychomotor Behavior: Normal   Attitude:   Cooperative   Orientation:   All  Speech   Rate / Production: Normal    Volume:  Normal   Mood:    Depressed   Affect:    Appropriate   Thought Content:   Clear  Thought Form:  Coherent  Logical     Insight:    Good     Plan:     Safety Plan: No current safety concerns identified.  Recommended that patient call 911 or go to the local ED should there be a change in any of these risk factors.     Barriers to treatment: None identified    Patient Contracts (see media tab):  None    Substance Use: Not addressed in session     Continue or Discharge: Patient will continue in Adult Day Treatment (ADT)  as planned. Patient is likely to benefit from learning and using skills as they work toward the goals identified in their treatment plan.      Lyndsey Pratt, [facility]  December 15, 2021    "

## 2021-12-15 NOTE — GROUP NOTE
Psychoeducation Group Note    PATIENT'S NAME: Brandin Rodriguez  MRN:   [id number]  :   1985  [id number]  DATE OF SERVICE: 12/15/21  START TIME:  2:00 PM  END TIME:  2:50 PM  FACILITATOR: Pablito Duron OTR/L  TOPIC: MH Life Skills Group: Resiliency Development                                    Service Modality:  Video Visit     Telemedicine Visit: The patient's condition can be safely assessed and treated via synchronous audio and visual telemedicine encounter.      Reason for Telemedicine Visit: Services only offered telehealth    Originating Site (Patient Location): Patient's home    Distant Site (Provider Location): Provider Remote Setting- Home Office    Consent:  The patient/guardian has verbally consented to: the potential risks and benefits of telemedicine (video visit) versus in person care; bill my insurance or make self-payment for services provided; and responsibility for payment of non-covered services.     Mode of Communication:  Video Conference via Medical Zoom    As the provider I attest to compliance with applicable laws and regulations related to telemedicine.       [facility] Adult Mental Health Day Treatment  TRACK: 1B    NUMBER OF PARTICIPANTS: 5    Summary of Group / Topics Discussed:  Resiliency Development:  Coping Skills(Self-Awareness and self-confidence): Patients were taught how to identify stressors, signs of stress, coping skills, and prevention strategies for overall stress management.  Patients were given the opportunity to identify both ongoing and acute mental health symptoms and how to effectively manage these symptoms by developing an effective aftercare plan.  Patients increased awareness of community based resources.    Patient Session Goals / Objectives:    Identified how using coping skills can be used for symptom and stress management       Improved awareness of individualed symptoms and stressors and how to effectively cope     Established a  relapse prevention plan to practice these skills in their own environments    Practiced and reflected on how to generalize taught skills to their everyday life        Patient Participation / Response:  Fully participated with the group by sharing personal reflections / insights and openly received / provided feedback with other participants.    Demonstrated understanding of content through handouts/group discussion , Verbalized understanding of content and Patient would benefit from additional opportunities to practice the content to be able to generalize it to their everyday life with increased intentionality, consistency, and efficacy in support of their psychiatric recovery    Treatment Plan:  Patient has a current master individualized treatment plan.  See Epic treatment plan for more information.    Pablito Duron, OTR/L

## 2021-12-15 NOTE — TELEPHONE ENCOUNTER
Dr. Powell - Pt calling for his adderall refill.    Did get his psychiatry appt scheduled out for 2/11/22  Has been going to all behavioral visits.  Changed pharmacies as is trying to stay away from [facility], and is living in [place] which is a healthier situation for pt.    Pt incredibly thankful for medications, feels bupropion has changed life. States has not had drink in month and is not craving.    Pt very thankful for support.      Let pt know if you need to see him before he starts with Psychiatry at all?     Shima Girard, RN  [facility] RN Triage Team

## 2021-12-15 NOTE — GROUP NOTE
Psychoeducation Group Note    PATIENT'S NAME: Brandin Rdoriguez  MRN:   [id number]  :   1985  [id number]  DATE OF SERVICE: 12/15/21  START TIME:  3:00 PM  END TIME:  3:50 PM  FACILITATOR: Samantha Gonzales RN  TOPIC: MH RN Group: Medication Education and Management                                    Service Modality:  Video Visit     Telemedicine Visit: The patient's condition can be safely assessed and treated via synchronous audio and visual telemedicine encounter.      Reason for Telemedicine Visit:  covid19    Originating Site (Patient Location): Patient's home    Distant Site (Provider Location): Provider Remote Setting- Home Office    Consent:  The patient/guardian has verbally consented to: the potential risks and benefits of telemedicine (video visit) versus in person care; bill my insurance or make self-payment for services provided; and responsibility for payment of non-covered services.     Patient would like the video invitation sent by:  My Chart    Mode of Communication:  Video Conference via Medical Zoom    As the provider I attest to compliance with applicable laws and regulations related to telemedicine.          [facility] Mental Health Day Treatment  TRACK: 1B    NUMBER OF PARTICIPANTS: 5    Summary of Group / Topics Discussed:  Medication Educations and Management:  Medication Jeopardy: Patients provided education regarding medication safety, antidepressants, side effects, neuroleptics, expected medication outcomes, knowledge of diagnosis, symptoms, and symptom management through an engaging jeopardy-style format.     Patient Session Goals / Objectives:    ? Participated in team-based Jeopardy game  ? Identified strategies for safe use, handling, and disposal of medications  ? Discussed basic aspects of medication safety, side effects, adverse outcomes and contraindications      Patient Participation / Response:  Fully participated with the group by sharing  personal reflections / insights and openly received / provided feedback with other participants.     Demonstrated understanding of topics discussed through group discussion and participation and Identified / Expressed personal readiness to practice skills    Treatment Plan:  Patient has a current master individualized treatment plan.  See Epic treatment plan for more information.    Samantha Gonzales RN

## 2021-12-16 RX ORDER — DEXTROAMPHETAMINE SACCHARATE, AMPHETAMINE ASPARTATE, DEXTROAMPHETAMINE SULFATE AND AMPHETAMINE SULFATE 2.5; 2.5; 2.5; 2.5 MG/1; MG/1; MG/1; MG/1
10 TABLET ORAL DAILY
Qty: 30 TABLET | Refills: 0 | Status: SHIPPED | OUTPATIENT
Start: 2021-12-16 | End: 2022-01-28

## 2021-12-22 ENCOUNTER — HOSPITAL ENCOUNTER (OUTPATIENT)
Dept: BEHAVIORAL HEALTH | Facility: CLINIC | Age: 36
End: 2021-12-22
Attending: PSYCHIATRY & NEUROLOGY
Payer: COMMERCIAL

## 2021-12-22 PROCEDURE — G0177 OPPS/PHP; TRAIN & EDUC SERV: HCPCS | Mod: GT

## 2021-12-22 PROCEDURE — 90853 GROUP PSYCHOTHERAPY: CPT | Mod: GT | Performed by: COUNSELOR

## 2021-12-22 NOTE — GROUP NOTE
Process Group Note    PATIENT'S NAME: Brandin Rodriguez  MRN:   [id number]  :   1985  ACCT. NUMBER: [id number]  DATE OF SERVICE: 21  START TIME:  1:00 PM  END TIME:  1:50 PM  FACILITATOR: Lyndsey Pratt [facility]  TOPIC:  Process Group    Diagnoses:  296.30 (F33.9) Major Depressive Disorder, Recurrent Episode, Unspecified _ and With mixed features  300.02 (F41.1) Generalized Anxiety Disorder  309.81 (F43.10) Posttraumatic Stress Disorder (includes Posttraumatic Stress Disorder for Children 6 Years and Younger)  Without dissociative symptoms.  4. Other Diagnoses that is relevant to services:   Substance-Related & Addictive Disorders 291.9 (F10.99) Unspecified Alcohol Related Disorder.  5. Provisional Diagnosis:  NA.      [facility] Mental [facility] Day Treatment  TRACK: 1B    NUMBER OF PARTICIPANTS: 5                                      Service Modality:  Video Visit     Telemedicine Visit: The patient's condition can be safely assessed and treated via synchronous audio and visual telemedicine encounter.      Reason for Telemedicine Visit: Services only offered telehealth    Originating Site (Patient Location): Patient's home    Distant Site (Provider Location): Provider Remote Setting- Home Office    Consent:  The patient/guardian has verbally consented to: the potential risks and benefits of telemedicine (video visit) versus in person care; bill my insurance or make self-payment for services provided; and responsibility for payment of non-covered services.     Patient would like the video invitation sent by:  My Chart    Mode of Communication:  Video Conference via Medical Zoom    As the provider I attest to compliance with applicable laws and regulations related to telemedicine.                Data:    Session content: At the start of this group, patients were invited to check in by identifying themselves, describing their current emotional status, and identifying issues to address in this  "group.   Area(s) of treatment focus addressed in this session included Symptom Management, Personal Safety and Abstinence/Relapse Prevention.    Иван reported feeling \"okay\" but tired.  His goal for the day is to spend some time with his family.  Patient declined additional process time but contributed to group discussion and provided feedback and support to peers.      Therapeutic Interventions/Treatment Strategies:  Psychotherapist offered support, feedback and validation and reinforced use of skills.    Assessment:    Patient response:   Patient responded to session by accepting feedback, giving feedback and listening    Possible barriers to participation / learning include: and no barriers identified    Health Issues:   None reported       Substance Use Review:   Substance Use: No active concerns identified.    Mental Status/Behavioral Observations  Appearance:   Appropriate   Eye Contact:   Good   Psychomotor Behavior: Normal   Attitude:   Cooperative   Orientation:   All  Speech   Rate / Production: Normal    Volume:  Normal   Mood:    Depressed   Affect:    Appropriate   Thought Content:   Clear  Thought Form:  Coherent  Logical     Insight:    Good     Plan:     Safety Plan: No current safety concerns identified.  Recommended that patient call 911 or go to the local ED should there be a change in any of these risk factors.     Barriers to treatment: None identified    Patient Contracts (see media tab):  None    Substance Use: Not addressed in session     Continue or Discharge: Patient will continue in Adult Day Treatment (ADT)  as planned. Patient is likely to benefit from learning and using skills as they work toward the goals identified in their treatment plan.      Lyndsey Pratt, [facility]  December 22, 2021    "

## 2021-12-22 NOTE — GROUP NOTE
Psychoeducation Group Note    PATIENT'S NAME: Brandin Rodriguez  MRN:   [id number]  :   1985  ACCT. NUMBER: [id number]  DATE OF SERVICE: 21  START TIME:  2:00 PM  END TIME:  2:50 PM  FACILITATOR: Pablito Duron OTR/L  TOPIC: MH Life Skills Group: Lifestyle Balance and Structure                                    Service Modality:  Video Visit     Telemedicine Visit: The patient's condition can be safely assessed and treated via synchronous audio and visual telemedicine encounter.      Reason for Telemedicine Visit: Services only offered telehealth    Originating Site (Patient Location): Patient's home    Distant Site (Provider Location): Provider Remote Setting- Home Office    Consent:  The patient/guardian has verbally consented to: the potential risks and benefits of telemedicine (video visit) versus in person care; bill my insurance or make self-payment for services provided; and responsibility for payment of non-covered services.     Mode of Communication:  Video Conference via Medical Zoom    As the provider I attest to compliance with applicable laws and regulations related to telemedicine.       [facility] Adult Mental Health Day Treatment  TRACK: 1B    NUMBER OF PARTICIPANTS: 7    Summary of Group / Topics Discussed:  Lifestyle Balance and Strucure:  Benefits of Leisure Values on Mental Health: Patients explored and learned about the benefits and possibilities of leisure activity to create lifestyle balance that supports their mental and physical wellbeing.  Patients were assisted to identify individualized leisure values and interests, recognized the benefits of leisure activity on mental health, and problem solved barriers to leisure engagement and strategies to overcome.  Patient engaged in an experiential leisure activity to gain self-awareness and build milieu social aspects and reflected on the impact the experiential activity had on their mood.       Patient Session Goals /  Objectives:    Increased awareness of the importance of engagement in leisure activities to support lifestyle balance and perceived quality of life    Identified strategies to recognize and challenge barriers to leisure participation     Facilitated exploration of meaningful leisure interests and values    Practiced and reflected on how to generalize taught skills to their everyday life      Patient Participation / Response:  Fully participated with the group by sharing personal reflections / insights and openly received / provided feedback with other participants.    Demonstrated understanding of content through handouts/group discussion , Verbalized understanding of content and Patient would benefit from additional opportunities to practice the content to be able to generalize it to their everyday life with increased intentionality, consistency, and efficacy in support of their psychiatric recovery    Treatment Plan:  Patient has a current master individualized treatment plan.  See Epic treatment plan for more information.    Pablito Duron, OTR/L

## 2021-12-22 NOTE — GROUP NOTE
Psychoeducation Group Note    PATIENT'S NAME: Brandin Rodriguez  MRN:   [id number]  :   1985  ACCT. NUMBER: [id number]  DATE OF SERVICE: 21  START TIME:  3:00 PM  END TIME:  3:50 PM  FACILITATOR: Samantha Gonzales RN  TOPIC: DANK RN Group: Medication Education and Management                                    Service Modality:  Video Visit     Telemedicine Visit: The patient's condition can be safely assessed and treated via synchronous audio and visual telemedicine encounter.      Reason for Telemedicine Visit:  covid19    Originating Site (Patient Location): Patient's home    Distant Site (Provider Location): Provider Remote Setting- Home Office    Consent:  The patient/guardian has verbally consented to: the potential risks and benefits of telemedicine (video visit) versus in person care; bill my insurance or make self-payment for services provided; and responsibility for payment of non-covered services.     Patient would like the video invitation sent by:  My Chart    Mode of Communication:  Video Conference via Medical Zoom    As the provider I attest to compliance with applicable laws and regulations related to telemedicine.          [facility] Mental Health Day Treatment  TRACK: 1B    NUMBER OF PARTICIPANTS: 7    Summary of Group / Topics Discussed:  Medication Educations and Management:  Medication Categories, pharmacology: Patient were provided with a brief overview of four major psychotropic medication categories. Expected effects, potential side effects, adverse reactions, and contraindications were discussed. Patients learned about how their medications work to treat their illness and reviewed safe medication management, handling, and disposal of medications.     Patient Session Goals / Objectives:  ? Listed the four major categories of psychotropic medications (antidepressants, antipsychotics, mood stabilizers, anti-anxiety)  ? Identified medications in each category and important  adverse reactions/contraindications for use  ? Explained how the medications they take work to treat their illness       Patient Participation / Response:  Fully participated with the group by sharing personal reflections / insights and openly received / provided feedback with other participants.     Demonstrated understanding of topics discussed through group discussion and participation and Identified / Expressed personal readiness to practice skills    Treatment Plan:  Patient has a current master individualized treatment plan.  See Epic treatment plan for more information.    Samantha Gonzales RN

## 2021-12-23 ENCOUNTER — HOSPITAL ENCOUNTER (OUTPATIENT)
Dept: BEHAVIORAL HEALTH | Facility: CLINIC | Age: 36
End: 2021-12-23
Attending: PSYCHIATRY & NEUROLOGY
Payer: COMMERCIAL

## 2021-12-23 PROCEDURE — 90853 GROUP PSYCHOTHERAPY: CPT | Mod: GT | Performed by: COUNSELOR

## 2021-12-23 PROCEDURE — G0177 OPPS/PHP; TRAIN & EDUC SERV: HCPCS | Mod: GT

## 2021-12-23 NOTE — GROUP NOTE
Psychotherapy Group Note    PATIENT'S NAME: Brandin Rodriguez  MRN:   [id number]  :   1985  ACCT. NUMBER: [id number]  DATE OF SERVICE: 21  START TIME:  3:00 PM  END TIME:  3:50 PM  FACILITATOR: Lyndsey Pratt LP[facility]  TOPIC: MH EBP Group: Coping Skills  [facility] Adult Mental Health Day Treatment  TRACK: 1B    NUMBER OF PARTICIPANTS: 6                                      Service Modality:  Video Visit     Telemedicine Visit: The patient's condition can be safely assessed and treated via synchronous audio and visual telemedicine encounter.      Reason for Telemedicine Visit: Services only offered telehealth    Originating Site (Patient Location): Patient's home    Distant Site (Provider Location): Provider Remote Setting- Home Office    Consent:  The patient/guardian has verbally consented to: the potential risks and benefits of telemedicine (video visit) versus in person care; bill my insurance or make self-payment for services provided; and responsibility for payment of non-covered services.     Patient would like the video invitation sent by:  My Chart    Mode of Communication:  Video Conference via Medical Zoom    As the provider I attest to compliance with applicable laws and regulations related to telemedicine.          Summary of Group / Topics Discussed:  Coping Skills: Self-Soothe: Patients learned to apply self-soothe as a way to decrease heightened stress in the moment.  Patients identified situations that necessitate self-soothe strategies.  They focused on ways to manage physical symptoms of distress using the senses. They discussed how to distinguish when this can be useful in their lives when other strategies are more relevant or helpful.    Patient Session Goals / Objectives:    Understand the purpose of using the senses to decrease distress    Process what happens in the body when using self-soothe strategies    Demonstrate understanding of when to use self-soothe  strategies    Identify and problem solve barriers to applying self-soothe strategies.    Choose 1-2 self-soothe strategies to apply during times of distress.        Patient Participation / Response:  Fully participated with the group by sharing personal reflections / insights and openly received / provided feedback with other participants.    Demonstrated understanding of topics discussed through group discussion and participation, Expressed understanding of the relevance / importance of coping skills at distressing times in life and Demonstrated knowledge of when to consider using a variety of coping skills in daily life    Treatment Plan:  Patient has a current master individualized treatment plan.  See Epic treatment plan for more information.    Lyndsey Pratt, [facility]

## 2021-12-23 NOTE — GROUP NOTE
Psychoeducation Group Note    PATIENT'S NAME: Brandin Rodriguez  MRN:   [id number]  :   1985  [id number]  DATE OF SERVICE: 21  START TIME:  2:00 PM  END TIME:  5:50 PM  FACILITATOR: Pablito Duron OTR/L  TOPIC: MH Life Skills Group: Resiliency Development                                    Service Modality:  Video Visit     Telemedicine Visit: The patient's condition can be safely assessed and treated via synchronous audio and visual telemedicine encounter.      Reason for Telemedicine Visit: Services only offered telehealth    Originating Site (Patient Location): Patient's home    Distant Site (Provider Location): Provider Remote Setting- Home Office    Consent:  The patient/guardian has verbally consented to: the potential risks and benefits of telemedicine (video visit) versus in person care; bill my insurance or make self-payment for services provided; and responsibility for payment of non-covered services.     Mode of Communication:  Video Conference via Medical Zoom    As the provider I attest to compliance with applicable laws and regulations related to telemedicine.       [facility] Adult Mental Health Day Treatment  TRACK: 1B    NUMBER OF PARTICIPANTS: 6    Summary of Group / Topics Discussed:  Resiliency Development:  Coping Skills(Strategies to mange anxiety and stress): Patients were taught how to identify stressors, signs of stress, coping skills, and prevention strategies for overall stress management.  Patients were given the opportunity to identify both ongoing and acute mental health symptoms and how to effectively manage these symptoms by developing an effective aftercare plan.  Patients increased awareness of community based resources.    Patient Session Goals / Objectives:    Identified how using coping skills can be used for symptom and stress management       Improved awareness of individualed symptoms and stressors and how to effectively cope      Established a relapse prevention plan to practice these skills in their own environments    Practiced and reflected on how to generalize taught skills to their everyday life        Patient Participation / Response:  Fully participated with the group by sharing personal reflections / insights and openly received / provided feedback with other participants.    Demonstrated understanding of content through video/group discussion , Verbalized understanding of content and Patient would benefit from additional opportunities to practice the content to be able to generalize it to their everyday life with increased intentionality, consistency, and efficacy in support of their psychiatric recovery    Treatment Plan:  Patient has a current master individualized treatment plan.  See Epic treatment plan for more information.    Pablito Duron, OTR/L

## 2021-12-23 NOTE — GROUP NOTE
Process Group Note    PATIENT'S NAME: Brandin Rodriguez  MRN:   [id number]  :   1985  ACCT. NUMBER: [id number]  DATE OF SERVICE: 21  START TIME:  1:00 PM  END TIME:  1:50 PM  FACILITATOR: Lyndsey Pratt [facility]  TOPIC:  Process Group    Diagnoses:  296.30 (F33.9) Major Depressive Disorder, Recurrent Episode, Unspecified _ and With mixed features  300.02 (F41.1) Generalized Anxiety Disorder  309.81 (F43.10) Posttraumatic Stress Disorder (includes Posttraumatic Stress Disorder for Children 6 Years and Younger)  Without dissociative symptoms.  4. Other Diagnoses that is relevant to services:   Substance-Related & Addictive Disorders 291.9 (F10.99) Unspecified Alcohol Related Disorder.  5. Provisional Diagnosis:  NA.      [facility] Mental [facility] Day Treatment  TRACK: 1B    NUMBER OF PARTICIPANTS: 4                                      Service Modality:  Video Visit     Telemedicine Visit: The patient's condition can be safely assessed and treated via synchronous audio and visual telemedicine encounter.      Reason for Telemedicine Visit: Services only offered telehealth    Originating Site (Patient Location): Patient's home    Distant Site (Provider Location): Provider Remote Setting- Home Office    Consent:  The patient/guardian has verbally consented to: the potential risks and benefits of telemedicine (video visit) versus in person care; bill my insurance or make self-payment for services provided; and responsibility for payment of non-covered services.     Patient would like the video invitation sent by:  My Chart    Mode of Communication:  Video Conference via Medical Zoom    As the provider I attest to compliance with applicable laws and regulations related to telemedicine.                Data:    Session content: At the start of this group, patients were invited to check in by identifying themselves, describing their current emotional status, and identifying issues to address in this  "group.   Area(s) of treatment focus addressed in this session included Symptom Management, Personal Safety and Abstinence/Relapse Prevention.    Иван reported feeling \"tired\" and \"overwhelmed\" today.  His goal for the day is to hang out with family.  Patient declined additional process time but contributed to group discussion and provided feedback and support to peers.      Therapeutic Interventions/Treatment Strategies:  Psychotherapist offered support, feedback and validation and reinforced use of skills.    Assessment:    Patient response:   Patient responded to session by accepting feedback, giving feedback and listening    Possible barriers to participation / learning include: and no barriers identified    Health Issues:   None reported       Substance Use Review:   Substance Use: No active concerns identified.    Mental Status/Behavioral Observations  Appearance:   Appropriate   Eye Contact:   Good   Psychomotor Behavior: Normal   Attitude:   Cooperative   Orientation:   All  Speech   Rate / Production: Normal    Volume:  Normal   Mood:    Anxious  Depressed   Affect:    Appropriate   Thought Content:   Clear  Thought Form:  Coherent  Logical     Insight:    Good     Plan:     Safety Plan: No current safety concerns identified.  Recommended that patient call 911 or go to the local ED should there be a change in any of these risk factors.     Barriers to treatment: None identified    Patient Contracts (see media tab):  None    Substance Use: Not addressed in session     Continue or Discharge: Patient will continue in Adult Day Treatment (ADT)  as planned. Patient is likely to benefit from learning and using skills as they work toward the goals identified in their treatment plan.      Lyndsey Pratt, [facility]  December 23, 2021    "

## 2021-12-27 ENCOUNTER — HOSPITAL ENCOUNTER (OUTPATIENT)
Dept: BEHAVIORAL HEALTH | Facility: CLINIC | Age: 36
End: 2021-12-27
Attending: PSYCHIATRY & NEUROLOGY
Payer: COMMERCIAL

## 2021-12-27 PROCEDURE — 90853 GROUP PSYCHOTHERAPY: CPT | Mod: GT | Performed by: COUNSELOR

## 2021-12-27 PROCEDURE — G0177 OPPS/PHP; TRAIN & EDUC SERV: HCPCS | Mod: GT

## 2021-12-27 NOTE — GROUP NOTE
Psychoeducation Group Note    PATIENT'S NAME: Brandin Rodriguez  MRN:   [id number]  :   1985  ACCT. NUMBER: [id number]  DATE OF SERVICE: 21  START TIME:  3:00 PM  END TIME:  3:50 PM  FACILITATOR: Sruthi Novak RN  TOPIC: DANK RN Group: Health Maintenance  [facility] Mental [facility] Day Treatment  TRACK: 1B    NUMBER OF PARTICIPANTS: 5    Summary of Group / Topics Discussed:  Health Maintenance: Eight Dimensions of Wellness: The concept of holistic health through the model of eight dimensions was introduced. Group members participated in identifying behaviors and activities in each of the dimensions of wellness.  The importance of each dimension was reinforced and the concept of balance in life as it relates to wellness was explored.      Patient Session Goals / Objectives:    Verbalized understanding of balance in wellness and how it relates to their life    Identified and explained the eight dimensions of wellness    Categorized activities and wellness needs into corresponding dimensions appropriately during exercise                                      Service Modality:  Video Visit     Telemedicine Visit: The patient's condition can be safely assessed and treated via synchronous audio and visual telemedicine encounter.      Reason for Telemedicine Visit: Services only offered telehealth    Originating Site (Patient Location): Patient's home    Distant Site (Provider Location): Provider Remote Setting- Home Office    Consent:  The patient/guardian has verbally consented to: the potential risks and benefits of telemedicine (video visit) versus in person care; bill my insurance or make self-payment for services provided; and responsibility for payment of non-covered services.     Patient would like the video invitation sent by:  My Chart    Mode of Communication:  Video Conference via Medical Zoom    As the provider I attest to compliance with applicable laws and regulations related to  telemedicine.           Patient Participation / Response:  Moderately participated, sharing some personal reflections / insights and adequately adequately received / provided feedback with other participants.    Verbalized understanding of health maintenance topic    Treatment Plan:  Patient has a current master individualized treatment plan.  See Epic treatment plan for more information.    Sruthi Novak RN

## 2021-12-27 NOTE — GROUP NOTE
Process Group Note    PATIENT'S NAME: Brandin oRdriguez  MRN:   [id number]  :   1985  ACCT. NUMBER: [id number]  DATE OF SERVICE: 21  START TIME:  1:00 PM  END TIME:  1:50 PM  FACILITATOR: Lyndsey Pratt [facility]  TOPIC:  Process Group    Diagnoses:  296.30 (F33.9) Major Depressive Disorder, Recurrent Episode, Unspecified _ and With mixed features  300.02 (F41.1) Generalized Anxiety Disorder  309.81 (F43.10) Posttraumatic Stress Disorder (includes Posttraumatic Stress Disorder for Children 6 Years and Younger)  Without dissociative symptoms.  4. Other Diagnoses that is relevant to services:   Substance-Related & Addictive Disorders 291.9 (F10.99) Unspecified Alcohol Related Disorder.  5. Provisional Diagnosis:  NA.      [facility] Mental [facility] Day Treatment  TRACK: 1B    NUMBER OF PARTICIPANTS: 5                                      Service Modality:  Video Visit     Telemedicine Visit: The patient's condition can be safely assessed and treated via synchronous audio and visual telemedicine encounter.      Reason for Telemedicine Visit: Services only offered telehealth    Originating Site (Patient Location): Patient's home    Distant Site (Provider Location): Provider Remote Setting- Home Office    Consent:  The patient/guardian has verbally consented to: the potential risks and benefits of telemedicine (video visit) versus in person care; bill my insurance or make self-payment for services provided; and responsibility for payment of non-covered services.     Patient would like the video invitation sent by:  My Chart    Mode of Communication:  Video Conference via Medical Zoom    As the provider I attest to compliance with applicable laws and regulations related to telemedicine.                Data:    Session content: At the start of this group, patients were invited to check in by identifying themselves, describing their current emotional status, and identifying issues to address in this  "group.   Area(s) of treatment focus addressed in this session included Symptom Management and Personal Safety.    Иван reported feeling \"off\" today.  He reported that his weekend was nice and he got to spend time with his camera.  Patient declined additional process time but contributed to group discussion and provided feedback and support to peers.      Therapeutic Interventions/Treatment Strategies:  Psychotherapist offered support, feedback and validation and reinforced use of skills.    Assessment:    Patient response:   Patient responded to session by accepting feedback, giving feedback and listening    Possible barriers to participation / learning include: and no barriers identified    Health Issues:   None reported       Substance Use Review:   Substance Use: cannabis .     Mental Status/Behavioral Observations  Appearance:   Appropriate   Eye Contact:   Good   Psychomotor Behavior: Normal   Attitude:   Cooperative   Orientation:   All  Speech   Rate / Production: Normal    Volume:  Normal   Mood:    Depressed   Affect:    Appropriate   Thought Content:   Clear  Thought Form:  Coherent  Logical     Insight:    Good     Plan:     Safety Plan: No current safety concerns identified.  Recommended that patient call 911 or go to the local ED should there be a change in any of these risk factors.     Barriers to treatment: None identified    Patient Contracts (see media tab):  None    Substance Use: Not addressed in session     Continue or Discharge: Patient will continue in Adult Day Treatment (ADT)  as planned. Patient is likely to benefit from learning and using skills as they work toward the goals identified in their treatment plan.      Lyndsey Pratt, [facility]  December 27, 2021    "

## 2021-12-27 NOTE — GROUP NOTE
Psychoeducation Group Note    PATIENT'S NAME: Brandin Rodriguez  MRN:   [id number]  :   1985  [id number]  DATE OF SERVICE: 21  START TIME:  2:00 PM  END TIME:  2:50 PM  FACILITATOR: Pablito Duron OTR/L  TOPIC: MH Life Skills Group: Resiliency Development                                    Service Modality:  Video Visit     Telemedicine Visit: The patient's condition can be safely assessed and treated via synchronous audio and visual telemedicine encounter.      Reason for Telemedicine Visit: Services only offered telehealth    Originating Site (Patient Location): Patient's home    Distant Site (Provider Location): Provider Remote Setting- Home Office    Consent:  The patient/guardian has verbally consented to: the potential risks and benefits of telemedicine (video visit) versus in person care; bill my insurance or make self-payment for services provided; and responsibility for payment of non-covered services.     Mode of Communication:  Video Conference via Medical Zoom    As the provider I attest to compliance with applicable laws and regulations related to telemedicine.       [facility] Adult Mental Health Day Treatment  TRACK: 1B    NUMBER OF PARTICIPANTS: 5    Summary of Group / Topics Discussed:  Resiliency Development:  Coping with Personal Change: Patients were taught how to identify stressors, signs of stress, coping skills, and prevention strategies for overall stress management.  Patients were given the opportunity to identify both ongoing and acute mental health symptoms and how to effectively manage these symptoms by developing an effective aftercare plan.  Patients increased awareness of community based resources.    Patient Session Goals / Objectives:    Identified how using coping skills can be used for symptom and stress management       Improved awareness of individualed symptoms and stressors and how to effectively cope     Established a relapse prevention  plan to practice these skills in their own environments    Practiced and reflected on how to generalize taught skills to their everyday life        Patient Participation / Response:  Fully participated with the group by sharing personal reflections / insights and openly received / provided feedback with other participants.    Demonstrated understanding of content through handouts/group discussion , Verbalized understanding of content and Patient would benefit from additional opportunities to practice the content to be able to generalize it to their everyday life with increased intentionality, consistency, and efficacy in support of their psychiatric recovery    Treatment Plan:  Patient has a current master individualized treatment plan.  See Epic treatment plan for more information.    Pablito Duron, OTR/L

## 2021-12-29 ENCOUNTER — HOSPITAL ENCOUNTER (OUTPATIENT)
Dept: BEHAVIORAL HEALTH | Facility: CLINIC | Age: 36
End: 2021-12-29
Attending: PSYCHIATRY & NEUROLOGY
Payer: COMMERCIAL

## 2021-12-29 PROCEDURE — 90853 GROUP PSYCHOTHERAPY: CPT | Mod: GT | Performed by: COUNSELOR

## 2021-12-29 PROCEDURE — G0177 OPPS/PHP; TRAIN & EDUC SERV: HCPCS | Mod: GT

## 2021-12-29 NOTE — GROUP NOTE
Psychoeducation Group Note    PATIENT'S NAME: Brandin Rodriguez  MRN:   [id number]  :   1985  ACCT. NUMBER: [id number]  DATE OF SERVICE: 21  START TIME:  3:00 PM  END TIME:  3:50 PM  FACILITATOR: Sruthi Novak RN  TOPIC: DANK RN Group: Mental Health Maintenance  [facility] Adult Mental Health Day Treatment  TRACK: 1B    NUMBER OF PARTICIPANTS: 6    Summary of Group / Topics Discussed:  Mental Health Maintenance:  Assessments of Strengths: Patients completed a self-reflection on personal strengths worksheet. The concept of personal strength as it relates to resilience were explored. Patients shared responses with the group and participated in discussion.     Patient Session Goals / Objectives:  ? Patients identified 1-3 qualities they consider a personal strength.  ? Patients understood the concept of personal strengths and the connection it has to resiliency                                    Service Modality:  Video Visit     Telemedicine Visit: The patient's condition can be safely assessed and treated via synchronous audio and visual telemedicine encounter.      Reason for Telemedicine Visit: Services only offered telehealth    Originating Site (Patient Location): Patient's home    Distant Site (Provider Location): Provider Remote Setting- Home Office    Consent:  The patient/guardian has verbally consented to: the potential risks and benefits of telemedicine (video visit) versus in person care; bill my insurance or make self-payment for services provided; and responsibility for payment of non-covered services.     Patient would like the video invitation sent by:  My Chart    Mode of Communication:  Video Conference via Medical Zoom    As the provider I attest to compliance with applicable laws and regulations related to telemedicine.           Patient Participation / Response:  Fully participated with the group by sharing personal reflections / insights and openly received / provided  feedback with other participants.    Verbalized understanding of mental health maintenance topic    Treatment Plan:  Patient has a current master individualized treatment plan.  See Epic treatment plan for more information.    Sruthi Novak RN

## 2021-12-29 NOTE — GROUP NOTE
Process Group Note    PATIENT'S NAME: Brandin Rodriguez  MRN:   [id number]  :   1985  ACCT. NUMBER: [id number]  DATE OF SERVICE: 21  START TIME:  1:00 PM  END TIME:  1:50 PM  FACILITATOR: Lyndsey Pratt [facility]  TOPIC:  Process Group    Diagnoses:  296.30 (F33.9) Major Depressive Disorder, Recurrent Episode, Unspecified _ and With mixed features  300.02 (F41.1) Generalized Anxiety Disorder  309.81 (F43.10) Posttraumatic Stress Disorder (includes Posttraumatic Stress Disorder for Children 6 Years and Younger)  Without dissociative symptoms.  4. Other Diagnoses that is relevant to services:   Substance-Related & Addictive Disorders 291.9 (F10.99) Unspecified Alcohol Related Disorder.  5. Provisional Diagnosis:  NA.      [facility] Mental [facility] Day Treatment  TRACK: 1B    NUMBER OF PARTICIPANTS: 5                                      Service Modality:  Video Visit     Telemedicine Visit: The patient's condition can be safely assessed and treated via synchronous audio and visual telemedicine encounter.      Reason for Telemedicine Visit: Services only offered telehealth    Originating Site (Patient Location): Patient's home    Distant Site (Provider Location): Provider Remote Setting- Home Office    Consent:  The patient/guardian has verbally consented to: the potential risks and benefits of telemedicine (video visit) versus in person care; bill my insurance or make self-payment for services provided; and responsibility for payment of non-covered services.     Patient would like the video invitation sent by:  My Chart    Mode of Communication:  Video Conference via Medical Zoom    As the provider I attest to compliance with applicable laws and regulations related to telemedicine.                Data:    Session content: At the start of this group, patients were invited to check in by identifying themselves, describing their current emotional status, and identifying issues to address in this  "group.   Area(s) of treatment focus addressed in this session included Symptom Management and Personal Safety.    Иван reported feeling \"pretty okay\" today.  His goal for the day is to run some errands.  Patient declined additional process time but contributed to group discussion and provided feedback and support to peers.      Therapeutic Interventions/Treatment Strategies:  Psychotherapist offered support, feedback and validation and reinforced use of skills.    Assessment:    Patient response:   Patient responded to session by accepting feedback, giving feedback and listening    Possible barriers to participation / learning include: and no barriers identified    Health Issues:   None reported       Substance Use Review:   Substance Use: Substance use has decreased    Mental Status/Behavioral Observations  Appearance:   Appropriate   Eye Contact:   Good   Psychomotor Behavior: Normal   Attitude:   Cooperative   Orientation:   All  Speech   Rate / Production: Normal    Volume:  Normal   Mood:    Depressed   Affect:    Appropriate   Thought Content:   Clear  Thought Form:  Coherent  Logical     Insight:    Good     Plan:     Safety Plan: No current safety concerns identified.  Recommended that patient call 911 or go to the local ED should there be a change in any of these risk factors.     Barriers to treatment: None identified    Patient Contracts (see media tab):  None    Substance Use: Not addressed in session     Continue or Discharge: Patient will continue in Adult Day Treatment (ADT)  as planned. Patient is likely to benefit from learning and using skills as they work toward the goals identified in their treatment plan.      Lyndsey Pratt, [facility]  December 29, 2021    "

## 2021-12-29 NOTE — GROUP NOTE
Psychoeducation Group Note    PATIENT'S NAME: Brandin Rodriguez  MRN:   [id number]  :   1985  [id number]  DATE OF SERVICE: 21  START TIME:  2:00 PM  END TIME:  2:50 PM  FACILITATOR: Pablito Duron OTR/L  TOPIC: MH Life Skills Group: Communication and Social Skills Development                                    Service Modality:  Video Visit     Telemedicine Visit: The patient's condition can be safely assessed and treated via synchronous audio and visual telemedicine encounter.      Reason for Telemedicine Visit: Services only offered telehealth    Originating Site (Patient Location): Patient's home    Distant Site (Provider Location): Provider Remote Setting- Home Office    Consent:  The patient/guardian has verbally consented to: the potential risks and benefits of telemedicine (video visit) versus in person care; bill my insurance or make self-payment for services provided; and responsibility for payment of non-covered services.     Mode of Communication:  Video Conference via Medical Zoom    As the provider I attest to compliance with applicable laws and regulations related to telemedicine.       [facility] Mental Health Day Treatment  TRACK: 1B    NUMBER OF PARTICIPANTS: 5    Summary of Group / Topics Discussed:  Communication and Social Skills Development: Communication Skills: Patients were taught and gained awareness of effective communication skills in the following areas: Trust building, verbal communication, listening, and non verbal communication.  Patients identified both personal strengths and opportunities for growth in these areas to improve overall communication and connection with other people.     Patient Session Goals / Objectives:    Identified strengths and opportunities for growth in communication skills and how these  impact their ability to communicate clearly with other people       Improved awareness of important aspects of communication skills  and how this relates to mental health recovery        Established a plan for practice of these skills in their own environments    Practiced and reflected on how to generalize taught skills to their everyday life      Patient Participation / Response:  Fully participated with the group by sharing personal reflections / insights and openly received / provided feedback with other participants.    Demonstrated understanding of content through video/handout/group discussion , Verbalized understanding of content and Patient would benefit from additional opportunities to practice the content to be able to generalize it to their everyday life with increased intentionality, consistency, and efficacy in support of their psychiatric recovery    Treatment Plan:  Patient has a current master individualized treatment plan.  See Epic treatment plan for more information.    Pablito Duron, OTR/L

## 2021-12-30 ENCOUNTER — HOSPITAL ENCOUNTER (OUTPATIENT)
Dept: BEHAVIORAL HEALTH | Facility: CLINIC | Age: 36
End: 2021-12-30
Attending: PSYCHIATRY & NEUROLOGY
Payer: COMMERCIAL

## 2021-12-30 PROCEDURE — 90853 GROUP PSYCHOTHERAPY: CPT | Mod: GT | Performed by: COUNSELOR

## 2021-12-30 PROCEDURE — G0177 OPPS/PHP; TRAIN & EDUC SERV: HCPCS | Mod: GT

## 2021-12-30 NOTE — GROUP NOTE
Process Group Note    PATIENT'S NAME: Brandin Rodriguez  MRN:   [id number]  :   1985  ACCT. NUMBER: [id number]  DATE OF SERVICE: 21  START TIME:  1:00 PM  END TIME:  1:50 PM  FACILITATOR: Lyndsey Pratt [facility]  TOPIC:  Process Group    Diagnoses:  296.30 (F33.9) Major Depressive Disorder, Recurrent Episode, Unspecified _ and With mixed features  300.02 (F41.1) Generalized Anxiety Disorder  309.81 (F43.10) Posttraumatic Stress Disorder (includes Posttraumatic Stress Disorder for Children 6 Years and Younger)  Without dissociative symptoms.  4. Other Diagnoses that is relevant to services:   Substance-Related & Addictive Disorders 291.9 (F10.99) Unspecified Alcohol Related Disorder.  5. Provisional Diagnosis:  NA.      [facility] Mental [facility] Day Treatment  TRACK: 1B    NUMBER OF PARTICIPANTS: 5                                      Service Modality:  Video Visit     Telemedicine Visit: The patient's condition can be safely assessed and treated via synchronous audio and visual telemedicine encounter.      Reason for Telemedicine Visit: Services only offered telehealth    Originating Site (Patient Location): Patient's home    Distant Site (Provider Location): Provider Remote Setting- Home Office    Consent:  The patient/guardian has verbally consented to: the potential risks and benefits of telemedicine (video visit) versus in person care; bill my insurance or make self-payment for services provided; and responsibility for payment of non-covered services.     Patient would like the video invitation sent by:  My Chart    Mode of Communication:  Video Conference via Medical Zoom    As the provider I attest to compliance with applicable laws and regulations related to telemedicine.                Data:    Session content: At the start of this group, patients were invited to check in by identifying themselves, describing their current emotional status, and identifying issues to address in this  group.   Area(s) of treatment focus addressed in this session included Symptom Management and Personal Safety.    Иван reported feeling okay today.   His goal is to get his mail from his apartment and also make a meal later today.  Patient declined additional process time but contributed to group discussion and provided feedback and support to peers.      Therapeutic Interventions/Treatment Strategies:  Psychotherapist offered support, feedback and validation and reinforced use of skills.    Assessment:    Patient response:   Patient responded to session by accepting feedback, giving feedback and listening    Possible barriers to participation / learning include: and no barriers identified    Health Issues:   None reported       Substance Use Review:   Substance Use: No active concerns identified.    Mental Status/Behavioral Observations  Appearance:   Appropriate   Eye Contact:   Good   Psychomotor Behavior: Normal   Attitude:   Cooperative   Orientation:   All  Speech   Rate / Production: Normal    Volume:  Normal   Mood:    Anxious  Depressed   Affect:    Appropriate   Thought Content:   Clear  Thought Form:  Coherent  Logical     Insight:    Good     Plan:     Safety Plan: No current safety concerns identified.  Recommended that patient call 911 or go to the local ED should there be a change in any of these risk factors.     Barriers to treatment: None identified    Patient Contracts (see media tab):  None    Substance Use: Not addressed in session     Continue or Discharge: Patient will continue in Adult Day Treatment (ADT)  as planned. Patient is likely to benefit from learning and using skills as they work toward the goals identified in their treatment plan.      Lyndsey Pratt, [facility]  December 30, 2021

## 2021-12-30 NOTE — GROUP NOTE
Psychoeducation Group Note    PATIENT'S NAME: Brandin Rodriguez  MRN:   [id number]  :   1985  [id number]  DATE OF SERVICE: 21  START TIME:  2:00 PM  END TIME:  2:50 PM  FACILITATOR: Pablito Duron OTR/L  TOPIC: MH Life Skills Group: Resiliency Development                                    Service Modality:  Video Visit     Telemedicine Visit: The patient's condition can be safely assessed and treated via synchronous audio and visual telemedicine encounter.      Reason for Telemedicine Visit: Services only offered telehealth    Originating Site (Patient Location): Patient's home    Distant Site (Provider Location): Provider Remote Setting- Home Office    Consent:  The patient/guardian has verbally consented to: the potential risks and benefits of telemedicine (video visit) versus in person care; bill my insurance or make self-payment for services provided; and responsibility for payment of non-covered services.     Mode of Communication:  Video Conference via Medical Zoom    As the provider I attest to compliance with applicable laws and regulations related to telemedicine.       [facility] Adult Mental Health Day Treatment  TRACK: 1B    NUMBER OF PARTICIPANTS: 7    Summary of Group / Topics Discussed:  Resiliency Development:  Coping Skills: Personal Recovery Outcome Measure: Patients were taught how to identify coping strategies and routines that they can adopt and use for management with focus on a balance between physical, emotional, social and spiritual strategies.    Patient Session Goals / Objectives:    Identified personal definitions of recovery for effectively managing mental health     Improved awareness of the process of recovery and skills and strategies that support this       Established a plan for practice of these skills in their own environments    Practiced and reflected on how to generalize taught skills to their everyday life      Patient Participation /  Response:  Fully participated with the group by sharing personal reflections / insights and openly received / provided feedback with other participants.    Demonstrated understanding of content through handouts/group discussion , Verbalized understanding of content and Patient would benefit from additional opportunities to practice the content to be able to generalize it to their everyday life with increased intentionality, consistency, and efficacy in support of their psychiatric recovery    Treatment Plan:  Patient has a current master individualized treatment plan.  See Epic treatment plan for more information.    Pablito Duron, OTR/L

## 2021-12-30 NOTE — GROUP NOTE
Psychotherapy Group Note    PATIENT'S NAME: Brandin Rodriguez  MRN:   [id number]  :   1985  ACCT. NUMBER: [id number]  DATE OF SERVICE: 21  START TIME:  3:00 PM  END TIME:  3:50 PM  FACILITATOR: Lyndsey Pratt LPCC  TOPIC:  EBP Group: Behavioral Activation  [facility] Adult Mental Health Day Treatment  TRACK: 1B    NUMBER OF PARTICIPANTS: 7                                      Service Modality:  Video Visit     Telemedicine Visit: The patient's condition can be safely assessed and treated via synchronous audio and visual telemedicine encounter.      Reason for Telemedicine Visit: Services only offered telehealth    Originating Site (Patient Location): Patient's home    Distant Site (Provider Location): Provider Remote Setting- Home Office    Consent:  The patient/guardian has verbally consented to: the potential risks and benefits of telemedicine (video visit) versus in person care; bill my insurance or make self-payment for services provided; and responsibility for payment of non-covered services.     Patient would like the video invitation sent by:  My Chart    Mode of Communication:  Video Conference via Medical Zoom    As the provider I attest to compliance with applicable laws and regulations related to telemedicine.          Summary of Group / Topics Discussed:  Behavioral Activation: Motivation and Procrastination: Patients explored how they currently spend their time, identifying thoughts and feelings that are motivating and serve to increase desired behaviors.  They also examined behaviors that contribute to procrastination.  Different types of procrastination behaviors were identified, and strategies to reduce individual procrastination and increase motivation were explored and practiced.  Patients identified ways to increase goal-directed activities to enhance mood and reduce symptoms.        Patient Session Goals / Objectives:    Identify current patterns of procrastination behavior  and how they influence thoughts and moods, and inhibit motivation.    Identify behaviors that can be implemented that contribute to improving thoughts and feelings, motivation, and reduce symptoms.    Identify and develop a plan to increase activities that promote a sense of accomplishment and competence.    Practice scheduling positive activities / behaviors into daily routines.      Patient Participation / Response:  Fully participated with the group by sharing personal reflections / insights and openly received / provided feedback with other participants.    Demonstrated understanding of topics discussed through group discussion and participation, Expressed understanding of the relationship between behaviors, thoughts, and feelings and Shared experiences and challenges with making behavioral changes    Treatment Plan:  Patient has a current master individualized treatment plan.  See Epic treatment plan for more information.    Lyndsey Pratt, [facility]

## 2022-01-03 ENCOUNTER — HOSPITAL ENCOUNTER (OUTPATIENT)
Dept: BEHAVIORAL HEALTH | Facility: CLINIC | Age: 37
End: 2022-01-03
Attending: PSYCHIATRY & NEUROLOGY
Payer: COMMERCIAL

## 2022-01-03 ENCOUNTER — PATIENT OUTREACH (OUTPATIENT)
Dept: NURSING | Facility: CLINIC | Age: 37
End: 2022-01-03
Payer: COMMERCIAL

## 2022-01-03 DIAGNOSIS — F43.10 PTSD (POST-TRAUMATIC STRESS DISORDER): ICD-10-CM

## 2022-01-03 PROCEDURE — G0177 OPPS/PHP; TRAIN & EDUC SERV: HCPCS | Mod: GT

## 2022-01-03 PROCEDURE — 90853 GROUP PSYCHOTHERAPY: CPT | Mod: GT | Performed by: COUNSELOR

## 2022-01-03 NOTE — PROGRESS NOTES
Clinic Care Coordination Contact    Follow Up Progress Note      Assessment: LUZ MARINA SMITH spoke with pt briefly as he was on break during his groups today. Pt requested a call tomorrow when he has more time.    PT did clarify that he doesn't have a Psychiatry appointment tomorrow with [facility] because they declined to see him. He has established with psychiatry at a different agencies now.    Goals addressed this encounter:   Goals Addressed                    This Visit's Progress       Patient Stated       1. Mental Health Management (pt-stated)   50%      Goal Statement: Within the next 6 months, I would like to improve my overall health by decreasing my substance use and following up with medical appointments.   Date Goal set: 12/7/2020  Barriers: None  Strengths: Motivated to address health and substance use concerns  Date to Achieve By: 6/7/2021 // date extended 6/7/2022  Patient expressed understanding of goal: Иван verbally stated understanding of goal   Action steps to achieve this goal:  1. I will attend scheduled medical appointments  2. I will follow treatment recommendations  3. I will outreach to Care Coordination  for further questions or concerns            Outreach Frequency: monthly    Plan: LUZ MARINA SMITH will follow up tomorrow.    Abimbola Cross [facility]  Clinic Care Coordinator  [facility] Cathie Issaquena  [facility]  [phone]  [email or web address]

## 2022-01-03 NOTE — GROUP NOTE
Process Group Note    PATIENT'S NAME: Brandin Rodriguez  MRN:   [id number]  :   1985  ACCT. NUMBER: [id number]  DATE OF SERVICE: 22  START TIME:  1:00 PM  END TIME:  1:50 PM  FACILITATOR: Lyndsey Pratt [facility]  TOPIC:  Process Group    Diagnoses:  296.30 (F33.9) Major Depressive Disorder, Recurrent Episode, Unspecified _ and With mixed features  300.02 (F41.1) Generalized Anxiety Disorder  309.81 (F43.10) Posttraumatic Stress Disorder (includes Posttraumatic Stress Disorder for Children 6 Years and Younger)  Without dissociative symptoms.  4. Other Diagnoses that is relevant to services:   Substance-Related & Addictive Disorders 291.9 (F10.99) Unspecified Alcohol Related Disorder.  5. Provisional Diagnosis:  NA.      [facility] Mental [facility] Day Treatment  TRACK: 1B    NUMBER OF PARTICIPANTS: 5                                      Service Modality:  Video Visit     Telemedicine Visit: The patient's condition can be safely assessed and treated via synchronous audio and visual telemedicine encounter.      Reason for Telemedicine Visit: Services only offered telehealth    Originating Site (Patient Location): Patient's home    Distant Site (Provider Location): Provider Remote Setting- Home Office    Consent:  The patient/guardian has verbally consented to: the potential risks and benefits of telemedicine (video visit) versus in person care; bill my insurance or make self-payment for services provided; and responsibility for payment of non-covered services.     Patient would like the video invitation sent by:  My Chart    Mode of Communication:  Video Conference via Medical Zoom    As the provider I attest to compliance with applicable laws and regulations related to telemedicine.                Data:    Session content: At the start of this group, patients were invited to check in by identifying themselves, describing their current emotional status, and identifying issues to address in this  "group.   Area(s) of treatment focus addressed in this session included Symptom Management and Personal Safety.    Иван reported feeling \"good\" but still has a lot of negative stuff going on in his head.  His goal for the day is to get his mail.  Patient declined additional process time but contributed to group discussion and provided feedback and support to peers.      Therapeutic Interventions/Treatment Strategies:  Psychotherapist offered support, feedback and validation and reinforced use of skills.    Assessment:    Patient response:   Patient responded to session by accepting feedback, giving feedback and listening    Possible barriers to participation / learning include: and no barriers identified    Health Issues:   None reported       Substance Use Review:   Substance Use: cannabis .     Mental Status/Behavioral Observations  Appearance:   Appropriate   Eye Contact:   Good   Psychomotor Behavior: Normal   Attitude:   Cooperative   Orientation:   All  Speech   Rate / Production: Normal    Volume:  Normal   Mood:    Anxious  Depressed   Affect:    Appropriate   Thought Content:   Clear  Thought Form:  Coherent  Logical     Insight:    Good     Plan:     Safety Plan: No current safety concerns identified.  Recommended that patient call 911 or go to the local ED should there be a change in any of these risk factors.     Barriers to treatment: None identified    Patient Contracts (see media tab):  None    Substance Use: Not addressed in session     Continue or Discharge: Patient will continue in Adult Day Treatment (ADT)  as planned. Patient is likely to benefit from learning and using skills as they work toward the goals identified in their treatment plan.      Lyndsey Pratt, [facility]  January 3, 2022    "

## 2022-01-03 NOTE — GROUP NOTE
"Psychoeducation Group Note    PATIENT'S NAME: Brandin Rodriguez  MRN:   [id number]  :   1985  ACCT. NUMBER: [id number]  DATE OF SERVICE: 22  START TIME:  3:00 PM  END TIME:  3:50 PM  FACILITATOR: Samantha Gonzales RN  TOPIC: MH RN Group: Mental Health Maintenance                                    Service Modality:  Video Visit     Telemedicine Visit: The patient's condition can be safely assessed and treated via synchronous audio and visual telemedicine encounter.      Reason for Telemedicine Visit:  covid9    Originating Site (Patient Location): Patient's home    Distant Site (Provider Location): Provider Remote Setting- Home Office    Consent:  The patient/guardian has verbally consented to: the potential risks and benefits of telemedicine (video visit) versus in person care; bill my insurance or make self-payment for services provided; and responsibility for payment of non-covered services.     Patient would like the video invitation sent by:  My Chart    Mode of Communication:  Video Conference via Medical Zoom    As the provider I attest to compliance with applicable laws and regulations related to telemedicine.          [facility] Adult Mental Health Day Treatment  TRACK: 1B    NUMBER OF PARTICIPANTS: 6    Summary of Group / Topics Discussed:  Mental Health Maintenance:  Anatomy of Trust: Patients discussed trust and watched Marichuy Roman's \"Anatomy of Trust\" video. Discussed the BRAVING acronym and strengthening self trust.    Patient Session Goals / Objectives:  ? Patients will have a deeper understanding of trust  ? Patients will have tools to have conversations with others about trust.  ? Patients will think of 1+ ways to strengthen self trust.          Patient Participation / Response:  Fully participated with the group by sharing personal reflections / insights and openly received / provided feedback with other participants.    Demonstrated understanding of topics discussed through group " discussion and participation and Identified / Expressed personal readiness to practice skills    Treatment Plan:  Patient has a current master individualized treatment plan.  See Epic treatment plan for more information.    Samantha Gonzales RN

## 2022-01-03 NOTE — GROUP NOTE
Psychoeducation Group Note    PATIENT'S NAME: Brandin Rodriguez  MRN:   [id number]  :   1985  [id number]  DATE OF SERVICE: 22  START TIME:  3:00 PM  END TIME:  3:50 PM  FACILITATOR: Pablito Duron OTR/L  TOPIC: MH Life Skills Group: Resiliency Development                                    Service Modality:  Video Visit     Telemedicine Visit: The patient's condition can be safely assessed and treated via synchronous audio and visual telemedicine encounter.      Reason for Telemedicine Visit: Services only offered telehealth    Originating Site (Patient Location): Patient's home    Distant Site (Provider Location): Provider Remote Setting- Home Office    Consent:  The patient/guardian has verbally consented to: the potential risks and benefits of telemedicine (video visit) versus in person care; bill my insurance or make self-payment for services provided; and responsibility for payment of non-covered services.     Mode of Communication:  Video Conference via Medical Zoom    As the provider I attest to compliance with applicable laws and regulations related to telemedicine.       [facility] Adult Mental Health Day Treatment  TRACK: 1B    NUMBER OF PARTICIPANTS: 6    Summary of Group / Topics Discussed:  Resiliency Development:  Coping Skills(Procrastination): Patients were taught how to identify stressors, signs of stress, coping skills, and prevention strategies for overall stress management.  Patients were given the opportunity to identify both ongoing and acute mental health symptoms and how to effectively manage these symptoms by developing an effective aftercare plan.  Patients increased awareness of community based resources.    Patient Session Goals / Objectives:    Identified how using coping skills can be used for symptom and stress management       Improved awareness of individualed symptoms and stressors and how to effectively cope     Established a relapse prevention  plan to practice these skills in their own environments    Practiced and reflected on how to generalize taught skills to their everyday life        Patient Participation / Response:  Fully participated with the group by sharing personal reflections / insights and openly received / provided feedback with other participants.    Demonstrated understanding of content through handouts/group discussion , Verbalized understanding of content and Patient would benefit from additional opportunities to practice the content to be able to generalize it to their everyday life with increased intentionality, consistency, and efficacy in support of their psychiatric recovery    Treatment Plan:  Patient has a current master individualized treatment plan.  See Epic treatment plan for more information.    Pablito Duron, OTR/L

## 2022-01-04 ENCOUNTER — PATIENT OUTREACH (OUTPATIENT)
Dept: CARE COORDINATION | Facility: CLINIC | Age: 37
End: 2022-01-04
Payer: COMMERCIAL

## 2022-01-04 RX ORDER — PROPRANOLOL HYDROCHLORIDE 20 MG/1
TABLET ORAL
Qty: 60 TABLET | Refills: 3 | Status: SHIPPED | OUTPATIENT
Start: 2022-01-04

## 2022-01-04 NOTE — PROGRESS NOTES
Clinic Care Coordination Contact  [facility]/Voicemail       Clinical Data: Care Coordinator Outreach    Outreach attempted x 1.  Left message on patient's voicemail with call back information and requested return call.    Plan: Care Coordinator will try to reach patient again in 3-5 business days.    Abimbola Cross [facility]  Clinic Care Coordinator  [facility] Cathie Ogemaw  [facility]  [phone]  [email or web address]

## 2022-01-05 ENCOUNTER — HOSPITAL ENCOUNTER (OUTPATIENT)
Dept: BEHAVIORAL HEALTH | Facility: CLINIC | Age: 37
End: 2022-01-05
Attending: PSYCHIATRY & NEUROLOGY
Payer: COMMERCIAL

## 2022-01-05 PROCEDURE — 90853 GROUP PSYCHOTHERAPY: CPT | Mod: GT | Performed by: COUNSELOR

## 2022-01-05 PROCEDURE — G0177 OPPS/PHP; TRAIN & EDUC SERV: HCPCS | Mod: GT

## 2022-01-05 NOTE — GROUP NOTE
Process Group Note    PATIENT'S NAME: Brandin Rodriguez  MRN:   [id number]  :   1985  ACCT. NUMBER: [id number]  DATE OF SERVICE: 22  START TIME:  1:00 PM  END TIME:  1:50 PM  FACILITATOR: Lyndsey Pratt [facility]  TOPIC:  Process Group    Diagnoses:  296.30 (F33.9) Major Depressive Disorder, Recurrent Episode, Unspecified _ and With mixed features  300.02 (F41.1) Generalized Anxiety Disorder  309.81 (F43.10) Posttraumatic Stress Disorder (includes Posttraumatic Stress Disorder for Children 6 Years and Younger)  Without dissociative symptoms.  4. Other Diagnoses that is relevant to services:   Substance-Related & Addictive Disorders 291.9 (F10.99) Unspecified Alcohol Related Disorder.  5. Provisional Diagnosis:  NA.      [facility] Mental [facility] Day Treatment  TRACK: 1B    NUMBER OF PARTICIPANTS: 5                                      Service Modality:  Video Visit     Telemedicine Visit: The patient's condition can be safely assessed and treated via synchronous audio and visual telemedicine encounter.      Reason for Telemedicine Visit: Services only offered telehealth    Originating Site (Patient Location): Patient's home    Distant Site (Provider Location): Provider Remote Setting- Home Office    Consent:  The patient/guardian has verbally consented to: the potential risks and benefits of telemedicine (video visit) versus in person care; bill my insurance or make self-payment for services provided; and responsibility for payment of non-covered services.     Patient would like the video invitation sent by:  My Chart    Mode of Communication:  Video Conference via Medical Zoom    As the provider I attest to compliance with applicable laws and regulations related to telemedicine.                Data:    Session content: At the start of this group, patients were invited to check in by identifying themselves, describing their current emotional status, and identifying issues to address in this  "group.   Area(s) of treatment focus addressed in this session included Symptom Management and Personal Safety.    Иван reported feeling \"okay\" today.  His goal for the day is to keep his momentum going today because he has been productive for the past few days and it's felt nice. He reported he got a text that there was a water leak at his apartment and he \"Freaked out\" but his girlfriend was able to help him stay grounded.  He was able to get to his apartment and it turned out everything was okay and he felt better being there since his girlfriend was with him.     Therapeutic Interventions/Treatment Strategies:  Psychotherapist offered support, feedback and validation and reinforced use of skills.    Assessment:    Patient response:   Patient responded to session by accepting feedback, giving feedback and listening    Possible barriers to participation / learning include: and no barriers identified    Health Issues:   None reported       Substance Use Review:   Substance Use: Substance use has decreased    Mental Status/Behavioral Observations  Appearance:   Appropriate   Eye Contact:   Good   Psychomotor Behavior: Normal   Attitude:   Cooperative   Orientation:   All  Speech   Rate / Production: Normal    Volume:  Normal   Mood:    Anxious  Depressed   Affect:    Appropriate   Thought Content:   Safety reports  presence of suicidal ideation passive suicidal ideation   Thought Form:  Coherent  Logical     Insight:    Good     Plan:     Safety Plan: Committed to safety and agreed to follow previously developed safety coping plan.      Barriers to treatment: None identified    Patient Contracts (see media tab):  None    Substance Use: Not addressed in session     Continue or Discharge: Patient will continue in Adult Dual Disorder Program (DDP) as planned. Patient is likely to benefit from learning and using skills as they work toward the goals identified in their treatment plan.      Lyndsey Pratt, [facility]  January " 5, 2022

## 2022-01-05 NOTE — GROUP NOTE
"Psychoeducation Group Note    PATIENT'S NAME: Brandin Rodriguez  MRN:   [id number]  :   1985  ACCT. NUMBER: [id number]  DATE OF SERVICE: 22  START TIME:  3:00 PM  END TIME:  3:50 PM  FACILITATOR: Samantha Gonzales RN  TOPIC: MH RN Group: Mental Health Maintenance                                    Service Modality:  Video Visit     Telemedicine Visit: The patient's condition can be safely assessed and treated via synchronous audio and visual telemedicine encounter.      Reason for Telemedicine Visit:  covid19    Originating Site (Patient Location): Patient's home    Distant Site (Provider Location): Provider Remote Setting- Home Office    Consent:  The patient/guardian has verbally consented to: the potential risks and benefits of telemedicine (video visit) versus in person care; bill my insurance or make self-payment for services provided; and responsibility for payment of non-covered services.     Patient would like the video invitation sent by:  My Chart    Mode of Communication:  Video Conference via Medical Zoom    As the provider I attest to compliance with applicable laws and regulations related to telemedicine.          [facility] Adult Mental Health Day Treatment  TRACK: 1B    NUMBER OF PARTICIPANTS: 8    Summary of Group / Topics Discussed:  Mental Health Maintenance:   Anatomy of Trust: Patients discussed trust and watched Marichuy Roman's \"Anatomy of Trust\" video. Discussed the BRAVING acronym and strengthening self trust. Continued discussion    Patient Session Goals / Objectives:  ? Patients will have a deeper understanding of trust  ? Patients will have tools to have conversations with others about trust.  ? Patients will think of 1+ ways to strengthen self trust.          Patient Participation / Response:  Fully participated with the group by sharing personal reflections / insights and openly received / provided feedback with other participants.    Demonstrated understanding of topics " discussed through group discussion and participation and Identified / Expressed personal readiness to practice skills    Treatment Plan:  Patient has a current master individualized treatment plan.  See Epic treatment plan for more information.    Samantha Gonzales RN

## 2022-01-05 NOTE — GROUP NOTE
Psychoeducation Group Note    PATIENT'S NAME: Brandin Rodriguez  MRN:   [id number]  :   1985  [id number]  DATE OF SERVICE: 22  START TIME:  2:00 PM  END TIME:  2:50 PM  FACILITATOR: Pablito Duron OTR/L  TOPIC: MH Life Skills Group: Resiliency Development                                    Service Modality:  Video Visit     Telemedicine Visit: The patient's condition can be safely assessed and treated via synchronous audio and visual telemedicine encounter.      Reason for Telemedicine Visit: Services only offered telehealth    Originating Site (Patient Location): Patient's home    Distant Site (Provider Location): Provider Remote Setting- Home Office    Consent:  The patient/guardian has verbally consented to: the potential risks and benefits of telemedicine (video visit) versus in person care; bill my insurance or make self-payment for services provided; and responsibility for payment of non-covered services.     Mode of Communication:  Video Conference via Medical Zoom    As the provider I attest to compliance with applicable laws and regulations related to telemedicine.       [facility] Adult Mental Health Day Treatment  TRACK: 1B    NUMBER OF PARTICIPANTS: 8    Summary of Group / Topics Discussed:  Resiliency Development:  Coping Skills(Wellness Recovery Action Plan): Patients were taught how to identify stressors, signs of stress, coping skills, and prevention strategies for overall stress management.  Patients were given the opportunity to identify both ongoing and acute mental health symptoms and how to effectively manage these symptoms by developing an effective aftercare plan.  Patients increased awareness of community based resources.    Patient Session Goals / Objectives:    Identified how using coping skills can be used for symptom and stress management       Improved awareness of individualed symptoms and stressors and how to effectively cope     Established a  relapse prevention plan to practice these skills in their own environments    Practiced and reflected on how to generalize taught skills to their everyday life        Patient Participation / Response:  Fully participated with the group by sharing personal reflections / insights and openly received / provided feedback with other participants.    Demonstrated understanding of content through handouts/group discussion , Verbalized understanding of content and Patient would benefit from additional opportunities to practice the content to be able to generalize it to their everyday life with increased intentionality, consistency, and efficacy in support of their psychiatric recovery    Treatment Plan:  Patient has a current master individualized treatment plan.  See Epic treatment plan for more information.    Pablito Duron, OTR/L

## 2022-01-06 ENCOUNTER — HOSPITAL ENCOUNTER (OUTPATIENT)
Dept: BEHAVIORAL HEALTH | Facility: CLINIC | Age: 37
End: 2022-01-06
Attending: PSYCHIATRY & NEUROLOGY
Payer: COMMERCIAL

## 2022-01-06 PROCEDURE — 90853 GROUP PSYCHOTHERAPY: CPT | Mod: GT | Performed by: COUNSELOR

## 2022-01-06 PROCEDURE — G0177 OPPS/PHP; TRAIN & EDUC SERV: HCPCS | Mod: GT

## 2022-01-06 NOTE — GROUP NOTE
Psychoeducation Group Note    PATIENT'S NAME: Brandin Rodriguez  MRN:   [id number]  :   1985  [id number]  DATE OF SERVICE: 22  START TIME:  2:00 PM  END TIME:  2:50 PM  FACILITATOR: Pablito Duron OTR/L  TOPIC: MH Life Skills Group: Resiliency Development                                    Service Modality:  Video Visit     Telemedicine Visit: The patient's condition can be safely assessed and treated via synchronous audio and visual telemedicine encounter.      Reason for Telemedicine Visit: Services only offered telehealth    Originating Site (Patient Location): Patient's home    Distant Site (Provider Location): Provider Remote Setting- Home Office    Consent:  The patient/guardian has verbally consented to: the potential risks and benefits of telemedicine (video visit) versus in person care; bill my insurance or make self-payment for services provided; and responsibility for payment of non-covered services.     Mode of Communication:  Video Conference via Medical Zoom    As the provider I attest to compliance with applicable laws and regulations related to telemedicine.       [facility] Adult Mental Health Day Treatment  TRACK: 1B    NUMBER OF PARTICIPANTS: 7    Summary of Group / Topics Discussed:  Resiliency Development:  Coping Skills(Weekly Mental Health Check-In with a focus on self-care and stress management): Patients were taught how to identify stressors, signs of stress, coping skills, and prevention strategies for overall stress management.  Patients were given the opportunity to identify both ongoing and acute mental health symptoms and how to effectively manage these symptoms by developing an effective aftercare plan.  Patients increased awareness of community based resources.    Patient Session Goals / Objectives:    Identified how using coping skills can be used for symptom and stress management       Improved awareness of individualed symptoms and stressors and  how to effectively cope     Established a relapse prevention plan to practice these skills in their own environments    Practiced and reflected on how to generalize taught skills to their everyday life        Patient Participation / Response:  Fully participated with the group by sharing personal reflections / insights and openly received / provided feedback with other participants.    Demonstrated understanding of content through handouts/group discussion , Verbalized understanding of content and Patient would benefit from additional opportunities to practice the content to be able to generalize it to their everyday life with increased intentionality, consistency, and efficacy in support of their psychiatric recovery    Treatment Plan:  Patient has a current master individualized treatment plan.  See Epic treatment plan for more information.    Pablito Duron, OTR/L

## 2022-01-06 NOTE — GROUP NOTE
Psychotherapy Group Note    PATIENT'S NAME: Brandin Rodriguez  MRN:   [id number]  :   1985  ACCT. NUMBER: [id number]  DATE OF SERVICE: 22  START TIME:  3:00 PM  END TIME:  3:50 PM  FACILITATOR: Lyndsey Pratt LPCC  TOPIC:  EBP Group: Emotions Management  [facility] Mental Health Day Treatment  TRACK: 1B    NUMBER OF PARTICIPANTS: 7                                      Service Modality:  Video Visit     Telemedicine Visit: The patient's condition can be safely assessed and treated via synchronous audio and visual telemedicine encounter.      Reason for Telemedicine Visit: Services only offered telehealth    Originating Site (Patient Location): Patient's home    Distant Site (Provider Location): Provider Remote Setting- Home Office    Consent:  The patient/guardian has verbally consented to: the potential risks and benefits of telemedicine (video visit) versus in person care; bill my insurance or make self-payment for services provided; and responsibility for payment of non-covered services.     Patient would like the video invitation sent by:  My Chart    Mode of Communication:  Video Conference via Medical Zoom    As the provider I attest to compliance with applicable laws and regulations related to telemedicine.          Summary of Group / Topics Discussed:  Emotions Management: Guilt and Shame: Patients explored and shared personal experiences associated with feelings of guilt and shame.  Group explored how these feelings develop, what they mean to each individual, and how to increase acceptance and usefulness of these feelings.  Group members assisted one another to contextualize these concepts and promote healing.     Patient Session Goals / Objectives:    Discuss and review definitions and personal views/experiences with guilt and shame    Understand the differences between guilt and shame    Explore how feelings of guilt and shame impact functioning    Understand and practice  strategies to manage difficult emotions and move towards healing    Understand and normalize difficult emotions through group discussion    Understand the utility of guilt and shame    Target  unwanted  emotions for change      Patient Participation / Response:  Fully participated with the group by sharing personal reflections / insights and openly received / provided feedback with other participants.    Demonstrated understanding of topics discussed through group discussion and participation, Expressed understanding of the relevance / importance of emotions management skills at distressing times in life and Self-aware of experiences with difficult emotions, and strategies to employ to manage them    Treatment Plan:  Patient has a current master individualized treatment plan.  See Epic treatment plan for more information.    Lyndsey Pratt, [facility]

## 2022-01-10 ENCOUNTER — HOSPITAL ENCOUNTER (OUTPATIENT)
Dept: BEHAVIORAL HEALTH | Facility: CLINIC | Age: 37
End: 2022-01-10
Attending: PSYCHIATRY & NEUROLOGY
Payer: COMMERCIAL

## 2022-01-10 PROCEDURE — 90853 GROUP PSYCHOTHERAPY: CPT | Mod: GT | Performed by: COUNSELOR

## 2022-01-10 PROCEDURE — G0177 OPPS/PHP; TRAIN & EDUC SERV: HCPCS | Mod: GT

## 2022-01-10 NOTE — GROUP NOTE
Psychoeducation Group Note    PATIENT'S NAME: Brandin Rodriguez  MRN:   [id number]  :   1985  ACCT. NUMBER: [id number]  DATE OF SERVICE: 1/10/22  START TIME:  3:00 PM  END TIME:  3:50 PM  FACILITATOR: Samantha Gonzales RN  TOPIC: DANK RN Group: [facility]                                    Service Modality:  Video Visit     Telemedicine Visit: The patient's condition can be safely assessed and treated via synchronous audio and visual telemedicine encounter.      Reason for Telemedicine Visit:  covid19    Originating Site (Patient Location): Patient's home    Distant Site (Provider Location): Provider Remote Setting- Home Office    Consent:  The patient/guardian has verbally consented to: the potential risks and benefits of telemedicine (video visit) versus in person care; bill my insurance or make self-payment for services provided; and responsibility for payment of non-covered services.     Patient would like the video invitation sent by:  My Chart    Mode of Communication:  Video Conference via Medical Zoom    As the provider I attest to compliance with applicable laws and regulations related to telemedicine.          [facility] Mental Health Day Treatment  TRACK: 1B    NUMBER OF PARTICIPANTS: 7    Summary of Group / Topics Discussed:  Foundations of Health: Nutrition: Macronutrients: Patient were provided education on major macronutrients, their role in the body, and why it is important to meet daily nutritional needs. Obstacles to meeting daily nutritional needs were identified in group discussion and strategies to promote improved nutrition were explored. Macronutrients discussed include: carbohydrates, proteins and amino acids, fats, fiber, and water.     Patient Session Goals / Objectives:  ? Discussed the role of diet on mood, physical health, energy level, and the development of chronic disease.  ? Identified daily nutritional needs recommended by the [employer] via My Plate education  resources  ? Developing increased health literacy skills in finding credible nutrition information from reliable sources      Patient Participation / Response:  Fully participated with the group by sharing personal reflections / insights and openly received / provided feedback with other participants.    Demonstrated understanding of topics discussed through group discussion and participation and Identified / Expressed personal readiness to practice skills    Treatment Plan:  Patient has a current master individualized treatment plan.  See Epic treatment plan for more information.    Samantha Gonzales RN

## 2022-01-10 NOTE — GROUP NOTE
Psychoeducation Group Note    PATIENT'S NAME: Brandin Rodriguez  MRN:   [id number]  :   1985  [id number]  DATE OF SERVICE: 1/10/22  START TIME:  2:00 PM  END TIME:  2:50 PM  FACILITATOR: Pablito Duron OTR/L  TOPIC: MH Life Skills Group: Resiliency Development                                    Service Modality:  Video Visit     Telemedicine Visit: The patient's condition can be safely assessed and treated via synchronous audio and visual telemedicine encounter.      Reason for Telemedicine Visit: Services only offered telehealth    Originating Site (Patient Location): Patient's home    Distant Site (Provider Location): Provider Remote Setting- Home Office    Consent:  The patient/guardian has verbally consented to: the potential risks and benefits of telemedicine (video visit) versus in person care; bill my insurance or make self-payment for services provided; and responsibility for payment of non-covered services.    Mode of Communication:  Video Conference via Medical Zoom    As the provider I attest to compliance with applicable laws and regulations related to telemedicine.       [facility] Adult Mental Health Day Treatment  TRACK: 1B    NUMBER OF PARTICIPANTS: 5    Summary of Group / Topics Discussed:  Resiliency Development:  Characteristics of Resiliency to Manage Stress: Patients were taught how to identify stressors, signs of stress, coping skills, and prevention strategies for overall stress management.  Patients were given the opportunity to identify both ongoing and acute mental health symptoms and how to effectively manage these symptoms by developing an effective aftercare plan.  Patients increased awareness of community based resources.    Patient Session Goals / Objectives:    Identified how using coping skills can be used for symptom and stress management       Improved awareness of individualed symptoms and stressors and how to effectively cope     Established a  relapse prevention plan to practice these skills in their own environments    Practiced and reflected on how to generalize taught skills to their everyday life        Patient Participation / Response:  Fully participated with the group by sharing personal reflections / insights and openly received / provided feedback with other participants.    Demonstrated understanding of content through handouts/discussion , Verbalized understanding of content and Patient would benefit from additional opportunities to practice the content to be able to generalize it to their everyday life with increased intentionality, consistency, and efficacy in support of their psychiatric recovery    Treatment Plan:  Patient has a current master individualized treatment plan.  See Epic treatment plan for more information.    Pablito Duron, OTR/L

## 2022-01-10 NOTE — GROUP NOTE
Process Group Note    PATIENT'S NAME: Brandin Rodriguez  MRN:   [id number]  :   1985  ACCT. NUMBER: [id number]  DATE OF SERVICE: 1/10/22  START TIME:  1:00 PM  END TIME:  1:50 PM  FACILITATOR: Lyndsey Pratt [facility]  TOPIC:  Process Group    Diagnoses:  296.30 (F33.9) Major Depressive Disorder, Recurrent Episode, Unspecified _ and With mixed features  300.02 (F41.1) Generalized Anxiety Disorder  309.81 (F43.10) Posttraumatic Stress Disorder (includes Posttraumatic Stress Disorder for Children 6 Years and Younger)  Without dissociative symptoms.  4. Other Diagnoses that is relevant to services:   Substance-Related & Addictive Disorders 291.9 (F10.99) Unspecified Alcohol Related Disorder.  5. Provisional Diagnosis:  NA.      [facility] Mental [facility] Day Treatment  TRACK: 1B    NUMBER OF PARTICIPANTS: 6                                      Service Modality:  Video Visit     Telemedicine Visit: The patient's condition can be safely assessed and treated via synchronous audio and visual telemedicine encounter.      Reason for Telemedicine Visit: Services only offered telehealth    Originating Site (Patient Location): Patient's home    Distant Site (Provider Location): Provider Remote Setting- Home Office    Consent:  The patient/guardian has verbally consented to: the potential risks and benefits of telemedicine (video visit) versus in person care; bill my insurance or make self-payment for services provided; and responsibility for payment of non-covered services.     Patient would like the video invitation sent by:  My Chart    Mode of Communication:  Video Conference via Medical Zoom    As the provider I attest to compliance with applicable laws and regulations related to telemedicine.                Data:    Session content: At the start of this group, patients were invited to check in by identifying themselves, describing their current emotional status, and identifying issues to address in this  "group.   Area(s) of treatment focus addressed in this session included Symptom Management, Personal Safety and Abstinence/Relapse Prevention.    Иван reported feeling \"okay\" today.  His goal for the day is to get to the auto place today.  Patient declined additional process time but contributed to group discussion and provided feedback and support to peers.      Therapeutic Interventions/Treatment Strategies:  Psychotherapist offered support, feedback and validation and reinforced use of skills.    Assessment:    Patient response:   Patient responded to session by accepting feedback, giving feedback and listening    Possible barriers to participation / learning include: and no barriers identified    Health Issues:   None reported       Substance Use Review:   Substance Use: cannabis .     Mental Status/Behavioral Observations  Appearance:   Appropriate   Eye Contact:   Good   Psychomotor Behavior: Normal   Attitude:   Cooperative   Orientation:   All  Speech   Rate / Production: Normal    Volume:  Normal   Mood:    Depressed   Affect:    Appropriate   Thought Content:   Clear  Thought Form:  Coherent  Logical     Insight:    Good     Plan:     Safety Plan: No current safety concerns identified.  Recommended that patient call 911 or go to the local ED should there be a change in any of these risk factors.     Barriers to treatment: None identified    Patient Contracts (see media tab):  None    Substance Use: Not addressed in session     Continue or Discharge: Patient will continue in Adult Day Treatment (ADT)  as planned. Patient is likely to benefit from learning and using skills as they work toward the goals identified in their treatment plan.      Lyndsey Pratt, [facility]  January 10, 2022    "

## 2022-01-12 ENCOUNTER — HOSPITAL ENCOUNTER (OUTPATIENT)
Dept: BEHAVIORAL HEALTH | Facility: CLINIC | Age: 37
End: 2022-01-12
Attending: PSYCHIATRY & NEUROLOGY
Payer: COMMERCIAL

## 2022-01-12 PROCEDURE — G0177 OPPS/PHP; TRAIN & EDUC SERV: HCPCS | Mod: GT

## 2022-01-12 PROCEDURE — 90853 GROUP PSYCHOTHERAPY: CPT | Mod: GT | Performed by: COUNSELOR

## 2022-01-12 NOTE — GROUP NOTE
Process Group Note    PATIENT'S NAME: Brandin Rodriguez  MRN:   [id number]  :   1985  ACCT. NUMBER: [id number]  DATE OF SERVICE: 22  START TIME:  1:00 PM  END TIME:  1:50 PM  FACILITATOR: Lyndsey Pratt [facility]  TOPIC:  Process Group    Diagnoses:  296.30 (F33.9) Major Depressive Disorder, Recurrent Episode, Unspecified _ and With mixed features  300.02 (F41.1) Generalized Anxiety Disorder  309.81 (F43.10) Posttraumatic Stress Disorder (includes Posttraumatic Stress Disorder for Children 6 Years and Younger)  Without dissociative symptoms.  4. Other Diagnoses that is relevant to services:   Substance-Related & Addictive Disorders 291.9 (F10.99) Unspecified Alcohol Related Disorder.  5. Provisional Diagnosis:  .      [facility] Adult Dual Diagnosis Day Treatment  TRACK: 1B    NUMBER OF PARTICIPANTS: 6                                      Service Modality:  Video Visit     Telemedicine Visit: The patient's condition can be safely assessed and treated via synchronous audio and visual telemedicine encounter.      Reason for Telemedicine Visit: Services only offered telehealth    Originating Site (Patient Location): Patient's home    Distant Site (Provider Location): Provider Remote Setting- Home Office    Consent:  The patient/guardian has verbally consented to: the potential risks and benefits of telemedicine (video visit) versus in person care; bill my insurance or make self-payment for services provided; and responsibility for payment of non-covered services.     Patient would like the video invitation sent by:  My Chart    Mode of Communication:  Video Conference via Medical Zoom    As the provider I attest to compliance with applicable laws and regulations related to telemedicine.                Data:    Session content: At the start of this group, patients were invited to check in by identifying themselves, describing their current emotional status, and identifying issues to address in this  "group.   Area(s) of treatment focus addressed in this session included Symptom Management and Personal Safety.    Иван reported feeling \"okay but tired\" today.  He reported he has been having trouble sleeping lately.  His goal for the day is to \"try to get more stuff done\" today.  Patient declined additional process time but contributed to group discussion and provided feedback and support to peers.      Therapeutic Interventions/Treatment Strategies:  Psychotherapist offered support, feedback and validation and reinforced use of skills.    Assessment:    Patient response:   Patient responded to session by accepting feedback, giving feedback and listening    Possible barriers to participation / learning include: and no barriers identified    Health Issues:   None reported       Substance Use Review:   Substance Use: cannabis .     Mental Status/Behavioral Observations  Appearance:   Appropriate   Eye Contact:   Good   Psychomotor Behavior: Normal   Attitude:   Cooperative   Orientation:   All  Speech   Rate / Production: Normal    Volume:  Normal   Mood:    Anxious  Depressed   Affect:    Appropriate   Thought Content:   Clear  Thought Form:  Coherent  Logical     Insight:    Good     Plan:     Safety Plan: No current safety concerns identified.  Recommended that patient call 911 or go to the local ED should there be a change in any of these risk factors.     Barriers to treatment: None identified    Patient Contracts (see media tab):  None    Substance Use: Not addressed in session     Continue or Discharge: Patient will continue in Adult Day Treatment (ADT)  as planned. Patient is likely to benefit from learning and using skills as they work toward the goals identified in their treatment plan.      Lyndsey Pratt, [facility]  January 12, 2022    "

## 2022-01-12 NOTE — GROUP NOTE
Psychoeducation Group Note    PATIENT'S NAME: Brandin Rodriguez  MRN:   [id number]  :   1985  [id number]  DATE OF SERVICE: 22  START TIME:  2:00 PM  END TIME:  2:50 PM  FACILITATOR: Pablito Duron OTR/L  TOPIC: MH Life Skills Group: Resiliency Development                                    Service Modality:  Video Visit     Telemedicine Visit: The patient's condition can be safely assessed and treated via synchronous audio and visual telemedicine encounter.      Reason for Telemedicine Visit: Services only offered telehealth    Originating Site (Patient Location): Patient's home    Distant Site (Provider Location): Provider Remote Setting- Home Office    Consent:  The patient/guardian has verbally consented to: the potential risks and benefits of telemedicine (video visit) versus in person care; bill my insurance or make self-payment for services provided; and responsibility for payment of non-covered service    Mode of Communication:  Video Conference via Medical Zoom    As the provider I attest to compliance with applicable laws and regulations related to telemedicine.       [facility] Adult Mental Health Day Treatment  TRACK: 1B    NUMBER OF PARTICIPANTS: 8    Summary of Group / Topics Discussed:  Resiliency Development:  Coping Skills(Spiritual Wellness): Patients were taught how to identify stressors, signs of stress, coping skills, and prevention strategies for overall stress management.  Patients were given the opportunity to identify both ongoing and acute mental health symptoms and how to effectively manage these symptoms by developing an effective aftercare plan.  Patients increased awareness of community based resources.    Patient Session Goals / Objectives:    Identified how using coping skills can be used for symptom and stress management       Improved awareness of individualed symptoms and stressors and how to effectively cope     Established a relapse prevention  plan to practice these skills in their own environments    Practiced and reflected on how to generalize taught skills to their everyday life         Patient Participation / Response:  Fully participated with the group by sharing personal reflections / insights and openly received / provided feedback with other participants.    Demonstrated understanding of content through video/handout/discussion , Verbalized understanding of content and Patient would benefit from additional opportunities to practice the content to be able to generalize it to their everyday life with increased intentionality, consistency, and efficacy in support of their psychiatric recovery    Treatment Plan:  Patient has a current master individualized treatment plan.  See Epic treatment plan for more information.    Pablito Duron, OTR/L

## 2022-01-12 NOTE — GROUP NOTE
Psychoeducation Group Note    PATIENT'S NAME: Brandin Rodriguez  MRN:   [id number]  :   1985  ACCT. NUMBER: [id number]  DATE OF SERVICE: 22  START TIME:  3:00 PM  END TIME:  3:50 PM  FACILITATOR: Samantha Gonzales RN  TOPIC: DANK RN Group: [facility]                                    Service Modality:  Video Visit     Telemedicine Visit: The patient's condition can be safely assessed and treated via synchronous audio and visual telemedicine encounter.      Reason for Telemedicine Visit:  covid19    Originating Site (Patient Location): Patient's home    Distant Site (Provider Location): Provider Remote Setting- Home Office    Consent:  The patient/guardian has verbally consented to: the potential risks and benefits of telemedicine (video visit) versus in person care; bill my insurance or make self-payment for services provided; and responsibility for payment of non-covered services.     Patient would like the video invitation sent by:  My Chart    Mode of Communication:  Video Conference via Medical Zoom    As the provider I attest to compliance with applicable laws and regulations related to telemedicine.          [facility] Mental Health Day Treatment  TRACK: 1B    NUMBER OF PARTICIPANTS: 8    Summary of Group / Topics Discussed:  Foundations of Health: Nutrition: Super Nutrients & Micronutrients, functional nutrition and some gut-brain connection: Super Nutrients are Foods that have high nutritional yield. Micronutrients are essential elements found in food or taken through supplements that are necessary for normal physiological functioning. This group was designed to complement the macronutrients group and build upon previous education. The changes that food makes on the brain (how the brain uses sugar) and nutrition as it relates to mental health was also discussed.       Patient Session Goals / Objectives:  ? Identified the health enhancing benefits to good  nutrition  ? Verbalized ways in which the food we eat affects the brain  ? Explained the role of micronutrients in the body, how much we need, and how to get it      Patient Participation / Response:  Fully participated with the group by sharing personal reflections / insights and openly received / provided feedback with other participants.    Demonstrated understanding of topics discussed through group discussion and participation and Identified / Expressed personal readiness to practice skills    Treatment Plan:  Patient has a current master individualized treatment plan.  See Epic treatment plan for more information.    Samantha Gonzales RN

## 2022-01-13 ENCOUNTER — HOSPITAL ENCOUNTER (OUTPATIENT)
Dept: BEHAVIORAL HEALTH | Facility: CLINIC | Age: 37
End: 2022-01-13
Attending: PSYCHIATRY & NEUROLOGY
Payer: COMMERCIAL

## 2022-01-13 DIAGNOSIS — F32.A DEPRESSION, UNSPECIFIED DEPRESSION TYPE: ICD-10-CM

## 2022-01-13 PROCEDURE — G0177 OPPS/PHP; TRAIN & EDUC SERV: HCPCS | Mod: GT

## 2022-01-13 PROCEDURE — 90853 GROUP PSYCHOTHERAPY: CPT | Mod: GT | Performed by: COUNSELOR

## 2022-01-13 NOTE — GROUP NOTE
Process Group Note    PATIENT'S NAME: Brandin Rodriguez  MRN:   [id number]  :   1985  ACCT. NUMBER: [id number]  DATE OF SERVICE: 22  START TIME:  1:00 PM  END TIME:  1:50 PM  FACILITATOR: Lyndsey Pratt [facility]  TOPIC:  Process Group    Diagnoses:  296.30 (F33.9) Major Depressive Disorder, Recurrent Episode, Unspecified _ and With mixed features  300.02 (F41.1) Generalized Anxiety Disorder  309.81 (F43.10) Posttraumatic Stress Disorder (includes Posttraumatic Stress Disorder for Children 6 Years and Younger)  Without dissociative symptoms.  4. Other Diagnoses that is relevant to services:   Substance-Related & Addictive Disorders 291.9 (F10.99) Unspecified Alcohol Related Disorder.  5. Provisional Diagnosis:  NA.      [facility] Day Treatment  TRACK: 1B    NUMBER OF PARTICIPANTS: 5                                      Service Modality:  Video Visit     Telemedicine Visit: The patient's condition can be safely assessed and treated via synchronous audio and visual telemedicine encounter.      Reason for Telemedicine Visit: Services only offered telehealth    Originating Site (Patient Location): Patient's home    Distant Site (Provider Location): Provider Remote Setting- Home Office    Consent:  The patient/guardian has verbally consented to: the potential risks and benefits of telemedicine (video visit) versus in person care; bill my insurance or make self-payment for services provided; and responsibility for payment of non-covered services.     Patient would like the video invitation sent by:  My Chart    Mode of Communication:  Video Conference via Medical Zoom    As the provider I attest to compliance with applicable laws and regulations related to telemedicine.                Data:    Session content: At the start of this group, patients were invited to check in by identifying themselves, describing their current emotional status, and identifying issues to address in this  "group.   Area(s) of treatment focus addressed in this session included Symptom Management and Personal Safety.    Иван reported feeling \"really tired\" today.  His goal is to take care of all his responsibilities today.  He has been listening to podcasts to keep himself awake and keep his mind active.  Patient declined additional process time but contributed to group discussion and provided feedback and support to peers.      Therapeutic Interventions/Treatment Strategies:  Psychotherapist offered support, feedback and validation and reinforced use of skills.    Assessment:    Patient response:   Patient responded to session by accepting feedback, giving feedback and listening    Possible barriers to participation / learning include: and no barriers identified    Health Issues:   None reported       Substance Use Review:   Substance Use: cannabis .     Mental Status/Behavioral Observations  Appearance:   Appropriate   Eye Contact:   Good   Psychomotor Behavior: Normal   Attitude:   Cooperative   Orientation:   All  Speech   Rate / Production: Normal    Volume:  Normal   Mood:    Anxious  Depressed   Affect:    Appropriate   Thought Content:   Safety reports  presence of suicidal ideation passive suicidal ideation   Thought Form:  Coherent  Logical     Insight:    Good     Plan:     Safety Plan: Committed to safety and agreed to follow previously developed safety coping plan.      Barriers to treatment: None identified    Patient Contracts (see media tab):  None    Substance Use: Not addressed in session     Continue or Discharge: Patient will continue in Adult Day Treatment (ADT)  as planned. Patient is likely to benefit from learning and using skills as they work toward the goals identified in their treatment plan.      Lyndsey Pratt, [facility]  January 13, 2022    "

## 2022-01-13 NOTE — GROUP NOTE
Psychotherapy Group Note    PATIENT'S NAME: Brandin Rodriguez  MRN:   [id number]  :   1985  ACCT. NUMBER: [id number]  DATE OF SERVICE: 22  START TIME:  3:00 PM  END TIME:  3:50 PM  FACILITATOR: Lyndsey Pratt LPCC  TOPIC:  EBP Group: Coping Skills  [facility] Adult Mental Health Day Treatment  TRACK: 1B    NUMBER OF PARTICIPANTS: 6                                      Service Modality:  Video Visit     Telemedicine Visit: The patient's condition can be safely assessed and treated via synchronous audio and visual telemedicine encounter.      Reason for Telemedicine Visit: Services only offered telehealth    Originating Site (Patient Location): Patient's home    Distant Site (Provider Location): Provider Remote Setting- Home Office    Consent:  The patient/guardian has verbally consented to: the potential risks and benefits of telemedicine (video visit) versus in person care; bill my insurance or make self-payment for services provided; and responsibility for payment of non-covered services.     Patient would like the video invitation sent by:  My Chart    Mode of Communication:  Video Conference via Medical Zoom    As the provider I attest to compliance with applicable laws and regulations related to telemedicine.          Summary of Group / Topics Discussed:  Coping Skills: Radical Acceptance: Patients learned radical acceptance as a way to tolerate heightened stress in the moment.  Patients identified situations that necessitate radical acceptance.  They focused on applying acceptance of the moment to tolerate difficult emotions and events. Patients discussed how to distinguish when this can be useful in their lives and when other skills are more relevant or helpful.    Patient Session Goals / Objectives:    Understand that some amount of pain exists for each of us in our lives    Process the difficulty of acceptance in our lives and benefits of radical acceptance to mood and  functioning.    Demonstrate understanding of when to use the radical acceptance to tolerate distress by providing examples of situations where this could be applied.    Identify barriers to applying radical acceptance to difficult situations and explore strategies to overcome them        Patient Participation / Response:  Fully participated with the group by sharing personal reflections / insights and openly received / provided feedback with other participants.    Demonstrated understanding of topics discussed through group discussion and participation, Expressed understanding of the relevance / importance of coping skills at distressing times in life and Demonstrated knowledge of when to consider using a variety of coping skills in daily life    Treatment Plan:  Patient has a current master individualized treatment plan.  See Epic treatment plan for more information.    Lyndsey Pratt, [facility]

## 2022-01-13 NOTE — GROUP NOTE
Psychoeducation Group Note    PATIENT'S NAME: Brandin Rodriguez  MRN:   [id number]  :   1985  [id number]  DATE OF SERVICE: 22  START TIME:  2:00 PM  END TIME:  2:50 PM  FACILITATOR: Pablito Duron OTR/L  TOPIC: MH Life Skills Group: Resiliency Development                                    Service Modality:  Video Visit     Telemedicine Visit: The patient's condition can be safely assessed and treated via synchronous audio and visual telemedicine encounter.      Reason for Telemedicine Visit: Services only offered telehealth    Originating Site (Patient Location): Patient's home    Distant Site (Provider Location): Provider Remote Setting- Home Office    Consent:  The patient/guardian has verbally consented to: the potential risks and benefits of telemedicine (video visit) versus in person care; bill my insurance or make self-payment for services provided; and responsibility for payment of non-covered services.     Mode of Communication:  Video Conference via Medical Zoom    As the provider I attest to compliance with applicable laws and regulations related to telemedicine.       [facility] Adult Mental Health Day Treatment  TRACK: 1B    NUMBER OF PARTICIPANTS: 6    Summary of Group / Topics Discussed:  Resiliency Development:  Coping Skills: Personal Recovery Outcome Measure: Patients were taught how to identify coping strategies and routines that they can adopt and use for management with focus on a balance between physical, emotional, social and spiritual strategies.    Patient Session Goals / Objectives:    Identified personal definitions of recovery for effectively managing both mental health and substance abuse/abuse symptoms     Improved awareness of the process of recovery and skills and strategies that support this       Established a plan for practice of these skills in their own environments    Practiced and reflected on how to generalize taught skills to their  everyday life      Patient Participation / Response:  Fully participated with the group by sharing personal reflections / insights and openly received / provided feedback with other participants.    Demonstrated understanding of content through handouts/discussion , Verbalized understanding of content and Patient would benefit from additional opportunities to practice the content to be able to generalize it to their everyday life with increased intentionality, consistency, and efficacy in support of their psychiatric recovery    Treatment Plan:  Patient has a current master individualized treatment plan.  See Epic treatment plan for more information.    Pablito Duron, OTR/L

## 2022-01-14 RX ORDER — BUPROPION HYDROCHLORIDE 300 MG/1
TABLET ORAL
Qty: 30 TABLET | Refills: 0 | Status: SHIPPED | OUTPATIENT
Start: 2022-01-14 | End: 2022-02-15

## 2022-01-14 NOTE — TELEPHONE ENCOUNTER
"Requested Prescriptions   Pending Prescriptions Disp Refills    buPROPion (WELLBUTRIN XL) 300 MG 24 hr tablet [Pharmacy Med Name: BUPROPION XL 300MG TABLETS] 30 tablet 0     Sig: TAKE 1 TABLET(300 MG) BY MOUTH EVERY MORNING        SSRIs Protocol Failed - 1/13/2022  3:53 AM        Failed - PHQ-9 score less than 5 in past 6 months     Please review last PHQ-9 score.           Passed - Medication is Bupropion     If the medication is Bupropion (Wellbutrin), and the patient is taking for smoking cessation; OK to refill.            Passed - Medication is active on med list        Passed - Patient is age 18 or older        Passed - Recent (6 mo) or future (30 days) visit within the authorizing provider's specialty     Patient had office visit in the last 6 months or has a visit in the next 30 days with authorizing provider or within the authorizing provider's specialty.  See \"Patient Info\" tab in inbasket, or \"Choose Columns\" in Meds & Orders section of the refill encounter.                  "

## 2022-01-17 ENCOUNTER — HOSPITAL ENCOUNTER (OUTPATIENT)
Dept: BEHAVIORAL HEALTH | Facility: CLINIC | Age: 37
End: 2022-01-17
Attending: PSYCHIATRY & NEUROLOGY
Payer: COMMERCIAL

## 2022-01-17 PROCEDURE — G0177 OPPS/PHP; TRAIN & EDUC SERV: HCPCS | Mod: GT

## 2022-01-17 PROCEDURE — 90853 GROUP PSYCHOTHERAPY: CPT | Mod: GT | Performed by: COUNSELOR

## 2022-01-17 NOTE — GROUP NOTE
Psychoeducation Group Note    PATIENT'S NAME: Brandin Rodriguez  MRN:   [id number]  :   1985  ACCT. NUMBER: [id number]  DATE OF SERVICE: 22  START TIME:  3:00 PM  END TIME:  3:50 PM  FACILITATOR: Samantha Gonzales RN  TOPIC: DANK RN Group: [facility]                                    Service Modality:  Video Visit     Telemedicine Visit: The patient's condition can be safely assessed and treated via synchronous audio and visual telemedicine encounter.      Reason for Telemedicine Visit:  covid19    Originating Site (Patient Location): Patient's home    Distant Site (Provider Location): Provider Remote Setting- Home Office    Consent:  The patient/guardian has verbally consented to: the potential risks and benefits of telemedicine (video visit) versus in person care; bill my insurance or make self-payment for services provided; and responsibility for payment of non-covered services.     Patient would like the video invitation sent by:  My Chart    Mode of Communication:  Video Conference via Medical Zoom    As the provider I attest to compliance with applicable laws and regulations related to telemedicine.          [facility] Adult Mental Health Day Treatment  TRACK: 1B + 1 6B pt merger    NUMBER OF PARTICIPANTS: 8    Summary of Group / Topics Discussed:  Foundations of Health: [facility]: Nutrition: Gut-Brain connection: This group was designed to complement the Functional nutrition group and build upon previous education. The changes that food makes on the brain (how ) and nutrition as it relates to mental health was also discussed.       Patient Session Goals / Objectives:  ? Identified the health enhancing benefits to good nutrition  ? Verbalized ways in which the food we eat affects the brain  ? Explained the role of the vagus nerve in the body, as well as gut microbiome health        Patient Participation / Response:  Fully participated with the group by sharing personal  reflections / insights and openly received / provided feedback with other participants.    Demonstrated understanding of topics discussed through group discussion and participation and Identified / Expressed personal readiness to practice skills    Treatment Plan:  Patient has a current master individualized treatment plan.  See Epic treatment plan for more information.    Samantha Gonzales RN

## 2022-01-17 NOTE — GROUP NOTE
Psychoeducation Group Note    PATIENT'S NAME: Brandin Rodriguez  MRN:   [id number]  :   1985  [id number]  DATE OF SERVICE: 22  START TIME:  2:00 PM  END TIME:  2:50 PM  FACILITATOR: Pablito Duron OTR/L  TOPIC: MH Life Skills Group: Resiliency Development                                    Service Modality:  Video Visit     Telemedicine Visit: The patient's condition can be safely assessed and treated via synchronous audio and visual telemedicine encounter.      Reason for Telemedicine Visit: Services only offered telehealth    Originating Site (Patient Location): Patient's home    Distant Site (Provider Location): Provider Remote Setting- Home Office    Consent:  The patient/guardian has verbally consented to: the potential risks and benefits of telemedicine (video visit) versus in person care; bill my insurance or make self-payment for services provided; and responsibility for payment of non-covered services.    Mode of Communication:  Video Conference via Medical Zoom    As the provider I attest to compliance with applicable laws and regulations related to telemedicine.       [facility] Adult Mental Health Day Treatment  TRACK: 1B    NUMBER OF PARTICIPANTS: 7    Summary of Group / Topics Discussed:  Resiliency Development:  Coping Skills( Self-Compassion): Patients were taught how to identify stressors, signs of stress, coping skills, and prevention strategies for overall stress management.  Patients were given the opportunity to identify both ongoing and acute mental health symptoms and how to effectively manage these symptoms by developing an effective aftercare plan.  Patients increased awareness of community based resources.    Patient Session Goals / Objectives:    Identified how using coping skills can be used for symptom and stress management       Improved awareness of individualed symptoms and stressors and how to effectively cope     Established a relapse prevention  plan to practice these skills in their own environments    Practiced and reflected on how to generalize taught skills to their everyday life    Practice Self-Compassion        Patient Participation / Response:  Fully participated with the group by sharing personal reflections / insights and openly received / provided feedback with other participants.    Demonstrated understanding of content through video/handouts/discussion , Verbalized understanding of content and Patient would benefit from additional opportunities to practice the content to be able to generalize it to their everyday life with increased intentionality, consistency, and efficacy in support of their psychiatric recovery    Treatment Plan:  Patient has a current master individualized treatment plan.  See Epic treatment plan for more information.    Pablito Duron, OTR/L

## 2022-01-17 NOTE — GROUP NOTE
Process Group Note    PATIENT'S NAME: Brandin Rodriguez  MRN:   [id number]  :   1985  ACCT. NUMBER: [id number]  DATE OF SERVICE: 22  START TIME:  1:00 PM  END TIME:  1:50 PM  FACILITATOR: Lyndsey Pratt [facility]  TOPIC:  Process Group    Diagnoses:  296.30 (F33.9) Major Depressive Disorder, Recurrent Episode, Unspecified _ and With mixed features  300.02 (F41.1) Generalized Anxiety Disorder  309.81 (F43.10) Posttraumatic Stress Disorder (includes Posttraumatic Stress Disorder for Children 6 Years and Younger)  Without dissociative symptoms.  4. Other Diagnoses that is relevant to services:   Substance-Related & Addictive Disorders 291.9 (F10.99) Unspecified Alcohol Related Disorder.  5. Provisional Diagnosis:  NA.      [facility] Mental [facility] Day Treatment  TRACK: 1B    NUMBER OF PARTICIPANTS: 6                                      Service Modality:  Video Visit     Telemedicine Visit: The patient's condition can be safely assessed and treated via synchronous audio and visual telemedicine encounter.      Reason for Telemedicine Visit: Services only offered telehealth    Originating Site (Patient Location): Patient's home    Distant Site (Provider Location): Provider Remote Setting- Home Office    Consent:  The patient/guardian has verbally consented to: the potential risks and benefits of telemedicine (video visit) versus in person care; bill my insurance or make self-payment for services provided; and responsibility for payment of non-covered services.     Patient would like the video invitation sent by:  My Chart    Mode of Communication:  Video Conference via Medical Zoom    As the provider I attest to compliance with applicable laws and regulations related to telemedicine.                Data:    Session content: At the start of this group, patients were invited to check in by identifying themselves, describing their current emotional status, and identifying issues to address in this  "group.   Area(s) of treatment focus addressed in this session included Symptom Management and Personal Safety.    Иван reported feeling \"okay\" but \"stressed\" today.  His goal for the day is to go to his apartment and also contact his insurance. Patient declined additional process time but contributed to group discussion and provided feedback and support to peers.      Therapeutic Interventions/Treatment Strategies:  Psychotherapist offered support, feedback and validation and reinforced use of skills.    Assessment:    Patient response:   Patient responded to session by accepting feedback, giving feedback and listening    Possible barriers to participation / learning include: and no barriers identified    Health Issues:   None reported       Substance Use Review:   Substance Use: No active concerns identified.    Mental Status/Behavioral Observations  Appearance:   Appropriate   Eye Contact:   Good   Psychomotor Behavior: Normal   Attitude:   Cooperative   Orientation:   All  Speech   Rate / Production: Normal    Volume:  Normal   Mood:    Anxious  Depressed   Affect:    Appropriate   Thought Content:   Clear  Thought Form:  Coherent  Logical     Insight:    Good     Plan:     Safety Plan: No current safety concerns identified.  Recommended that patient call 911 or go to the local ED should there be a change in any of these risk factors.     Barriers to treatment: None identified    Patient Contracts (see media tab):  None    Substance Use: Not addressed in session     Continue or Discharge: Patient will continue in Adult Day Treatment (ADT)  as planned. Patient is likely to benefit from learning and using skills as they work toward the goals identified in their treatment plan.      Lyndsey Pratt, [facility]  January 17, 2022    "

## 2022-01-18 NOTE — PROGRESS NOTES
Clinic Care Coordination Contact  [facility]/Voicemail       Clinical Data: Care Coordinator Outreach    Outreach attempted x 2.  Left message on patient's voicemail with call back information and requested return call.    Plan: Care Coordinator will try to reach patient again in 1 month.    Abimbola Cross [facility]  Clinic Care Coordinator  [facility] [place]  [facility]  [phone]  [email or web address]

## 2022-01-20 ENCOUNTER — HOSPITAL ENCOUNTER (OUTPATIENT)
Dept: BEHAVIORAL HEALTH | Facility: CLINIC | Age: 37
End: 2022-01-20
Attending: PSYCHIATRY & NEUROLOGY
Payer: COMMERCIAL

## 2022-01-20 PROCEDURE — G0177 OPPS/PHP; TRAIN & EDUC SERV: HCPCS | Mod: GT

## 2022-01-20 NOTE — GROUP NOTE
Psychoeducation Group Note    PATIENT'S NAME: Brandin Rodriguez  MRN:   [id number]  :   1985  [id number]  DATE OF SERVICE: 22  START TIME:  2:00 PM  END TIME:  2:50 PM  FACILITATOR: Pablito Duron OTR/L  TOPIC: MH Life Skills Group: Resiliency Development                                    Service Modality:  Video Visit     Telemedicine Visit: The patient's condition can be safely assessed and treated via synchronous audio and visual telemedicine encounter.      Reason for Telemedicine Visit: Services only offered telehealth    Originating Site (Patient Location): Patient's home    Distant Site (Provider Location): Provider Remote Setting- Home Office    Consent:  The patient/guardian has verbally consented to: the potential risks and benefits of telemedicine (video visit) versus in person care; bill my insurance or make self-payment for services provided; and responsibility for payment of non-covered services    Mode of Communication:  Video Conference via Medical Zoom    As the provider I attest to compliance with applicable laws and regulations related to telemedicine.       [facility] Adult Mental Health Day Treatment  TRACK: 1B    NUMBER OF PARTICIPANTS: 5    Summary of Group / Topics Discussed:  Resiliency Development:  Coping Skills(Weekly Mental Health Check-In): Patients were taught how to identify stressors, signs of stress, coping skills, and prevention strategies for overall stress management.  Patients were given the opportunity to identify both ongoing and acute mental health symptoms and how to effectively manage these symptoms by developing an effective aftercare plan.  Patients increased awareness of community based resources.    Patient Session Goals / Objectives:    Identified how using coping skills can be used for symptom and stress management       Improved awareness of individualed symptoms and stressors and how to effectively cope     Established a relapse  prevention plan to practice these skills in their own environments    Practiced and reflected on how to generalize taught skills to their everyday life        Patient Participation / Response:  Fully participated with the group by sharing personal reflections / insights and openly received / provided feedback with other participants.    Demonstrated understanding of content through handout/video/discussion , Verbalized understanding of content and Patient would benefit from additional opportunities to practice the content to be able to generalize it to their everyday life with increased intentionality, consistency, and efficacy in support of their psychiatric recovery    Treatment Plan:  Patient has a current master individualized treatment plan.  See Epic treatment plan for more information.    Pablito Duron, OTR/L

## 2022-01-24 ENCOUNTER — HOSPITAL ENCOUNTER (OUTPATIENT)
Dept: BEHAVIORAL HEALTH | Facility: CLINIC | Age: 37
End: 2022-01-24
Attending: PSYCHIATRY & NEUROLOGY
Payer: COMMERCIAL

## 2022-01-24 PROCEDURE — G0177 OPPS/PHP; TRAIN & EDUC SERV: HCPCS | Mod: GT

## 2022-01-24 PROCEDURE — 90853 GROUP PSYCHOTHERAPY: CPT | Mod: GT | Performed by: COUNSELOR

## 2022-01-24 NOTE — GROUP NOTE
Process Group Note    PATIENT'S NAME: Brandin Rodriguez  MRN:   [id number]  :   1985  ACCT. NUMBER: [id number]  DATE OF SERVICE: 22  START TIME:  1:00 PM  END TIME:  1:50 PM  FACILITATOR: Lyndsey Pratt [facility]  TOPIC:  Process Group    Diagnoses:  296.30 (F33.9) Major Depressive Disorder, Recurrent Episode, Unspecified _ and With mixed features  300.02 (F41.1) Generalized Anxiety Disorder  309.81 (F43.10) Posttraumatic Stress Disorder (includes Posttraumatic Stress Disorder for Children 6 Years and Younger)  Without dissociative symptoms.  4. Other Diagnoses that is relevant to services:   Substance-Related & Addictive Disorders 291.9 (F10.99) Unspecified Alcohol Related Disorder.  5. Provisional Diagnosis:  NA.      [facility] Mental [facility] Day Treatment  TRACK: 1B    NUMBER OF PARTICIPANTS:6                                      Service Modality:  Video Visit     Telemedicine Visit: The patient's condition can be safely assessed and treated via synchronous audio and visual telemedicine encounter.      Reason for Telemedicine Visit: Services only offered telehealth    Originating Site (Patient Location): Patient's home    Distant Site (Provider Location): Provider Remote Setting- Home Office    Consent:  The patient/guardian has verbally consented to: the potential risks and benefits of telemedicine (video visit) versus in person care; bill my insurance or make self-payment for services provided; and responsibility for payment of non-covered services.     Patient would like the video invitation sent by:  My Chart    Mode of Communication:  Video Conference via Medical Zoom    As the provider I attest to compliance with applicable laws and regulations related to telemedicine.                Data:    Session content: At the start of this group, patients were invited to check in by identifying themselves, describing their current emotional status, and identifying issues to address in this  "group.   Area(s) of treatment focus addressed in this session included Symptom Management and Personal Safety.    Иван reported feeling \"anxious\" and \"scatterbrained\" today.  His goal for the day is to run some errands. Patient declined additional process time but contributed to group discussion and provided feedback and support to peers.      Therapeutic Interventions/Treatment Strategies:  Psychotherapist offered support, feedback and validation.    Assessment:    Patient response:   Patient responded to session by accepting feedback, giving feedback and listening    Possible barriers to participation / learning include: and no barriers identified    Health Issues:   None reported       Substance Use Review:   Substance Use: cannabis .     Mental Status/Behavioral Observations  Appearance:   Appropriate   Eye Contact:   Good   Psychomotor Behavior: Normal   Attitude:   Cooperative   Orientation:   All  Speech   Rate / Production: Normal    Volume:  Normal   Mood:    Anxious  Depressed   Affect:    Appropriate   Thought Content:   Clear  Thought Form:  Coherent  Logical     Insight:    Good     Plan:     Safety Plan: No current safety concerns identified.  Recommended that patient call 911 or go to the local ED should there be a change in any of these risk factors.     Barriers to treatment: None identified    Patient Contracts (see media tab):  None    Substance Use: Not addressed in session     Continue or Discharge: Patient will continue in Adult Day Treatment (ADT)  as planned. Patient is likely to benefit from learning and using skills as they work toward the goals identified in their treatment plan.      Lyndsey Pratt, [facility]  January 24, 2022    "

## 2022-01-24 NOTE — GROUP NOTE
Psychoeducation Group Note    PATIENT'S NAME: Brandin Rodriguez  MRN:   [id number]  [facility]   1985  [id number]  DATE OF SERVICE: 22  START TIME:  2:00 PM  END TIME:  2:50 PM  FACILITATOR: Pablito Duron OTR/L  TOPIC: MH Life Skills Group: Resiliency Development                                    Service Modality:  Video Visit     Telemedicine Visit: The patient's condition can be safely assessed and treated via synchronous audio and visual telemedicine encounter.      Reason for Telemedicine Visit: Services only offered telehealth    Originating Site (Patient Location): Patient's home    Distant Site (Provider Location): Provider Remote Setting- Home Office    Consent:  The patient/guardian has verbally consented to: the potential risks and benefits of telemedicine (video visit) versus in person care; bill my insurance or make self-payment for services provided; and responsibility for payment of non-covered services.    Mode of Communication:  Video Conference via Medical Zoom    As the provider I attest to compliance with applicable laws and regulations related to telemedicine.       [facility] Adult Mental Health Day Treatment  TRACK: 1B    NUMBER OF PARTICIPANTS: 7    Summary of Group / Topics Discussed:  Resiliency Development:  Coping Skills(Conflict Management): Patients were taught how to identify stressors, signs of stress, coping skills, and prevention strategies for overall stress management.  Patients were given the opportunity to identify both ongoing and acute mental health symptoms and how to effectively manage these symptoms by developing an effective aftercare plan.  Patients increased awareness of community based resources.    Patient Session Goals / Objectives:    Identified how using coping skills can be used for symptom and stress management       Improved awareness of individualed symptoms and stressors and how to effectively cope     Established a relapse  prevention plan to practice these skills in their own environments    Practiced and reflected on how to generalize taught skills to their everyday life        Patient Participation / Response:  Fully participated with the group by sharing personal reflections / insights and openly received / provided feedback with other participants.    Demonstrated understanding of content through  handout/group discussion , Verbalized understanding of content and Patient would benefit from additional opportunities to practice the content to be able to generalize it to their everyday life with increased intentionality, consistency, and efficacy in support of their psychiatric recovery    Treatment Plan:  Patient has a current master individualized treatment plan.  See Epic treatment plan for more information.    Pablito Duron, OTR/L

## 2022-01-24 NOTE — GROUP NOTE
Psychoeducation Group Note    PATIENT'S NAME: Brandin Rodriguez  MRN:   [id number]  :   1985  [id number]  DATE OF SERVICE: 22  START TIME:  3:00 PM  END TIME:  3:50 PM  FACILITATOR: Samantha Gonzales RN  TOPIC: DANK RN Group: Health Maintenance                                    Service Modality:  Video Visit     Telemedicine Visit: The patient's condition can be safely assessed and treated via synchronous audio and visual telemedicine encounter.      Reason for Telemedicine Visit:  covid19    Originating Site (Patient Location): Patient's home    Distant Site (Provider Location): Provider Remote Setting- Home Office    Consent:  The patient/guardian has verbally consented to: the potential risks and benefits of telemedicine (video visit) versus in person care; bill my insurance or make self-payment for services provided; and responsibility for payment of non-covered services.     Patient would like the video invitation sent by:  My Chart    Mode of Communication:  Video Conference via Medical Zoom    As the provider I attest to compliance with applicable laws and regulations related to telemedicine.          [facility] Adult Mental Health Day Treatment  TRACK: 1B    NUMBER OF PARTICIPANTS: 7    Summary of Group / Topics Discussed:  Health Maintenance: Discharge planning/Community resources: Patients worked on completing an instructor-facilitated discharge planning activity. Discharge planning begins for all patients after admission. Competent discharge planning promotes a successful transition and decreases the likelihood of mental health relapse. In this group, all dimensions of wellness were reviewed to assess for needs/discharge readiness. These dimensions included: physical, emotional, occupational/productivity, environmental, social, spiritual, intellectual, and financial. Patients worked on completing/updating their discharge planning and identifying their treatment needs prior  to time of discharge.     Patient Session Goals / Objectives:  ? Identified unmet treatment needs to accomplish before discharge  ? Completed all dimensions of the discharge planning packet  ? Participated in the planning process, make phone calls, set up appointments, got connected with community resources, followed up with treatment team as needed         Patient Participation / Response:  Fully participated with the group by sharing personal reflections / insights and openly received / provided feedback with other participants.    Demonstrated understanding of topics discussed through group discussion and participation and Identified / Expressed personal readiness to practice skills    Treatment Plan:  Patient has a current master individualized treatment plan.  See Epic treatment plan for more information.    Samantha Gonzales RN

## 2022-01-25 NOTE — PROGRESS NOTES
Clinic Care Coordination Contact  [facility]/Voicemail       Clinical Data: Care Coordinator Outreach    CC SW received return call from pt, voicemail left.    Outreach attempted x 3.  Left message on patient's voicemail with call back information and requested return call.    Plan: Care Coordinator will try to reach patient again in 1 month.    Abimbola Cross [facility]  Clinic Care Coordinator  [facility] Cathie Owen  [facility]  [phone]  [email or web address]

## 2022-01-26 ENCOUNTER — HOSPITAL ENCOUNTER (OUTPATIENT)
Dept: BEHAVIORAL HEALTH | Facility: CLINIC | Age: 37
End: 2022-01-26
Attending: PSYCHIATRY & NEUROLOGY
Payer: COMMERCIAL

## 2022-01-26 PROCEDURE — G0177 OPPS/PHP; TRAIN & EDUC SERV: HCPCS | Mod: GT

## 2022-01-26 PROCEDURE — 90853 GROUP PSYCHOTHERAPY: CPT | Mod: GT | Performed by: COUNSELOR

## 2022-01-26 NOTE — PROGRESS NOTES
Acknowledgement of Current Treatment Plan       I have reviewed my treatment plan(60 Day Review) with my therapist on 1/26/22.   I agree with the plan as it is written in the electronic health record. (1B)    Name:      Signature:  Brandin ENRIQUEZ Davemaryse Unable to sign due to COVID and Virtual     Brandin Hansen MD  Psychiatrist/Medical Director Brandin Hansen MD on 1/28/2022 at 2:02 PM     RUTH Coley, [facility]  Psychotherapist RUTH Gibson on 1/28/2022 at 10:25 AM     Franklin Duron OTR/L Franklin Duron OTR/L

## 2022-01-26 NOTE — GROUP NOTE
Psychoeducation Group Note    PATIENT'S NAME: Brandin Rodriguez  MRN:   [id number]  :   1985  ACCT. NUMBER: [id number]  DATE OF SERVICE: 22  START TIME:  3:00 PM  END TIME:  3:50 PM  FACILITATOR: Samantha Gonzales RN  TOPIC: DANK RN Group: Health Maintenance                                    Service Modality:  Video Visit     Telemedicine Visit: The patient's condition can be safely assessed and treated via synchronous audio and visual telemedicine encounter.      Reason for Telemedicine Visit:  covid19    Originating Site (Patient Location): Patient's home    Distant Site (Provider Location): Provider Remote Setting- Home Office    Consent:  The patient/guardian has verbally consented to: the potential risks and benefits of telemedicine (video visit) versus in person care; bill my insurance or make self-payment for services provided; and responsibility for payment of non-covered services.     Patient would like the video invitation sent by:  My Chart    Mode of Communication:  Video Conference via Medical Zoom    As the provider I attest to compliance with applicable laws and regulations related to telemedicine.          [facility] Adult Mental Health Day Treatment  TRACK: 1B    NUMBER OF PARTICIPANTS: 6    Summary of Group / Topics Discussed:  Health Maintenance: Discharge planning/Community resources: Patients worked on completing an instructor-facilitated discharge planning activity. Discharge planning begins for all patients after admission. Competent discharge planning promotes a successful transition and decreases the likelihood of mental health relapse. In this group, all dimensions of wellness were reviewed to assess for needs/discharge readiness. These dimensions included: physical, emotional, occupational/productivity, environmental, social, spiritual, intellectual, and financial. Patients worked on completing/updating their discharge planning and identifying their treatment needs prior  to time of discharge.     Patient Session Goals / Objectives:  ? Identified unmet treatment needs to accomplish before discharge  ? Completed all dimensions of the discharge planning packet  ? Participated in the planning process, make phone calls, set up appointments, got connected with community resources, followed up with treatment team as needed         Patient Participation / Response:  Fully participated with the group by sharing personal reflections / insights and openly received / provided feedback with other participants.    Demonstrated understanding of topics discussed through group discussion and participation and Identified / Expressed personal readiness to practice skills    Treatment Plan:  Patient has a current master individualized treatment plan.  See Epic treatment plan for more information.    Samantha Gonzales RN

## 2022-01-26 NOTE — GROUP NOTE
Process Group Note    PATIENT'S NAME: Brandin Rodriguez  MRN:   [id number]  :   1985  ACCT. NUMBER: [id number]  DATE OF SERVICE: 22  START TIME:  1:00 PM  END TIME:  1:50 PM  FACILITATOR: Lyndsey Pratt [facility]  TOPIC:  Process Group    Diagnoses:  296.30 (F33.9) Major Depressive Disorder, Recurrent Episode, Unspecified _ and With mixed features  300.02 (F41.1) Generalized Anxiety Disorder  309.81 (F43.10) Posttraumatic Stress Disorder (includes Posttraumatic Stress Disorder for Children 6 Years and Younger)  Without dissociative symptoms.  4. Other Diagnoses that is relevant to services:   Substance-Related & Addictive Disorders 291.9 (F10.99) Unspecified Alcohol Related Disorder.  5. Provisional Diagnosis:  NA.      [facility] Mental [facility] Day Treatment  TRACK: 1B    NUMBER OF PARTICIPANTS: 5                                      Service Modality:  Video Visit     Telemedicine Visit: The patient's condition can be safely assessed and treated via synchronous audio and visual telemedicine encounter.      Reason for Telemedicine Visit: Services only offered telehealth    Originating Site (Patient Location): Patient's home    Distant Site (Provider Location): Provider Remote Setting- Home Office    Consent:  The patient/guardian has verbally consented to: the potential risks and benefits of telemedicine (video visit) versus in person care; bill my insurance or make self-payment for services provided; and responsibility for payment of non-covered services.     Patient would like the video invitation sent by:  My Chart    Mode of Communication:  Video Conference via Medical Zoom    As the provider I attest to compliance with applicable laws and regulations related to telemedicine.                Data:    Session content: At the start of this group, patients were invited to check in by identifying themselves, describing their current emotional status, and identifying issues to address in this  "group.   Area(s) of treatment focus addressed in this session included Symptom Management and Personal Safety.    Иван reported feeling \"off\" and has some \"depressive thoughts swirling in the back of my head.\"  His goal for the day is to get a hold of his care coordinator because he's trying to find a therapist. Patient declined additional process time but contributed to group discussion and provided feedback and support to peers.      Therapeutic Interventions/Treatment Strategies:  Psychotherapist offered support, feedback and validation and reinforced use of skills.    Assessment:    Patient response:   Patient responded to session by accepting feedback, giving feedback and listening    Possible barriers to participation / learning include: and no barriers identified    Health Issues:   None reported       Substance Use Review:   Substance Use: cannabis .     Mental Status/Behavioral Observations  Appearance:   Appropriate   Eye Contact:   Good   Psychomotor Behavior: Normal   Attitude:   Cooperative   Orientation:   All  Speech   Rate / Production: Normal    Volume:  Normal   Mood:    Depressed   Affect:    Appropriate   Thought Content:   Safety reports  presence of suicidal ideation passive suicidal ideation   Thought Form:  Coherent  Logical     Insight:    Good     Plan:     Safety Plan: Committed to safety and agreed to follow previously developed safety coping plan.      Barriers to treatment: None identified    Patient Contracts (see media tab):  None    Substance Use: Not addressed in session     Continue or Discharge: Patient will continue in Adult Day Treatment (ADT)  as planned. Patient is likely to benefit from learning and using skills as they work toward the goals identified in their treatment plan.      Lyndsey Pratt, [facility]  January 26, 2022    "

## 2022-01-26 NOTE — ADDENDUM NOTE
Encounter addended by: Pablito Duron, OTR/L on: 1/26/2022 5:00 PM   Actions taken: Clinical Note Signed

## 2022-01-26 NOTE — GROUP NOTE
Psychoeducation Group Note    PATIENT'S NAME: Brandin Rodriguez  MRN:   [id number]  :   1985  ACCT. NUMBER: [id number]  DATE OF SERVICE: 22  START TIME:  2:00 PM  END TIME:  2:50 PM  FACILITATOR: Pablito Duron OTR/L  TOPIC: MH Life Skills Group: Communication and Social Skills Development                                    Service Modality:  Video Visit     Telemedicine Visit: The patient's condition can be safely assessed and treated via synchronous audio and visual telemedicine encounter.      Reason for Telemedicine Visit: Services only offered telehealth    Originating Site (Patient Location): Patient's home    Distant Site (Provider Location): Provider Remote Setting- Home Office    Consent:  The patient/guardian has verbally consented to: the potential risks and benefits of telemedicine (video visit) versus in person care; bill my insurance or make self-payment for services provided; and responsibility for payment of non-covered services.    Mode of Communication:  Video Conference via Medical Zoom    As the provider I attest to compliance with applicable laws and regulations related to telemedicine.       [facility] Mental Health Day Treatment  TRACK: 1B    NUMBER OF PARTICIPANTS: 7    Summary of Group / Topics Discussed:  Communication and Social Skills Development: Listening Skills: {Patients were taught and gained awareness into personal barriers to effective listening and how this can negatively impact relationships.  Patients were taught skills and practiced how to improve communication with others by becoming an active listener.  Patients identified both personal strengths and opportunities for growth in this area to improve overall communication and connection with other people as a way to build meaningful relationships to combat mental health and substance use/abuse symptoms.      Patient Session Goals / Objectives:    Identified importance of active listening skills in  effective communication and how this impacts their ability to communicate clearly with other people       Improved awareness of important aspects of listening skills and how this relates to mental health recovery        Established a plan for practice of these skills in their own environments    Practiced and reflected on how to generalize taught skills to their everyday life      Patient Participation / Response:  Fully participated with the group by sharing personal reflections / insights and openly received / provided feedback with other participants.    Demonstrated understanding of content through video/handout/discussion , Verbalized understanding of content and Patient would benefit from additional opportunities to practice the content to be able to generalize it to their everyday life with increased intentionality, consistency, and efficacy in support of their psychiatric recovery    Treatment Plan:  Patient has a current master individualized treatment plan.  See Epic treatment plan for more information.    Pablito Duron, OTR/L

## 2022-01-27 ENCOUNTER — TELEPHONE (OUTPATIENT)
Dept: FAMILY MEDICINE | Facility: CLINIC | Age: 37
End: 2022-01-27
Payer: COMMERCIAL

## 2022-01-27 ENCOUNTER — HOSPITAL ENCOUNTER (OUTPATIENT)
Dept: BEHAVIORAL HEALTH | Facility: CLINIC | Age: 37
End: 2022-01-27
Attending: PSYCHIATRY & NEUROLOGY
Payer: COMMERCIAL

## 2022-01-27 DIAGNOSIS — F90.2 ADHD (ATTENTION DEFICIT HYPERACTIVITY DISORDER), COMBINED TYPE: ICD-10-CM

## 2022-01-27 PROCEDURE — G0177 OPPS/PHP; TRAIN & EDUC SERV: HCPCS | Mod: GT

## 2022-01-27 PROCEDURE — 90853 GROUP PSYCHOTHERAPY: CPT | Mod: GT | Performed by: COUNSELOR

## 2022-01-27 NOTE — GROUP NOTE
Process Group Note    PATIENT'S NAME: Brandin Rodriguez  MRN:   [id number]  :   1985  ACCT. NUMBER: [id number]  DATE OF SERVICE: 22  START TIME:  1:00 PM  END TIME:  1:50 PM  FACILITATOR: Lyndsey Pratt [facility]  TOPIC:  Process Group    Diagnoses:  296.30 (F33.9) Major Depressive Disorder, Recurrent Episode, Unspecified _ and With mixed features  300.02 (F41.1) Generalized Anxiety Disorder  309.81 (F43.10) Posttraumatic Stress Disorder (includes Posttraumatic Stress Disorder for Children 6 Years and Younger)  Without dissociative symptoms.  4. Other Diagnoses that is relevant to services:   Substance-Related & Addictive Disorders 291.9 (F10.99) Unspecified Alcohol Related Disorder.  5. Provisional Diagnosis:  NA.      [facility] Day Treatment  TRACK: 1B    NUMBER OF PARTICIPANTS: 6                                      Service Modality:  Video Visit     Telemedicine Visit: The patient's condition can be safely assessed and treated via synchronous audio and visual telemedicine encounter.      Reason for Telemedicine Visit: Services only offered telehealth    Originating Site (Patient Location): Patient's home    Distant Site (Provider Location): Provider Remote Setting- Home Office    Consent:  The patient/guardian has verbally consented to: the potential risks and benefits of telemedicine (video visit) versus in person care; bill my insurance or make self-payment for services provided; and responsibility for payment of non-covered services.     Patient would like the video invitation sent by:  My Chart    Mode of Communication:  Video Conference via Medical Zoom    As the provider I attest to compliance with applicable laws and regulations related to telemedicine.                Data:    Session content: At the start of this group, patients were invited to check in by identifying themselves, describing their current emotional status, and identifying issues to address in this  "group.   Area(s) of treatment focus addressed in this session included Symptom Management, Personal Safety and Abstinence/Relapse Prevention.    Иван reported feeling \"tired but hopeful\" today because he got an appointment to discuss his medications next week.  His goal today is to make it to a meeting this evening. Patient declined additional process time but contributed to group discussion and provided feedback and support to peers.      Therapeutic Interventions/Treatment Strategies:  Psychotherapist offered support, feedback and validation and reinforced use of skills.    Assessment:    Patient response:   Patient responded to session by accepting feedback, giving feedback and listening    Possible barriers to participation / learning include: and no barriers identified    Health Issues:   None reported       Substance Use Review:   Substance Use: cannabis .     Mental Status/Behavioral Observations  Appearance:   Appropriate   Eye Contact:   Good   Psychomotor Behavior: Normal   Attitude:   Cooperative   Orientation:   All  Speech   Rate / Production: Normal    Volume:  Normal   Mood:    Anxious  Depressed   Affect:    Appropriate   Thought Content:   Clear  Thought Form:  Coherent  Logical     Insight:    Good     Plan:     Safety Plan: No current safety concerns identified.  Recommended that patient call 911 or go to the local ED should there be a change in any of these risk factors.     Barriers to treatment: None identified    Patient Contracts (see media tab):  None    Substance Use: Provided encouragement towards sobriety    Provided support and affirmation for steps taken towards sobriety      Continue or Discharge: Patient will continue in Adult Day Treatment (ADT)  as planned. Patient is likely to benefit from learning and using skills as they work toward the goals identified in their treatment plan.      Lyndsey Pratt, [facility]  January 27, 2022    "

## 2022-01-27 NOTE — GROUP NOTE
Psychoeducation Group Note    PATIENT'S NAME: Brandin Rodriguez  MRN:   [id number]  :   1985  [id number]  DATE OF SERVICE: 22  START TIME:  2:00 PM  END TIME:  2:50 PM  FACILITATOR: Pablito Duron OTR/L  TOPIC: MH Life Skills Group: Resiliency Development                                    Service Modality:  Video Visit     Telemedicine Visit: The patient's condition can be safely assessed and treated via synchronous audio and visual telemedicine encounter.      Reason for Telemedicine Visit: Services only offered telehealth    Originating Site (Patient Location): Patient's home    Distant Site (Provider Location): Provider Remote Setting- Home Office    Consent:  The patient/guardian has verbally consented to: the potential risks and benefits of telemedicine (video visit) versus in person care; bill my insurance or make self-payment for services provided; and responsibility for payment of non-covered services.     Mode of Communication:  Video Conference via Medical Zoom    As the provider I attest to compliance with applicable laws and regulations related to telemedicine.       [facility] Adult Mental Health Day Treatment  TRACK: 1B    NUMBER OF PARTICIPANTS: 8    Summary of Group / Topics Discussed:  Resiliency Development:  Coping Skills: Personal Recovery Outcome Measure: Patients were taught how to identify coping strategies and routines that they can adopt and use for management with focus on a balance between physical, emotional, social and spiritual strategies.    Patient Session Goals / Objectives:    Identified personal definitions of recovery for effectively managing both mental health and substance abuse/abuse symptoms     Improved awareness of the process of recovery and skills and strategies that support this       Established a plan for practice of these skills in their own environments    Practiced and reflected on how to generalize taught skills to their  everyday life      Patient Participation / Response:  Fully participated with the group by sharing personal reflections / insights and openly received / provided feedback with other participants.    Demonstrated understanding of content through handout /discussion , Verbalized understanding of content and Patient would benefit from additional opportunities to practice the content to be able to generalize it to their everyday life with increased intentionality, consistency, and efficacy in support of their psychiatric recovery    Treatment Plan:  Patient has a current master individualized treatment plan.  See Epic treatment plan for more information.    Pablito Duron, OTR/L

## 2022-01-27 NOTE — TELEPHONE ENCOUNTER
Pt calling, requesting refill of Adderall, - he is out, and has upcoming appt with psych in Feb.   He is wondering if you will fill small supply of med for him?

## 2022-01-27 NOTE — GROUP NOTE
Psychotherapy Group Note    PATIENT'S NAME: Brandin Rodriguez  MRN:   [id number]  :   1985  ACCT. NUMBER: [id number]  DATE OF SERVICE: 22  START TIME:  3:00 PM  END TIME:  3:50 PM  FACILITATOR: Placido Tong LMFT  TOPIC:  EBP Group: Specialty Awareness  [facility] Adult Mental Health Day Treatment  TRACK: 1B    NUMBER OF PARTICIPANTS: 8    Summary of Group / Topics Discussed:  Specialty Topics: Grief/Transitions: Patients received an overview of the grief process.  Patients explored their relationship to loss and how that has affected their mental health symptoms.  Strategies for recognizing loss and ideas for engaging in the grief process was presented and discussed.  Patients identified needs for support and coping skills to manage loss.  The purpose of this specialty topic is to help patients identify the aspects of change due to loss that individuals experience in addition to mental health symptoms to better cope with the grief, loss, and life transitions.      Patient Session Goals / Objectives:    Identified grief, loss, and life transitions     Discussed how grief impacts mental health symptoms and disrupts usual functioning    Identified needs for support and coping with grief and planned further action for coping    Service Modality:  Video Visit     Telemedicine Visit: The patient's condition can be safely assessed and treated via synchronous audio and visual telemedicine encounter.      Reason for Telemedicine Visit: Services only offered telehealth and due to COVID-19.    Originating Site (Patient Location): Patient's home    Distant Site (Provider Location): Provider Remote Setting- Home Office    Consent:  The patient/guardian has verbally consented to: the potential risks and benefits of telemedicine (video visit) versus in person care; bill my insurance or make self-payment for services provided; and responsibility for payment of non-covered services.     Patient would like  the video invitation sent by:  My Chart    Mode of Communication:  Video Conference via Medical Zoom    As the provider I attest to compliance with applicable laws and regulations related to telemedicine.        Patient Participation / Response:  Fully participated with the group by sharing personal reflections / insights and openly received / provided feedback with other participants.    Demonstrated understanding of topics discussed through group discussion and participation, Identified / Expressed readiness to act on skill suggestions discussed in topic, Verbalized understanding of ways to proactively manage illness, Identified plan to address barriers to practicing skills discussed in topic and Practiced skills in session    Treatment Plan:  Patient has a current master individualized treatment plan.  See Epic treatment plan for more information.    Placido Tong, WINFT

## 2022-01-27 NOTE — ADDENDUM NOTE
Encounter addended by: Lyndsey Pratt [facility] on: 1/26/2022 9:56 PM   Actions taken: Charge Capture section accepted

## 2022-01-28 ENCOUNTER — TELEPHONE (OUTPATIENT)
Dept: BEHAVIORAL HEALTH | Facility: CLINIC | Age: 37
End: 2022-01-28
Payer: COMMERCIAL

## 2022-01-28 RX ORDER — DEXTROAMPHETAMINE SACCHARATE, AMPHETAMINE ASPARTATE, DEXTROAMPHETAMINE SULFATE AND AMPHETAMINE SULFATE 2.5; 2.5; 2.5; 2.5 MG/1; MG/1; MG/1; MG/1
10 TABLET ORAL DAILY
Qty: 30 TABLET | Refills: 0 | Status: SHIPPED | OUTPATIENT
Start: 2022-01-28

## 2022-01-28 NOTE — ADDENDUM NOTE
Encounter addended by: Lyndsey Pratt [facility] on: 1/28/2022 10:25 AM   Actions taken: Flowsheet accepted, Clinical Note Signed

## 2022-01-28 NOTE — TELEPHONE ENCOUNTER
Pt sent email to psychotherapist re: concern about ADderall refill.   Wtr called to f/u. Pt said he got a msg saying it was ready at the pharmacy. No further concerns.  Samantha Gonzales RN on 1/28/2022 at 11:47 AM

## 2022-01-31 ENCOUNTER — HOSPITAL ENCOUNTER (OUTPATIENT)
Dept: BEHAVIORAL HEALTH | Facility: CLINIC | Age: 37
End: 2022-01-31
Attending: PSYCHIATRY & NEUROLOGY
Payer: COMMERCIAL

## 2022-01-31 PROCEDURE — G0177 OPPS/PHP; TRAIN & EDUC SERV: HCPCS | Mod: GT

## 2022-01-31 PROCEDURE — 90853 GROUP PSYCHOTHERAPY: CPT | Mod: GT | Performed by: COUNSELOR

## 2022-01-31 NOTE — GROUP NOTE
Process Group Note    PATIENT'S NAME: Brandin Rodriguez  MRN:   [id number]  :   1985  ACCT. NUMBER: [id number]  DATE OF SERVICE: 22  START TIME:  1:00 PM  END TIME:  1:50 PM  FACILITATOR: Lyndsey Pratt [facility]  TOPIC:  Process Group    Diagnoses:  296.30 (F33.9) Major Depressive Disorder, Recurrent Episode, Unspecified _ and With mixed features  300.02 (F41.1) Generalized Anxiety Disorder  309.81 (F43.10) Posttraumatic Stress Disorder (includes Posttraumatic Stress Disorder for Children 6 Years and Younger)  Without dissociative symptoms.  4. Other Diagnoses that is relevant to services:   Substance-Related & Addictive Disorders 291.9 (F10.99) Unspecified Alcohol Related Disorder.  5. Provisional Diagnosis:  NA.      [facility] Mental [facility] Day Treatment  TRACK: 1B    NUMBER OF PARTICIPANTS: 5                                      Service Modality:  Video Visit     Telemedicine Visit: The patient's condition can be safely assessed and treated via synchronous audio and visual telemedicine encounter.      Reason for Telemedicine Visit: Services only offered telehealth    Originating Site (Patient Location): Patient's home    Distant Site (Provider Location): Provider Remote Setting- Home Office    Consent:  The patient/guardian has verbally consented to: the potential risks and benefits of telemedicine (video visit) versus in person care; bill my insurance or make self-payment for services provided; and responsibility for payment of non-covered services.     Patient would like the video invitation sent by:  My Chart    Mode of Communication:  Video Conference via Medical Zoom    As the provider I attest to compliance with applicable laws and regulations related to telemedicine.                Data:    Session content: At the start of this group, patients were invited to check in by identifying themselves, describing their current emotional status, and identifying issues to address in this  "group.   Area(s) of treatment focus addressed in this session included Symptom Management and Personal Safety.    Иван reported feeling \"okay\" but exhausted.  His goal is to finish up on some things he's helping some people with.  Patient declined additional process time but contributed to group discussion and provided feedback and support to peers.      Therapeutic Interventions/Treatment Strategies:  Psychotherapist offered support, feedback and validation and reinforced use of skills.    Assessment:    Patient response:   Patient responded to session by accepting feedback, giving feedback and listening    Possible barriers to participation / learning include: and no barriers identified    Health Issues:   None reported       Substance Use Review:   Substance Use: cannabis .     Mental Status/Behavioral Observations  Appearance:   Appropriate   Eye Contact:   Good   Psychomotor Behavior: Normal   Attitude:   Cooperative   Orientation:   All  Speech   Rate / Production: Normal    Volume:  Normal   Mood:    Anxious  Depressed   Affect:    Appropriate   Thought Content:   Clear  Thought Form:  Coherent  Logical     Insight:    Good     Plan:     Safety Plan: No current safety concerns identified.  Recommended that patient call 911 or go to the local ED should there be a change in any of these risk factors.     Barriers to treatment: None identified    Patient Contracts (see media tab):  None    Substance Use: Not addressed in session     Continue or Discharge: Patient will continue in Adult Day Treatment (ADT)  as planned. Patient is likely to benefit from learning and using skills as they work toward the goals identified in their treatment plan.      Lyndsey Pratt, [facility]  January 31, 2022    "

## 2022-01-31 NOTE — GROUP NOTE
Psychoeducation Group Note    PATIENT'S NAME: Brandin Rodriguez  MRN:   [id number]  :   1985  [id number]  DATE OF SERVICE: 22  START TIME:  2:00 PM  END TIME:  2:50 PM  FACILITATOR: Pablito Duron OTR/L  TOPIC: MH Life Skills Group: Resiliency Development                                    Service Modality:  Video Visit     Telemedicine Visit: The patient's condition can be safely assessed and treated via synchronous audio and visual telemedicine encounter.      Reason for Telemedicine Visit: Services only offered telehealth    Originating Site (Patient Location): Patient's home    Distant Site (Provider Location): Provider Remote Setting- Home Office    Consent:  The patient/guardian has verbally consented to: the potential risks and benefits of telemedicine (video visit) versus in person care; bill my insurance or make self-payment for services provided; and responsibility for payment of non-covered services.     Mode of Communication:  Video Conference via Medical Zoom    As the provider I attest to compliance with applicable laws and regulations related to telemedicine.       [facility] Adult Mental Health Day Treatment  TRACK: 1B    NUMBER OF PARTICIPANTS: 6    Summary of Group / Topics Discussed:  Resiliency Development:  Coping Skills(resources to scope with stress): Patients were taught how to identify stressors, signs of stress, coping skills, and prevention strategies for overall stress management.  Patients were given the opportunity to identify both ongoing and acute mental health symptoms and how to effectively manage these symptoms by developing an effective aftercare plan.  Patients increased awareness of community based resources.    Patient Session Goals / Objectives:    Identified how using coping skills can be used for symptom and stress management       Improved awareness of individualed symptoms and stressors and how to effectively cope     Established a  relapse prevention plan to practice these skills in their own environments    Practiced and reflected on how to generalize taught skills to their everyday life        Patient Participation / Response:  Fully participated with the group by sharing personal reflections / insights and openly received / provided feedback with other participants.    Demonstrated understanding of content through handout/group discussion , Verbalized understanding of content and Patient would benefit from additional opportunities to practice the content to be able to generalize it to their everyday life with increased intentionality, consistency, and efficacy in support of their psychiatric recovery    Treatment Plan:  Patient has a current master individualized treatment plan.  See Epic treatment plan for more information.    Pablito Duron, OTR/L

## 2022-01-31 NOTE — GROUP NOTE
Psychoeducation Group Note    PATIENT'S NAME: Brandin Rodriguez  MRN:   [id number]  :   1985  ACCT. NUMBER: [id number]  DATE OF SERVICE: 22  START TIME:  3:00 PM  END TIME:  3:50 PM  FACILITATOR: Samantha Gonzales RN  TOPIC: MH RN Group: [facility]                                    Service Modality:  Video Visit     Telemedicine Visit: The patient's condition can be safely assessed and treated via synchronous audio and visual telemedicine encounter.      Reason for Telemedicine Visit:  covid19    Originating Site (Patient Location): Patient's home    Distant Site (Provider Location): Provider Remote Setting- Home Office    Consent:  The patient/guardian has verbally consented to: the potential risks and benefits of telemedicine (video visit) versus in person care; bill my insurance or make self-payment for services provided; and responsibility for payment of non-covered services.     Patient would like the video invitation sent by:  My Chart    Mode of Communication:  Video Conference via Medical Zoom    As the provider I attest to compliance with applicable laws and regulations related to telemedicine.          [facility] Adult Mental Health Day Treatment  TRACK: 1B    NUMBER OF PARTICIPANTS: 6    Summary of Group / Topics Discussed:  Foundations of Health: Sleep: Case study/sleep hygiene: Patients explored the connection between sleep and mental illness. Patients learned about how adequate sleep can improve health, productivity, wellness, quality of life, and safety.     Patient Session Goals / Objectives:  ? Demonstrated understanding of sleep hygiene practices and benefits of sleep  ? Identified sleep hygiene strategies to utilize     Described the connection between sleep disturbances and mental illness      Patient Participation / Response:  Fully participated with the group by sharing personal reflections / insights and openly received / provided feedback with other  participants.    Demonstrated understanding of topics discussed through group discussion and participation and Identified / Expressed personal readiness to practice skills    Treatment Plan:  Patient has a current master individualized treatment plan.  See Epic treatment plan for more information.    Samantha Gonzales RN

## 2022-02-01 ENCOUNTER — TELEPHONE (OUTPATIENT)
Dept: FAMILY MEDICINE | Facility: CLINIC | Age: 37
End: 2022-02-01
Payer: COMMERCIAL

## 2022-02-01 NOTE — TELEPHONE ENCOUNTER
Insurance will only cover 0.5 tablets daily of Amphetamine-dextroamphetamine 10 mg.     Please review list of formulary alternatives:        Kiran Lopez CMA on 2/1/2022 at 2:06 PM

## 2022-02-02 ENCOUNTER — HOSPITAL ENCOUNTER (OUTPATIENT)
Dept: BEHAVIORAL HEALTH | Facility: CLINIC | Age: 37
End: 2022-02-02
Attending: PSYCHIATRY & NEUROLOGY
Payer: COMMERCIAL

## 2022-02-02 PROCEDURE — G0177 OPPS/PHP; TRAIN & EDUC SERV: HCPCS | Mod: GT

## 2022-02-02 PROCEDURE — 90853 GROUP PSYCHOTHERAPY: CPT | Mod: GT | Performed by: COUNSELOR

## 2022-02-02 NOTE — GROUP NOTE
Psychoeducation Group Note    PATIENT'S NAME: Brandin Rodriguez  MRN:   [id number]  :   1985  ACCT. NUMBER: [id number]  DATE OF SERVICE: 22  START TIME:  3:00 PM  END TIME:  3:50 PM  FACILITATOR: Samantha Gonzales RN  TOPIC: DANK RN Group: [facility]                                    Service Modality:  Video Visit     Telemedicine Visit: The patient's condition can be safely assessed and treated via synchronous audio and visual telemedicine encounter.      Reason for Telemedicine Visit:  covid19    Originating Site (Patient Location): Patient's home    Distant Site (Provider Location): Provider Remote Setting- Home Office    Consent:  The patient/guardian has verbally consented to: the potential risks and benefits of telemedicine (video visit) versus in person care; bill my insurance or make self-payment for services provided; and responsibility for payment of non-covered services.     Patient would like the video invitation sent by:  My Chart    Mode of Communication:  Video Conference via Medical Zoom    As the provider I attest to compliance with applicable laws and regulations related to telemedicine.          [facility] Adult Mental Health Day Treatment  TRACK: 1B    NUMBER OF PARTICIPANTS: 9    Summary of Group / Topics Discussed:  Foundations of Health: Sleep: Case study/sleep hygiene: Patients explored the connection between sleep and mental illness. Patients learned about how adequate sleep can improve health, productivity, wellness, quality of life, and safety.     Patient Session Goals / Objectives:  ? Demonstrated understanding of sleep hygiene practices and benefits of sleep  ? Identified sleep hygiene strategies to utilize     Described the connection between sleep disturbances and mental illness      Patient Participation / Response:  Fully participated with the group by sharing personal reflections / insights and openly received / provided feedback with other  participants.    Demonstrated understanding of topics discussed through group discussion and participation and Identified / Expressed personal readiness to practice skills    Treatment Plan:  Patient has a current master individualized treatment plan.  See Epic treatment plan for more information.    Samantha Gonzales RN

## 2022-02-02 NOTE — GROUP NOTE
Process Group Note    PATIENT'S NAME: Brandin Rodriguez  MRN:   [id number]  :   1985  ACCT. NUMBER: [id number]  DATE OF SERVICE: 22  START TIME:  1:00 PM  END TIME:  1:50 PM  FACILITATOR: Lyndsey Pratt [facility]  TOPIC:  Process Group    Diagnoses:  296.30 (F33.9) Major Depressive Disorder, Recurrent Episode, Unspecified _ and With mixed features  300.02 (F41.1) Generalized Anxiety Disorder  309.81 (F43.10) Posttraumatic Stress Disorder (includes Posttraumatic Stress Disorder for Children 6 Years and Younger)  Without dissociative symptoms.  4. Other Diagnoses that is relevant to services:   Substance-Related & Addictive Disorders 291.9 (F10.99) Unspecified Alcohol Related Disorder.  5. Provisional Diagnosis:  NA.      [facility] Mental [facility] Day Treatment  TRACK: 1B    NUMBER OF PARTICIPANTS: 6                                      Service Modality:  Video Visit     Telemedicine Visit: The patient's condition can be safely assessed and treated via synchronous audio and visual telemedicine encounter.      Reason for Telemedicine Visit: Services only offered telehealth    Originating Site (Patient Location): Patient's home    Distant Site (Provider Location): Provider Remote Setting- Home Office    Consent:  The patient/guardian has verbally consented to: the potential risks and benefits of telemedicine (video visit) versus in person care; bill my insurance or make self-payment for services provided; and responsibility for payment of non-covered services.     Patient would like the video invitation sent by:  My Chart    Mode of Communication:  Video Conference via Medical Zoom    As the provider I attest to compliance with applicable laws and regulations related to telemedicine.                Data:    Session content: At the start of this group, patients were invited to check in by identifying themselves, describing their current emotional status, and identifying issues to address in this  group.   Area(s) of treatment focus addressed in this session included Symptom Management and Personal Safety.     Иван reported feeling okay today.  He met with a provider today and had some medication changes. His goal for the day is to have no more goals for the day because he needs some time to relax.  Patient declined additional process time but contributed to group discussion and provided feedback and support to peers.      Therapeutic Interventions/Treatment Strategies:  Psychotherapist offered support, feedback and validation and reinforced use of skills.    Assessment:    Patient response:   Patient responded to session by accepting feedback, giving feedback and listening    Possible barriers to participation / learning include: and no barriers identified    Health Issues:   None reported       Substance Use Review:   Substance Use: cannabis .     Mental Status/Behavioral Observations  Appearance:   Appropriate   Eye Contact:   Good   Psychomotor Behavior: Normal   Attitude:   Cooperative   Orientation:   All  Speech   Rate / Production: Normal    Volume:  Normal   Mood:    Depressed   Affect:    Appropriate   Thought Content:   Clear  Thought Form:  Coherent  Logical     Insight:    Good     Plan:     Safety Plan: No current safety concerns identified.  Recommended that patient call 911 or go to the local ED should there be a change in any of these risk factors.     Barriers to treatment: None identified    Patient Contracts (see media tab):  None    Substance Use: Not addressed in session     Continue or Discharge: Patient will continue in Adult Day Treatment (ADT)  as planned. Patient is likely to benefit from learning and using skills as they work toward the goals identified in their treatment plan.      Lyndsey Pratt, [facility]  February 2, 2022

## 2022-02-02 NOTE — GROUP NOTE
Psychoeducation Group Note    PATIENT'S NAME: Brandin Rodriguez  MRN:   [id number]  :   1985  [id number]  DATE OF SERVICE: 22  START TIME:  2:00 PM  END TIME:  2:50 PM  FACILITATOR: Pabltio Duron OTR/L  TOPIC: MH Life Skills Group: Communication and Social Skills Development                                    Service Modality:  Video Visit     Telemedicine Visit: The patient's condition can be safely assessed and treated via synchronous audio and visual telemedicine encounter.      Reason for Telemedicine Visit: Services only offered telehealth    Originating Site (Patient Location): Patient's home    Distant Site (Provider Location): Provider Remote Setting- Home Office    Consent:  The patient/guardian has verbally consented to: the potential risks and benefits of telemedicine (video visit) versus in person care; bill my insurance or make self-payment for services provided; and responsibility for payment of non-covered service.    Mode of Communication:  Video Conference via Medical Zoom    As the provider I attest to compliance with applicable laws and regulations related to telemedicine.       [facility] Adult Mental Health Day Treatment  TRACK: 1B    NUMBER OF PARTICIPANTS: 9 including a 2nd therapist Jena Brito    Summary of Group / Topics Discussed:  Communication and Social Skills Development: Mental Health Social Social Support:  Patients were taught and gained awareness of characteristics of an effective support and how to develop this in their lives.  Patients identified both personal strengths and opportunities for growth in this areas to improve overall communication and connection with other people.    Patient Session Goals / Objectives:    Identified strengths and opportunities for growth in developing their social support system and how this impacts their ability to connect and communicate with other people       Improved awareness of important aspects of  social support systems and how this relates to mental health recovery        Established a plan for practice of these skills in their own environments    Practiced and reflected on how to generalize taught skills to their everyday life    Support groups are introduced during this session        Patient Participation / Response:  Moderately participated, sharing some personal reflections / insights and adequately adequately received / provided feedback with other participants.    Patient presentation: Irritable mood, Demonstrated understanding of content through handouts, discussion ,aftercare planning and support group services , Verbalized understanding of content and Patient would benefit from additional opportunities to practice the content to be able to generalize it to their everyday life with increased intentionality, consistency, and efficacy in support of their psychiatric recovery    Treatment Plan:  Patient has a current master individualized treatment plan.  See Epic treatment plan for more information.    Pablito Duron, OTR/L

## 2022-02-07 ENCOUNTER — HOSPITAL ENCOUNTER (OUTPATIENT)
Dept: BEHAVIORAL HEALTH | Facility: CLINIC | Age: 37
End: 2022-02-07
Attending: PSYCHIATRY & NEUROLOGY
Payer: COMMERCIAL

## 2022-02-07 PROCEDURE — G0177 OPPS/PHP; TRAIN & EDUC SERV: HCPCS | Mod: GT

## 2022-02-07 PROCEDURE — 90853 GROUP PSYCHOTHERAPY: CPT | Mod: GT | Performed by: COUNSELOR

## 2022-02-07 NOTE — GROUP NOTE
Psychoeducation Group Note    PATIENT'S NAME: Brandin Rodriguez  MRN:   [id number]  :   1985  ACCT. NUMBER: [id number]  DATE OF SERVICE: 22  START TIME:  3:00 PM  END TIME:  3:50 PM  FACILITATOR: Samantha Gonzales, RN; Richard Oscar RN  TOPIC:  RN Group: Mental Health Maintenance                                    Service Modality:  Video Visit     Telemedicine Visit: The patient's condition can be safely assessed and treated via synchronous audio and visual telemedicine encounter.      Reason for Telemedicine Visit:  covid19    Originating Site (Patient Location): Patient's home    Distant Site (Provider Location): Provider Remote Setting- Home Office    Consent:  The patient/guardian has verbally consented to: the potential risks and benefits of telemedicine (video visit) versus in person care; bill my insurance or make self-payment for services provided; and responsibility for payment of non-covered services.     Patient would like the video invitation sent by:  My Chart    Mode of Communication:  Video Conference via Medical Zoom    As the provider I attest to compliance with applicable laws and regulations related to telemedicine.          [facility] Adult Mental Health Day Treatment  TRACK: 1B    NUMBER OF PARTICIPANTS: 9    Summary of Group / Topics Discussed:  Mental Health Maintenance:  Vulnerability: In this group, the concept of vulnerability was explored through the viewing, discussion, and self-reflection of the Marichuy Reynoso Talk Titled,  The Power of Vulnerability.      Patient Session Goals / Objectives:  ? Defined and described definition of vulnerability   ? Identified 2 or more ways of practicing authenticity         Patient Participation / Response:  Fully participated with the group by sharing personal reflections / insights and openly received / provided feedback with other participants.    Demonstrated understanding of topics discussed through group discussion and  participation    Treatment Plan:  Patient has a current master individualized treatment plan.  See Epic treatment plan for more information.    Richard sOcar RN

## 2022-02-07 NOTE — GROUP NOTE
Psychoeducation Group Note    PATIENT'S NAME: Brandin Rodriguez  MRN:   [id number]  :   1985  [id number]  DATE OF SERVICE: 22  START TIME:  2:00 PM  END TIME:  2:50 PM  FACILITATOR: Pablito Duron OTR/L  TOPIC: MH Life Skills Group: Communication and Social Skills Development                                    Service Modality:  Video Visit     Telemedicine Visit: The patient's condition can be safely assessed and treated via synchronous audio and visual telemedicine encounter.      Reason for Telemedicine Visit: Services only offered telehealth    Originating Site (Patient Location): Patient's home    Distant Site (Provider Location): Provider Remote Setting- Home Office    Consent:  The patient/guardian has verbally consented to: the potential risks and benefits of telemedicine (video visit) versus in person care; bill my insurance or make self-payment for services provided; and responsibility for payment of non-covered services.     Mode of Communication:  Video Conference via Medical Zoom    As the provider I attest to compliance with applicable laws and regulations related to telemedicine.       [facility] Mental Health Day Treatment  TRACK: 1B    NUMBER OF PARTICIPANTS: 9    Summary of Group / Topics Discussed:  Communication and Social Skills Development: Social Supports: Social Risk Taking: Patients explored and evaluated how effective they are in different aspects of social risk taking.  Patients gained awareness of different areas in which they need/want to take risks, possible benefits of taking this risk, and action steps to take.  Patients identified both personal strengths and opportunities for growth in social risk taking to improve overall communication and connection with other people.      Patient Session Goals / Objectives:    Identified strengths and opportunities for growth in social risk taking skills and how these impact their ability to communicate  clearly with other people       Improved awareness of important aspects of social risk taking skills and how this relates to mental health recovery        Established a plan for practice of these skills in their own environments    Practiced and reflected on how to generalize taught skills to their everyday life      Patient Participation / Response:  Moderately participated, sharing some personal reflections / insights and adequately adequately received / provided feedback with other participants.    Demonstrated understanding of content through handout/group discussion , Verbalized understanding of content and Patient would benefit from additional opportunities to practice the content to be able to generalize it to their everyday life with increased intentionality, consistency, and efficacy in support of their psychiatric recovery    Treatment Plan:  Patient has a current master individualized treatment plan.  See Epic treatment plan for more information.    Pablito Duron, OTR/L

## 2022-02-07 NOTE — GROUP NOTE
Process Group Note    PATIENT'S NAME: Brandin Rodriguez  MRN:   [id number]  :   1985  ACCT. NUMBER: [id number]  DATE OF SERVICE: 22  START TIME:  1:00 PM  END TIME:  1:50 PM  [facility]ACILITATOR: [facility]; Samantha Gonzales RN  TOPIC:  Process Group    Diagnoses:  296.30 (F33.9) Major Depressive Disorder, Recurrent Episode, Unspecified _ and With mixed features  300.02 (F41.1) Generalized Anxiety Disorder  309.81 (F43.10) Posttraumatic Stress Disorder (includes Posttraumatic Stress Disorder for Children 6 Years and Younger)  Without dissociative symptoms.  4. Other Diagnoses that is relevant to services:   Substance-Related & Addictive Disorders 291.9 (F10.99) Unspecified Alcohol Related Disorder.  5. Provisional Diagnosis:  [facility] Mental [facility] Day Treatment  TRACK: 1B    NUMBER OF PARTICIPANTS: 9                                      Service Modality:  Video Visit     Telemedicine Visit: The patient's condition can be safely assessed and treated via synchronous audio and visual telemedicine encounter.      Reason for Telemedicine Visit: Services only offered telehealth    Originating Site (Patient Location): Patient's home    Distant Site (Provider Location): Provider Remote Setting- Home Office    Consent:  The patient/guardian has verbally consented to: the potential risks and benefits of telemedicine (video visit) versus in person care; bill my insurance or make self-payment for services provided; and responsibility for payment of non-covered services.     Patient would like the video invitation sent by:  My Chart    Mode of Communication:  Video Conference via Medical Zoom    As the provider I attest to compliance with applicable laws and regulations related to telemedicine.                Data:    Session content: At the start of this group, patients were invited to check in by identifying themselves, describing their current emotional status, and identifying issues  "to address in this group.   Area(s) of treatment focus addressed in this session included Symptom Management and Personal Safety.    Иван reported feeling \"not like myself\" today.  His goal for the day is to help clean around the house.      Therapeutic Interventions/Treatment Strategies:  Psychotherapist offered support, feedback and validation and reinforced use of skills.    Assessment:    Patient response:   Patient responded to session by accepting feedback, giving feedback and listening    Possible barriers to participation / learning include: and no barriers identified    Health Issues:   None reported       Substance Use Review:   Substance Use: cannabis .     Mental Status/Behavioral Observations  Appearance:   Appropriate   Eye Contact:   Good   Psychomotor Behavior: Normal   Attitude:   Cooperative   Orientation:   All  Speech   Rate / Production: Normal    Volume:  Normal   Mood:    Depressed   Affect:    Appropriate   Thought Content:   Safety reports  presence of suicidal ideation passive suicidal ideation   Thought Form:  Coherent  Logical     Insight:    Good     Plan:     Safety Plan: No current safety concerns identified.  Recommended that patient call 911 or go to the local ED should there be a change in any of these risk factors.     Barriers to treatment: None identified    Patient Contracts (see media tab):  None    Substance Use: Provided encouragement towards sobriety    Provided support and affirmation for steps taken towards sobriety      Continue or Discharge: Patient will continue in Adult Day Treatment (ADT)  as planned. Patient is likely to benefit from learning and using skills as they work toward the goals identified in their treatment plan.      Lyndsey Pratt, [facility]  February 7, 2022    "

## 2022-02-09 ENCOUNTER — HOSPITAL ENCOUNTER (OUTPATIENT)
Dept: BEHAVIORAL HEALTH | Facility: CLINIC | Age: 37
End: 2022-02-09
Attending: PSYCHIATRY & NEUROLOGY
Payer: COMMERCIAL

## 2022-02-09 ENCOUNTER — TELEPHONE (OUTPATIENT)
Dept: BEHAVIORAL HEALTH | Facility: CLINIC | Age: 37
End: 2022-02-09
Payer: COMMERCIAL

## 2022-02-09 PROCEDURE — 90853 GROUP PSYCHOTHERAPY: CPT | Mod: GT | Performed by: COUNSELOR

## 2022-02-09 PROCEDURE — G0177 OPPS/PHP; TRAIN & EDUC SERV: HCPCS | Mod: GT

## 2022-02-09 NOTE — GROUP NOTE
Process Group Note    PATIENT'S NAME: Brandin Rodriguez  MRN:   [id number]  :   1985  ACCT. NUMBER: [id number]  DATE OF SERVICE: 22  START TIME:  1:00 PM  END TIME:  1:50 PM  FACILITATOR: Lyndsey Pratt [facility]  TOPIC:  Process Group    Diagnoses:  296.30 (F33.9) Major Depressive Disorder, Recurrent Episode, Unspecified _ and With mixed features  300.02 (F41.1) Generalized Anxiety Disorder  309.81 (F43.10) Posttraumatic Stress Disorder (includes Posttraumatic Stress Disorder for Children 6 Years and Younger)  Without dissociative symptoms.  4. Other Diagnoses that is relevant to services:   Substance-Related & Addictive Disorders 291.9 (F10.99) Unspecified Alcohol Related Disorder.  5. Provisional Diagnosis:  NA.      [facility] Mental [facility] Day Treatment  TRACK: 1B    NUMBER OF PARTICIPANTS: 7                                      Service Modality:  Video Visit     Telemedicine Visit: The patient's condition can be safely assessed and treated via synchronous audio and visual telemedicine encounter.      Reason for Telemedicine Visit: Services only offered telehealth    Originating Site (Patient Location): Patient's home    Distant Site (Provider Location): Provider Remote Setting- Home Office    Consent:  The patient/guardian has verbally consented to: the potential risks and benefits of telemedicine (video visit) versus in person care; bill my insurance or make self-payment for services provided; and responsibility for payment of non-covered services.     Patient would like the video invitation sent by:  My Chart    Mode of Communication:  Video Conference via Medical Zoom    As the provider I attest to compliance with applicable laws and regulations related to telemedicine.                Data:    Session content: At the start of this group, patients were invited to check in by identifying themselves, describing their current emotional status, and identifying issues to address in this  "group.   Area(s) of treatment focus addressed in this session included Symptom Management and Personal Safety.    Иван reported feeling \"not great\" today.  His goal for the day is to be gentle with himself. Patient declined additional process time but contributed to group discussion and provided feedback and support to peers.      Therapeutic Interventions/Treatment Strategies:  Psychotherapist offered support, feedback and validation and reinforced use of skills.    Assessment:    Patient response:   Patient responded to session by accepting feedback, giving feedback and listening    Possible barriers to participation / learning include: and no barriers identified    Health Issues:   None reported       Substance Use Review:   Substance Use: cannabis .     Mental Status/Behavioral Observations  Appearance:   Appropriate   Eye Contact:   Good   Psychomotor Behavior: Normal   Attitude:   Cooperative   Orientation:   All  Speech   Rate / Production: Normal    Volume:  Normal   Mood:    Depressed  Sad   Affect:    Subdued  Tearful  Thought Content:   Clear  Thought Form:  Coherent  Logical     Insight:    Good     Plan:     Safety Plan: No current safety concerns identified.  Recommended that patient call 911 or go to the local ED should there be a change in any of these risk factors.     Barriers to treatment: None identified    Patient Contracts (see media tab):  None    Substance Use: Not addressed in session     Continue or Discharge: Patient will continue in Adult Day Treatment (ADT)  as planned. Patient is likely to benefit from learning and using skills as they work toward the goals identified in their treatment plan.      Lyndsey Pratt, [facility]  February 9, 2022    "

## 2022-02-09 NOTE — GROUP NOTE
Psychoeducation Group Note    PATIENT'S NAME: Brandin Rodriguez  MRN:   [id number]  :   1985  ACCT. NUMBER: [id number]  DATE OF SERVICE: 22  START TIME:  3:00 PM  END TIME:  3:50 PM  FACILITATOR: Samantha Gonzales RN  TOPIC: MH RN Group: Mental Health Maintenance                                    Service Modality:  Video Visit     Telemedicine Visit: The patient's condition can be safely assessed and treated via synchronous audio and visual telemedicine encounter.      Reason for Telemedicine Visit:  covid19    Originating Site (Patient Location): Patient's home    Distant Site (Provider Location): Provider Remote Setting- Home Office    Consent:  The patient/guardian has verbally consented to: the potential risks and benefits of telemedicine (video visit) versus in person care; bill my insurance or make self-payment for services provided; and responsibility for payment of non-covered services.     Patient would like the video invitation sent by:  My Chart    Mode of Communication:  Video Conference via Medical Zoom    As the provider I attest to compliance with applicable laws and regulations related to telemedicine.          [facility] Adult Mental Health Day Treatment  TRACK: 1B    NUMBER OF PARTICIPANTS: 8    Summary of Group / Topics Discussed:  Mental Health Maintenance:  Vulnerability (continued discussion): In this group, the concept of vulnerability was explored through the viewing, discussion, and self-reflection of the Marichuy Reynoso Talk Titled,  The Power of Vulnerability.      Patient Session Goals / Objectives:  ? Defined and described definition of vulnerability   ? Identified 2 or more ways of practicing authenticity         Patient Participation / Response:  Fully participated with the group by sharing personal reflections / insights and openly received / provided feedback with other participants.    Demonstrated understanding of topics discussed through group discussion and  participation    Treatment Plan:  Patient has a current master individualized treatment plan.  See Epic treatment plan for more information.    Samantha Gonzales RN

## 2022-02-09 NOTE — TELEPHONE ENCOUNTER
Left message for patient with reminder of video appointment 2/11/22.  Nuria Subramanian, CMA on 2/9/2022 at 8:58 AM

## 2022-02-09 NOTE — GROUP NOTE
Psychoeducation Group Note    PATIENT'S NAME: Brandin Rodriguez  MRN:   [id number]  :   1985  [id number]  DATE OF SERVICE: 22  START TIME:  2:00 PM  END TIME:  2:50 PM  FACILITATOR: Pablito Duron OTR/L  TOPIC: MH Life Skills Group: Resiliency Development                                    Service Modality:  Video Visit     Telemedicine Visit: The patient's condition can be safely assessed and treated via synchronous audio and visual telemedicine encounter.      Reason for Telemedicine Visit: Services only offered telehealth    Originating Site (Patient Location): Patient's home    Distant Site (Provider Location): Provider Remote Setting- Home Office    Consent:  The patient/guardian has verbally consented to: the potential risks and benefits of telemedicine (video visit) versus in person care; bill my insurance or make self-payment for services provided; and responsibility for payment of non-covered services.    Mode of Communication:  Video Conference via Medical Zoom    As the provider I attest to compliance with applicable laws and regulations related to telemedicine.       [facility] Adult Mental Health Day Treatment  TRACK: 1B    NUMBER OF PARTICIPANTS: 7    Summary of Group / Topics Discussed:  Resiliency Development:  Coping Skills(Vision Statement for Health and Wellness): Patients were taught how to identify stressors, signs of stress, coping skills, and prevention strategies for overall stress management.  Patients were given the opportunity to identify both ongoing and acute mental health symptoms and how to effectively manage these symptoms by developing an effective aftercare plan.  Patients increased awareness of community based resources.    Patient Session Goals / Objectives:    Identified how using coping skills can be used for symptom and stress management       Improved awareness of individualed symptoms and stressors and how to effectively cope      Established a relapse prevention plan to practice these skills in their own environments    Practiced and reflected on how to generalize taught skills to their everyday life        Patient Participation / Response:  Fully participated with the group by sharing personal reflections / insights and openly received / provided feedback with other participants.    Demonstrated understanding of content through video/handout/discussion , Verbalized understanding of content and Patient would benefit from additional opportunities to practice the content to be able to generalize it to their everyday life with increased intentionality, consistency, and efficacy in support of their psychiatric recovery    Treatment Plan:  Patient has a current master individualized treatment plan.  See Epic treatment plan for more information.    Pablito Duron, OTR/L

## 2022-02-11 ENCOUNTER — VIRTUAL VISIT (OUTPATIENT)
Dept: PSYCHOLOGY | Facility: CLINIC | Age: 37
End: 2022-02-11
Payer: COMMERCIAL

## 2022-02-11 DIAGNOSIS — F33.2 SEVERE EPISODE OF RECURRENT MAJOR DEPRESSIVE DISORDER, WITHOUT PSYCHOTIC FEATURES (H): Primary | ICD-10-CM

## 2022-02-11 DIAGNOSIS — F32.A DEPRESSION, UNSPECIFIED DEPRESSION TYPE: ICD-10-CM

## 2022-02-11 PROCEDURE — 90834 PSYTX W PT 45 MINUTES: CPT | Mod: 95 | Performed by: PSYCHOLOGIST

## 2022-02-11 ASSESSMENT — PATIENT HEALTH QUESTIONNAIRE - PHQ9
10. IF YOU CHECKED OFF ANY PROBLEMS, HOW DIFFICULT HAVE THESE PROBLEMS MADE IT FOR YOU TO DO YOUR WORK, TAKE CARE OF THINGS AT HOME, OR GET ALONG WITH OTHER PEOPLE: SOMEWHAT DIFFICULT
SUM OF ALL RESPONSES TO PHQ QUESTIONS 1-9: 8
SUM OF ALL RESPONSES TO PHQ QUESTIONS 1-9: 8
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
## 2022-02-12 DIAGNOSIS — F32.A DEPRESSION, UNSPECIFIED DEPRESSION TYPE: ICD-10-CM

## 2022-02-12 ASSESSMENT — PATIENT HEALTH QUESTIONNAIRE - PHQ9: SUM OF ALL RESPONSES TO PHQ QUESTIONS 1-9: 8

## 2022-02-14 ENCOUNTER — HOSPITAL ENCOUNTER (OUTPATIENT)
Dept: BEHAVIORAL HEALTH | Facility: CLINIC | Age: 37
End: 2022-02-14
Attending: PSYCHIATRY & NEUROLOGY
Payer: COMMERCIAL

## 2022-02-14 PROCEDURE — 90853 GROUP PSYCHOTHERAPY: CPT | Mod: GT | Performed by: COUNSELOR

## 2022-02-14 PROCEDURE — G0177 OPPS/PHP; TRAIN & EDUC SERV: HCPCS | Mod: GT

## 2022-02-14 NOTE — GROUP NOTE
Psychoeducation Group Note    PATIENT'S NAME: Brandin Rodriguez  MRN:   [id number]  :   1985  ACCT. NUMBER: [id number]  DATE OF SERVICE: 22  START TIME:  3:00 PM  END TIME:  3:50 PM  FACILITATOR: Samantha Gonzales RN  TOPIC: MH RN Group: Mental Health Maintenance                                    Service Modality:  Video Visit     Telemedicine Visit: The patient's condition can be safely assessed and treated via synchronous audio and visual telemedicine encounter.      Reason for Telemedicine Visit:  covid19    Originating Site (Patient Location): Patient's home    Distant Site (Provider Location): Provider Remote Setting- Home Office    Consent:  The patient/guardian has verbally consented to: the potential risks and benefits of telemedicine (video visit) versus in person care; bill my insurance or make self-payment for services provided; and responsibility for payment of non-covered services.     Patient would like the video invitation sent by:  My Chart    Mode of Communication:  Video Conference via Medical Zoom    As the provider I attest to compliance with applicable laws and regulations related to telemedicine.          [facility] Adult Mental Health Day Treatment  TRACK: 1B    NUMBER OF PARTICIPANTS: 7    Summary of Group / Topics Discussed:  Mental Health Maintenance:  Stigma: In this group patients explored stigma surrounding a mental health diagnosis.  The group discussed the way stigma impacts their own life, and discussed strategies to reduce. The relationship between physical and mental health were also explored in the context of healthcare access, treatment, and support.    Patient Session Goals / Objectives:  ? Patients identified the importance of practicing emotional hygiene  ? Patients identified ways to decrease the  impact of stigma in their own life        Patient Participation / Response:  Fully participated with the group by sharing personal reflections / insights and  openly received / provided feedback with other participants.    Demonstrated understanding of topics discussed through group discussion and participation and Identified / Expressed personal readiness to practice skills    Treatment Plan:  Patient has a current master individualized treatment plan.  See Epic treatment plan for more information.    Samantha Gonzales RN

## 2022-02-14 NOTE — GROUP NOTE
Process Group Note    PATIENT'S NAME: Brandin Rodriguez  MRN:   [id number]  :   1985  ACCT. NUMBER: [id number]  DATE OF SERVICE: 22  START TIME:  1:00 PM  END TIME:  1:50 PM  FACILITATOR: Lyndsey Pratt [facility]  TOPIC:  Process Group    Diagnoses:  296.30 (F33.9) Major Depressive Disorder, Recurrent Episode, Unspecified _ and With mixed features  300.02 (F41.1) Generalized Anxiety Disorder  309.81 (F43.10) Posttraumatic Stress Disorder (includes Posttraumatic Stress Disorder for Children 6 Years and Younger)  Without dissociative symptoms.  4. Other Diagnoses that is relevant to services:   Substance-Related & Addictive Disorders 291.9 (F10.99) Unspecified Alcohol Related Disorder.  5. Provisional Diagnosis:  NA.      [facility] Day Treatment  TRACK: 1B    NUMBER OF PARTICIPANTS: 6                                      Service Modality:  Video Visit     Telemedicine Visit: The patient's condition can be safely assessed and treated via synchronous audio and visual telemedicine encounter.      Reason for Telemedicine Visit: Services only offered telehealth    Originating Site (Patient Location): Patient's home    Distant Site (Provider Location): Provider Remote Setting- Home Office    Consent:  The patient/guardian has verbally consented to: the potential risks and benefits of telemedicine (video visit) versus in person care; bill my insurance or make self-payment for services provided; and responsibility for payment of non-covered services.     Patient would like the video invitation sent by:  My Chart    Mode of Communication:  Video Conference via Medical Zoom    As the provider I attest to compliance with applicable laws and regulations related to telemedicine.                Data:    Session content: At the start of this group, patients were invited to check in by identifying themselves, describing their current emotional status, and identifying issues to address in this  "group.   Area(s) of treatment focus addressed in this session included Symptom Management, Personal Safety and Abstinence/Relapse Prevention.    Иван reported feeling \"good\" today.  His goal for the day is to cook dinner for his girlfriend tonight.  Patient declined additional process time but contributed to group discussion and provided feedback and support to peers.      Therapeutic Interventions/Treatment Strategies:  Psychotherapist offered support, feedback and validation and reinforced use of skills.    Assessment:    Patient response:   Patient responded to session by accepting feedback, giving feedback and listening    Possible barriers to participation / learning include: and no barriers identified    Health Issues:   None reported       Substance Use Review:   Substance Use: No active concerns identified.    Mental Status/Behavioral Observations  Appearance:   Appropriate   Eye Contact:   Good   Psychomotor Behavior: Normal   Attitude:   Cooperative   Orientation:   All  Speech   Rate / Production: Normal    Volume:  Normal   Mood:    Depressed   Affect:    Appropriate   Thought Content:   Clear  Thought Form:  Coherent  Logical     Insight:    Good     Plan:     Safety Plan: No current safety concerns identified.  Recommended that patient call 911 or go to the local ED should there be a change in any of these risk factors.     Barriers to treatment: None identified    Patient Contracts (see media tab):  None    Substance Use: Not addressed in session     Continue or Discharge: Patient will continue in Adult Day Treatment (ADT)  as planned. Patient is likely to benefit from learning and using skills as they work toward the goals identified in their treatment plan.      Lyndsey Pratt, [facility]  February 14, 2022    "

## 2022-02-14 NOTE — GROUP NOTE
Psychoeducation Group Note    PATIENT'S NAME: Brandin Rodriguez  MRN:   [id number]  :   1985  ACCT. NUMBER: [id number]  DATE OF SERVICE: 22  START TIME:  2:00 PM  END TIME:  2:50 PM  FACILITATOR: Pablito Duron OTR/L  TOPIC: MH Life Skills Group: Communication and Social Skills Development                                    Service Modality:  Video Visit     Telemedicine Visit: The patient's condition can be safely assessed and treated via synchronous audio and visual telemedicine encounter.      Reason for Telemedicine Visit: Services only offered telehealth    Originating Site (Patient Location): Patient's home    Distant Site (Provider Location): Provider Remote Setting- Home Office    Consent:  The patient/guardian has verbally consented to: the potential risks and benefits of telemedicine (video visit) versus in person care; bill my insurance or make self-payment for services provided; and responsibility for payment of non-covered services.     Mode of Communication:  Video Conference via Medical Zoom    As the provider I attest to compliance with applicable laws and regulations related to telemedicine.       [facility] Mental Health Day Treatment  TRACK: 1B    NUMBER OF PARTICIPANTS: 7    Summary of Group / Topics Discussed:  Communication and Social Skills Development: Communication Styles: Am I Assertive?: Patients completed a self assessment of assertiveness skills and how this impacts their communication with other people.  Patients were given an opportunity to identify strengths as well as areas for improvement in assertive communication.    Patient Session Goals / Objectives:    Identified strengths and areas for improvement in assertive communication and how that impacts their ability to communicate clearly with other people       Improved awareness of important aspects of assertive communication and how this relates to mental health recovery        Established a plan for  practice of these skills in their own environments    Practiced and reflected on how to generalize taught skills to their everyday life      Patient Participation / Response:  Fully participated with the group by sharing personal reflections / insights and openly received / provided feedback with other participants.    Demonstrated understanding of content through handouts /discussion , Verbalized understanding of content and Patient would benefit from additional opportunities to practice the content to be able to generalize it to their everyday life with increased intentionality, consistency, and efficacy in support of their psychiatric recovery    Treatment Plan:  Patient has a current master individualized treatment plan.  See Epic treatment plan for more information.    Pablito Duron, OTR/L

## 2022-02-15 ENCOUNTER — PATIENT OUTREACH (OUTPATIENT)
Dept: NURSING | Facility: CLINIC | Age: 37
End: 2022-02-15
Payer: COMMERCIAL

## 2022-02-15 RX ORDER — BUPROPION HYDROCHLORIDE 300 MG/1
TABLET ORAL
Qty: 30 TABLET | Refills: 0 | Status: SHIPPED | OUTPATIENT
Start: 2022-02-15 | End: 2022-03-26

## 2022-02-15 NOTE — PROGRESS NOTES
Clinic Care Coordination Contact    Follow Up Progress Note      Assessment: LUZ MARINA SMITH received a voicemail from pt stating that he would like to make an appointments for a sleep study. He was told that a referral was not needed.     CC LUIS spoke with pt regarding his overall wellbeing. CC LUIS provided scheduling phone number for Sleep Study and encouraged him to contact PCP via [employer] to request referral if it is needed.    Pt is still on adderall and this continues to be very helpful. He is taking wellbutrin as well and this helps with alcohol cravings. 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 He is staying in a safe place in [place]. 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     Pt is seeing a Psychiatrist through [facility]. This psychiatrist will continue his adderall prescription.    Pt will be graduating from his Substance Use group in a couple weeks. Pt feels this will be good for him. He would like to establish with a therapist. He has an appointment somewhere at the end of March.     Care Gaps:    Health Maintenance Due   Topic Date Due     PREVENTIVE CARE VISIT  Never done     ADVANCE CARE PLANNING  Never done     DEPRESSION ACTION PLAN  Never done     INFLUENZA VACCINE (1) 09/01/2021     Postponed to focus on sobriety     Goals addressed this encounter:   Goals Addressed                    This Visit's Progress       Patient Stated       1. Mental Health Management (pt-stated)   70%      Goal Statement: Within the next 6 months, I would like to improve my overall health by decreasing my substance use and following up with medical appointments.   Date Goal set: 12/7/2020  Barriers: None  Strengths: Motivated to address health and substance use concerns  Date to Achieve By: 6/7/2021 // date extended 6/7/2022  Patient expressed understanding of goal: Иван verbally stated understanding of goal   Action steps to achieve this goal:  1. I will attend scheduled medical appointments  2. I will follow treatment  recommendations  3. I will outreach to Care Coordination  for further questions or concerns            Outreach Frequency: monthly    Plan: CC SW will outreach to pt in 1 month the discuss his overall wellbeing.    Abimbola Cross, [facility]  Clinic Care Coordinator  [facility] Cathie Emmons  [facility]  [phone]  [email or web address]

## 2022-02-16 ENCOUNTER — HOSPITAL ENCOUNTER (OUTPATIENT)
Dept: BEHAVIORAL HEALTH | Facility: CLINIC | Age: 37
End: 2022-02-16
Attending: PSYCHIATRY & NEUROLOGY
Payer: COMMERCIAL

## 2022-02-16 PROCEDURE — G0177 OPPS/PHP; TRAIN & EDUC SERV: HCPCS | Mod: GT

## 2022-02-16 PROCEDURE — 90853 GROUP PSYCHOTHERAPY: CPT | Mod: GT | Performed by: COUNSELOR

## 2022-02-16 NOTE — GROUP NOTE
Psychoeducation Group Note    PATIENT'S NAME: Brandin Rodriguez  MRN:   [id number]  :   1985  ACCT. NUMBER: [id number]  DATE OF SERVICE: 22  START TIME:  2:00 PM  END TIME:  2:50 PM  FACILITATOR: Pablito Duron OTR/L  TOPIC: MH Life Skills Group: Resiliency Development                                    Service Modality:  Video Visit     Telemedicine Visit: The patient's condition can be safely assessed and treated via synchronous audio and visual telemedicine encounter.      Reason for Telemedicine Visit: Services only offered telehealth    Originating Site (Patient Location): Patient's home    Distant Site (Provider Location): Provider Remote Setting- Home Office    Consent:  The patient/guardian has verbally consented to: the potential risks and benefits of telemedicine (video visit) versus in person care; bill my insurance or make self-payment for services provided; and responsibility for payment of non-covered services.     Mode of Communication:  Video Conference via Medical Zoom    As the provider I attest to compliance with applicable laws and regulations related to telemedicine.       [facility] Adult Mental Health Day Treatment  TRACK: 1B    NUMBER OF PARTICIPANTS: 7    Summary of Group / Topics Discussed:  Resiliency Development:  Self Awareness and Self Expression: Patients were provided with an experiential opportunity to reflect and express their important values, skills and interests, roles, relationships as they worked on strengthening their self identity.  Patients built awareness of important aspects of themselves that they want to share with others as a way to build resiliency skill and focus on good things in their lives.     Patient Session Goals / Objectives:    Identified personal values, skills and interests, roles, and relationships that contribute their self identity     Improved awareness of important aspects of themselves and how this relates to mental health  recovery and contributes to resiliency development       Established a plan for practice of these skills in their own environments    Practiced and reflected on how to generalize taught skills to their everyday life      Patient Participation / Response:  Fully participated with the group by sharing personal reflections / insights and openly received / provided feedback with other participants.    Demonstrated understanding of content through handouts/video/discussion , Verbalized understanding of content and Patient would benefit from additional opportunities to practice the content to be able to generalize it to their everyday life with increased intentionality, consistency, and efficacy in support of their psychiatric recovery    Treatment Plan:  Patient has a current master individualized treatment plan.  See Epic treatment plan for more information.    Pablito Duron, OTR/L

## 2022-02-16 NOTE — GROUP NOTE
Psychoeducation Group Note    PATIENT'S NAME: Brandin Rodriguez  MRN:   [id number]  :   1985  ACCT. NUMBER: [id number]  DATE OF SERVICE: 22  START TIME:  3:00 PM  END TIME:  3:50 PM  FACILITATOR: Samantha Gonzales RN  TOPIC: MH RN Group: Mental Health Maintenance                                    Service Modality:  Video Visit     Telemedicine Visit: The patient's condition can be safely assessed and treated via synchronous audio and visual telemedicine encounter.      Reason for Telemedicine Visit:  covid19    Originating Site (Patient Location): Patient's home    Distant Site (Provider Location): Provider Remote Setting- Home Office    Consent:  The patient/guardian has verbally consented to: the potential risks and benefits of telemedicine (video visit) versus in person care; bill my insurance or make self-payment for services provided; and responsibility for payment of non-covered services.     Patient would like the video invitation sent by:  My Chart    Mode of Communication:  Video Conference via Medical Zoom    As the provider I attest to compliance with applicable laws and regulations related to telemedicine.          [facility] Adult Mental Health Day Treatment  TRACK: 1B    NUMBER OF PARTICIPANTS: 7    Summary of Group / Topics Discussed:  Mental Health Maintenance:  Stigma: In this group patients explored stigma surrounding a mental health diagnosis.  The group discussed the way stigma impacts their own life, and discussed strategies to reduce. The relationship between physical and mental health were also explored in the context of healthcare access, treatment, and support.    Patient Session Goals / Objectives:  ? Patients identified the importance of practicing emotional hygiene  ? Patients identified ways to decrease the  impact of stigma in their own life        Patient Participation / Response:  Fully participated with the group by sharing personal reflections / insights and  openly received / provided feedback with other participants.    Demonstrated understanding of topics discussed through group discussion and participation and Identified / Expressed personal readiness to practice skills    Treatment Plan:  Patient has a current master individualized treatment plan.  See Epic treatment plan for more information.    Samantha Gonzales RN

## 2022-02-17 ENCOUNTER — HOSPITAL ENCOUNTER (OUTPATIENT)
Dept: BEHAVIORAL HEALTH | Facility: CLINIC | Age: 37
End: 2022-02-17
Attending: PSYCHIATRY & NEUROLOGY
Payer: COMMERCIAL

## 2022-02-17 PROCEDURE — 90853 GROUP PSYCHOTHERAPY: CPT | Mod: GT | Performed by: COUNSELOR

## 2022-02-17 PROCEDURE — G0177 OPPS/PHP; TRAIN & EDUC SERV: HCPCS | Mod: GT

## 2022-02-17 NOTE — GROUP NOTE
Psychoeducation Group Note    PATIENT'S NAME: Brandin Rodriguez  MRN:   [id number]  :   1985  [id number]  DATE OF SERVICE: 22  START TIME:  2:00 PM  END TIME:  2:50 PM  FACILITATOR: Pablito Duron OTR/L  TOPIC: MH Life Skills Group: Resiliency Development                                    Service Modality:  Video Visit     Telemedicine Visit: The patient's condition can be safely assessed and treated via synchronous audio and visual telemedicine encounter.      Reason for Telemedicine Visit: Services only offered telehealth    Originating Site (Patient Location): Patient's home    Distant Site (Provider Location): Provider Remote Setting- Home Office    Consent:  The patient/guardian has verbally consented to: the potential risks and benefits of telemedicine (video visit) versus in person care; bill my insurance or make self-payment for services provided; and responsibility for payment of non-covered services.     Mode of Communication:  Video Conference via Medical Zoom    As the provider I attest to compliance with applicable laws and regulations related to telemedicine.       [facility] Adult Mental Health Day Treatment  TRACK: 1B    NUMBER OF PARTICIPANTS: 7    Summary of Group / Topics Discussed:  Resiliency Development:  Coping Skills(Psychological Flexibility): Patients were taught how to identify stressors, signs of stress, coping skills, and prevention strategies for overall stress management.  Patients were given the opportunity to identify both ongoing and acute mental health symptoms and how to effectively manage these symptoms by developing an effective aftercare plan.  Patients increased awareness of community based resources.    Patient Session Goals / Objectives:    Identified how using coping skills can be used for symptom and stress management       Improved awareness of individualed symptoms and stressors and how to effectively cope     Established a relapse  prevention plan to practice these skills in their own environments    Practiced and reflected on how to generalize taught skills to their everyday life        Patient Participation / Response:  Fully participated with the group by sharing personal reflections / insights and openly received / provided feedback with other participants.    Demonstrated understanding of content through video/group discussion , Verbalized understanding of content and Patient would benefit from additional opportunities to practice the content to be able to generalize it to their everyday life with increased intentionality, consistency, and efficacy in support of their psychiatric recovery    Treatment Plan:  Patient has a current master individualized treatment plan.  See Epic treatment plan for more information.    Pablito Duron, OTR/L

## 2022-02-17 NOTE — GROUP NOTE
Psychotherapy Group Note    PATIENT'S NAME: Brandin Rodriguez  MRN:   [id number]  :   1985  ACCT. NUMBER: [id number]  DATE OF SERVICE: 22  START TIME:  3:00 PM  END TIME:  3:50 PM  FACILITATOR: Lyndsey Pratt LPCC  TOPIC: MH EBP Group: Self-Awareness  [facility] Adult Mental Health Day Treatment  TRACK: 1B    NUMBER OF PARTICIPANTS: 6                                      Service Modality:  Video Visit     Telemedicine Visit: The patient's condition can be safely assessed and treated via synchronous audio and visual telemedicine encounter.      Reason for Telemedicine Visit: Services only offered telehealth    Originating Site (Patient Location): Patient's home    Distant Site (Provider Location): Provider Remote Setting- Home Office    Consent:  The patient/guardian has verbally consented to: the potential risks and benefits of telemedicine (video visit) versus in person care; bill my insurance or make self-payment for services provided; and responsibility for payment of non-covered services.     Patient would like the video invitation sent by:  My Chart    Mode of Communication:  Video Conference via Medical Zoom    As the provider I attest to compliance with applicable laws and regulations related to telemedicine.          Summary of Group / Topics Discussed:  Self-Awareness: Self-Compassion: Patients received overview of key concepts in developing self-compassion. Patients discussed mindfulness, self-kindness, and finding common humanity. Patients identified their current approach to problems in their lives and learned skills for increasing self-compassion. Patients identified ways they can put self-compassion skills into practice and problem solve barriers to application of skills.     Patient Session Goals / Objectives:    [place] components of self-compassion    Identify ways to practice self-compassion in daily life    Problem solve barriers to self-compassion practice      Patient  Participation / Response:  Fully participated with the group by sharing personal reflections / insights and openly received / provided feedback with other participants.    Demonstrated understanding of topics discussed through group discussion and participation, Demonstrated understanding of values, strengths, and challenges to learn about themselves and Identified / Expressed readiness to act intentionally, increase self-compassion, promote personal growth    Treatment Plan:  Patient has a current master individualized treatment plan.  See Epic treatment plan for more information.    Lyndsey Pratt, [facility]

## 2022-02-21 ENCOUNTER — HOSPITAL ENCOUNTER (OUTPATIENT)
Dept: BEHAVIORAL HEALTH | Facility: CLINIC | Age: 37
End: 2022-02-21
Attending: PSYCHIATRY & NEUROLOGY
Payer: COMMERCIAL

## 2022-02-21 ENCOUNTER — VIRTUAL VISIT (OUTPATIENT)
Dept: PSYCHOLOGY | Facility: CLINIC | Age: 37
End: 2022-02-21
Payer: COMMERCIAL

## 2022-02-21 DIAGNOSIS — F10.21 ALCOHOL USE DISORDER, SEVERE, IN EARLY REMISSION (H): ICD-10-CM

## 2022-02-21 DIAGNOSIS — F32.A DEPRESSION, UNSPECIFIED DEPRESSION TYPE: ICD-10-CM

## 2022-02-21 DIAGNOSIS — Z13.39 ATTENTION DEFICIT HYPERACTIVITY DISORDER (ADHD) EVALUATION: Primary | ICD-10-CM

## 2022-02-21 PROCEDURE — G0177 OPPS/PHP; TRAIN & EDUC SERV: HCPCS | Mod: GT

## 2022-02-21 PROCEDURE — 90853 GROUP PSYCHOTHERAPY: CPT | Mod: GT

## 2022-02-21 PROCEDURE — 90834 PSYTX W PT 45 MINUTES: CPT | Mod: 95 | Performed by: PSYCHOLOGIST

## 2022-02-21 ASSESSMENT — PATIENT HEALTH QUESTIONNAIRE - PHQ9
10. IF YOU CHECKED OFF ANY PROBLEMS, HOW DIFFICULT HAVE THESE PROBLEMS MADE IT FOR YOU TO DO YOUR WORK, TAKE CARE OF THINGS AT HOME, OR GET ALONG WITH OTHER PEOPLE: SOMEWHAT DIFFICULT
SUM OF ALL RESPONSES TO PHQ QUESTIONS 1-9: 7
SUM OF ALL RESPONSES TO PHQ QUESTIONS 1-9: 7

## 2022-02-21 NOTE — GROUP NOTE
Psychoeducation Group Note    PATIENT'S NAME: Brandin Rodriguez  MRN:   [id number]  :   1985  [id number]  DATE OF SERVICE: 22  START TIME:  3:00 PM  END TIME:  3:50 PM  FACILITATOR: Samantha Gonzales RN  TOPIC: DANK RN Group: Brain Health                                    Service Modality:  Video Visit     Telemedicine Visit: The patient's condition can be safely assessed and treated via synchronous audio and visual telemedicine encounter.      Reason for Telemedicine Visit:  covid19    Originating Site (Patient Location): Patient's home    Distant Site (Provider Location): Provider Remote Setting- Home Office    Consent:  The patient/guardian has verbally consented to: the potential risks and benefits of telemedicine (video visit) versus in person care; bill my insurance or make self-payment for services provided; and responsibility for payment of non-covered services.     Patient would like the video invitation sent by:  My Chart    Mode of Communication:  Video Conference via Medical Zoom    As the provider I attest to compliance with applicable laws and regulations related to telemedicine.          [facility] Mental Health Day Treatment  TRACK: 1B    NUMBER OF PARTICIPANTS: 5    Summary of Group / Topics Discussed:  Brain Health:  Pathophysiology of Mood Disorders: Patients were educated on mood disorder etiology and neuroscience, risk factors, symptoms, and pharmacologic, psychotherapeutic, and complementary treatment options. Patients were guided on a discussion of mental, behavioral, and physical symptoms and shared their symptoms with the group.     Patient Session Goals / Objectives:  Described what mood disorders are and identified risk factors   Explained how chemical imbalances in the brain can cause symptoms and how medications work to reverse this imbalance   Identified and described pharmacologic, psychotherapeutic, and complementary treatment options      Patient  Participation / Response:  {OP MH PROGRESS NOTE PATIENT PARTICIPATION:094451}    {OP MH PROGRESS NOTE PATIENT RESPONSE - BRAIN HEALTH:411365}    Treatment Plan:  Patient has {OP  PROGRAMMATIC TX PLAN STATUS:231750}    Samantha Gonzales RN
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
## 2022-02-21 NOTE — GROUP NOTE
Psychoeducation Group Note    PATIENT'S NAME: Brandin Rodriguez  MRN:   [id number]  :   1985  [id number]  DATE OF SERVICE: 22  START TIME:  2:00 PM  END TIME:  2:50 PM  FACILITATOR: Pablito Duron OTR/L  TOPIC: MH Life Skills Group: Resiliency Development                                    Service Modality:  Video Visit     Telemedicine Visit: The patient's condition can be safely assessed and treated via synchronous audio and visual telemedicine encounter.      Reason for Telemedicine Visit: Services only offered telehealth    Originating Site (Patient Location): Patient's home    Distant Site (Provider Location): Provider Remote Setting- Home Office    Consent:  The patient/guardian has verbally consented to: the potential risks and benefits of telemedicine (video visit) versus in person care; bill my insurance or make self-payment for services provided; and responsibility for payment of non-covered services.     Mode of Communication:  Video Conference via Medical Zoom    As the provider I attest to compliance with applicable laws and regulations related to telemedicine.       [facility] Adult Mental Health Day Treatment  TRACK: 1B    NUMBER OF PARTICIPANTS: 5    Summary of Group / Topics Discussed:  Resiliency Development:  Coping Skills( Healthy Body Healthy Mind): Patients were taught how to identify stressors, signs of stress, coping skills, and prevention strategies for overall stress management.  Patients were given the opportunity to identify both ongoing and acute mental health symptoms and how to effectively manage these symptoms by developing an effective aftercare plan.  Patients increased awareness of community based resources.    Patient Session Goals / Objectives:    Identified how using coping skills can be used for symptom and stress management       Improved awareness of individualed symptoms and stressors and how to effectively cope     Established a relapse  prevention plan to practice these skills in their own environments    Practiced and reflected on how to generalize taught skills to their everyday life        Patient Participation / Response:  Fully participated with the group by sharing personal reflections / insights and openly received / provided feedback with other participants.    Demonstrated understanding of content through handouts/group discussion , Verbalized understanding of content and Patient would benefit from additional opportunities to practice the content to be able to generalize it to their everyday life with increased intentionality, consistency, and efficacy in support of their psychiatric recovery    Treatment Plan:  Patient has a current master individualized treatment plan.  See Epic treatment plan for more information.    Pablito Duron, OTR/L

## 2022-02-21 NOTE — GROUP NOTE
Process Group Note    PATIENT'S NAME: Brandin Rodriguez  MRN:   [id number]  :   1985  ACCT. NUMBER: [id number]  DATE OF SERVICE: 22  START TIME:  1:00 PM  END TIME:  1:50 PM  FACILITATOR: Jena Brito  TOPIC:  Process Group    Diagnoses:  296.30 (F33.9) Major Depressive Disorder, Recurrent Episode, Unspecified _ and With mixed features  300.02 (F41.1) Generalized Anxiety Disorder  309.81 (F43.10) Posttraumatic Stress Disorder (includes Posttraumatic Stress Disorder for Children 6 Years and Younger)  Without dissociative symptoms.  4. Other Diagnoses that is relevant to services:   Substance-Related & Addictive Disorders 291.9 (F10.99) Unspecified Alcohol Related Disorder.  5. Provisional Diagnosis:  [facility] Day Treatment  TRACK: 1B                                      Service Modality:  Video Visit     Telemedicine Visit: The patient's condition can be safely assessed and treated via synchronous audio and visual telemedicine encounter.      Reason for Telemedicine Visit: Services only offered telehealth    Originating Site (Patient Location): Patient's home    Distant Site (Provider Location): Provider Remote Setting- Home Office    Consent:  The patient/guardian has verbally consented to: the potential risks and benefits of telemedicine (video visit) versus in person care; bill my insurance or make self-payment for services provided; and responsibility for payment of non-covered services.     Patient would like the video invitation sent by:  My Chart    Mode of Communication:  Video Conference via Medical Zoom    As the provider I attest to compliance with applicable laws and regulations related to telemedicine.          NUMBER OF PARTICIPANTS: 7          Data:    Session content: At the start of this group, patients were invited to check in by identifying themselves, describing their current emotional status, and identifying issues to address in this group.    Area(s) of treatment focus addressed in this session included Symptom Management, Personal Safety, Develop / Improve Independent Living Skills and Develop Socialization / Interpersonal Relationship Skills.  Иван reported having an ADHD assessment this morning which brought up things from his childhood.  He reported feeling better now but will try to take it easy tonight.  He reported goal to clean.  He denied safety concerns.  He used marijuana for nausea.  He reported looking forward to getting connected to an individual therapist soon.      Therapeutic Interventions/Treatment Strategies:  Psychotherapist offered support, feedback and validation and reinforced use of skills. Treatment modalities used include Motivational Interviewing, Cognitive Behavioral Therapy and Dialectical Behavioral Therapy. Validated and normalized.  Highlighted and reinforced skills used; connected to positive outcomes.  Provided additional skill suggestions.  Aided in creating a plan to help work towards goals.        Assessment:    Patient response:   Patient responded to session by accepting feedback, giving feedback, listening, focusing on goals, being attentive and accepting support    Possible barriers to participation / learning include: and no barriers identified    Health Issues:   None reported       Substance Use Review:   Substance Use: cannabis .  and Last use: for nausea    Mental Status/Behavioral Observations  Appearance:   Appropriate   Eye Contact:   Good   Psychomotor Behavior: Normal   Attitude:   Cooperative   Orientation:   All  Speech   Rate / Production: Normal    Volume:  Normal   Mood:    triggered with childhood stuff but otherwise doing well  Affect:    Appropriate   Thought Content:   Rumination  Thought Form:  Coherent  Logical     Insight:    Good     Plan:     Safety Plan: No current safety concerns identified.  Recommended that patient call 911 or go to the local ED should there be a change in any of  these risk factors.     Barriers to treatment: None identified    Patient Contracts (see media tab):  None    Substance Use: Not addressed in session     Continue or Discharge: Patient will continue in Adult Day Treatment (ADT)  as planned. Patient is likely to benefit from learning and using skills as they work toward the goals identified in their treatment plan.      Jena Brito  February 21, 2022

## 2022-02-22 ASSESSMENT — PATIENT HEALTH QUESTIONNAIRE - PHQ9: SUM OF ALL RESPONSES TO PHQ QUESTIONS 1-9: 7

## 2022-02-23 ENCOUNTER — HOSPITAL ENCOUNTER (OUTPATIENT)
Dept: BEHAVIORAL HEALTH | Facility: CLINIC | Age: 37
End: 2022-02-23
Attending: PSYCHIATRY & NEUROLOGY
Payer: COMMERCIAL

## 2022-02-23 PROCEDURE — 90853 GROUP PSYCHOTHERAPY: CPT | Mod: GT | Performed by: COUNSELOR

## 2022-02-23 NOTE — GROUP NOTE
Psychoeducation Group Note    PATIENT'S NAME: Brandin Rodriguez  MRN:   [id number]  [facility]   1985  [id number]  DATE OF SERVICE: 22  START TIME:  2:00 PM  END TIME:  2:50 PM  FACILITATOR: Pablito Duron OTR/L  TOPIC: MH Life Skills Group: Resiliency Development                                    Service Modality:  Video Visit     Telemedicine Visit: The patient's condition can be safely assessed and treated via synchronous audio and visual telemedicine encounter.      Reason for Telemedicine Visit: Services only offered telehealth    Originating Site (Patient Location): Patient's home    Distant Site (Provider Location): Provider Remote Setting- Home Office    Consent:  The patient/guardian has verbally consented to: the potential risks and benefits of telemedicine (video visit) versus in person care; bill my insurance or make self-payment for services provided; and responsibility for payment of non-covered services.    Mode of Communication:  Video Conference via Medical Zoom    As the provider I attest to compliance with applicable laws and regulations related to telemedicine.       [facility] Adult Mental Health Day Treatment  TRACK: 1B    NUMBER OF PARTICIPANTS: 5    Summary of Group / Topics Discussed:  Resiliency Development:  Coping Skills-Stress Management: Patients were taught how to identify stressors, signs of stress, coping skills, and prevention strategies for overall stress management.  Patients were given the opportunity to identify both ongoing and acute mental health symptoms and how to effectively manage these symptoms by developing an effective aftercare plan.  Patients increased awareness of community based resources.    Patient Session Goals / Objectives:  Identified how using coping skills can be used for symptom and stress management     Improved awareness of individualed symptoms and stressors and how to effectively cope   Established a relapse prevention plan  to practice these skills in their own environments  Practiced and reflected on how to generalize taught skills to their everyday life        Patient Participation / Response:  {OP MH PROGRESS NOTE PATIENT PARTICIPATION:647054}    {OP MH PROGRESS NOTE PATIENT RESPONSE - LIFE SKILLS :541272}    Treatment Plan:  Patient has {OP MH PROGRAMMATIC TX PLAN STATUS:624102}    Pablito Duron, OTR/L

## 2022-02-23 NOTE — GROUP NOTE
Process Group Note    PATIENT'S NAME: Brandin Rodriguez  MRN:   [id number]  :   1985  ACCT. NUMBER: [id number]  DATE OF SERVICE: 22  START TIME:  1:00 PM  END TIME:  1:50 PM  FACILITATOR: Lyndsey Pratt [facility]  TOPIC:  Process Group    Diagnoses:  296.30 (F33.9) Major Depressive Disorder, Recurrent Episode, Unspecified _ and With mixed features  300.02 (F41.1) Generalized Anxiety Disorder  309.81 (F43.10) Posttraumatic Stress Disorder (includes Posttraumatic Stress Disorder for Children 6 Years and Younger)  Without dissociative symptoms.  4. Other Diagnoses that is relevant to services:   Substance-Related & Addictive Disorders 291.9 (F10.99) Unspecified Alcohol Related Disorder.  5. Provisional Diagnosis:  NA.      [facility] Day Treatment  TRACK: 1B    NUMBER OF PARTICIPANTS: 6                                      Service Modality:  Video Visit     Telemedicine Visit: The patient's condition can be safely assessed and treated via synchronous audio and visual telemedicine encounter.      Reason for Telemedicine Visit: Services only offered telehealth    Originating Site (Patient Location): Patient's home    Distant Site (Provider Location): Provider Remote Setting- Home Office    Consent:  The patient/guardian has verbally consented to: the potential risks and benefits of telemedicine (video visit) versus in person care; bill my insurance or make self-payment for services provided; and responsibility for payment of non-covered services.     Patient would like the video invitation sent by:  My Chart    Mode of Communication:  Video Conference via Medical Zoom    As the provider I attest to compliance with applicable laws and regulations related to telemedicine.                Data:    Session content: At the start of this group, patients were invited to check in by identifying themselves, describing their current emotional status, and identifying issues to address in this  "group.   Area(s) of treatment focus addressed in this session included Symptom Management, Personal Safety and Abstinence/Relapse Prevention.    Иван reported feeling \"not good\" today.  His goal for the day is to \"get through the day\".  He was supposed to go to [place] for his brother's birthday but is going to cancel because he's not up to it.  Patient declined additional process time but contributed to group discussion and provided feedback and support to peers.      Therapeutic Interventions/Treatment Strategies:  Psychotherapist offered support, feedback and validation and reinforced use of skills.    Assessment:    Patient response:   Patient responded to session by accepting feedback, giving feedback and listening    Possible barriers to participation / learning include: and no barriers identified    Health Issues:   None reported       Substance Use Review:   Substance Use: No active concerns identified.    Mental Status/Behavioral Observations  Appearance:   Appropriate   Eye Contact:   Good   Psychomotor Behavior: Normal   Attitude:   Cooperative   Orientation:   All  Speech   Rate / Production: Normal    Volume:  Normal   Mood:    Anxious  Depressed   Affect:    Tearful Worrisome   Thought Content:   Clear  Thought Form:  Coherent  Logical     Insight:    Good     Plan:     Safety Plan: No current safety concerns identified.  Recommended that patient call 911 or go to the local ED should there be a change in any of these risk factors.     Barriers to treatment: None identified    Patient Contracts (see media tab):  None    Substance Use: Not addressed in session     Continue or Discharge: Patient will continue in Adult Day Treatment (ADT)  as planned. Patient is likely to benefit from learning and using skills as they work toward the goals identified in their treatment plan.      Lyndsey Pratt, [facility]  February 23, 2022    "

## 2022-02-24 ENCOUNTER — HOSPITAL ENCOUNTER (OUTPATIENT)
Dept: BEHAVIORAL HEALTH | Facility: CLINIC | Age: 37
End: 2022-02-24
Attending: PSYCHIATRY & NEUROLOGY
Payer: COMMERCIAL

## 2022-02-24 PROCEDURE — 90853 GROUP PSYCHOTHERAPY: CPT | Mod: GT | Performed by: COUNSELOR

## 2022-02-24 PROCEDURE — G0177 OPPS/PHP; TRAIN & EDUC SERV: HCPCS | Mod: GT

## 2022-02-24 NOTE — GROUP NOTE
Process Group Note    PATIENT'S NAME: Brandin Rodriguez  MRN:   [id number]  :   1985  ACCT. NUMBER: [id number]  DATE OF SERVICE: 22  START TIME:  1:00 PM  END TIME:  1:50 PM  FACILITATOR: Lyndsey Pratt [facility]  TOPIC:  Process Group    Diagnoses:  296.30 (F33.9) Major Depressive Disorder, Recurrent Episode, Unspecified _ and With mixed features  300.02 (F41.1) Generalized Anxiety Disorder  309.81 (F43.10) Posttraumatic Stress Disorder (includes Posttraumatic Stress Disorder for Children 6 Years and Younger)  Without dissociative symptoms.  4. Other Diagnoses that is relevant to services:   Substance-Related & Addictive Disorders 291.9 (F10.99) Unspecified Alcohol Related Disorder.  5. Provisional Diagnosis:  NA.      [facility] Mental [facility] Day Treatment  TRACK: 1B    NUMBER OF PARTICIPANTS: 6                                      Service Modality:  Video Visit     Telemedicine Visit: The patient's condition can be safely assessed and treated via synchronous audio and visual telemedicine encounter.      Reason for Telemedicine Visit: Services only offered telehealth    Originating Site (Patient Location): Patient's home    Distant Site (Provider Location): Provider Remote Setting- Home Office    Consent:  The patient/guardian has verbally consented to: the potential risks and benefits of telemedicine (video visit) versus in person care; bill my insurance or make self-payment for services provided; and responsibility for payment of non-covered services.     Patient would like the video invitation sent by:  My Chart    Mode of Communication:  Video Conference via Medical Zoom    As the provider I attest to compliance with applicable laws and regulations related to telemedicine.                Data:    Session content: At the start of this group, patients were invited to check in by identifying themselves, describing their current emotional status, and identifying issues to address in this  "group.   Area(s) of treatment focus addressed in this session included Symptom Management, Personal Safety and Abstinence/Relapse Prevention.    Иван reported feeling \"better than yesterday.\"  His goal today is to \"take it easy\" and continue to make sure he is eating.  Patient declined additional process time but contributed to group discussion and provided feedback and support to peers.      Therapeutic Interventions/Treatment Strategies:  Psychotherapist offered support, feedback and validation and reinforced use of skills.    Assessment:    Patient response:   Patient responded to session by accepting feedback, giving feedback and listening    Possible barriers to participation / learning include: and no barriers identified    Health Issues:   None reported       Substance Use Review:   Substance Use: cannabis .     Mental Status/Behavioral Observations  Appearance:   Appropriate   Eye Contact:   Good   Psychomotor Behavior: Normal   Attitude:   Cooperative   Orientation:   All  Speech   Rate / Production: Normal    Volume:  Normal   Mood:    Anxious  Depressed   Affect:    Appropriate   Thought Content:   Clear  Thought Form:  Coherent  Logical     Insight:    Good     Plan:     Safety Plan: No current safety concerns identified.  Recommended that patient call 911 or go to the local ED should there be a change in any of these risk factors.     Barriers to treatment: None identified    Patient Contracts (see media tab):  None    Substance Use: Not addressed in session     Continue or Discharge: Patient will continue in Adult Day Treatment (ADT)  as planned. Patient is likely to benefit from learning and using skills as they work toward the goals identified in their treatment plan.      Lyndsey Pratt, [facility]  February 24, 2022    "

## 2022-02-24 NOTE — GROUP NOTE
Psychoeducation Group Note    PATIENT'S NAME: Brandin Rodriguez  MRN:   [id number]  :   1985  [id number]  DATE OF SERVICE: 22  START TIME:  2:00 PM  END TIME:  2:50 PM  FACILITATOR: Pablito Duron OTR/L  TOPIC: MH Life Skills Group: Resiliency Development                                    Service Modality:  Video Visit     Telemedicine Visit: The patient's condition can be safely assessed and treated via synchronous audio and visual telemedicine encounter.      Reason for Telemedicine Visit: Services only offered telehealth    Originating Site (Patient Location): Patient's home    Distant Site (Provider Location): Provider Remote Setting- Home Office    Consent:  The patient/guardian has verbally consented to: the potential risks and benefits of telemedicine (video visit) versus in person care; bill my insurance or make self-payment for services provided; and responsibility for payment of non-covered services.     Mode of Communication:  Video Conference via Medical Zoom    As the provider I attest to compliance with applicable laws and regulations related to telemedicine.       [facility] Adult Mental Health Day Treatment  TRACK: 1B    NUMBER OF PARTICIPANTS: 6    Summary of Group / Topics Discussed:  Resiliency Development:  Coping Skills: Personal Recovery Outcome Measure: Patients were taught how to identify coping strategies and routines that they can adopt and use for management with focus on a balance between physical, emotional, social and spiritual strategies.    Patient Session Goals / Objectives:    Identified personal definitions of recovery for effectively managing both mental health and substance abuse/abuse symptoms     Improved awareness of the process of recovery and skills and strategies that support this       Established a plan for practice of these skills in their own environments    Practiced and reflected on how to generalize taught skills to their  everyday life      Patient Participation / Response:  Fully participated with the group by sharing personal reflections / insights and openly received / provided feedback with other participants.    Demonstrated understanding of content through handout/discussion , Verbalized understanding of content and Patient would benefit from additional opportunities to practice the content to be able to generalize it to their everyday life with increased intentionality, consistency, and efficacy in support of their psychiatric recovery    Treatment Plan:  Patient has a current master individualized treatment plan.  See Epic treatment plan for more information.    Pablito Duron, OTR/L

## 2022-02-24 NOTE — GROUP NOTE
Psychotherapy Group Note    PATIENT'S NAME: Brandin Rodriguez  MRN:   [id number]  :   1985  [id number]  DATE OF SERVICE: 22  START TIME:  3:00 PM  END TIME:  3:50 PM  FACILITATOR: Lyndsey Pratt [facility]  TOPIC:  EBP Group: Emotions Management  [facility] Adult Mental Health Day Treatment  TRACK: 1B    NUMBER OF PARTICIPANTS: 6                                      Service Modality:  Video Visit     Telemedicine Visit: The patient's condition can be safely assessed and treated via synchronous audio and visual telemedicine encounter.      Reason for Telemedicine Visit: Services only offered telehealth    Originating Site (Patient Location): Patient's home    Distant Site (Provider Location): Provider Remote Setting- Home Office    Consent:  The patient/guardian has verbally consented to: the potential risks and benefits of telemedicine (video visit) versus in person care; bill my insurance or make self-payment for services provided; and responsibility for payment of non-covered services.     Patient would like the video invitation sent by:  My Chart    Mode of Communication:  Video Conference via Medical Zoom    As the provider I attest to compliance with applicable laws and regulations related to telemedicine.          Summary of Group / Topics Discussed:  Emotions Management: Understanding Emotions: Patients discussed the purpose of emotions and function they serve in our lives.  Reviewed core emotions, why they happen (triggers), and how they occur. The group assisted one anothers' understanding that: emotional experiences are important; difficult emotions have a place in our lives; and the differences between various emotions.    Patient Session Goals / Objectives:    Demonstrate understanding of types various emotions    Identify and discuss specific emotions and when they occur; understand triggers    Discuss barriers to emotional regulation      Patient Participation /  Response:  Fully participated with the group by sharing personal reflections / insights and openly received / provided feedback with other participants.    Demonstrated understanding of topics discussed through group discussion and participation, Expressed understanding of the relevance / importance of emotions management skills at distressing times in life, Self-aware of experiences with difficult emotions, and strategies to employ to manage them and Demonstrated knowledge of when to consider applying a variety of emotions management skills in daily life    Treatment Plan:  Patient has a current master individualized treatment plan.  See Epic treatment plan for more information.    Lyndsey Pratt, [facility]

## 2022-02-24 NOTE — ADDENDUM NOTE
Encounter addended by: Samantha Gonzales RN on: 2/24/2022 11:21 AM   Actions taken: Flowsheet accepted

## 2022-02-27 ENCOUNTER — HEALTH MAINTENANCE LETTER (OUTPATIENT)
Age: 37
End: 2022-02-27

## 2022-02-28 ENCOUNTER — HOSPITAL ENCOUNTER (OUTPATIENT)
Dept: BEHAVIORAL HEALTH | Facility: CLINIC | Age: 37
End: 2022-02-28
Attending: PSYCHIATRY & NEUROLOGY
Payer: COMMERCIAL

## 2022-02-28 PROCEDURE — 90853 GROUP PSYCHOTHERAPY: CPT | Mod: GT | Performed by: COUNSELOR

## 2022-02-28 PROCEDURE — G0177 OPPS/PHP; TRAIN & EDUC SERV: HCPCS | Mod: GT

## 2022-02-28 NOTE — GROUP NOTE
Process Group Note    PATIENT'S NAME: Brandin Rodriguez  MRN:   [id number]  :   1985  ACCT. NUMBER: [id number]  DATE OF SERVICE: 22  START TIME:  2:00 PM  END TIME:  2:50 PM  FACILITATOR: Lyndsey Pratt [facility]  TOPIC:  Process Group    Diagnoses:  296.30 (F33.9) Major Depressive Disorder, Recurrent Episode, Unspecified _ and With mixed features  300.02 (F41.1) Generalized Anxiety Disorder  309.81 (F43.10) Posttraumatic Stress Disorder (includes Posttraumatic Stress Disorder for Children 6 Years and Younger)  Without dissociative symptoms.  4. Other Diagnoses that is relevant to services:   Substance-Related & Addictive Disorders 291.9 (F10.99) Unspecified Alcohol Related Disorder.  5. Provisional Diagnosis:  NA.      [facility] Mental [facility] Day Treatment  TRACK: 1B    NUMBER OF PARTICIPANTS: 7                                      Service Modality:  Video Visit     Telemedicine Visit: The patient's condition can be safely assessed and treated via synchronous audio and visual telemedicine encounter.      Reason for Telemedicine Visit: Services only offered telehealth    Originating Site (Patient Location): Patient's home    Distant Site (Provider Location): Provider Remote Setting- Home Office    Consent:  The patient/guardian has verbally consented to: the potential risks and benefits of telemedicine (video visit) versus in person care; bill my insurance or make self-payment for services provided; and responsibility for payment of non-covered services.     Patient would like the video invitation sent by:  My Chart    Mode of Communication:  Video Conference via Medical Zoom    As the provider I attest to compliance with applicable laws and regulations related to telemedicine.                Data:    Session content: At the start of this group, patients were invited to check in by identifying themselves, describing their current emotional status, and identifying issues to address in this  "group.   Area(s) of treatment focus addressed in this session included Symptom Management and Personal Safety.    Иван reported feeling \"better\" today.  His goal today was to set up a psychiatrist appointment for tomorrow, which he did.  He also needs to go get his oil changed today.  Patient declined additional process time but contributed to group discussion and provided feedback and support to peers.      Therapeutic Interventions/Treatment Strategies:  Psychotherapist offered support, feedback and validation and reinforced use of skills.    Assessment:    Patient response:   Patient responded to session by accepting feedback, giving feedback and listening    Possible barriers to participation / learning include: and no barriers identified    Health Issues:   None reported       Substance Use Review:   Substance Use: cannabis .     Mental Status/Behavioral Observations  Appearance:   Appropriate   Eye Contact:   Good   Psychomotor Behavior: Normal   Attitude:   Cooperative   Orientation:   All  Speech   Rate / Production: Normal    Volume:  Normal   Mood:    Anxious  Depressed   Affect:    Appropriate   Thought Content:   Clear  Thought Form:  Coherent  Logical     Insight:    Good     Plan:     Safety Plan: No current safety concerns identified.  Recommended that patient call 911 or go to the local ED should there be a change in any of these risk factors.     Barriers to treatment: None identified    Patient Contracts (see media tab):  None    Substance Use: Provided encouragement towards sobriety    Provided support and affirmation for steps taken towards sobriety      Continue or Discharge: Patient will continue in Adult Day Treatment (ADT)  as planned. Patient is likely to benefit from learning and using skills as they work toward the goals identified in their treatment plan.      Lyndsey Pratt, [facility]  February 28, 2022    "

## 2022-02-28 NOTE — GROUP NOTE
Psychoeducation Group Note    PATIENT'S NAME: Brandin Rodriguez  MRN:   [id number]  :   1985  [id number]  DATE OF SERVICE: 22  START TIME:  1:00 PM  END TIME:  1:50 PM  FACILITATOR: Pablito Duron OTR/L  TOPIC: MH Life Skills Group: Resiliency Development                                    Service Modality:  Video Visit     Telemedicine Visit: The patient's condition can be safely assessed and treated via synchronous audio and visual telemedicine encounter.      Reason for Telemedicine Visit: Services only offered telehealth    Originating Site (Patient Location): Patient's home    Distant Site (Provider Location): Provider Remote Setting- Home Office    Consent:  The patient/guardian has verbally consented to: the potential risks and benefits of telemedicine (video visit) versus in person care; bill my insurance or make self-payment for services provided; and responsibility for payment of non-covered services.     Mode of Communication:  Video Conference via Medical Zoom    As the provider I attest to compliance with applicable laws and regulations related to telemedicine.       [facility] Adult Mental Health Day Treatment  TRACK: 1B    NUMBER OF PARTICIPANTS: 7    Summary of Group / Topics Discussed:  Resiliency Development:  Coping Skills(Motivation): Patients were taught how to identify stressors, signs of stress, coping skills, and prevention strategies for overall stress management.  Patients were given the opportunity to identify both ongoing and acute mental health symptoms and how to effectively manage these symptoms by developing an effective aftercare plan.  Patients increased awareness of community based resources.    Patient Session Goals / Objectives:    Identified how using coping skills can be used for symptom and stress management       Improved awareness of individualed symptoms and stressors and how to effectively cope     Established a relapse prevention plan  to practice these skills in their own environments    Practiced and reflected on how to generalize taught skills to their everyday life        Patient Participation / Response:  Fully participated with the group by sharing personal reflections / insights and openly received / provided feedback with other participants.    Demonstrated understanding of content through video/handouts/discussion , Verbalized understanding of content and Patient would benefit from additional opportunities to practice the content to be able to generalize it to their everyday life with increased intentionality, consistency, and efficacy in support of their psychiatric recovery    Treatment Plan:  Patient has a current master individualized treatment plan.  See Epic treatment plan for more information.    Pablito Duron, OTR/L

## 2022-03-02 ENCOUNTER — HOSPITAL ENCOUNTER (OUTPATIENT)
Dept: BEHAVIORAL HEALTH | Facility: CLINIC | Age: 37
End: 2022-03-02
Attending: PSYCHIATRY & NEUROLOGY
Payer: COMMERCIAL

## 2022-03-02 PROCEDURE — G0177 OPPS/PHP; TRAIN & EDUC SERV: HCPCS | Mod: GT

## 2022-03-02 PROCEDURE — 90853 GROUP PSYCHOTHERAPY: CPT | Mod: GT | Performed by: COUNSELOR

## 2022-03-02 NOTE — GROUP NOTE
Psychoeducation Group Note    PATIENT'S NAME: Brandin Rodriguez  MRN:   [id number]  :   1985  [id number]  DATE OF SERVICE: 3/02/22  START TIME:  2:00 PM  END TIME:  2:50 PM  FACILITATOR: Pablito Duron OTR/L  TOPIC: MH Life Skills Group: Lifestyle Balance and Structure                                    Service Modality:  Video Visit     Telemedicine Visit: The patient's condition can be safely assessed and treated via synchronous audio and visual telemedicine encounter.      Reason for Telemedicine Visit: Services only offered telehealth    Originating Site (Patient Location): Patient's home    Distant Site (Provider Location): Provider Remote Setting- Home Office    Consent:  The patient/guardian has verbally consented to: the potential risks and benefits of telemedicine (video visit) versus in person care; bill my insurance or make self-payment for services provided; and responsibility for payment of non-covered services.     Mode of Communication:  Video Conference via Medical Zoom    As the provider I attest to compliance with applicable laws and regulations related to telemedicine.       [facility] Adult Mental Health Day Treatment  TRACK: 1B    NUMBER OF PARTICIPANTS: 7    Summary of Group / Topics Discussed:  Lifestyle Balance and Strucure:  Time Management: Time for Tips and Tips for Time: Patients were introduced to how effective time management is beneficial to self esteem, relationships with others, life balance, and other aspects of daily life.   Patients were taught strategies on how to improve time management skills.    Patient Session Goals / Objectives:    Facilitated the discussion on challenges/barriers and personal situations with time management     Identified specific techniques and strategies to improve time management to support mental health recovery       Identified a plan to implement strategies into daily life to support improved time management        Patient Participation / Response:  Fully participated with the group by sharing personal reflections / insights and openly received / provided feedback with other participants.    Demonstrated understanding of content through handouts/group discussion , Verbalized understanding of content and Patient would benefit from additional opportunities to practice the content to be able to generalize it to their everyday life with increased intentionality, consistency, and efficacy in support of their psychiatric recovery    Treatment Plan:  Patient has See Epic Treatment Plan - Patient is discharging.    Pablito Duron, OTR/L

## 2022-03-02 NOTE — GROUP NOTE
Psychoeducation Group Note    PATIENT'S NAME: Brandin Rodriguez  MRN:   [id number]  :   1985  [id number]  DATE OF SERVICE: 3/02/22  START TIME:  3:00 PM  END TIME:  3:50 PM  FACILITATOR: Samantha Gonzales RN  TOPIC: MH RN Group: Mind/Body Practice & Complementary                                    Service Modality:  Video Visit     Telemedicine Visit: The patient's condition can be safely assessed and treated via synchronous audio and visual telemedicine encounter.      Reason for Telemedicine Visit:  covid19    Originating Site (Patient Location): Patient's home    Distant Site (Provider Location): Provider Remote Setting- Home Office    Consent:  The patient/guardian has verbally consented to: the potential risks and benefits of telemedicine (video visit) versus in person care; bill my insurance or make self-payment for services provided; and responsibility for payment of non-covered services.     Patient would like the video invitation sent by:  My Chart    Mode of Communication:  Video Conference via Medical Zoom    As the provider I attest to compliance with applicable laws and regulations related to telemedicine.          [facility] Mental Health Day Treatment  TRACK: 1B    NUMBER OF PARTICIPANTS: 7    Summary of Group / Topics Discussed:  Mind/Body Practice & Complementary Therapies:  Progressive Muscle Relaxation: In addition to affecting our mood and behavior, psychological stress can cause a myriad of physical symptoms in our body. Patients were educated on these effects and guided to increased self-awareness of how stress affects their body. The purpose, benefits, history, and techniques of progressive muscle relaxation were discussed. In an instructor guided experiential, patients were guided to practice PMR to help reduce physical symptoms of psychological stress and achieve a more balanced feeling of well-being.    Patient Session Goals / Objectives:  ? Identified  physiological symptoms of stress on the body  ? Listed & Explained the purpose and benefits to practicing PMR   ? Practiced progressive muscle relaxation experiential      Patient Participation / Response:  Fully participated with the group by sharing personal reflections / insights and openly received / provided feedback with other participants.    Demonstrated understanding of topics discussed through group discussion and participation and Identified / Expressed personal readiness to practice skills    Treatment Plan:  Patient has See Epic Treatment Plan - Patient is discharging.    Samantha Gonzales RN

## 2022-03-02 NOTE — GROUP NOTE
Process Group Note    PATIENT'S NAME: Brandin Rodriguez  MRN:   [id number]  :   1985  ACCT. NUMBER: [id number]  DATE OF SERVICE: 3/02/22  START TIME:  1:00 PM  END TIME:  1:50 PM  FACILITATOR: Lyndsey Pratt [facility]  TOPIC:  Process Group    Diagnoses:  296.30 (F33.9) Major Depressive Disorder, Recurrent Episode, Unspecified _ and With mixed features  300.02 (F41.1) Generalized Anxiety Disorder  309.81 (F43.10) Posttraumatic Stress Disorder (includes Posttraumatic Stress Disorder for Children 6 Years and Younger)  Without dissociative symptoms.  4. Other Diagnoses that is relevant to services:   Substance-Related & Addictive Disorders 291.9 (F10.99) Unspecified Alcohol Related Disorder.  5. Provisional Diagnosis:  NA.      [facility] Day Treatment  TRACK: 1B    NUMBER OF PARTICIPANTS: 6                                      Service Modality:  Video Visit     Telemedicine Visit: The patient's condition can be safely assessed and treated via synchronous audio and visual telemedicine encounter.      Reason for Telemedicine Visit: Services only offered telehealth    Originating Site (Patient Location): Patient's home    Distant Site (Provider Location): Provider Remote Setting- Home Office    Consent:  The patient/guardian has verbally consented to: the potential risks and benefits of telemedicine (video visit) versus in person care; bill my insurance or make self-payment for services provided; and responsibility for payment of non-covered services.     Patient would like the video invitation sent by:  My Chart    Mode of Communication:  Video Conference via Medical Zoom    As the provider I attest to compliance with applicable laws and regulations related to telemedicine.                Data:    Session content: At the start of this group, patients were invited to check in by identifying themselves, describing their current emotional status, and identifying issues to address in this  "group.   Area(s) of treatment focus addressed in this session included Symptom Management and Personal Safety.    Иван reported feeling \"okay\" but \"a little sad\" because it's his last day.  His goal was to make it to group today and also needs to renew his license tabs.  He took time to reflect on his stay in ADT and how much things have improved.     Therapeutic Interventions/Treatment Strategies:  Psychotherapist offered support, feedback and validation and reinforced use of skills. Treatment modalities used include Motivational Interviewing, Cognitive Behavioral Therapy and Dialectical Behavioral Therapy. Interventions include Other: Explored with patient how life transitions may impact mental health and functioning .    Assessment:    Patient response:   Patient responded to session by accepting feedback, giving feedback and listening    Possible barriers to participation / learning include: and no barriers identified    Health Issues:   None reported       Substance Use Review:   Substance Use: cannabis .     Mental Status/Behavioral Observations  Appearance:   Appropriate   Eye Contact:   Good   Psychomotor Behavior: Normal   Attitude:   Cooperative   Orientation:   All  Speech   Rate / Production: Normal    Volume:  Normal   Mood:    Normal  Affect:    Appropriate   Thought Content:   Clear  Thought Form:  Coherent  Logical     Insight:    Good     Plan:     Safety Plan: No current safety concerns identified.  Recommended that patient call 911 or go to the local ED should there be a change in any of these risk factors.     Barriers to treatment: None identified    Patient Contracts (see media tab):  None    Substance Use: Not addressed in session     Continue or Discharge: Patient is being discharged today. See Treatment Plan and Discharge Summary.       Lyndsey Pratt, [facility]  March 2, 2022    "

## 2022-03-07 ENCOUNTER — TELEPHONE (OUTPATIENT)
Dept: BEHAVIORAL HEALTH | Facility: CLINIC | Age: 37
End: 2022-03-07
Payer: COMMERCIAL

## 2022-03-07 NOTE — TELEPHONE ENCOUNTER
called Pt today regarding the clinic. He is able to start this week on Wednesday.  Writer will request appointments for this group and gave Pt a little more information about the track.

## 2022-03-09 ENCOUNTER — HOSPITAL ENCOUNTER (OUTPATIENT)
Dept: BEHAVIORAL HEALTH | Facility: CLINIC | Age: 37
Discharge: HOME | End: 2022-03-09
Attending: PSYCHIATRY & NEUROLOGY
Payer: COMMERCIAL

## 2022-03-09 PROCEDURE — 90853 GROUP PSYCHOTHERAPY: CPT | Mod: GT

## 2022-03-09 PROCEDURE — 90853 GROUP PSYCHOTHERAPY: CPT | Mod: HQ,GT

## 2022-03-09 NOTE — PROGRESS NOTES
Adult Day Treatment Program:  Individualized Treatment Plan       Date of Plan: 3/9/22    Name: Brandin Rodriguez MRN: [id number]    : 1985     Program: Adult Day Treatment Program (ADT)    Clinical Track: clinic 3    DSM5 Diagnosis:  296.30 (F33.9) Major Depressive Disorder, Recurrent Episode, Unspecified _ and With mixed features  300.02 (F41.1) Generalized Anxiety Disorder  309.81 (F43.10) Posttraumatic Stress Disorder (includes Posttraumatic Stress Disorder for Children 6 Years and Younger)  Without dissociative symptoms.  4. Other Diagnoses that is relevant to services:   Substance-Related & Addictive Disorders 291.9 (F10.99) Unspecified Alcohol Related Disorder.  5. Provisional Diagnosis:  NA.      ADT Multidisciplinary Team Members:  Dr. Brandin Hansen MD, Idalia Cole [facility]  Brandin Rodriguez will participate in the Adult Day Treatment Program 1 days per week, 2 hours per day.   Anticipated duration/discharge: 12 weeks    Due to COVID-19, services will be delivered via telemedicine until further notice.     Program Start Date: 3/9/22  Anticipated Discharge Date: 22 (pending authorization/clinical changes)    Review Date: Does Brandin Rodriguez continue to meet criteria to participate in the ADT Program, 1 days per week; 2 hours per day?   3/9/22 yes                     Client Strengths:  caring, empathetic, goal-focused, good listener, has a previous history of therapy, insightful, intelligent, motivated, open to learning, support of family, friends and providers and supportive    Client Participation in Plan:  Contributed to goals and plan     Areas of Vulnerability:  Suicidal Ideation   Anxiety  Depressive symptoms     Long-Term Goals:  Knowledge about illness and management of symptoms   Maintenance of personal safety     Abuse Prevention Plan:  Safe, therapeutic environment   Safety coping plan as needed   Education regarding illness and skill development   Coordination with care  "providers     Discharge Criteria:  Satisfactory progress toward treatment goals   Improvement re: identified problems and symptoms   Ability to continue recovery at next level of service   Has a discharge plan in place   Has safety/coping plan in place   Ability to maintain sobriety     Areas of Treatment Focus     Why are you seeking treatment/What do you want to focus on during treatment? \"depression, ptsd and suicidal ideation\"       Area of Treatment Focus:   Personal Safety  Start Date:    3/9/22    Goal:  Target Date: 5/4/22 Status: ended  Client will notify staff when needing assistance to develop or implement a coping plan to manage suicidal or self injurious urges.  Client will use coping plan for safety, as needed.      Progress:           Treatment Strategies:   Assess / reassess level of potential for harm to self or others  Engage in safety planning when indicated          Area of Treatment Focus:   Symptom Stabilization and Management  Start Date:    3/9/22    Goal:  Target Date: 5/4/22 Status: completed  Will report on symptoms and identify skills to use to manage depression, ptsd and anxiety      Progress:           Treatment Strategies:   Facilitate increased self awareness  Teach adaptive coping skills and communication skills        Area of Treatment Focus:   Community Resources / Support and Discharge Planning  Start Date:    3/9    Goal:  Target Date: 5/4 Status: completed  Will develop a discharge plan      Progress:           Treatment Strategies:   Assist with discharge planning     OLGA Dixon      NOTE: Signatures are available on the Acknowledgement of Treatment Plan located in Chart Review    The Individualized Treatment Plan Signature Page has been routed to the provider for co-sign.        "

## 2022-03-09 NOTE — GROUP NOTE
Process Group Note    PATIENT'S NAME: Brandin Rodriguez  MRN:   [id number]  :   1985  ACCT. NUMBER: [id number]  DATE OF SERVICE: 3/09/22  START TIME:  1:00 PM  END TIME:  3:00 PM  FACILITATOR: Idalia Cole LICSW  TOPIC:  Process Group    Diagnoses:  296.30 (F33.9) Major Depressive Disorder, Recurrent Episode, Unspecified _ and With mixed features  300.02 (F41.1) Generalized Anxiety Disorder  309.81 (F43.10) Posttraumatic Stress Disorder (includes Posttraumatic Stress Disorder for Children 6 Years and Younger)  Without dissociative symptoms.  4. Other Diagnoses that is relevant to services:   Substance-Related & Addictive Disorders 291.9 (F10.99) Unspecified Alcohol Related Disorder.  5. Provisional Diagnosis:  NA.      [facility] Adult Mental Health Day Treatment  TRACK: clinic 3    NUMBER OF PARTICIPANTS:                             Service Modality:  Video Visit     Telemedicine Visit: The patient's condition can be safely assessed and treated via synchronous audio and visual telemedicine encounter.      Reason for Telemedicine Visit: Services only offered telehealth    Originating Site (Patient Location): Patient's home    Distant Site (Provider Location): [facility]ENT[facility] HEALTH & [facility]    Consent:  The patient/guardian has verbally consented to: the potential risks and benefits of telemedicine (video visit) versus in person care; bill my insurance or make self-payment for services provided; and responsibility for payment of non-covered services.     Patient would like the video invitation sent by:  My Chart    Mode of Communication:  Video Conference via Medical Zoom    As the provider I attest to compliance with applicable laws and regulations related to telemedicine.               Data:    Session content: At the start of this group, patients were invited to check in by identifying themselves, describing their current emotional status, and identifying issues to address  in this group.   Area(s) of treatment focus addressed in this session included Symptom Management and Personal Safety.  Pt is welcomed and oriented to clinic on this his first day in the group. He reports a long struggle with anxiety, depression, ptsd and suicidal ideation. He feels anxious about starting the group today and had been anxious about not having group since discharge from [facility]. He identifies coping skills as self-care, including healthy eating. He is proud of completing tasks this week. Denies safety concerns.  In the second hour patients learned about mindsets that contribute to procrastination and discussed ways procrastination increases anxiety and depression. Each patient identified their own challenges and ways to decrease procrastination.  Therapeutic Interventions/Treatment Strategies:  Psychotherapist offered support, feedback and validation and reinforced use of skills. Treatment modalities used include Cognitive Behavioral Therapy. Interventions include Behavioral Activation: Explored how behaviors effect mood and interact with thoughts and feelings and Encouraged strategies to reduce individual procrastination and increase motivation by increasing goal-directed activities to enhance mood and reduce symptoms. and Symptoms Management: Promoted understanding of their diagnoses and how it impacts their functioning.    Assessment:    Patient response:   Patient responded to session by accepting feedback, listening, focusing on goals, being attentive and accepting support    Possible barriers to participation / learning include: and no barriers identified    Health Issues:   None reported       Substance Use Review:   Substance Use: cannabis .  and Last use: weekend    Mental Status/Behavioral Observations  Appearance:   Appropriate   Eye Contact:   Good   Psychomotor Behavior: Normal   Attitude:   Cooperative   Orientation:   All  Speech   Rate / Production: Normal    Volume:  Normal    Mood:    Anxious  Depressed   Affect:    Subdued   Thought Content:   Clear  Thought Form:  Coherent  Logical     Insight:    Good     Plan:     Safety Plan: No current safety concerns identified.  Recommended that patient call 911 or go to the local ED should there be a change in any of these risk factors.     Barriers to treatment: None identified    Patient Contracts (see media tab):  None    Substance Use: Not addressed in session     Continue or Discharge: Patient will continue in Adult Day Treatment (ADT)  as planned. Patient is likely to benefit from learning and using skills as they work toward the goals identified in their treatment plan.      Idalia Jackson, LICSW  March 9, 2022

## 2022-03-09 NOTE — PROGRESS NOTES
Acknowledgement of Current Treatment Plan       I have reviewed my treatment plan with my therapist / counselor on 3/9/22.   I agree with the plan as it is written in the electronic health record. (Clinic 3)    Name:      Signature:  Brandin Rodriguez Verbal ok given due to marianela Hansen MD  Psychiatrist/Medical Director Brandin Hansen MD on 3/10/2022 at 9:29 AM   Idalia Cole [facility]  Psychotherapist Brandin Hansen MD on 3/9/2022 at 4:38 PM

## 2022-03-16 ENCOUNTER — TELEPHONE (OUTPATIENT)
Dept: BEHAVIORAL HEALTH | Facility: CLINIC | Age: 37
End: 2022-03-16
Payer: COMMERCIAL

## 2022-03-16 NOTE — ADDENDUM NOTE
Encounter addended by: Idalia Cole LICSW on: 3/16/2022 8:36 AM   Actions taken: Clinical Note Signed

## 2022-03-16 NOTE — TELEPHONE ENCOUNTER
Writer called Pt today to see if able to join group.  Left message requesting them to call the program line at [phone] to either get the email link to group or to call in their absence for the day.

## 2022-03-16 NOTE — PROGRESS NOTES
Acknowledgement of Current Treatment Plan       I have reviewed my treatment plan with my therapist / counselor on 3/9/22.   I agree with the plan as it is written in the electronic health record. (Clinic Three)    Name:      Signature:  Brandin Rodriguez   Verbal ok given due to marianela Hansen MD  Psychiatrist/Medical Director Brandin Hansen MD on 3/16/2022 at 4:07 PM   OLGA Young  Psychotherapist Idalia Cole [facility]

## 2022-03-17 ENCOUNTER — PATIENT OUTREACH (OUTPATIENT)
Dept: CARE COORDINATION | Facility: CLINIC | Age: 37
End: 2022-03-17
Payer: COMMERCIAL

## 2022-03-17 ENCOUNTER — TELEPHONE (OUTPATIENT)
Dept: BEHAVIORAL HEALTH | Facility: CLINIC | Age: 37
End: 2022-03-17
Payer: COMMERCIAL

## 2022-03-17 NOTE — PROGRESS NOTES
Clinic Care Coordination Contact  [facility]/Voicemail       Clinical Data: Care Coordinator Outreach    Outreach attempted x 1.  Left message on patient's voicemail with call back information and requested return call.    Plan: Care Coordinator will try to reach patient again in 3-5 business days.    Abimbola Cross [facility]  Clinic Care Coordinator  [facility] Cathie Harmon  [facility]  [phone]  [email or web address]

## 2022-03-18 ENCOUNTER — NURSE TRIAGE (OUTPATIENT)
Dept: NURSING | Facility: CLINIC | Age: 37
End: 2022-03-18
Payer: COMMERCIAL

## 2022-03-18 NOTE — TELEPHONE ENCOUNTER
RN triage  Call from pt   Pt states L side neck pain since yesterday   No injury   No chest pain no diff breathing   No numbness or tingling   Able to use arms/hands   Rates pain 6-9/10 varies w/ moving   Pt states he thinks he may have a hernia in his neck -- ' blueberry' size lump L neck   Not red - not warm     Per protocol = should be seen     Transfer to      Jenny Alcala RN  [facility]  Triage Nurse Advisor    COVID 19 Nurse Triage Plan/Patient Instructions    Please be aware that novel coronavirus (COVID-19) may be circulating in the community. If you develop symptoms such as fever, cough, or SOB or if you have concerns about the presence of another infection including coronavirus (COVID-19), please contact your health care provider or visit [email or web address].     Disposition/Instructions    In-Person Visit with provider recommended. Reference Visit Selection Guide.    Thank you for taking steps to prevent the spread of this virus.  o Limit your contact with others.  o Wear a simple mask to cover your cough.  o Wash your hands well and often.    Resources    [facility]: About COVID-19: [email or web address]    CDC: What to Do If You're Sick: www.cdc.gov/coronavirus/2019-ncov/about/steps-when-sick.html    CDC: Ending Home Isolation: www.cdc.gov/coronavirus/2019-ncov/hcp/disposition-in-home-patients.html     CDC: Caring for Someone: www.cdc.gov/coronavirus/2019-ncov/if-you-are-sick/care-for-someone.html     [facility]: Interim Guidance for Hospital Discharge to Home: www.health.[facility].mn.us/diseases/coronavirus/hcp/hospdischarge.pdf    [facility] clinical trials (COVID-19 research studies): [email or web address]/um-clinical-trials     Below are the COVID-19 hotlines at the [place] Department of Health ([facility]). Interpreters are available.   o For health questions: Call 155-103-3021 or 1-532.869.6274 (7 a.m. to 7 p.m.)  o For questions about schools and childcare: Call  562.923.2145 or 1-551.331.4571 (7 a.m. to 7 p.m.)     Reason for Disposition    Patient wants to be seen    Additional Information    Negative: Shock suspected (e.g., cold/pale/clammy skin, too weak to stand, low BP, rapid pulse)    Negative: Similar pain previously and it was from 'heart attack'    Negative: Similar pain previously from 'angina' and not relieved by nitroglycerin    Negative: Difficult to awaken or acting confused (e.g., disoriented, slurred speech)    Negative: Sounds like a life-threatening emergency to the triager    Negative: Chest pain    Negative: Difficulty breathing or unusual sweating (e.g., sweating without exertion)    Negative: Chest pain lasting longer than 5 minutes    Negative: Stiff neck (can't touch chin to chest) and has headache    Negative: Stiff neck (can't touch chin to chest) and fever    Negative: Weakness of an arm or hand    Negative: Problems with bowel or bladder control    Negative: SEVERE pain (e.g., excruciating, unable to do any normal activities)    Negative: Head is twisting to one side (or ask 'is it turning against your will?')    Negative: Fever > 103 F (39.4 C)    Negative: Fever > 100.0 F (37.8 C) and IVDA (intravenous drug abuse)    Negative: Fever > 100.0 F (37.8 C) and has diabetes mellitus or a weak immune system (e.g., HIV positive, cancer chemotherapy, organ transplant, splenectomy, chronic steroids)    Negative: Numbness in an arm or hand (i.e., loss of sensation)    Negative: Painful rash with multiple small blisters grouped together (i.e., dermatomal distribution or 'band' or 'stripe')    Protocols used: NECK PAIN OR [id number]

## 2022-03-23 ENCOUNTER — HOSPITAL ENCOUNTER (OUTPATIENT)
Dept: BEHAVIORAL HEALTH | Facility: CLINIC | Age: 37
Discharge: HOME | End: 2022-03-23
Attending: PSYCHIATRY & NEUROLOGY
Payer: COMMERCIAL

## 2022-03-23 PROCEDURE — 90853 GROUP PSYCHOTHERAPY: CPT | Mod: HQ,GT

## 2022-03-23 PROCEDURE — 90853 GROUP PSYCHOTHERAPY: CPT | Mod: GT

## 2022-03-23 NOTE — GROUP NOTE
Process Group Note    PATIENT'S NAME: Brandin Rodriguez  MRN:   [id number]  :   1985  ACCT. NUMBER: [id number]  DATE OF SERVICE: 3/23/22  START TIME:  1:00 PM  END TIME:  2:50 PM  FACILITATOR: Pattie Monique LGSW  TOPIC:  Process Group    Diagnoses:  296.30 (F33.9) Major Depressive Disorder, Recurrent Episode, Unspecified _ and With mixed features  300.02 (F41.1) Generalized Anxiety Disorder  309.81 (F43.10) Posttraumatic Stress Disorder (includes Posttraumatic Stress Disorder for Children 6 Years and Younger)  Without dissociative symptoms.  4. Other Diagnoses that is relevant to services:   Substance-Related & Addictive Disorders 291.9 (F10.99) Unspecified Alcohol Related Disorder.  5. Provisional Diagnosis:  [facility] Day Treatment  TRACK: Clinic 3    NUMBER OF PARTICIPANTS: 6.     Service Modality:  Video Visit     Telemedicine Visit: The patient's condition can be safely assessed and treated via synchronous audio and visual telemedicine encounter.      Reason for Telemedicine Visit: Services only offered telehealth    Originating Site (Patient Location): Patient's home    Distant Site (Provider Location): Provider Remote Setting- Home Office    Consent:  The patient/guardian has verbally consented to: the potential risks and benefits of telemedicine (video visit) versus in person care; bill my insurance or make self-payment for services provided; and responsibility for payment of non-covered services.     Patient would like the video invitation sent by:  My Chart    Mode of Communication:  Video Conference via Medical Zoom    As the provider I attest to compliance with applicable laws and regulations related to telemedicine.        During the second hour of group we discussed self compassion. Patients took a quiz to determine their levels of self compassion and learned ways to increase self compassion through engaging in different activities such as journaling, body  "scan meditation, and positive touch.           Data:    Session content: At the start of this group, patients were invited to check in by identifying themselves, describing their current emotional status, and identifying issues to address in this group.   Area(s) of treatment focus addressed in this session included Symptom Management, Personal Safety and Community Resources/Discharge Planning.    Иван reports that today he is feeling  a lot of things.  He is working on creating more structure. He tweaked his neck and it is bothering him now. He is feeling overwhelmed. He reports that he missed group last week and also iit was the anniversary of his daughter s death. He reports that he is feeling nervous about his current insurance because [place] stopped accepting [facility], He is going to call his care coordinator to help him to remedy the issue. He states that he did a lot of work to advocate for himself to get care and he doesn't want to start over. He reports that he is focused on skills of [place]. He reports a barrier of stress and worry. He states experiencing SI over the past week, \"nothing serious.\" He reports some THC use. He is trying to take his medications. He denies the need for additional support. He is going to see a new therapist on the 30th. He is open to feedback and support from the group.     Therapeutic Interventions/Treatment Strategies:  Psychotherapist offered support, feedback and validation and reinforced use of skills. Treatment modalities used include Motivational Interviewing, Cognitive Behavioral Therapy and Dialectical Behavioral Therapy. Interventions include Behavioral Activation: Encouraged strategies to reduce individual procrastination and increase motivation by increasing goal-directed activities to enhance mood and reduce symptoms., Cognitive Restructuring:  Explored impact of ineffective thoughts / distortions on mood and activity, Coping Skills: Facilitated " discussion on learning and applying radical acceptance skill, Mindfulness: Facilitated discussion of when/how to use mindfulness skills to benefit general health, mental health symptoms, and stressors, Symptoms Management: Promoted understanding of their diagnoses and how it impacts their functioning and Emotions Management:  Increased awareness of daily mood patterns/changes.    Assessment:    Patient response:   Patient responded to session by accepting feedback, giving feedback, listening, focusing on goals, being attentive and accepting support    Possible barriers to participation / learning include: and no barriers identified    Health Issues:   Yes: Pain, Associated Psychological Distress       Substance Use Review:   Substance Use: cannabis .     Mental Status/Behavioral Observations  Appearance:   Appropriate   Eye Contact:   Good   Psychomotor Behavior: Normal   Attitude:   Cooperative   Orientation:   All  Speech   Rate / Production: Normal    Volume:  Normal   Mood:    Dysphoric  Affect:    Appropriate  Worrisome   Thought Content:   Clear and Safety denies any current safety concerns including suicidal ideation, self-harm, and homicidal ideation  Thought Form:  Coherent  Logical     Insight:    Good     Plan:     Safety Plan: No current safety concerns identified.  Recommended that patient call 911 or go to the local ED should there be a change in any of these risk factors.     Barriers to treatment: None identified    Patient Contracts (see media tab):  None    Substance Use: Not addressed in session     Continue or Discharge: Patient will continue in Adult Day Treatment (ADT)  as planned. Patient is likely to benefit from learning and using skills as they work toward the goals identified in their treatment plan.      Pattie Monique, CHASIDY  March 23, 2022     home

## 2022-03-24 NOTE — PROGRESS NOTES
Clinic Care Coordination Contact  [facility]/Voicemail       Clinical Data: Care Coordinator Outreach    Outreach attempted x 2.  Left message on patient's voicemail with call back information and requested return call.    Plan: Care Coordinator will try to reach patient again in 1 month.    Abimbola Cross [facility]  Clinic Care Coordinator  [facility] [place]  [facility]  [phone]  [email or web address]

## 2022-03-26 DIAGNOSIS — F32.A DEPRESSION, UNSPECIFIED DEPRESSION TYPE: ICD-10-CM

## 2022-03-28 ENCOUNTER — PATIENT OUTREACH (OUTPATIENT)
Dept: NURSING | Facility: CLINIC | Age: 37
End: 2022-03-28
Payer: COMMERCIAL

## 2022-03-28 NOTE — PROGRESS NOTES
Clinic Care Coordination Contact    Follow Up Progress Note      Assessment: He was having problems with his neck. He spoke with clinic to schedule an appointment but he was informed that [facility] no longer takes [facility]. LUZ MARINA SMITH advised he establish care with a new provider that takes his insurance. Pt isn't very interested in going to [facility] but he knows he can call the phone number on his card to learn other options.    He is going to treatment 1x per week and is transitioning out of treatment. He has therapy on 3/30. Pt shared he is doing very well. He feels that adderral has made all the difference in ending his alcohol use. Pt shared he is also on wellburtrin and this is assisting him to have no cravings.     Care Gaps:    Health Maintenance Due   Topic Date Due     PREVENTIVE CARE VISIT  Never done     ADVANCE CARE PLANNING  Never done     DEPRESSION ACTION PLAN  Never done     INFLUENZA VACCINE (1) 09/01/2021     Pt will expore PCP options through his insurance    Goals addressed this encounter:   Goals Addressed                    This Visit's Progress       Patient Stated       1. Mental Health Management (pt-stated)   90%      Goal Statement: Within the next 6 months, I would like to improve my overall health by decreasing my substance use and following up with medical appointments.   Date Goal set: 12/7/2020  Barriers: None  Strengths: Motivated to address health and substance use concerns  Date to Achieve By: 6/7/2021 // date extended 6/7/2022  Patient expressed understanding of goal: Иван verbally stated understanding of goal   Action steps to achieve this goal:  1. I will attend scheduled medical appointments  2. I will follow treatment recommendations  3. I will outreach to Care Coordination  for further questions or concerns            Outreach Frequency: monthly    Plan: LUZ MARINA SMITH will outreach to pt in 1 month to discuss his overall wellbeing.    Abimbola Cross, [facility]  Clinic Care  Coordinator  [facility] Dara  [facility] Dara  [facility] Cathie [place]  [facility]  [phone]  [email or web address]

## 2022-03-29 ENCOUNTER — VIRTUAL VISIT (OUTPATIENT)
Dept: PSYCHOLOGY | Facility: CLINIC | Age: 37
End: 2022-03-29
Payer: COMMERCIAL

## 2022-03-29 DIAGNOSIS — F90.2 ADHD (ATTENTION DEFICIT HYPERACTIVITY DISORDER), COMBINED TYPE: Primary | ICD-10-CM

## 2022-03-29 PROCEDURE — 90834 PSYTX W PT 45 MINUTES: CPT | Mod: 95 | Performed by: PSYCHOLOGIST

## 2022-03-29 PROCEDURE — 96131 PSYCL TST EVAL PHYS/QHP EA: CPT | Mod: 95 | Performed by: PSYCHOLOGIST

## 2022-03-29 PROCEDURE — 96130 PSYCL TST EVAL PHYS/QHP 1ST: CPT | Mod: 95 | Performed by: PSYCHOLOGIST

## 2022-03-29 RX ORDER — BUPROPION HYDROCHLORIDE 300 MG/1
TABLET ORAL
Qty: 90 TABLET | Refills: 0 | Status: SHIPPED | OUTPATIENT
Start: 2022-03-29 | End: 2022-08-01

## 2022-03-29 ASSESSMENT — PATIENT HEALTH QUESTIONNAIRE - PHQ9
10. IF YOU CHECKED OFF ANY PROBLEMS, HOW DIFFICULT HAVE THESE PROBLEMS MADE IT FOR YOU TO DO YOUR WORK, TAKE CARE OF THINGS AT HOME, OR GET ALONG WITH OTHER PEOPLE: SOMEWHAT DIFFICULT
SUM OF ALL RESPONSES TO PHQ QUESTIONS 1-9: 4
SUM OF ALL RESPONSES TO PHQ QUESTIONS 1-9: 4

## 2022-03-29 NOTE — TELEPHONE ENCOUNTER
Prescription approved per [facility] Refill Protocol.  Melanie DURANT RN  [facility]

## 2022-03-29 NOTE — PROGRESS NOTES
Progress Note  Disclaimer: Voice recognition software was used to generate this note. As a result, wrong word or 'sound-a-like' substitutions may have occurred due to the inherent limitations of voice recognition software. There may be errors in the script that have gone undetected. Please consider this when interpreting information found in this chart.       Client Name: Brandin Rodriguez  Date: 3/29/2022      Service Type: Individual      Session Start Time: 12:00 PM  session End Time: 12:45 PM     Session Length: 45    Session #: 3 with this author    Attendees: Client attended alone    Service Modality:  Video Visit:      Provider verified identity through the following two step process.  Patient provided:  Patient is known previously to provider    Telemedicine Visit: The patient's condition can be safely assessed and treated via synchronous audio and visual telemedicine encounter.      Reason for Telemedicine Visit: Services only offered telehealth    Originating Site (Patient Location): Patient's home    Distant Site (Provider Location): Provider Remote Setting- Home Office    Consent:  The patient/guardian has verbally consented to: the potential risks and benefits of telemedicine (video visit) versus in person care; bill my insurance or make self-payment for services provided; and responsibility for payment of non-covered services.     Patient would like the video invitation sent by:  My Chart    Mode of Communication:  Video Conference via [email or web address]    As the provider I attest to compliance with applicable laws and regulations related to telemedicine.     Treatment Plan Last Reviewed: Today  PHQ-9 / NEREYDA-7 : See Flowsheets    DATA  Interactive Complexity: NO  Crisis: NO            DATA   ADHD Assessment feedback session. Reviewed results of testing. See [facility] extended documentation for results. Explained diagnosis and treatment options. Recommended medication  evaluation be scheduled with PCP. Also provided and reviewed ADHD patient handout. Pt will schedule follow-up with me after seeing PCP.     Progress Since Last Session (Related to Symptoms / Goals / Homework):   Symptoms: No change stable    Homework: Achieved / completed to satisfaction      Episode of Care Goals: Satisfactory progress - ACTION (Actively working towards change); Intervened by reinforcing change plan / affirming steps taken     Current / Ongoing Stressors and Concerns:   Difficulty with attention and concentration.  Has appointment with outside therapist tomorrow. Psychiarist is also outside. Meeting first time tomorrow.      Treatment Objective(s) Addressed in This Session:   Reviewed results of testing, provided ADHD Psychoeducation tips and resources, encouraged client to make appointment for medication evaluation.       Intervention:   psychoeducation regarding ADHD        ASSESSMENT: Current Emotional / Mental Status (status of significant symptoms):   Risk status (Self / Other harm or suicidal ideation)   Client denies current fears or concerns for personal safety.   Client denies current or recent suicidal ideation or behaviors.   Client denies current or recent homicidal ideation or behaviors.   Client denies current or recent self injurious behavior or ideation.   Client denies other safety concerns.   Recommended that patient call 911 or go to the local ED should there be a change in any of these risk factors.     Appearance:   Appropriate    Eye Contact:   Good    Psychomotor Behavior: Restless    Attitude:   Cooperative    Orientation:   All   Speech    Rate / Production: Normal     Volume:  Normal    Mood:    Anxious  Normal   Affect:    Appropriate    Thought Content:  Clear    Thought Form:  Coherent  Logical    Insight:    Good      Medication Review:   Changes to psychiatric medications, see updated Medication List in EPIC.      Medication Compliance:   Yes     Changes in Health  Issues:   None reported     Chemical Use Review:   Substance Use: Chemical use reviewed, no active concerns identified      Tobacco Use: No change in amount of tobacco use since last session.  Patient declined discussion at this time     Collateral Reports Completed:   Not Applicable    PLAN: (Client Tasks / Therapist Tasks / Other)  See [facility] extended documentation for testing report.  This is a complex patient with a number of comorbid conditions including childhood posttraumatic stress disorder and history of substance abuse in early remission.  It is entirely possible that he has had ADHD beginning in childhood, however it is advisable to address trauma and addictions before more clear picture of this can be ascertained.  Recommend the client stick with the ADHD medications that he is on as they are providing him clear benefit.      Tremayne Boyd    Psychological Testing   Billing/Services Summary       Initial interview/Record Review 2/11/2022 22226    Intake 2/21/2022 90359    Interactive feedback Session 3/29/2022 34889    Testing Evaluation Services Base: 13496  (1st 60 mins) Add-on: 26554  (each addtl 60 mins)   Test Scoring and Interpretation  (Start/Stop), (Date, Day #) 60 minutes   Integration/Report Generation   (Start/Stop), (Date, Day #) 120 minutes   Clinical Decision Making/Battery Modification   (Start/Stop), (Date, Day #) 0 minutes   Post-Service Work   (Start/Stop), (Date, Day #) 0 minutes   Total Time: 180 minutes (2 hours, 00 minutes)   Total Units:              Diagnosis(es): (ICD-10)  F 90.2          Answers for HPI/ROS submitted by the patient on 3/29/2022  If you checked off any problems, how difficult have these problems made it for you to do your work, take care of things at home, or get along with other people?: Somewhat difficult  PHQ9 TOTAL SCORE: 4

## 2022-03-30 ENCOUNTER — FCC EXTENDED DOCUMENTATION (OUTPATIENT)
Dept: PSYCHOLOGY | Facility: CLINIC | Age: 37
End: 2022-03-30
Payer: COMMERCIAL

## 2022-03-30 ENCOUNTER — HOSPITAL ENCOUNTER (OUTPATIENT)
Dept: BEHAVIORAL HEALTH | Facility: CLINIC | Age: 37
Discharge: HOME | End: 2022-03-30
Attending: PSYCHIATRY & NEUROLOGY
Payer: COMMERCIAL

## 2022-03-30 PROCEDURE — 90853 GROUP PSYCHOTHERAPY: CPT | Mod: GT

## 2022-03-30 ASSESSMENT — PATIENT HEALTH QUESTIONNAIRE - PHQ9: SUM OF ALL RESPONSES TO PHQ QUESTIONS 1-9: 4

## 2022-03-30 NOTE — PROGRESS NOTES
[facility]  ADHD Evaluation    This note consists of symbols derived from keyboarding, dictation and/or voice recognition software. As a result, there may be errors in the script that have gone undetected. Please consider this when interpreting information found in this chart.     Patient: Brandin Rodriguez  YOB: 1985  MRN: [id number]  Date(s) of assessment: See initial intake notes by Krystyna Roberts, PhD, LP dated 5/25/2021 6/22/2021 and 7/9/2021.  This authors sessions with client dated 2/11/2022 and 2/21/2022.  Testing materials interpreted 3/29/2022 and feedback session 3/29/2022.    Information about appointment:  Client attended 6 sessions to aid in determining client's mental health diagnosis or diagnoses and treatment recommendations that best address client concerns. Client records includingprevious evaluations were reviewed. A diagnostic assessment was conducted at the initial appointment. Client completed several rating scales to assist in assessing attention-related and other mental health symptoms that may be causing impairments in functioning. Rating scales were also completed by a collateral contact.  In between the interviews in 2021 and 2022 the client was briefly hospitalized and participated in day treatment.  Also as of 3/29/2022 client had been sober for over 4 months.    Assessment tools:      Jovany Adult ADHD Rating Scale-IV: Self and Other Reports (BAARS-IV), Jovany Functional Impairment Scale: Self and Other Reports (BFIS), Jovany Deficits in Executive Functioning Scale: Self and Other Reports (BDEFS), Patient Health Questionnaire-9 (PHQ-9) and Generalized Anxiety Disorder-7 (NEREYDA-7)    Assessment Results:    Behavioral Observations:  See notes as outlined above on this complex patient    Jovany Adult ADHD Rating Scale-IV: Self and Other Reports (BAARS-IV)  The BAARS-IV assesses for symptoms of ADHD that are experienced in one's daily life. This assessment  "measure includes self and collateral rating scales designed to provide information regarding current and childhood symptoms of ADHD including inattention, hyperactivity, and impulsivity. Self-report scores are reported as percentiles. Scores at the 76th-83rd percentile are considered marginal, scores at the 84th-92nd percentile are considered borderline, scores at the 93rd-95th percentile are considered mild, scores at the 96th-98th percentile are considered moderate, and those at the 99th percentile are considered severe. Collateral or \"other\" rating scales are reported as number of symptoms observed in comparison to those reported by the client. Norms and percentile scores are not available for collateral reports.     Current Symptoms Scale--Self Report:   Client completed the self-report inventory of current symptoms.     BAARS-IV SR Raw Score %tile   Inattention Total  25 97   Hyperactivity Total 14 96   Impulsivity Total 13 98   Sluggish Cog Tempo 17 77   Total ADHD Score 52 98           Client indicated that the reported symptoms have resulted in impaired functioning in school, home, work and social relationships. Overall, the results suggest the client is experiencing Moderate ADHD symptoms.     Current Symptoms Scale--Other Report:  Unfortunately, due to family differences, no collateral information was available for this client.    Childhood Symptoms Scale--Self-Report:  Client completed the self-report inventory of childhood symptoms.     BAARS-IV Child SR Raw Score %tile   Inattention Total  30 98   Impulsivity Total 23 93   Total Score 53 97   Symptom Count 12 95         Client indicated that the reported symptoms resulted in impaired functioning in home and social relationships Overall, the results suggest the client experienced  Moderate symptoms of ADHD as a child.     Childhood Symptoms Scale--Other Report:  Not available                           Jovany Functional Impairment Scale: Self and Other " "Reports (BFIS)  The BFIS is used to assess an individuals' psychosocial impairment in major life/daily activities that may be due to a mental health disorder. This assessment measure includes self and collateral rating scales. Self-report scores are reported as percentiles. Scores at the 76th-83rd percentile are considered marginal, scores at the 84th-92nd percentile are considered borderline, scores at the 93rd-95th percentile are considered mild, scores at the 96th-98th percentile are considered moderate, and those at the 99th percentile are considered severe.Collateral or \"other\" rating scales are reported as number of symptoms observed in comparison to those reported by the client. Norms and percentile scores are not available for collateral reports.     Results indicate the client identified impairment (scores at or greater than 93rd percentile) in the following areas: home-family, home-chores, work, social-strangers, social-friends, community activities, education, money management and health maintenance The client's Mean Impairment Score was 7.29 (98th percentile) indicating the client is reporting Moderate impairment in functioning across domains.  Collateral information was not available.    Jovany Deficits in Executive Functioning Scale (BDEFS)  The BDEFS is a measure used for evaluating dimensions of adult executive functioning in daily life.This assessment measure includes self and collateral rating scales. Self-report scores are reported as percentiles. Scores at the 76th-83rd percentile are considered marginal, scores at the 84th-92nd percentile are considered borderline, scores at the 93rd-95th percentile are considered mild, scores at the 96th-98th percentile are considered moderate, and those at the 99th percentile are considered severe.Collateral or \"other\" rating scales are reported as number of symptoms observed in comparison to those reported by the client. Norms and percentile scores are not " available for collateral reports.     BDEFS-LF SR  Raw Score %tile   Time Management Section 1 Total  76 99   Org/problem solving Section 2 Total 61 96   Self Restraint Section 3 Total 50 95   Self Motivation Section 4 Total 27 93   Emotional regulation Section 5 Total 50 99   Total EF Summary Score 264 99   EF Symptom Count 62    ADHD-EF Index Score 30 97         These scores suggest the client has Severe deficits in executive functioning. These deficits are likely to be due to ADHD.     Again collateral information was not available    Patient Health Questionnaire- 9 (PHQ-9)   This questionnaire is designed to assess for depression in adults.  Based on the score, he is experiencing severe symptoms of depression. Client identified the following symptoms of depression: depressed mood, lack of interest, difficulty with sleep, poor appetite or overeating, feeling bad about self, poor concentration, restlessness or lethargy and suicidal ideation.       Generalized Anxiety Disorder Questionnaire (NEREYDA-7)  This questionnaire is designed to assess for anxiety in adults.  Based on the score, he is experiencing severe symptoms of anxiety. Client identified the following symptoms of anxiety: feeling on edge/nervous/anxious, difficulty controlling worry, worrying about many different things, trouble relaxing, being restless, becoming easily annoyed or irritable and feeling something awful might happen      Summary (based on clinical interview, review of records, test results):    Current Symptoms:  Inattention:Moderate   Hyperactivity:Moderate   Childhood Symptoms:Moderate   Functional impairment 9/15 Domains. Severity, Moderate  Deficits in Executive Functioning: Severe   Depression: severe   Anxiety: severe     ADHD is a neurodevelopmental disorder. As such, to qualify for a diagnosis of ADHD an individual must show some symptoms of the disorder before the age of 12; these symptoms must currently be present to a degree that  is inconsistent with their developmental level; the symptoms must negatively impact the individual;  they must be present for more than six months;and  they must occur in multiple areas of the individuals life (e.g. Home and school).     ADHD is often difficult to differentiate from other competing diagnoses such as depression, anxiety, PTSD, especially childhood PTSD, and the effects of drug addiction.  Within the last year the client has experienced all of these.  Particularly in light of the lack of collateral information available, it is difficult to pick 1 definitive diagnosis.  The client's history is certainly replete with childhood symptoms and a reported previous diagnosis, however it also contains narrative of severe trauma at an early age.  Additionally client lost his daughter in an MVA.  The client is in the early stages of recovery from multiple substances.  Of note, during his last hospital stay psychiatry did start him on Adderall and he has noted marked improvement.  While this is not an ideal way to diagnose ADHD, stimulant medications have been shown to aid in the treatment of PTSD in the clinical literature.  In either case I would not recommend that it be discontinued as the client is clearly reporting benefit from it and is using it as prescribed.  While it may never be possible to determine if ADHD was present prior to childhood trauma it might be beneficial to repeat testing in a year or 2 once the client has had longer in sobriety and is more emotionally stable      DSM5 Diagnoses: (Sustained by DSM5 Criteria Listed Above)  Diagnoses: 296.33 (F33.2) Major Depressive Disorder, Recurrent Episode, Severe _  309.81 (F43.10) Posttraumatic Stress Disorder (includes Posttraumatic Stress Disorder for Children 6 Years and Younger)  Without dissociative symptoms; 300.02 (F41.1) Generalized Anxiety Disorder;    Psychosocial & Contextual Factors: Rule out F 90.2 ADHD combined  type        Recommendations:    Maintain sobriety and utilize sober support resources on a regular basis.  Continue individual treatment focused on trauma and loss.  Follow all medication recommendations from psychiatry.  Complete group therapy program.  Review ADHD tips and tricks packet supplied by this provider as well as 1 or 2 of the books recommended there and.  This writer remains available on an advisory basis.    Tremayne Boyd

## 2022-03-30 NOTE — GROUP NOTE
Process Group Note    PATIENT'S NAME: Brandin Rodriguez  MRN:   [id number]  :   1985  ACCT. NUMBER: [id number]  DATE OF SERVICE: 3/30/22  START TIME:  1:00 PM  END TIME:  3:00 PM  FACILITATOR: Idalia Cole LICSW  TOPIC:  Process Group    Diagnoses:  296.30 (F33.9) Major Depressive Disorder, Recurrent Episode, Unspecified _ and With mixed features  300.02 (F41.1) Generalized Anxiety Disorder  309.81 (F43.10) Posttraumatic Stress Disorder (includes Posttraumatic Stress Disorder for Children 6 Years and Younger)  Without dissociative symptoms.  4. Other Diagnoses that is relevant to services:   Substance-Related & Addictive Disorders 291.9 (F10.99) Unspecified Alcohol Related Disorder.  5. Provisional Diagnosis:  NA.      [facility] Adult Mental Health Day Treatment  TRACK: clinic 3                              Service Modality:  Video Visit     Telemedicine Visit: The patient's condition can be safely assessed and treated via synchronous audio and visual telemedicine encounter.      Reason for Telemedicine Visit: Services only offered telehealth    Originating Site (Patient Location): Patient's home    Distant Site (Provider Location): [facility]    Consent:  The patient/guardian has verbally consented to: the potential risks and benefits of telemedicine (video visit) versus in person care; bill my insurance or make self-payment for services provided; and responsibility for payment of non-covered services.     Patient would like the video invitation sent by:  My Chart    Mode of Communication:  Video Conference via Medical Zoom    As the provider I attest to compliance with applicable laws and regulations related to telemedicine.       NUMBER OF PARTICIPANTS: 8          Data:    Session content: At the start of this group, patients were invited to check in by identifying themselves, describing their current emotional status, and identifying issues to address  in this group.   Area(s) of treatment focus addressed in this session included Symptom Management and Personal Safety.  Pt reports feeling positive about recent adhd diagnosis and he's hoping to get med adjustment. He feels positive about accomplishing tasks and is relieved that insurance will cover this program. He is hopeful that new therapist will be a good match for him. Pt reports using distraction to cope and identifies feeling positive about cooking/baking. He sets a goal to do his paperwork and then reward himself by going out of town. Denies safety concerns.    Therapeutic Interventions/Treatment Strategies:  Psychotherapist offered support, feedback and validation and reinforced use of skills. Treatment modalities used include Cognitive Behavioral Therapy. Interventions include Behavioral Activation: Explored how behaviors effect mood and interact with thoughts and feelings.    Assessment:    Patient response:   Patient responded to session by accepting feedback, giving feedback, listening, focusing on goals, being attentive and accepting support    Possible barriers to participation / learning include: and no barriers identified    Health Issues:   None reported       Substance Use Review:   Substance Use: cannabis .  and Last use: this wk    Mental Status/Behavioral Observations  Appearance:   Appropriate   Eye Contact:   Good   Psychomotor Behavior: Normal   Attitude:   Cooperative   Orientation:   All  Speech   Rate / Production: Normal    Volume:  Normal   Mood:    Normal  Affect:    Appropriate   Thought Content:   Clear  Thought Form:  Coherent  Logical     Insight:    Good     Plan:     Safety Plan: No current safety concerns identified.  Recommended that patient call 911 or go to the local ED should there be a change in any of these risk factors.     Barriers to treatment: None identified    Patient Contracts (see media tab):  None    Substance Use: Not addressed in session     Continue or  Discharge: Patient will continue in Adult Day Treatment (ADT)  as planned. Patient is likely to benefit from learning and using skills as they work toward the goals identified in their treatment plan.      Idalia Jackson, [facility]  March 30, 2022

## 2022-04-06 ENCOUNTER — HOSPITAL ENCOUNTER (OUTPATIENT)
Dept: BEHAVIORAL HEALTH | Facility: CLINIC | Age: 37
Discharge: HOME | End: 2022-04-06
Attending: PSYCHIATRY & NEUROLOGY
Payer: COMMERCIAL

## 2022-04-06 PROCEDURE — 90853 GROUP PSYCHOTHERAPY: CPT | Mod: GT

## 2022-04-06 PROCEDURE — 90853 GROUP PSYCHOTHERAPY: CPT | Mod: HQ,GT

## 2022-04-06 NOTE — GROUP NOTE
Process Group Note    PATIENT'S NAME: Brandin Rodriguez  MRN:   [id number]  :   1985  ACCT. NUMBER: [id number]  DATE OF SERVICE: 22  START TIME:  1:00 PM  END TIME:  1:50 PM  FACILITATOR: Idalia Cole LICSW  TOPIC:  Process Group    Diagnoses:  296.30 (F33.9) Major Depressive Disorder, Recurrent Episode, Unspecified _ and With mixed features  300.02 (F41.1) Generalized Anxiety Disorder  309.81 (F43.10) Posttraumatic Stress Disorder (includes Posttraumatic Stress Disorder for Children 6 Years and Younger)  Without dissociative symptoms.  4. Other Diagnoses that is relevant to services:   Substance-Related & Addictive Disorders 291.9 (F10.99) Unspecified Alcohol Related Disorder.  5. Provisional Diagnosis:  NA.      [facility] Adult Mental Health Day Treatment  TRACK: clinic 3                              Service Modality:  Video Visit     Telemedicine Visit: The patient's condition can be safely assessed and treated via synchronous audio and visual telemedicine encounter.      Reason for Telemedicine Visit: Services only offered telehealth    Originating Site (Patient Location): Patient's home    Distant Site (Provider Location): [facility] MENTAL HEALTH & ADDICTION SERVICES    Consent:  The patient/guardian has verbally consented to: the potential risks and benefits of telemedicine (video visit) versus in person care; bill my insurance or make self-payment for services provided; and responsibility for payment of non-covered services.     Patient would like the video invitation sent by:  My Chart    Mode of Communication:  Video Conference via Medical Zoom    As the provider I attest to compliance with applicable laws and regulations related to telemedicine.         NUMBER OF PARTICIPANTS: 6          Data:    Session content: At the start of this group, patients were invited to check in by identifying themselves, describing their current emotional status, and identifying issues to  address in this group.   Area(s) of treatment focus addressed in this session included Symptom Management and Personal Safety.  Pt reports feeling proud that he worked this week and made some money. He did [employer] Dash with his girlfriend and appreciates her willingness to work with him. Pt identifies coping skills as grounding. He feels proud of getting phone calls completed. He sets goal to drive independently to [place] this week. Pt is fearful of driving, but identified ways to make it less worrisome. Pt plans to go to daughter's [employer], a place he finds peaceful. He reports much progress since starting the program. Denies safety concerns.    Therapeutic Interventions/Treatment Strategies:  Psychotherapist offered support, feedback and validation and reinforced use of skills. Treatment modalities used include Cognitive Behavioral Therapy. Interventions include Coping Skills: Discussed use of self-soothe skills to decrease distress in the body and Symptoms Management: Promoted understanding of their diagnoses and how it impacts their functioning.    Assessment:    Patient response:   Patient responded to session by accepting feedback, giving feedback, listening, focusing on goals, being attentive and accepting support    Possible barriers to participation / learning include: and no barriers identified    Health Issues:   None reported       Substance Use Review:   Substance Use: cannabis .     Mental Status/Behavioral Observations  Appearance:   Appropriate   Eye Contact:   Good   Psychomotor Behavior: Normal   Attitude:   Cooperative   Orientation:   All  Speech   Rate / Production: Normal    Volume:  Normal   Mood:    Anxious   Affect:    Appropriate   Thought Content:   Clear  Thought Form:  Coherent  Logical     Insight:    Good     Plan:     Safety Plan: No current safety concerns identified.  Recommended that patient call 911 or go to the local ED should there be a change in any of these risk  factors.     Barriers to treatment: None identified    Patient Contracts (see media tab):  None    Substance Use: Not addressed in session     Continue or Discharge: Patient will continue in Adult Day Treatment (ADT)  as planned. Patient is likely to benefit from learning and using skills as they work toward the goals identified in their treatment plan.      Idalia Jackson, [facility]  April 6, 2022

## 2022-04-07 NOTE — GROUP NOTE
Psychotherapy Group Note    PATIENT'S NAME: Brandin Rodriguez  MRN:   [id number]  :   1985  ACCT. NUMBER: [id number]  DATE OF SERVICE: 22  START TIME:  2:00 PM  END TIME:  2:50 PM  FACILITATOR: Idalia Cole LICSW  TOPIC:  EBP Group: Coping Skills  [facility] Adult Mental Health Day Treatment  TRACK: clinic 3                              Service Modality:  Video Visit     Telemedicine Visit: The patient's condition can be safely assessed and treated via synchronous audio and visual telemedicine encounter.      Reason for Telemedicine Visit: Services only offered telehealth    Originating Site (Patient Location): Patient's home    Distant Site (Provider Location): [facility] MENTAL HEALTH & ADDICTION SERVICES    Consent:  The patient/guardian has verbally consented to: the potential risks and benefits of telemedicine (video visit) versus in person care; bill my insurance or make self-payment for services provided; and responsibility for payment of non-covered services.     Patient would like the video invitation sent by:  My Chart    Mode of Communication:  Video Conference via Medical Zoom    As the provider I attest to compliance with applicable laws and regulations related to telemedicine.         NUMBER OF PARTICIPANTS: 5    Summary of Group / Topics Discussed:  Coping Skills: Additional Coping Skills:  Patients discussed a wide array of coping skills learned in IOP.  Reviewed the benefits of applying the aforementioned coping strategies.  Patients explored how these strategies might be applied to daily stressors or distressing situations.    Patient Session Goals / Objectives:    Understand the purpose and benefits of applying coping strategies    Identified those coping skills that are applicable to current situations    Address barriers to utilizing coping skills when in distress.        Patient Participation / Response:  Fully participated with the group by sharing personal  reflections / insights and openly received / provided feedback with other participants.    Demonstrated understanding of topics discussed through group discussion and participation and Expressed understanding of the relevance / importance of coping skills at distressing times in life    Treatment Plan:  Patient has a current master individualized treatment plan.  See Epic treatment plan for more information.    Idalia Jackson, LICSW

## 2022-04-07 NOTE — ADDENDUM NOTE
Encounter addended by: Idalia Cole, LICSW on: 4/7/2022 2:12 PM   Actions taken: Charge Capture section accepted

## 2022-04-15 ENCOUNTER — LAB (OUTPATIENT)
Dept: FAMILY MEDICINE | Facility: CLINIC | Age: 37
End: 2022-04-15
Attending: FAMILY MEDICINE
Payer: COMMERCIAL

## 2022-04-15 DIAGNOSIS — Z20.822 SUSPECTED 2019 NOVEL CORONAVIRUS INFECTION: ICD-10-CM

## 2022-04-15 PROCEDURE — U0003 INFECTIOUS AGENT DETECTION BY NUCLEIC ACID (DNA OR RNA); SEVERE ACUTE RESPIRATORY SYNDROME CORONAVIRUS 2 (SARS-COV-2) (CORONAVIRUS DISEASE [COVID-19]), AMPLIFIED PROBE TECHNIQUE, MAKING USE OF HIGH THROUGHPUT TECHNOLOGIES AS DESCRIBED BY CMS-2020-01-R: HCPCS

## 2022-04-15 PROCEDURE — U0005 INFEC AGEN DETEC AMPLI PROBE: HCPCS

## 2022-04-16 LAB — SARS-COV-2 RNA RESP QL NAA+PROBE: NEGATIVE

## 2022-04-20 ENCOUNTER — HOSPITAL ENCOUNTER (OUTPATIENT)
Dept: BEHAVIORAL HEALTH | Facility: CLINIC | Age: 37
Discharge: HOME | End: 2022-04-20
Attending: PSYCHIATRY & NEUROLOGY
Payer: COMMERCIAL

## 2022-04-20 PROCEDURE — 90853 GROUP PSYCHOTHERAPY: CPT | Mod: GT

## 2022-04-20 NOTE — GROUP NOTE
Process Group Note    PATIENT'S NAME: Brandin Rodriguez  MRN:   [id number]  :   1985  ACCT. NUMBER: [id number]  DATE OF SERVICE: 22  START TIME:  1:00 PM  END TIME:  1:50 PM  FACILITATOR: Idalia Cole LICSW  TOPIC:  Process Group    Diagnoses:  296.30 (F33.9) Major Depressive Disorder, Recurrent Episode, Unspecified _ and With mixed features  300.02 (F41.1) Generalized Anxiety Disorder  309.81 (F43.10) Posttraumatic Stress Disorder (includes Posttraumatic Stress Disorder for Children 6 Years and Younger)  Without dissociative symptoms.  4. Other Diagnoses that is relevant to services:   Substance-Related & Addictive Disorders 291.9 (F10.99) Unspecified Alcohol Related Disorder.  5. Provisional Diagnosis:  NA.      [facility] Adult Mental Health Day Treatment  TRACK: clinic 3                              Service Modality:  Video Visit     Telemedicine Visit: The patient's condition can be safely assessed and treated via synchronous audio and visual telemedicine encounter.      Reason for Telemedicine Visit: Services only offered telehealth    Originating Site (Patient Location): Patient's home    Distant Site (Provider Location): [facility] MENTAL HEALTH & ADDICTION SERVICES    Consent:  The patient/guardian has verbally consented to: the potential risks and benefits of telemedicine (video visit) versus in person care; bill my insurance or make self-payment for services provided; and responsibility for payment of non-covered services.     Patient would like the video invitation sent by:  My Chart    Mode of Communication:  Video Conference via Medical Zoom    As the provider I attest to compliance with applicable laws and regulations related to telemedicine.         NUMBER OF PARTICIPANTS: 6          Data:    Session content: At the start of this group, patients were invited to check in by identifying themselves, describing their current emotional status, and identifying issues to  address in this group.   Area(s) of treatment focus addressed in this session included Symptom Management and Personal Safety.  Pt reports feeling positive about completing yard work. He feels staying busy is helpful. He feels more hope with spring arriving. Pt plans to discharge earlier than expected and next wk will be his last here. He is working with a psychiatrist and therapist. Denies safety concerns.    Therapeutic Interventions/Treatment Strategies:  Psychotherapist offered support, feedback and validation and reinforced use of skills. Treatment modalities used include Cognitive Behavioral Therapy. Interventions include Behavioral Activation: Explored how behaviors effect mood and interact with thoughts and feelings.    Assessment:    Patient response:   Patient responded to session by accepting feedback, giving feedback, listening, focusing on goals, being attentive and accepting support    Possible barriers to participation / learning include: and no barriers identified    Health Issues:   None reported       Substance Use Review:   Substance Use: cannabis .     Mental Status/Behavioral Observations  Appearance:   Appropriate   Eye Contact:   Good   Psychomotor Behavior: Normal   Attitude:   Cooperative   Orientation:   All  Speech   Rate / Production: Normal    Volume:  Normal   Mood:    Normal  Affect:    Appropriate   Thought Content:   Clear  Thought Form:  Coherent  Logical     Insight:    Good     Plan:     Safety Plan: No current safety concerns identified.  Recommended that patient call 911 or go to the local ED should there be a change in any of these risk factors.     Barriers to treatment: None identified    Patient Contracts (see media tab):  None    Substance Use: Not addressed in session     Continue or Discharge: Patient will continue in Adult Day Treatment (ADT)  as planned. Patient is likely to benefit from learning and using skills as they work toward the goals identified in their  treatment plan.      Idalia Jackson, [facility]  April 20, 2022

## 2022-04-27 ENCOUNTER — HOSPITAL ENCOUNTER (OUTPATIENT)
Dept: BEHAVIORAL HEALTH | Facility: CLINIC | Age: 37
Discharge: HOME | End: 2022-04-27
Attending: PSYCHIATRY & NEUROLOGY
Payer: COMMERCIAL

## 2022-04-27 PROCEDURE — 90853 GROUP PSYCHOTHERAPY: CPT | Mod: GT

## 2022-04-27 PROCEDURE — 90853 GROUP PSYCHOTHERAPY: CPT | Mod: HQ,GT

## 2022-04-27 NOTE — GROUP NOTE
Process Group Note    PATIENT'S NAME: Brandin Rodriguez  MRN:   [id number]  :   1985  ACCT. NUMBER: [id number]  DATE OF SERVICE: 22  START TIME:  1:00 PM  END TIME:  1:50 PM  FACILITATOR: Idalia Cole LICSW  TOPIC:  Process Group    Diagnoses:  296.30 (F33.9) Major Depressive Disorder, Recurrent Episode, Unspecified _ and With mixed features  300.02 (F41.1) Generalized Anxiety Disorder  309.81 (F43.10) Posttraumatic Stress Disorder (includes Posttraumatic Stress Disorder for Children 6 Years and Younger)  Without dissociative symptoms.  4. Other Diagnoses that is relevant to services:   Substance-Related & Addictive Disorders 291.9 (F10.99) Unspecified Alcohol Related Disorder.  5. Provisional Diagnosis:  NA.      [facility] Adult Mental Health Day Treatment  TRACK: clinic 3                              Service Modality:  Video Visit     Telemedicine Visit: The patient's condition can be safely assessed and treated via synchronous audio and visual telemedicine encounter.      Reason for Telemedicine Visit: Services only offered telehealth    Originating Site (Patient Location): Patient's home    Distant Site (Provider Location): [facility] MENTAL HEALTH & ADDICTION SERVICES    Consent:  The patient/guardian has verbally consented to: the potential risks and benefits of telemedicine (video visit) versus in person care; bill my insurance or make self-payment for services provided; and responsibility for payment of non-covered services.     Patient would like the video invitation sent by:  My Chart    Mode of Communication:  Video Conference via Medical Zoom    As the provider I attest to compliance with applicable laws and regulations related to telemedicine.         NUMBER OF PARTICIPANTS: 7          Data:    Session content: At the start of this group, patients were invited to check in by identifying themselves, describing their current emotional status, and identifying issues to  address in this group.   Area(s) of treatment focus addressed in this session included Symptom Management, Personal Safety and Community Resources/Discharge Planning.  Pt reports feeling hopeful and ready for discharge today. He describes a number of gains made while in the program. He is looking forward to summer and is enjoying working in his parents' yard today. Pt reports benefiting from staying busy and working outdoors. He sets a goal to apply for a job at [employer]. Denies safety concerns.    Therapeutic Interventions/Treatment Strategies:  Psychotherapist offered support, feedback and validation and reinforced use of skills. Treatment modalities used include Cognitive Behavioral Therapy. Interventions include Behavioral Activation: Explored how behaviors effect mood and interact with thoughts and feelings.    Assessment:    Patient response:   Patient responded to session by accepting feedback, giving feedback, listening, focusing on goals, being attentive and accepting support    Possible barriers to participation / learning include: and no barriers identified    Health Issues:   None reported       Substance Use Review:   Substance Use: No active concerns identified.    Mental Status/Behavioral Observations  Appearance:   Appropriate   Eye Contact:   Good   Psychomotor Behavior: Normal   Attitude:   Cooperative   Orientation:   All  Speech   Rate / Production: Normal    Volume:  Normal   Mood:    Normal  Affect:    Appropriate   Thought Content:   Clear  Thought Form:  Coherent  Logical     Insight:    Good     Plan:     Safety Plan: No current safety concerns identified.  Recommended that patient call 911 or go to the local ED should there be a change in any of these risk factors.     Barriers to treatment: None identified    Patient Contracts (see media tab):  None    Substance Use: Not addressed in session     Continue or Discharge: Patient is being discharged today. See Treatment Plan and Discharge  Summary.       Idalia Jackson, [facility]  April 27, 2022

## 2022-05-02 ENCOUNTER — PATIENT OUTREACH (OUTPATIENT)
Dept: NURSING | Facility: CLINIC | Age: 37
End: 2022-05-02
Payer: COMMERCIAL

## 2022-05-02 NOTE — LETTER
[facility]ORD[facility]  [address]  [place] [address]    May 2, 2022    Brandin Rodriguez  [address] [place] [address]    Dear Brandin,  Your Care Team congratulates you on your journey to maintain wellness. This document will help guide you on your journey to maintain a healthy lifestyle.  You can use this to help you overcome any barriers you may encounter.  If you should have any questions or concerns, you can contact the members of your Care Team or contact your Primary Care Clinic for assistance.     Health Maintenance  Health Maintenance Reviewed:      My Access Plan  Medical Emergency 911   Primary Clinic Line [facility] - [phone]   24 Hour Appointment Line [phone] or  [phone]) (toll-free)   24 Hour Nurse Line [phone] (toll-free)   Preferred Urgent Care     Preferred Hospital     Preferred Pharmacy [id number] - [place] - [address] AT [address] [place] [address]     Behavioral Health Crisis Line The National Suicide Prevention Lifeline at 1-251.438.5043 or 911     My Care Team Members  Patient Care Team       Relationship Specialty Notifications Start End    Gerber Powell MD PCP - General Internal Medicine  12/9/20     Phone: [phone] Fax: [phone]         [address] [place] [address]    Gerber Powell MD Assigned PCP   1/10/21     Phone: [phone] Fax: [phone]         [address] [place] [address]    Tremayne Boyd Assigned Behavioral Health Provider   4/3/22     Phone: [phone] Fax: [phone] [address] [facility] [address]               Goals        Patient Stated       COMPLETED: 1. Mental Health Management (pt-stated)       Goal Statement: Within the next 6 months, I would like to improve my overall health by decreasing my substance use and following up with medical appointments.   Date Goal set: 12/7/2020  Barriers: None  Strengths:  Motivated to address health and substance use concerns  Date to Achieve By: 6/7/2021 // date extended 6/7/2022  Patient expressed understanding of goal: Иван verbally stated understanding of goal   Action steps to achieve this goal:  1. I will attend scheduled medical appointments  2. I will follow treatment recommendations  3. I will outreach to Care Coordination  for further questions or concerns            COMPLETED: Moodswiing (pt-stated)       Goal Statement: I will apply for [facility] benefits within the next 2 weeks   Date goal set: 1/27/2021  Date to Achieve By: 3/1/2021  Action steps to achieve this goal:  1. I will be available for the phone appointment with my Financial Resource Worker 2/1/21 at 10:00 am.  2. I will connect with my Financial Resource Worker, Alanis ROGEL, at [phone] if needed.                   It has been your Clinic Care Team's pleasure to work with you on your goals.    Regards,  Your Clinic Care Team   Abimbola Cross [facility]  Clinic Care Coordinator  [facility]  [phone]

## 2022-05-02 NOTE — PROGRESS NOTES
Adult Mental Health Intensive Outpatient Discharge Summary/Instructions      Patient: Brandin Rodriguez MRN: [id number]   : 1985 Age: 36 year old Sex: male     Admission Date: 3/9/22  Discharge Date: 22  Diagnosis: 296.30 (F33.9) Major Depressive Disorder, Recurrent Episode, Unspecified _ and With mixed features  300.02 (F41.1) Generalized Anxiety Disorder  309.81 (F43.10) Posttraumatic Stress Disorder (includes Posttraumatic Stress Disorder for Children 6 Years and Younger)  Without dissociative symptoms.  4. Other Diagnoses that is relevant to services:   Substance-Related & Addictive Disorders 291.9 (F10.99) Unspecified Alcohol Related Disorder.  5. Provisional Diagnosis:  NA.      Focus of Treatment / Progress    Personal Safety:      * Follow your safety plan     * Call crisis lines as needed:    [facility] [phone] [facility] [phone]  [facility] [phone] Crisis Connection [phone]  [facility] [phone] [facility] [phone]  [facility] [phone] National Suicide Prevention [phone]  [facility] [phone] Suicide Prevention [phone]  [facility] [phone]    Managing symptoms of:  depression    Community support/health:  [facility]    Managing Symptoms and Preventing Relapse    * Go to all of your appointments    * Take all medications as directed.      * Carry a current list if medication with you    * Do not use illicit (street) drugs.  Avoid alcohol    * Report these symptoms to your care team. These are early signs of relapse:   Thoughts of suicide   Losing more sleep   Increased confusion   Mood getting worse   Feeling more aggressive   Other:  isolation    *Use these skills daily:  Talk to someone that you trust, eat/exercise/sleep adequately, practice mindfulness/self-compassion, spend time outdoors    Copy of summary sent to: epic    Follow up with psychiatrist / main caregiver: Brandin [facility]     Next visit: to be scheduled    Follow up with your therapist: Ana Horvath   Next visit: Wed      See your medical doctor about:  Medical conditions and annual physical    Other:  Your treatment team has enjoyed working with you and wish you only the best Иван!    Client Signature:_verbal ok given due to covid______________________  Date / Time:_4/27/22 1500__________  Staff Signature:_Idalia CRUZ_______________________   Date / Time:__4/27/22 1500_________

## 2022-05-02 NOTE — PROGRESS NOTES
Clinic Care Coordination Contact    Follow Up Progress Note      Assessment: LUZ MARINA SMITH spoke with pt regarding his overall wellbeing. Pt shared that he is doing well. He has completed his outpatient program. The first program he found was very helpful but the one day a week hasn't been really that helpful for him. He now has a therapist and psychiatrist and he will continue to work with them.    He has a psychiatrist that will be continuing to prescrib ADHD medications. He was able to complete his ADHD assessment in [facility] and with this now he is have less trouble accessing the medications he needs.     He is no longer having any alcohol cravings.    Pt shared that he feels comfortable no longer working with LUZ MARINA SMITH as he is feeling very stable with his current level of support.    Care Gaps:    Health Maintenance Due   Topic Date Due     PREVENTIVE CARE VISIT  Never done     ADVANCE CARE PLANNING  Never done     DEPRESSION ACTION PLAN  Never done     DTAP/TDAP/TD IMMUNIZATION (1 - Tdap) 01/01/2017     Pneumococcal Vaccine: Pediatrics (0 to 5 Years) and At-Risk Patients (6 to 64 Years) (2 - PCV) 12/22/2018     Due to pt's insurance he will be establishing care in [facility]    Goals addressed this encounter:    Goals Addressed                    This Visit's Progress       Patient Stated       COMPLETED: 1. Mental Health Management (pt-stated)   100%      Goal Statement: Within the next 6 months, I would like to improve my overall health by decreasing my substance use and following up with medical appointments.   Date Goal set: 12/7/2020  Barriers: None  Strengths: Motivated to address health and substance use concerns  Date to Achieve By: 6/7/2021 // date extended 6/7/2022  Patient expressed understanding of goal: Иван verbally stated understanding of goal   Action steps to achieve this goal:  1. I will attend scheduled medical appointments  2. I will follow treatment recommendations  3. I will outreach to Care  Coordination  for further questions or concerns             Plan: At this time, pt denies outstanding need for connection or referral to resources or assistance navigating recommended follow up care. No further outreaches will be made at this time unless a new referral is made or a change in the pt's status occurs. Patient was provided with [facility] contact information and encouraged to call with any questions or concerns.    Abimbola Cross, [facility]  Clinic Care Coordinator  [facility] Cathie Coal  [facility]  [phone]  [email or web address]

## 2022-07-12 ENCOUNTER — NURSE TRIAGE (OUTPATIENT)
Dept: NURSING | Facility: CLINIC | Age: 37
End: 2022-07-12

## 2022-07-12 VITALS
HEART RATE: 89 BPM | RESPIRATION RATE: 20 BRPM | OXYGEN SATURATION: 97 % | TEMPERATURE: 97.2 F | DIASTOLIC BLOOD PRESSURE: 84 MMHG | SYSTOLIC BLOOD PRESSURE: 126 MMHG

## 2022-07-12 PROCEDURE — 99283 EMERGENCY DEPT VISIT LOW MDM: CPT

## 2022-07-13 ENCOUNTER — HOSPITAL ENCOUNTER (EMERGENCY)
Facility: CLINIC | Age: 37
Discharge: HOME | End: 2022-07-13
Attending: EMERGENCY MEDICINE | Admitting: EMERGENCY MEDICINE
Payer: COMMERCIAL

## 2022-07-13 ENCOUNTER — PATIENT OUTREACH (OUTPATIENT)
Dept: FAMILY MEDICINE | Facility: CLINIC | Age: 37
End: 2022-07-13

## 2022-07-13 DIAGNOSIS — K60.2 ANAL FISSURE: ICD-10-CM

## 2022-07-13 RX ORDER — HYDROCORTISONE 25 MG/G
CREAM TOPICAL
Qty: 30 G | Refills: 0 | Status: SHIPPED | OUTPATIENT
Start: 2022-07-13 | End: 2023-07-03

## 2022-07-13 ASSESSMENT — ENCOUNTER SYMPTOMS
RECTAL PAIN: 1
CONSTIPATION: 0
DIARRHEA: 0

## 2022-07-13 NOTE — DISCHARGE INSTRUCTIONS
You can use Tucks pads after bowel movements and through the day to help provide some relief along with tylenol and ibuprofen.  Please use a fiber supplement and consider miralax to have soft bowel movements.

## 2022-07-13 NOTE — ED PROVIDER NOTES
History   Chief Complaint:  Rectal/perineal Pain       HPI   Brandin Rodriguez is a 36 year old male who presents with rectal pain. The patient reports that he has rectal pain that causes rectal pain when he sits, cough or bends over for the past 2-3 days. He denies having blood in bowel movement. He also reports taking medication to alleviate the pain but it didn't help. He denies having surgery, constipation, diarrhea.    Review of Systems   Constitutional: Negative for fever.   Gastrointestinal: Positive for rectal pain. Negative for abdominal pain, blood in stool, constipation and diarrhea.   All other systems reviewed and are negative.    Allergies:  Amoxicillin  Bactrim [Sulfamethoxazole W-Trimethoprim]    Medications:  Ultram  Inderal  Advil  Paxil  Vistaril  Adderall  Wellbutrin    Past Medical History:     Hematemesis  NEREYDA  Severe episode of recurrent major depressive disorder  Hepatitis  Liver disease  Suicidal ideation  Dysphagia  Testicular pain  PTSD  Sinus tachycardia  Neuropathy  Nausea  Vomiting     Past Surgical History:    Biopsy  Esophagoscopy  Soft tissue surgery     Family History:    Diabetes - mother  Hypertension - mother    Social History:  The patient presents to the ED with his wife.    Physical Exam     Patient Vitals for the past 24 hrs:   BP Temp Temp src Pulse Resp SpO2   07/12/22 2136 126/84 97.2  F (36.2  C) Oral 89 20 97 %       Physical Exam  General:  Alert, nontoxic in appearance  CV:  Appears well perfused  Lungs:  No obvious respiratory distress  Abd:  Soft, NTND  Rectal:  Tender area at 3 oclock on rectum.  No bleeding, no fluctuance, no drainage.  Neuro:  Speaking clearly, no slurred speech  MSK:  Ambulatory      Emergency Department Course       Emergency Department Course:             Reviewed:  I reviewed nursing notes, vitals, past medical history and Care Everywhere    Assessments:  117 I obtained history and examined the patient as noted above.    I rechecked the  patient and explained findings.     Disposition:  The patient was discharged to home.     Impression & Plan       Medical Decision Making:  Brandin Rodriguez is a 36-year-old gentleman presents due to rectal pain.  Last couple of days he has had rectal pain.  Evaluation he did have small area of irritation.  There is no signs of infection.  I discussed treatment strategies including including Anusol steroid cream, Tucks pads, stool softener, and MiraLAX as needed for soft bowel movements.  He was recommended follow-up with his doctor in clinic and return to the ER for any further concerns.      Diagnosis:    ICD-10-CM    1. Anal fissure  K60.2        Discharge Medications:  Discharge Medication List as of 7/13/2022  1:33 AM      START taking these medications    Details   hydrocortisone, Perianal, (ANUSOL-HC) 2.5 % cream Apply to anus after bowel movements up to 3 times per day.Disp-30 g, [id number]             Scribe Disclosure:  IEDMUNDO, am serving as a scribe at 1:17 AM on 7/13/2022 to document services personally performed by Tremayne Morales MD, based on my observations and the provider's statements to me.              Tremayne Morales MD  07/14/22 0032

## 2022-07-13 NOTE — LETTER
July 13, 2022      To Whom It May Concern:      Brandin Rodriguez was seen in our Emergency Department today, 07/13/22.  Please excuse him from work 7/14/22    Sincerely,        Tremayne Morales MD

## 2022-07-13 NOTE — ED TRIAGE NOTES
[facility]  ED Arrival Note    Pt states that he has been having rectal pain for a couple of days. Pt states that it feels like something is tearing everything he sits down. Pt denies any constipation or hx of hemorrhoids. Denies any blood in his stool. Pt states that he has attempted using preparation H without any relief.       Visitors during triage: Spouse    Directed to: Triage Lobby     Triage Assessment     Row Name 07/12/22 9929       Triage Assessment (Adult)    Airway WDL WDL       Respiratory WDL    Respiratory WDL WDL       Cardiac WDL    Cardiac WDL WDL       Cognitive/Neuro/Behavioral WDL    Cognitive/Neuro/Behavioral WDL WDL

## 2022-07-13 NOTE — TELEPHONE ENCOUNTER
What type of discharge? Emergency Department  Risk of Hospital admission or ED visit: 87%  Is a TCM episode required? No  When should the patient follow up with PCP? within 30 days of discharge.    María Sanchez RN  [facility]

## 2022-07-13 NOTE — TELEPHONE ENCOUNTER
"States his insurance line told him to go to UC but he wants to be sure. C/o rectal pain for past 2-3 days \"lt feels like there is a tear in my anus\". No bleeding. No hemorrhoids or protrusion. Not constipated, in fact, has been having loose bowel movements the past couple days. Pain is constant but worse w/ sitting. Advised see provider w/i 24 hours per guideline - advised according to our guideline UC or OV tomorrow is ok. He will go to [facility] now.     Reason for Disposition    MODERATE-SEVERE rectal pain (i.e., interferes with school, work, or sleep)    Additional Information    Negative: Foreign body in rectum    Negative: Diarrhea is main symptom    Negative: Constipation is main symptom (e.g., pain or discomfort caused by passage of hard BMs)    Negative: Blood in or on bowel movement is main symptom    Negative: Pregnant    Negative: Pinworms are suspected (rectal itching; (white thread-like worm about size of a staple, moves)    Negative: Sexual assault    Negative: Injury to rectum    Negative: Large mass protruding out of rectum    Negative: Patient sounds very sick or weak to the triager    Negative: SEVERE rectal pain (e.g., excruciating, unable to have a bowel movement)    Negative: [1] Rectal pain or redness AND [2] fever    Negative: [1] Sudden onset rectal pain AND [2] constipated (straining, rectal pressure or fullness) AND [3] NOT better after SITZ bath, suppository or enema    Protocols used: RECTAL SYMPTOMS-A-AH      "

## 2022-07-14 ASSESSMENT — ENCOUNTER SYMPTOMS
BLOOD IN STOOL: 0
ABDOMINAL PAIN: 0
FEVER: 0

## 2022-07-29 DIAGNOSIS — F32.A DEPRESSION, UNSPECIFIED DEPRESSION TYPE: ICD-10-CM

## 2022-08-01 RX ORDER — BUPROPION HYDROCHLORIDE 300 MG/1
TABLET ORAL
Qty: 90 TABLET | Refills: 0 | Status: SHIPPED | OUTPATIENT
Start: 2022-08-01

## 2022-08-01 NOTE — TELEPHONE ENCOUNTER
Routing refill request to provider for review/approval because:  Patient needs to be seen because it has been more than 6 months since last office visit.    Last office vist: 12/8/2021    Pended 90 day supply & 0 refills. Please Review.    Next office visit: none scheduled.  Sent my chart message to schedule      Melba Bran RN  [facility]

## 2022-10-04 PROBLEM — R11.2 NAUSEA AND VOMITING: Status: ACTIVE | Noted: 2020-12-03

## 2022-11-19 ENCOUNTER — HEALTH MAINTENANCE LETTER (OUTPATIENT)
Age: 37
End: 2022-11-19

## 2022-11-21 ENCOUNTER — NURSE TRIAGE (OUTPATIENT)
Dept: NURSING | Facility: CLINIC | Age: 37
End: 2022-11-21

## 2022-11-21 NOTE — TELEPHONE ENCOUNTER
"Triage Call:    Patient calling to report pain and swelling of gum line.  He reports he has a history of a broken tooth and believes the \"shard shifted\".  He reports his gums have become more swollen within a few hours.  He reports constant moderate pain to gums and jaw.  He denies weakness, confusion, fever, swollen tongue, or history of heart attacks.  He reports swelling of face.    According to the protocol, patient should see dentist within 24 hours.  Care advice given to take acetaminophen, ibuprofen, and apply cold pack, advised patient when to call back. Patient verbalizes understanding and agrees with plan of care.     Bruna Peralta RN  11/21/22 12:36 AM  [facility] Nurse Advisor    Reason for Disposition    [1] Face is swollen AND [2] no fever    Additional Information    Negative: Knocked out (unconscious) > 1 minute    Negative: Difficult to awaken or acting confused (e.g., disoriented, slurred speech)    Negative: Severe neck pain    Negative: Sounds like a life-threatening emergency to the triager    Negative: Head injury is main concern    Negative: Neck injury is main concern    Negative: Wound looks infected    Negative: Tooth knocked out    Negative: Shock suspected (e.g., cold/pale/clammy skin, too weak to stand, low BP, rapid pulse)    Negative: [1] Similar pain previously AND [2] it was from \"heart attack\"    Negative: [1] Similar pain previously AND [2] it was from \"angina\" AND [3] not relieved by nitroglycerin    Negative: Sounds like a life-threatening emergency to the triager    Negative: Chest pain    Negative: Toothache followed tooth injury    Negative: Toothache or mouth pain after tooth extraction    Negative: Canker sore (i.e., aphthous ulcer)    Negative: Tongue is very swollen and tender    Negative: [1] Face is swollen AND [2] fever    Negative: Patient sounds very sick or weak to the triager    Negative: [1] SEVERE pain (e.g., excruciating, unable to do any normal " activities) AND [2] not improved 2 hours after pain medicine    Negative: Face is very swollen    Negative: Fever    Protocols used: TOOTHACHE-A-AH, TOOTH INJURY-A-AH

## 2023-04-09 ENCOUNTER — HEALTH MAINTENANCE LETTER (OUTPATIENT)
Age: 38
End: 2023-04-09

## 2023-06-04 ENCOUNTER — NURSE TRIAGE (OUTPATIENT)
Dept: NURSING | Facility: CLINIC | Age: 38
End: 2023-06-04

## 2023-06-04 ENCOUNTER — OFFICE VISIT (OUTPATIENT)
Dept: FAMILY MEDICINE | Facility: CLINIC | Age: 38
End: 2023-06-04
Payer: COMMERCIAL

## 2023-06-04 ENCOUNTER — NURSE TRIAGE (OUTPATIENT)
Dept: NURSING | Facility: CLINIC | Age: 38
End: 2023-06-04
Payer: COMMERCIAL

## 2023-06-04 ENCOUNTER — HOSPITAL ENCOUNTER (EMERGENCY)
Facility: HOSPITAL | Age: 38
Discharge: HOME | End: 2023-06-04
Attending: EMERGENCY MEDICINE | Admitting: EMERGENCY MEDICINE
Payer: COMMERCIAL

## 2023-06-04 VITALS
HEART RATE: 92 BPM | BODY MASS INDEX: 28.12 KG/M2 | WEIGHT: 219 LBS | SYSTOLIC BLOOD PRESSURE: 112 MMHG | DIASTOLIC BLOOD PRESSURE: 72 MMHG | OXYGEN SATURATION: 98 % | TEMPERATURE: 98.2 F

## 2023-06-04 VITALS
HEART RATE: 98 BPM | WEIGHT: 218.92 LBS | BODY MASS INDEX: 28.11 KG/M2 | OXYGEN SATURATION: 96 % | RESPIRATION RATE: 16 BRPM | SYSTOLIC BLOOD PRESSURE: 133 MMHG | TEMPERATURE: 98 F | DIASTOLIC BLOOD PRESSURE: 75 MMHG

## 2023-06-04 DIAGNOSIS — L60.0 INGROWING NAIL, RIGHT GREAT TOE: Primary | ICD-10-CM

## 2023-06-04 DIAGNOSIS — L60.0 INGROWN TOENAIL OF RIGHT FOOT WITH INFECTION: ICD-10-CM

## 2023-06-04 PROBLEM — Z87.898 HISTORY OF ALCOHOL USE DISORDER: Status: ACTIVE | Noted: 2017-09-11

## 2023-06-04 PROCEDURE — 99283 EMERGENCY DEPT VISIT LOW MDM: CPT

## 2023-06-04 PROCEDURE — 99213 OFFICE O/P EST LOW 20 MIN: CPT | Performed by: FAMILY MEDICINE

## 2023-06-04 PROCEDURE — 250N000009 HC RX 250: Performed by: EMERGENCY MEDICINE

## 2023-06-04 PROCEDURE — 11730 AVULSION NAIL PLATE SIMPLE 1: CPT | Mod: T5

## 2023-06-04 RX ORDER — CEPHALEXIN 500 MG/1
500 CAPSULE ORAL 2 TIMES DAILY
Qty: 20 CAPSULE | Refills: 0 | Status: SHIPPED | OUTPATIENT
Start: 2023-06-04 | End: 2023-06-14

## 2023-06-04 RX ORDER — GINSENG 100 MG
CAPSULE ORAL ONCE
Status: COMPLETED | OUTPATIENT
Start: 2023-06-04 | End: 2023-06-04

## 2023-06-04 RX ADMIN — BACITRACIN: 500 OINTMENT TOPICAL at 18:58

## 2023-06-04 ASSESSMENT — ENCOUNTER SYMPTOMS
EYES NEGATIVE: 1
PSYCHIATRIC NEGATIVE: 1
ARTHRALGIAS: 1
CONSTITUTIONAL NEGATIVE: 1
CARDIOVASCULAR NEGATIVE: 1
NEUROLOGICAL NEGATIVE: 1
HEMATOLOGIC/LYMPHATIC NEGATIVE: 1
RESPIRATORY NEGATIVE: 1
ALLERGIC/IMMUNOLOGIC NEGATIVE: 1
ENDOCRINE NEGATIVE: 1
GASTROINTESTINAL NEGATIVE: 1

## 2023-06-04 ASSESSMENT — ACTIVITIES OF DAILY LIVING (ADL): ADLS_ACUITY_SCORE: 37

## 2023-06-04 NOTE — ED PROVIDER NOTES
EMERGENCY DEPARTMENT ENCOUNTER      NAME: Brandin Rodriguez  AGE: 37 year old male  YOB: 1985  MRN: [id number]  EVALUATION DATE & TIME: 6/4/2023  5:27 PM    PCP: No Ref-Primary, Physician    ED PROVIDER: Archana Ocasio MD    Chief Complaint   Patient presents with     Right big toe pain         FINAL IMPRESSION:  1. Ingrown toenail of right foot with infection          ED COURSE & MEDICAL DECISION MAKING:    Pertinent Labs & Imaging studies reviewed. (See chart for details)  37 year old male with history of depression/anxiety, PTSD, alcohol abuse who presents to the Emergency Department for evaluation of pain to the right great toe.  On examination shows evidence of ingrown toe with surrounding erythema consistent with a paronychia/cellulitis.  No evidence of a felon.  Discussed removing the toenail partially to help treat the ingrown toe.  Patient is agreeable with same, this was performed, please see procedure note.  He is already been prescribed Keflex, and was encouraged to get that prescription.  Given information to follow-up with podiatry if he has any concerns about how it is healing.  Discharged home.    5:59 PM Introduced myself to the patient, obtained history of present illness, and performed initial physical exam at this time.   6:26 PM Performed nail removal procedure. Discussed discharge instructions at this time, the patient is agreeable with this plan         Medical Decision Making    History:    Supplemental history from: Documented in chart, if applicable    External Record(s) reviewed: Urgent care visit from today    Work Up:    Chart documentation includes differential considered and any EKGs or imaging independently interpreted by provider, where specified.    In additional to work up documented, I considered the following work up: See MDM    External consultation:    Discussion of management with another provider: N/A    Complicating factors:    Care impacted by chronic illness:  Mental Health    Care affected by social determinants of health: Access to Medical Care    Disposition considerations: Discharge. I recommended the patient continue their current prescription strength medication(s): Keflex. N/A.        At the conclusion of the encounter I discussed the results of all of the tests and the disposition. The questions were answered. The patient or family acknowledged understanding and was agreeable with the care plan.      MEDICATIONS GIVEN IN THE EMERGENCY:  Medications   bacitracin ointment ( Topical $Given 6/4/23 4465)       NEW PRESCRIPTIONS STARTED AT TODAY'S ER VISIT  Discharge Medication List as of 6/4/2023  7:02 PM             =================================================================    HPI    Patient information was obtained from: the patient    Use of Intrepreter: N/A     Brandin Rodriguez is a 37 year old male with pertinent medical history of NEREYDA, neuropathy, and chronic liver disease, who presents for evaluation of an ingrown toenail.     Per chart review, the patient was seen earlier today in clinic for evaluation of right great toe ingrown toenail. During this visit, the patient noted pain, swelling, and redness to his right great toe after cutting the nail too short a few days ago. The patient was prescribed Keflex and discharged with instruction to follow up if symptoms did not improve or conditions worsened.    For the past four days, the patient has had ongoing right great toe pain secondary to trimming his toenail too short. He notes that he has been applying antibacterial topical treatments and band-aids without sufficient relief. He reports some pink liquid coming from the area, but no purulent drainage. No pain medications taken today. He is allergic to amoxicillin and bactrim. No other complaints at this time.       PAST MEDICAL HISTORY:  Past Medical History:   Diagnosis Date     Alcoholic hepatitis      Anxiety      Depression      Depressive disorder       PTSD (post-traumatic stress disorder)      Suicidal ideation        PAST SURGICAL HISTORY:  Past Surgical History:   Procedure Laterality Date     BIOPSY      mole bxs     ESOPHAGOSCOPY, GASTROSCOPY, DUODENOSCOPY (EGD), COMBINED N/A 11/5/2019    Procedure: ESOPHAGOGASTRODUODENOSCOPY (EGD);  Surgeon: Jake Elizabeth MD;  Location:  GI     ESOPHAGOSCOPY, GASTROSCOPY, DUODENOSCOPY (EGD), COMBINED N/A 3/12/2020    Procedure: ESOPHAGOGASTRODUODENOSCOPY (EGD);  Surgeon: Jake Elizabeth MD;  Location:  GI     SOFT TISSUE SURGERY         CURRENT MEDICATIONS:    Prior to Admission Medications   Prescriptions Last Dose Informant Patient Reported? Taking?   albuterol (PROAIR HFA/PROVENTIL HFA/VENTOLIN HFA) 108 (90 Base) MCG/ACT inhaler  Self Yes No   Sig: Inhale 1-2 puffs into the lungs every 6 hours as needed for shortness of breath / dyspnea or wheezing   amphetamine-dextroamphetamine (ADDERALL) 10 MG tablet   No No   Sig: Take 1 tablet (10 mg) by mouth daily   buPROPion (WELLBUTRIN XL) 300 MG 24 hr tablet   No No   Sig: TAKE 1 TABLET(300 MG) BY MOUTH EVERY MORNING   cephALEXin (KEFLEX) 500 MG capsule   No No   Sig: Take 1 capsule (500 mg) by mouth 2 times daily for 10 days   cyanocobalamin 1000 MCG SUBL  Self Yes No   Sig: Place 500-1,000 mcg under the tongue daily    hydrocortisone, Perianal, (ANUSOL-HC) 2.5 % cream   No No   Sig: Apply to anus after bowel movements up to 3 times per day.   melatonin 3 MG tablet   No No   Sig: Take 1 tablet (3 mg) by mouth nightly as needed for sleep   multivitamin w/minerals (MULTI-VITAMIN) tablet  Self Yes No   Sig: Take 1 tablet by mouth daily   nicotine polacrilex (NICORETTE) 4 MG gum   No No   Sig: Place 1 each (4 mg) inside cheek every 2 hours as needed for smoking cessation   pantoprazole (PROTONIX) 40 MG EC tablet   No No   Sig: Take 1 tablet (40 mg) by mouth 2 times daily   propranolol (INDERAL) 20 MG tablet   No No   Sig: TAKE 1 TABLET(20 MG) BY MOUTH THREE  TIMES DAILY AS NEEDED FOR ANXIETY      Facility-Administered Medications: None       ALLERGIES:  Allergies   Allergen Reactions     Amoxicillin Rash     Bactrim [Sulfamethoxazole W-Trimethoprim] Rash       FAMILY HISTORY:  Family History   Problem Relation Age of Onset     Diabetes Mother      Hypertension Mother      Heart Disease Maternal Grandmother      Cancer Paternal Grandmother      Anxiety Disorder Brother      Autism Spectrum Disorder Brother      Arrhythmia Maternal Half-Sister        SOCIAL HISTORY:  Social History     Tobacco Use     Smoking status: Every Day     Packs/day: 1.00     Types: Cigarettes     Smokeless tobacco: Never   Substance Use Topics     Alcohol use: Yes     Comment: < one pint a day 10-15-21     Drug use: Yes     Types: Marijuana     Comment: occasionally        VITALS:  Patient Vitals for the past 24 hrs:   BP Temp Temp src Pulse Resp SpO2 Weight   06/04/23 1910 133/75 -- -- -- -- 96 % --   06/04/23 1719 129/72 98  F (36.7  C) Oral 98 16 97 % 99.3 kg (218 lb 14.7 oz)       PHYSICAL EXAM    General Appearance: Well-appearing, well-nourished, no acute distress   Cardio:  Regular rate and rhythm  Pulm:  No respiratory distress, clear to auscultation bilaterally  Back:  No midline tenderness to palpation, no paraspinal tenderness, No CVA tenderness, normal ROM  Abdomen:  Soft, non-tender, non distended,no rebound or guarding.  Extremities:  Extremities normal, atraumatic, no cyanosis or edema, full ROM and motor tone intact, bilateral pulses intact upper and lower Ingrown toenail to the medial aspect of the great toe with surrounding erythema and pain.  Neuro:  Alert and oriented ×3     PROCEDURES:    PROCEDURE: Nail removal   INDICATIONS:  Ingrown nail   PROCEDURE PROVIDER: Dr Archana Ocasio   SITE: Right Great toe (1st toe)   MEDICATION: 3 mLs of 1% Lidocaine with epinephrine as a digital block to the great toe   NOTE: The area was prepped with Betadine and draped off in the usual  sterile fashion. Local anesthetic was injected subcutaneously with anesthesia effects demonstrated prior to proceeding. The medial aspect of the great nail was removed. A sterile dressing was placed over the area.   COMPLICATIONS: Patient tolerated procedure well, without complication       The creation of this record is based on the scribe s observations of the work being performed by Archana Ocasio MD and the provider s statements to them. It was created on her behalf by Kailyn Rosario, a trained medical scribe. This document has been checked and approved by the attending provider.    Archana Ocasio MD  Emergency Medicine  [facility] EMERGENCY DEPARTMENT  [facility] [address]  [phone]  Dept: [phone]       Archana Ocasio MD  06/04/23 6178

## 2023-06-04 NOTE — TELEPHONE ENCOUNTER
Nurse Triage SBAR    Is this a 2nd Level Triage? YES, LICENSED PRACTITIONER REVIEW IS REQUIRED    Situation: area surrounding toenail is red and pustulant     Background: 3-4 days ago patient trimmed his right foot big toe toenail. Post trimming patient noticed pain and has been applying antibacterial ointment, and a Bandaid. Patient states that the pain is getting worse and discharge is increasing Patient states that the toe is red and blistered with some black areas.  Patient denies fever or swelling    Assessment: infection    Protocol Recommended Disposition:   Go to ED Now (Or PCP Triage)    Recommendation: Patient verbalized understanding of care advice.       Renetta Aguirre RN on 6/4/2023 at 2:01 AM      Does the patient meet one of the following criteria for ADS visit consideration? No       Reason for Disposition    Black (necrotic) or blisters develop in wound    Additional Information    Negative: [1] Widespread rash AND [2] bright red, sunburn-like AND [3] too weak to stand    Negative: Sounds like a life-threatening emergency to the triager    Negative: [1] Widespread rash AND [2] bright red, sunburn-like    Negative: SEVERE pain in the wound    Protocols used: WOUND INFECTION-A-AH

## 2023-06-04 NOTE — ED TRIAGE NOTES
Pt c/o right-sided big toe pain for past 4 days.  Pt denies any known injury.  It appears to be an in-grown toenail.  Redness and swelling to right big toe.  Pt was seen at urgent care at noon today, was prescribed Amoxicillin (which he is allergic to), and told to follow up with primary care doctor, so he came to ED to be seen.  Pt has been using Epsom Salt and antibacterial ointment with no relief.  No Tylenol or Ibuprofen taken today.     Triage Assessment     Row Name 06/04/23 8481       Triage Assessment (Adult)    Airway WDL WDL       Respiratory WDL    Respiratory WDL WDL       Skin Circulation/Temperature WDL    Skin Circulation/Temperature WDL WDL       Cardiac WDL    Cardiac WDL WDL       Peripheral/Neurovascular WDL    Peripheral Neurovascular WDL WDL       Cognitive/Neuro/Behavioral WDL    Cognitive/Neuro/Behavioral WDL WDL

## 2023-06-04 NOTE — DISCHARGE INSTRUCTIONS
Take the previously prescribed Keflex.  Dressing changes daily as discussed.  I have also given you contact information for podiatry to call and schedule follow-up appointment if its not healing well.

## 2023-06-04 NOTE — PROGRESS NOTES
SUBJECTIVE:   Brandin Rodriguez is a 37 year old male presenting with a chief complaint of   Chief Complaint   Patient presents with     Ingrown Toenail     Right great toenail ingrown. Redness, swelling, drainage.       He is an established patient of [facility].    MS Injury/Pain    Onset of symptoms was 4 day(s) ago.  Location: right toe(s) great  Context:       The injury happened while no injury , cut his nail too close to the cuticle.       Mechanism: No injury      Patient experienced delayed pain, delayed swelling  Course of symptoms is worsening.    Severity moderate  Current and Associated symptoms: Pain, Swelling, Warmth, Redness and Tenderness  Denies    Aggravating Factors: walking and running  Therapies to improve symptoms include: Tylenol  This is the first time this type of problem has occurred for this patient.           Review of Systems   Constitutional: Negative.    HENT: Negative.    Eyes: Negative.    Respiratory: Negative.    Cardiovascular: Negative.    Gastrointestinal: Negative.    Endocrine: Negative.    Genitourinary: Negative.    Musculoskeletal: Positive for arthralgias.   Allergic/Immunologic: Negative.    Neurological: Negative.    Hematological: Negative.    Psychiatric/Behavioral: Negative.        Past Medical History:   Diagnosis Date     Alcoholic hepatitis      Anxiety      Depression      Depressive disorder      PTSD (post-traumatic stress disorder)      Suicidal ideation      Family History   Problem Relation Age of Onset     Diabetes Mother      Hypertension Mother      Heart Disease Maternal Grandmother      Cancer Paternal Grandmother      Anxiety Disorder Brother      Autism Spectrum Disorder Brother      Arrhythmia Maternal Half-Sister      Current Outpatient Medications   Medication Sig Dispense Refill     albuterol (PROAIR HFA/PROVENTIL HFA/VENTOLIN HFA) 108 (90 Base) MCG/ACT inhaler Inhale 1-2 puffs into the lungs every 6 hours as needed for shortness of breath / dyspnea  or wheezing       amphetamine-dextroamphetamine (ADDERALL) 10 MG tablet Take 1 tablet (10 mg) by mouth daily 30 tablet 0     buPROPion (WELLBUTRIN XL) 300 MG 24 hr tablet TAKE 1 TABLET(300 MG) BY MOUTH EVERY MORNING 90 tablet 0     cephALEXin (KEFLEX) 500 MG capsule Take 1 capsule (500 mg) by mouth 2 times daily for 10 days 20 capsule 0     cyanocobalamin 1000 MCG SUBL Place 500-1,000 mcg under the tongue daily        hydrocortisone, Perianal, (ANUSOL-HC) 2.5 % cream Apply to anus after bowel movements up to 3 times per day. 30 g 0     melatonin 3 MG tablet Take 1 tablet (3 mg) by mouth nightly as needed for sleep 30 tablet 1     multivitamin w/minerals (MULTI-VITAMIN) tablet Take 1 tablet by mouth daily       nicotine polacrilex (NICORETTE) 4 MG gum Place 1 each (4 mg) inside cheek every 2 hours as needed for smoking cessation 220 each 0     pantoprazole (PROTONIX) 40 MG EC tablet Take 1 tablet (40 mg) by mouth 2 times daily 60 tablet 1     propranolol (INDERAL) 20 MG tablet TAKE 1 TABLET(20 MG) BY MOUTH THREE TIMES DAILY AS NEEDED FOR ANXIETY 60 tablet 3     Social History     Tobacco Use     Smoking status: Every Day     Packs/day: 1.00     Types: Cigarettes     Smokeless tobacco: Never   Vaping Use     Vaping status: Not on file   Substance Use Topics     Alcohol use: Yes     Comment: < one pint a day 10-15-21       OBJECTIVE  /72   Pulse 92   Temp 98.2  F (36.8  C)   Wt 99.3 kg (219 lb)   SpO2 98%   BMI 28.12 kg/m      Physical Exam  Constitutional:       Appearance: Normal appearance.   HENT:      Head: Normocephalic and atraumatic.      Mouth/Throat:      Mouth: Mucous membranes are moist.   Eyes:      Extraocular Movements: Extraocular movements intact.      Conjunctiva/sclera: Conjunctivae normal.      Pupils: Pupils are equal, round, and reactive to light.   Musculoskeletal:         General: Swelling and tenderness present.        Feet:    Feet:      Right foot:      Skin integrity: Blister,  erythema and warmth present.      Comments: Ingrown toe nail on right toe nail.   Skin:     General: Skin is warm and dry.   Neurological:      Mental Status: He is alert and oriented to person, place, and time.   Psychiatric:         Mood and Affect: Mood normal.         Behavior: Behavior normal.         Labs:  No results found for this or any previous visit (from the past 24 hour(s)).      ASSESSMENT:      ICD-10-CM    1. Ingrowing nail, right great toe  L60.0 cephALEXin (KEFLEX) 500 MG capsule      Antibiotics faxed.     Medical Decision Making:    Differential Diagnosis:  MS Injury Pain: Ingrown toe nail infection    Serious Comorbid Conditions:  Adult:  None    PLAN:    MS Injury/Pain  Tylenol, Ibuprofen and Rx: antibiotics    Followup:    If not improving or if condition worsens, follow up with your Primary Care Provider    There are no Patient Instructions on file for this visit.

## 2023-06-04 NOTE — TELEPHONE ENCOUNTER
S: Right great toe nail.  B: Patient clipped right great toe nail to close to the cuticle and it is now infected.  Saw provider today at [facility].   Was prescribed Keflex.  Symptoms are:    Redness    Swelling    Warmth    Pinkish drainage    Has neuropathy in toes    Hurts to walk (limping)    No fever    Anxious, talking fast.      Does not have a PCP.  Patient wondering if he should be seen in the ED today.     A: Per protocol to see PCP with in 3 days.  Advised to start taking Keflex as prescribed. Keep foot elevated above heart to help reduce swelling.  If redness progresses in toe come into the ED.  Patient thanked writer for her time and abruptly hung up.     R: Protocol and care advice reviewed. Patient verbalized understanding and voiced no further questions. Call back with any new or worsening symptoms, concerns, or questions.    Briana Dejesus RN, MA  [facility] Triage Nurse Advisor    Reason for Disposition    [1] MODERATE pain (e.g., interferes with normal activities, limping) AND [2] present > 3 days    Additional Information    Negative: Foot is cool or blue in comparison to other foot    Negative: Purple or black skin on toe  (Exception: simple recalled injury with bruise)    Negative: [1] Looks infected (spreading redness, red streak, pus) AND [2] fever    Negative: Patient sounds very sick or weak to the triager    Negative: [1] SEVERE pain (e.g., excruciating, unable to do any normal activities) AND [2] not improved after 2 hours of pain medicine    Negative: [1] Redness spreading into foot or red streak into foot AND [2] no fever    Negative: Looks like a boil, infected sore, or deep ulcer    Negative: [1] Swollen toe AND [2] no fever  (Exceptions: just localized bump from bunion, corns, insect bite, sting)    Negative: Yellow pus seen in skin around toenail (cuticle area), or pus seen under toenail    Negative: Numbness in one foot (i.e., loss of sensation)    Protocols used:  TOE PAIN-A-AH

## 2023-06-05 NOTE — ED NOTES
Discharge paperwork, follow up care and prescriptions discussed with pt. Pt verbalized understanding and has no questions at this time. VSS

## 2023-06-06 ENCOUNTER — NURSE TRIAGE (OUTPATIENT)
Dept: NURSING | Facility: CLINIC | Age: 38
End: 2023-06-06

## 2023-06-07 NOTE — TELEPHONE ENCOUNTER
Called to follow up with patient. Noted last visit was with Dr Powell in 2021. Has not followed up here recently     Patient Contact    Attempt # 1    Was call answered?  No.  Left message on voicemail with information to call back.    Hoa NUNEZ, Triage RN  [facility]

## 2023-06-07 NOTE — TELEPHONE ENCOUNTER
"Nurse Triage SBAR    Situation:   - psych concern    Background:   -Patient calling, It is okay to leave a detailed message at this number.   -Hx PTSD, anxiety, depression SI, alcohol dependence    Assessment:   -have not \"felt like this in over a year\"  -he wanted to make a complaint r/t visit he had  -feels like he is getting bounced around (over the phone), was already given info for patient relations (he cant call them when they are open due to there hours)  -feels like he is \"spiraling\",  \"feels like what happened before with the [employer]\" (he was given a false name and badge number)  -\"worried cant do this again\"  -\"alone right now\"  -denies any thoughts of harming himself or others  -dose not trust what people are saying anymore  -patient was using vague statements   -stated he wanted to talk to a counselor through [facility]  -RN offered numbers to crisis lines  -caller disconnected the call    Recommendation:   RN called [facility] for a welfare check given the patient is seemingly in crisis  Care team: routing in case there is any addition support or psych resources we can offer the patient     MARCEL SHETH RN on 6/6/2023 at 8:23 PM        Reason for Disposition    Patient is extremely upset (e.g. can't be calmed down)    Additional Information    Negative: [1] Patient attempted suicide recently AND [2] unwilling to go to the ER    Negative: [1] Patient is threatening suicide today AND [2] unwilling to go to the ER    Negative: Acts or talks confused    Negative: Sounds like a life-threatening emergency to the triager    Negative: Suicidal threats, thoughts or behavior are the main concern    Negative: [1] Postpartum depression AND [2] NO suicidal thoughts    Negative: Homicidal behavior is the main concern    Negative: [1] Substance abuse suspected AND [2] symptoms now    Negative: Mental health hospital admission needed in the past and depression symptoms are similar    Protocols used: " DEPRESSION-P-AH

## 2023-06-08 ENCOUNTER — NURSE TRIAGE (OUTPATIENT)
Dept: NURSING | Facility: CLINIC | Age: 38
End: 2023-06-08
Payer: COMMERCIAL

## 2023-06-08 NOTE — TELEPHONE ENCOUNTER
Patient Contact    Attempt # 2    Was call answered?  No.  Left message on voicemail with information to call back.    Hoa NUNEZ, Triage RN  [facility]

## 2023-06-08 NOTE — TELEPHONE ENCOUNTER
"Patient returning call saying 'I think I know what it is about.\" and patient said he only had a couple minutes to talk at most.  Writer was searching for the original documentation to further assist patient, but patient said he had to return to work and ended the call.  He said he would call back at 6pm after work.    Tania Alba RN  [facility]    "

## 2023-06-08 NOTE — TELEPHONE ENCOUNTER
Patient is calling back from messages that were left in last couple days. Is unsure what they are about. Patient says what he is wanting is to know who reported him to have a welfare check done on 6/6/23 and also wants to listen to the recording. He is upset that this was done and wants to speak to someone about it. This writer informed him that note will be sent to supervisor to reach back to him on his concerns.     HELADIO PINO RN  [facility] nurse advisors  6/8/2023  7:09 PM    Reason for Disposition    General information question, no triage required and triager able to answer question    Additional Information    Negative: RN needs further essential information from caller in order to complete triage    Negative: Requesting regular office appointment    Negative: [1] Caller requesting NON-URGENT health information AND [2] PCP's office is the best resource    Negative: Health Information question, no triage required and triager able to answer question    Protocols used: INFORMATION ONLY CALL - NO TRIAGE-A-

## 2023-07-03 ENCOUNTER — OFFICE VISIT (OUTPATIENT)
Dept: FAMILY MEDICINE | Facility: CLINIC | Age: 38
End: 2023-07-03
Payer: COMMERCIAL

## 2023-07-03 VITALS
TEMPERATURE: 99.9 F | HEART RATE: 95 BPM | RESPIRATION RATE: 16 BRPM | WEIGHT: 221 LBS | BODY MASS INDEX: 28.36 KG/M2 | SYSTOLIC BLOOD PRESSURE: 125 MMHG | OXYGEN SATURATION: 97 % | HEIGHT: 74 IN | DIASTOLIC BLOOD PRESSURE: 79 MMHG

## 2023-07-03 DIAGNOSIS — M54.41 CHRONIC BILATERAL LOW BACK PAIN WITH RIGHT-SIDED SCIATICA: ICD-10-CM

## 2023-07-03 DIAGNOSIS — G89.29 CHRONIC BILATERAL LOW BACK PAIN WITH RIGHT-SIDED SCIATICA: ICD-10-CM

## 2023-07-03 DIAGNOSIS — L60.0 INGROWN TOENAIL: ICD-10-CM

## 2023-07-03 DIAGNOSIS — F17.200 TOBACCO USE DISORDER: ICD-10-CM

## 2023-07-03 DIAGNOSIS — G62.9 PERIPHERAL POLYNEUROPATHY: Primary | ICD-10-CM

## 2023-07-03 LAB
ALBUMIN SERPL BCG-MCNC: 4.8 G/DL (ref 3.5–5.2)
ALP SERPL-CCNC: 55 U/L (ref 40–129)
ALT SERPL W P-5'-P-CCNC: 14 U/L (ref 0–70)
ANION GAP SERPL CALCULATED.3IONS-SCNC: 13 MMOL/L (ref 7–15)
AST SERPL W P-5'-P-CCNC: 22 U/L (ref 0–45)
BILIRUB SERPL-MCNC: 0.5 MG/DL
BUN SERPL-MCNC: 16.1 MG/DL (ref 6–20)
CALCIUM SERPL-MCNC: 9.4 MG/DL (ref 8.6–10)
CHLORIDE SERPL-SCNC: 104 MMOL/L (ref 98–107)
CREAT SERPL-MCNC: 0.98 MG/DL (ref 0.67–1.17)
DEPRECATED HCO3 PLAS-SCNC: 22 MMOL/L (ref 22–29)
ERYTHROCYTE [DISTWIDTH] IN BLOOD BY AUTOMATED COUNT: 11.9 % (ref 10–15)
FERRITIN SERPL-MCNC: 122 NG/ML (ref 31–409)
FOLATE SERPL-MCNC: 8.7 NG/ML (ref 4.6–34.8)
GFR SERPL CREATININE-BSD FRML MDRD: >90 ML/MIN/1.73M2
GLUCOSE SERPL-MCNC: 121 MG/DL (ref 70–99)
HCT VFR BLD AUTO: 43.1 % (ref 40–53)
HGB BLD-MCNC: 14.8 G/DL (ref 13.3–17.7)
IRON BINDING CAPACITY (ROCHE): 311 UG/DL (ref 240–430)
IRON SATN MFR SERPL: 24 % (ref 15–46)
IRON SERPL-MCNC: 75 UG/DL (ref 61–157)
MCH RBC QN AUTO: 32.6 PG (ref 26.5–33)
MCHC RBC AUTO-ENTMCNC: 34.3 G/DL (ref 31.5–36.5)
MCV RBC AUTO: 95 FL (ref 78–100)
PLATELET # BLD AUTO: 301 10E3/UL (ref 150–450)
POTASSIUM SERPL-SCNC: 4.3 MMOL/L (ref 3.4–5.3)
PROT SERPL-MCNC: 7 G/DL (ref 6.4–8.3)
RBC # BLD AUTO: 4.54 10E6/UL (ref 4.4–5.9)
SODIUM SERPL-SCNC: 139 MMOL/L (ref 136–145)
TSH SERPL DL<=0.005 MIU/L-ACNC: 0.83 UIU/ML (ref 0.3–4.2)
VIT B12 SERPL-MCNC: 277 PG/ML (ref 232–1245)
WBC # BLD AUTO: 7 10E3/UL (ref 4–11)

## 2023-07-03 PROCEDURE — 82728 ASSAY OF FERRITIN: CPT | Performed by: INTERNAL MEDICINE

## 2023-07-03 PROCEDURE — 99214 OFFICE O/P EST MOD 30 MIN: CPT | Performed by: INTERNAL MEDICINE

## 2023-07-03 PROCEDURE — 83540 ASSAY OF IRON: CPT | Performed by: INTERNAL MEDICINE

## 2023-07-03 PROCEDURE — 82746 ASSAY OF FOLIC ACID SERUM: CPT | Performed by: INTERNAL MEDICINE

## 2023-07-03 PROCEDURE — 85027 COMPLETE CBC AUTOMATED: CPT | Performed by: INTERNAL MEDICINE

## 2023-07-03 PROCEDURE — 84443 ASSAY THYROID STIM HORMONE: CPT | Performed by: INTERNAL MEDICINE

## 2023-07-03 PROCEDURE — 36415 COLL VENOUS BLD VENIPUNCTURE: CPT | Performed by: INTERNAL MEDICINE

## 2023-07-03 PROCEDURE — 80053 COMPREHEN METABOLIC PANEL: CPT | Performed by: INTERNAL MEDICINE

## 2023-07-03 PROCEDURE — 82607 VITAMIN B-12: CPT | Performed by: INTERNAL MEDICINE

## 2023-07-03 PROCEDURE — 83550 IRON BINDING TEST: CPT | Performed by: INTERNAL MEDICINE

## 2023-07-03 RX ORDER — ALBUTEROL SULFATE 90 UG/1
1-2 AEROSOL, METERED RESPIRATORY (INHALATION) EVERY 6 HOURS PRN
Qty: 18 G | Refills: 1 | Status: SHIPPED | OUTPATIENT
Start: 2023-07-03 | End: 2023-08-22

## 2023-07-03 ASSESSMENT — PATIENT HEALTH QUESTIONNAIRE - PHQ9
10. IF YOU CHECKED OFF ANY PROBLEMS, HOW DIFFICULT HAVE THESE PROBLEMS MADE IT FOR YOU TO DO YOUR WORK, TAKE CARE OF THINGS AT HOME, OR GET ALONG WITH OTHER PEOPLE: SOMEWHAT DIFFICULT
5. POOR APPETITE OR OVEREATING: SEVERAL DAYS
SUM OF ALL RESPONSES TO PHQ QUESTIONS 1-9: 1
SUM OF ALL RESPONSES TO PHQ QUESTIONS 1-9: 1

## 2023-07-03 ASSESSMENT — ANXIETY QUESTIONNAIRES
GAD7 TOTAL SCORE: 3
GAD7 TOTAL SCORE: 3
3. WORRYING TOO MUCH ABOUT DIFFERENT THINGS: NOT AT ALL
6. BECOMING EASILY ANNOYED OR IRRITABLE: NOT AT ALL
1. FEELING NERVOUS, ANXIOUS, OR ON EDGE: MORE THAN HALF THE DAYS
IF YOU CHECKED OFF ANY PROBLEMS ON THIS QUESTIONNAIRE, HOW DIFFICULT HAVE THESE PROBLEMS MADE IT FOR YOU TO DO YOUR WORK, TAKE CARE OF THINGS AT HOME, OR GET ALONG WITH OTHER PEOPLE: SOMEWHAT DIFFICULT
2. NOT BEING ABLE TO STOP OR CONTROL WORRYING: NOT AT ALL
7. FEELING AFRAID AS IF SOMETHING AWFUL MIGHT HAPPEN: NOT AT ALL
5. BEING SO RESTLESS THAT IT IS HARD TO SIT STILL: NOT AT ALL

## 2023-07-03 ASSESSMENT — PAIN SCALES - GENERAL: PAINLEVEL: SEVERE PAIN (6)

## 2023-07-03 NOTE — PATIENT INSTRUCTIONS
BLOOD tests now    Schedule a blood flow study (AISHA) (see below)    We placed a referral to Podiatry as well.      We placed a referral for physical therapy (for the back)    We have ordered an imaging study.  Unless otherwise indicated, please call the number below to schedule.   [facility] Scheduling:    [phone]    Results will be conveyed by MyChart, telephone or letter.
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
## 2023-07-03 NOTE — LETTER
July 3, 2023      Brandin ENRIQUEZ Jennifer  [address] [place] [address]        To Whom It May Concern,     The above named patient was seen today.  We are beginning an investigation to evaluate medical conditions.      At this time I am recommending he work with no restrictions per shift but no more than 7 hours per shift.    This restriction should remain in place for 5 months from the date above.  No shift hour restrictions thereafter.      Sincerely,        Jamaal Levine MD

## 2023-07-11 ENCOUNTER — HOSPITAL ENCOUNTER (OUTPATIENT)
Dept: ULTRASOUND IMAGING | Facility: HOSPITAL | Age: 38
Discharge: HOME | End: 2023-07-11
Attending: INTERNAL MEDICINE | Admitting: INTERNAL MEDICINE
Payer: COMMERCIAL

## 2023-07-11 DIAGNOSIS — G62.9 PERIPHERAL POLYNEUROPATHY: ICD-10-CM

## 2023-07-11 PROCEDURE — 93924 LWR XTR VASC STDY BILAT: CPT

## 2023-08-06 ASSESSMENT — ENCOUNTER SYMPTOMS
CONSTIPATION: 0
PALPITATIONS: 0
CHILLS: 0
HEADACHES: 0
DYSURIA: 0
NERVOUS/ANXIOUS: 1
WEAKNESS: 0
MYALGIAS: 1
JOINT SWELLING: 0
ABDOMINAL PAIN: 0
COUGH: 1
FEVER: 0
FREQUENCY: 0
HEMATOCHEZIA: 0
DIARRHEA: 0
NAUSEA: 0
ARTHRALGIAS: 1
EYE PAIN: 0
HEMATURIA: 0
HEARTBURN: 0
DIZZINESS: 0
SHORTNESS OF BREATH: 0
SORE THROAT: 0
PARESTHESIAS: 0

## 2023-08-07 ENCOUNTER — OFFICE VISIT (OUTPATIENT)
Dept: FAMILY MEDICINE | Facility: CLINIC | Age: 38
End: 2023-08-07
Payer: COMMERCIAL

## 2023-08-07 VITALS
SYSTOLIC BLOOD PRESSURE: 122 MMHG | OXYGEN SATURATION: 100 % | TEMPERATURE: 99.3 F | HEART RATE: 115 BPM | HEIGHT: 74 IN | BODY MASS INDEX: 28.11 KG/M2 | WEIGHT: 219 LBS | RESPIRATION RATE: 16 BRPM | DIASTOLIC BLOOD PRESSURE: 82 MMHG

## 2023-08-07 DIAGNOSIS — R21 RASH: ICD-10-CM

## 2023-08-07 DIAGNOSIS — Z00.00 ENCOUNTER FOR PREVENTIVE CARE: Primary | ICD-10-CM

## 2023-08-07 PROCEDURE — 99395 PREV VISIT EST AGE 18-39: CPT | Mod: 25 | Performed by: INTERNAL MEDICINE

## 2023-08-07 PROCEDURE — 99213 OFFICE O/P EST LOW 20 MIN: CPT | Mod: 25 | Performed by: INTERNAL MEDICINE

## 2023-08-07 RX ORDER — TRIAMCINOLONE ACETONIDE 1 MG/G
CREAM TOPICAL 2 TIMES DAILY
Qty: 80 G | Refills: 0 | Status: SHIPPED | OUTPATIENT
Start: 2023-08-07 | End: 2024-02-26

## 2023-08-07 ASSESSMENT — ENCOUNTER SYMPTOMS
WEAKNESS: 0
HEARTBURN: 0
HEMATURIA: 0
SORE THROAT: 0
MYALGIAS: 1
CONSTIPATION: 0
ABDOMINAL PAIN: 0
CHILLS: 0
FREQUENCY: 0
ARTHRALGIAS: 1
DIARRHEA: 0
NAUSEA: 0
PALPITATIONS: 0
HEADACHES: 0
DYSURIA: 0
EYE PAIN: 0
FEVER: 0
JOINT SWELLING: 0
PARESTHESIAS: 0
COUGH: 1
NERVOUS/ANXIOUS: 1
DIZZINESS: 0
SHORTNESS OF BREATH: 0
HEMATOCHEZIA: 0

## 2023-08-07 ASSESSMENT — PAIN SCALES - GENERAL: PAINLEVEL: MODERATE PAIN (5)

## 2023-08-07 NOTE — PROGRESS NOTES
SUBJECTIVE:   CC: Иван is an 37 year old who presents for preventative health visit.       8/7/2023    12:49 PM   Additional Questions   Roomed by Licha CASTANEDA   Accompanied by --       Healthy Habits:     Getting at least 3 servings of Calcium per day:  Yes    Bi-annual eye exam:  NO    Dental care twice a year:  NO    Sleep apnea or symptoms of sleep apnea:  None    Diet:  Regular (no restrictions)    Frequency of exercise:  2-3 days/week    Duration of exercise:  30-45 minutes    Taking medications regularly:  Yes    Medication side effects:  None    Additional concerns today:  Yes      Have you ever done Advance Care Planning? (For example, a Health Directive, POLST, or a discussion with a medical provider or your loved ones about your wishes): No, advance care planning information given to patient to review.  Patient plans to discuss their wishes with loved ones or provider.      Social History     Tobacco Use    Smoking status: Every Day     Packs/day: 0.50     Types: Cigarettes    Smokeless tobacco: Never   Substance Use Topics    Alcohol use: Yes     Comment: 5 per week             8/6/2023     9:33 PM   Alcohol Use   Prescreen: >3 drinks/day or >7 drinks/week? Yes   AUDIT SCORE  5         8/6/2023     9:33 PM   AUDIT - Alcohol Use Disorders Identification Test - Reproduced from the World Health Organization Audit 2001 (Second Edition)   1.  How often do you have a drink containing alcohol? 2 to 3 times a week   2.  How many drinks containing alcohol do you have on a typical day when you are drinking? 3 or 4   3.  How often do you have five or more drinks on one occasion? Less than monthly   4.  How often during the last year have you found that you were not able to stop drinking once you had started? Never   5.  How often during the last year have you failed to do what was normally expected of you because of drinking? Never   6.  How often during the last year have you needed a first drink in the morning to get  "yourself going after a heavy drinking session? Never   7.  How often during the last year have you had a feeling of guilt or remorse after drinking? Never   8.  How often during the last year have you been unable to remember what happened the night before because of your drinking? Never   9.  Have you or someone else been injured because of your drinking? No   10. Has a relative, friend, doctor or other health care worker been concerned about your drinking or suggested you cut down? No   TOTAL SCORE 5       Last PSA: No results found for: PSA    Reviewed orders with patient. Reviewed health maintenance and updated orders accordingly - Yes      Reviewed and updated as needed this visit by clinical staff   Tobacco  Allergies               Reviewed and updated as needed this visit by Provider                     Review of Systems   Constitutional:  Negative for chills and fever.   HENT:  Negative for congestion, ear pain, hearing loss and sore throat.    Eyes:  Negative for pain and visual disturbance.   Respiratory:  Positive for cough. Negative for shortness of breath.    Cardiovascular:  Negative for chest pain, palpitations and peripheral edema.   Gastrointestinal:  Negative for abdominal pain, constipation, diarrhea, heartburn, hematochezia and nausea.   Genitourinary:  Negative for dysuria, frequency, genital sores, hematuria, impotence, penile discharge and urgency.   Musculoskeletal:  Positive for arthralgias and myalgias. Negative for joint swelling.   Skin:  Positive for rash.   Neurological:  Negative for dizziness, weakness, headaches and paresthesias.   Psychiatric/Behavioral:  Negative for mood changes. The patient is nervous/anxious.          OBJECTIVE:   /82 (BP Location: Right arm, Patient Position: Chair, Cuff Size: Adult Large)   Pulse 115   Temp 99.3  F (37.4  C) (Temporal)   Resp 16   Ht 1.88 m (6' 2\")   Wt 99.3 kg (219 lb)   SpO2 100%   BMI 28.12 kg/m      Physical Exam  GENERAL: " "healthy, alert and no distress  EYES: Eyes grossly normal to inspection, PERRL and conjunctivae and sclerae normal  HENT: ear canals and TM's normal, nose and mouth without ulcers or lesions  NECK: no adenopathy, no asymmetry, masses, or scars and thyroid normal to palpation  RESP: lungs clear to auscultation - no rales, rhonchi or wheezes  CV: regular rate and rhythm, normal S1 S2, no S3 or S4, no murmur, click or rub, no peripheral edema and peripheral pulses strong  ABDOMEN: soft, nontender, no hepatosplenomegaly, no masses and bowel sounds normal  MS: no gross musculoskeletal defects noted, no edema  SKIN: scaly plaques.  RLE, LLE, RUE. Torso spared  NEURO: Normal strength and tone, mentation intact and speech normal  PSYCH: mentation appears normal, affect normal/bright        ASSESSMENT/PLAN:       ICD-10-CM    1. Encounter for preventive care  Z00.00       2. Rash   Scaly plaques anterior shin most pronounced right lower extremity but I also has it on the left lower extremity as well as right upper extremity.  No formal diagnosis in the past.  We will prescribe triamcinolone for as needed use.  Patient is to message us with no improvement.  Can consider dermatology referral for psoriasis work-up. R21 triamcinolone (KENALOG) 0.1 % external cream      Age and gender appropriate preventive care and screenings are discussed.  Particular attention to personal preventive care and age appropriate lifestyle including the incorporation of healthy diet and physical activity is made.              COUNSELING:   Reviewed preventive health counseling, as reflected in patient instructions      BMI:   Estimated body mass index is 28.12 kg/m  as calculated from the following:    Height as of this encounter: 1.88 m (6' 2\").    Weight as of this encounter: 99.3 kg (219 lb).         He reports that he has been smoking cigarettes. He has been smoking an average of .5 packs per day. He has never used smokeless " tobacco.  Nicotine/Tobacco Cessation Plan:   Information offered: Patient not interested at this time            Jamaal Levine MD  [facility]

## 2023-08-11 NOTE — PROGRESS NOTES
Assessment:      ICD-10-CM    1. Sesamoiditis of right foot  M25.871 XR Foot Right G/E 3 Views     Orthotics and Prosthetics DME Orthotic; Foot Orthotics; Bilateral      2. Closed fracture of phalanx of right fifth toe, initial encounter  S92.501A       3. Sprain of right foot, initial encounter  S93.601A              Plan:  Orders Placed This Encounter   Procedures    XR Foot Right G/E 3 Views    Orthotics and Prosthetics DME Orthotic; Foot Orthotics; Bilateral         Discussed the etiology and treatment of the condition with the patient.  Imaging studies reviewed and discussed with the patient.    Majority of toe fractures are non-operative.    -Gonzales splint with Coban  -Firm soled shoe or post-op shoe  -Limit activity to pain tolerance  -No need to repeat x-rays       Return:  No follow-ups on file.     Nicolette Blake DPM          Chief Complaint:     Patient presents with:  Right Great Toe - Ingrown Toenail  Right 5th Toe - Toe Pain     Toe fracture    HPI:  Brandin Rodriguez is a 37 year old year old male who presents for evaluation of foot fracture.    Date of injury- 3 days ago    Also b/l foot pain, numbness        Past Medical & Surgical History:  Past Medical History:   Diagnosis Date    Alcoholic hepatitis     Anxiety     Depression     Depressive disorder     PTSD (post-traumatic stress disorder)     Suicidal ideation     Uncomplicated asthma       Past Surgical History:   Procedure Laterality Date    BIOPSY      mole bxs    ESOPHAGOSCOPY, GASTROSCOPY, DUODENOSCOPY (EGD), COMBINED N/A 11/5/2019    Procedure: ESOPHAGOGASTRODUODENOSCOPY (EGD);  Surgeon: Jake Elizabeth MD;  Location:  GI    ESOPHAGOSCOPY, GASTROSCOPY, DUODENOSCOPY (EGD), COMBINED N/A 3/12/2020    Procedure: ESOPHAGOGASTRODUODENOSCOPY (EGD);  Surgeon: Jake Elizabeth MD;  Location:  GI    SOFT TISSUE SURGERY        Family History   Problem Relation Age of Onset    Diabetes Mother     Hypertension Mother     Heart  Disease Maternal Grandmother     Cancer Paternal Grandmother     Anxiety Disorder Brother     Autism Spectrum Disorder Brother     Arrhythmia Maternal Half-Sister         Social History:  ?  History   Smoking Status    Every Day    Packs/day: 0.50    Types: Cigarettes   Smokeless Tobacco    Never     History   Drug Use    Types: Marijuana     Comment: occasionally     Social History    Substance and Sexual Activity      Alcohol use: Yes        Comment: 5 per week      Allergies:  ?   Allergies   Allergen Reactions    Amoxicillin Rash    Bactrim [Sulfamethoxazole W-Trimethoprim] Rash        Medications:    Current Outpatient Medications   Medication    albuterol (PROAIR HFA/PROVENTIL HFA/VENTOLIN HFA) 108 (90 Base) MCG/ACT inhaler    amphetamine-dextroamphetamine (ADDERALL) 10 MG tablet    buPROPion (WELLBUTRIN XL) 300 MG 24 hr tablet    multivitamin w/minerals (MULTI-VITAMIN) tablet    propranolol (INDERAL) 20 MG tablet    triamcinolone (KENALOG) 0.1 % external cream     No current facility-administered medications for this visit.       Physical Exam:  ?  Vitals:  Pulse 99   Wt 99.3 kg (219 lb)   SpO2 94%   BMI 28.12 kg/m     General:  WD/WN, in NAD.  A&O x3.  Dermatologic:    Skin is intact, open lesions absent.   Bruising present forefoot R.  Skin texture, turgor is normal.  Vascular:  Digital capillary refill time normal .  Skin temperature is warm to affected digits.  Focal edema- mild to affected digit.  Neurologic:    Gross sensation normal.  Gait and balance normal.  Musculoskeletal:  Maximal pain to palpation of affected digit.   Active ROM present to digits bilateral.  Muscle strength intact bilateral.      Imaging:  x-ray  Weight-bearing views right foot dated 8/23, reveal 5th digit Fx, bipartite sesamoids, no other Fx  Bipartite fibular sesamoid

## 2023-08-15 ENCOUNTER — OFFICE VISIT (OUTPATIENT)
Dept: PODIATRY | Facility: CLINIC | Age: 38
End: 2023-08-15
Attending: INTERNAL MEDICINE
Payer: COMMERCIAL

## 2023-08-15 ENCOUNTER — ANCILLARY PROCEDURE (OUTPATIENT)
Dept: GENERAL RADIOLOGY | Facility: CLINIC | Age: 38
End: 2023-08-15
Attending: PODIATRIST
Payer: COMMERCIAL

## 2023-08-15 VITALS — BODY MASS INDEX: 28.12 KG/M2 | HEART RATE: 99 BPM | OXYGEN SATURATION: 94 % | WEIGHT: 219 LBS

## 2023-08-15 DIAGNOSIS — S92.501A CLOSED FRACTURE OF PHALANX OF RIGHT FIFTH TOE, INITIAL ENCOUNTER: ICD-10-CM

## 2023-08-15 DIAGNOSIS — M79.671 RIGHT FOOT PAIN: ICD-10-CM

## 2023-08-15 DIAGNOSIS — S93.601A SPRAIN OF RIGHT FOOT, INITIAL ENCOUNTER: ICD-10-CM

## 2023-08-15 DIAGNOSIS — M25.871 SESAMOIDITIS OF RIGHT FOOT: Primary | ICD-10-CM

## 2023-08-15 PROCEDURE — 99203 OFFICE O/P NEW LOW 30 MIN: CPT | Performed by: PODIATRIST

## 2023-08-15 PROCEDURE — 73630 X-RAY EXAM OF FOOT: CPT | Mod: TC | Performed by: RADIOLOGY

## 2023-08-15 NOTE — LETTER
8/15/2023         RE: Brandin Rodriguez  [address] [place] [address]        Dear Colleague,    Thank you for referring your patient, Brandin Rodriguez, to the [facility]. Please see a copy of my visit note below.    Assessment:      ICD-10-CM    1. Sesamoiditis of right foot  M25.871 XR Foot Right G/E 3 Views     Orthotics and Prosthetics DME Orthotic; Foot Orthotics; Bilateral      2. Closed fracture of phalanx of right fifth toe, initial encounter  S92.501A       3. Sprain of right foot, initial encounter  S93.601A              Plan:  Orders Placed This Encounter   Procedures     XR Foot Right G/E 3 Views     Orthotics and Prosthetics DME Orthotic; Foot Orthotics; Bilateral         Discussed the etiology and treatment of the condition with the patient.  Imaging studies reviewed and discussed with the patient.    Majority of toe fractures are non-operative.    -Gonzales splint with Coban  -Firm soled shoe or post-op shoe  -Limit activity to pain tolerance  -No need to repeat x-rays       Return:  No follow-ups on file.     Nicolette Blake DPM          Chief Complaint:     Patient presents with:  Right Great Toe - Ingrown Toenail  Right 5th Toe - Toe Pain     Toe fracture    HPI:  Brandin Rodriguez is a 37 year old year old male who presents for evaluation of foot fracture.    Date of injury- 3 days ago    Also b/l foot pain, numbness        Past Medical & Surgical History:  Past Medical History:   Diagnosis Date     Alcoholic hepatitis      Anxiety      Depression      Depressive disorder      PTSD (post-traumatic stress disorder)      Suicidal ideation      Uncomplicated asthma       Past Surgical History:   Procedure Laterality Date     BIOPSY      mole bxs     ESOPHAGOSCOPY, GASTROSCOPY, DUODENOSCOPY (EGD), COMBINED N/A 11/5/2019    Procedure: ESOPHAGOGASTRODUODENOSCOPY (EGD);  Surgeon: Jake Elizabeth MD;  Location: [facility] [place]     ESOPHAGOSCOPY, GASTROSCOPY,  DUODENOSCOPY (EGD), COMBINED N/A 3/12/2020    Procedure: ESOPHAGOGASTRODUODENOSCOPY (EGD);  Surgeon: Jake Elizabeth MD;  Location:  GI     SOFT TISSUE SURGERY        Family History   Problem Relation Age of Onset     Diabetes Mother      Hypertension Mother      Heart Disease Maternal Grandmother      Cancer Paternal Grandmother      Anxiety Disorder Brother      Autism Spectrum Disorder Brother      Arrhythmia Maternal Half-Sister         Social History:  ?  History   Smoking Status     Every Day     Packs/day: 0.50     Types: Cigarettes   Smokeless Tobacco     Never     History   Drug Use     Types: Marijuana     Comment: occasionally     Social History    Substance and Sexual Activity      Alcohol use: Yes        Comment: 5 per week      Allergies:  ?   Allergies   Allergen Reactions     Amoxicillin Rash     Bactrim [Sulfamethoxazole W-Trimethoprim] Rash        Medications:    Current Outpatient Medications   Medication     albuterol (PROAIR HFA/PROVENTIL HFA/VENTOLIN HFA) 108 (90 Base) MCG/ACT inhaler     amphetamine-dextroamphetamine (ADDERALL) 10 MG tablet     buPROPion (WELLBUTRIN XL) 300 MG 24 hr tablet     multivitamin w/minerals (MULTI-VITAMIN) tablet     propranolol (INDERAL) 20 MG tablet     triamcinolone (KENALOG) 0.1 % external cream     No current facility-administered medications for this visit.       Physical Exam:  ?  Vitals:  Pulse 99   Wt 99.3 kg (219 lb)   SpO2 94%   BMI 28.12 kg/m     General:  WD/WN, in NAD.  A&O x3.  Dermatologic:    Skin is intact, open lesions absent.   Bruising present forefoot R.  Skin texture, turgor is normal.  Vascular:  Digital capillary refill time normal .  Skin temperature is warm to affected digits.  Focal edema- mild to affected digit.  Neurologic:    Gross sensation normal.  Gait and balance normal.  Musculoskeletal:  Maximal pain to palpation of affected digit.   Active ROM present to digits bilateral.  Muscle strength intact  bilateral.      Imaging:  x-ray  Weight-bearing views right foot dated 8/23, reveal 5th digit Fx, bipartite sesamoids, no other Fx  Bipartite fibular sesamoid      Again, thank you for allowing me to participate in the care of your patient.        Sincerely,        Nicolette Blake DPM

## 2023-08-15 NOTE — PATIENT INSTRUCTIONS
PATIENT INSTRUCTIONS - Podiatry / Foot & Ankle Surgery        -Gonzales splint with Coban  -Firm soled shoe or post-op shoe  -Limit activity to pain tolerance  -No need to repeat x-rays           [place] [facility]UST[facility] LOCATIONS  [facility]  [address]  [place] [address]  Phone: [phone]  Fax: [phone] [facility]  [address]  [place] [address]  Phone: [phone]   Fax: [phone]   [facility]  [address]  [place] [address]  Phone: [phone]  Fax: [phone] [facility]  [address]  [place] [address]  Phone: [phone]   Fax: [phone]   [facility]   [address]  [place] [address]  Phone: [phone]  Fax: [phone] * Please call any location listed to make an appointment for a casting/fitting. Your referral was sent to their central office and they will all have the order on file.           If not improved - standing L foot xrays, MRI R

## 2023-08-22 DIAGNOSIS — F17.200 TOBACCO USE DISORDER: ICD-10-CM

## 2023-08-23 RX ORDER — ALBUTEROL SULFATE 90 UG/1
1-2 AEROSOL, METERED RESPIRATORY (INHALATION) EVERY 6 HOURS PRN
Qty: 18 G | Refills: 1 | Status: SHIPPED | OUTPATIENT
Start: 2023-08-23 | End: 2024-03-25

## 2023-09-13 ENCOUNTER — ANCILLARY PROCEDURE (OUTPATIENT)
Dept: GENERAL RADIOLOGY | Facility: CLINIC | Age: 38
End: 2023-09-13
Attending: PODIATRIST
Payer: COMMERCIAL

## 2023-09-13 ENCOUNTER — OFFICE VISIT (OUTPATIENT)
Dept: PODIATRY | Facility: CLINIC | Age: 38
End: 2023-09-13
Payer: COMMERCIAL

## 2023-09-13 VITALS
WEIGHT: 219 LBS | SYSTOLIC BLOOD PRESSURE: 138 MMHG | HEIGHT: 74 IN | BODY MASS INDEX: 28.11 KG/M2 | DIASTOLIC BLOOD PRESSURE: 86 MMHG

## 2023-09-13 DIAGNOSIS — Q66.70 PES CAVUS: ICD-10-CM

## 2023-09-13 DIAGNOSIS — M25.871 SESAMOIDITIS OF RIGHT FOOT: ICD-10-CM

## 2023-09-13 DIAGNOSIS — M79.671 BILATERAL FOOT PAIN: ICD-10-CM

## 2023-09-13 DIAGNOSIS — S92.501A CLOSED FRACTURE OF PHALANX OF RIGHT FIFTH TOE, INITIAL ENCOUNTER: Primary | ICD-10-CM

## 2023-09-13 DIAGNOSIS — M79.672 BILATERAL FOOT PAIN: ICD-10-CM

## 2023-09-13 DIAGNOSIS — S92.501A CLOSED FRACTURE OF PHALANX OF RIGHT FIFTH TOE, INITIAL ENCOUNTER: ICD-10-CM

## 2023-09-13 PROCEDURE — 99213 OFFICE O/P EST LOW 20 MIN: CPT | Performed by: PODIATRIST

## 2023-09-13 PROCEDURE — 73660 X-RAY EXAM OF TOE(S): CPT | Mod: TC | Performed by: RADIOLOGY

## 2023-09-13 ASSESSMENT — PAIN SCALES - GENERAL: PAINLEVEL: EXTREME PAIN (8)

## 2023-09-13 NOTE — PATIENT INSTRUCTIONS
Thank you for choosing [facility] Podiatry / Foot & Ankle Surgery!    DR. CARRERO'S CLINIC LOCATIONS:     [facility] TRIAGE LINE: [phone]   [address] APPOINTMENTS: [phone]   [place] [address] RADIOLOGY: [phone]   (Every other Tues - Wed - Fri PM) SET UP SURGERY: [phone]    PHYSICAL THERAPY: [phone]   [facility] SPECIALTY BILLING QUESTIONS: [phone] [address] FAX: [phone]   [place] [address]    (Thurs & Fri AM)       CAVUS FOOT (HIGH-ARCHED FOOT)  Cavus foot is a condition in which the foot has a very high arch. Because of this high arch, an excessive amount of weight is placed on the ball and heel of the foot when walking or standing. Cavus foot can lead to a variety of signs and symptoms, such as pain and instability. It can develop at any age, and can occur in one or both feet.  CAUSE  Cavus foot is often caused by a neurologic disorder or other medical condition such as cerebral palsy, Charcot-Echo-Tooth disease, spina bifida, polio, muscular dystrophy, or stroke. In other cases of cavus foot, the high arch may represent an inherited structural abnormality.  An accurate diagnosis is important because the underlying cause of cavus foot largely determines its future course. If the high arch is due to a neurologic disorder or other medical condition, it is likely to progressively worsen. On the other hand, cases of cavus foot that do not result from neurologic disorders usually do not change in appearance.  SYMPTOMS  The arch of a cavus foot will appear high even when standing. In addition, one or more of the following symptoms may be present:  Hammertoes (bent toes) or claw toes (toes clenched like a fist)   Calluses on the ball, side, or heel of the foot   Pain when standing or walking   An unstable foot due to the heel tilting inward, which can lead to ankle sprains   Some people with cavus foot may also experience foot drop, a weakness  of the muscles in the foot and ankle that results in dragging the foot when taking a step. Foot drop is usually a sign of an underlying neurologic condition.  DIAGNOSIS  Diagnosis of cavus foot includes a review of the patient s family history. The foot and ankle surgeon examines the foot, looking for a high arch and possible calluses, hammertoes, and claw toes. The foot is tested for muscle strength, and the patient s walking pattern and coordination are observed. If a neurologic condition appears to be present, the entire limb may be examined. The surgeon may also study the pattern of wear on the patient's shoes.  X-rays are sometimes ordered to further assess the condition. In addition, the surgeon may refer the patient to a neurologist for a complete neurologic evaluation.  TREATMENT  This may include one or more of the following options:  Orthotic devices - Custom orthotic devices that fit into the shoe can be beneficial because they provide stability and cushioning to the foot.   Shoe modifications - High-topped shoes support the ankle, and shoes with heels a little wider on the bottom add stability.   Bracing - The surgeon may recommend a brace to help keep the foot and ankle stable. Bracing is also useful in managing foot drop.   SURGERY  If non-surgical treatment fails to adequately relieve pain and improve stability, surgery may be needed to decrease pain, increase stability, and compensate for weakness in the foot. The surgeon will choose the best surgical procedure or combination of procedures based on the patient s individual case. In some cases where an underlying neurologic problem exists, surgery may be needed again in the future due to the progression of the disorder.   SESAMOID INJURIES IN THE FOOT  A sesamoid is a bone embedded in a tendon. Sesamoids are found in several joints in the body. In the normal foot, the sesamoids are two pea-shaped bones located in the ball of the foot, beneath the big  toe joint.  Acting as a pulley for tendons, the sesamoids help the big toe move normally and provide leverage when the big toe  pushes off  during walking and running. The sesamoids also serve as a weight-bearing surface for the first metatarsal bone (the long bone connected to the big toe), absorbing the weight placed on the ball of the foot when walking, running, and jumping.  Sesamoid injuries can involve the bones, tendons, and/or surrounding tissue in the joint. They are often associated with activities requiring increased pressure on the ball of the foot, such as running, basketball, football, golf, tennis, and ballet. In addition, people with high arches are at risk for developing sesamoid problems. Frequent wearing of high-heeled shoes can also be a contributing factor.  TYPES OF INJURIES  There are three types of sesamoid injuries in the foot:  Turf Toe: This is an injury of the soft tissue surrounding the big toe joint. It usually occurs when the big toe joint is extended beyond its normal range. Turf toe causes immediate, sharp pain and swelling. It usually affects the entire big toe joint and limits the motion of the toe. Turf toe may result in an injury to the soft tissue attached to the sesamoid or a fracture of the sesamoid. Sometimes a  pop  is felt at the moment of injury.   Fracture: A fracture (break) in a sesamoid bone can be either acute or chronic.   An acute fracture is caused by trauma - a direct blow or impact to the bone. An acute sesamoid fracture produces immediate pain and swelling at the site of the break, but usually does not affect the entire big toe joint.   A chronic fracture is a stress fracture (a hairline break usually caused by repetitive stress or overuse). A chronic sesamoid fracture produces longstanding pain in the ball of the foot beneath the big toe joint. The pain, which tends to come and go, generally is aggravated with activity and relieved with rest.  Sesamoiditis:  This is an overuse injury involving chronic inflammation of the sesamoid bones and the tendons involved with those bones. Sesamoiditis is caused by increased pressure to the sesamoids. Often, sesamoiditis is associated with a dull, longstanding pain beneath the big toe joint. The pain comes and goes, usually occurring with certain shoes or certain activities.  DIAGNOSIS  In diagnosing a sesamoid injury, the foot and ankle surgeon will examine the foot, focusing on the big toe joint. The surgeon will press on the big toe, move it up and down, and may assess the patient s walking and evaluate the wear pattern on the patient s shoes. X-rays are ordered, and in some cases, advanced imaging studies may be ordered.  NON-SURGICAL TREATMENT  Non-surgical treatment for sesamoid injuries of the foot may include one or more of the following options, depending on the type of injury and degree of severity:  Padding, strapping, or taping. A pad may be placed in the shoe to cushion the inflamed sesamoid area, or the toe may be taped or strapped to relieve that area of tension.   Immobilization. The foot may be placed in a cast or removable walking cast. Crutches may be used to prevent placing weight on the foot.   Oral medications. Nonsteroidal anti-inflammatory drugs (NSAIDs), such as ibuprofen, are often helpful in reducing the pain and inflammation.   Physical therapy. The rehabilitation period following immobilization sometimes includes physical therapy, such as exercises (range-of-motion, strengthening, and conditioning) and ultrasound therapy.   Steroid injections. In some cases, cortisone is injected in the joint to reduce pain and inflammation.   Orthotic devices. Custom orthotic devices that fit into the shoe may be prescribed for long-term treatment of sesamoiditis to balance the pressure placed on the ball of the foot.  SURGICAL TREATMENT  When sesamoid injuries fail to respond to non-surgical treatment, surgery may be  required. The foot and ankle surgeon will determine the type of procedure that is best suited to the individual patient.

## 2023-09-13 NOTE — LETTER
9/13/2023         RE: Brandin Rodriguez  [address] [place] [address]        Dear Colleague,    Thank you for referring your patient, Brandin Rodriguez, to the [facility]. Please see a copy of my visit note below.    ASSESSMENT:  Encounter Diagnoses   Name Primary?     Closed fracture of phalanx of right fifth toe, initial encounter Yes     Sesamoiditis of right foot      Bilateral foot pain      Pes cavus        MEDICAL DECISION MAKING:  I personally reviewed the right fifth toe x-ray images with Иван.  There is interval callus formation indicating interval healing.    No additional recommendations for right fifth toe fracture.  For the sesamoid pain, I recommend proceeding with custom orthoses and considering stiff soled shoes.    I cannot explain all of his bilateral foot pain, yet do not think it is related to pes cavus and his years of working on his feet.  I explained that the cavus foot is a fairly rigid foot.  Custom orthoses should help and we can renew yearly.      Follow-up on an as-needed basis.        Disclaimer: This note consists of symbols derived from keyboarding, dictation and/or voice recognition software. As a result, there may be errors in the script that have gone undetected. Please consider this when interpreting information found in this chart.    Brandin Jurado DPM, FACFAS, [facility] Department of Podiatry/Foot & Ankle Surgery      ____________________________________________________________________    HPI:       Иван follows up for bilateral foot pain.  He saw Dr. Blake on 8/15/2023.  X-rays of the right foot were done showing a bipartite fibular sesamoid  Reports years of generalized bilateral foot pain.  Also a history of a right fifth toe fracture, transverse, base of proximal phalanx.  He still has some pain in this toe  Pain below the right first metatarsal head  Generalized bilateral midfoot pain  Custom orthoses were ordered at his last  "visit.  They are being fabricated.  He wears different shoes depending on his activity.  He works on his feet at a nursery.  *  Past Medical History:   Diagnosis Date     Alcoholic hepatitis      Anxiety      Depression      Depressive disorder      PTSD (post-traumatic stress disorder)      Suicidal ideation      Uncomplicated asthma    *  *  Past Surgical History:   Procedure Laterality Date     BIOPSY      mole bxs     ESOPHAGOSCOPY, GASTROSCOPY, DUODENOSCOPY (EGD), COMBINED N/A 11/5/2019    Procedure: ESOPHAGOGASTRODUODENOSCOPY (EGD);  Surgeon: Jake Elizabeth MD;  Location:  GI     ESOPHAGOSCOPY, GASTROSCOPY, DUODENOSCOPY (EGD), COMBINED N/A 3/12/2020    Procedure: ESOPHAGOGASTRODUODENOSCOPY (EGD);  Surgeon: Jake Elizabeth MD;  Location:  GI     SOFT TISSUE SURGERY     *  *  Current Outpatient Medications   Medication Sig Dispense Refill     albuterol (PROAIR HFA/PROVENTIL HFA/VENTOLIN HFA) 108 (90 Base) MCG/ACT inhaler Inhale 1-2 puffs into the lungs every 6 hours as needed for shortness of breath or wheezing 18 g 1     amphetamine-dextroamphetamine (ADDERALL) 10 MG tablet Take 1 tablet (10 mg) by mouth daily 30 tablet 0     buPROPion (WELLBUTRIN XL) 300 MG 24 hr tablet TAKE 1 TABLET(300 MG) BY MOUTH EVERY MORNING 90 tablet 0     multivitamin w/minerals (MULTI-VITAMIN) tablet Take 1 tablet by mouth daily       propranolol (INDERAL) 20 MG tablet TAKE 1 TABLET(20 MG) BY MOUTH THREE TIMES DAILY AS NEEDED FOR ANXIETY 60 tablet 3     triamcinolone (KENALOG) 0.1 % external cream Apply topically 2 times daily 80 g 0         EXAM:    Vitals: /86   Ht 1.88 m (6' 2\")   Wt 99.3 kg (219 lb)   BMI 28.12 kg/m    BMI: Body mass index is 28.12 kg/m .    Constitutional:  Brandin Rodriguez is in no apparent distress, appears well-nourished.  Cooperative with history and physical exam.    Vascular:  Pedal pulses are palpable for both the DP and PT arteries.  CFT < 3 sec.   There is some ongoing edema " of the right fifth toe.      Neuro: Light touch sensation is intact to the L4, L5, S1 distributions  No evidence of weakness, spasticity, or contracture in the lower extremities.     Derm: Normal texture and turgor.  No erythema, ecchymosis, or cyanosis.  No open lesions.     Musculoskeletal:    Lower extremity muscle strength is normal.  Pes cavus..  Pain on palpation to the plantar right fibular sesamoid region.  Mild contracture of the right fifth toe, no other deformity.    X-Ray Findings:  I personally reviewed the right fifth toe images.  See comments above          Again, thank you for allowing me to participate in the care of your patient.        Sincerely,        Brandin Jurado DPM

## 2023-09-13 NOTE — PROGRESS NOTES
ASSESSMENT:  Encounter Diagnoses   Name Primary?    Closed fracture of phalanx of right fifth toe, initial encounter Yes    Sesamoiditis of right foot     Bilateral foot pain     Pes cavus        MEDICAL DECISION MAKING:  I personally reviewed the right fifth toe x-ray images with Иван.  There is interval callus formation indicating interval healing.    No additional recommendations for right fifth toe fracture.  For the sesamoid pain, I recommend proceeding with custom orthoses and considering stiff soled shoes.    I cannot explain all of his bilateral foot pain, yet do not think it is related to pes cavus and his years of working on his feet.  I explained that the cavus foot is a fairly rigid foot.  Custom orthoses should help and we can renew yearly.      Follow-up on an as-needed basis.        Disclaimer: This note consists of symbols derived from keyboarding, dictation and/or voice recognition software. As a result, there may be errors in the script that have gone undetected. Please consider this when interpreting information found in this chart.    Brandin Jurado DPM, FACFAS, MS    [facility]      ____________________________________________________________________    HPI:       Иван follows up for bilateral foot pain.  He saw Dr. Blake on 8/15/2023.  X-rays of the right foot were done showing a bipartite fibular sesamoid  Reports years of generalized bilateral foot pain.  Also a history of a right fifth toe fracture, transverse, base of proximal phalanx.  He still has some pain in this toe  Pain below the right first metatarsal head  Generalized bilateral midfoot pain  Custom orthoses were ordered at his last visit.  They are being fabricated.  He wears different shoes depending on his activity.  He works on his feet at a nursery.  *  Past Medical History:   Diagnosis Date    Alcoholic hepatitis     Anxiety     Depression     Depressive disorder     PTSD  "(post-traumatic stress disorder)     Suicidal ideation     Uncomplicated asthma    *  *  Past Surgical History:   Procedure Laterality Date    BIOPSY      mole bxs    ESOPHAGOSCOPY, GASTROSCOPY, DUODENOSCOPY (EGD), COMBINED N/A 11/5/2019    Procedure: ESOPHAGOGASTRODUODENOSCOPY (EGD);  Surgeon: Jake Elizabeth MD;  Location:  GI    ESOPHAGOSCOPY, GASTROSCOPY, DUODENOSCOPY (EGD), COMBINED N/A 3/12/2020    Procedure: ESOPHAGOGASTRODUODENOSCOPY (EGD);  Surgeon: Jake Elizabeth MD;  Location:  GI    SOFT TISSUE SURGERY     *  *  Current Outpatient Medications   Medication Sig Dispense Refill    albuterol (PROAIR HFA/PROVENTIL HFA/VENTOLIN HFA) 108 (90 Base) MCG/ACT inhaler Inhale 1-2 puffs into the lungs every 6 hours as needed for shortness of breath or wheezing 18 g 1    amphetamine-dextroamphetamine (ADDERALL) 10 MG tablet Take 1 tablet (10 mg) by mouth daily 30 tablet 0    buPROPion (WELLBUTRIN XL) 300 MG 24 hr tablet TAKE 1 TABLET(300 MG) BY MOUTH EVERY MORNING 90 tablet 0    multivitamin w/minerals (MULTI-VITAMIN) tablet Take 1 tablet by mouth daily      propranolol (INDERAL) 20 MG tablet TAKE 1 TABLET(20 MG) BY MOUTH THREE TIMES DAILY AS NEEDED FOR ANXIETY 60 tablet 3    triamcinolone (KENALOG) 0.1 % external cream Apply topically 2 times daily 80 g 0         EXAM:    Vitals: /86   Ht 1.88 m (6' 2\")   Wt 99.3 kg (219 lb)   BMI 28.12 kg/m    BMI: Body mass index is 28.12 kg/m .    Constitutional:  Brandin Rodriguez is in no apparent distress, appears well-nourished.  Cooperative with history and physical exam.    Vascular:  Pedal pulses are palpable for both the DP and PT arteries.  CFT < 3 sec.   There is some ongoing edema of the right fifth toe.      Neuro: Light touch sensation is intact to the L4, L5, S1 distributions  No evidence of weakness, spasticity, or contracture in the lower extremities.     Derm: Normal texture and turgor.  No erythema, ecchymosis, or cyanosis.  No open " lesions.     Musculoskeletal:    Lower extremity muscle strength is normal.  Pes cavus..  Pain on palpation to the plantar right fibular sesamoid region.  Mild contracture of the right fifth toe, no other deformity.    X-Ray Findings:  I personally reviewed the right fifth toe images.  See comments above

## 2023-12-21 ENCOUNTER — NURSE TRIAGE (OUTPATIENT)
Dept: NURSING | Facility: CLINIC | Age: 38
End: 2023-12-21

## 2023-12-21 ENCOUNTER — TELEPHONE (OUTPATIENT)
Dept: FAMILY MEDICINE | Facility: CLINIC | Age: 38
End: 2023-12-21

## 2023-12-21 ENCOUNTER — E-VISIT (OUTPATIENT)
Dept: FAMILY MEDICINE | Facility: CLINIC | Age: 38
End: 2023-12-21
Payer: COMMERCIAL

## 2023-12-21 DIAGNOSIS — U07.1 INFECTION DUE TO 2019 NOVEL CORONAVIRUS: Primary | ICD-10-CM

## 2023-12-21 PROCEDURE — 99207 PR NON-BILLABLE SERV PER CHARTING: CPT | Performed by: INTERNAL MEDICINE

## 2023-12-21 NOTE — TELEPHONE ENCOUNTER
Patient Contact    Attempt # 1    Was call answered? No.    Left message for patient to call triage back.    Guera Pederson RN

## 2023-12-21 NOTE — TELEPHONE ENCOUNTER
Pt started an e visit for Covid.  Please have him go through RN Covid protocol.  Thank you  Jamaal Levine MD on 12/21/2023 at 4:41 PM

## 2023-12-22 NOTE — TELEPHONE ENCOUNTER
COVID Positive/Requesting COVID treatment    Patient is positive for COVID and requesting treatment options.    Date of positive COVID test (PCR or at home)? home  Current COVID symptoms: cough, fatigue, headache, sore throat, and congestion or runny nose  Date COVID symptoms began: 12/18/2023    Message should be routed to clinic RN pool. Best phone number to use for call back: [phone]  Pattie Simpson RN on 12/21/2023 at 6:51 PM    Reason for Disposition   [1] HIGH RISK patient (e.g., weak immune system, age > 64 years, obesity with BMI 30 or higher, pregnant, chronic lung disease or other chronic medical condition) AND [2] COVID symptoms (e.g., cough, fever)  (Exceptions: Already seen by PCP and no new or worsening symptoms.)     Asthma    Additional Information   Negative: SEVERE difficulty breathing (e.g., struggling for each breath, speaks in single words)   Negative: Difficult to awaken or acting confused (e.g., disoriented, slurred speech)   Negative: Bluish (or gray) lips or face now   Negative: Shock suspected (e.g., cold/pale/clammy skin, too weak to stand, low BP, rapid pulse)   Negative: Sounds like a life-threatening emergency to the triager   Negative: SEVERE or constant chest pain or pressure  (Exception: Mild central chest pain, present only when coughing.)   Negative: MODERATE difficulty breathing (e.g., speaks in phrases, SOB even at rest, pulse 100-120)   Negative: [1] Headache AND [2] stiff neck (can't touch chin to chest)   Negative: Oxygen level (e.g., pulse oximetry) 90 percent or lower   Negative: [1] Drinking very little AND [2] dehydration suspected (e.g., no urine > 12 hours, very dry mouth, very lightheaded)   Negative: Chest pain or pressure  (Exception: MILD central chest pain, present only when coughing.)   Negative: Patient sounds very sick or weak to the triager   Negative: MILD difficulty breathing (e.g., minimal/no SOB at rest, SOB with walking, pulse <100)   Negative:  Fever > 103 F (39.4 C)   Negative: [1] Fever > 101 F (38.3 C) AND [2] age > 60 years   Negative: [1] Fever > 100.0 F (37.8 C) AND [2] bedridden (e.g., CVA, chronic illness, recovering from surgery)   Negative: Oxygen level (e.g., pulse oximetry) 91 to 94 percent    Protocols used: Coronavirus (COVID-19) Diagnosed or [id number]

## 2023-12-22 NOTE — TELEPHONE ENCOUNTER
Patient Contact    Attempt # 1    Was call answered?  Yes. Per chart review, patient has a history of hepatic empairment, virtual visit required. See documentation below.    RN COVID TREATMENT VISIT  12/22/23      The patient has been triaged and does not require a higher level of care.    Brandin Rodriguez  38 year old  Current weight? 219    Has the patient been seen by a primary care provider at an [facility] or [facility] within the past two years? Yes.   Have you been in close proximity to/do you have a known exposure to a person with a confirmed case of influenza? No.     General treatment eligibility:  Date of positive COVID test (PCR or at home)?  12/21/23    Are you or have you been hospitalized for this COVID-19 infection? No.   Have you received monoclonal antibodies or antiviral treatment for COVID-19 since this positive test? No.   Do you have any of the following conditions that place you at risk of being very sick from COVID-19?   - Overweight or Obesity (BMI >85th percentile or BMI 25 or higher)  Yes, patient has at least one high risk condition as noted above.     Current COVID symptoms:   - cough  - fatigue  - headache  - sore throat  - congestion or runny nose  Yes. Patient has at least one symptom as selected.     How many days since symptoms started? 5 days or less. Established patient, 12 years or older weighing at least 88.2 lbs, who has symptoms that started in the past 5 days, has not been hospitalized nor received treatment already, and is at risk for being very sick from COVID-19.     Treatment eligibility by RN:  Are you currently pregnant or nursing? No  Do you have a clinically significant hypersensitivity to nirmatrelvir or ritonavir, or toxic epidermal necrolysis (TEN) or Ramirez-Rafita Syndrome? No  Do you have a history of hepatitis, any hepatic impairment on the Problem List (such as Child-Seth Class C, cirrhosis, fatty liver disease, alcoholic liver  disease), or was the last liver lab (hepatic panel, ALT, AST, ALK Phos, bilirubin) elevated in the past 6 months? YES  Do you have any history of severe renal impairment (eGFR < 30mL/min)? No    Is patient eligible to continue? No, patient does not meet all eligibility requirements for the RN COVID treatment (as denoted by yes response(s) above). Patient informed they will need a virtual provider visit to assess treatment options. Writer called to patient and scheduled patient for virtual treatment appt:  12/22/2023 4:30 PM  VIRTUAL CARE PROVIDER [facility]     Meri Gilmore RN  [facility]    Meri Gilmore RN  [facility]

## 2023-12-24 ENCOUNTER — NURSE TRIAGE (OUTPATIENT)
Dept: NURSING | Facility: CLINIC | Age: 38
End: 2023-12-24
Payer: COMMERCIAL

## 2023-12-24 NOTE — TELEPHONE ENCOUNTER
"Nurse Triage SBAR    Is this a 2nd Level Triage? NO    Situation: Anxiety    Background: Patient stresses he is not suicidal. Reports he is spiraling with his PTSD and trying to keep his apartment safe; wanting to see someone ASAP. Reports he hasn't seen anyone in awhile. Had a recent break-in, also has covid.     Assessment: Patient feeling like he is afraid, unable to sleep, patient states he \"knows he is in danger\", reports he has PTSD related to the police so he is unable to contact them. Reports the police have lied to him in the past. Reports he is having to watch out the windows all night for safety. Reports racing heart, no chest pain, no shortness of breath.     Protocol Recommended Disposition:   Go to ED Now    Recommendation: Reviewed disposition, recommended patient be seen at [facility]. Patient states it is a nice place but he can't go there because things would suck: states that his job would let him go. Patient states he can't go to the ED right now. Continued to offer options, when patient informed [facility]/ e-visit is not an option overnight. Call became disconnected. Called patient back and re-stated disposition. Patient states he can't go to ED. Patient states that he would be able to bear this feeling if he knew he had an upcoming e-visit; states he can wait two days if he has to. Again reiterated disposition, also discussed signs/symptoms to call back with and encouraged patient to speak about the way he is feeling with his wife as she is currently unaware. Patient refused disposition, states he will call back if things worsen; transferred patient to scheduling to see about virtual visit.    Kristi Blanco RN on 12/24/2023 at 2:29 AM      Reason for Disposition   [1] Lightheadedness or dizziness AND [2] persists > 10 minutes AND [3] not relieved by reassurance provided by triager    Additional Information   Negative: SEVERE difficulty breathing (e.g., struggling for each breath, " speaks in single words)   Negative: Bluish (or gray) lips or face now   Negative: Difficult to awaken or acting confused (e.g., disoriented, slurred speech)   Negative: Violent behavior, or threatening to physically hurt or kill someone   Negative: Sounds like a life-threatening emergency to the triager   Negative: Chest pain   Negative: Palpitations, skipped heartbeat, or rapid heartbeat is main symptom   Negative: Cough is main symptom   Negative: Suicide thoughts, threats, attempts, or questions   Negative: Depression is main problem or symptom (e.g., feelings of sadness or hopelessness)   Negative: [1] Difficulty breathing AND [2] persists > 10 minutes AND [3] not relieved by reassurance provided by triager    Protocols used: Anxiety and Panic Attack-A-AH

## 2024-02-22 ENCOUNTER — HOSPITAL ENCOUNTER (EMERGENCY)
Facility: HOSPITAL | Age: 39
Discharge: HOME | End: 2024-02-22
Attending: EMERGENCY MEDICINE | Admitting: EMERGENCY MEDICINE
Payer: COMMERCIAL

## 2024-02-22 VITALS
OXYGEN SATURATION: 96 % | DIASTOLIC BLOOD PRESSURE: 86 MMHG | HEART RATE: 101 BPM | TEMPERATURE: 97.6 F | SYSTOLIC BLOOD PRESSURE: 141 MMHG | HEIGHT: 74 IN | BODY MASS INDEX: 26.95 KG/M2 | RESPIRATION RATE: 18 BRPM | WEIGHT: 210 LBS

## 2024-02-22 DIAGNOSIS — M54.42 ACUTE LEFT-SIDED LOW BACK PAIN WITH LEFT-SIDED SCIATICA: ICD-10-CM

## 2024-02-22 PROCEDURE — 99284 EMERGENCY DEPT VISIT MOD MDM: CPT

## 2024-02-22 PROCEDURE — 250N000013 HC RX MED GY IP 250 OP 250 PS 637: Performed by: EMERGENCY MEDICINE

## 2024-02-22 RX ORDER — CYCLOBENZAPRINE HCL 10 MG
10 TABLET ORAL 3 TIMES DAILY PRN
Qty: 20 TABLET | Refills: 0 | Status: SHIPPED | OUTPATIENT
Start: 2024-02-22 | End: 2024-02-26

## 2024-02-22 RX ORDER — OXYCODONE AND ACETAMINOPHEN 5; 325 MG/1; MG/1
1 TABLET ORAL ONCE
Status: COMPLETED | OUTPATIENT
Start: 2024-02-22 | End: 2024-02-22

## 2024-02-22 RX ORDER — METHYLPREDNISOLONE 4 MG
TABLET, DOSE PACK ORAL
Qty: 21 TABLET | Refills: 0 | Status: SHIPPED | OUTPATIENT
Start: 2024-02-22 | End: 2024-03-12

## 2024-02-22 RX ORDER — IBUPROFEN 200 MG
400 TABLET ORAL ONCE
Status: COMPLETED | OUTPATIENT
Start: 2024-02-22 | End: 2024-02-22

## 2024-02-22 RX ORDER — LIDOCAINE 4 G/G
1 PATCH TOPICAL ONCE
Status: DISCONTINUED | OUTPATIENT
Start: 2024-02-22 | End: 2024-02-22 | Stop reason: HOSPADM

## 2024-02-22 RX ORDER — LIDOCAINE 4 G/G
1 PATCH TOPICAL EVERY 24 HOURS
Qty: 10 PATCH | Refills: 0 | Status: SHIPPED | OUTPATIENT
Start: 2024-02-22

## 2024-02-22 RX ADMIN — IBUPROFEN 400 MG: 200 TABLET, FILM COATED ORAL at 20:02

## 2024-02-22 RX ADMIN — LIDOCAINE 1 PATCH: 4 PATCH TOPICAL at 20:03

## 2024-02-22 RX ADMIN — OXYCODONE HYDROCHLORIDE AND ACETAMINOPHEN 1 TABLET: 5; 325 TABLET ORAL at 20:02

## 2024-02-22 ASSESSMENT — COLUMBIA-SUICIDE SEVERITY RATING SCALE - C-SSRS
6. HAVE YOU EVER DONE ANYTHING, STARTED TO DO ANYTHING, OR PREPARED TO DO ANYTHING TO END YOUR LIFE?: NO
1. IN THE PAST MONTH, HAVE YOU WISHED YOU WERE DEAD OR WISHED YOU COULD GO TO SLEEP AND NOT WAKE UP?: NO
2. HAVE YOU ACTUALLY HAD ANY THOUGHTS OF KILLING YOURSELF IN THE PAST MONTH?: NO

## 2024-02-22 ASSESSMENT — ACTIVITIES OF DAILY LIVING (ADL): ADLS_ACUITY_SCORE: 39

## 2024-02-22 NOTE — ED TRIAGE NOTES
Patient arrives for evaluation of lower back pain and left sided leg pain. States that pain has been present for about one month now, however has recently increased in severity. Pain is located mostly in the left upper leg. Patient notes that pain has made it difficult to walk and sleep.

## 2024-02-23 NOTE — PROGRESS NOTES
ASSESSMENT: Brandin Rodriguez is a 38 year old male with past medical history significant for alcoholic liver disease, anxiety, depression, PTSD, tobacco use disorder, history of suicidal ideation, history of alcohol use disorder who presents today for new patient evaluation of 4 complaints:  1.  Acute on chronic left low back pain with radiation into the left lower extremity.  Pain is severe.  Patient has not had any imaging of lumbar spine.  Symptoms are concerning for a disc bulge or protrusion with left-sided neural compromise.  He likely has underlying chronic spondylosis.  On exam patient demonstrated weakness in the left hip flexors.  2.  Chronic left neck pain without radicular symptoms.  Suspect cervical spondylosis with secondary myofascial pain.  3.  A 9-month history of left foot lump on arch of foot.  It is painful.  Will have him see podiatry.  4.  Chronic bilateral foot paresthesias.  He was told he had peripheral neuropathy many years ago but has never had an EMG.  He reported a sensory deficit in the toes of both feet.    PLAN:  A shared decision making model was used.  The patient's values and choices were respected.  The following represents what was discussed and decided upon by the physician assistant and the patient.      1.  DIAGNOSTIC TESTS:    - I ordered an MRI lumbar spine.  Patient had weakness on exam.  He is having severe pain.  - I ordered an x-ray cervical spine as an initial evaluation.  An MRI cervical spine would not change our treatment plan at this time.  He is out of any cervical radicular symptoms.  He may need an MRI cervical spine if neck pain persists.  - I ordered an EMG bilateral lower extremities to evaluate foot paresthesias.  Would also like to rule out lumbar radiculopathy.    2.  PHYSICAL THERAPY:  Discussed the importance of core strengthening, ROM, stretching exercises with the patient and how each of these entities is important in decreasing pain.  Explained to the  patient that the purpose of physical therapy is to teach the patient a home exercise program.  These exercises need to be performed every day in order to decrease pain and prevent future occurrences of pain.  I entered a referral to physical therapy.  He will do the [employer] program.    3.  MEDICATIONS:    - Patient has several days remaining of his Medrol Dosepak.  This has been helpful.  - I refilled the patient's cyclobenzaprine 10 mg 3 times daily.  - Patient can continue lidocaine patch.  - I offered gabapentin but he declined.  - Patient can continue Tylenol up to 1000 mg 3 times daily.  - After patient completes Medrol Dosepak he can use over-the-counter NSAIDs.    4.  INTERVENTIONS: No interventions were ordered today.  We could consider interventional pain management if he fails to improve conservative treatments, depending on the results of his imaging studies.    5.  PATIENT EDUCATION: Patient is in agreement the above plan.  All questions were answered.    6.  REFERRALS: Entered a referral to podiatry for his painful left foot lump.    7.  FOLLOW-UP:   I will send the patient a [employer] message with his imaging results.  At that time I may recommend that he continue with physical therapy and follow-up with me after 4 weeks versus return to the clinic to review the results in person.  He would need 40 minutes for this visit.  If he has questions or concerns, he should not hesitate to call.      SUBJECTIVE:  Brandin Rodriguez  Is a 38 year old male who presents today as a referral from the emergency department (February 22, 2024) for new patient evaluation of several complaints.    Patient had first discussed his low back pain.  Patient reports that he has had low back pain off and on for years.  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  About a month ago he began to experience pain down the left leg which was new and different for him.   He denies any injury or event to cause the leg pain.  It got progressively worse and then he developed severe pain in the low back.  Pain is so severe that he could not sleep so he went to the emergency department.  He was evaluated and discharged home with prescriptions for Medrol Dosepak, Flexeril, and lidocaine patches.  Medications have helped somewhat.  Pain is located in the left lower lumbar region.  Pain radiates into the left buttock, extending to the lateral hip.  Pain radiates down the left anterolateral hip to the knee.  He describes the pain as a shooting pain.  He then occasionally experiences shooting pain in the left foot but he is not sure if that is related to his back or the lump in his left foot that he has had for the past 9 months.  He states that he feels a hard lump on the arch of his left foot.  It is tender to touch.  He has numbness and tingling in both feet affecting all toes.  He states that years ago he was told that he had peripheral neuropathy but it sounds that he did not have any workup at that time.  He was prescribed gabapentin but that was not helpful so he stopped taking it.  The lower extremity paresthesias are stable since his back and leg pain began.  He denies any new numbness or tingling over the past 1 month.  His back pain is aggravated bending, lifting, sitting too long, laying too long.  He has difficulty going on long car rides because of the pain.  He has difficulty sleeping because of the pain.  He just bought a new mattress and hopes that that will help his back pain.  He denies any alleviating factors.  He denies loss of bowel or bladder control.  Since he started taking prednisone he has had some sweats at night but otherwise denies fevers or chills.    We next discussed his neck pain.  Patient reports that he has had chronic left neck pain for years.  He denies any pain radiating into the shoulder or arm.  Denies any numbness, tingling, weakness down the  arms.      Treatment to date:  - Physical therapy in 2021  - No chiropractic treatment  - No spine injections  - No spine surgeries  - Current Medrol Dosepak helpful  - Current Flexeril 2-3 times daily helpful  - Lidocaine patches somewhat helpful    Current Outpatient Medications   Medication    albuterol (PROAIR HFA/PROVENTIL HFA/VENTOLIN HFA) 108 (90 Base) MCG/ACT inhaler    amphetamine-dextroamphetamine (ADDERALL) 10 MG tablet    buPROPion (WELLBUTRIN XL) 300 MG 24 hr tablet    cyclobenzaprine (FLEXERIL) 10 MG tablet    Lidocaine (LIDOCARE) 4 % Patch    methylPREDNISolone (MEDROL DOSEPAK) 4 MG tablet therapy pack    multivitamin w/minerals (MULTI-VITAMIN) tablet    propranolol (INDERAL) 20 MG tablet    triamcinolone (KENALOG) 0.1 % external cream     No current facility-administered medications for this visit.       Allergies   Allergen Reactions    Amoxicillin Rash    Bactrim [Sulfamethoxazole W-Trimethoprim] Rash       Past Medical History:   Diagnosis Date    Alcoholic hepatitis (H28)     Anxiety     Depression     Depressive disorder     PTSD (post-traumatic stress disorder)     Suicidal ideation     Uncomplicated asthma         Patient Active Problem List   Diagnosis    Hematemesis    NEREYDA (generalized anxiety disorder)    Severe episode of recurrent major depressive disorder, without psychotic features (H)    Hepatitis    Liver disease, chronic, due to alcohol (H24)    Suicidal ideation    Alcoholic intoxication without complication (H24)    Uncomplicated alcohol dependence (H)    Complicated grief    Dysphagia    Elevated LFTs    Testicular pain    Tobacco use disorder    PTSD (post-traumatic stress disorder)    Major depressive disorder, single episode, severe without psychosis (H)    Sinus tachycardia    Alcohol withdrawal (H)    Dehydration    Neuropathy    Nausea and vomiting, intractability of vomiting not specified, unspecified vomiting type    Depression, unspecified depression type    Major  depressive disorder with current active episode, unspecified depression episode severity, unspecified whether recurrent    Depression    Major depressive disorder, recurrent episode with mixed features (H24)    History of alcohol use disorder       Past Surgical History:   Procedure Laterality Date    BIOPSY      mole bxs    ESOPHAGOSCOPY, GASTROSCOPY, DUODENOSCOPY (EGD), COMBINED N/A 11/5/2019    Procedure: ESOPHAGOGASTRODUODENOSCOPY (EGD);  Surgeon: Jake Elizabeth MD;  Location:  GI    ESOPHAGOSCOPY, GASTROSCOPY, DUODENOSCOPY (EGD), COMBINED N/A 3/12/2020    Procedure: ESOPHAGOGASTRODUODENOSCOPY (EGD);  Surgeon: Jake Elizabeth MD;  Location:  GI    SOFT TISSUE SURGERY         Family History   Problem Relation Age of Onset    Diabetes Mother     Hypertension Mother     Heart Disease Maternal Grandmother     Cancer Paternal Grandmother     Anxiety Disorder Brother     Autism Spectrum Disorder Brother     Arrhythmia Maternal Half-Sister        Social history: Patient is .  He is currently unemployed.  He smokes 1/2 pack of cigarettes per day.  He drinks alcohol socially.  He admits to marijuana use.      ROS: Positive for ringing in ears, nausea/vomiting, reflux, joint pain, muscle pain, muscle fatigue, sciatica, itching, dizziness, insomnia, anxiety, depression.  Specifically negative for bowel/bladder dysfunction, fevers,chills, appetite changes, unexplained weight loss.   Otherwise 13 systems reviewed are negative.  Please see the patient's intake questionnaire from today for details.      OBJECTIVE:  PHYSICAL EXAMINATION:    CONSTITUTIONAL:  Vital signs as above.  Patient appears anxious.  The patient is well nourished and well groomed.  PSYCHIATRIC:  The patient is awake, alert, oriented to person, place, time and answering questions appropriately with clear speech.    HEENT: Normocephalic, atraumatic.  Sclera clear.  Neck is supple.  SKIN:  Skin over the face, bilateral lower  extremities, and posterior torso is clean, dry, intact without rashes.    GAIT:  Gait is antalgic, favoring the left.  He is able to ambulate on toes and heels bilaterally with some difficulty due to pain.    STANDING EXAMINATION: Tender to palpation left lower lumbar paraspinous muscles.  Hypertonicity left lumbar paraspinous muscles.  Patient has epi sacral lipomas bilaterally.  Lumbar flexion mildly restricted.  Lumbar extension within normal limits.  MUSCLE STRENGTH:  The patient has 4/5 strength left hip flexors, otherwise 5/5 strength for the right hip flexors, bilateral knee flexors/extensors, ankle dorsiflexors/plantar flexors, great toe extensors.  5/5 strength bilateral shoulder abductors, elbow flexors/extensors, wrist extensors, finger flexors/abductors.  NEUROLOGICAL: 2+ patellar, and achilles reflexes bilaterally.  Negative Babinski's bilaterally.  No ankle clonus bilaterally. Sensation to light touch is intact in the bilateral upper extremities throughout.  Subjective altered sensation toes of both feet.  VASCULAR:  2/4 dorsalis pedis pulses bilaterally.  Bilateral lower extremities are warm.  There is no pitting edema of the bilateral lower extremities.  ABDOMINAL:  Soft, non-distended, non-tender throughout all quadrants.  No pulsatile mass palpated in the left lower quadrant.  LYMPH NODES:  No palpable or tender inguinal lymph nodes.  MUSCULOSKELETAL: No significant tenderness palpation cervical paraspinous muscles.  Straight leg raise positive bilaterally to reproduce left-sided low back and leg pain.

## 2024-02-23 NOTE — ED PROVIDER NOTES
EMERGENCY DEPARTMENT ENCOUNTER      NAME: Brandin Rodrgiuez  AGE: 38 year old male  YOB: 1985  MRN: [id number]  EVALUATION DATE & TIME: 2024  7:06 PM    PCP: Jamaal Levine    ED PROVIDER: Richard Bazan M.D.      Chief Complaint   Patient presents with    Back Pain    Leg Pain     Left leg            FINAL IMPRESSION:  1. Acute left-sided low back pain with left-sided sciatica          ED COURSE & MEDICAL DECISION MAKIN year old male presents to the Emergency Department for evaluation of low back pain.  Patient presenting with subacute onset of left-sided low back pain with radiation to his left leg.  Symptoms seem consistent with sciatica.  His lower extremity neurological exam is preserved and he does not have any red flag symptoms to suggest anything like cauda equina syndrome, epidural abscess, etc.  No trauma to suggest utility of x-ray or CT imaging.  No indication for MRI imaging based on stable exam and absence of any red flag risk factors.  We discussed a prompt referral to the spine center for continued evaluation.  Patient was encouraged to start a Medrol Dosepak for inflammation and we discussed a analgesic plan for home.  Patient discharged in stable condition.    At the conclusion of the encounter I discussed the results of all of the tests and the disposition. The questions were answered. The patient or family acknowledged understanding and was agreeable with the care plan.     7:26 PM I met the patient and performed my initial interview and exam. We discussed the plan for discharge and the patient is agreeable. Reviewed supportive cares, symptomatic treatment, outpatient follow up, and reasons to return to the Emergency Department. All questions and concerns were addressed. Patient to be discharged by ED RN.      Medical Decision Making  Obtained supplemental history:Supplemental history obtained?: Family Member/Significant Other  Reviewed external records: External  records reviewed?: No  Care impacted by chronic illness:Mental Health  Care significantly affected by social determinants of health:N/A  Did you consider but not order tests?: Work up considered but not performed and documented in chart, if applicable  Did you interpret images independently?: Independent interpretation of ECG and images noted in documentation, when applicable.  Consultation discussion with other provider:Did you involve another provider (consultant, , pharmacy, etc.)?: No  Discharge. I prescribed additional prescription strength medication(s) as charted. See documentation for any additional details.        MEDICATIONS GIVEN IN THE EMERGENCY:  Medications   Lidocaine (LIDOCARE) 4 % Patch 1 patch (1 patch Transdermal $Patch/Med Applied 2/22/24 2003)   oxyCODONE-acetaminophen (PERCOCET) 5-325 MG per tablet 1 tablet (1 tablet Oral $Given 2/22/24 2002)   ibuprofen (ADVIL/MOTRIN) tablet 400 mg (400 mg Oral $Given 2/22/24 2002)       NEW PRESCRIPTIONS STARTED AT TODAY'S ER VISIT  Discharge Medication List as of 2/22/2024  8:20 PM        START taking these medications    Details   cyclobenzaprine (FLEXERIL) 10 MG tablet Take 1 tablet (10 mg) by mouth 3 times daily as needed for muscle spasms, Disp-20 tablet, R-0, Local Print      Lidocaine (LIDOCARE) 4 % Patch Place 1 patch onto the skin every 24 hours To prevent lidocaine toxicity, patient should be patch free for 12 hrs daily.Disp-10 patch, R-0Local Print      methylPREDNISolone (MEDROL DOSEPAK) 4 MG tablet therapy pack Follow Package Directions, Disp-21 tablet, R-0, Local Print                =================================================================    HPI    Patient information was obtained from: the patient and his spouse    Use of : N/A         Brandin Rodriguez is a 38 year old male with a pertinent history of depression who presents to this ED by private vehicle for evaluation of back pain.     The patient has had lower left back  pain or the last month. It has increased in severity the last few days. He reports that the pain radiates down his left leg to his upper thigh. He had no initial trauma or injury to his back. He has taken some Advil and tylenol for the pain, but usually uses heat and ice to control the pain. He has no history of similar symptoms. He also notes that he occasionally gets dizzy when he beds down and stands back up. He denies any recent fever.     REVIEW OF SYSTEMS   All systems reviewed and negative except as noted in HPI.    PAST MEDICAL HISTORY:  Past Medical History:   Diagnosis Date    Alcoholic hepatitis (H28)     Anxiety     Depression     Depressive disorder     PTSD (post-traumatic stress disorder)     Suicidal ideation     Uncomplicated asthma        PAST SURGICAL HISTORY:  Past Surgical History:   Procedure Laterality Date    BIOPSY      mole bxs    ESOPHAGOSCOPY, GASTROSCOPY, DUODENOSCOPY (EGD), COMBINED N/A 11/5/2019    Procedure: ESOPHAGOGASTRODUODENOSCOPY (EGD);  Surgeon: Jake Elizabeth MD;  Location:  GI    ESOPHAGOSCOPY, GASTROSCOPY, DUODENOSCOPY (EGD), COMBINED N/A 3/12/2020    Procedure: ESOPHAGOGASTRODUODENOSCOPY (EGD);  Surgeon: Jake Elizabeth MD;  Location:  GI    SOFT TISSUE SURGERY             CURRENT MEDICATIONS:    Current Facility-Administered Medications   Medication    Lidocaine (LIDOCARE) 4 % Patch 1 patch     Current Outpatient Medications   Medication    cyclobenzaprine (FLEXERIL) 10 MG tablet    Lidocaine (LIDOCARE) 4 % Patch    methylPREDNISolone (MEDROL DOSEPAK) 4 MG tablet therapy pack    albuterol (PROAIR HFA/PROVENTIL HFA/VENTOLIN HFA) 108 (90 Base) MCG/ACT inhaler    amphetamine-dextroamphetamine (ADDERALL) 10 MG tablet    buPROPion (WELLBUTRIN XL) 300 MG 24 hr tablet    multivitamin w/minerals (MULTI-VITAMIN) tablet    propranolol (INDERAL) 20 MG tablet    triamcinolone (KENALOG) 0.1 % external cream         ALLERGIES:  Allergies   Allergen Reactions     "Amoxicillin Rash    Bactrim [Sulfamethoxazole W-Trimethoprim] Rash       FAMILY HISTORY:  Family History   Problem Relation Age of Onset    Diabetes Mother     Hypertension Mother     Heart Disease Maternal Grandmother     Cancer Paternal Grandmother     Anxiety Disorder Brother     Autism Spectrum Disorder Brother     Arrhythmia Maternal Half-Sister        SOCIAL HISTORY:   Social History     Socioeconomic History    Marital status:    Tobacco Use    Smoking status: Every Day     Packs/day: .5     Types: Cigarettes    Smokeless tobacco: Never   Substance and Sexual Activity    Alcohol use: Yes     Comment: 5 per week    Drug use: Yes     Types: Marijuana     Comment: occasionally    Sexual activity: Yes     Partners: Female     Birth control/protection: Implant   Other Topics Concern    Parent/sibling w/ CABG, MI or angioplasty before 65F 55M? No     Social Determinants of Health     Financial Resource Strain: Low Risk  (11/13/2020)    Overall Financial Resource Strain (CARDIA)     Difficulty of Paying Living Expenses: Not hard at all   Food Insecurity: No Food Insecurity (11/13/2020)    Hunger Vital Sign     Worried About Running Out of Food in the Last Year: Never true     Ran Out of Food in the Last Year: Never true   Transportation Needs: No Transportation Needs (11/13/2020)    PRAPARE - Transportation     Lack of Transportation (Medical): No     Lack of Transportation (Non-Medical): No   Stress: Stress Concern Present (11/13/2020)    [employer] - Occupational Stress Questionnaire     Feeling of Stress : To some extent   Social Connections: Unknown (12/7/2020)    Social Connection and Isolation Panel [NHANES]     Marital Status:        VITALS:  BP (!) 141/86   Pulse 101   Temp 97.6  F (36.4  C)   Resp 18   Ht 1.88 m (6' 2\")   Wt 95.3 kg (210 lb)   SpO2 96%   BMI 26.96 kg/m      PHYSICAL EXAM    Constitutional: Well developed, Well nourished, NAD.  HENT: " Normocephalic, Atraumatic. Neck Supple.  Eyes: EOMI, Conjunctiva normal.  Respiratory: Breathing comfortably on room air. Speaks full sentences easily. Lungs clear to ascultation.  Cardiovascular: Normal heart rate, Regular rhythm. No peripheral edema.  Abdomen: Soft  Musculoskeletal: Good range of motion in all major joints. No major deformities noted.  Integument: Warm, Dry.  Neurologic: Awake, alert, oriented.  Face symmetric.  Speech is normal.  Strength is 5 out of 5 throughout bilateral upper and lower extremities including hip flexion, knee extension, plantarflexion and dorsiflexion.  Sensation light touch intact including no saddle anesthesia.  Psychiatric: Cooperative. Affect appropriate.         I, Ramona King, am serving as a scribe to document services personally performed by Dr. Richard Bazan, based on my observation and the provider's statements to me. I, Richard Bazan MD attest that Ramona King is acting in a scribe capacity, has observed my performance of the services and has documented them in accordance with my direction.    Richard Bazan M.D.  Emergency Medicine  [facility] EMERGENCY DEPARTMENT  [address] [place] [id number]  [phone]  Dept: [phone]       Richard Bazan MD  02/22/24 5630

## 2024-02-23 NOTE — DISCHARGE INSTRUCTIONS
You were seen in the emergency department for left-sided low back pain with radiation down your leg.  Your evaluation looks consistent with sciatica.  This is a low back problem with referred nerve pain down the leg.  We discussed that the usual treatment includes initially an oral steroid which we are going to prescribe you.  We would recommend continuing pain medications in an escalating fashion including Tylenol 650 mg every 6 hours, ibuprofen 400 mg every 6 hours, and finally Flexeril 10 mg up to 3 times a day.  You can apply lidocaine patches for 12 hours at a time.  We are placing an urgent referral to the spine center which is nearby rehabilitation clinic.  They can access things like physical therapy or other advanced imaging if it is necessary.  You should get a call from  within the next day or two.  We also attached the information so if you have not heard anything by tomorrow afternoon please call them to confirm an appointment.

## 2024-02-26 ENCOUNTER — OFFICE VISIT (OUTPATIENT)
Dept: PHYSICAL MEDICINE AND REHAB | Facility: CLINIC | Age: 39
End: 2024-02-26
Attending: EMERGENCY MEDICINE
Payer: COMMERCIAL

## 2024-02-26 VITALS
DIASTOLIC BLOOD PRESSURE: 86 MMHG | SYSTOLIC BLOOD PRESSURE: 148 MMHG | HEART RATE: 94 BPM | HEIGHT: 74 IN | WEIGHT: 223.8 LBS | BODY MASS INDEX: 28.72 KG/M2

## 2024-02-26 DIAGNOSIS — M54.16 LUMBAR RADICULAR PAIN: Primary | ICD-10-CM

## 2024-02-26 DIAGNOSIS — M79.672 LEFT FOOT PAIN: ICD-10-CM

## 2024-02-26 DIAGNOSIS — M54.2 CERVICALGIA: ICD-10-CM

## 2024-02-26 DIAGNOSIS — M79.18 MYOFASCIAL PAIN: ICD-10-CM

## 2024-02-26 DIAGNOSIS — R20.2 PARESTHESIA: ICD-10-CM

## 2024-02-26 PROCEDURE — 99204 OFFICE O/P NEW MOD 45 MIN: CPT | Performed by: PHYSICIAN ASSISTANT

## 2024-02-26 RX ORDER — CYCLOBENZAPRINE HCL 10 MG
10 TABLET ORAL 3 TIMES DAILY PRN
Qty: 60 TABLET | Refills: 1 | Status: SHIPPED | OUTPATIENT
Start: 2024-02-26 | End: 2024-03-25

## 2024-02-26 ASSESSMENT — PAIN SCALES - GENERAL: PAINLEVEL: SEVERE PAIN (7)

## 2024-02-26 NOTE — PATIENT INSTRUCTIONS
[facility] Scheduling    Please call [phone] to schedule your image(s) (select option#1). There are 2 different locations, see below. You can do walk-in visits for xray only images if you want.     [facility]  [address] [place], [place] [id number]    [place] [id number] [facility] [address]     An order for physicaltherapy has been provided today.  Someone will call you to schedule physical therapy or you can call [phone] to schedule physical therapy.  It will be very important for you to do your physical therapy exercises on aregular basis to decrease your pain and prevent future flares of pain.

## 2024-02-26 NOTE — LETTER
2/26/2024         RE: Brandin Rodriguez  [address] [place] [address]        Dear Colleague,    Thank you for referring your patient, Brandin Rodriguez, to the [facility] SPINE [facility] NEUROSURGERY. Please see a copy of my visit note below.    ASSESSMENT: Brandin Rodriguez is a 38 year old male with past medical history significant for alcoholic liver disease, anxiety, depression, PTSD, tobacco use disorder, history of suicidal ideation, history of alcohol use disorder who presents today for new patient evaluation of 4 complaints:  1.  Acute on chronic left low back pain with radiation into the left lower extremity.  Pain is severe.  Patient has not had any imaging of lumbar spine.  Symptoms are concerning for a disc bulge or protrusion with left-sided neural compromise.  He likely has underlying chronic spondylosis.  On exam patient demonstrated weakness in the left hip flexors.  2.  Chronic left neck pain without radicular symptoms.  Suspect cervical spondylosis with secondary myofascial pain.  3.  A 9-month history of left foot lump on arch of foot.  It is painful.  Will have him see podiatry.  4.  Chronic bilateral foot paresthesias.  He was told he had peripheral neuropathy many years ago but has never had an EMG.  He reported a sensory deficit in the toes of both feet.    PLAN:  A shared decision making model was used.  The patient's values and choices were respected.  The following represents what was discussed and decided upon by the physician assistant and the patient.      1.  DIAGNOSTIC TESTS:    - I ordered an MRI lumbar spine.  Patient had weakness on exam.  He is having severe pain.  - I ordered an x-ray cervical spine as an initial evaluation.  An MRI cervical spine would not change our treatment plan at this time.  He is out of any cervical radicular symptoms.  He may need an MRI cervical spine if neck pain persists.  - I ordered an EMG bilateral lower extremities to evaluate foot  paresthesias.  Would also like to rule out lumbar radiculopathy.    2.  PHYSICAL THERAPY:  Discussed the importance of core strengthening, ROM, stretching exercises with the patient and how each of these entities is important in decreasing pain.  Explained to the patient that the purpose of physical therapy is to teach the patient a home exercise program.  These exercises need to be performed every day in order to decrease pain and prevent future occurrences of pain.  I entered a referral to physical therapy.  He will do the [facility] program.    3.  MEDICATIONS:    - Patient has several days remaining of his Medrol Dosepak.  This has been helpful.  - I refilled the patient's cyclobenzaprine 10 mg 3 times daily.  - Patient can continue lidocaine patch.  - I offered gabapentin but he declined.  - Patient can continue Tylenol up to 1000 mg 3 times daily.  - After patient completes Medrol Dosepak he can use over-the-counter NSAIDs.    4.  INTERVENTIONS: No interventions were ordered today.  We could consider interventional pain management if he fails to improve conservative treatments, depending on the results of his imaging studies.    5.  PATIENT EDUCATION: Patient is in agreement the above plan.  All questions were answered.    6.  REFERRALS: Entered a referral to podiatry for his painful left foot lump.    7.  FOLLOW-UP:   I will send the patient a [employer] message with his imaging results.  At that time I may recommend that he continue with physical therapy and follow-up with me after 4 weeks versus return to the clinic to review the results in person.  He would need 40 minutes for this visit.  If he has questions or concerns, he should not hesitate to call.      SUBJECTIVE:  Brandin Rodriguez  Is a 38 year old male who presents today as a referral from the emergency department (February 22, 2024) for new patient evaluation of several complaints.    Patient had first discussed his low back pain.  Patient reports that he  has had low back pain off and on for years.  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  About a month ago he began to experience pain down the left leg which was new and different for him.  He denies any injury or event to cause the leg pain.  It got progressively worse and then he developed severe pain in the low back.  Pain is so severe that he could not sleep so he went to the emergency department.  He was evaluated and discharged home with prescriptions for Medrol Dosepak, Flexeril, and lidocaine patches.  Medications have helped somewhat.  Pain is located in the left lower lumbar region.  Pain radiates into the left buttock, extending to the lateral hip.  Pain radiates down the left anterolateral hip to the knee.  He describes the pain as a shooting pain.  He then occasionally experiences shooting pain in the left foot but he is not sure if that is related to his back or the lump in his left foot that he has had for the past 9 months.  He states that he feels a hard lump on the arch of his left foot.  It is tender to touch.  He has numbness and tingling in both feet affecting all toes.  He states that years ago he was told that he had peripheral neuropathy but it sounds that he did not have any workup at that time.  He was prescribed gabapentin but that was not helpful so he stopped taking it.  The lower extremity paresthesias are stable since his back and leg pain began.  He denies any new numbness or tingling over the past 1 month.  His back pain is aggravated bending, lifting, sitting too long, laying too long.  He has difficulty going on long car rides because of the pain.  He has difficulty sleeping because of the pain.  He just bought a new mattress and hopes that that will help his back pain.  He denies any alleviating factors.  He denies loss of bowel or bladder control.  Since he started taking prednisone he has had some sweats  at night but otherwise denies fevers or chills.    We next discussed his neck pain.  Patient reports that he has had chronic left neck pain for years.  He denies any pain radiating into the shoulder or arm.  Denies any numbness, tingling, weakness down the arms.      Treatment to date:  - Physical therapy in 2021  - No chiropractic treatment  - No spine injections  - No spine surgeries  - Current Medrol Dosepak helpful  - Current Flexeril 2-3 times daily helpful  - Lidocaine patches somewhat helpful    Current Outpatient Medications   Medication     albuterol (PROAIR HFA/PROVENTIL HFA/VENTOLIN HFA) 108 (90 Base) MCG/ACT inhaler     amphetamine-dextroamphetamine (ADDERALL) 10 MG tablet     buPROPion (WELLBUTRIN XL) 300 MG 24 hr tablet     cyclobenzaprine (FLEXERIL) 10 MG tablet     Lidocaine (LIDOCARE) 4 % Patch     methylPREDNISolone (MEDROL DOSEPAK) 4 MG tablet therapy pack     multivitamin w/minerals (MULTI-VITAMIN) tablet     propranolol (INDERAL) 20 MG tablet     triamcinolone (KENALOG) 0.1 % external cream     No current facility-administered medications for this visit.       Allergies   Allergen Reactions     Amoxicillin Rash     Bactrim [Sulfamethoxazole W-Trimethoprim] Rash       Past Medical History:   Diagnosis Date     Alcoholic hepatitis (H28)      Anxiety      Depression      Depressive disorder      PTSD (post-traumatic stress disorder)      Suicidal ideation      Uncomplicated asthma         Patient Active Problem List   Diagnosis     Hematemesis     NEREYDA (generalized anxiety disorder)     Severe episode of recurrent major depressive disorder, without psychotic features (H)     Hepatitis     Liver disease, chronic, due to alcohol (H24)     Suicidal ideation     Alcoholic intoxication without complication (H24)     Uncomplicated alcohol dependence (H)     Complicated grief     Dysphagia     Elevated LFTs     Testicular pain     Tobacco use disorder     PTSD (post-traumatic stress disorder)     Major  depressive disorder, single episode, severe without psychosis (H)     Sinus tachycardia     Alcohol withdrawal (H)     Dehydration     Neuropathy     Nausea and vomiting, intractability of vomiting not specified, unspecified vomiting type     Depression, unspecified depression type     Major depressive disorder with current active episode, unspecified depression episode severity, unspecified whether recurrent     Depression     Major depressive disorder, recurrent episode with mixed features (H24)     History of alcohol use disorder       Past Surgical History:   Procedure Laterality Date     BIOPSY      mole bxs     ESOPHAGOSCOPY, GASTROSCOPY, DUODENOSCOPY (EGD), COMBINED N/A 11/5/2019    Procedure: ESOPHAGOGASTRODUODENOSCOPY (EGD);  Surgeon: Jake Elizabeth MD;  Location:  GI     ESOPHAGOSCOPY, GASTROSCOPY, DUODENOSCOPY (EGD), COMBINED N/A 3/12/2020    Procedure: ESOPHAGOGASTRODUODENOSCOPY (EGD);  Surgeon: Jake Elizabeth MD;  Location:  GI     SOFT TISSUE SURGERY         Family History   Problem Relation Age of Onset     Diabetes Mother      Hypertension Mother      Heart Disease Maternal Grandmother      Cancer Paternal Grandmother      Anxiety Disorder Brother      Autism Spectrum Disorder Brother      Arrhythmia Maternal Half-Sister        Social history: Patient is .  He is currently unemployed.  He smokes 1/2 pack of cigarettes per day.  He drinks alcohol socially.  He admits to marijuana use.      ROS: Positive for ringing in ears, nausea/vomiting, reflux, joint pain, muscle pain, muscle fatigue, sciatica, itching, dizziness, insomnia, anxiety, depression.  Specifically negative for bowel/bladder dysfunction, fevers,chills, appetite changes, unexplained weight loss.   Otherwise 13 systems reviewed are negative.  Please see the patient's intake questionnaire from today for details.      OBJECTIVE:  PHYSICAL EXAMINATION:    CONSTITUTIONAL:  Vital signs as above.  Patient appears  anxious.  The patient is well nourished and well groomed.  PSYCHIATRIC:  The patient is awake, alert, oriented to person, place, time and answering questions appropriately with clear speech.    HEENT: Normocephalic, atraumatic.  Sclera clear.  Neck is supple.  SKIN:  Skin over the face, bilateral lower extremities, and posterior torso is clean, dry, intact without rashes.    GAIT:  Gait is antalgic, favoring the left.  He is able to ambulate on toes and heels bilaterally with some difficulty due to pain.    STANDING EXAMINATION: Tender to palpation left lower lumbar paraspinous muscles.  Hypertonicity left lumbar paraspinous muscles.  Patient has epi sacral lipomas bilaterally.  Lumbar flexion mildly restricted.  Lumbar extension within normal limits.  MUSCLE STRENGTH:  The patient has 4/5 strength left hip flexors, otherwise 5/5 strength for the right hip flexors, bilateral knee flexors/extensors, ankle dorsiflexors/plantar flexors, great toe extensors.  5/5 strength bilateral shoulder abductors, elbow flexors/extensors, wrist extensors, finger flexors/abductors.  NEUROLOGICAL: 2+ patellar, and achilles reflexes bilaterally.  Negative Babinski's bilaterally.  No ankle clonus bilaterally. Sensation to light touch is intact in the bilateral upper extremities throughout.  Subjective altered sensation toes of both feet.  VASCULAR:  2/4 dorsalis pedis pulses bilaterally.  Bilateral lower extremities are warm.  There is no pitting edema of the bilateral lower extremities.  ABDOMINAL:  Soft, non-distended, non-tender throughout all quadrants.  No pulsatile mass palpated in the left lower quadrant.  LYMPH NODES:  No palpable or tender inguinal lymph nodes.  MUSCULOSKELETAL: No significant tenderness palpation cervical paraspinous muscles.  Straight leg raise positive bilaterally to reproduce left-sided low back and leg pain.        Again, thank you for allowing me to participate in the care of your patient.         Sincerely,        Brianne Edouard PA-C

## 2024-03-07 ENCOUNTER — HOSPITAL ENCOUNTER (OUTPATIENT)
Dept: MRI IMAGING | Facility: HOSPITAL | Age: 39
Discharge: HOME | End: 2024-03-07
Attending: PHYSICIAN ASSISTANT
Payer: COMMERCIAL

## 2024-03-07 ENCOUNTER — HOSPITAL ENCOUNTER (OUTPATIENT)
Dept: RADIOLOGY | Facility: HOSPITAL | Age: 39
Discharge: HOME | End: 2024-03-07
Attending: PHYSICIAN ASSISTANT
Payer: COMMERCIAL

## 2024-03-07 DIAGNOSIS — M54.2 CERVICALGIA: ICD-10-CM

## 2024-03-07 DIAGNOSIS — M54.16 LUMBAR RADICULAR PAIN: ICD-10-CM

## 2024-03-07 PROCEDURE — 72040 X-RAY EXAM NECK SPINE 2-3 VW: CPT

## 2024-03-07 PROCEDURE — 72148 MRI LUMBAR SPINE W/O DYE: CPT

## 2024-03-11 NOTE — PROGRESS NOTES
Assessment:   Brandin Rodriguez is a 38 year old y.o. male with past medical history significant for alcoholic liver disease, anxiety, depression, PTSD, tobacco use disorder, history of suicidal ideation, history of alcohol use disorder  who presents today for follow-up regarding 4 complaints:  1.  Acute on chronic left low back pain with radiation into the left lower extremity.  My review of an MRI lumbar spine shows transitional lumbosacral anatomy with a lumbarized S1.  At L5-S1 there is grade 1 spondylitic spondylolisthesis with mild to moderate left and mild right foraminal stenosis.  Patient reports leg pain has improved significantly since he was last seen but he continues to have axial low back pain which is likely multifactorial and related to lumbar facet mediated pain, discogenic pain, myofascial pain.  2.  Chronic left neck pain without radicular symptoms.  X-ray cervical spine shows straightening of the normal cervical lordosis with mild degenerative disc disease at C4-5 and C5-6.  3.  A 9-month history of left foot lump on arch of foot.  It is painful.  Will have him see podiatry.  4.  Chronic bilateral foot paresthesias.  He is scheduled for EMG bilateral lower extremities tomorrow.  Suspect peripheral neuropathy.       Plan:     A shared decision making plan was used.  The patient's values and choices were respected.  The following represents what was discussed and decided upon by the physician assistant and the patient.      1.  DIAGNOSTIC TESTS:    - I reviewed the MRI lumbar spine.  - I reviewed the x-ray cervical spine.  - We will await the results of the EMG bilateral lower extremities.  - I ordered lumbar spine flexion-extension x-rays to evaluate for any instability before he begins physical therapy.    2.  PHYSICAL THERAPY: I had referred the patient to [facility] physical therapy February 26, 2024.  This is not yet scheduled.  As long as flexion-extension x-rays do not show any dynamic instability  he can proceed with [facility] therapy.  If the x-rays do show instability he will need to traditional physical therapy.    3.  MEDICATIONS:  - I prescribed Celebrex 100 mg twice daily as needed.  He will use this instead of ibuprofen.  Ibuprofen causes stomach irritation.  He has a history of stomach ulcer.  He does have a history of a rash with Bactrim when he was a child.  I told him to monitor closely for rash.  If he develops a rash after taking Celebrex he should discontinue the medication.  He can use Benadryl if needed for the rash.  - Patient can continue Tylenol.  - Patient completed a Medrol Dosepak.  - Patient can continue cyclobenzaprine as needed.  He takes this sparingly.    4.  INTERVENTIONS: No interventions were ordered today.  Patient is going to trial conservative treatments.  - If left lower extremity radicular pain returns I recommend a left L5-S1 transforaminal epidural steroid injection.  - If he continues to have left axial low back pain I recommend left L3, L4, L5 medial branch blocks as a workup toward radiofrequency ablation.  - We could also consider interventional pain management for her neck pain if this fails to improve, depending on the results of advanced imaging.    5.  PATIENT EDUCATION: Patient is in agreement the above plan.  All questions were answered.  - I did discuss with the patient that some people go on to need spine surgery for this problem.  We will exhaust conservative treatments first.  - Patient should schedule his follow-up with podiatry for his foot pain.  He wanted to take care of the tests for his spine first.    6.  FOLLOW-UP: I will send the patient a [employer] message with his x-ray results.  As long as there is no dynamic instability I recommend [facility] physical therapy.  If there is instability would recommend traditional physical therapy.  Otherwise patient will follow-up with me in 8 weeks to monitor progress with physical therapy.  If he has questions or  concerns in the meantime, he should not hesitate to call.    Subjective:     Brandin Rodriguez is a 38 year old male who presents today for follow-up regarding neck pain, back pain, foot paresthesias, left foot pain.  I saw the patient in consultation February 26, 2024.  He returns today to review his x-ray and MRI results.  Patient reports that since he was last seen his leg pain has improved significantly.  He continues to have low back pain, left greater than right and left-sided neck pain.    Patient complains of left-sided neck pain.  Denies any pain down the arms.  He has left greater than right low back pain.  On the left the pain extends from the thoracolumbar junction to the lumbosacral junction.  On the right the pain is located in the lower lumbar region.  He denies any significant pain down the legs currently.  He has abnormal sensation in both feet which is chronic and stable.  He is scheduled for an EMG.  Denies weakness.  He rates his pain today as a 6 out of 10.  At its worst it is a 9 out of 10.  At its best it is a 4 out of 10.  Pain is aggravated with activity, long sitting, crouching.  Pain is alleviated with rest.  He denies any new symptoms since he was last seen.    Treatment to date:  - Physical therapy in 2021  - No chiropractic treatment  - No spine injections  - No spine surgeries  - Medrol Dosepak  - Current Flexeril as needed somewhat helpful  - Lidocaine patches somewhat helpful  - Advil somewhat helpful causes stomach irritation  - Tylenol somewhat helpful    Review of Systems:  Positive for headache, pain much worse at night.  Negative for numbness/tingling, weakness, loss of bowel/bladder control, inability to urinate, trip/stumble/falls, difficulty swallowing, difficulty with hand skills, fevers, unintentional weight loss.     Objective:   CONSTITUTIONAL:  Vital signs as above.  No acute distress.  The patient is well nourished and well groomed.    PSYCHIATRIC:  The patient is awake,  alert, oriented to person, place and time.  The patient is answering questions appropriately with clear speech.  Normal affect.  HEENT: Normocephalic, atraumatic.  Sclera clear.    SKIN: Exposed skin is clean, dry, intact without rashes.  MUSCULOSKELETAL: The patient has 5/5 strength for the bilateral hip flexors, knee flexors/extensors, ankle dorsi/plantar flexors.  Able to ambulate on toes and heels bilaterally without difficulty.  Tender to palpation left lower lumbar paraspinous muscles with hypertonicity.  NEUROLOGICAL: Cranial nerves II through XII grossly intact.    RESULTS:    I reviewed the x-ray cervical spine from [facility] dated March 7, 2024.  This shows straightening of normal cervical lordosis.  There is mild degenerative disc disease at C4-5 and C5 5 6.    I reviewed the MRI lumbar spine from [facility] dated March 7, 2024.  This shows transitional anatomy with lumbarization of S1.  At L5-S1 there is grade 1 spondylitic spondylolisthesis.  At this level there is a broad-based disc bulge/central extrusion and mild facet arthropathy with mild to moderate right and mild left foraminal stenosis.  There is facet arthropathy throughout the remainder of the lumbar spine but no additional neural compromise.

## 2024-03-12 ENCOUNTER — OFFICE VISIT (OUTPATIENT)
Dept: PHYSICAL MEDICINE AND REHAB | Facility: CLINIC | Age: 39
End: 2024-03-12
Payer: COMMERCIAL

## 2024-03-12 VITALS
HEIGHT: 74 IN | BODY MASS INDEX: 28.62 KG/M2 | SYSTOLIC BLOOD PRESSURE: 124 MMHG | HEART RATE: 92 BPM | WEIGHT: 223 LBS | DIASTOLIC BLOOD PRESSURE: 74 MMHG

## 2024-03-12 DIAGNOSIS — M54.16 LUMBAR RADICULAR PAIN: ICD-10-CM

## 2024-03-12 DIAGNOSIS — M43.16 SPONDYLOLISTHESIS OF LUMBAR REGION: Primary | ICD-10-CM

## 2024-03-12 DIAGNOSIS — M54.2 CERVICALGIA: ICD-10-CM

## 2024-03-12 PROCEDURE — 99214 OFFICE O/P EST MOD 30 MIN: CPT | Performed by: PHYSICIAN ASSISTANT

## 2024-03-12 RX ORDER — CELECOXIB 100 MG/1
100 CAPSULE ORAL 2 TIMES DAILY PRN
Qty: 60 CAPSULE | Refills: 1 | Status: SHIPPED | OUTPATIENT
Start: 2024-03-12

## 2024-03-12 ASSESSMENT — PAIN SCALES - GENERAL: PAINLEVEL: SEVERE PAIN (6)

## 2024-03-12 NOTE — LETTER
3/12/2024         RE: Brandin Rodriguez  [address] [place] [address]        Dear Colleague,    Thank you for referring your patient, Brandin Rodriguez, to the [facility]. Please see a copy of my visit note below.    Assessment:   Brandin Rodriguez is a 38 year old y.o. male with past medical history significant for alcoholic liver disease, anxiety, depression, PTSD, tobacco use disorder, history of suicidal ideation, history of alcohol use disorder  who presents today for follow-up regarding 4 complaints:  1.  Acute on chronic left low back pain with radiation into the left lower extremity.  My review of an MRI lumbar spine shows transitional lumbosacral anatomy with a lumbarized S1.  At L5-S1 there is grade 1 spondylitic spondylolisthesis with mild to moderate left and mild right foraminal stenosis.  Patient reports leg pain has improved significantly since he was last seen but he continues to have axial low back pain which is likely multifactorial and related to lumbar facet mediated pain, discogenic pain, myofascial pain.  2.  Chronic left neck pain without radicular symptoms.  X-ray cervical spine shows straightening of the normal cervical lordosis with mild degenerative disc disease at C4-5 and C5-6.  3.  A 9-month history of left foot lump on arch of foot.  It is painful.  Will have him see podiatry.  4.  Chronic bilateral foot paresthesias.  He is scheduled for EMG bilateral lower extremities tomorrow.  Suspect peripheral neuropathy.       Plan:     A shared decision making plan was used.  The patient's values and choices were respected.  The following represents what was discussed and decided upon by the physician assistant and the patient.      1.  DIAGNOSTIC TESTS:    - I reviewed the MRI lumbar spine.  - I reviewed the x-ray cervical spine.  - We will await the results of the EMG bilateral lower extremities.  - I ordered lumbar spine flexion-extension x-rays to  evaluate for any instability before he begins physical therapy.    2.  PHYSICAL THERAPY: I had referred the patient to [facility] physical therapy February 26, 2024.  This is not yet scheduled.  As long as flexion-extension x-rays do not show any dynamic instability he can proceed with [facility] physical therapy.  If the x-rays do show instability he will need to traditional physical therapy.    3.  MEDICATIONS:  - I prescribed Celebrex 100 mg twice daily as needed.  He will use this instead of ibuprofen.  Ibuprofen causes stomach irritation.  He has a history of stomach ulcer.  He does have a history of a rash with Bactrim when he was a child.  I told him to monitor closely for rash.  If he develops a rash after taking Celebrex he should discontinue the medication.  He can use Benadryl if needed for the rash.  - Patient can continue Tylenol.  - Patient completed a Medrol Dosepak.  - Patient can continue cyclobenzaprine as needed.  He takes this sparingly.    4.  INTERVENTIONS: No interventions were ordered today.  Patient is going to trial conservative treatments.  - If left lower extremity radicular pain returns I recommend a left L5-S1 transforaminal epidural steroid injection.  - If he continues to have left axial low back pain I recommend left L3, L4, L5 medial branch blocks as a workup toward radiofrequency ablation.  - We could also consider interventional pain management for her neck pain if this fails to improve, depending on the results of advanced imaging.    5.  PATIENT EDUCATION: Patient is in agreement the above plan.  All questions were answered.  - I did discuss with the patient that some people go on to need spine surgery for this problem.  We will exhaust conservative treatments first.  - Patient should schedule his follow-up with podiatry for his foot pain.  He wanted to take care of the tests for his spine first.    6.  FOLLOW-UP: I will send the patient a [email or web address] message with his x-ray results.  As long as  there is no dynamic instability I recommend [facility] physical therapy.  If there is instability would recommend traditional physical therapy.  Otherwise patient will follow-up with me in 8 weeks to monitor progress with physical therapy.  If he has questions or concerns in the meantime, he should not hesitate to call.    Subjective:     Brandin Rodriguez is a 38 year old male who presents today for follow-up regarding neck pain, back pain, foot paresthesias, left foot pain.  I saw the patient in consultation February 26, 2024.  He returns today to review his x-ray and MRI results.  Patient reports that since he was last seen his leg pain has improved significantly.  He continues to have low back pain, left greater than right and left-sided neck pain.    Patient complains of left-sided neck pain.  Denies any pain down the arms.  He has left greater than right low back pain.  On the left the pain extends from the thoracolumbar junction to the lumbosacral junction.  On the right the pain is located in the lower lumbar region.  He denies any significant pain down the legs currently.  He has abnormal sensation in both feet which is chronic and stable.  He is scheduled for an EMG.  Denies weakness.  He rates his pain today as a 6 out of 10.  At its worst it is a 9 out of 10.  At its best it is a 4 out of 10.  Pain is aggravated with activity, long sitting, crouching.  Pain is alleviated with rest.  He denies any new symptoms since he was last seen.    Treatment to date:  - Physical therapy in 2021  - No chiropractic treatment  - No spine injections  - No spine surgeries  - Medrol Dosepak  - Current Flexeril as needed somewhat helpful  - Lidocaine patches somewhat helpful  - Advil somewhat helpful causes stomach irritation  - Tylenol somewhat helpful    Review of Systems:  Positive for headache, pain much worse at night.  Negative for numbness/tingling, weakness, loss of bowel/bladder control, inability to urinate,  trip/stumble/falls, difficulty swallowing, difficulty with hand skills, fevers, unintentional weight loss.     Objective:   CONSTITUTIONAL:  Vital signs as above.  No acute distress.  The patient is well nourished and well groomed.    PSYCHIATRIC:  The patient is awake, alert, oriented to person, place and time.  The patient is answering questions appropriately with clear speech.  Normal affect.  HEENT: Normocephalic, atraumatic.  Sclera clear.    SKIN: Exposed skin is clean, dry, intact without rashes.  MUSCULOSKELETAL: The patient has 5/5 strength for the bilateral hip flexors, knee flexors/extensors, ankle dorsi/plantar flexors.  Able to ambulate on toes and heels bilaterally without difficulty.  Tender to palpation left lower lumbar paraspinous muscles with hypertonicity.  NEUROLOGICAL: Cranial nerves II through XII grossly intact.    RESULTS:    I reviewed the x-ray cervical spine from [facility] dated March 7, 2024.  This shows straightening of normal cervical lordosis.  There is mild degenerative disc disease at C4-5 and C5 5 6.    I reviewed the MRI lumbar spine from [facility] dated March 7, 2024.  This shows transitional anatomy with lumbarization of S1.  At L5-S1 there is grade 1 spondylitic spondylolisthesis.  At this level there is a broad-based disc bulge/central extrusion and mild facet arthropathy with mild to moderate right and mild left foraminal stenosis.  There is facet arthropathy throughout the remainder of the lumbar spine but no additional neural compromise.         Again, thank you for allowing me to participate in the care of your patient.        Sincerely,        Brianne Edouard PA-C

## 2024-03-12 NOTE — PATIENT INSTRUCTIONS
[facility] Scheduling    Please call [phone] to schedule your image(s) (select option#1). There are 2 different locations, see below. You can do walk-in visits for xray only images if you want.     [address] [address]    [address] [address]     I will send you a [employer] message with your xray results and physical therapy recommendations

## 2024-03-13 ENCOUNTER — OFFICE VISIT (OUTPATIENT)
Dept: PHYSICAL MEDICINE AND REHAB | Facility: CLINIC | Age: 39
End: 2024-03-13
Attending: PHYSICIAN ASSISTANT
Payer: COMMERCIAL

## 2024-03-13 ENCOUNTER — HOSPITAL ENCOUNTER (OUTPATIENT)
Dept: RADIOLOGY | Facility: HOSPITAL | Age: 39
Discharge: HOME | End: 2024-03-13
Attending: PHYSICIAN ASSISTANT | Admitting: PHYSICIAN ASSISTANT
Payer: COMMERCIAL

## 2024-03-13 DIAGNOSIS — M43.16 SPONDYLOLISTHESIS OF LUMBAR REGION: ICD-10-CM

## 2024-03-13 DIAGNOSIS — R20.2 PARESTHESIA: ICD-10-CM

## 2024-03-13 PROCEDURE — 95913 NRV CNDJ TEST 13/> STUDIES: CPT | Performed by: PHYSICAL MEDICINE & REHABILITATION

## 2024-03-13 PROCEDURE — 95886 MUSC TEST DONE W/N TEST COMP: CPT | Performed by: PHYSICAL MEDICINE & REHABILITATION

## 2024-03-13 PROCEDURE — 72120 X-RAY BEND ONLY L-S SPINE: CPT

## 2024-03-13 NOTE — PATIENT INSTRUCTIONS
Thank you for choosing the [facility] for your EMG testing.    The ordering provider will receive your final EMG results within the next few days.  Please follow up with your provider for the results and further treatment recommendations.

## 2024-03-13 NOTE — LETTER
3/13/2024         RE: Brandin Rodriguez  [address] [place] [address]        Dear Colleague,    Thank you for referring your patient, Brandin Rodriguez, to the [facility]. Please see a copy of my visit note below.    [facility]  [address]  [place] [address]  Office: [phone] Fax: [phone]    Electromyography and Nerve Conduction Study Report        Indication: Patient presents at the request of Brianne Edouard for a bilateral lower extremity EMG.  He has numbness and tingling in the toes of both feet for quite some time.  Has improved some with supplements and exercise.  All the toes of both feet right equal to left in severity he also has low back pain.  On exam, he has normal sensation to light touch to the lower extremities, 2+ patellar 1+ Achilles reflexes bilaterally, normal muscle strength throughout the major muscular's of the bilateral lower extremities.      Pt Exam Discussion (Communication Barriers):  Electromyography and nerve conduction testing, including associated discomfort, risks, benefits, and alternatives was discussed with the patient prior to the procedure.  No learning/ communication barriers; patient verbalized understanding of procedure.  Informed consent was obtained.           Pt Assessment:  Testing was successfully completed; patient tolerated testing well.       Blood Thinners: None   Skin Temperature: Warmed 31.3                   EMG/NCS  results:     Nerve Conduction Studies  Motor Sites      Segment Distal Latency Neg. Amp CV F-Latency F-Estimate Comment   Site  (ms) (mV) (m/s) (ms) (ms)    Left Fibular (EDB) Motor   Ankle Ankle-EDB 5.2 3.0       Knee Knee-Ankle 16.0 4.1 42      Right Fibular (EDB) Motor   Ankle Ankle-EDB 4.9 4.5       Fib Head Fib Head-Ankle 14.0 4.0 40      Knee Knee-Ankle 16.2 3.0 *41      Left Tibial (AH) Motor   Ankle Ankle-AH 4.0 8.5  60.8 -    Knee Knee-Ankle 15.6  6.5 44      Right Tibial (AH) Motor   Ankle Ankle-AH 3.9 8.1       Knee Knee-Ankle 16.0 7.4 41      Right Ulnar (ADM) Motor   Wrist  2.3 11.5  29.2 -    Bel Elbow Bel Elbow-Wrist 6.8 10.9 60      Ab Elbow Ab Elbow-Wrist 8.2 10.7 64        Sensory Sites      Onset Lat Peak Lat Amp CV Comment   Site (ms) (ms) ( V) (m/s)    Right Radial Sensory   Forearm-Wrist 1.50 2.0 35 67    Left Superficial Fibular Sensory   Lower Leg-Ankle *NR *NR *NR *NR    Right Superficial Fibular Sensory   Lower Leg-Ankle *NR *NR *NR *NR    Left Sural Sensory   B-Ankle 3.2 4.0 *4 -    Right Sural Sensory   B-Ankle 3.4 4.1 *6 -        NCS Waveforms:    Motor                  Sensory                  F-Wave           Electromyography     Side Muscle Nerve Root Ins Act Fibs Psw Fasc Recrt Dur Amp Poly Comment   Right AntTibialis Dp Br Fibular L4-5 Nml Nml Nml Nml Nml Nml Nml 0    Right Gastroc Tibial S1-2 Nml Nml Nml Nml Nml Nml Nml 0    Right Fibularis Long Sup Br Fibular L5-S1 Nml Nml Nml Nml Nml Nml Nml 0    Right VastusLat Femoral L2-4 Nml Nml Nml Nml Nml Nml Nml 0    Right AbdHallucis MedPlantar S1-2 Nml Nml Nml Nml Nml Nml Nml 0    Left AntTibialis Dp Br Fibular L4-5 Nml Nml Nml Nml Nml Nml Nml 0    Left Gastroc Tibial S1-2 Nml Nml Nml Nml Nml Nml Nml 0    Left Fibularis Long Sup Br Fibular L5-S1 Nml Nml Nml Nml Nml Nml Nml 0    Left VastusLat Femoral L2-4 Nml Nml Nml Nml Nml Nml Nml 0    Left AbdHallucis MedPlantar S1-2 Nml Nml Nml Nml Nml Nml Nml 0          Comment NCS: Abnormal study  1.  Borderline amplitude bilateral sural SNAPs.  2.  Absent bilateral superficial peroneal SNAPs.  3.  Normal bilateral peroneal and tibial CMAP's  4.  Normal right radial SNAP and ulnar CMAP.    Comment EMG: Normal study  1.  Normal needle EMG bilateral lower extremities.    Interpretation: Abnormal study: There is electrodiagnostic evidence of:    1.  Borderline amplitude of the bilateral sural SNAPs and absent bilateral superficial peroneal SNAPs.   Normal needle EMG through the lower extremities.  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    2.  There is no electrodiagnostic evidence of lumbosacral radiculopathy, lumbosacral plexopathy, or focal neuropathy in the bilateral lower extremities.     Note: Axonal polyneuropathy include the vast majority of polyneuropathies.  They include nearly all diabetic, toxic, metabolic, drug induced, nutritional, and endocrine associated polyneuropathies.  Further workup for the cause of the polyneuropathy could include evaluation for vitamin B12 deficiency, thyroid disorder, and diabetes.  Other considerations include heavy metal toxicity, connective tissue disorder/rheumatologic source, or medication related.    The testing was completed in its entirety by the physician.      It was our pleasure caring for your patient today, if there any questions or concerns please do not hesitate to contact us.      Again, thank you for allowing me to participate in the care of your patient.        Sincerely,        Mario Seo, DO

## 2024-03-13 NOTE — PROGRESS NOTES
[facility]  [address] [place] [address] [address]  [place] [address]  Office: [phone] Fax: [phone]    Electromyography and Nerve Conduction Study Report        Indication: Patient presents at the request of Brianne Edouard for a bilateral lower extremity EMG.  He has numbness and tingling in the toes of both feet for quite some time.  Has improved some with supplements and exercise.  All the toes of both feet right equal to left in severity he also has low back pain.  On exam, he has normal sensation to light touch to the lower extremities, 2+ patellar 1+ Achilles reflexes bilaterally, normal muscle strength throughout the major muscular's of the bilateral lower extremities.      Pt Exam Discussion (Communication Barriers):  Electromyography and nerve conduction testing, including associated discomfort, risks, benefits, and alternatives was discussed with the patient prior to the procedure.  No learning/ communication barriers; patient verbalized understanding of procedure.  Informed consent was obtained.           Pt Assessment:  Testing was successfully completed; patient tolerated testing well.       Blood Thinners: None   Skin Temperature: Warmed 31.3                   EMG/NCS  results:     Nerve Conduction Studies  Motor Sites      Segment Distal Latency Neg. Amp CV F-Latency F-Estimate Comment   Site  (ms) (mV) (m/s) (ms) (ms)    Left Fibular (EDB) Motor   Ankle Ankle-EDB 5.2 3.0       Knee Knee-Ankle 16.0 4.1 42      Right Fibular (EDB) Motor   Ankle Ankle-EDB 4.9 4.5       Fib Head Fib Head-Ankle 14.0 4.0 40      Knee Knee-Ankle 16.2 3.0 *41      Left Tibial (AH) Motor   Ankle Ankle-AH 4.0 8.5  60.8 -    Knee Knee-Ankle 15.6 6.5 44      Right Tibial (AH) Motor   Ankle Ankle-AH 3.9 8.1       Knee Knee-Ankle 16.0 7.4 41      Right Ulnar (ADM) Motor   Wrist  2.3 11.5  29.2 -    Bel Elbow Bel Elbow-Wrist 6.8 10.9 60      Ab Elbow Ab Elbow-Wrist 8.2 10.7 64        Sensory Sites       Onset Lat Peak Lat Amp CV Comment   Site (ms) (ms) ( V) (m/s)    Right Radial Sensory   Forearm-Wrist 1.50 2.0 35 67    Left Superficial Fibular Sensory   Lower Leg-Ankle *NR *NR *NR *NR    Right Superficial Fibular Sensory   Lower Leg-Ankle *NR *NR *NR *NR    Left Sural Sensory   B-Ankle 3.2 4.0 *4 -    Right Sural Sensory   B-Ankle 3.4 4.1 *6 -        NCS Waveforms:    Motor                  Sensory                  F-Wave           Electromyography     Side Muscle Nerve Root Ins Act Fibs Psw Fasc Recrt Dur Amp Poly Comment   Right AntTibialis Dp Br Fibular L4-5 Nml Nml Nml Nml Nml Nml Nml 0    Right Gastroc Tibial S1-2 Nml Nml Nml Nml Nml Nml Nml 0    Right Fibularis Long Sup Br Fibular L5-S1 Nml Nml Nml Nml Nml Nml Nml 0    Right VastusLat Femoral L2-4 Nml Nml Nml Nml Nml Nml Nml 0    Right AbdHallucis MedPlantar S1-2 Nml Nml Nml Nml Nml Nml Nml 0    Left AntTibialis Dp Br Fibular L4-5 Nml Nml Nml Nml Nml Nml Nml 0    Left Gastroc Tibial S1-2 Nml Nml Nml Nml Nml Nml Nml 0    Left Fibularis Long Sup Br Fibular L5-S1 Nml Nml Nml Nml Nml Nml Nml 0    Left VastusLat Femoral L2-4 Nml Nml Nml Nml Nml Nml Nml 0    Left AbdHallucis MedPlantar S1-2 Nml Nml Nml Nml Nml Nml Nml 0          Comment NCS: Abnormal study  1.  Borderline amplitude bilateral sural SNAPs.  2.  Absent bilateral superficial peroneal SNAPs.  3.  Normal bilateral peroneal and tibial CMAP's  4.  Normal right radial SNAP and ulnar CMAP.    Comment EMG: Normal study  1.  Normal needle EMG bilateral lower extremities.    Interpretation: Abnormal study: There is electrodiagnostic evidence of:    1.  Borderline amplitude of the bilateral sural SNAPs and absent bilateral superficial peroneal SNAPs.  Normal needle EMG through the lower extremities.  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    2.  There is no electrodiagnostic evidence of lumbosacral radiculopathy, lumbosacral plexopathy, or focal  neuropathy in the bilateral lower extremities.     Note: Axonal polyneuropathy include the vast majority of polyneuropathies.  They include nearly all diabetic, toxic, metabolic, drug induced, nutritional, and endocrine associated polyneuropathies.  Further workup for the cause of the polyneuropathy could include evaluation for vitamin B12 deficiency, thyroid disorder, and diabetes.  Other considerations include heavy metal toxicity, connective tissue disorder/rheumatologic source, or medication related.    The testing was completed in its entirety by the physician.      It was our pleasure caring for your patient today, if there any questions or concerns please do not hesitate to contact us.

## 2024-03-14 DIAGNOSIS — M43.16 SPONDYLOLISTHESIS OF LUMBAR REGION: ICD-10-CM

## 2024-03-14 DIAGNOSIS — M54.16 LUMBAR RADICULAR PAIN: Primary | ICD-10-CM

## 2024-03-14 DIAGNOSIS — M54.2 CERVICALGIA: ICD-10-CM

## 2024-03-25 ENCOUNTER — MYC REFILL (OUTPATIENT)
Dept: FAMILY MEDICINE | Facility: CLINIC | Age: 39
End: 2024-03-25
Payer: COMMERCIAL

## 2024-03-25 ENCOUNTER — MYC REFILL (OUTPATIENT)
Dept: PHYSICAL MEDICINE AND REHAB | Facility: CLINIC | Age: 39
End: 2024-03-25
Payer: COMMERCIAL

## 2024-03-25 DIAGNOSIS — F17.200 TOBACCO USE DISORDER: ICD-10-CM

## 2024-03-25 DIAGNOSIS — M79.18 MYOFASCIAL PAIN: ICD-10-CM

## 2024-03-25 RX ORDER — ALBUTEROL SULFATE 90 UG/1
1-2 AEROSOL, METERED RESPIRATORY (INHALATION) EVERY 6 HOURS PRN
Qty: 18 G | Refills: 0 | Status: SHIPPED | OUTPATIENT
Start: 2024-03-25 | End: 2024-04-16

## 2024-03-27 NOTE — TELEPHONE ENCOUNTER
Last appointment: 03/13/2024  Next appointment: None    Notes/Comments:      Rx request(s) has been reviewed.

## 2024-03-29 RX ORDER — CYCLOBENZAPRINE HCL 10 MG
10 TABLET ORAL 3 TIMES DAILY PRN
Qty: 60 TABLET | Refills: 1 | Status: SHIPPED | OUTPATIENT
Start: 2024-03-29

## 2024-04-02 ENCOUNTER — OFFICE VISIT (OUTPATIENT)
Dept: PODIATRY | Facility: CLINIC | Age: 39
End: 2024-04-02
Attending: PHYSICIAN ASSISTANT
Payer: COMMERCIAL

## 2024-04-02 VITALS
HEART RATE: 58 BPM | DIASTOLIC BLOOD PRESSURE: 74 MMHG | SYSTOLIC BLOOD PRESSURE: 121 MMHG | RESPIRATION RATE: 16 BRPM | OXYGEN SATURATION: 97 %

## 2024-04-02 DIAGNOSIS — M72.2 PLANTAR FASCIAL FIBROMATOSIS OF LEFT FOOT: ICD-10-CM

## 2024-04-02 DIAGNOSIS — G57.63 MORTON'S NEUROMA OF BOTH FEET: Primary | ICD-10-CM

## 2024-04-02 PROCEDURE — 99213 OFFICE O/P EST LOW 20 MIN: CPT | Performed by: PODIATRIST

## 2024-04-02 ASSESSMENT — PAIN SCALES - GENERAL: PAINLEVEL: MODERATE PAIN (4)

## 2024-04-02 NOTE — PATIENT INSTRUCTIONS
Brandin,      Your surgery with [facility] has been ordered. A  will contact you in the next few days to schedule. Or feel free to call [phone] to schedule sooner.     Procedure:   plantar fibroma both feet     Procedure Date :     *A surgery nurse will call you 1-2 days before surgery to inform you of the time of arrival for surgery.    Surgeon:   Dr. Jens Us    Admission Type:   Outpatient    Procedure Location:   [facility]:  [address] [place] [address] [address] [place] [address] (Fax: [phone])    Post Operative Appointment:       Preparation Instructions to complete:    []  You will need a Pre-op Physical within 30 days before surgery with your primary care provider. This exam is required by the anesthesiologist to ensure a safe surgical experience.    Failure  to obtain your pre-op physical will lead to cancellation of your surgery  Call them right away to schedule this. Please ensure your Preoperative Physical is faxed to the Hospital (numbers listed above)    [] Preoperative Medication Instructions  We would like you to stop your anticoagulation medications 3-5 days before surgery HOWEVER contact your prescribing provider for instructions before discontinuing any medications. (Examples: Coumadin, Plavix, Xarelto, Eliquis, Pradaxa, Effient, Brilinta) If you are on Coumadin, we would like the goal INR ? 1.2.  IT IS OK TO STAY ON ASPIRIN PRIOR TO SURGERY.   Hold Ibuprofen, Herbal Supplements and Vitamin E 7 days before  Stop Cialis, Levitra and Viagra 2-3 days before surgery  If you take these diabetic medications, please discuss with your primary doctor and follow the hold instructions:   Hold seven (7) days prior for once weekly injectable doses [semaglutide (Ozempic, Wegovy), dulaglutide (Trulicity), exenatide ER (Bydureon), tirzepatide (Mounjaro)]  Hold the day before and day of for once daily injectable GLP-1 agonists [exenatide (Byetta), liraglutide  "(Antony Santos)]  Hold seven (7) days for oral semaglutide (Rybelsus)     [] Fasting Requirements  Nothing to eat for 8 hours before surgery unless instructed differently by the surgery nurse.  You may have clear liquids up to two hours before your arrival time (coffee, tea, water, or Gatorade. No cream or milk)  If your primary care provider has instructed you to continue oral medications, you may take them on the morning of surgery with a small sip of water.    No alcohol or smoking after 12:00am the day of surgery    [] Contact your insurance regarding coverage  If you would like a Good Wilma Estimate for your upcoming procedure at [facility], contact Cost of Care Estimates   Advocates are available be phone Monday through Friday 9am - 3pm   at [phone]  You may also submit a request online at [email or web address] and select the \"Submit Pricing Inquiry\" link     For all self-pay, estimate, or anesthesia billing questions at [facility], the contact information is below:    Who to contact: Marisol KHAN  [facility] number: [phone]  Prepay number: [phone]    The billing office at the [facility] will contact you with the facility charge estimate but you may also reach them at [phone] with questions.    [] DO NOT BRING FMLA WITH TO SURGERY.  These should be sent to the provider's office by fax to [phone].    [] Day of Surgery  Medications - Take as indicated with sips of water.   Wear comfortable loose fitting clothes. Wear your glasses-Not contacts. Do not wear jewelry and remove body piercing's. Surgery may be cancelled if they are not removed.   You may have 1 family member wait in the lobby during your surgery. Visitor restrictions are subject to change. Please verify with the surgery nurse when they call.   If same day surgery-Have a someone come with you to surgery that can help you understand the surgeon's " instructions, drive you home and stay with you overnight the first night.    [] You will receive a call from a surgery nurse 1-3 days prior to surgery. They will go over more details with you.         Frequently Asked Questions    WHAT DO I DO WHEN I COME HOME FROM SURGERY?    After surgery and/ or your hospital stay you may be tired and drowsy. Rest, but make sure to get up and walk around every couple of hours to circulate your blood. If you are non-weight bearing do not put any weight on your foot.  Be sure to take your medications as directed. Try to get a restful sleep and begin more normal activities as tolerated.    HOW MUCH PAIN SHOULD I EXPECT AND WILL I HAVE PAIN MEDICATION?    Everyone tolerates pain differently. Still, each type of surgery involves a certain level and type of pain. Generally most people feel better within a few days but keep in mind that everyone has a different tolerance to pain. All medications should be taken as directed. All medications can have side effects such as bleeding, upset stomach, headaches, dizziness, constipation, etc. If you have any major problems or allergic reaction, stop the medication and call the office. If you only have a little pain, you may simply take Tylenol or Ibuprofen as directed on the label.     WHAT ABOUT MY DRESSING AND WHEN DO I COME BACK TO THE OFFICE?    The outer dressing stays in place for 7 days and when you come in for your first post-op we will remove it.  Some patients may have staples, zipper or sutures; these stay in for 10-14 days and will be removed at your 2nd post-op visit. After 24 hours you may shower using shower precautions.    WHAT ABOUT SWELLING, REDNESS, BRUISING OR DRAINAGE?    It is normal to have some swelling, and bruising after surgery. It gradually subsides but may be present for several weeks. Elevation and icing will help to reduce the swelling more rapidly.  If your bandage is really tight after 24 hours you may cut the  bandage an inch by the toes or under your foot and by your ankle.  Ice therapy often works better than oral pain medication. Using cold therapy is recommended every hour for 20 minutes.    WHEN CAN I TAKE A BATH?    You can take a bath as long as the wound has no drainage and is fully healed without a scab. This generally takes about 2 weeks.  Unless you get the bandaged protector from above then you can take a shower when you feel up to it.    WHEN CAN I RETURN TO WORK?    You may return to work to an office-type job whenever you feel comfortable enough. The only restriction would be your own motivation and pain tolerance. If your job requires vigorous activities you may be off 1-4 weeks which is determined by what surgery you have and your operating physician.     WHAT ABOUT DRIVING OR FLYING?    As a general rule, you may resume driving as soon as you are comfortable and fully alert and off narcotic pain medications. If you are traveling by plane within 4 weeks of surgery, perform range of motion exercises of the legs and feet during the flight. Get up and walk around frequently to prevent blood clots.      WHEN CAN I EXERSICE?    You may return to normal activities such as exercising when your surgeon tells you usually 2 weeks. Walking, sitting, standing and going up the stairs are all fine after the 2-week carlos. You may have restrictions for 1-4 weeks of no lifting, pushing, pulling in excess of 20 lbs. This is determined by what surgery you have and your surgeon.    WILL I BECOME ADDICTED TO MY PAIN MEDICATION?    Pain medications are safe and effective when used as directed. However, misuse of these products can be extremely harmful and even deadly. Pain medications must be taken only as directed by your surgeon. Do not change the dose of your pain medication without talking to your operating physician first.    POSTOPERATIVE INFORMATION: MANAGING PAIN  Measuring Your Pain    A pain scale helps you rate pain  intensity. In the scale, 0 means no pain, and 10 is the worst pain possible.  You should rate your pain every 4-6 hours. You may feel some pain even with medications. It is important to tell your health care provider if medications don't reduce the pain.        Managing Post-Op Pain at Home: Medications    Pain after an operation (post-op pain) is common and expected. These guidelines can help you stay as comfortable as possible.    Taking Pain Medications    Take any prescribed pain medications as directed by your doctor.  Take pain medications with some food to avoid an upset stomach.  Be aware that narcotic pain medications cause constipation. We recommend taking Milk of Magnesia   Eating high-fiber foods and drink plenty of water.  Don t drink alcohol while using pain medications.  Possible side effects include stomach upset, nausea, and itching.    Alternating Ibuprofen with your pain medication    Ibuprofen has three actions: it reduces fever, relieves pain and fights inflammation. Alternate between your prescribed pain medication and Ibuprofen every three hours (i.e., take a dose of Ibuprofen then three hours later take your prescribed pain medication then three hours later Ibuprofen, ect.) These two medications are safe to use together like this.    POSTOPERATIVE INFORMATION: CONSTIPATION    Constipation after surgery is a common problem and is often related to taking post-operative narcotic pain medication. Signs that you may be constipated include bloating, abdominal distention and/or pain.    Constipation Prevention & Treatment    Drink plenty of fluids! This is the most important thing you can do to help prevent constipation.  Increase physical activity as recommended by your surgeon.  Consume a high fiber diet. We recommend introducing high fiber foods pre-operatively. Some foods high in fiber include:    Oatmeal Bran  Flaxseed Dried Fruit    Carrots   Bananas Strawberries Oranges Whole Grain Bread      Bananas Prunes  Pears  Raspberries     Over the counter medication/supplements - in order of recommended treatment:   (Be sure to follow instructions on product packaging)    Fiber supplement   Bulk forming laxative - Can be used to prevent constipation  Great food sources of fiber include but are not limited to:  Colace (docusate sodium)  Osmotic stool softener - Can be used 2-3 times per day to prevent constipation  Milk of Magnesia  MIRALAX  Enema/Suppository    Patient can also  some probiotics such as Culturelle to help prevent Antibiotic associated diarrhea. They can take this 1 hour before or 2 hours after taking their antibiotic should not be taken at the same time as they can cancel out the effects.                          ** AFTER SURGERY INSTRUCTIONS **                          [] You are to remain LIMITED WEIGHT BEARING on your right and left  foot LIMITED WEIGHT BEARING MEANS THAT IT IS ONLY OKAY FOR YOU TO APPLY LIGHT PRESSURE ON THE AFFECTED FOOT WHEN TRANSFERRING FROM YOUR ASSISTIVE DEVICE TO A CHAIR OR BED.    [] During surgery   will apply a gauze dressing to your foot. This will remain intact until you are seen in clinic for follow up    [] It is NOT OKAY to get your surgical site wet while bathing, you will need to purchase a cast cover to protect your foot from getting wet. You can purchase this at [facility] or your local pharmacy.    [] It is important that you elevate your affected foot after surgery. Proper elevation is raising your foot above your waist. The fluid in your lower extremities needs to get back to your heart so it can get pumped to your kidneys and expelled through urination. So if you notice you have to go to the bathroom more frequently when you are elevating your leg this is a good sign that it is working.     [] It is important that you increase your protein intake after surgery. Protein is essential for wound healing. We recommend you take a  protein supplement twice per day. This is in addition to your normal diet. Examples of protein supplements are Ensure, Boost, Glucerna (if you are diabetic), or protein powder. You can purchase these at your local retailer or grocery store.       Notify our office right away, if you have any changes in your health status, or if you develop a cold, flu, diarrhea, infection, fever or sore throat before your scheduled surgery date. We can be reached at [phone]   Monday-Friday 8 am - 4:30 pm if you have any questions.     Thank you for trusting us with your care!      For informational purposes only. Not to replace the advice of your health care provider. Copyright   2020 [facility]. All rights reserved. Clinically reviewed by Infection Prevention and the [facility] COVID-19 Clinical Team. [employer] 538456 - Rev 09/09/21.

## 2024-04-02 NOTE — Clinical Note
4/2/2024         RE: Brandin Rodriguez  [address] [place] [address]        Dear Colleague,    Thank you for referring your patient, Brandin Rodriguez, to the [facility]. Please see a copy of my visit note below.    FOOT AND ANKLE SURGERY/PODIATRY CONSULT NOTE         ASSESSMENT:   ***      TREATMENT:  ***        HPI: I was asked to see Brandin Rodriguez today  ***.  The patient was seen in consultation at the request of Brianne Edouard PA-C for ***.     Past Medical History:   Diagnosis Date     Alcoholic hepatitis (H28)      Anxiety      Depression      Depressive disorder      PTSD (post-traumatic stress disorder)      Suicidal ideation      Uncomplicated asthma        Social History     Socioeconomic History     Marital status:      Spouse name: Not on file     Number of children: Not on file     Years of education: Not on file     Highest education level: Not on file   Occupational History     Not on file   Tobacco Use     Smoking status: Every Day     Packs/day: .5     Types: Cigarettes     Smokeless tobacco: Never   Substance and Sexual Activity     Alcohol use: Yes     Comment: 5 per week     Drug use: Yes     Types: Marijuana     Comment: occasionally     Sexual activity: Yes     Partners: Female     Birth control/protection: Implant   Other Topics Concern     Parent/sibling w/ CABG, MI or angioplasty before 65F 55M? No   Social History Narrative     Not on file     Social Determinants of Health     Financial Resource Strain: Low Risk  (11/13/2020)    Overall Financial Resource Strain (CARDIA)      Difficulty of Paying Living Expenses: Not hard at all   Food Insecurity: No Food Insecurity (11/13/2020)    Hunger Vital Sign      Worried About Running Out of Food in the Last Year: Never true      Ran Out of Food in the Last Year: Never true   Transportation Needs: No Transportation Needs (11/13/2020)    PRAPARE - Transportation      Lack of Transportation (Medical): No       Lack of Transportation (Non-Medical): No   Physical Activity: Not on file   Stress: Stress Concern Present (11/13/2020)    [place] [employer] - Occupational Stress Questionnaire      Feeling of Stress : To some extent   Social Connections: Unknown (12/7/2020)    Social Connection and Isolation Panel [NHANES]      Frequency of Communication with Friends and Family: Not on file      Frequency of Social Gatherings with Friends and Family: Not on file      Attends [employer] Services: Not on file      Active Member of Clubs or Organizations: Not on file      Attends Club or Organization Meetings: Not on file      Marital Status:    Interpersonal Safety: Not on file   Housing Stability: Not on file          Allergies   Allergen Reactions     Trazodone Other (See Comments)     Depressed, suicidal thoughts     Depressed, suicidal thoughts     Amoxicillin Rash     Bactrim [Sulfamethoxazole W-Trimethoprim] Rash          Current Outpatient Medications:      albuterol (PROAIR HFA/PROVENTIL HFA/VENTOLIN HFA) 108 (90 Base) MCG/ACT inhaler, Inhale 1-2 puffs into the lungs every 6 hours as needed for shortness of breath or wheezing, Disp: 18 g, Rfl: 0     amphetamine-dextroamphetamine (ADDERALL) 10 MG tablet, Take 1 tablet (10 mg) by mouth daily, Disp: 30 tablet, Rfl: 0     buPROPion (WELLBUTRIN XL) 300 MG 24 hr tablet, TAKE 1 TABLET(300 MG) BY MOUTH EVERY MORNING, Disp: 90 tablet, Rfl: 0     celecoxib (CELEBREX) 100 MG capsule, Take 1 capsule (100 mg) by mouth 2 times daily as needed for moderate pain, Disp: 60 capsule, Rfl: 1     cyclobenzaprine (FLEXERIL) 10 MG tablet, Take 1 tablet (10 mg) by mouth 3 times daily as needed for muscle spasms, Disp: 60 tablet, Rfl: 1     Lidocaine (LIDOCARE) 4 % Patch, Place 1 patch onto the skin every 24 hours To prevent lidocaine toxicity, patient should be patch free for 12 hrs daily., Disp: 10 patch, Rfl: 0     multivitamin w/minerals (MULTI-VITAMIN) tablet, Take  1 tablet by mouth daily, Disp: , Rfl:      propranolol (INDERAL) 20 MG tablet, TAKE 1 TABLET(20 MG) BY MOUTH THREE TIMES DAILY AS NEEDED FOR ANXIETY (Patient not taking: Reported on 4/2/2024), Disp: 60 tablet, Rfl: 3     Family History   Problem Relation Age of Onset     Diabetes Mother      Hypertension Mother      Heart Disease Maternal Grandmother      Cancer Paternal Grandmother      Anxiety Disorder Brother      Autism Spectrum Disorder Brother      Arrhythmia Maternal Half-Sister         Social History     Socioeconomic History     Marital status:      Spouse name: Not on file     Number of children: Not on file     Years of education: Not on file     Highest education level: Not on file   Occupational History     Not on file   Tobacco Use     Smoking status: Every Day     Packs/day: .5     Types: Cigarettes     Smokeless tobacco: Never   Substance and Sexual Activity     Alcohol use: Yes     Comment: 5 per week     Drug use: Yes     Types: Marijuana     Comment: occasionally     Sexual activity: Yes     Partners: Female     Birth control/protection: Implant   Other Topics Concern     Parent/sibling w/ CABG, MI or angioplasty before 65F 55M? No   Social History Narrative     Not on file     Social Determinants of Health     Financial Resource Strain: Low Risk  (11/13/2020)    Overall Financial Resource Strain (CARDIA)      Difficulty of Paying Living Expenses: Not hard at all   Food Insecurity: No Food Insecurity (11/13/2020)    Hunger Vital Sign      Worried About Running Out of Food in the Last Year: Never true      Ran Out of Food in the Last Year: Never true   Transportation Needs: No Transportation Needs (11/13/2020)    PRAPARE - Transportation      Lack of Transportation (Medical): No      Lack of Transportation (Non-Medical): No   Physical Activity: Not on file   Stress: Stress Concern Present (11/13/2020)    [place] of Occupational [employer] - Occupational Stress Questionnaire       Feeling of Stress : To some extent   Social Connections: Unknown (12/7/2020)    Social Connection and Isolation Panel [NHANES]      Frequency of Communication with Friends and Family: Not on file      Frequency of Social Gatherings with Friends and Family: Not on file      Attends Jew Services: Not on file      Active Member of Clubs or Organizations: Not on file      Attends Club or Organization Meetings: Not on file      Marital Status:    Interpersonal Safety: Not on file   Housing Stability: Not on file        Review of Systems - Patient denies fever, chills, rash, wound, stiffness, limping, numbness, weakness, heart burn, blood in stool, chest pain with activity, calf pain when walking, shortness of breath with activity, chronic cough, easy bleeding/bruising, swelling of ankles, excessive thirst, fatigue, depression, anxiety.  Patient admits to ***.      OBJECTIVE:  Appearance: alert, well appearing, and in no distress.    /74   Pulse 58   Resp 16   SpO2 97%      There is no height or weight on file to calculate BMI.     General appearance: Patient is alert and fully cooperative with history & exam.  No sign of distress is noted during the visit.  Psychiatric: Affect is pleasant & appropriate.  Patient appears motivated to improve health.  Respiratory: Breathing is regular & unlabored while sitting.  HEENT: Hearing is intact to spoken word.  Speech is clear.  No gross evidence of visual impairment that would impact ambulation.    Vascular: Dorsalis pedis and posterior tibial pulses are palpable. There is pedal hair growth ***.  CFT < 3 sec from anterior tibial surface to distal digits ***. There is no appreciable edema noted.  Dermatologic: Turgor and texture are within normal limits. No coloration or temperature changes. No primary or secondary lesions noted.  Neurologic: All epicritic and proprioceptive sensations are grossly intact ***.  Musculoskeletal: All active and passive ankle,  subtalar, midtarsal, and 1st MPJ range of motion are grossly intact without pain or crepitus, with the exception of ***. Manual muscle strength is ***. All dorsiflexors, plantarflexors, invertors, evertors are intact ***. Tenderness present to *** on palpation. Tenderness to *** with range of motion. Calf is soft/non-tender without warmth/induration    Imaging:       No images are attached to the encounter or orders placed in the encounter.     XR Lumbar Bending Only 2/3 Views    Result Date: 3/14/2024  EXAM: XR LUMBAR FLEX/EXT 2/3 VIEWS LOCATION: [facility] DATE: 3/13/2024 INDICATION: eval for instability COMPARISON: Lumbar spine MRI: 03/07/2024.     IMPRESSION: 1.  Vertebral body heights are maintained. 2.  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 Grade 1 anterolisthesis of L5 on S1 measures 10 mm in neutral lateral view. No significant change with flexion or extension. 3.  Moderate to advanced degenerative disc height loss at L5-S1.    MR Lumbar Spine w/o Contrast    Result Date: 3/8/2024  EXAM: MR LUMBAR SPINE W/O CONTRAST LOCATION: [facility] DATE: 3/7/2024 INDICATION:  Lumbar radicular pain COMPARISON: None. TECHNIQUE: Routine Lumbar Spine MRI without IV contrast. 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 The conus ends at mid L2. Mild levocurvature of the lumbar spine. 5 mm spondylolisthesis of L5 on S1 due to bilateral L5 pars defects. Mixed Modic type I and type II changes at L5-S1. Mild symmetric atrophy of the posterior paraspinal soft tissues. T12-L1: Normal disc height and signal. Shallow central disc protrusion. Mild bilateral facet arthropathy. No spinal canal or neural foraminal stenosis. L1-L2: Normal disc height and signal. Shallow central disc protrusion. Mild bilateral facet arthropathy. No spinal canal or neural foraminal stenosis. L2-L3: Normal disc height and signal. Small broad-based disc bulge. Mild bilateral facet arthropathy. No  spinal canal or neural foraminal stenosis. L3-L4: Normal disc height and signal. Small broad-based disc bulge with superimposed left foraminal disc protrusion. No spinal canal or neural foraminal stenosis. L4-L5: Normal disc height and signal. Shallow broad-based disc bulge. Mild to moderate bilateral facet arthropathy. No spinal canal or neural foraminal stenosis. L5-S1: Moderate intervertebral disc height loss. Loss of the normal T2 signal within the disc. Broad-based disc bulge with superimposed small central disc extrusion. Mild bilateral facet arthropathy. No spinal canal narrowing. Mild to moderate right neuroforaminal narrowing. Mild left neuroforaminal narrowing. S1-S2: Transitional disc. No spinal canal or neuroforaminal narrowing.     IMPRESSION: 1.  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 Please correlate with radiographs prior to any surgical or interventional procedures. 2.  5 mm spondylolisthesis of L5 on S1 due to bilateral L5 pars defects. 3.  No high-grade spinal canal narrowing. 4.  Mild to moderate right and mild left neuroforaminal narrowing at L5-S1. 5.  Mixed Modic type I and type II changes at L5-S1.     XR Cervical Spine 2/3 Views    Result Date: 3/8/2024  EXAM: XR CERVICAL SPINE 2/3 VIEWS LOCATION: [facility] DATE: 3/7/2024 INDICATION:  Cervicalgia COMPARISON: None.     IMPRESSION: Straightening of the normal cervical lordosis. No fracture. Mild disc degenerative changes at C4-C5 and C5-C6. Prevertebral soft tissues unremarkable. Unremarkable facets.      XR Lumbar Bending Only 2/3 Views    Result Date: 3/14/2024  EXAM: XR LUMBAR FLEX/EXT 2/3 VIEWS LOCATION: [facility] DATE: 3/13/2024 INDICATION: eval for instability COMPARISON: Lumbar spine MRI: 03/07/2024.     IMPRESSION: 1.  Vertebral body heights are maintained. 2.  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 Grade 1 anterolisthesis of L5 on S1 measures 10 mm in neutral lateral view. No  significant change with flexion or extension. 3.  Moderate to advanced degenerative disc height loss at L5-S1.    MR Lumbar Spine w/o Contrast    Result Date: 3/8/2024  EXAM: MR LUMBAR SPINE W/O CONTRAST LOCATION: [facility] DATE: 3/7/2024 INDICATION:  Lumbar radicular pain COMPARISON: None. TECHNIQUE: Routine Lumbar Spine MRI without IV contrast. 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 The conus ends at mid L2. Mild levocurvature of the lumbar spine. 5 mm spondylolisthesis of L5 on S1 due to bilateral L5 pars defects. Mixed Modic type I and type II changes at L5-S1. Mild symmetric atrophy of the posterior paraspinal soft tissues. T12-L1: Normal disc height and signal. Shallow central disc protrusion. Mild bilateral facet arthropathy. No spinal canal or neural foraminal stenosis. L1-L2: Normal disc height and signal. Shallow central disc protrusion. Mild bilateral facet arthropathy. No spinal canal or neural foraminal stenosis. L2-L3: Normal disc height and signal. Small broad-based disc bulge. Mild bilateral facet arthropathy. No spinal canal or neural foraminal stenosis. L3-L4: Normal disc height and signal. Small broad-based disc bulge with superimposed left foraminal disc protrusion. No spinal canal or neural foraminal stenosis. L4-L5: Normal disc height and signal. Shallow broad-based disc bulge. Mild to moderate bilateral facet arthropathy. No spinal canal or neural foraminal stenosis. L5-S1: Moderate intervertebral disc height loss. Loss of the normal T2 signal within the disc. Broad-based disc bulge with superimposed small central disc extrusion. Mild bilateral facet arthropathy. No spinal canal narrowing. Mild to moderate right neuroforaminal narrowing. Mild left neuroforaminal narrowing. S1-S2: Transitional disc. No spinal canal or neuroforaminal narrowing.     IMPRESSION: 1.  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 Please correlate with radiographs prior to  any surgical or interventional procedures. 2.  5 mm spondylolisthesis of L5 on S1 due to bilateral L5 pars defects. 3.  No high-grade spinal canal narrowing. 4.  Mild to moderate right and mild left neuroforaminal narrowing at L5-S1. 5.  Mixed Modic type I and type II changes at L5-S1.     XR Cervical Spine 2/3 Views    Result Date: 3/8/2024  EXAM: XR CERVICAL SPINE 2/3 VIEWS LOCATION: [facility] DATE: 3/7/2024 INDICATION:  Cervicalgia COMPARISON: None.     IMPRESSION: Straightening of the normal cervical lordosis. No fracture. Mild disc degenerative changes at C4-C5 and C5-C6. Prevertebral soft tissues unremarkable. Unremarkable facets.          Jens Mills DPM  [facility] Foot & Ankle Surgery/Podiatry         Again, thank you for allowing me to participate in the care of your patient.        Sincerely,        Jens Us DPM

## 2024-04-02 NOTE — PROGRESS NOTES
FOOT AND ANKLE SURGERY/PODIATRY CONSULT NOTE         ASSESSMENT:   Valerio's neuroma bilateral feet  Plantar fibroma medial band plantar fascia left foot      TREATMENT:  I informed the patient that I do not recommend treating the plantar fibroma since he is asymptomatic.  I recommended he watch and monitor his condition.  If the lesion increases in size and develops pain or other symptoms then I would recommend surgical excision of the plantar fibroma at that time.  I did however recommend surgical excision of his painful Valerio's neuroma bilateral feet.  The patient was told the procedure will be done on outpatient basis under local anesthesia with IV sedation.  He was told procedure takes approximately 10 minutes per foot.  I recommended doing both feet simultaneously.  He was told he would then be discharged weightbearing in a postoperative shoe for 2 weeks.  He was also told that as result of this procedure he would have some permanent numbness between the third and fourth toes.  The patient agreed to having the procedure performed.  He was asked to go n.p.o. at midnight prior to the procedure and he was asked to see his primary care physician for preoperative consultation.  All pertinent questions were invited and answered.         HPI: I was asked to see Brandin Rodriguez today complaining of bilateral foot pain.  The patient stated he has had pain in the ball of both feet for several years.  The pain is quite severe and is aggravated with weightbearing and ambulation.  He denies any severe trauma to his feet.  He has not had any associated redness or swelling.  The pain is relieved with nonweightbearing.  He stated that he feels as though there is a lump moving around underneath the ball of his foot.  He has pain and tingling involving the third and fourth toes of both feet.  He has been wearing orthotics for quite some time.  The patient also complained of a lump which has appeared in the arch of his left  foot.  This lump is nontender.  He stated that he noticed it over the past several months after wearing his new pair of orthotics.  The patient was seen in consultation at the request of Brianne Edouard PA-C for evaluation and treatment of bilateral foot pain.     Past Medical History:   Diagnosis Date    Alcoholic hepatitis (H28)     Anxiety     Depression     Depressive disorder     PTSD (post-traumatic stress disorder)     Suicidal ideation     Uncomplicated asthma        Social History     Socioeconomic History    Marital status:      Spouse name: Not on file    Number of children: Not on file    Years of education: Not on file    Highest education level: Not on file   Occupational History    Not on file   Tobacco Use    Smoking status: Every Day     Packs/day: .5     Types: Cigarettes    Smokeless tobacco: Never   Substance and Sexual Activity    Alcohol use: Yes     Comment: 5 per week    Drug use: Yes     Types: Marijuana     Comment: occasionally    Sexual activity: Yes     Partners: Female     Birth control/protection: Implant   Other Topics Concern    Parent/sibling w/ CABG, MI or angioplasty before 65F 55M? No   Social History Narrative    Not on file     Social Determinants of Health     Financial Resource Strain: Low Risk  (11/13/2020)    Overall Financial Resource Strain (CARDIA)     Difficulty of Paying Living Expenses: Not hard at all   Food Insecurity: No Food Insecurity (11/13/2020)    Hunger Vital Sign     Worried About Running Out of Food in the Last Year: Never true     Ran Out of Food in the Last Year: Never true   Transportation Needs: No Transportation Needs (11/13/2020)    PRAPARE - Transportation     Lack of Transportation (Medical): No     Lack of Transportation (Non-Medical): No   Physical Activity: Not on file   Stress: Stress Concern Present (11/13/2020)    [employer] [place] [employer] - Occupational Stress Questionnaire     Feeling of Stress : To some extent   Social  Connections: Unknown (12/7/2020)    Social Connection and Isolation Panel [NHANES]     Frequency of Communication with Friends and Family: Not on file     Frequency of Social Gatherings with Friends and Family: Not on file     Attends Mu-ism Services: Not on file     Active Member of Clubs or Organizations: Not on file     Attends Club or Organization Meetings: Not on file     Marital Status:    Interpersonal Safety: Not on file   Housing Stability: Not on file          Allergies   Allergen Reactions    Trazodone Other (See Comments)     Depressed, suicidal thoughts     Depressed, suicidal thoughts    Amoxicillin Rash    Bactrim [Sulfamethoxazole W-Trimethoprim] Rash          Current Outpatient Medications:     albuterol (PROAIR HFA/PROVENTIL HFA/VENTOLIN HFA) 108 (90 Base) MCG/ACT inhaler, Inhale 1-2 puffs into the lungs every 6 hours as needed for shortness of breath or wheezing, Disp: 18 g, Rfl: 0    amphetamine-dextroamphetamine (ADDERALL) 10 MG tablet, Take 1 tablet (10 mg) by mouth daily, Disp: 30 tablet, Rfl: 0    buPROPion (WELLBUTRIN XL) 300 MG 24 hr tablet, TAKE 1 TABLET(300 MG) BY MOUTH EVERY MORNING, Disp: 90 tablet, Rfl: 0    celecoxib (CELEBREX) 100 MG capsule, Take 1 capsule (100 mg) by mouth 2 times daily as needed for moderate pain, Disp: 60 capsule, Rfl: 1    cyclobenzaprine (FLEXERIL) 10 MG tablet, Take 1 tablet (10 mg) by mouth 3 times daily as needed for muscle spasms, Disp: 60 tablet, Rfl: 1    Lidocaine (LIDOCARE) 4 % Patch, Place 1 patch onto the skin every 24 hours To prevent lidocaine toxicity, patient should be patch free for 12 hrs daily., Disp: 10 patch, Rfl: 0    multivitamin w/minerals (MULTI-VITAMIN) tablet, Take 1 tablet by mouth daily, Disp: , Rfl:     propranolol (INDERAL) 20 MG tablet, TAKE 1 TABLET(20 MG) BY MOUTH THREE TIMES DAILY AS NEEDED FOR ANXIETY (Patient not taking: Reported on 4/2/2024), Disp: 60 tablet, Rfl: 3     Family History   Problem Relation Age of  Onset    Diabetes Mother     Hypertension Mother     Heart Disease Maternal Grandmother     Cancer Paternal Grandmother     Anxiety Disorder Brother     Autism Spectrum Disorder Brother     Arrhythmia Maternal Half-Sister         Social History     Socioeconomic History    Marital status:      Spouse name: Not on file    Number of children: Not on file    Years of education: Not on file    Highest education level: Not on file   Occupational History    Not on file   Tobacco Use    Smoking status: Every Day     Packs/day: .5     Types: Cigarettes    Smokeless tobacco: Never   Substance and Sexual Activity    Alcohol use: Yes     Comment: 5 per week    Drug use: Yes     Types: Marijuana     Comment: occasionally    Sexual activity: Yes     Partners: Female     Birth control/protection: Implant   Other Topics Concern    Parent/sibling w/ CABG, MI or angioplasty before 65F 55M? No   Social History Narrative    Not on file     Social Determinants of Health     Financial Resource Strain: Low Risk  (11/13/2020)    Overall Financial Resource Strain (CARDIA)     Difficulty of Paying Living Expenses: Not hard at all   Food Insecurity: No Food Insecurity (11/13/2020)    Hunger Vital Sign     Worried About Running Out of Food in the Last Year: Never true     Ran Out of Food in the Last Year: Never true   Transportation Needs: No Transportation Needs (11/13/2020)    PRAPARE - Transportation     Lack of Transportation (Medical): No     Lack of Transportation (Non-Medical): No   Physical Activity: Not on file   Stress: Stress Concern Present (11/13/2020)    [employer] [place] [employer] - Occupational Stress Questionnaire     Feeling of Stress : To some extent   Social Connections: Unknown (12/7/2020)    Social Connection and Isolation Panel [NHANES]     Frequency of Communication with Friends and Family: Not on file     Frequency of Social Gatherings with Friends and Family: Not on file     Attends Adventist  Services: Not on file     Active Member of Clubs or Organizations: Not on file     Attends Club or Organization Meetings: Not on file     Marital Status:    Interpersonal Safety: Not on file   Housing Stability: Not on file        Review of Systems - Patient denies fever, chills, rash, wound, stiffness, limping, numbness, weakness, heart burn, blood in stool, chest pain with activity, calf pain when walking, shortness of breath with activity, chronic cough, easy bleeding/bruising, swelling of ankles, excessive thirst, fatigue, depression, anxiety.  Patient admits to bilateral foot pain and a mass in the medial arch of the left foot..      OBJECTIVE:  Appearance: alert, well appearing, and in no distress.    /74   Pulse 58   Resp 16   SpO2 97%      There is no height or weight on file to calculate BMI.     General appearance: Patient is alert and fully cooperative with history & exam.  No sign of distress is noted during the visit.  Psychiatric: Affect is pleasant & appropriate.  Patient appears motivated to improve health.  Respiratory: Breathing is regular & unlabored while sitting.  HEENT: Hearing is intact to spoken word.  Speech is clear.  No gross evidence of visual impairment that would impact ambulation.    Vascular: Dorsalis pedis and posterior tibial pulses are palpable. There is pedal hair growth laterally.  CFT < 3 sec from anterior tibial surface to distal digits bilaterally. There is no appreciable edema noted.  Dermatologic: There is a small firm palpable subcutaneous mass along the medial band of the plantar fascia left foot.  Turgor and texture are within normal limits. No coloration or temperature changes. No primary or secondary lesions noted.  Neurologic: All epicritic and proprioceptive sensations are grossly intact bilaterally.  There is a very pronounced positive Shani sign noted third intermetatarsal space bilaterally.  Musculoskeletal: All active and passive ankle, subtalar,  midtarsal, and 1st MPJ range of motion are grossly intact without pain or crepitus, with the exception of none. Manual muscle strength is within normal limits bilaterally. All dorsiflexors, plantarflexors, invertors, evertors are intact bilaterally. Tenderness present to the third intermetatarsal space bilaterally on palpation.  No tenderness to bilateral feet or ankles with range of motion. Calf is soft/non-tender without warmth/induration    Imaging:       No images are attached to the encounter or orders placed in the encounter.     XR Lumbar Bending Only 2/3 Views    Result Date: 3/14/2024  EXAM: XR LUMBAR FLEX/EXT 2/3 VIEWS LOCATION: [facility] DATE: 3/13/2024 INDICATION: eval for instability COMPARISON: Lumbar spine MRI: 03/07/2024.     IMPRESSION: 1.  Vertebral body heights are maintained. 2.  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 Grade 1 anterolisthesis of L5 on S1 measures 10 mm in neutral lateral view. No significant change with flexion or extension. 3.  Moderate to advanced degenerative disc height loss at L5-S1.    MR Lumbar Spine w/o Contrast    Result Date: 3/8/2024  EXAM: MR LUMBAR SPINE W/O CONTRAST LOCATION: [facility] DATE: 3/7/2024 INDICATION:  Lumbar radicular pain COMPARISON: None. TECHNIQUE: Routine Lumbar Spine MRI without IV contrast. 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 The conus ends at mid L2. Mild levocurvature of the lumbar spine. 5 mm spondylolisthesis of L5 on S1 due to bilateral L5 pars defects. Mixed Modic type I and type II changes at L5-S1. Mild symmetric atrophy of the posterior paraspinal soft tissues. T12-L1: Normal disc height and signal. Shallow central disc protrusion. Mild bilateral facet arthropathy. No spinal canal or neural foraminal stenosis. L1-L2: Normal disc height and signal. Shallow central disc protrusion. Mild bilateral facet arthropathy. No spinal canal or neural foraminal stenosis. L2-L3:  Normal disc height and signal. Small broad-based disc bulge. Mild bilateral facet arthropathy. No spinal canal or neural foraminal stenosis. L3-L4: Normal disc height and signal. Small broad-based disc bulge with superimposed left foraminal disc protrusion. No spinal canal or neural foraminal stenosis. L4-L5: Normal disc height and signal. Shallow broad-based disc bulge. Mild to moderate bilateral facet arthropathy. No spinal canal or neural foraminal stenosis. L5-S1: Moderate intervertebral disc height loss. Loss of the normal T2 signal within the disc. Broad-based disc bulge with superimposed small central disc extrusion. Mild bilateral facet arthropathy. No spinal canal narrowing. Mild to moderate right neuroforaminal narrowing. Mild left neuroforaminal narrowing. S1-S2: Transitional disc. No spinal canal or neuroforaminal narrowing.     IMPRESSION: 1.  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 Please correlate with radiographs prior to any surgical or interventional procedures. 2.  5 mm spondylolisthesis of L5 on S1 due to bilateral L5 pars defects. 3.  No high-grade spinal canal narrowing. 4.  Mild to moderate right and mild left neuroforaminal narrowing at L5-S1. 5.  Mixed Modic type I and type II changes at L5-S1.     XR Cervical Spine 2/3 Views    Result Date: 3/8/2024  EXAM: XR CERVICAL SPINE 2/3 VIEWS LOCATION: [facility] DATE: 3/7/2024 INDICATION:  Cervicalgia COMPARISON: None.     IMPRESSION: Straightening of the normal cervical lordosis. No fracture. Mild disc degenerative changes at C4-C5 and C5-C6. Prevertebral soft tissues unremarkable. Unremarkable facets.      XR Lumbar Bending Only 2/3 Views    Result Date: 3/14/2024  EXAM: XR LUMBAR FLEX/EXT 2/3 VIEWS LOCATION: [facility] DATE: 3/13/2024 INDICATION: eval for instability COMPARISON: Lumbar spine MRI: 03/07/2024.     IMPRESSION: 1.  Vertebral body heights are maintained. 2.  Chronic bilateral  pars defects at L5. Grade 1 anterolisthesis of L5 on S1 measures 10 mm in neutral lateral view. No significant change with flexion or extension. 3.  Moderate to advanced degenerative disc height loss at L5-S1.    MR Lumbar Spine w/o Contrast    Result Date: 3/8/2024  EXAM: MR LUMBAR SPINE W/O CONTRAST LOCATION: [facility] DATE: 3/7/2024 INDICATION:  Lumbar radicular pain COMPARISON: None. TECHNIQUE: Routine Lumbar Spine MRI without IV contrast. 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 The conus ends at mid L2. Mild levocurvature of the lumbar spine. 5 mm spondylolisthesis of L5 on S1 due to bilateral L5 pars defects. Mixed Modic type I and type II changes at L5-S1. Mild symmetric atrophy of the posterior paraspinal soft tissues. T12-L1: Normal disc height and signal. Shallow central disc protrusion. Mild bilateral facet arthropathy. No spinal canal or neural foraminal stenosis. L1-L2: Normal disc height and signal. Shallow central disc protrusion. Mild bilateral facet arthropathy. No spinal canal or neural foraminal stenosis. L2-L3: Normal disc height and signal. Small broad-based disc bulge. Mild bilateral facet arthropathy. No spinal canal or neural foraminal stenosis. L3-L4: Normal disc height and signal. Small broad-based disc bulge with superimposed left foraminal disc protrusion. No spinal canal or neural foraminal stenosis. L4-L5: Normal disc height and signal. Shallow broad-based disc bulge. Mild to moderate bilateral facet arthropathy. No spinal canal or neural foraminal stenosis. L5-S1: Moderate intervertebral disc height loss. Loss of the normal T2 signal within the disc. Broad-based disc bulge with superimposed small central disc extrusion. Mild bilateral facet arthropathy. No spinal canal narrowing. Mild to moderate right neuroforaminal narrowing. Mild left neuroforaminal narrowing. S1-S2: Transitional disc. No spinal canal or neuroforaminal narrowing.      IMPRESSION: 1.  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 Please correlate with radiographs prior to any surgical or interventional procedures. 2.  5 mm spondylolisthesis of L5 on S1 due to bilateral L5 pars defects. 3.  No high-grade spinal canal narrowing. 4.  Mild to moderate right and mild left neuroforaminal narrowing at L5-S1. 5.  Mixed Modic type I and type II changes at L5-S1.     XR Cervical Spine 2/3 Views    Result Date: 3/8/2024  EXAM: XR CERVICAL SPINE 2/3 VIEWS LOCATION: [facility] DATE: 3/7/2024 INDICATION:  Cervicalgia COMPARISON: None.     IMPRESSION: Straightening of the normal cervical lordosis. No fracture. Mild disc degenerative changes at C4-C5 and C5-C6. Prevertebral soft tissues unremarkable. Unremarkable facets.          Jens Us; ADAN  [facility] Foot & Ankle Surgery/Podiatry

## 2024-04-03 ENCOUNTER — TELEPHONE (OUTPATIENT)
Dept: PODIATRY | Facility: CLINIC | Age: 39
End: 2024-04-03
Payer: COMMERCIAL

## 2024-04-16 DIAGNOSIS — F17.200 TOBACCO USE DISORDER: ICD-10-CM

## 2024-04-16 RX ORDER — ALBUTEROL SULFATE 90 UG/1
AEROSOL, METERED RESPIRATORY (INHALATION)
Qty: 8.5 G | Refills: 1 | Status: SHIPPED | OUTPATIENT
Start: 2024-04-16 | End: 2024-06-06

## 2024-05-13 DIAGNOSIS — M54.16 LUMBAR RADICULAR PAIN: ICD-10-CM

## 2024-05-14 NOTE — TELEPHONE ENCOUNTER
Last appointment: 3/12/2024  Next appointment: None    Notes/Comments: Called to verify need for refill.      Rx request(s) has been reviewed.

## 2024-05-24 RX ORDER — CELECOXIB 100 MG/1
CAPSULE ORAL
Qty: 60 CAPSULE | Refills: 1 | OUTPATIENT
Start: 2024-05-24

## 2024-06-06 DIAGNOSIS — F17.200 TOBACCO USE DISORDER: ICD-10-CM

## 2024-06-06 RX ORDER — ALBUTEROL SULFATE 90 UG/1
AEROSOL, METERED RESPIRATORY (INHALATION)
Qty: 8.5 G | Refills: 1 | Status: SHIPPED | OUTPATIENT
Start: 2024-06-06

## 2024-07-08 ENCOUNTER — PATIENT OUTREACH (OUTPATIENT)
Dept: CARE COORDINATION | Facility: CLINIC | Age: 39
End: 2024-07-08
Payer: COMMERCIAL

## 2024-11-02 ENCOUNTER — HEALTH MAINTENANCE LETTER (OUTPATIENT)
Age: 39
End: 2024-11-02

## 2025-01-02 NOTE — GROUP NOTE
Process Group Note    PATIENT'S NAME: Brandin Rodriguez  MRN:   [id number]  :   1985  ACCT. NUMBER: [id number]  DATE OF SERVICE: 22  START TIME:  1:00 PM  END TIME:  1:50 PM  FACILITATOR: Lyndsey Pratt [facility]  TOPIC:  Process Group    Diagnoses:  296.30 (F33.9) Major Depressive Disorder, Recurrent Episode, Unspecified _ and With mixed features  300.02 (F41.1) Generalized Anxiety Disorder  309.81 (F43.10) Posttraumatic Stress Disorder (includes Posttraumatic Stress Disorder for Children 6 Years and Younger)  Without dissociative symptoms.  4. Other Diagnoses that is relevant to services:   Substance-Related & Addictive Disorders 291.9 (F10.99) Unspecified Alcohol Related Disorder.  5. Provisional Diagnosis:  NA.      [facility] Day Treatment  TRACK: 1B    NUMBER OF PARTICIPANTS: 6                                      Service Modality:  Video Visit     Telemedicine Visit: The patient's condition can be safely assessed and treated via synchronous audio and visual telemedicine encounter.      Reason for Telemedicine Visit: Services only offered telehealth    Originating Site (Patient Location): Patient's home    Distant Site (Provider Location): Provider Remote Setting- Home Office    Consent:  The patient/guardian has verbally consented to: the potential risks and benefits of telemedicine (video visit) versus in person care; bill my insurance or make self-payment for services provided; and responsibility for payment of non-covered services.     Patient would like the video invitation sent by:  My Chart    Mode of Communication:  Video Conference via Medical Zoom    As the provider I attest to compliance with applicable laws and regulations related to telemedicine.                Data:    Session content: At the start of this group, patients were invited to check in by identifying themselves, describing their current emotional status, and identifying issues to address in this  "group.   Area(s) of treatment focus addressed in this session included Symptom Management and Personal Safety.    Иван reported feeling \"better\" today.  His goal is to drive out to his apartment to get the mail.     Therapeutic Interventions/Treatment Strategies:  Psychotherapist offered support, feedback and validation and reinforced use of skills.    Assessment:    Patient response:   Patient responded to session by accepting feedback, giving feedback and listening    Possible barriers to participation / learning include: and no barriers identified    Health Issues:   None reported       Substance Use Review:   Substance Use: cannabis .     Mental Status/Behavioral Observations  Appearance:   Appropriate   Eye Contact:   Good   Psychomotor Behavior: Normal   Attitude:   Cooperative   Orientation:   All  Speech   Rate / Production: Normal    Volume:  Normal   Mood:    Depressed   Affect:    Appropriate   Thought Content:   Clear  Thought Form:  Coherent  Logical     Insight:    Good     Plan:     Safety Plan: No current safety concerns identified.  Recommended that patient call 911 or go to the local ED should there be a change in any of these risk factors.     Barriers to treatment: None identified    Patient Contracts (see media tab):  None    Substance Use: Provided encouragement towards sobriety    Provided support and affirmation for steps taken towards sobriety      Continue or Discharge: Patient will continue in Adult Day Treatment (ADT)  as planned. Patient is likely to benefit from learning and using skills as they work toward the goals identified in their treatment plan.      Lyndsey Pratt, [facility]  February 16, 2022    " B/L foot wound, pt concerned about worsening L foot ulcer

## 2025-05-19 ENCOUNTER — APPOINTMENT (OUTPATIENT)
Dept: ULTRASOUND IMAGING | Facility: HOSPITAL | Age: 40
End: 2025-05-19
Payer: COMMERCIAL

## 2025-05-19 ENCOUNTER — HOSPITAL ENCOUNTER (EMERGENCY)
Facility: HOSPITAL | Age: 40
Discharge: HOME | End: 2025-05-20
Attending: EMERGENCY MEDICINE | Admitting: EMERGENCY MEDICINE
Payer: COMMERCIAL

## 2025-05-19 ENCOUNTER — APPOINTMENT (OUTPATIENT)
Dept: CT IMAGING | Facility: HOSPITAL | Age: 40
End: 2025-05-19
Payer: COMMERCIAL

## 2025-05-19 DIAGNOSIS — R11.2 NAUSEA AND VOMITING, UNSPECIFIED VOMITING TYPE: ICD-10-CM

## 2025-05-19 DIAGNOSIS — K76.0 HEPATIC STEATOSIS: ICD-10-CM

## 2025-05-19 DIAGNOSIS — R74.01 TRANSAMINITIS: ICD-10-CM

## 2025-05-19 LAB
ALBUMIN SERPL BCG-MCNC: 5.3 G/DL (ref 3.5–5.2)
ALP SERPL-CCNC: 83 U/L (ref 40–150)
ALT SERPL W P-5'-P-CCNC: 160 U/L (ref 0–70)
ANION GAP SERPL CALCULATED.3IONS-SCNC: 19 MMOL/L (ref 7–15)
AST SERPL W P-5'-P-CCNC: 200 U/L (ref 0–45)
BASOPHILS # BLD AUTO: 0 10E3/UL (ref 0–0.2)
BASOPHILS NFR BLD AUTO: 1 %
BILIRUB DIRECT SERPL-MCNC: 0.31 MG/DL (ref 0–0.3)
BILIRUB SERPL-MCNC: 0.7 MG/DL
BUN SERPL-MCNC: 9.9 MG/DL (ref 6–20)
CALCIUM SERPL-MCNC: 10 MG/DL (ref 8.8–10.4)
CHLORIDE SERPL-SCNC: 101 MMOL/L (ref 98–107)
CREAT SERPL-MCNC: 0.99 MG/DL (ref 0.67–1.17)
EGFRCR SERPLBLD CKD-EPI 2021: >90 ML/MIN/1.73M2
EOSINOPHIL # BLD AUTO: 0 10E3/UL (ref 0–0.7)
EOSINOPHIL NFR BLD AUTO: 1 %
ERYTHROCYTE [DISTWIDTH] IN BLOOD BY AUTOMATED COUNT: 12.1 % (ref 10–15)
GLUCOSE SERPL-MCNC: 124 MG/DL (ref 70–99)
HCO3 SERPL-SCNC: 22 MMOL/L (ref 22–29)
HCT VFR BLD AUTO: 43 % (ref 40–53)
HGB BLD-MCNC: 15.9 G/DL (ref 13.3–17.7)
HOLD SPECIMEN: NORMAL
IMM GRANULOCYTES # BLD: 0 10E3/UL
IMM GRANULOCYTES NFR BLD: 0 %
LIPASE SERPL-CCNC: 90 U/L (ref 13–60)
LYMPHOCYTES # BLD AUTO: 1.9 10E3/UL (ref 0.8–5.3)
LYMPHOCYTES NFR BLD AUTO: 29 %
MCH RBC QN AUTO: 34 PG (ref 26.5–33)
MCHC RBC AUTO-ENTMCNC: 37 G/DL (ref 31.5–36.5)
MCV RBC AUTO: 92 FL (ref 78–100)
MONOCYTES # BLD AUTO: 0.4 10E3/UL (ref 0–1.3)
MONOCYTES NFR BLD AUTO: 6 %
NEUTROPHILS # BLD AUTO: 4.2 10E3/UL (ref 1.6–8.3)
NEUTROPHILS NFR BLD AUTO: 64 %
NRBC # BLD AUTO: 0 10E3/UL
NRBC BLD AUTO-RTO: 0 /100
PLATELET # BLD AUTO: 323 10E3/UL (ref 150–450)
POTASSIUM SERPL-SCNC: 4.4 MMOL/L (ref 3.4–5.3)
PROT SERPL-MCNC: 7.8 G/DL (ref 6.4–8.3)
RBC # BLD AUTO: 4.68 10E6/UL (ref 4.4–5.9)
SODIUM SERPL-SCNC: 142 MMOL/L (ref 135–145)
WBC # BLD AUTO: 6.5 10E3/UL (ref 4–11)

## 2025-05-19 PROCEDURE — 99285 EMERGENCY DEPT VISIT HI MDM: CPT | Mod: 25

## 2025-05-19 PROCEDURE — 85004 AUTOMATED DIFF WBC COUNT: CPT

## 2025-05-19 PROCEDURE — 80048 BASIC METABOLIC PNL TOTAL CA: CPT

## 2025-05-19 PROCEDURE — 36415 COLL VENOUS BLD VENIPUNCTURE: CPT | Performed by: STUDENT IN AN ORGANIZED HEALTH CARE EDUCATION/TRAINING PROGRAM

## 2025-05-19 PROCEDURE — 250N000011 HC RX IP 250 OP 636

## 2025-05-19 PROCEDURE — 83690 ASSAY OF LIPASE: CPT

## 2025-05-19 PROCEDURE — 74177 CT ABD & PELVIS W/CONTRAST: CPT

## 2025-05-19 PROCEDURE — 250N000011 HC RX IP 250 OP 636: Performed by: EMERGENCY MEDICINE

## 2025-05-19 PROCEDURE — 96361 HYDRATE IV INFUSION ADD-ON: CPT

## 2025-05-19 PROCEDURE — 96375 TX/PRO/DX INJ NEW DRUG ADDON: CPT | Mod: 59

## 2025-05-19 PROCEDURE — 76705 ECHO EXAM OF ABDOMEN: CPT

## 2025-05-19 PROCEDURE — 82248 BILIRUBIN DIRECT: CPT

## 2025-05-19 PROCEDURE — 258N000003 HC RX IP 258 OP 636

## 2025-05-19 PROCEDURE — 96374 THER/PROPH/DIAG INJ IV PUSH: CPT | Mod: 59

## 2025-05-19 PROCEDURE — 96376 TX/PRO/DX INJ SAME DRUG ADON: CPT | Mod: 59

## 2025-05-19 RX ORDER — LORAZEPAM 2 MG/ML
0.5 INJECTION INTRAMUSCULAR ONCE
Status: COMPLETED | OUTPATIENT
Start: 2025-05-19 | End: 2025-05-19

## 2025-05-19 RX ORDER — ONDANSETRON 2 MG/ML
4 INJECTION INTRAMUSCULAR; INTRAVENOUS EVERY 30 MIN PRN
Status: DISCONTINUED | OUTPATIENT
Start: 2025-05-19 | End: 2025-05-20 | Stop reason: HOSPADM

## 2025-05-19 RX ORDER — IOPAMIDOL 755 MG/ML
90 INJECTION, SOLUTION INTRAVASCULAR ONCE
Status: COMPLETED | OUTPATIENT
Start: 2025-05-19 | End: 2025-05-19

## 2025-05-19 RX ADMIN — IOPAMIDOL 90 ML: 755 INJECTION, SOLUTION INTRAVENOUS at 22:13

## 2025-05-19 RX ADMIN — LORAZEPAM 0.5 MG: 2 INJECTION INTRAMUSCULAR; INTRAVENOUS at 23:04

## 2025-05-19 RX ADMIN — ONDANSETRON 4 MG: 2 INJECTION, SOLUTION INTRAMUSCULAR; INTRAVENOUS at 21:34

## 2025-05-19 RX ADMIN — SODIUM CHLORIDE 1000 ML: 0.9 INJECTION, SOLUTION INTRAVENOUS at 21:35

## 2025-05-19 RX ADMIN — ONDANSETRON 4 MG: 2 INJECTION, SOLUTION INTRAMUSCULAR; INTRAVENOUS at 22:48

## 2025-05-19 ASSESSMENT — COLUMBIA-SUICIDE SEVERITY RATING SCALE - C-SSRS
6. HAVE YOU EVER DONE ANYTHING, STARTED TO DO ANYTHING, OR PREPARED TO DO ANYTHING TO END YOUR LIFE?: YES
1. IN THE PAST MONTH, HAVE YOU WISHED YOU WERE DEAD OR WISHED YOU COULD GO TO SLEEP AND NOT WAKE UP?: NO
2. HAVE YOU ACTUALLY HAD ANY THOUGHTS OF KILLING YOURSELF IN THE PAST MONTH?: NO

## 2025-05-19 ASSESSMENT — ACTIVITIES OF DAILY LIVING (ADL)
ADLS_ACUITY_SCORE: 54
ADLS_ACUITY_SCORE: 48

## 2025-05-19 NOTE — Clinical Note
Brandin Rodriguez was seen and treated in our emergency department on 5/19/2025.  He may return to work on 05/22/2025.       If you have any questions or concerns, please don't hesitate to call.      Nevaeh Miranda PA-C

## 2025-05-20 VITALS
SYSTOLIC BLOOD PRESSURE: 148 MMHG | DIASTOLIC BLOOD PRESSURE: 90 MMHG | OXYGEN SATURATION: 99 % | HEIGHT: 75 IN | WEIGHT: 199.2 LBS | BODY MASS INDEX: 24.77 KG/M2 | TEMPERATURE: 99 F | RESPIRATION RATE: 18 BRPM | HEART RATE: 84 BPM

## 2025-05-20 PROCEDURE — 96376 TX/PRO/DX INJ SAME DRUG ADON: CPT

## 2025-05-20 PROCEDURE — 250N000011 HC RX IP 250 OP 636

## 2025-05-20 PROCEDURE — 96375 TX/PRO/DX INJ NEW DRUG ADDON: CPT

## 2025-05-20 RX ORDER — ONDANSETRON 4 MG/1
4 TABLET, ORALLY DISINTEGRATING ORAL EVERY 8 HOURS PRN
Qty: 10 TABLET | Refills: 0 | Status: SHIPPED | OUTPATIENT
Start: 2025-05-20 | End: 2025-05-23

## 2025-05-20 RX ORDER — METOCLOPRAMIDE 10 MG/1
10 TABLET ORAL 3 TIMES DAILY PRN
Qty: 30 TABLET | Refills: 0 | Status: SHIPPED | OUTPATIENT
Start: 2025-05-20

## 2025-05-20 RX ORDER — ONDANSETRON 2 MG/ML
4 INJECTION INTRAMUSCULAR; INTRAVENOUS ONCE
Status: COMPLETED | OUTPATIENT
Start: 2025-05-20 | End: 2025-05-20

## 2025-05-20 RX ORDER — METOCLOPRAMIDE HYDROCHLORIDE 5 MG/ML
10 INJECTION INTRAMUSCULAR; INTRAVENOUS ONCE
Status: COMPLETED | OUTPATIENT
Start: 2025-05-20 | End: 2025-05-20

## 2025-05-20 RX ADMIN — ONDANSETRON 4 MG: 2 INJECTION, SOLUTION INTRAMUSCULAR; INTRAVENOUS at 00:38

## 2025-05-20 RX ADMIN — METOCLOPRAMIDE 10 MG: 5 INJECTION, SOLUTION INTRAMUSCULAR; INTRAVENOUS at 00:40

## 2025-05-20 ASSESSMENT — ACTIVITIES OF DAILY LIVING (ADL): ADLS_ACUITY_SCORE: 54

## 2025-05-20 NOTE — DISCHARGE INSTRUCTIONS
Your lab work showed elevated liver enzymes but was otherwise reassuring. Your CT and ultrasound showed no acute findings. You were sent home with prescriptions for nausea medications, Zofran and Reglan. You can use these as needed to help manage your symptoms. Try small sips of water, the BRAT (bananas, rice, applesauce, toast) diet, and get plenty of rest.     Follow up with your primary care provider if you continue to have symptoms.

## 2025-05-20 NOTE — ED PROVIDER NOTES
"Emergency Department Staff Physician Note     I had a face to face encounter with this patient seen by the Advanced Practice Provider (ANTHONY).  I have seen, examined, and discussed the patient with the ANTHONY and agree with their assessment and plan of management.    Relevant HPI:     Brandin Rodriguez is a 39 year old male who presents to the Emergency Department for evaluation of Nausea, vomiting and diarrhea.     Patient reports developing constant \"violent\" vomiting around 1630 after consuming two pieces of [place] roll from a deli yesterday. He also endorses dizziness and diaphoresis associated with the nausea. Patient noted some dark red blood in his last episode of emesis. Otherwise, his vomit has resembled  the 7-Up that he has been drinking. Patient also endorses constant epigastric abdominal pain that occasionally shoots into his back and 1 episode of loose stool. No dizziness or diaphoresis associated with the urge to defecate. Denies fever, chills, recent illness, and any other symptoms or complaints at this time.     I, Key Mackey, am serving as a scribe to document services personally performed by Maria Alejandra Araya MD, based on my observations and the provider's statements to me.   I, Maria Alejandra Araya MD, attest that Key Mackey was acting in a scribe capacity, has observed my performance of the services and has documented them in accordance with my direction.    ED Course:  10:10 PM  I received the patient report from the ANTHONY, Nevaeh Miranda. I agree with their assessment and plan of management, and I will see the patient.  10:50 I met with the patient to introduce myself, gather additional history, perform my initial exam, and discuss the plan.     Brief Physical Exam:  VITAL SIGNS: BP (!) 148/90   Pulse 84   Temp 99  F (37.2  C) (Oral)   Resp 18   Ht 1.892 m (6' 2.5\")   Wt 90.4 kg (199 lb 3.2 oz)   SpO2 99%   BMI 25.23 kg/m    Generalized: does not appear toxic, interactive and in no apparent distress. "   HEENT: No eye discharge or redness, no nasal drainage or bleeding.   Resp: Equal work of breathing with bilateral chest rise present.  CV: Warm extremities, regular rate.  Neuro: Interactive, no aphasia or dysarthria, no facial droop.  MSK: Moving extremities spontaneously. No focal deformity or trauma noted to extremities.  Psych: Cooperative, does not appear to be hallucinating or responding to internal stimuli.  Slightly anxious appearing.     Impression / ED Plan:  I had a face to face encounter with this patient seen by the Advanced Practice Provider (ANTHONY).  I have seen, examined, and discussed the patient with the ANTHONY and agree with their assessment and plan of management. I personally saw the patient and performed a substantive portion of the visit including all aspects of the medical decision making.    1.  Nausea vomiting, 1 episode of diarrhea.  CT abdomen pelvis, right upper quadrant ultrasound performed, negative for acute process, negative for cholecystitis or pancreatitis  Hepatic steatosis present on both scans, liver enzymes mildly elevated but bilirubin essentially normal.  Some dehydration with anion gap of 19.  IV fluids given.  Patient states he is anxious secondary to his father having pancreatic cancer.  Ativan ordered x 1.  History of NEREYDA.  Suspect viral gastroenteritis, nausea medication prescribed.  Discharged home.      1. Nausea and vomiting, unspecified vomiting type    2. Transaminitis    3. Hepatic steatosis      Maria Alejandra Araya MD  Staff Physician  [facility] EMERGENCY DEPARTMENT     Maria Alejandra Araya MD  05/20/25 8649
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
## 2025-05-20 NOTE — ED NOTES
"Pt upset and requesting to leave, stating \"I'm not feeling any better.\" He asked for this RN to notify the provider x2. Nevaeh GARCÍA notified.   "

## 2025-05-20 NOTE — ED NOTES
Discharge instructions provided to the pt. Pt and pt's spouse deny questions regarding the instructions. Pt reports he is feeling slightly better after medication administration and drinking water. No new emesis noted. He ambulates independently from the hallway with his spouse.

## 2025-05-20 NOTE — ED TRIAGE NOTES
Patient developed a sudden onset of nausea, vomiting, and diarrhea this evening. Reports generalized abdominal cramping that increases prior to episodes of emesis and diarrhea.      Triage Assessment (Adult)       Row Name 05/19/25 2049          Triage Assessment    Airway WDL WDL        Respiratory WDL    Respiratory WDL WDL        Skin Circulation/Temperature WDL    Skin Circulation/Temperature WDL WDL        Cardiac WDL    Cardiac WDL WDL        Peripheral/Neurovascular WDL    Peripheral Neurovascular WDL WDL        Cognitive/Neuro/Behavioral WDL    Cognitive/Neuro/Behavioral WDL WDL

## 2025-07-15 SDOH — HEALTH STABILITY: PHYSICAL HEALTH: ON AVERAGE, HOW MANY DAYS PER WEEK DO YOU ENGAGE IN MODERATE TO STRENUOUS EXERCISE (LIKE A BRISK WALK)?: 3 DAYS

## 2025-07-15 SDOH — HEALTH STABILITY: PHYSICAL HEALTH: ON AVERAGE, HOW MANY MINUTES DO YOU ENGAGE IN EXERCISE AT THIS LEVEL?: 30 MIN

## 2025-07-15 ASSESSMENT — SOCIAL DETERMINANTS OF HEALTH (SDOH): HOW OFTEN DO YOU GET TOGETHER WITH FRIENDS OR RELATIVES?: ONCE A WEEK

## 2025-07-15 ASSESSMENT — PATIENT HEALTH QUESTIONNAIRE - PHQ9
10. IF YOU CHECKED OFF ANY PROBLEMS, HOW DIFFICULT HAVE THESE PROBLEMS MADE IT FOR YOU TO DO YOUR WORK, TAKE CARE OF THINGS AT HOME, OR GET ALONG WITH OTHER PEOPLE: SOMEWHAT DIFFICULT
SUM OF ALL RESPONSES TO PHQ QUESTIONS 1-9: 9
SUM OF ALL RESPONSES TO PHQ QUESTIONS 1-9: 9

## 2025-07-16 ENCOUNTER — OFFICE VISIT (OUTPATIENT)
Dept: INTERNAL MEDICINE | Facility: CLINIC | Age: 40
End: 2025-07-16
Payer: COMMERCIAL

## 2025-07-16 VITALS
WEIGHT: 213 LBS | HEART RATE: 86 BPM | RESPIRATION RATE: 18 BRPM | OXYGEN SATURATION: 97 % | SYSTOLIC BLOOD PRESSURE: 102 MMHG | BODY MASS INDEX: 27.34 KG/M2 | DIASTOLIC BLOOD PRESSURE: 70 MMHG | TEMPERATURE: 97.6 F | HEIGHT: 74 IN

## 2025-07-16 DIAGNOSIS — Z23 IMMUNIZATION DUE: ICD-10-CM

## 2025-07-16 DIAGNOSIS — G89.29 CHRONIC LEFT-SIDED LOW BACK PAIN WITH LEFT-SIDED SCIATICA: ICD-10-CM

## 2025-07-16 DIAGNOSIS — K76.0 HEPATIC STEATOSIS: ICD-10-CM

## 2025-07-16 DIAGNOSIS — G89.29 CHRONIC PAIN OF BOTH FEET: ICD-10-CM

## 2025-07-16 DIAGNOSIS — F41.0 PANIC ATTACK: ICD-10-CM

## 2025-07-16 DIAGNOSIS — Z00.00 ENCOUNTER FOR ANNUAL PHYSICAL EXAM: Primary | ICD-10-CM

## 2025-07-16 DIAGNOSIS — F43.10 PTSD (POST-TRAUMATIC STRESS DISORDER): ICD-10-CM

## 2025-07-16 DIAGNOSIS — E78.5 HYPERLIPIDEMIA, UNSPECIFIED HYPERLIPIDEMIA TYPE: ICD-10-CM

## 2025-07-16 DIAGNOSIS — M79.672 CHRONIC PAIN OF BOTH FEET: ICD-10-CM

## 2025-07-16 DIAGNOSIS — F10.90 ALCOHOL USE DISORDER: ICD-10-CM

## 2025-07-16 DIAGNOSIS — F17.200 NICOTINE DEPENDENCE, UNCOMPLICATED, UNSPECIFIED NICOTINE PRODUCT TYPE: ICD-10-CM

## 2025-07-16 DIAGNOSIS — M79.671 CHRONIC PAIN OF BOTH FEET: ICD-10-CM

## 2025-07-16 DIAGNOSIS — M54.42 CHRONIC LEFT-SIDED LOW BACK PAIN WITH LEFT-SIDED SCIATICA: ICD-10-CM

## 2025-07-16 PROBLEM — F10.920 ALCOHOLIC INTOXICATION WITHOUT COMPLICATION: Status: RESOLVED | Noted: 2019-12-03 | Resolved: 2025-07-16

## 2025-07-16 PROBLEM — F10.939 ALCOHOL WITHDRAWAL (H): Status: RESOLVED | Noted: 2020-03-11 | Resolved: 2025-07-16

## 2025-07-16 PROBLEM — Z87.898 HISTORY OF ALCOHOL USE DISORDER: Status: RESOLVED | Noted: 2017-09-11 | Resolved: 2025-07-16

## 2025-07-16 PROBLEM — F10.20 UNCOMPLICATED ALCOHOL DEPENDENCE (H): Status: RESOLVED | Noted: 2019-12-03 | Resolved: 2025-07-16

## 2025-07-16 LAB
ALBUMIN SERPL BCG-MCNC: 4.6 G/DL (ref 3.5–5.2)
ALP SERPL-CCNC: 68 U/L (ref 40–150)
ALT SERPL W P-5'-P-CCNC: 46 U/L (ref 0–70)
ANION GAP SERPL CALCULATED.3IONS-SCNC: 12 MMOL/L (ref 7–15)
AST SERPL W P-5'-P-CCNC: 38 U/L (ref 0–45)
BILIRUB SERPL-MCNC: 0.3 MG/DL
BUN SERPL-MCNC: 17.1 MG/DL (ref 6–20)
CALCIUM SERPL-MCNC: 9.8 MG/DL (ref 8.8–10.4)
CHLORIDE SERPL-SCNC: 103 MMOL/L (ref 98–107)
CHOLEST SERPL-MCNC: 222 MG/DL
CREAT SERPL-MCNC: 1 MG/DL (ref 0.67–1.17)
EGFRCR SERPLBLD CKD-EPI 2021: >90 ML/MIN/1.73M2
FASTING STATUS PATIENT QL REPORTED: NO
FASTING STATUS PATIENT QL REPORTED: NO
GLUCOSE SERPL-MCNC: 87 MG/DL (ref 70–99)
HCO3 SERPL-SCNC: 26 MMOL/L (ref 22–29)
HDLC SERPL-MCNC: 64 MG/DL
LDLC SERPL CALC-MCNC: 125 MG/DL
NONHDLC SERPL-MCNC: 158 MG/DL
POTASSIUM SERPL-SCNC: 4.1 MMOL/L (ref 3.4–5.3)
PROT SERPL-MCNC: 7 G/DL (ref 6.4–8.3)
SODIUM SERPL-SCNC: 141 MMOL/L (ref 135–145)
TRIGL SERPL-MCNC: 164 MG/DL

## 2025-07-16 PROCEDURE — 36415 COLL VENOUS BLD VENIPUNCTURE: CPT | Performed by: NURSE PRACTITIONER

## 2025-07-16 PROCEDURE — 80061 LIPID PANEL: CPT | Performed by: NURSE PRACTITIONER

## 2025-07-16 PROCEDURE — 80053 COMPREHEN METABOLIC PANEL: CPT | Performed by: NURSE PRACTITIONER

## 2025-07-16 RX ORDER — PROPRANOLOL HCL 20 MG
20 TABLET ORAL 2 TIMES DAILY
Qty: 60 TABLET | Refills: 3 | Status: SHIPPED | OUTPATIENT
Start: 2025-07-16

## 2025-07-16 RX ORDER — CYCLOBENZAPRINE HCL 10 MG
10 TABLET ORAL 3 TIMES DAILY PRN
Qty: 60 TABLET | Refills: 1 | Status: SHIPPED | OUTPATIENT
Start: 2025-07-16

## 2025-07-16 RX ORDER — NICOTINE 21 MG/24HR
1 PATCH, TRANSDERMAL 24 HOURS TRANSDERMAL EVERY 24 HOURS
Qty: 14 PATCH | Refills: 0 | Status: SHIPPED | OUTPATIENT
Start: 2025-08-27 | End: 2025-09-10

## 2025-07-16 RX ORDER — NICOTINE 21 MG/24HR
1 PATCH, TRANSDERMAL 24 HOURS TRANSDERMAL EVERY 24 HOURS
Qty: 42 PATCH | Refills: 0 | Status: SHIPPED | OUTPATIENT
Start: 2025-07-16 | End: 2025-08-27

## 2025-07-16 ASSESSMENT — ANXIETY QUESTIONNAIRES
GAD7 TOTAL SCORE: 8
1. FEELING NERVOUS, ANXIOUS, OR ON EDGE: SEVERAL DAYS
GAD7 TOTAL SCORE: 8
6. BECOMING EASILY ANNOYED OR IRRITABLE: SEVERAL DAYS
5. BEING SO RESTLESS THAT IT IS HARD TO SIT STILL: SEVERAL DAYS
IF YOU CHECKED OFF ANY PROBLEMS ON THIS QUESTIONNAIRE, HOW DIFFICULT HAVE THESE PROBLEMS MADE IT FOR YOU TO DO YOUR WORK, TAKE CARE OF THINGS AT HOME, OR GET ALONG WITH OTHER PEOPLE: SOMEWHAT DIFFICULT
2. NOT BEING ABLE TO STOP OR CONTROL WORRYING: SEVERAL DAYS
7. FEELING AFRAID AS IF SOMETHING AWFUL MIGHT HAPPEN: SEVERAL DAYS
3. WORRYING TOO MUCH ABOUT DIFFERENT THINGS: MORE THAN HALF THE DAYS

## 2025-07-16 ASSESSMENT — PATIENT HEALTH QUESTIONNAIRE - PHQ9: 5. POOR APPETITE OR OVEREATING: SEVERAL DAYS

## 2025-07-16 NOTE — PROGRESS NOTES
Preventive Care Visit  [facility]  Marisa Heller NP, Internal Medicine  Jul 16, 2025      Assessment & Plan     Encounter for annual physical exam  Reviewed overall health maintenance.  Due for screening of colorectal cancer start age 44.  We discussed prostate cancer screening guidelines as well.  Encouraged healthy diet and exercise.  - REVIEW OF HEALTH MAINTENANCE PROTOCOL ORDERS    PTSD (post-traumatic stress disorder)  Panic attack  Will restart propranolol today.  Recommend close follow-up with psychiatry to discuss an increase and panic attacks.  - propranolol (INDERAL) 20 MG tablet; Take 1 tablet (20 mg) by mouth 2 times daily.    Chronic left-sided low back pain with left-sided sciatica  Has chronic left-sided low back pain for many years.  Had an MRI about 1 year ago and it states that it was recommended he do physical therapy and see specialist at that time.  However he has not followed up.  Does use Flexeril as needed for back pain.  States often he will get back spasms and occasional sciatica down the left leg.  No known injury.  Does not have any numbness or tingling at this time.  At this time we will place referral for physical therapy and referral to spine to follow-up for his ongoing pain.  - cyclobenzaprine (FLEXERIL) 10 MG tablet; Take 1 tablet (10 mg) by mouth 3 times daily as needed for muscle spasms.  - Physical Therapy  Referral; Future  - Spine  Referral; Future    Alcohol use disorder  Hepatic steatosis  Hepatic steatosis likely secondary to alcohol use disorder.  We discussed this diagnosis and how alcohol can cause progression towards liver failure and cirrhosis.  Encouraged him to decrease or even eliminate alcohol intake.  Will recheck liver function today as they were significantly elevated about 2 months ago.  - Comprehensive metabolic panel; Future  - Comprehensive metabolic panel    Hyperlipidemia, unspecified hyperlipidemia type  Due  for lipid panel today.  Will recalculate ASCVD risk off of new lipid panel once available.  - Lipid Profile; Future  - Lipid Profile    Nicotine dependence, uncomplicated, unspecified nicotine product type  Patient states he is ready to quit smoking.  We discussed options.  Would like to try nicotine patches.  Ordered at this time.  Deferred referral to [place] quit program.  - nicotine (NICODERM CQ) 21 MG/24HR 24 hr patch; Place 1 patch over 24 hours onto the skin every 24 hours.  - nicotine (NICODERM CQ) 14 MG/24HR 24 hr patch; Place 1 patch over 24 hours onto the skin every 24 hours for 14 days.  - nicotine (NICODERM CQ) 7 MG/24HR 24 hr patch; Place 1 patch over 24 hours onto the skin every 24 hours for 14 days.  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  They recommended surgery but at that time he was not interested in doing surgery.  This visit was 1 year ago.  Still has a lot of pain in both feet.  Referral placed to podiatry to discuss this previous diagnosis.  - Orthopedic  Referral; Future    Immunization due  Tetanus updated today.  - TDAP 10-64Y (ADACEL,BOOSTRIX)    The longitudinal plan of care for the diagnosis(es)/condition(s) as documented were addressed during this visit. Due to the added complexity in care, I will continue to support Иван in the subsequent management and with ongoing continuity of care.      Patient has been advised of split billing requirements and indicates understanding: Yes    Nicotine/Tobacco Cessation  He reports that he has been smoking cigarettes. He started smoking about 21 years ago. He has a 10.8 pack-year smoking history. He has been exposed to tobacco smoke. He has never used smokeless tobacco.  Nicotine/Tobacco Cessation Plan  Pharmacotherapies : Nicotine patch      BMI  Estimated body mass index is 27.72 kg/m  as calculated from the following:     "Height as of this encounter: 1.867 m (6' 1.5\").    Weight as of this encounter: 96.6 kg (213 lb).   Weight management plan: Patient referred to endocrine and/or weight management specialty    Counseling  Appropriate preventive services were addressed with this patient via screening, questionnaire, or discussion as appropriate for fall prevention, nutrition, physical activity, Tobacco-use cessation, social engagement, weight loss and cognition.  Checklist reviewing preventive services available has been given to the patient.  Reviewed patient's diet, addressing concerns and/or questions.   He is at risk for lack of exercise and has been provided with information to increase physical activity for the benefit of his well-being.   The patient was instructed to see the dentist every 6 months.   He is at risk for psychosocial distress and has been provided with information to reduce risk.   Reviewed preventive health counseling, as reflected in patient instructions       Alcohol Use       Luisa Oates is a 39 year old, presenting for the following:  Physical (Would like to establish care)        7/16/2025     4:02 PM   Additional Questions   Roomed by Estelle AMAYA  Иван is a pleasant 39-year-old male with significant history of anxiety, depression, PTSD, panic attacks, ADHD, tobacco use disorder, alcohol use disorder, hepatic cyst ptosis, hyperlipidemia and chronic back pain here today for an annual exam and to establish care.  He does see psychiatry through outpatient that manages his mental health and his ADHD.  However he is interested in today's and restarting his propranolol.  States he has not used it in several years but he has noticed more frequent panic attacks.  States that this has worked well for him in the past.  Was in the ER 2 months ago for nausea and vomiting and at that time was found to have significant elevated liver enzymes.  He does have a history of hepatic steatosis noted back even on " an ultrasound in 2020.  Patient does have a history of alcohol use disorder but states that he does not drink as much as he used to as he only has 1-2 drinks daily now.  He denies any abdominal pain, nausea or vomiting at this time.  He is interested in smoking cessation.  He is  and likes to garden.  Does not have any children.  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          Advance Care Planning    Discussed advance care planning with patient; informed AVS has link to [facility].        7/15/2025   General Health   How would you rate your overall physical health? (!) FAIR   Feel stress (tense, anxious, or unable to sleep) Very much   (!) STRESS CONCERN      7/15/2025   Nutrition   Three or more servings of calcium each day? Yes   Diet: Regular (no restrictions)   How many servings of fruit and vegetables per day? (!) 2-3   How many sweetened beverages each day? (!) 3         7/15/2025   Exercise   Days per week of moderate/strenous exercise 3 days   Average minutes spent exercising at this level 30 min         7/15/2025   Social Factors   Frequency of gathering with friends or relatives Once a week   Worry food won't last until get money to buy more No   Food not last or not have enough money for food? No   Do you have housing? (Housing is defined as stable permanent housing and does not include staying outside in a car, in a tent, in an abandoned building, in an overnight shelter, or couch-surfing.) Yes   Are you worried about losing your housing? No   Lack of transportation? No   Unable to get utilities (heat,electricity)? No         7/15/2025   Dental   Dentist two times every year? (!) NO       Today's PHQ-9 Score:       7/15/2025     9:50 PM   PHQ-9 SCORE   PHQ-9 Total Score MyChart 9 (Mild depression)   PHQ-9 Total Score 9        Patient-reported         7/15/2025   Substance Use   If I could quit smoking, I would Completely agree   I want to quit somking, worry about health affects  "Somewhat agree   Willing to make a plan to quit smoking Completely agree   Willing to cut down before quitting Somewhat agree   Alcohol more than 3/day or more than 7/wk No   Do you use any other substances recreationally? (!) CANNABIS PRODUCTS     Social History     Tobacco Use    Smoking status: Every Day     Current packs/day: 0.50     Types: Cigarettes     Passive exposure: Current    Smokeless tobacco: Never   Vaping Use    Vaping status: Never Used   Substance Use Topics    Alcohol use: Yes     Comment: 5 per week    Drug use: Yes     Types: Marijuana     Comment: occasionally           7/15/2025   STI Screening   New sexual partner(s) since last STI/HIV test? No   ASCVD Risk   The 10-year ASCVD risk score (Sushma BUNCH, et al., 2019) is: 3.3%    Values used to calculate the score:      Age: 39 years      Sex: Male      Is Non- : No      Diabetic: No      Tobacco smoker: Yes      Systolic Blood Pressure: 102 mmHg      Is BP treated: No      HDL Cholesterol: 37 mg/dL      Total Cholesterol: 185 mg/dL        7/15/2025   Contraception/Family Planning   Questions about contraception or family planning No        Reviewed and updated as needed this visit by Provider                    ROS  Comprehensive 12-point review of systems was completed and negative except as noted in HPI.       Objective    Exam  /70 (BP Location: Right arm, Patient Position: Sitting)   Pulse 86   Temp 97.6  F (36.4  C)   Resp 18   Ht 1.867 m (6' 1.5\")   Wt 96.6 kg (213 lb)   SpO2 97%   BMI 27.72 kg/m     Estimated body mass index is 27.72 kg/m  as calculated from the following:    Height as of this encounter: 1.867 m (6' 1.5\").    Weight as of this encounter: 96.6 kg (213 lb).    Physical Exam  Constitutional: In no acute distress.  Clean appearance.  Ears: Bilateral TMs are intact without any erythema or effusion.  Grossly normal hearing.  Oropharynx: Normal mucosa.  Dentition and gingiva is " appropriate.  Posterior oropharynx without any abnormalities.  Neck: Supple.  Trachea is midline.  No thyromegaly.  Neck is without tenderness or masses.  Cardiovascular: Regular rate and rhythm.  Normal peripheral perfusion.  No edema.   Respiratory: Lungs are clear bilaterally.  Normal respiratory effort.  Skin: Skin is pink, warm and dry.  Gastrointestinal: Soft and flat.  Normal bowel sounds.  Nontender throughout upon palpation.  No masses.    Genitourinary:  Deferred.  No concerns.  Musculoskeletal:  Normal range of motion of extremities.  Gait normal.  Able to mount exam table without difficulties.  Psychiatric: Appropriate affect and demeanor.  Memory intact.  Good insight and judgment.  Neurologic: Sensation and temperature of extremities appropriate.  No tremor or involuntary movement noted.          Signed Electronically by: Marisa Heller NP    Answers submitted by the patient for this visit:  Patient Health Questionnaire (Submitted on 7/15/2025)  If you checked off any problems, how difficult have these problems made it for you to do your work, take care of things at home, or get along with other people?: Somewhat difficult  PHQ9 TOTAL SCORE: 9

## 2025-07-16 NOTE — PATIENT INSTRUCTIONS
Labs today. Will let you know results through [employer] once available.     Eat a quality diet (generally, low in simple sugars, starches, cholesterol and saturated fat.)    Limit alcohol to no more than 1 in 24 hours, as higher use increases your weight, can damage your liver or pancreas, and increases triglycerides (fat in the blood). Never drink and drive.  Exercise regularly. Ideally you would have 30 minutes of aerobic exercise at least 4 times weekly. Find something you enjoy and a friend to do it with you.    Apply sun block (SPF 25 or greater) on exposed skin anytime you are out in the sun to prevent skin cancer.     Do testicular self-exam once monthly. Schedule an appointment if you find a nodule on one of the testicles or have another finding that concerns you.    Wear a seatbelt whenever you are in a car.    Consider a flu shot every fall.    Follow up in one year, return earlier if needed           Nicotine Transdermal System   Habitrol, Nicoderm C-Q    Uses  For quitting smoking.    Instructions  DO NOT take this medicine by mouth.    Avoid placing the patch near the breast.    Remove the patch after 24 hours.    Keep the medicine at room temperature. Avoid heat and direct light.    This patch should not be cut.    Wash your hands before and after handling this medicine.    Remove old patch before applying new one. Change the location of the new patch.    If you have vivid dreams or trouble sleeping, you may remove the patch before going to sleep.    Ask your doctor or pharmacist about locations on your body where this patch can be used.    Remove the plastic liner that protects the sticky side of the patch before applying to the skin.    Be sure the area of skin is clean and dry before putting on a new patch.    Apply the patch to a clean, dry, hairless area.    Press the patch firmly for a few seconds to make sure it stays in place.    After removing the patch, fold it together and discard it out of  reach of children and pets.    Please ask your doctor or pharmacist how you can safely dispose of used patches.    If the skin under the patch becomes irritated, remove the patch. Do not apply a new patch to the area until the skin feels better.    To avoid irritating your skin, use a different location for a new patch.    Apply the patch only to normal looking skin. Avoid areas of the skin that are red, have scrapes, or damaged.    If the patch falls off, apply a new a patch on a different location of the body.    Please tell your doctor and pharmacist about all the medicines you take. Include both prescription and over-the-counter medicines. Also tell them about any vitamins, herbal medicines, or anything else you take for your health.    If you need to stop this medicine, your doctor may wish to gradually reduce the dosage before stopping.    Do not use more than 1 patch at any one time.    Cautions  Tell your doctor and pharmacist if you ever had an allergic reaction to a medicine. Symptoms of an allergic reaction can include trouble breathing, skin rash, itching, swelling, or severe dizziness.    Do not use the medication any more than instructed.    Avoid smoking while on this medicine. Smoking may increase your risk for stroke, heart attack, blood clots, high blood pressure, and other diseases of the heart and blood vessels.    Tell the doctor or pharmacist if you are pregnant, planning to be pregnant, or breastfeeding.    Ask your pharmacist if this medicine can interact with any of your other medicines. Be sure to tell them about all the medicines you take.    Please tell all your doctors and dentists that you are on this medicine before they provide care.    Side Effects  The following is a list of some common side effects from this medicine. Please speak with your doctor about what you should do if you experience these or other side effects.    skin irritation where medicine is applied    If you have any  of the following side effects, you may be getting too much medicine. Please contact your doctor to let them know about these side effects.    diarrhea  dizziness  nausea  rapid heartbeat  vomiting    A few people may have an allergic reaction to this medicine. Symptoms can include difficulty breathing, skin rash, itching, swelling, or severe dizziness. If you notice any of these symptoms, seek medical help quickly.    Extra  Please speak with your doctor, nurse, or pharmacist if you have any questions about this medicine.      [email or web address]  IMPORTANT NOTE: This document tells you briefly how to take your medicine, but it does not tell you all there is to know about it. Your doctor or pharmacist may give you other documents about your medicine. Please talk to them if you have any questions. Always follow their advice. There is a more complete description of this medicine available in English. Scan this code on your smartphone or tablet or use the web address below. You can also ask your pharmacist for a printout. If you have any questions, please ask your pharmacist.   2021 [employer].      3400-7030 [employer]. All rights reserved. This information is not intended as a substitute for professional medical care. Always follow your healthcare professional's instructions.

## 2025-07-17 ENCOUNTER — PATIENT OUTREACH (OUTPATIENT)
Dept: CARE COORDINATION | Facility: CLINIC | Age: 40
End: 2025-07-17
Payer: COMMERCIAL

## 2025-07-21 ENCOUNTER — PATIENT OUTREACH (OUTPATIENT)
Dept: CARE COORDINATION | Facility: CLINIC | Age: 40
End: 2025-07-21
Payer: COMMERCIAL

## 2025-07-23 ENCOUNTER — THERAPY VISIT (OUTPATIENT)
Dept: PHYSICAL THERAPY | Facility: REHABILITATION | Age: 40
End: 2025-07-23
Attending: NURSE PRACTITIONER
Payer: COMMERCIAL

## 2025-07-23 DIAGNOSIS — M54.42 CHRONIC LEFT-SIDED LOW BACK PAIN WITH LEFT-SIDED SCIATICA: Primary | ICD-10-CM

## 2025-07-23 DIAGNOSIS — G89.29 CHRONIC LEFT-SIDED LOW BACK PAIN WITH LEFT-SIDED SCIATICA: Primary | ICD-10-CM

## 2025-07-23 PROCEDURE — 97161 PT EVAL LOW COMPLEX 20 MIN: CPT | Mod: GP | Performed by: PHYSICAL THERAPIST

## 2025-07-23 PROCEDURE — 97110 THERAPEUTIC EXERCISES: CPT | Mod: GP | Performed by: PHYSICAL THERAPIST

## 2025-07-23 NOTE — PROGRESS NOTES
"PHYSICAL THERAPY EVALUATION  Type of Visit: Evaluation       Fall Risk Screen:  Have you fallen 2 or more times in the past year?: No  Have you fallen and had an injury in the past year?: No    Subjective   Patient is having left sided lower back, states they've \"always\" had pain in this area, since before they were 15. Symptoms most prevelent with sitting and driving, bending, and transitioning from sitting to standing.     Sleep - \"not great right now\", waking up and not getting back to bed, sometimes waking up due to pain, other times due to bathroom and having difficulty getting back to bed.     Standing and walking feels okay. Actually feels better with being more active on their feet.     Left leg pain develops with long periods of driving or sitting and this is what limits their sitting more than the back pain.     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)  Prior physical therapy experience/exposure - yes for right knee when they were a kid   Goals - sit long periods of time for 1 hour or sit in car for 2+ hours without needing to stretch, not have back pain at night (takes gummies sometimes to help with sleep, doesn't like taking medications)   What they believe is needed in order to improve - PT          Presenting condition or subjective complaint: Lower spine issue  Date of onset: 07/16/25    Relevant medical history: Asthma; Depression; Smoking   Dates & types of surgery:      Prior diagnostic imaging/testing results: MRI; X-ray     Prior therapy history for the same diagnosis, illness or injury: No      Prior Level of Function  Transfers:   Ambulation:   ADL:   IADL:     Living Environment  Social support: With a significant other or spouse   Type of home: House   Stairs to enter the home: Yes 5 Is there a railing: Yes     Ramp: No   Stairs inside the home: Yes 12 Is there a railing: Yes     Help at home: " None  Equipment owned:       Employment: No    Hobbies/Interests:      Patient goals for therapy: Not have pain sitting long periods    Pain assessment:      Objective   LUMBAR SPINE EVALUATION  PAIN:   INTEGUMENTARY (edema, incisions):   POSTURE:   GAIT:   Weightbearing Status:   Assistive Device(s):   Gait Deviations:   BALANCE/PROPRIOCEPTION:   WEIGHTBEARING ALIGNMENT:   NON-WEIGHTBEARING ALIGNMENT:    ROM:   (Degrees) Left AROM Left PROM  Right AROM Right PROM   Hip Flexion       Hip Extension       Hip Abduction       Hip Adduction       Hip Internal Rotation       Hip External Rotation       Knee Flexion       Knee Extension       Lumbar Side glide     Lumbar Flexion Limited by <25%   Lumbar Extension Limited by <25%   Pain:   End feel:   PELVIC/SI SCREEN:   STRENGTH: prone trunk extension endurance not tested due to pain, hip extension MMT L 4/5, R 4+/5    MYOTOMES: WNL  DTR S:    Left Right   C5 (Biceps)     C6 (Brachioradialis)     C7 (Triceps)     L4 (Quad) 2 2   S1 (Achilles) 2 2     CORD SIGNS:   DERMATOMES:   NEURAL TENSION:   FLEXIBILITY:   LUMBAR/HIP Special Tests:    PELVIS/SI SPECIAL TESTS:   FUNCTIONAL TESTS:   PALPATION:   SPINAL SEGMENTAL CONCLUSIONS:       Assessment & Plan   CLINICAL IMPRESSIONS  Medical Diagnosis: M54.42, G89.29 (ICD-10-CM) - Chronic left-sided low back pain with left-sided sciatica    Treatment Diagnosis: low back pain with trunk muscle power deficits   Impression/Assessment: Patient is a 39 year old male with chief complaints of low back pain with trunk muscle power deficits.  The following significant findings have been identified: Pain, Decreased ROM/flexibility, Decreased strength, Impaired muscle performance, and Decreased activity tolerance. These impairments interfere with their ability to perform household chores and driving  as compared to previous level of function.     Clinical Decision Making (Complexity):  Clinical Presentation: Stable/Uncomplicated  Clinical  Presentation Rationale: based on medical and personal factors listed in PT evaluation  Clinical Decision Making (Complexity): Low complexity    PLAN OF CARE  Treatment Interventions:  Interventions: Manual Therapy, Neuromuscular Re-education, Therapeutic Activity, Therapeutic Exercise, Self-Care/Home Management    Long Term Goals     PT Goal 1  Goal Identifier: HEP  Goal Description: Patient will demonstrate at least 50% compliance in their HEP to support progress towards functional and patient specific goals  Target Date: 10/15/25  PT Goal 2  Goal Identifier: drivign  Goal Description: Patient will be able to drive for at least 2 hours without needing to stop due to left leg pain  Target Date: 10/15/25  PT Goal 3  Goal Identifier: bending  Goal Description: Patient will report being able to bend and lift at least 20% body weight with appropriate mechanics for improved tolerance with ADL's  Target Date: 10/15/25      Frequency of Treatment: every other week  Duration of Treatment: 12 weeks    Recommended Referrals to Other Professionals:   Education Assessment:   Learner/Method: Patient    Risks and benefits of evaluation/treatment have been explained.   Patient/Family/caregiver agrees with Plan of Care.     Evaluation Time:     PT Eval, Low Complexity Minutes (57495): 25       Signing Clinician: Riccardo Campbell PT

## 2025-08-11 ENCOUNTER — TELEPHONE (OUTPATIENT)
Dept: FAMILY MEDICINE | Facility: CLINIC | Age: 40
End: 2025-08-11

## 2025-08-11 ENCOUNTER — OFFICE VISIT (OUTPATIENT)
Dept: FAMILY MEDICINE | Facility: CLINIC | Age: 40
End: 2025-08-11
Payer: COMMERCIAL

## 2025-08-11 VITALS
WEIGHT: 219.6 LBS | OXYGEN SATURATION: 98 % | BODY MASS INDEX: 28.18 KG/M2 | HEIGHT: 74 IN | DIASTOLIC BLOOD PRESSURE: 84 MMHG | TEMPERATURE: 98.2 F | SYSTOLIC BLOOD PRESSURE: 128 MMHG | HEART RATE: 96 BPM | RESPIRATION RATE: 18 BRPM

## 2025-08-11 DIAGNOSIS — M25.512 ACUTE PAIN OF LEFT SHOULDER: Primary | ICD-10-CM

## 2025-08-11 DIAGNOSIS — M79.602 LEFT ARM PAIN: ICD-10-CM

## 2025-08-11 PROCEDURE — 3074F SYST BP LT 130 MM HG: CPT | Performed by: FAMILY MEDICINE

## 2025-08-11 PROCEDURE — 3079F DIAST BP 80-89 MM HG: CPT | Performed by: FAMILY MEDICINE

## 2025-08-11 PROCEDURE — 99214 OFFICE O/P EST MOD 30 MIN: CPT | Performed by: FAMILY MEDICINE

## 2025-08-11 PROCEDURE — 1125F AMNT PAIN NOTED PAIN PRSNT: CPT | Performed by: FAMILY MEDICINE

## 2025-08-11 PROCEDURE — G2211 COMPLEX E/M VISIT ADD ON: HCPCS | Performed by: FAMILY MEDICINE

## 2025-08-11 ASSESSMENT — PAIN SCALES - GENERAL: PAINLEVEL_OUTOF10: SEVERE PAIN (8)

## 2025-08-12 ENCOUNTER — HOSPITAL ENCOUNTER (OUTPATIENT)
Dept: MRI IMAGING | Facility: HOSPITAL | Age: 40
Discharge: HOME | End: 2025-08-12
Attending: FAMILY MEDICINE
Payer: COMMERCIAL

## 2025-08-12 DIAGNOSIS — M25.512 ACUTE PAIN OF LEFT SHOULDER: ICD-10-CM

## 2025-08-12 DIAGNOSIS — M79.602 LEFT ARM PAIN: ICD-10-CM

## 2025-08-12 PROCEDURE — 73221 MRI JOINT UPR EXTREM W/O DYE: CPT | Mod: LT

## 2025-08-13 ENCOUNTER — THERAPY VISIT (OUTPATIENT)
Dept: PHYSICAL THERAPY | Facility: CLINIC | Age: 40
End: 2025-08-13
Payer: COMMERCIAL

## 2025-08-13 DIAGNOSIS — M54.42 CHRONIC LEFT-SIDED LOW BACK PAIN WITH LEFT-SIDED SCIATICA: Primary | ICD-10-CM

## 2025-08-13 DIAGNOSIS — G89.29 CHRONIC LEFT-SIDED LOW BACK PAIN WITH LEFT-SIDED SCIATICA: Primary | ICD-10-CM

## 2025-08-13 PROCEDURE — 97110 THERAPEUTIC EXERCISES: CPT | Mod: GP | Performed by: PHYSICAL THERAPIST

## (undated) RX ORDER — FENTANYL CITRATE 50 UG/ML
INJECTION, SOLUTION INTRAMUSCULAR; INTRAVENOUS
Status: DISPENSED
Start: 2019-11-05

## (undated) RX ORDER — FENTANYL CITRATE 50 UG/ML
INJECTION, SOLUTION INTRAMUSCULAR; INTRAVENOUS
Status: DISPENSED
Start: 2020-03-12